# Patient Record
Sex: MALE | Race: ASIAN | NOT HISPANIC OR LATINO | URBAN - METROPOLITAN AREA
[De-identification: names, ages, dates, MRNs, and addresses within clinical notes are randomized per-mention and may not be internally consistent; named-entity substitution may affect disease eponyms.]

---

## 2021-01-19 ENCOUNTER — INPATIENT (INPATIENT)
Facility: HOSPITAL | Age: 56
LOS: 11 days | Discharge: HOME | End: 2021-01-31
Attending: INTERNAL MEDICINE | Admitting: INTERNAL MEDICINE
Payer: COMMERCIAL

## 2021-01-19 VITALS
DIASTOLIC BLOOD PRESSURE: 70 MMHG | SYSTOLIC BLOOD PRESSURE: 107 MMHG | OXYGEN SATURATION: 97 % | RESPIRATION RATE: 17 BRPM | TEMPERATURE: 98 F | HEART RATE: 93 BPM

## 2021-01-19 DIAGNOSIS — R62.7 ADULT FAILURE TO THRIVE: ICD-10-CM

## 2021-01-19 LAB
ALBUMIN SERPL ELPH-MCNC: 3 G/DL — LOW (ref 3.3–5)
ALP SERPL-CCNC: 361 U/L — HIGH (ref 40–120)
ALT FLD-CCNC: 39 U/L — SIGNIFICANT CHANGE UP (ref 4–41)
ANION GAP SERPL CALC-SCNC: 13 MMOL/L — SIGNIFICANT CHANGE UP (ref 7–14)
APTT BLD: 31.8 SEC — SIGNIFICANT CHANGE UP (ref 27–36.3)
AST SERPL-CCNC: 110 U/L — HIGH (ref 4–40)
BASOPHILS # BLD AUTO: 0 K/UL — SIGNIFICANT CHANGE UP (ref 0–0.2)
BASOPHILS NFR BLD AUTO: 0 % — SIGNIFICANT CHANGE UP (ref 0–2)
BILIRUB SERPL-MCNC: 0.4 MG/DL — SIGNIFICANT CHANGE UP (ref 0.2–1.2)
BUN SERPL-MCNC: 21 MG/DL — SIGNIFICANT CHANGE UP (ref 7–23)
CALCIUM SERPL-MCNC: 8.7 MG/DL — SIGNIFICANT CHANGE UP (ref 8.4–10.5)
CHLORIDE SERPL-SCNC: 101 MMOL/L — SIGNIFICANT CHANGE UP (ref 98–107)
CO2 SERPL-SCNC: 21 MMOL/L — LOW (ref 22–31)
CREAT SERPL-MCNC: 0.81 MG/DL — SIGNIFICANT CHANGE UP (ref 0.5–1.3)
EOSINOPHIL # BLD AUTO: 0 K/UL — SIGNIFICANT CHANGE UP (ref 0–0.5)
EOSINOPHIL NFR BLD AUTO: 0 % — SIGNIFICANT CHANGE UP (ref 0–6)
GLUCOSE SERPL-MCNC: 125 MG/DL — HIGH (ref 70–99)
HCT VFR BLD CALC: 34.4 % — LOW (ref 39–50)
HGB BLD-MCNC: 10.2 G/DL — LOW (ref 13–17)
IANC: 9.5 K/UL — HIGH (ref 1.5–8.5)
INR BLD: 1.29 RATIO — HIGH (ref 0.88–1.16)
LYMPHOCYTES # BLD AUTO: 1.03 K/UL — SIGNIFICANT CHANGE UP (ref 1–3.3)
LYMPHOCYTES # BLD AUTO: 8.8 % — LOW (ref 13–44)
MAGNESIUM SERPL-MCNC: 2.7 MG/DL — HIGH (ref 1.6–2.6)
MCHC RBC-ENTMCNC: 19.2 PG — LOW (ref 27–34)
MCHC RBC-ENTMCNC: 29.7 GM/DL — LOW (ref 32–36)
MCV RBC AUTO: 64.8 FL — LOW (ref 80–100)
MONOCYTES # BLD AUTO: 0.71 K/UL — SIGNIFICANT CHANGE UP (ref 0–0.9)
MONOCYTES NFR BLD AUTO: 6.1 % — SIGNIFICANT CHANGE UP (ref 2–14)
NEUTROPHILS # BLD AUTO: 9.77 K/UL — HIGH (ref 1.8–7.4)
NEUTROPHILS NFR BLD AUTO: 78.1 % — HIGH (ref 43–77)
PHOSPHATE SERPL-MCNC: 3.6 MG/DL — SIGNIFICANT CHANGE UP (ref 2.5–4.5)
PLATELET # BLD AUTO: 331 K/UL — SIGNIFICANT CHANGE UP (ref 150–400)
POTASSIUM SERPL-MCNC: 5 MMOL/L — SIGNIFICANT CHANGE UP (ref 3.5–5.3)
POTASSIUM SERPL-SCNC: 5 MMOL/L — SIGNIFICANT CHANGE UP (ref 3.5–5.3)
PROT SERPL-MCNC: 7.6 G/DL — SIGNIFICANT CHANGE UP (ref 6–8.3)
PROTHROM AB SERPL-ACNC: 14.5 SEC — HIGH (ref 10.6–13.6)
RBC # BLD: 5.31 M/UL — SIGNIFICANT CHANGE UP (ref 4.2–5.8)
RBC # FLD: 18.1 % — HIGH (ref 10.3–14.5)
SARS-COV-2 RNA SPEC QL NAA+PROBE: SIGNIFICANT CHANGE UP
SODIUM SERPL-SCNC: 135 MMOL/L — SIGNIFICANT CHANGE UP (ref 135–145)
WBC # BLD: 11.71 K/UL — HIGH (ref 3.8–10.5)
WBC # FLD AUTO: 11.71 K/UL — HIGH (ref 3.8–10.5)

## 2021-01-19 PROCEDURE — 99285 EMERGENCY DEPT VISIT HI MDM: CPT

## 2021-01-19 RX ORDER — SODIUM CHLORIDE 9 MG/ML
1000 INJECTION INTRAMUSCULAR; INTRAVENOUS; SUBCUTANEOUS ONCE
Refills: 0 | Status: COMPLETED | OUTPATIENT
Start: 2021-01-19 | End: 2021-01-19

## 2021-01-19 RX ADMIN — SODIUM CHLORIDE 1000 MILLILITER(S): 9 INJECTION INTRAMUSCULAR; INTRAVENOUS; SUBCUTANEOUS at 18:20

## 2021-01-19 NOTE — ED PROVIDER NOTE - OBJECTIVE STATEMENT
56 yo m pmh colon CA mets to liver and lungs, pw syncope. pt was dx w/ ca one month prior at OSH after having abd pain and GIB. reports was following up w/ pcp today and had syncopal event in office and in bathroom. unclear if before/after using bathroom. reports having prodromal sx including lightheadedness. reports intermittent cp/sob over last month. none present today. reports 25 lb weight loss over last month. denies n/v, f/c.

## 2021-01-19 NOTE — ED PROVIDER NOTE - ATTENDING CONTRIBUTION TO CARE
Abhijeet BAZZI: I agree with the above provided history and exam and addend/modify it as follows.    55M w/ pmh colon CA (newly diagnosed as of 2 wks ago) - p/w generalized weakness x 2 wks, as well as pre-syncopal episode today - was walking to bathroom, felt very weak and dizzy, grabbed onto railing and slowly let himself down onto the ground, denies hitting head / injuring any part of his body, denies passing out at all. Denies any other recent complaints, no cough, fever, vomiting, abd pain, chest pain.     On exam patient appears pale, weak, moving all extremities spontaneously, no abd TTP, lungs CTAB.     Plan to check syncopal workup. Concern for FTT, pt may need to be admitted for further workup and management, but also unable to care for himself at home at this time    I Josue Jha MD performed a history and physical exam of the patient and discussed their management with the resident and /or advanced care provider. I reviewed the resident and /or ACP's note and agree with the documented findings and plan of care. My medical decision making and observations are found above.

## 2021-01-19 NOTE — ED PROVIDER NOTE - NS ED ROS FT
GENERAL: No fever or chills, //             EYES: no change in vision, //             HEENT: no trouble swallowing or speaking, //             CARDIAC: no chest pain, //              PULMONARY:  SOB, //             GI: no abdominal pain, no nausea or no vomiting, no diarrhea or constipation, //             : No changes in urination,  //            SKIN: no rashes,  //            NEURO: no headache,  //             MSK: No joint pain otherwise as HPI or negative. ~David Pike DO PGY3

## 2021-01-19 NOTE — ED ADULT TRIAGE NOTE - CHIEF COMPLAINT QUOTE
Pt. brought in by EMS from [facility] sent in for near syncopal episode at home and MD office. Pt. found to be hypotensive at MD office, given 500cc bolus. Pt. recently diagnosed with colon CA, has been experiencing FTT [id number]. Pt. states he lost 25 lbs in the past month. Noted IV in RAC obtained at [facility]D office. Pt. brought in by EMS from [facility] sent in for near syncopal episode at home and MD office. Pt. found to be hypotensive at MD office, given 500cc bolus. Pt. recently diagnosed with colon CA, has been experiencing FTT [id number]. Pt. states he lost 25 lbs in the past month. fingerstick of 130 done by EMS at [facility] office. Noted IV in RAC obtained at PMD office.

## 2021-01-19 NOTE — ED PROVIDER NOTE - CLINICAL SUMMARY MEDICAL DECISION MAKING FREE TEXT BOX
yousuf pgy3: 56 yo m w/ recent dx of metastatic colon ca pw new syncope. no prior hx. no recent cardiac w/u. no reported cad hx in family. possible volume down due to recent weight loss. will check labs, imaging, likey tba. pt w/ no tachycardia or dyspnea upon current eval however at risk of PE, will eval.

## 2021-01-19 NOTE — ED PROVIDER NOTE - PHYSICAL EXAMINATION
General: well appearing, interactive, well nourished, no apparent distress, ncat  HEENT: EOMI, PERRLA, normal mucosa, normal oropharynx, no lesions on the lips or on oral mucosa, normal external ear  Neck: supple, no lymphadenopathy, full range of motion, no nuchal rigidity  CV: RRR, normal S1 and S2 with no murmur, capillary refill less than two seconds, pp 2+   Resp: lungs CTA b/l, good aeration bilaterally, symmetric chest wall   Abd: non-distended, soft, non-tender  : no CVA tenderness  MSK: full range of motion, no cyanosis, no edema, no clubbing, no immobility  Neuro: CN II-XII grossly intact, muscle strength 5/5 in all extremities, silt in all extremities   Skin: no rashes, skin intact

## 2021-01-19 NOTE — ED ADULT NURSE REASSESSMENT NOTE - NS ED NURSE REASSESS COMMENT FT1
Received report from JAMIR Fragoso. Pt resting comfortably at this time, resp. even and unlabored. Pt states he has been feeling weak x2-4 weeks. Denies CP, SOB, HA, dizziness, and abd. pain. Awaiting CT scan results. VS as noted. NSR on the CM. NAD. Will continue to monitor.

## 2021-01-19 NOTE — ED ADULT NURSE NOTE - CHIEF COMPLAINT QUOTE
Pt. brought in by EMS from [facility] sent in for near syncopal episode at home and MD office. Pt. found to be hypotensive at MD office, given 500cc bolus. Pt. recently diagnosed with colon CA, has been experiencing FTT [id number]. Pt. states he lost 25 lbs in the past month. fingerstick of 130 done by EMS at [facility] office. Noted IV in RAC obtained at [facility]D office.

## 2021-01-19 NOTE — ED ADULT NURSE NOTE - OBJECTIVE STATEMENT
pt received spot 5. pt A+Ox3 recently diagnosed with colon ca with mets to liver and lungs. s/p syncopal episode today at pmd office. denies cp/sob. respirations even and unlabored. placed on cm. NSR noted. IVSL in place. labs drawn and sent. awaiting further orders. will monitor.

## 2021-01-20 DIAGNOSIS — C18.9 MALIGNANT NEOPLASM OF COLON, UNSPECIFIED: ICD-10-CM

## 2021-01-20 DIAGNOSIS — D72.829 ELEVATED WHITE BLOOD CELL COUNT, UNSPECIFIED: ICD-10-CM

## 2021-01-20 DIAGNOSIS — R55 SYNCOPE AND COLLAPSE: ICD-10-CM

## 2021-01-20 DIAGNOSIS — R94.5 ABNORMAL RESULTS OF LIVER FUNCTION STUDIES: ICD-10-CM

## 2021-01-20 DIAGNOSIS — R62.7 ADULT FAILURE TO THRIVE: ICD-10-CM

## 2021-01-20 DIAGNOSIS — Z29.9 ENCOUNTER FOR PROPHYLACTIC MEASURES, UNSPECIFIED: ICD-10-CM

## 2021-01-20 DIAGNOSIS — R50.9 FEVER, UNSPECIFIED: ICD-10-CM

## 2021-01-20 DIAGNOSIS — D64.9 ANEMIA, UNSPECIFIED: ICD-10-CM

## 2021-01-20 LAB
ALBUMIN SERPL ELPH-MCNC: 2.2 G/DL — LOW (ref 3.3–5)
ALP SERPL-CCNC: 306 U/L — HIGH (ref 40–120)
ALT FLD-CCNC: 34 U/L — SIGNIFICANT CHANGE UP (ref 4–41)
ANION GAP SERPL CALC-SCNC: 6 MMOL/L — LOW (ref 7–14)
APTT BLD: 32.2 SEC — SIGNIFICANT CHANGE UP (ref 27–36.3)
AST SERPL-CCNC: 148 U/L — HIGH (ref 4–40)
B PERT DNA SPEC QL NAA+PROBE: SIGNIFICANT CHANGE UP
BASOPHILS # BLD AUTO: 0.03 K/UL — SIGNIFICANT CHANGE UP (ref 0–0.2)
BASOPHILS NFR BLD AUTO: 0.3 % — SIGNIFICANT CHANGE UP (ref 0–2)
BILIRUB SERPL-MCNC: 0.5 MG/DL — SIGNIFICANT CHANGE UP (ref 0.2–1.2)
BUN SERPL-MCNC: 15 MG/DL — SIGNIFICANT CHANGE UP (ref 7–23)
C PNEUM DNA SPEC QL NAA+PROBE: SIGNIFICANT CHANGE UP
CALCIUM SERPL-MCNC: 8 MG/DL — LOW (ref 8.4–10.5)
CHLORIDE SERPL-SCNC: 106 MMOL/L — SIGNIFICANT CHANGE UP (ref 98–107)
CO2 SERPL-SCNC: 19 MMOL/L — LOW (ref 22–31)
CREAT SERPL-MCNC: 0.63 MG/DL — SIGNIFICANT CHANGE UP (ref 0.5–1.3)
CRP SERPL-MCNC: 105 MG/L — HIGH
EOSINOPHIL # BLD AUTO: 0.03 K/UL — SIGNIFICANT CHANGE UP (ref 0–0.5)
EOSINOPHIL NFR BLD AUTO: 0.3 % — SIGNIFICANT CHANGE UP (ref 0–6)
FERRITIN SERPL-MCNC: 314 NG/ML — SIGNIFICANT CHANGE UP (ref 30–400)
FLUAV H1 2009 PAND RNA SPEC QL NAA+PROBE: SIGNIFICANT CHANGE UP
FLUAV H1 RNA SPEC QL NAA+PROBE: SIGNIFICANT CHANGE UP
FLUAV H3 RNA SPEC QL NAA+PROBE: SIGNIFICANT CHANGE UP
FLUAV SUBTYP SPEC NAA+PROBE: SIGNIFICANT CHANGE UP
FLUBV RNA SPEC QL NAA+PROBE: SIGNIFICANT CHANGE UP
FOLATE SERPL-MCNC: 18.2 NG/ML — HIGH (ref 3.1–17.5)
GLUCOSE BLDC GLUCOMTR-MCNC: 77 MG/DL — SIGNIFICANT CHANGE UP (ref 70–99)
GLUCOSE BLDC GLUCOMTR-MCNC: 79 MG/DL — SIGNIFICANT CHANGE UP (ref 70–99)
GLUCOSE SERPL-MCNC: 73 MG/DL — SIGNIFICANT CHANGE UP (ref 70–99)
HADV DNA SPEC QL NAA+PROBE: SIGNIFICANT CHANGE UP
HAPTOGLOB SERPL-MCNC: 393 MG/DL — HIGH (ref 34–200)
HCOV PNL SPEC NAA+PROBE: SIGNIFICANT CHANGE UP
HCT VFR BLD CALC: 32.8 % — LOW (ref 39–50)
HCV AB S/CO SERPL IA: 0.13 S/CO — SIGNIFICANT CHANGE UP (ref 0–0.99)
HCV AB SERPL-IMP: SIGNIFICANT CHANGE UP
HGB BLD-MCNC: 9.9 G/DL — LOW (ref 13–17)
HMPV RNA SPEC QL NAA+PROBE: SIGNIFICANT CHANGE UP
HPIV1 RNA SPEC QL NAA+PROBE: SIGNIFICANT CHANGE UP
HPIV2 RNA SPEC QL NAA+PROBE: SIGNIFICANT CHANGE UP
HPIV3 RNA SPEC QL NAA+PROBE: SIGNIFICANT CHANGE UP
HPIV4 RNA SPEC QL NAA+PROBE: SIGNIFICANT CHANGE UP
IANC: 7.87 K/UL — SIGNIFICANT CHANGE UP (ref 1.5–8.5)
IMM GRANULOCYTES NFR BLD AUTO: 0.4 % — SIGNIFICANT CHANGE UP (ref 0–1.5)
INR BLD: 1.24 RATIO — HIGH (ref 0.88–1.16)
IRON SATN MFR SERPL: 34 UG/DL — LOW (ref 45–165)
IRON SATN MFR SERPL: 9 % — LOW (ref 14–50)
LDH SERPL L TO P-CCNC: 942 U/L — HIGH (ref 135–225)
LYMPHOCYTES # BLD AUTO: 1.95 K/UL — SIGNIFICANT CHANGE UP (ref 1–3.3)
LYMPHOCYTES # BLD AUTO: 18.7 % — SIGNIFICANT CHANGE UP (ref 13–44)
MAGNESIUM SERPL-MCNC: 2.5 MG/DL — SIGNIFICANT CHANGE UP (ref 1.6–2.6)
MCHC RBC-ENTMCNC: 19.3 PG — LOW (ref 27–34)
MCHC RBC-ENTMCNC: 30.2 GM/DL — LOW (ref 32–36)
MCV RBC AUTO: 64.1 FL — LOW (ref 80–100)
MONOCYTES # BLD AUTO: 0.53 K/UL — SIGNIFICANT CHANGE UP (ref 0–0.9)
MONOCYTES NFR BLD AUTO: 5.1 % — SIGNIFICANT CHANGE UP (ref 2–14)
NEUTROPHILS # BLD AUTO: 7.87 K/UL — HIGH (ref 1.8–7.4)
NEUTROPHILS NFR BLD AUTO: 75.2 % — SIGNIFICANT CHANGE UP (ref 43–77)
NRBC # BLD: 0 /100 WBCS — SIGNIFICANT CHANGE UP
NRBC # FLD: 0 K/UL — SIGNIFICANT CHANGE UP
PHOSPHATE SERPL-MCNC: 3.6 MG/DL — SIGNIFICANT CHANGE UP (ref 2.5–4.5)
PLATELET # BLD AUTO: 309 K/UL — SIGNIFICANT CHANGE UP (ref 150–400)
POTASSIUM SERPL-MCNC: SIGNIFICANT CHANGE UP MMOL/L (ref 3.5–5.3)
POTASSIUM SERPL-SCNC: SIGNIFICANT CHANGE UP MMOL/L (ref 3.5–5.3)
PROCALCITONIN SERPL-MCNC: 1.35 NG/ML — HIGH (ref 0.02–0.1)
PROT SERPL-MCNC: 6.8 G/DL — SIGNIFICANT CHANGE UP (ref 6–8.3)
PROTHROM AB SERPL-ACNC: 14.1 SEC — HIGH (ref 10.6–13.6)
PSA FREE FLD-MCNC: 0.29 NG/ML — SIGNIFICANT CHANGE UP
PSA FREE FLD-MCNC: 21 % — SIGNIFICANT CHANGE UP
PSA SERPL-MCNC: 1.34 NG/ML — SIGNIFICANT CHANGE UP (ref 0–4)
RAPID RVP RESULT: SIGNIFICANT CHANGE UP
RAPID RVP RESULT: SIGNIFICANT CHANGE UP
RBC # BLD: 5.12 M/UL — SIGNIFICANT CHANGE UP (ref 4.2–5.8)
RBC # BLD: 5.12 M/UL — SIGNIFICANT CHANGE UP (ref 4.2–5.8)
RBC # FLD: 18.5 % — HIGH (ref 10.3–14.5)
RETICS #: 34 K/UL — SIGNIFICANT CHANGE UP (ref 25–125)
RETICS/RBC NFR: 0.7 % — SIGNIFICANT CHANGE UP (ref 0.5–2.5)
RSV RNA SPEC QL NAA+PROBE: SIGNIFICANT CHANGE UP
RV+EV RNA SPEC QL NAA+PROBE: SIGNIFICANT CHANGE UP
SARS-COV-2 RNA SPEC QL NAA+PROBE: SIGNIFICANT CHANGE UP
SODIUM SERPL-SCNC: 131 MMOL/L — LOW (ref 135–145)
TIBC SERPL-MCNC: 358 UG/DL — SIGNIFICANT CHANGE UP (ref 220–430)
TROPONIN T, HIGH SENSITIVITY RESULT: 7 NG/L — SIGNIFICANT CHANGE UP
UIBC SERPL-MCNC: 324 UG/DL — SIGNIFICANT CHANGE UP (ref 110–370)
VIT B12 SERPL-MCNC: 1895 PG/ML — HIGH (ref 200–900)
WBC # BLD: 10.45 K/UL — SIGNIFICANT CHANGE UP (ref 3.8–10.5)
WBC # FLD AUTO: 10.45 K/UL — SIGNIFICANT CHANGE UP (ref 3.8–10.5)

## 2021-01-20 PROCEDURE — 99222 1ST HOSP IP/OBS MODERATE 55: CPT | Mod: GC

## 2021-01-20 PROCEDURE — 99223 1ST HOSP IP/OBS HIGH 75: CPT

## 2021-01-20 PROCEDURE — 99446 NTRPROF PH1/NTRNET/EHR 5-10: CPT | Mod: GC

## 2021-01-20 PROCEDURE — 12345: CPT | Mod: NC

## 2021-01-20 RX ORDER — SODIUM CHLORIDE 9 MG/ML
1000 INJECTION INTRAMUSCULAR; INTRAVENOUS; SUBCUTANEOUS
Refills: 0 | Status: DISCONTINUED | OUTPATIENT
Start: 2021-01-20 | End: 2021-01-31

## 2021-01-20 RX ORDER — ENOXAPARIN SODIUM 100 MG/ML
40 INJECTION SUBCUTANEOUS DAILY
Refills: 0 | Status: DISCONTINUED | OUTPATIENT
Start: 2021-01-20 | End: 2021-01-24

## 2021-01-20 RX ORDER — PIPERACILLIN AND TAZOBACTAM 4; .5 G/20ML; G/20ML
3.38 INJECTION, POWDER, LYOPHILIZED, FOR SOLUTION INTRAVENOUS EVERY 8 HOURS
Refills: 0 | Status: DISCONTINUED | OUTPATIENT
Start: 2021-01-20 | End: 2021-01-26

## 2021-01-20 RX ORDER — ASCORBIC ACID 60 MG
500 TABLET,CHEWABLE ORAL DAILY
Refills: 0 | Status: DISCONTINUED | OUTPATIENT
Start: 2021-01-20 | End: 2021-01-31

## 2021-01-20 RX ORDER — AZITHROMYCIN 500 MG/1
500 TABLET, FILM COATED ORAL ONCE
Refills: 0 | Status: COMPLETED | OUTPATIENT
Start: 2021-01-20 | End: 2021-01-20

## 2021-01-20 RX ORDER — PIPERACILLIN AND TAZOBACTAM 4; .5 G/20ML; G/20ML
3.38 INJECTION, POWDER, LYOPHILIZED, FOR SOLUTION INTRAVENOUS ONCE
Refills: 0 | Status: COMPLETED | OUTPATIENT
Start: 2021-01-20 | End: 2021-01-20

## 2021-01-20 RX ORDER — AZITHROMYCIN 500 MG/1
TABLET, FILM COATED ORAL
Refills: 0 | Status: DISCONTINUED | OUTPATIENT
Start: 2021-01-20 | End: 2021-01-24

## 2021-01-20 RX ORDER — AZITHROMYCIN 500 MG/1
500 TABLET, FILM COATED ORAL EVERY 24 HOURS
Refills: 0 | Status: DISCONTINUED | OUTPATIENT
Start: 2021-01-21 | End: 2021-01-24

## 2021-01-20 RX ORDER — ACETAMINOPHEN 500 MG
650 TABLET ORAL ONCE
Refills: 0 | Status: COMPLETED | OUTPATIENT
Start: 2021-01-20 | End: 2021-01-20

## 2021-01-20 RX ORDER — FERROUS SULFATE 325(65) MG
325 TABLET ORAL DAILY
Refills: 0 | Status: DISCONTINUED | OUTPATIENT
Start: 2021-01-20 | End: 2021-01-31

## 2021-01-20 RX ADMIN — PIPERACILLIN AND TAZOBACTAM 200 GRAM(S): 4; .5 INJECTION, POWDER, LYOPHILIZED, FOR SOLUTION INTRAVENOUS at 08:51

## 2021-01-20 RX ADMIN — PIPERACILLIN AND TAZOBACTAM 25 GRAM(S): 4; .5 INJECTION, POWDER, LYOPHILIZED, FOR SOLUTION INTRAVENOUS at 22:31

## 2021-01-20 RX ADMIN — SODIUM CHLORIDE 125 MILLILITER(S): 9 INJECTION INTRAMUSCULAR; INTRAVENOUS; SUBCUTANEOUS at 04:09

## 2021-01-20 RX ADMIN — Medication 500 MILLIGRAM(S): at 13:12

## 2021-01-20 RX ADMIN — Medication 650 MILLIGRAM(S): at 22:53

## 2021-01-20 RX ADMIN — PIPERACILLIN AND TAZOBACTAM 25 GRAM(S): 4; .5 INJECTION, POWDER, LYOPHILIZED, FOR SOLUTION INTRAVENOUS at 13:12

## 2021-01-20 RX ADMIN — ENOXAPARIN SODIUM 40 MILLIGRAM(S): 100 INJECTION SUBCUTANEOUS at 13:12

## 2021-01-20 RX ADMIN — Medication 325 MILLIGRAM(S): at 13:12

## 2021-01-20 RX ADMIN — AZITHROMYCIN 500 MILLIGRAM(S): 500 TABLET, FILM COATED ORAL at 09:52

## 2021-01-20 NOTE — ED ADULT NURSE REASSESSMENT NOTE - NS ED NURSE REASSESS COMMENT FT1
Report given to JAMIR Gonzáles in [facility]. Pt resting comfortably, resp. even and unlabored. Denies CP, SOB, HA, and dizziness. VS as noted. NSR on the CM. NAD. Transported to [facility].

## 2021-01-20 NOTE — CHART NOTE - NSCHARTNOTEFT_GEN_A_CORE
friday    Vascular & Interventional Radiology Brief Consult Note    Evaluate for Procedure: Liver Biopsy    HPI: 55y Male with PMH of vitiligo, with recent CT A/P showing mass in distal transverse colon with hepatic and right lower lobe metastases now presenting to the hospital for pre-syncopal episode likely 2/2 orthostatic hypotension. Patient reports near-syncopal episode when walking to the bathroom to urinate at night.     Allergies:   Medications (Abx/Cardiac/Anticoagulation/Blood Products)    azithromycin  IVPB: 500 milliGRAM(s) IV Intermittent (01-20 @ 09:52)  enoxaparin Injectable: 40 milliGRAM(s) SubCutaneous (01-20 @ 13:12)  piperacillin/tazobactam IVPB.: 200 mL/Hr IV Intermittent (01-20 @ 08:51)  piperacillin/tazobactam IVPB..: 25 mL/Hr IV Intermittent (01-20 @ 13:12)    Data:  167.6  51.7  T(C): 37.1  HR: 80  BP: 96/66  RR: 18  SpO2: 100%    -WBC 10.45 / HgB 9.9 / Hct 32.8 / Plt 309  -Na 131 / Cl 106 / BUN 15 / Glucose 73  -K TNP / CO2 19 / Cr 0.63  -ALT 34 / Alk Phos 306 / T.Bili 0.5  -INR 1.24 / PTT 32.2    Imaging: CT chest 1/19/2021: Hepatic hypodensities in keeping with known metastatic disease.    Assessment/Plan:   -55y Male with mass in distal transverse colon with hepatic and right lower lobe metastases     Plan:   CT biopsy liver masses on Friday.    -Please place order for IR Procedure, approving attending Dr. Parisi  -NPO past midnight Friday  -hold therapeutic and prophylactic anticoagulants (LVWH prophylaxis must be held 12 h prior and can be resumed 24 hours after procedure)  -maintain active type and screen x 2 Vascular & Interventional Radiology Brief Consult Note    Evaluate for Procedure: Liver Biopsy    HPI: 55y Male with PMH of vitiligo, with recent CT A/P showing mass in distal transverse colon with hepatic and right lower lobe metastases now presenting to the hospital for pre-syncopal episode likely 2/2 orthostatic hypotension. Patient reports near-syncopal episode when walking to the bathroom to urinate at night.     Allergies:   Medications (Abx/Cardiac/Anticoagulation/Blood Products)    azithromycin  IVPB: 500 milliGRAM(s) IV Intermittent (01-20 @ 09:52)  enoxaparin Injectable: 40 milliGRAM(s) SubCutaneous (01-20 @ 13:12)  piperacillin/tazobactam IVPB.: 200 mL/Hr IV Intermittent (01-20 @ 08:51)  piperacillin/tazobactam IVPB..: 25 mL/Hr IV Intermittent (01-20 @ 13:12)    Data:  167.6  51.7  T(C): 37.1  HR: 80  BP: 96/66  RR: 18  SpO2: 100%    -WBC 10.45 / HgB 9.9 / Hct 32.8 / Plt 309  -Na 131 / Cl 106 / BUN 15 / Glucose 73  -K TNP / CO2 19 / Cr 0.63  -ALT 34 / Alk Phos 306 / T.Bili 0.5  -INR 1.24 / PTT 32.2    Imaging: CT chest 1/19/2021: Hepatic hypodensities in keeping with known metastatic disease.    Assessment/Plan:   -55y Male with mass in distal transverse colon with hepatic and right lower lobe metastases     Plan:   Patient is currently on COVID precautions.    Will defer biopsy for now.  If patient is off of COVID precautions, please reconsult as needed or page [phone].

## 2021-01-20 NOTE — CONSULT NOTE ADULT - ASSESSMENT
56 yo man with history of vitiligo and recent finding of a mass in the distal transverse colon with hepatic and RLL metastatic disease (on CTAP on 1/5/21) presents with an episode of near syncope early Tuesday morning. Patient currently with no obstructive symptoms.     - No indication for acute surgical intervention   - GI consult for possible colonoscopy, tissue bx   - medical oncology consult   - Obtain tumor markers , CEA   - Discussed with Dr. Fernie Rao PGY2  B [facility]   [id number]  56 yo man with history of vitiligo and recent finding of a mass in the distal transverse colon with hepatic and RLL metastatic disease (on CTAP on 1/5/21) presents with an episode of near syncope early Tuesday morning. Patient currently with no obstructive symptoms.     - No indication for acute surgical intervention   - GI consult for possible colonoscopy, tissue bx   - medical oncology consult   - Obtain tumor markers , CEA  - Obtain OSH CT A/P for further review of imaging    - Discussed with Dr. Fernie Rao PGY2  B Team [facility]   [id number]

## 2021-01-20 NOTE — H&P ADULT - PROBLEM SELECTOR PLAN 2
Likely 2/2 metastatic disease from colon cancer. Pt with 2 days of fevers at home. On admission, pt initially afebrile but later had fever of 101.2F. Did not meet SIRS criteria. CTA chest with no PE but with peripheral based groundglass opacities in the right lung.   - COVID-19 PCR negative. Will check RVP and repeat COVID-19 swab. If negative, then will start empiric antibiotics with Zosyn and Azithromycin to cover for PNA and abdominal source given colonic mass and liver mets.  - F/u blood cxs  - F/u UA and urine cx, though pt denies any urinary symptoms  - Check procalcitonin, other COVID-19 inflammatory markers Pt with 2 days of fevers at home. On admission, pt initially afebrile but later had fever of 101.2F. Did not meet SIRS criteria, with leukocytosis of 11.71. CTA chest with no PE but with peripheral based groundglass opacities in the right lung.   - COVID-19 PCR negative. Will check RVP and repeat COVID-19 swab. If negative, then will start empiric antibiotics with Zosyn and Azithromycin to cover for PNA and abdominal source given colonic mass and liver mets.  - F/u blood cxs  - F/u UA and urine cx, though pt denies any urinary symptoms  - Check procalcitonin, other COVID-19 inflammatory markers

## 2021-01-20 NOTE — H&P ADULT - HISTORY OF PRESENT ILLNESS
54 yo man with recent finding of a mass in the distal transverse colon with hepatic and RLL metastatic disease (on CTAP on 1/5/21) presents with an episode of near syncope early Tuesday morning. Pt went to [facility] ED on 1/4/21 due to weeks of lower abdominal pain and was found to have a stool guaiac that was positive in the setting of anemia with Hgb 8.8. A CTAP that was performed during the ED visit showed an approximately 5.5 x 3.6 cm mass in the distal transverse colon, an enlarged prostate with distended bladder and mid prostate enhancement, and diffuse enhancing lesions throughout the liver and cluster of nodules at RLL consistent with metastatic disease. Pt was scheduled to have follow up   54 yo man with recent finding of a mass in the distal transverse colon with hepatic and RLL metastatic disease (on CTAP on 1/5/21) presents with an episode of near syncope early Tuesday morning. Pt went to [facility] ED on 1/4/21 due to weeks of lower abdominal pain and was found to have a stool guaiac that was positive in the setting of anemia with Hgb 8.8. A CTAP that was performed during the ED visit showed an approximately 5.5 x 3.6 cm mass in the distal transverse colon, an enlarged prostate with distended bladder and mid prostate enhancement, and diffuse enhancing lesions throughout the liver and cluster of nodules at RLL consistent with metastatic disease. Pt was scheduled to have follow up in early February and hasn't seen anyone yet for the findings. Pt went to see his PCP on Tuesday, however, after he almost passed out when he went to the bathroom to urinate in the middle of the night. Pt was feeling lightheaded/faint at the time and was about to fall to the ground when he caught himself by grabbing on to the door. Pt denies any LOC, head trauma, or fall. Pt again felt lightheaded/faint when he was at his PCP's office later in the day. No associated chest pain, palpitations, headaches, vision changes, numbness, tingling, diaphoresis,  56 yo man with history of vitiligo and recent finding of a mass in the distal transverse colon with hepatic and RLL metastatic disease (on CTAP on 1/5/21) presents with an episode of near syncope early Tuesday morning. Pt went to [facility] ED on 1/4/21 due to weeks of lower abdominal pain and was found to have a stool guaiac that was positive in the setting of anemia with Hgb 8.8. A CTAP that was performed during the ED visit showed an approximately 5.5 x 3.6 cm mass in the distal transverse colon, an enlarged prostate with distended bladder and mid prostate enhancement, and diffuse enhancing lesions throughout the liver and cluster of nodules at RLL consistent with metastatic disease. Pt was scheduled to have follow up in early February and hasn't seen anyone yet for the findings. Pt went to see his PCP on Tuesday, however, after he almost passed out when he went to the bathroom to urinate in the middle of the night. Pt was feeling lightheaded/faint at the time and was about to fall to the ground when he caught himself by grabbing on to the door. Pt denies any LOC, head trauma, or fall. Pt again felt lightheaded/faint when he was at his PCP's office later in the day. No associated chest pain, palpitations, headaches, vision changes, numbness, tingling, or diaphoresis. Pt notes that over the past 2-3 weeks, he has lost ~20-25 lbs as a result of a weak appetite and loss of taste. Pt also reports that for the last 1 week, he has been experiencing dyspnea with exertion, getting winded even when walking short distances, such as from his bed to the bathroom. Over the last 2 days, pt has been having intermittent fevers. Denies any cough, current abdominal pain, N/V, diarrhea, constipation, bloody stools, or urinary symptoms. Has had dark greenish stools since starting iron tablets a week ago.    In the ED,   T 98, HR 80-93, BP /54-70, RR 16-18, O2 sats % RA.

## 2021-01-20 NOTE — PROVIDER CONTACT NOTE (OTHER) - ACTION/TREATMENT ORDERED:
Provider notified and made aware. Provider recommends to reattempt PO intake and will order PO Tylenol.  Will continue to monitor.

## 2021-01-20 NOTE — H&P ADULT - NSHPPHYSICALEXAM_GEN_ALL_CORE
Vital Signs Last 24 Hrs  T(C): 36.7 (19 Jan 2021 20:42), Max: 36.7 (19 Jan 2021 15:36)  T(F): 98 (19 Jan 2021 20:42), Max: 98 (19 Jan 2021 15:36)  HR: 80 (20 Jan 2021 00:54) (80 - 93)  BP: 99/69 (20 Jan 2021 00:54) (91/54 - 107/70)  BP(mean): --  RR: 16 (20 Jan 2021 00:54) (16 - 18)  SpO2: 100% (20 Jan 2021 00:54) (97% - 100%)    PHYSICAL EXAM:  General: Awake and alert.  No acute distress.  Head: Normocephalic, atraumatic.    Eyes: PERRL.  EOMI.  No scleral icterus.  No conjunctival pallor.  Mouth: Moist MM.  No oropharyngeal exudates.    Neck: Supple.  Full range of motion.  No JVD.  No LAD.  No thyromegaly.  Trachea midline.    Heart: RRR.  Normal S1 and S2.  No murmurs, rubs, or gallops.  No LE edema b/l.   Lungs: Nonlabored breathing.  Good inspiratory effort.  CTAB.  No wheezes, crackles, or rhonchi.    Abdomen: BS+, soft, NT/ND.  No hepatomegaly.   Skin: Warm and dry.  No rashes.  Extremities: No cyanosis.  2+ peripheral pulses b/l.  Musculoskeletal: No joint deformities.  No spinal or paraspinal tenderness.  Neuro: A&Ox3.  CN II-XII intact.  5/5 motor strength in UE and LE b/l.  Tactile sensation intact in UE and LE b/l.  Cerebellar function intact as assessed by finger-to-nose test. Vital Signs Last 24 Hrs  T(C): 36.7 (19 Jan 2021 20:42), Max: 36.7 (19 Jan 2021 15:36)  T(F): 98 (19 Jan 2021 20:42), Max: 98 (19 Jan 2021 15:36)  HR: 80 (20 Jan 2021 00:54) (80 - 93)  BP: 99/69 (20 Jan 2021 00:54) (91/54 - 107/70)  BP(mean): --  RR: 16 (20 Jan 2021 00:54) (16 - 18)  SpO2: 100% (20 Jan 2021 00:54) (97% - 100%)    PHYSICAL EXAM:  General: Frail appearing.  Awake and alert.  No acute distress.  Head: Normocephalic, atraumatic.    Eyes: PERRL.  EOMI.  No scleral icterus.  No conjunctival pallor.  Mouth: Dry MM.  No oropharyngeal exudates.    Neck: Supple.  Full range of motion.  No JVD.  No LAD.  No thyromegaly.  Trachea midline.    Heart: RRR.  Normal S1 and S2.  No murmurs, rubs, or gallops.  No LE edema b/l.   Lungs: Nonlabored breathing.  Good inspiratory effort.  CTAB.  No wheezes, crackles, or rhonchi.    Abdomen: BS+, soft, nontender with light or deep palpation with no rebound or guarding, nondistended.  No hepatomegaly.   Skin: Warm and dry.  No rashes.  Extremities: No cyanosis.  2+ peripheral pulses b/l.  Musculoskeletal: No joint deformities.  No spinal or paraspinal tenderness.  Neuro: A&Ox3.  CN II-XII intact.  5/5 motor strength in UE and LE b/l.  Tactile sensation intact in UE and LE b/l.  No focal deficits.

## 2021-01-20 NOTE — CONSULT NOTE ADULT - SUBJECTIVE AND OBJECTIVE BOX
Patient is a 55y old  Male who presents with a chief complaint of Near syncope (20 Jan 2021 12:46)      HPI:    56 yo man with history of vitiligo and recent finding of a mass in the distal transverse colon with hepatic and RLL metastatic disease (on CTAP on 1/5/21) presents with an episode of near syncope early Tuesday morning. Pt went to [facility] ED on 1/4/21 due to weeks of lower abdominal pain and was found to have a stool guaiac that was positive in the setting of anemia with Hgb 8.8. A CTAP that was performed during the ED visit showed an approximately 5.5 x 3.6 cm mass in the distal transverse colon, an enlarged prostate with distended bladder and mid prostate enhancement, and diffuse enhancing lesions throughout the liver and cluster of nodules at RLL consistent with metastatic disease.     Pt was scheduled to have follow up in early February and hasn't seen anyone yet for the findings. Pt went to see his PCP on Tuesday, however, after he almost passed out when he went to the bathroom to urinate in the middle of the night. Pt was feeling lightheaded/faint at the time and was about to fall to the ground when he caught himself by grabbing on to the door. Pt denies any LOC, head trauma, or fall. Pt again felt lightheaded/faint when he was at his PCP's office later in the day. No associated chest pain, palpitations, headaches, vision changes, numbness, tingling, or diaphoresis. Pt notes that over the past 2-3 weeks, he has lost ~20-25 lbs as a result of a weak appetite and loss of taste. Pt also reports that for the last 1 week, he has been experiencing dyspnea with exertion, getting winded even when walking short distances, such as from his bed to the bathroom. Over the last 2 days, pt has been having intermittent fevers. Denies any cough, current abdominal pain, N/V, diarrhea, constipation, bloody stools, or urinary symptoms. Has had dark greenish stools since starting iron tablets a week ago.    In the ED,   T 98, HR 80-93, BP /54-70, RR 16-18, O2 sats % RA.  (20 Jan 2021 01:18)      REVIEW OF SYSTEMS:    CONSTITUTIONAL: No fever, weight loss, or fatigue  EYES: No eye pain, visual disturbances, or discharge  ENMT:  No sore throat  NECK: No pain or stiffness  RESPIRATORY: No cough, wheezing, chills or hemoptysis; No shortness of breath  CARDIOVASCULAR: No chest pain, palpitations, dizziness, or leg swelling  GASTROINTESTINAL: No abdominal or epigastric pain. No nausea, vomiting, or hematemesis; No diarrhea or constipation. No melena or hematochezia.  GENITOURINARY: No dysuria, frequency, hematuria, or incontinence  NEUROLOGICAL: No headaches, memory loss, loss of strength, numbness, or tremors  SKIN: No itching, burning, rashes, or lesions   LYMPH NODES: No enlarged glands  MUSCULOSKELETAL: No joint pain or swelling; No muscle, back, or extremity pain      PAST MEDICAL & SURGICAL HISTORY:  Vitiligo    Colon cancer    No significant past surgical history        Allergies    No Known Allergies    Intolerances        FAMILY HISTORY:  Family history of type 2 diabetes mellitus    Family history of hypertension    Family history of colon cancer        SOCIAL HISTORY:        MEDICATIONS  (STANDING):  ascorbic acid 500 milliGRAM(s) Oral daily  azithromycin  IVPB      enoxaparin Injectable 40 milliGRAM(s) SubCutaneous daily  ferrous    sulfate 325 milliGRAM(s) Oral daily  piperacillin/tazobactam IVPB.. 3.375 Gram(s) IV Intermittent every 8 hours  sodium chloride 0.9%. 1000 milliLiter(s) (125 mL/Hr) IV Continuous <Continuous>    MEDICATIONS  (PRN):      Vital Signs Last 24 Hrs  T(C): 37.1 (20 Jan 2021 12:50), Max: 38.4 (20 Jan 2021 01:57)  T(F): 98.8 (20 Jan 2021 12:50), Max: 101.2 (20 Jan 2021 01:57)  HR: 80 (20 Jan 2021 12:50) (73 - 93)  BP: 96/66 (20 Jan 2021 12:50) (91/54 - 107/70)  BP(mean): --  RR: 18 (20 Jan 2021 12:50) (16 - 18)  SpO2: 100% (20 Jan 2021 12:50) (96% - 100%)    PHYSICAL EXAM:    GENERAL: NAD, well-groomed  HEAD:  Atraumatic, Normocephalic  EYES: EOMI, PERRLA, conjunctiva and sclera clear  ENMT: No tonsillar erythema, exudates, or enlargement; Moist mucous membranes  NECK: Supple, No JVD  CHEST/LUNG: Clear to percussion bilaterally; No rales, rhonchi, wheezing, or rubs  HEART: Regular rate and rhythm; No murmurs, rubs, or gallops  ABDOMEN: Soft, Nontender, Nondistended; Bowel sounds present  EXTREMITIES:  2+ Peripheral Pulses, No clubbing, cyanosis, or edema  LYMPH: No lymphadenopathy noted  SKIN: No rashes or lesions    LABS:  CBC Full  -  ( 20 Jan 2021 06:26 )  WBC Count : 10.45 K/uL  RBC Count : 5.12 M/uL  Hemoglobin : 9.9 g/dL  Hematocrit : 32.8 %  Platelet Count - Automated : 309 K/uL  Mean Cell Volume : 64.1 fL  Mean Cell Hemoglobin : 19.3 pg  Mean Cell Hemoglobin Concentration : 30.2 gm/dL  Auto Neutrophil # : 7.87 K/uL  Auto Lymphocyte # : 1.95 K/uL  Auto Monocyte # : 0.53 K/uL  Auto Eosinophil # : 0.03 K/uL  Auto Basophil # : 0.03 K/uL  Auto Neutrophil % : 75.2 %  Auto Lymphocyte % : 18.7 %  Auto Monocyte % : 5.1 %  Auto Eosinophil % : 0.3 %  Auto Basophil % : 0.3 %      01-20    131<L>  |  106  |  15  ----------------------------<  73  TNP   |  19<L>  |  0.63    Ca    8.0<L>      20 Jan 2021 06:26  Phos  3.6     01-20  Mg     2.5     01-20    TPro  6.8  /  Alb  2.2<L>  /  TBili  0.5  /  DBili  x   /  AST  148<H>  /  ALT  34  /  AlkPhos  306<H>  01-20      LIVER FUNCTIONS - ( 20 Jan 2021 06:26 )  Alb: 2.2 g/dL / Pro: 6.8 g/dL / ALK PHOS: 306 U/L / ALT: 34 U/L / AST: 148 U/L / GGT: x                               MICROBIOLOGY:      COVID-19 PCR . (01.20.21 @ 03:56)   COVID-19 PCR: NotDetec: You can help in the fight against COVID-19. [facility] may contact   you to see if you are interested in voluntarily participating in one of   our clinical trials.   Testing is performed using polymerase chain reaction (PCR) or   transcription mediated amplification (TMA). This COVID-19 (SARS-CoV-2)   nucleic acid amplification test was validated by [facility] and is   in use under the FDA Emergency Use Authorization (EUA) for clinical labs   CLIA-certified to perform high complexity testing. Test results should be   correlated with clinical presentation, patient history, and epidemiology.     Respiratory Viral Panel with COVID-19 by ROSANNE (01.20.21 @ 03:56)   Rapid RVP Result: NotDete   SARS-CoV-2: NotDetec: This Respiratory Panel uses polymerase chain reaction (PCR) to detect for   adenovirus; coronavirus (HKU1, NL63, 229E, OC43); human metapneumovirus   (hMPV); human enterovirus/rhinovirus (Entero/RV); influenza A; influenza   A/H1; influenza A/H3; influenza A/H1-2009; influenza B; parainfluenza   viruses 1, 2, 3, 4; respiratory syncytial virus; Mycoplasma pneumoniae;   Chlamydophila pneumoniae; and SARS-CoV-2.   Adenovirus (RapRVP): NotDetec   Influenza A (RapRVP): NotDetec   Influenza AH1 2009 (RapRVP): NotDetec   Influenza AH1 (RapRVP): NotDetec   Influenza AH3 (RapRVP): NotDetec   Influenza B (RapRVP): NotDetec   Parainfluenza 1 (RapRVP): NotDetec   Parainfluenza 2 (RapRVP): NotDetec   Parainfluenza 3 (RapRVP): NotDetec   Parainfluenza 4 (RapRVP): NotDetec   Resp Syncytial Virus (RapRVP): NotDetec   Chlamydia pneumoniae (RapRVP): NotDetec   Mycoplasma pneumoniae (RapRVP): NotDetec   Entero/Rhinovirus (RapRVP): NotDetec   hMPV (RapRVP): NotDetec   Coronavirus (229E,HKU1,NL63,OC43): NotDetec       RADIOLOGY:      < from: CT Angio Chest w/ IV Cont (01.19.21 @ 20:28) >    EXAM:  CT ANGIO CHEST (W)AW IC        PROCEDURE DATE:  Jan 19 2021         INTERPRETATION:  CLINICAL INFORMATION: 55-year-old male with known metastatic colonic cancer to the liver and lungs presenting with syncopal episode. Clinical concern for pulmonary embolism.    COMPARISON: None.    PROCEDURE:  CT Angiography of the Chest.  90 ml of Omnipaque 350 was injected intravenously. 10 ml were discarded.  Sagittal and coronal reformats were performed as well as 3D (MIP) reconstructions.    FINDINGS:    LUNGS AND AIRWAYS: Patent central airways.  Diffuse right peripherally-based groundglass opacities and focus of groundglass opacity in the left lower lobes. Right dependent basilar atelectasis.  PLEURA: No pleural effusion. No pneumothorax.  MEDIASTINUM AND ALMAZ: No lymphadenopathy.  VESSELS: No main, right or left, lobar, segmental or subsegmental pulmonary embolism. The main pulmonary artery is normal in caliber.  HEART: Heart size is normal. No pericardial effusion.  CHEST WALL AND LOWER NECK: Within normal limits.  VISUALIZED UPPER ABDOMEN: Hepatic hypodensities in keeping with known metastatic disease.  BONES: Within normal limits.    IMPRESSION:  No evidence of pulmonary embolism.    Peripheral based groundglass opacities in the right lung suggestive of atypical/ COVID-19 viral pneumonia.    < end of copied text >             Patient is a 55y old  Male who presents with a chief complaint of Near syncope (20 Jan 2021 12:46)      HPI:    56 yo man with history of vitiligo and recent finding of a mass in the distal transverse colon with hepatic and RLL metastatic disease (on CTAP on 1/5/21) presents with an episode of near syncope early Tuesday morning. Pt went to [facility] ED on 1/4/21 due to weeks of lower abdominal pain and was found to have a stool guaiac that was positive in the setting of anemia with Hgb 8.8. A CTAP that was performed during the ED visit showed an approximately 5.5 x 3.6 cm mass in the distal transverse colon, an enlarged prostate with distended bladder and mid prostate enhancement, and diffuse enhancing lesions throughout the liver and cluster of nodules at RLL consistent with metastatic disease.     Pt was scheduled to have follow up in early February and hasn't seen anyone yet for the findings. Pt went to see his PCP on Tuesday, however, after he almost passed out when he went to the bathroom to urinate in the middle of the night. Pt was feeling lightheaded/faint at the time and was about to fall to the ground when he caught himself by grabbing on to the door. Pt denies any LOC, head trauma, or fall. Pt again felt lightheaded/faint when he was at his PCP's office later in the day. No associated chest pain, palpitations, headaches, vision changes, numbness, tingling, or diaphoresis. Pt notes that over the past 2-3 weeks, he has lost ~20-25 lbs as a result of a weak appetite and loss of taste. Pt also reports that for the last 1 week, he has been experiencing dyspnea with exertion, getting winded even when walking short distances, such as from his bed to the bathroom. Over the last 2 days, pt has been having intermittent fevers. Denies any cough, current abdominal pain, N/V, diarrhea, constipation, bloody stools, or urinary symptoms. Has had dark greenish stools since starting iron tablets a week ago.    In the ED,   T 98, HR 80-93, BP /54-70, RR 16-18, O2 sats % RA.  (20 Jan 2021 01:18)      REVIEW OF SYSTEMS:    CONSTITUTIONAL: No fever, weight loss, or fatigue  EYES: No eye pain, visual disturbances, or discharge  ENMT:  No sore throat  NECK: No pain or stiffness  RESPIRATORY: No cough, wheezing, chills or hemoptysis; No shortness of breath  CARDIOVASCULAR: No chest pain, palpitations, dizziness, or leg swelling  GASTROINTESTINAL: No abdominal or epigastric pain. No nausea, vomiting, or hematemesis; No diarrhea or constipation. No melena or hematochezia.  GENITOURINARY: No dysuria, frequency, hematuria, or incontinence  NEUROLOGICAL: No headaches, memory loss, loss of strength, numbness, or tremors  SKIN: No itching, burning, rashes, or lesions   LYMPH NODES: No enlarged glands  MUSCULOSKELETAL: No joint pain or swelling; No muscle, back, or extremity pain      PAST MEDICAL & SURGICAL HISTORY:  Vitiligo    Colon cancer    No significant past surgical history        Allergies    No Known Allergies    Intolerances        FAMILY HISTORY:  Family history of type 2 diabetes mellitus    Family history of hypertension    Family history of colon cancer        SOCIAL HISTORY:        MEDICATIONS  (STANDING):  ascorbic acid 500 milliGRAM(s) Oral daily  azithromycin  IVPB      enoxaparin Injectable 40 milliGRAM(s) SubCutaneous daily  ferrous    sulfate 325 milliGRAM(s) Oral daily  piperacillin/tazobactam IVPB.. 3.375 Gram(s) IV Intermittent every 8 hours  sodium chloride 0.9%. 1000 milliLiter(s) (125 mL/Hr) IV Continuous <Continuous>    MEDICATIONS  (PRN):      Vital Signs Last 24 Hrs  T(C): 37.1 (20 Jan 2021 12:50), Max: 38.4 (20 Jan 2021 01:57)  T(F): 98.8 (20 Jan 2021 12:50), Max: 101.2 (20 Jan 2021 01:57)  HR: 80 (20 Jan 2021 12:50) (73 - 93)  BP: 96/66 (20 Jan 2021 12:50) (91/54 - 107/70)  BP(mean): --  RR: 18 (20 Jan 2021 12:50) (16 - 18)  SpO2: 100% (20 Jan 2021 12:50) (96% - 100%)    PHYSICAL EXAM:    GENERAL: NAD, well-groomed  HEAD:  Atraumatic, Normocephalic  EYES: EOMI, PERRLA, conjunctiva and sclera clear  ENMT: No tonsillar erythema, exudates, or enlargement; Moist mucous membranes  NECK: Supple, No JVD  CHEST/LUNG: Clear to percussion bilaterally; No rales, rhonchi, wheezing, or rubs  HEART: Regular rate and rhythm; No murmurs, rubs, or gallops  ABDOMEN: Soft, Nontender, Nondistended; Bowel sounds present  EXTREMITIES:  2+ Peripheral Pulses, No clubbing, cyanosis, or edema  LYMPH: No lymphadenopathy noted  SKIN: No rashes or lesions, + vitiligo    LABS:  CBC Full  -  ( 20 Jan 2021 06:26 )  WBC Count : 10.45 K/uL  RBC Count : 5.12 M/uL  Hemoglobin : 9.9 g/dL  Hematocrit : 32.8 %  Platelet Count - Automated : 309 K/uL  Mean Cell Volume : 64.1 fL  Mean Cell Hemoglobin : 19.3 pg  Mean Cell Hemoglobin Concentration : 30.2 gm/dL  Auto Neutrophil # : 7.87 K/uL  Auto Lymphocyte # : 1.95 K/uL  Auto Monocyte # : 0.53 K/uL  Auto Eosinophil # : 0.03 K/uL  Auto Basophil # : 0.03 K/uL  Auto Neutrophil % : 75.2 %  Auto Lymphocyte % : 18.7 %  Auto Monocyte % : 5.1 %  Auto Eosinophil % : 0.3 %  Auto Basophil % : 0.3 %      01-20    131<L>  |  106  |  15  ----------------------------<  73  TNP   |  19<L>  |  0.63    Ca    8.0<L>      20 Jan 2021 06:26  Phos  3.6     01-20  Mg     2.5     01-20    TPro  6.8  /  Alb  2.2<L>  /  TBili  0.5  /  DBili  x   /  AST  148<H>  /  ALT  34  /  AlkPhos  306<H>  01-20      LIVER FUNCTIONS - ( 20 Jan 2021 06:26 )  Alb: 2.2 g/dL / Pro: 6.8 g/dL / ALK PHOS: 306 U/L / ALT: 34 U/L / AST: 148 U/L / GGT: x                               MICROBIOLOGY:      COVID-19 PCR . (01.20.21 @ 03:56)   COVID-19 PCR: NotDetec: You can help in the fight against COVID-19. [facility] may contact   you to see if you are interested in voluntarily participating in one of   our clinical trials.   Testing is performed using polymerase chain reaction (PCR) or   transcription mediated amplification (TMA). This COVID-19 (SARS-CoV-2)   nucleic acid amplification test was validated by [facility] and is   in use under the FDA Emergency Use Authorization (EUA) for clinical labs   CLIA-certified to perform high complexity testing. Test results should be   correlated with clinical presentation, patient history, and epidemiology.     Respiratory Viral Panel with COVID-19 by ROSANNE (01.20.21 @ 03:56)   Rapid RVP Result: NotDete   SARS-CoV-2: NotDetec: This Respiratory Panel uses polymerase chain reaction (PCR) to detect for   adenovirus; coronavirus (HKU1, NL63, 229E, OC43); human metapneumovirus   (hMPV); human enterovirus/rhinovirus (Entero/RV); influenza A; influenza   A/H1; influenza A/H3; influenza A/H1-2009; influenza B; parainfluenza   viruses 1, 2, 3, 4; respiratory syncytial virus; Mycoplasma pneumoniae;   Chlamydophila pneumoniae; and SARS-CoV-2.   Adenovirus (RapRVP): NotDetec   Influenza A (RapRVP): NotDetec   Influenza AH1 2009 (RapRVP): NotDetec   Influenza AH1 (RapRVP): NotDetec   Influenza AH3 (RapRVP): NotDetec   Influenza B (RapRVP): NotDetec   Parainfluenza 1 (RapRVP): NotDetec   Parainfluenza 2 (RapRVP): NotDetec   Parainfluenza 3 (RapRVP): NotDetec   Parainfluenza 4 (RapRVP): NotDetec   Resp Syncytial Virus (RapRVP): NotDetec   Chlamydia pneumoniae (RapRVP): NotDetec   Mycoplasma pneumoniae (RapRVP): NotDetec   Entero/Rhinovirus (RapRVP): NotDetec   hMPV (RapRVP): NotDetec   Coronavirus (229E,HKU1,NL63,OC43): NotDetec       COVID-19 PCR . (01.19.21 @ 18:17)   COVID-19 PCR: NotDetec: You can help in the fight against COVID-19. [facility] may [facility]   you to see if you are interested in voluntarily participating in one of   our clinical trials.   Testing is performed using polymerase chain reaction (PCR) or   transcription mediated amplification (TMA). This COVID-19 (SARS-CoV-2)   nucleic acid amplification test was validated by [facility] and is   in use under the FDA Emergency Use Authorization (EUA) for clinical labs   CLIA-certified to perform high complexity testing. Test results should be   correlated with clinical presentation, patient history, and epidemiology.   RADIOLOGY:      < from: CT Angio Chest w/ IV Cont (01.19.21 @ 20:28) >    EXAM:  CT ANGIO CHEST (W)AW IC        PROCEDURE DATE:  Jan 19 2021         INTERPRETATION:  CLINICAL INFORMATION: 55-year-old male with known metastatic colonic cancer to the liver and lungs presenting with syncopal episode. Clinical concern for pulmonary embolism.    COMPARISON: None.    PROCEDURE:  CT Angiography of the Chest.  90 ml of Omnipaque 350 was injected intravenously. 10 ml were discarded.  Sagittal and coronal reformats were performed as well as 3D (MIP) reconstructions.    FINDINGS:    LUNGS AND AIRWAYS: Patent central airways.  Diffuse right peripherally-based groundglass opacities and focus of groundglass opacity in the left lower lobes. Right dependent basilar atelectasis.  PLEURA: No pleural effusion. No pneumothorax.  MEDIASTINUM AND ALMAZ: No lymphadenopathy.  VESSELS: No main, right or left, lobar, segmental or subsegmental pulmonary embolism. The main pulmonary artery is normal in caliber.  HEART: Heart size is normal. No pericardial effusion.  CHEST WALL AND LOWER NECK: Within normal limits.  VISUALIZED UPPER ABDOMEN: Hepatic hypodensities in keeping with known metastatic disease.  BONES: Within normal limits.    IMPRESSION:  No evidence of pulmonary embolism.    Peripheral based groundglass opacities in the right lung suggestive of atypical/ COVID-19 viral pneumonia.    < end of copied text >

## 2021-01-20 NOTE — H&P ADULT - PROBLEM SELECTOR PLAN 4
Pt with recent CTAP showing an ~5.5 x 3.6 cm mass in the distal transverse colon, an enlarged prostate with distended bladder and mid prostate enhancement, and diffuse enhancing lesions throughout the liver and cluster of nodules at RLL consistent with metastatic disease. Suspect colon cancer Pt with recent CTAP showing an ~5.5 x 3.6 cm mass in the distal transverse colon, an enlarged prostate with distended bladder and mid prostate enhancement, and diffuse enhancing lesions throughout the liver and cluster of nodules at RLL consistent with metastatic disease. Suspect colon cancer, but also with concern for prostate cancer.  - GI consult emailed for possible colonoscopy to obtain tissue biopsy of colonic mass  - Check total and free PSA and UA. Possible Urology consult pending PSA results.  - Possible Oncology consult pending tissue biopsy results, as primary currently unknown  - Check tumor markers: CEA, CA 19-9, and AFP

## 2021-01-20 NOTE — PROGRESS NOTE ADULT - PROBLEM SELECTOR PLAN 4
Pt with recent CTAP showing an ~5.5 x 3.6 cm mass in the distal transverse colon, an enlarged prostate with distended bladder and mid prostate enhancement, and diffuse enhancing lesions throughout the liver and cluster of nodules at RLL consistent with metastatic disease. Suspect colon cancer, but also with concern for prostate cancer.  - FU GI consult- emailed for possible colonoscopy to obtain tissue biopsy of colonic mass  - FU  total and free PSA and UA. Possible Urology consult pending PSA results.  - Possible Oncology consult pending tissue biopsy results, as primary currently unknown  -FU  tumor markers: CEA, CA 19-9, and AFP Pt with recent CTAP showing an ~5.5 x 3.6 cm mass in the distal transverse colon, an enlarged prostate with distended bladder and mid prostate enhancement, and diffuse enhancing lesions throughout the liver and cluster of nodules at RLL consistent with metastatic disease. Suspect colon cancer, but also with concern for prostate cancer.  - FU GI consult- emailed for possible colonoscopy to obtain tissue biopsy of colonic mass  - FU  total and free PSA and UA. Possible Urology consult pending PSA results.  - Possible Oncology consult pending tissue biopsy results, as primary currently unknown  -FU  tumor markers: CEA, CA 19-9, and AFP  - Gi/Surgery on board- Advise IR guided  BX

## 2021-01-20 NOTE — CONSULT NOTE ADULT - SUBJECTIVE AND OBJECTIVE BOX
Chief Complaint:  Patient is a 55y old  Male who presents with a chief complaint of Near syncope (20 Jan 2021 10:52)      HPI:  Patient is a 55yoM with PMH of vitiligo, with recent CT A/P showing mass in distal transverse colon with hepatic and right lower lobe metastases (1/25/21) now presenting to the hospital for pre-syncopal episode likely 2/2 orthostatic hypotension. Patient reports near-syncopal episode when walking to the bathroom to urinate at night. He denies LOC, head trauma or prior episodes of syncope. Patient reports history of dark red blood in stools for several weeks which resolved 2 weeks ago after receiving medication in [facility] (?[facility]). He has not had follow-up since the prior admission. He also reports mild stabbing upper abdominal 3/10 pain that typically occurs at night and lasts for several minutes before resolving spontaneously. He notes 20 pound weight loss over the last year, decreased appetite, early satiety,  thin caliber stool, urinary frequency, nocturia and dribbling. He also notes dyspnea on exertion after walking 30-40 feet. He denies nausea, vomiting, headache, chest pain, LE edema or pallor. He reports he has never had a colonoscopy in the past, denies sick contacts or recent travel.      Allergies:  No Known Allergies      Home Medications:    Hospital Medications:  ascorbic acid 500 milliGRAM(s) Oral daily  azithromycin  IVPB      enoxaparin Injectable 40 milliGRAM(s) SubCutaneous daily  ferrous    sulfate 325 milliGRAM(s) Oral daily  piperacillin/tazobactam IVPB.. 3.375 Gram(s) IV Intermittent every 8 hours  sodium chloride 0.9%. 1000 milliLiter(s) IV Continuous <Continuous>      PMHX/PSHX:  Vitiligo    Colon cancer    No significant past surgical history        Family history:  Family history of type 2 diabetes mellitus    Family history of hypertension    Family history of colon cancer        Social History:     ROS:     General:  Positive for wt loss, fevers, chills,  fatigue, No night sweats  Eyes:  Good vision, no reported pain  ENT:  No sore throat, pain, runny nose, dysphagia  CV:  No pain, palpitations, hypo/hypertension  Resp:  Positive for dyspnea on exertion. No cough, tachypnea, wheezing  GI:  See HPI  :  Positive for nocturia and dribbling, No pain, bleeding, incontinence  Muscle:  No pain, weakness  Neuro:  No weakness, tingling, memory problems  Psych:  No insomnia, mood problems, depression  Endocrine:  No polyuria, polydipsia, cold/heat intolerance  Heme:  No petechiae, ecchymosis, easy bruisability  Skin:  No rash, edema      PHYSICAL EXAM:     GENERAL:  Appears cachectic, no distress  HEENT:  NC/AT,  conjunctivae clear and pink,  no JVD  CHEST:  Full & symmetric excursion, no increased effort, breath sounds clear. + Mild crackles in lung bases  HEART:  Regular rhythm, S1, S2, no murmur/rub/S3/S4, no abdominal bruit  ABDOMEN:  Soft, non-distended, +LUQ and epigastric tenderness to palpation, normoactive bowel sounds,  no masses  EXTREMITIES:  no cyanosis, clubbing, or edema  SKIN:  Scattered hypopigmented patches of skin,  No rash/erythema/ecchymoses/petechiae/wounds/abscess/warm/dry  NEURO:  Alert and oriented x3    Vital Signs:  Vital Signs Last 24 Hrs  T(C): 36.9 (20 Jan 2021 08:52), Max: 38.4 (20 Jan 2021 01:57)  T(F): 98.4 (20 Jan 2021 08:52), Max: 101.2 (20 Jan 2021 01:57)  HR: 73 (20 Jan 2021 08:52) (73 - 93)  BP: 105/71 (20 Jan 2021 08:52) (91/54 - 107/70)  BP(mean): --  RR: 18 (20 Jan 2021 08:52) (16 - 18)  SpO2: 100% (20 Jan 2021 08:52) (96% - 100%)  Daily Height in cm: 167.64 (20 Jan 2021 08:52)    Daily     LABS:                        9.9    10.45 )-----------( 309      ( 20 Jan 2021 06:26 )             32.8     01-20    131<L>  |  106  |  15  ----------------------------<  73  TNP   |  19<L>  |  0.63    Ca    8.0<L>      20 Jan 2021 06:26  Phos  3.6     01-20  Mg     2.5     01-20    TPro  6.8  /  Alb  2.2<L>  /  TBili  0.5  /  DBili  x   /  AST  148<H>  /  ALT  34  /  AlkPhos  306<H>  01-20    LIVER FUNCTIONS - ( 20 Jan 2021 06:26 )  Alb: 2.2 g/dL / Pro: 6.8 g/dL / ALK PHOS: 306 U/L / ALT: 34 U/L / AST: 148 U/L / GGT: x           PT/INR - ( 20 Jan 2021 07:53 )   PT: 14.1 sec;   INR: 1.24 ratio         PTT - ( 20 Jan 2021 07:53 )  PTT:32.2 sec        Imaging:            Branden Hirsch  PGY-1  [id number]     Chief Complaint:  Patient is a 55y old  Male who presents with a chief complaint of Near syncope (20 Jan 2021 10:52)      HPI:  Patient is a 55yoM with PMH of vitiligo, with recent CT A/P showing mass in distal transverse colon with hepatic and right lower lobe metastases (1/25/21) now presenting to the hospital for pre-syncopal episode likely 2/2 orthostatic hypotension. Patient reports near-syncopal episode when walking to the bathroom to urinate at night. He denies LOC, head trauma or prior episodes of syncope. Patient reports history of dark red blood in stools for several weeks which resolved 2 weeks ago after receiving medication in [facility] (?[facility]). He has not had follow-up since the prior admission. He reports mild stabbing upper abdominal 3/10 pain that typically occurs at night and lasts for several minutes before resolving spontaneously. He notes 20 pound weight loss over the last year, decreased appetite, early satiety,  thin caliber stool, urinary frequency, nocturia and dribbling and intermittent fevers over the last few days. He notes dyspnea on exertion after walking 30-40 feet. He denies nausea, vomiting, headache, chest pain, LE edema or pallor. He reports he has never had a colonoscopy in the past, denies sick contacts or recent travel.      Allergies:  No Known Allergies      Home Medications:    Hospital Medications:  ascorbic acid 500 milliGRAM(s) Oral daily  azithromycin  IVPB      enoxaparin Injectable 40 milliGRAM(s) SubCutaneous daily  ferrous    sulfate 325 milliGRAM(s) Oral daily  piperacillin/tazobactam IVPB.. 3.375 Gram(s) IV Intermittent every 8 hours  sodium chloride 0.9%. 1000 milliLiter(s) IV Continuous <Continuous>      PMHX/PSHX:  Vitiligo    Colon cancer    No significant past surgical history        Family history:  Family history of type 2 diabetes mellitus    Family history of hypertension    Family history of colon cancer        Social History:     ROS:     General:  Positive for wt loss, fevers, chills,  fatigue, No night sweats  Eyes:  Good vision, no reported pain  ENT:  No sore throat, pain, runny nose, dysphagia  CV:  No pain, palpitations, hypo/hypertension  Resp:  Positive for dyspnea on exertion. No cough, tachypnea, wheezing  GI:  See HPI  :  Positive for nocturia and dribbling, No pain, bleeding, incontinence  Muscle:  No pain, weakness  Neuro:  No weakness, tingling, memory problems  Psych:  No insomnia, mood problems, depression  Endocrine:  No polyuria, polydipsia, cold/heat intolerance  Heme:  No petechiae, ecchymosis, easy bruisability  Skin:  No rash, edema      PHYSICAL EXAM:     GENERAL:  Appears cachectic, no distress  HEENT:  NC/AT,  conjunctivae clear and pink,  no JVD  CHEST:  Full & symmetric excursion, no increased effort, breath sounds clear. + Mild crackles in lung bases  HEART:  Regular rhythm, S1, S2, no murmur/rub/S3/S4, no abdominal bruit  ABDOMEN:  Soft, non-distended, +LUQ and epigastric tenderness to palpation, normoactive bowel sounds,  no masses  EXTREMITIES:  no cyanosis, clubbing, or edema  SKIN:  Scattered hypopigmented patches of skin,  No rash/erythema/ecchymoses/petechiae/wounds/abscess/warm/dry  NEURO:  Alert and oriented x3    Vital Signs:  Vital Signs Last 24 Hrs  T(C): 36.9 (20 Jan 2021 08:52), Max: 38.4 (20 Jan 2021 01:57)  T(F): 98.4 (20 Jan 2021 08:52), Max: 101.2 (20 Jan 2021 01:57)  HR: 73 (20 Jan 2021 08:52) (73 - 93)  BP: 105/71 (20 Jan 2021 08:52) (91/54 - 107/70)  BP(mean): --  RR: 18 (20 Jan 2021 08:52) (16 - 18)  SpO2: 100% (20 Jan 2021 08:52) (96% - 100%)  Daily Height in cm: 167.64 (20 Jan 2021 08:52)    Daily     LABS:                        9.9    10.45 )-----------( 309      ( 20 Jan 2021 06:26 )             32.8     01-20    131<L>  |  106  |  15  ----------------------------<  73  TNP   |  19<L>  |  0.63    Ca    8.0<L>      20 Jan 2021 06:26  Phos  3.6     01-20  Mg     2.5     01-20    TPro  6.8  /  Alb  2.2<L>  /  TBili  0.5  /  DBili  x   /  AST  148<H>  /  ALT  34  /  AlkPhos  306<H>  01-20    LIVER FUNCTIONS - ( 20 Jan 2021 06:26 )  Alb: 2.2 g/dL / Pro: 6.8 g/dL / ALK PHOS: 306 U/L / ALT: 34 U/L / AST: 148 U/L / GGT: x           PT/INR - ( 20 Jan 2021 07:53 )   PT: 14.1 sec;   INR: 1.24 ratio         PTT - ( 20 Jan 2021 07:53 )  PTT:32.2 sec        Imaging:            Branden Hirsch  PGY-1  [id number]

## 2021-01-20 NOTE — CHART NOTE - NSCHARTNOTEFT_GEN_A_CORE
Spoke with GI recommended IR for liver met Bx, clear diet for now individual instruction/verbal instruction/written material

## 2021-01-20 NOTE — H&P ADULT - PROBLEM SELECTOR PLAN 5
Hgb 10.2 on admission vs. 8.8 on 1/4/21. No S/S of active bleed at this time. Pt with positive stool guaiac on 1/4/21. Likely iron-deficiency anemia, possibly also 2/2 AOCD and from folate/vit B12 deficiency given recent poor PO intake.   - Check FOBT  - Check iron studies, folate, vit B12, retic count. Lower suspicion for hemolysis at this time, but will check haptoglobin and LDH.  - Monitor H/H and transfuse to keep Hgb > 7 Hgb 10.2 on admission vs. 8.8 on 1/4/21. No S/S of active bleed at this time. Pt with positive stool guaiac on 1/4/21. Likely iron-deficiency anemia, possibly also 2/2 AOCD and from folate/vit B12 deficiency given recent poor PO intake.   - Check FOBT  - Check iron studies, folate, vit B12, retic count. Lower suspicion for hemolysis at this time, but will check haptoglobin and LDH.  - Monitor H/H and transfuse to keep Hgb > 7  - C/w ferrous sulfate with vit C supplementation

## 2021-01-20 NOTE — PROGRESS NOTE ADULT - SUBJECTIVE AND OBJECTIVE BOX
PROGRESS NOTE:     Patient is a 55y old  Male who presents with a chief complaint of Near syncope (20 Jan 2021 10:06)      SUBJECTIVE / OVERNIGHT EVENTS:    ADDITIONAL REVIEW OF SYSTEMS:    MEDICATIONS  (STANDING):  ascorbic acid 500 milliGRAM(s) Oral daily  azithromycin  IVPB      enoxaparin Injectable 40 milliGRAM(s) SubCutaneous daily  ferrous    sulfate 325 milliGRAM(s) Oral daily  piperacillin/tazobactam IVPB.. 3.375 Gram(s) IV Intermittent every 8 hours  sodium chloride 0.9%. 1000 milliLiter(s) (125 mL/Hr) IV Continuous <Continuous>    MEDICATIONS  (PRN):      CAPILLARY BLOOD GLUCOSE      POCT Blood Glucose.: 77 mg/dL (20 Jan 2021 06:12)  POCT Blood Glucose.: 79 mg/dL (20 Jan 2021 03:15)  POCT Blood Glucose.: 196 mg/dL (19 Jan 2021 15:47)    I&O's Summary      PHYSICAL EXAM:  Vital Signs Last 24 Hrs  T(C): 36.9 (20 Jan 2021 08:52), Max: 38.4 (20 Jan 2021 01:57)  T(F): 98.4 (20 Jan 2021 08:52), Max: 101.2 (20 Jan 2021 01:57)  HR: 73 (20 Jan 2021 08:52) (73 - 93)  BP: 105/71 (20 Jan 2021 08:52) (91/54 - 107/70)  BP(mean): --  RR: 18 (20 Jan 2021 08:52) (16 - 18)  SpO2: 100% (20 Jan 2021 08:52) (96% - 100%)    CONSTITUTIONAL: NAD, well-developed  RESPIRATORY: Normal respiratory effort; lungs are clear to auscultation bilaterally  CARDIOVASCULAR: Regular rate and rhythm, normal S1 and S2, no murmur/rub/gallop; No lower extremity edema; Peripheral pulses are 2+ bilaterally  ABDOMEN: Nontender to palpation, normoactive bowel sounds, no rebound/guarding; No hepatosplenomegaly  MUSCLOSKELETAL: no clubbing or cyanosis of digits; no joint swelling or tenderness to palpation  PSYCH: A+O to person, place, and time; affect appropriate  NEURO:  SKIN:    LABS:                        9.9    10.45 )-----------( 309      ( 20 Jan 2021 06:26 )             32.8     01-20    131<L>  |  106  |  15  ----------------------------<  73  TNP   |  19<L>  |  0.63    Ca    8.0<L>      20 Jan 2021 06:26  Phos  3.6     01-20  Mg     2.5     01-20    TPro  6.8  /  Alb  2.2<L>  /  TBili  0.5  /  DBili  x   /  AST  148<H>  /  ALT  34  /  AlkPhos  306<H>  01-20    PT/INR - ( 20 Jan 2021 07:53 )   PT: 14.1 sec;   INR: 1.24 ratio         PTT - ( 20 Jan 2021 07:53 )  PTT:32.2 sec            RADIOLOGY & ADDITIONAL TESTS:  Results Reviewed:   Imaging Personally Reviewed:  Electrocardiogram Personally Reviewed:    COORDINATION OF CARE:  Care Discussed with Consultants/Other Providers [Y/N]:  Prior or Outpatient Records Reviewed [Y/N]:   Dwain Becerra  Hospitalist  Pager- [phone]    PROGRESS NOTE:     Patient is a 55y old  Male who presents with a chief complaint of Near syncope (20 Jan 2021 10:06)      SUBJECTIVE / OVERNIGHT EVENTS: Pt seen and examined by me  C/o decreased  PO appetite, anxious about his condition   No fever or chills   no cough     ADDITIONAL REVIEW OF SYSTEMS:    MEDICATIONS  (STANDING):  ascorbic acid 500 milliGRAM(s) Oral daily  azithromycin  IVPB      enoxaparin Injectable 40 milliGRAM(s) SubCutaneous daily  ferrous    sulfate 325 milliGRAM(s) Oral daily  piperacillin/tazobactam IVPB.. 3.375 Gram(s) IV Intermittent every 8 hours  sodium chloride 0.9%. 1000 milliLiter(s) (125 mL/Hr) IV Continuous <Continuous>    MEDICATIONS  (PRN):      CAPILLARY BLOOD GLUCOSE      POCT Blood Glucose.: 77 mg/dL (20 Jan 2021 06:12)  POCT Blood Glucose.: 79 mg/dL (20 Jan 2021 03:15)  POCT Blood Glucose.: 196 mg/dL (19 Jan 2021 15:47)    I&O's Summary      PHYSICAL EXAM:  Vital Signs Last 24 Hrs  T(C): 36.9 (20 Jan 2021 08:52), Max: 38.4 (20 Jan 2021 01:57)  T(F): 98.4 (20 Jan 2021 08:52), Max: 101.2 (20 Jan 2021 01:57)  HR: 73 (20 Jan 2021 08:52) (73 - 93)  BP: 105/71 (20 Jan 2021 08:52) (91/54 - 107/70)  BP(mean): --  RR: 18 (20 Jan 2021 08:52) (16 - 18)  SpO2: 100% (20 Jan 2021 08:52) (96% - 100%)    CONSTITUTIONAL: NAD, well-developed  RESPIRATORY: Normal respiratory effort; lungs are clear to auscultation bilaterally  CARDIOVASCULAR: Regular rate and rhythm, normal S1 and S2, no murmur/rub/gallop; No lower extremity edema; Peripheral pulses are 2+ bilaterally  ABDOMEN: Nontender to palpation, normoactive bowel sounds, no rebound/guarding; No hepatosplenomegaly  MUSCLOSKELETAL: no clubbing or cyanosis of digits; no joint swelling or tenderness to palpation  PSYCH: A+O to person, place, and time; affect appropriate  NEURO: NAD  SKIN: patches of vitilgo    LABS:                        9.9    10.45 )-----------( 309      ( 20 Jan 2021 06:26 )             32.8     01-20    131<L>  |  106  |  15  ----------------------------<  73  TNP   |  19<L>  |  0.63    Ca    8.0<L>      20 Jan 2021 06:26  Phos  3.6     01-20  Mg     2.5     01-20    TPro  6.8  /  Alb  2.2<L>  /  TBili  0.5  /  DBili  x   /  AST  148<H>  /  ALT  34  /  AlkPhos  306<H>  01-20    PT/INR - ( 20 Jan 2021 07:53 )   PT: 14.1 sec;   INR: 1.24 ratio         PTT - ( 20 Jan 2021 07:53 )  PTT:32.2 sec            RADIOLOGY & ADDITIONAL TESTS:  Results Reviewed:   Imaging Personally Reviewed:  Electrocardiogram Personally Reviewed:    COORDINATION OF CARE:  Care Discussed with Consultants/Other Providers [Y/N]: GI   Prior or Outpatient Records Reviewed [Y/N]:Surgery

## 2021-01-20 NOTE — H&P ADULT - PROBLEM SELECTOR PLAN 3
Likely 2/2 metastatic disease from colon cancer. Pt with significant weight loss and poor appetite over past 2-3 weeks.  - Nutrition consult  - PT consult  - Fall risk protocol

## 2021-01-20 NOTE — PROGRESS NOTE ADULT - PROBLEM SELECTOR PLAN 6
T bili 0.4, alk phos 361, , and ALT 39. Likely from liver mets. No abdominal pain or tenderness.  - Monitor LFTs for now  - Will get repeat imaging of abdomen if pt with concerning uptrending LFTs

## 2021-01-20 NOTE — CONSULT NOTE ADULT - ASSESSMENT
Patient is a 55yoM with PMH of vitiligo, with recent CT A/P showing mass in distal transverse colon with hepatic and right lower lobe lesions consistent with metastases (1/25/21) now presenting to the hospital for pre-syncopal episode likely 2/2 orthostatic hypotension with a suspected COVID-19 infection.  #Colon mass                    Patient found to have 5.5x3.6cm mass in distal transverse colon with diffuse enhancing hepatic lesions and nodules in right lung base, consistent with metastatic colon cancer. Patient also with history concerning for malignancy including 20+ lbs weight loss in the last year, bloody stools, iron deficiency anemia, thin caliber stools as well as family history of cancer. Given likely metastases to the liver and lung, a biopsy of the hepatic lesions, if feasible, would confirm the diagnosis of metastatic colon cancer. Given active  COVID precautions, patient is not a candidate for colonoscopy at this time Patient reports he wants to pursue any appropriate management if malignancy is confirmed.  #Fever  Patient with fever 2/2 COVID vs. bacterial PNA vs. tumor fever. Despite negative COVID PCR on this admission, patient also has elevated inflammatory markers and peripheral ground glass opacities on CTA concerning for COVID infection, and is currently on COVID precautions    Recommendations:  - IR consult to evaluate for possibility of liver lesion biopsy to confirm malignancy  - ID consult regarding management for fever of unclear etiology   - Clear liquid diet  - Oncology consult, pending biopsy results Patient is a 55yoM with PMH of vitiligo, with recent CT A/P showing mass in distal transverse colon with hepatic and right lower lobe lesions consistent with metastases (1/25/21) now presenting to the hospital for pre-syncopal episode likely 2/2 orthostatic hypotension with a suspected COVID-19 infection.  #Colon mass                    Patient found to have 5.5x3.6cm mass in distal transverse colon with diffuse enhancing hepatic lesions and nodules in right lung base, consistent with metastatic colon cancer. Patient also with history concerning for malignancy including 20+ lbs weight loss in the last year, bloody stools, iron deficiency anemia, thin caliber stools as well as family history of cancer. Patient's elevated Alk phos and AST likely 2/2 metastatic disease to the liver. Given likely metastases to the liver and lung, a biopsy of the hepatic lesions, if feasible, would confirm the diagnosis of metastatic colon cancer. Given active COVID precautions, patient is not a candidate for colonoscopy at this time Patient reports he wants to pursue any appropriate management if malignancy is confirmed.  #Fever  Patient with fever 2/2 COVID vs. atypical bacterial PNA vs. tumor fever. Despite negative COVID PCR on this admission, patient also has elevated inflammatory markers and peripheral ground glass opacities on CTA concerning for COVID infection, and is currently on COVID precautions.    Recommendations:  - IR consult to evaluate for possibility of liver lesion biopsy to confirm malignancy  - ID consult regarding management for fever of unclear etiology   - Clear liquid diet  - Oncology consult, pending biopsy results Patient is a 55yoM with PMH of vitiligo, with recent CT A/P showing mass in distal transverse colon with hepatic and right lower lobe lesions consistent with metastases (1/25/21) now presenting to the hospital for pre-syncopal episode likely 2/2 orthostatic hypotension with a suspected COVID-19 infection.  #Colon mass                    Patient found to have 5.5x3.6cm mass in distal transverse colon with diffuse enhancing hepatic lesions and nodules in right lung base, consistent with metastatic colon cancer. Patient also with history concerning for malignancy including 20+ lbs weight loss in the last year, bloody stools, iron deficiency anemia, thin caliber stools as well as family history of cancer. Patient's elevated Alk phos and AST likely 2/2 metastatic disease to the liver. Given likely metastases to the liver and lung, a biopsy of the hepatic lesions, if feasible, would confirm the diagnosis of metastatic colon cancer. Given active COVID precautions, patient is not a candidate for colonoscopy at this time Patient reports he wants to pursue any appropriate management if malignancy is confirmed.  #Fever  Patient with fever 2/2 COVID vs. atypical bacterial PNA vs. tumor fever. Despite negative COVID PCR on this admission, patient also has elevated inflammatory markers and peripheral ground glass opacities on CTA concerning for COVID infection, and is currently on COVID precautions.    Recommendations:  - IR consult to evaluate for possibility of liver lesion biopsy to confirm malignancy  - ID consult regarding management for fever of unclear etiology   - Clear liquid diet  - Tumor markers, including CEA  - Oncology consult, pending biopsy results

## 2021-01-20 NOTE — H&P ADULT - PROBLEM SELECTOR PLAN 7
- DVT ppx: Lovenox SQ if H/H in AM relative stable.  - Diet: Regular for now - DVT ppx: Lovenox SQ if H/H in AM relatively stable.  - Diet: Regular for now

## 2021-01-20 NOTE — PROGRESS NOTE ADULT - PROBLEM SELECTOR PLAN 2
Pt with 2 days of fevers at home. On admission, pt initially afebrile but later had fever of 101.2F. Did not meet SIRS criteria, with leukocytosis of 11.71.   CTA -no PE, peripheral based groundglass opacities in R  lung.   - COVID-19 PCR x2 negative.  RVP - negative  -Procalcitonin-1.35  -On  empiric antibiotics with Zosyn and Azithromycin to cover for PNA and abdominal source given colonic mass and liver mets.  - F/u blood cxs  - F/u UA and urine cx, though pt denies any urinary symptoms

## 2021-01-20 NOTE — H&P ADULT - PROBLEM SELECTOR PLAN 6
T bili 0.4, alk phos 361, , and ALT 39. Likely from liver mets. No abdominal pain or tenderness.  - Monitor LFTs T bili 0.4, alk phos 361, , and ALT 39. Likely from liver mets. No abdominal pain or tenderness.  - Monitor LFTs for now  - Will get repeat imaging of abdomen if pt with concerning uptrending LFTs

## 2021-01-20 NOTE — H&P ADULT - NSHPREVIEWOFSYSTEMS_GEN_ALL_CORE
Constitutional: No generalized weakness, fevers, chills, or weight loss  Eyes: No visual changes, double vision, or eye pain  Ears, Nose, Mouth, Throat: No runny nose, sinus pain, ear pain, tinnitus, sore throat, dysphagia, or odynophagia  Cardiovascular: No chest pain, palpitations, or LE edema  Respiratory: No cough, wheezing, hemoptysis, or shortness of breath  Gastrointestinal: No abdominal pain, dysphagia, anorexia, nausea/vomiting, diarrhea/constipation, hematemesis, melena, or BRBPR  Genitourinary: No dysuria, frequency, urgency, or hematuria  Musculoskeletal: No joint pain, joint swelling, or decreased ROM  Skin: No pruritus, rashes, lesions, or wounds  Neurologic:  No seizures, headache, paresthesias, numbness, or limb weakness  Psychiatric: No depression, anxiety, difficulty concentrating, anhedonia, or lack of energy  Endocrine: No heat/cold intolerance, mood swings, sweats, polydipsia, or polyuria  Hematologic/lymphatic: No purpura, petechia, or prolonged or excessive bleeding after dental extraction / injury  Allergic/Immunologic: No anaphylaxis or allergic response to materials, foods, animals    Positives and pertinent negatives noted and all other systems negative. Constitutional: +Generalized weakness. +Fevers. +Weight lossNo generalized weakness, fevers, chills, or weight loss  Eyes: No visual changes, double vision, or eye pain  Ears, Nose, Mouth, Throat: No runny nose, sinus pain, ear pain, tinnitus, sore throat, dysphagia, or odynophagia  Cardiovascular: No chest pain, palpitations, or LE edema  Respiratory: No cough, wheezing, hemoptysis, or shortness of breath  Gastrointestinal: No abdominal pain, dysphagia, anorexia, nausea/vomiting, diarrhea/constipation, hematemesis, melena, or BRBPR  Genitourinary: No dysuria, frequency, urgency, or hematuria  Musculoskeletal: No joint pain, joint swelling, or decreased ROM  Skin: No pruritus, rashes, lesions, or wounds  Neurologic:  No seizures, headache, paresthesias, numbness, or limb weakness  Psychiatric: No depression, anxiety, difficulty concentrating, anhedonia, or lack of energy  Endocrine: No heat/cold intolerance, mood swings, sweats, polydipsia, or polyuria  Hematologic/lymphatic: No purpura, petechia, or prolonged or excessive bleeding after dental extraction / injury  Allergic/Immunologic: No anaphylaxis or allergic response to materials, foods, animals    Positives and pertinent negatives noted and all other systems negative. Constitutional: +Generalized weakness. +Fevers. +Weight loss.  Eyes: No visual changes, double vision, or eye pain  Ears, Nose, Mouth, Throat: No runny nose, sinus pain, ear pain, tinnitus, sore throat, dysphagia, or odynophagia  Cardiovascular: No chest pain, palpitations, or LE edema  Respiratory: +Dyspnea with exertion. No cough, wheezing, or hemoptysis.  Gastrointestinal: +Resolved lower abdominal pain. +Dark greenish stools since starting iron tablets. No nausea/vomiting, diarrhea/constipation, hematemesis, or BRBPR.  Genitourinary: No dysuria, frequency, urgency, or hematuria  Musculoskeletal: No joint pain, joint swelling, or decreased ROM  Skin: +Vitiligo. No pruritus or rashes.  Neurologic: +Syncope. No seizures, headache, paresthesias, numbness, or limb weakness.  Psychiatric: No depression, anxiety, or agitation  Endocrine: No heat/cold intolerance, mood swings, sweats, polydipsia, or polyuria  Hematologic/lymphatic: No purpura, petechia, or prolonged or excessive bleeding after dental extraction / injury  Allergic/Immunologic: No anaphylaxis or allergic response to materials, foods, animals    Positives and pertinent negatives noted and all other systems negative.

## 2021-01-20 NOTE — CONSULT NOTE ADULT - SUBJECTIVE AND OBJECTIVE BOX
HPI:  54 yo man with history of vitiligo and recent finding of a mass in the distal transverse colon with hepatic and RLL metastatic disease (on CTAP on 1/5/21) presents with an episode of near syncope early Tuesday morning. Pt went to see his PCP on Tuesday, however, after he almost passed out when he went to the bathroom to urinate in the middle of the night. He denies any other symptoms. Surgery consulted  for known colon mass.     Pt went to [facility] ED on 1/4/21 due to weeks of lower abdominal pain and was found to have a stool guaiac that was positive in the setting of anemia with Hgb 8.8. A CTAP that was performed during the ED visit showed an approximately 5.5 x 3.6 cm mass in the distal transverse colon, an enlarged prostate with distended bladder and mid prostate enhancement, and diffuse enhancing lesions throughout the liver and cluster of nodules at RLL consistent with metastatic disease. Pt was scheduled to have follow up in early February. Additionally pt notes poor appetite and ~20-25 lbs weight loss over the last 2-3 weeks. Pt reports normal BM and passing flatus. Denies n/v/f/c.     Of note pt with recent VELEZ, intermittent fevers and recent loss of taste. Denies any cough, current abdominal pain, N/V, diarrhea, constipation, bloody stools, or urinary symptoms. CTA chest with b/l ground glass opacities c/w COVID 19, however COVID PCR negative x2.     In the ED,   T 98, HR 80-93, BP /54-70, RR 16-18, O2 sats % RA.  (20 Jan 2021 01:18)      PAST MEDICAL & SURGICAL HISTORY:  Vitiligo    Colon cancer    No significant past surgical history        ROS: Negative except for HPI    MEDICATIONS:  enoxaparin Injectable 40 milliGRAM(s) SubCutaneous daily          ascorbic acid 500 milliGRAM(s) Oral daily  azithromycin  IVPB      ferrous    sulfate 325 milliGRAM(s) Oral daily  piperacillin/tazobactam IVPB.. 3.375 Gram(s) IV Intermittent every 8 hours  sodium chloride 0.9%. 1000 milliLiter(s) IV Continuous <Continuous>      Allergies    No Known Allergies    Intolerances        SOCIAL HISTORY: Denies tobacco, social ETOH, denies illicit drug use    FAMILY HISTORY:  Family history of type 2 diabetes mellitus    Family history of hypertension    Family history of colon cancer        Vital Signs Last 24 Hrs  T(C): 36.9 (20 Jan 2021 08:52), Max: 38.4 (20 Jan 2021 01:57)  T(F): 98.4 (20 Jan 2021 08:52), Max: 101.2 (20 Jan 2021 01:57)  HR: 73 (20 Jan 2021 08:52) (73 - 93)  BP: 105/71 (20 Jan 2021 08:52) (91/54 - 107/70)  BP(mean): --  RR: 18 (20 Jan 2021 08:52) (16 - 18)  SpO2: 100% (20 Jan 2021 08:52) (96% - 100%)    PHYSICAL EXAM:    Constitutional: Cachectic, NAD   HEENT: PERRLA, EOMI  Respiratory: CTAB  Cardiovascular: RRR  Gastrointestinal: soft, NT/ND. No rebound or guarding.   Extremities: No peripheral edema  Vascular: 2+ peripheral pulses  Neurological: A/O x 3, no focal deficits      LABS:                        9.9    10.45 )-----------( 309      ( 20 Jan 2021 06:26 )             32.8     01-20    131<L>  |  106  |  15  ----------------------------<  73  TNP   |  19<L>  |  0.63    Ca    8.0<L>      20 Jan 2021 06:26  Phos  3.6     01-20  Mg     2.5     01-20    TPro  6.8  /  Alb  2.2<L>  /  TBili  0.5  /  DBili  x   /  AST  148<H>  /  ALT  34  /  AlkPhos  306<H>  01-20    PT/INR - ( 20 Jan 2021 07:53 )   PT: 14.1 sec;   INR: 1.24 ratio         PTT - ( 20 Jan 2021 07:53 )  PTT:32.2 sec        RADIOLOGY & ADDITIONAL STUDIES:    ASSESSMENT/PLAN:  Patient is a 55y old  Male who presents with a chief complaint of Near syncope (20 Jan 2021 01:18)      -  -  -  -

## 2021-01-20 NOTE — CHART NOTE - NSCHARTNOTEFT_GEN_A_CORE
d/w attending- given mass found on CT from 1/5/21-Pt with recent CTAP showing an ~5.5 x 3.6 cm mass in the distal transverse colon, an enlarged prostate with distended bladder and mid prostate enhancement, and diffuse enhancing lesions throughout the liver and cluster of nodules at RLL consistent with metastatic disease. Suspect colon cancer.    called colorectal surgery consult, will follow up.

## 2021-01-20 NOTE — H&P ADULT - NSHPSOCIALHISTORY_GEN_ALL_CORE
Tobacco: denies  EtOH: denies  Illicit drugs: denies  Lives with Tobacco: denies any history of use  EtOH: social drinker  Illicit drugs: denies any history of use  . Lives with wife.

## 2021-01-20 NOTE — PROGRESS NOTE ADULT - PROBLEM SELECTOR PLAN 1
Suspect 2/2 orthostatic hypotension, but can also be reflex-mediated/vasovagal or cardiac (structural heart disease, arrhythmias) or in setting of anemia.  - Orthostatics checked s/p 1L NS bolus in ED and positive.   Pt with increase in HR of >30 bpm when going from sitting to standing.   - C/w IVF with NS at 125 cc/hr x8 hrs overnight  - CTA chest negative for PE  - Hs-trop x1 -7  - EKG without any acute ischemic changes. Low suspicion for myocardial ischemia/ACS as etiology at this time.  - FU  TTE to evaluate for any structural heart disease  - Monitor on tele for any concerning arrhythmias  - Anemia workup as below  - PT consult  - Fall risk protocol

## 2021-01-20 NOTE — H&P ADULT - ASSESSMENT
54 yo man with history of vitiligo and recent finding of a mass in the distal transverse colon with hepatic and RLL metastatic disease (on CTAP on 1/5/21) presents with an episode of near syncope early Tuesday morning, suspect 2/2 orthostatic hypotension, and fevers of unclear etiology.

## 2021-01-20 NOTE — CONSULT NOTE ADULT - ASSESSMENT
54 yo man with history of vitiligo and recent finding of a mass in the distal transverse colon with hepatic and RLL metastatic disease (on CTAP on 1/5/21) presents with an episode of near syncope early Tuesday morning. Pt went to [facility] ED on 1/4/21 due to weeks of lower abdominal pain and was found to have a stool guaiac that was positive in the setting of anemia with Hgb 8.8. A CTAP that was performed during the ED visit showed an approximately 5.5 x 3.6 cm mass in the distal transverse colon, an enlarged prostate with distended bladder and mid prostate enhancement, and diffuse enhancing lesions throughout the liver and cluster of nodules at RLL consistent with metastatic disease.     Pt was scheduled to have follow up in early February and hasn't seen anyone yet for the findings. Pt went to see his PCP on Tuesday, however, after he almost passed out when he went to the bathroom to urinate in the middle of the night. Pt was feeling lightheaded/faint at the time and was about to fall to the ground when he caught himself by grabbing on to the door. Pt denies any LOC, head trauma, or fall. Pt again felt lightheaded/faint when he was at his PCP's office later in the day. No associated chest pain, palpitations, headaches, vision changes, numbness, tingling, or diaphoresis. Pt notes that over the past 2-3 weeks, he has lost ~20-25 lbs as a result of a weak appetite and loss of taste. Pt also reports that for the last 1 week, he has been experiencing dyspnea with exertion, getting winded even when walking short distances, such as from his bed to the bathroom. Over the last 2 days, pt has been having intermittent fevers. Denies any cough, current abdominal pain, N/V, diarrhea, constipation, bloody stools, or urinary symptoms. Has had dark greenish stools since starting iron tablets a week ago.    In the ED,   T 98, HR 80-93, BP /54-70, RR 16-18, O2 sats % RA.  (20 Jan 2021 01:18)      Fever:    - Tmax 101.2 (1/20), hemodynamically stable.  Satting 100% on RA.  Rule out acute bacterial infection.  Check bcx x 2 and UA/Ucx.   Denies urinary symptoms.    - CTA chest no PE, (+) peripheral based GGOs in rt lung, possible metastatic disease.  Covid pcr (-) x2.  Pt currently w/o sob or hypoxemia, without symptoms of acute covid illness.       - Will check covid serologies to see if pt had prior exposure.      - Fever may also be caused by underlying colonic malignancy and metastatic disease.      Colonic mass with liver metastasis:    - Surgical evaluation noted - no surgery planned at this time.  GI called by primary team for colonoscopy and biopsy.    - Possible IR biopsy of liver mets.    - Cont zosyn/azithro for now for intra-abdominal coverage and Legionella.  If Leg Ag (-), d/c azithro.    Leandra Matson

## 2021-01-20 NOTE — H&P ADULT - NSHPLABSRESULTS_GEN_ALL_CORE
EKG personally reviewed.    Imaging personally reviewed.  CTA chest with IV contrast:  INTERPRETATION:  CLINICAL INFORMATION: 55-year-old male with known metastatic colonic cancer to the liver and lungs presenting with syncopal episode. Clinical concern for pulmonary embolism.    COMPARISON: None.    PROCEDURE:  CT Angiography of the Chest.  90 ml of Omnipaque 350 was injected intravenously. 10 ml were discarded.  Sagittal and coronal reformats were performed as well as 3D (MIP) reconstructions.    FINDINGS:    LUNGS AND AIRWAYS: Patent central airways.  Diffuse right peripherally-based groundglass opacities and focus of groundglass opacity in the left lower lobes. Right dependent basilar atelectasis.  PLEURA: No pleural effusion. No pneumothorax.  MEDIASTINUM AND ALMAZ: No lymphadenopathy.  VESSELS: No main, right or left, lobar, segmental or subsegmental pulmonary embolism. The main pulmonary artery is normal in caliber.  HEART: Heart size is normal. No pericardial effusion.  CHEST WALL AND LOWER NECK: Within normal limits.  VISUALIZED UPPER ABDOMEN: Hepatic hypodensities in keeping with known metastatic disease.  BONES: Within normal limits.    IMPRESSION:  No evidence of pulmonary embolism.  Peripheral based groundglass opacities in the right lung suggestive of atypical/ COVID-19 viral pneumonia. EKG personally reviewed.  NSR at 89 bpm. No acute ischemic changes. QTc 442 ms.    Imaging personally reviewed.  CTA chest with IV contrast:  INTERPRETATION:  CLINICAL INFORMATION: 55-year-old male with known metastatic colonic cancer to the liver and lungs presenting with syncopal episode. Clinical concern for pulmonary embolism.    COMPARISON: None.    PROCEDURE:  CT Angiography of the Chest.  90 ml of Omnipaque 350 was injected intravenously. 10 ml were discarded.  Sagittal and coronal reformats were performed as well as 3D (MIP) reconstructions.    FINDINGS:    LUNGS AND AIRWAYS: Patent central airways.  Diffuse right peripherally-based groundglass opacities and focus of groundglass opacity in the left lower lobes. Right dependent basilar atelectasis.  PLEURA: No pleural effusion. No pneumothorax.  MEDIASTINUM AND ALMAZ: No lymphadenopathy.  VESSELS: No main, right or left, lobar, segmental or subsegmental pulmonary embolism. The main pulmonary artery is normal in caliber.  HEART: Heart size is normal. No pericardial effusion.  CHEST WALL AND LOWER NECK: Within normal limits.  VISUALIZED UPPER ABDOMEN: Hepatic hypodensities in keeping with known metastatic disease.  BONES: Within normal limits.    IMPRESSION:  No evidence of pulmonary embolism.  Peripheral based groundglass opacities in the right lung suggestive of atypical/ COVID-19 viral pneumonia.

## 2021-01-20 NOTE — PATIENT PROFILE ADULT - COMPLETE THE FOLLOWING IF THE PATIENT REFUSES THE INFLUENZA VACCINE:
[Negative] : Genitourinary Risks/benefits discussed with patient/surrogate/Vaccine Information Sheet (VIS) provided-VIS date: 8/15/19

## 2021-01-20 NOTE — H&P ADULT - NSICDXFAMILYHX_GEN_ALL_CORE_FT
FAMILY HISTORY:  Family history of colon cancer  Family history of hypertension  Family history of type 2 diabetes mellitus

## 2021-01-21 LAB
AFP-TM SERPL-MCNC: 2.4 NG/ML — SIGNIFICANT CHANGE UP
ALBUMIN SERPL ELPH-MCNC: 2.7 G/DL — LOW (ref 3.3–5)
ALP SERPL-CCNC: 377 U/L — HIGH (ref 40–120)
ALT FLD-CCNC: 42 U/L — HIGH (ref 4–41)
ANION GAP SERPL CALC-SCNC: 12 MMOL/L — SIGNIFICANT CHANGE UP (ref 7–14)
AST SERPL-CCNC: 135 U/L — HIGH (ref 4–40)
BASOPHILS # BLD AUTO: 0.02 K/UL — SIGNIFICANT CHANGE UP (ref 0–0.2)
BASOPHILS NFR BLD AUTO: 0.2 % — SIGNIFICANT CHANGE UP (ref 0–2)
BILIRUB SERPL-MCNC: 0.5 MG/DL — SIGNIFICANT CHANGE UP (ref 0.2–1.2)
BUN SERPL-MCNC: 14 MG/DL — SIGNIFICANT CHANGE UP (ref 7–23)
CALCIUM SERPL-MCNC: 8.3 MG/DL — LOW (ref 8.4–10.5)
CANCER AG19-9 SERPL-ACNC: 2775 U/ML — HIGH
CEA SERPL-MCNC: 1000 NG/ML — HIGH (ref 1–3.8)
CHLORIDE SERPL-SCNC: 107 MMOL/L — SIGNIFICANT CHANGE UP (ref 98–107)
CO2 SERPL-SCNC: 21 MMOL/L — LOW (ref 22–31)
CREAT SERPL-MCNC: 0.72 MG/DL — SIGNIFICANT CHANGE UP (ref 0.5–1.3)
CULTURE RESULTS: NO GROWTH — SIGNIFICANT CHANGE UP
EOSINOPHIL # BLD AUTO: 0.04 K/UL — SIGNIFICANT CHANGE UP (ref 0–0.5)
EOSINOPHIL NFR BLD AUTO: 0.4 % — SIGNIFICANT CHANGE UP (ref 0–6)
GLUCOSE SERPL-MCNC: 74 MG/DL — SIGNIFICANT CHANGE UP (ref 70–99)
HCT VFR BLD CALC: 34.6 % — LOW (ref 39–50)
HGB BLD-MCNC: 10.1 G/DL — LOW (ref 13–17)
IANC: 6.45 K/UL — SIGNIFICANT CHANGE UP (ref 1.5–8.5)
IMM GRANULOCYTES NFR BLD AUTO: 0.3 % — SIGNIFICANT CHANGE UP (ref 0–1.5)
LYMPHOCYTES # BLD AUTO: 1.87 K/UL — SIGNIFICANT CHANGE UP (ref 1–3.3)
LYMPHOCYTES # BLD AUTO: 21 % — SIGNIFICANT CHANGE UP (ref 13–44)
MAGNESIUM SERPL-MCNC: 2.3 MG/DL — SIGNIFICANT CHANGE UP (ref 1.6–2.6)
MCHC RBC-ENTMCNC: 19.1 PG — LOW (ref 27–34)
MCHC RBC-ENTMCNC: 29.2 GM/DL — LOW (ref 32–36)
MCV RBC AUTO: 65.4 FL — LOW (ref 80–100)
MONOCYTES # BLD AUTO: 0.5 K/UL — SIGNIFICANT CHANGE UP (ref 0–0.9)
MONOCYTES NFR BLD AUTO: 5.6 % — SIGNIFICANT CHANGE UP (ref 2–14)
NEUTROPHILS # BLD AUTO: 6.45 K/UL — SIGNIFICANT CHANGE UP (ref 1.8–7.4)
NEUTROPHILS NFR BLD AUTO: 72.5 % — SIGNIFICANT CHANGE UP (ref 43–77)
NRBC # BLD: 0 /100 WBCS — SIGNIFICANT CHANGE UP
NRBC # FLD: 0 K/UL — SIGNIFICANT CHANGE UP
PHOSPHATE SERPL-MCNC: 3.2 MG/DL — SIGNIFICANT CHANGE UP (ref 2.5–4.5)
PLATELET # BLD AUTO: 331 K/UL — SIGNIFICANT CHANGE UP (ref 150–400)
POTASSIUM SERPL-MCNC: 4.3 MMOL/L — SIGNIFICANT CHANGE UP (ref 3.5–5.3)
POTASSIUM SERPL-SCNC: 4.3 MMOL/L — SIGNIFICANT CHANGE UP (ref 3.5–5.3)
PROT SERPL-MCNC: 6.8 G/DL — SIGNIFICANT CHANGE UP (ref 6–8.3)
RBC # BLD: 5.29 M/UL — SIGNIFICANT CHANGE UP (ref 4.2–5.8)
RBC # FLD: 18.5 % — HIGH (ref 10.3–14.5)
SARS-COV-2 IGG SERPL QL IA: POSITIVE
SARS-COV-2 IGM SERPL IA-ACNC: 8.11 INDEX — HIGH
SODIUM SERPL-SCNC: 140 MMOL/L — SIGNIFICANT CHANGE UP (ref 135–145)
SPECIMEN SOURCE: SIGNIFICANT CHANGE UP
WBC # BLD: 8.91 K/UL — SIGNIFICANT CHANGE UP (ref 3.8–10.5)
WBC # FLD AUTO: 8.91 K/UL — SIGNIFICANT CHANGE UP (ref 3.8–10.5)

## 2021-01-21 PROCEDURE — 99232 SBSQ HOSP IP/OBS MODERATE 35: CPT | Mod: GC

## 2021-01-21 PROCEDURE — 99233 SBSQ HOSP IP/OBS HIGH 50: CPT

## 2021-01-21 RX ORDER — ACETAMINOPHEN 500 MG
650 TABLET ORAL ONCE
Refills: 0 | Status: COMPLETED | OUTPATIENT
Start: 2021-01-21 | End: 2021-01-21

## 2021-01-21 RX ORDER — ACETAMINOPHEN 500 MG
650 TABLET ORAL EVERY 6 HOURS
Refills: 0 | Status: DISCONTINUED | OUTPATIENT
Start: 2021-01-21 | End: 2021-01-31

## 2021-01-21 RX ADMIN — PIPERACILLIN AND TAZOBACTAM 25 GRAM(S): 4; .5 INJECTION, POWDER, LYOPHILIZED, FOR SOLUTION INTRAVENOUS at 22:59

## 2021-01-21 RX ADMIN — AZITHROMYCIN 255 MILLIGRAM(S): 500 TABLET, FILM COATED ORAL at 09:05

## 2021-01-21 RX ADMIN — PIPERACILLIN AND TAZOBACTAM 25 GRAM(S): 4; .5 INJECTION, POWDER, LYOPHILIZED, FOR SOLUTION INTRAVENOUS at 13:05

## 2021-01-21 RX ADMIN — ENOXAPARIN SODIUM 40 MILLIGRAM(S): 100 INJECTION SUBCUTANEOUS at 13:05

## 2021-01-21 RX ADMIN — Medication 650 MILLIGRAM(S): at 23:18

## 2021-01-21 RX ADMIN — Medication 500 MILLIGRAM(S): at 13:05

## 2021-01-21 RX ADMIN — Medication 650 MILLIGRAM(S): at 17:32

## 2021-01-21 RX ADMIN — Medication 325 MILLIGRAM(S): at 13:05

## 2021-01-21 RX ADMIN — PIPERACILLIN AND TAZOBACTAM 25 GRAM(S): 4; .5 INJECTION, POWDER, LYOPHILIZED, FOR SOLUTION INTRAVENOUS at 05:22

## 2021-01-21 NOTE — PROGRESS NOTE ADULT - ASSESSMENT
56 yo man with history of vitiligo and recent finding of a mass in the distal transverse colon with hepatic and RLL metastatic disease (on CTAP on 1/5/21) presents with an episode of near syncope early Tuesday morning. Pt went to [facility] ED on 1/4/21 due to weeks of lower abdominal pain and was found to have a stool guaiac that was positive in the setting of anemia with Hgb 8.8. A CTAP that was performed during the ED visit showed an approximately 5.5 x 3.6 cm mass in the distal transverse colon, an enlarged prostate with distended bladder and mid prostate enhancement, and diffuse enhancing lesions throughout the liver and cluster of nodules at RLL consistent with metastatic disease.     Pt was scheduled to have follow up in early February and hasn't seen anyone yet for the findings. Pt went to see his PCP on Tuesday, however, after he almost passed out when he went to the bathroom to urinate in the middle of the night. Pt was feeling lightheaded/faint at the time and was about to fall to the ground when he caught himself by grabbing on to the door. Pt denies any LOC, head trauma, or fall. Pt again felt lightheaded/faint when he was at his PCP's office later in the day. No associated chest pain, palpitations, headaches, vision changes, numbness, tingling, or diaphoresis. Pt notes that over the past 2-3 weeks, he has lost ~20-25 lbs as a result of a weak appetite and loss of taste. Pt also reports that for the last 1 week, he has been experiencing dyspnea with exertion, getting winded even when walking short distances, such as from his bed to the bathroom. Over the last 2 days, pt has been having intermittent fevers. Denies any cough, current abdominal pain, N/V, diarrhea, constipation, bloody stools, or urinary symptoms. Has had dark greenish stools since starting iron tablets a week ago.    In the ED,   T 98, HR 80-93, BP /54-70, RR 16-18, O2 sats % RA.  (20 Jan 2021 01:18)      Fever:    - Tmax 101.2 (1/20), hemodynamically stable.  Satting 100% on RA.  Rule out acute bacterial infection.  Check bcx x 2  (ngtd) and UA/Ucx.   Denies urinary symptoms.    - CTA chest no PE, (+) peripheral based GGOs in rt lung, possible metastatic disease vs prior covid exposure.   Covid pcr (-) x2.  Pt currently w/o sob or hypoxemia, without symptoms of acute covid illness.       - Covid serologies are positive.  Covid pcr (-) x 2.   In the absence of acute illness, suspect this is prior disease.  OK from ID standpoint to d/c isolation precautions.     - Fever may also be caused by underlying colonic malignancy and metastatic disease.      Colonic mass with liver metastasis:    - Surgical evaluation noted - no surgery planned at this time.  GI called by primary team for colonoscopy and biopsy.    - Possible IR biopsy of liver mets - planned for next monday.    - Cont zosyn/azithro for now for intra-abdominal coverage and Legionella.  If Leg Ag (-), d/c azithro.    Leandra Matson

## 2021-01-21 NOTE — PROGRESS NOTE ADULT - PROBLEM SELECTOR PLAN 3
Suspect colon cancer ( no tissue diagnosis yet).  CTAP showing an ~5.5 x 3.6 cm mass in the distal transverse colon, enlarged prostate, diffuse enhancing lesions throughout the liver and cluster of nodules at RLL consistent with metastatic disease.   CEA -2882, PSA - WNL  -Oncology consult pending tissue biopsy results, as primary currently unknown  -FU  tumor markers: CA 19-9, and AFP  - GI/Surgery on board- Advise IR guided  BX  - 1/20 -Team dw IR- Defer biopsy as pt was on  isolation as pt  1/21-DW ID- Isolation dced as low suspicion for Active COVID, previous exposure +  1/21- Appreciate GI- Consult IR re: biopsy of liver lesions. However, if IR unable, GI can tentatively plan for colonoscopy for biopsy, for Monday   FU IR/GI Suspect colon cancer ( no tissue diagnosis yet).  CTAP showing an ~5.5 x 3.6 cm mass in the distal transverse colon, enlarged prostate, diffuse enhancing lesions throughout the liver and cluster of nodules at RLL consistent with metastatic disease.   CEA -2882, PSA - WNL  -Oncology consult pending tissue biopsy results, as primary currently unknown  -FU  tumor markers: CA 19-9, and AFP  - GI/Surgery on board- Advise IR guided  BX  - 1/20 -Team dw IR- Defer biopsy as pt was on  isolation as pt  1/21-DW ID- Isolation dced as low suspicion for Active COVID, previous exposure +  1/21- Appreciate GI- Consult IR re: biopsy of liver lesions. However, if IR unable, GI can tentatively plan for colonoscopy for biopsy, for Monday 1/21= Plan for IR procedure under Dr. Cristina on Monday Self

## 2021-01-21 NOTE — PROGRESS NOTE ADULT - SUBJECTIVE AND OBJECTIVE BOX
----------------------------------------------------------  Interventional Radiology Brief Consult Note  -----------------------------------------------------------    Reason for Referral:     HPI: 55y Male with PMH of vitiligo, with recent CT A/P showing mass in distal transverse colon with hepatic and right lower lobe metastases now presenting to the hospital for pre-syncopal episode likely 2/2 orthostatic hypotension. Patient reports near-syncopal episode when walking to the bathroom to urinate at night.     Vitals:  T(C): 37.5 (01-21-21 @ 13:00), Max: 37.9 (01-20-21 @ 22:15)  HR: 81 (01-21-21 @ 13:00) (70 - 82)  BP: 108/77 (01-21-21 @ 13:00) (95/69 - 108/77)  RR: 18 (01-21-21 @ 13:00) (17 - 18)  SpO2: 100% (01-21-21 @ 13:00) (99% - 100%)    Medications:  Home Medications:  Centrum oral tablet: 1 tab(s) orally once a day (21 Jan 2021 10:58)  ferrous sulfate 324 mg (65 mg elemental iron) oral tablet: 1 tab(s) orally once a day (21 Jan 2021 10:58)  Vitamin C 500 mg oral tablet: 1 tab(s) orally once a day (21 Jan 2021 10:58)    MEDICATIONS  (STANDING):  ascorbic acid 500 milliGRAM(s) Oral daily  azithromycin  IVPB      azithromycin  IVPB 500 milliGRAM(s) IV Intermittent every 24 hours  enoxaparin Injectable 40 milliGRAM(s) SubCutaneous daily  ferrous    sulfate 325 milliGRAM(s) Oral daily  piperacillin/tazobactam IVPB.. 3.375 Gram(s) IV Intermittent every 8 hours  sodium chloride 0.9%. 1000 milliLiter(s) (125 mL/Hr) IV Continuous <Continuous>      Labs:           10.1  8.91)-----(331     (01-21-21 @ 07:08)         34.6     140 | 107 | 14  --------------------< 74     (01-21-21 @ 07:08)  4.3 | 21 | 0.72       PT: 14.1<H> 01-20-21 @ 07:53  aPTT: 32.2 01-20-21 @ 07:53   INR: 1.24<H> 01-20-21 @ 07:53      Assessment/Plan:   -55y Male with mass in distal transverse colon with hepatic and right lower lobe metastases. patient no longer on covid precautions per ID, covid AB positive, lung CT findings felt likely to be chronic    Recommendations:  plan for  above procedure Monday with anesthesia under CT. can put order for IR procedure under Dr. Cristina  NPO AMN sun for sedation  please hold AM SQH/SQL mon AM  please recheck CBC BMP Coags in AM, maintain active T/S x 2

## 2021-01-21 NOTE — PROGRESS NOTE ADULT - PROBLEM SELECTOR PLAN 2
Pt with 2 days of fevers at home. On admission, pt initially afebrile but later had fever of 101.2F. Did not meet SIRS criteria, with leukocytosis of 11.71.   CTA -no PE, peripheral based groundglass opacities in R  lung.   - COVID-19 PCR x2 negative.  COVID ab- positive RVP - negative  -Procalcitonin-1.35  -On  empiric antibiotics with Zosyn and Azithromycin to cover for PNA and abdominal source given colonic mass and liver mets.  - ID on board- DC Azithro if U Legionella  negative  - F/u blood cxs  - F/u UA and urine cx, though pt denies any urinary symptoms

## 2021-01-21 NOTE — PROGRESS NOTE ADULT - ASSESSMENT
56 yo man with history of vitiligo and recent finding of a mass in the distal transverse colon with hepatic and RLL metastatic disease (on CTAP on 1/5/21) presents with an episode of near syncope early Tuesday morning, suspect 2/2 orthostatic hypotension, and fevers of unclear etiology.

## 2021-01-21 NOTE — DIETITIAN INITIAL EVALUATION ADULT. - PROBLEM SELECTOR PLAN 2
Pt with 2 days of fevers at home. On admission, pt initially afebrile but later had fever of 101.2F. Did not meet SIRS criteria, with leukocytosis of 11.71. CTA chest with no PE but with peripheral based groundglass opacities in the right lung.   - COVID-19 PCR negative. Will check RVP and repeat COVID-19 swab. If negative, then will start empiric antibiotics with Zosyn and Azithromycin to cover for PNA and abdominal source given colonic mass and liver mets.  - F/u blood cxs  - F/u UA and urine cx, though pt denies any urinary symptoms  - Check procalcitonin, other COVID-19 inflammatory markers

## 2021-01-21 NOTE — PROGRESS NOTE ADULT - PROBLEM SELECTOR PLAN 4
Likely 2/2 metastatic disease from colon cancer. Pt with significant weight loss and poor appetite over past 2-3 weeks.  - Nutrition consult  - PT consult- home   - Fall risk protocol

## 2021-01-21 NOTE — DIETITIAN INITIAL EVALUATION ADULT. - PROBLEM SELECTOR PLAN 5
Hgb 10.2 on admission vs. 8.8 on 1/4/21. No S/S of active bleed at this time. Pt with positive stool guaiac on 1/4/21. Likely iron-deficiency anemia, possibly also 2/2 AOCD and from folate/vit B12 deficiency given recent poor PO intake.   - Check FOBT  - Check iron studies, folate, vit B12, retic count. Lower suspicion for hemolysis at this time, but will check haptoglobin and LDH.  - Monitor H/H and transfuse to keep Hgb > 7  - C/w ferrous sulfate with vit C supplementation

## 2021-01-21 NOTE — PROGRESS NOTE ADULT - PROBLEM SELECTOR PLAN 6
T bili 0.4, alk phos 361, , and ALT 39. Likely from liver mets. No abdominal pain or tenderness.  - Monitor LFTs for now  - Will get repeat imaging of abdomen if pt with concerning uptrending LFTs Likely from liver mets. No abdominal pain or tenderness.  - Monitor LFTs for now  - Will get repeat imaging of abdomen if pt with concerning uptrending LFTs

## 2021-01-21 NOTE — DIETITIAN INITIAL EVALUATION ADULT. - PROBLEM SELECTOR PLAN 1
Unclear etiology. Suspect 2/2 orthostatic hypotension, but can also be reflex-mediated/vasovagal or cardiac (structural heart disease, arrhythmias) or in setting of anemia.  - Orthostatics checked s/p 1L NS bolus in ED and positive. Pt with increase in HR of >30 bpm when going from sitting to standing.   - C/w IVF with NS at 125 cc/hr x8 hrs overnight  - CTA chest negative for PE  - Hs-trop drawn in ED but result unable to be obtained due to lab issues. Will check hs-trop in AM. EKG without any acute ischemic changes. Low suspicion for myocardial ischemia/ACS as etiology at this time.  - Check TTE to evaluate for any structural heart disease  - Monitor on tele for any concerning arrhythmias  - Anemia workup as below  - PT consult  - Fall risk protocol

## 2021-01-21 NOTE — DIETITIAN INITIAL EVALUATION ADULT. - PROBLEM SELECTOR PLAN 4
Pt with recent CTAP showing an ~5.5 x 3.6 cm mass in the distal transverse colon, an enlarged prostate with distended bladder and mid prostate enhancement, and diffuse enhancing lesions throughout the liver and cluster of nodules at RLL consistent with metastatic disease. Suspect colon cancer, but also with concern for prostate cancer.  - GI consult emailed for possible colonoscopy to obtain tissue biopsy of colonic mass  - Check total and free PSA and UA. Possible Urology consult pending PSA results.  - Possible Oncology consult pending tissue biopsy results, as primary currently unknown  - Check tumor markers: CEA, CA 19-9, and AFP

## 2021-01-21 NOTE — DIETITIAN INITIAL EVALUATION ADULT. - PERTINENT MEDS FT
MEDICATIONS  (STANDING):  ascorbic acid 500 milliGRAM(s) Oral daily  azithromycin  IVPB      azithromycin  IVPB 500 milliGRAM(s) IV Intermittent every 24 hours  enoxaparin Injectable 40 milliGRAM(s) SubCutaneous daily  ferrous    sulfate 325 milliGRAM(s) Oral daily  piperacillin/tazobactam IVPB.. 3.375 Gram(s) IV Intermittent every 8 hours  sodium chloride 0.9%. 1000 milliLiter(s) (125 mL/Hr) IV Continuous <Continuous>

## 2021-01-21 NOTE — PROGRESS NOTE ADULT - SUBJECTIVE AND OBJECTIVE BOX
PROGRESS NOTE:   Written by Branden Hirsch. [facility] page number: [id number]. [place] page number [phone]    Patient is a 55y old  Male who presents with a chief complaint of Near syncope (20 Jan 2021 14:11)      SUBJECTIVE / OVERNIGHT EVENTS: Overnight, no acute events. Patient febrile to 100.3F overnight, resolved with tylenol. Patient reports he feels better this morning and feels more energetic. His last BM was 2 days ago. Patient denies chest pain, shortness of breath, abdominal pain, constipation, diarrhea, leg swelling or pain      ADDITIONAL REVIEW OF SYSTEMS:    MEDICATIONS  (STANDING):  ascorbic acid 500 milliGRAM(s) Oral daily  azithromycin  IVPB      azithromycin  IVPB 500 milliGRAM(s) IV Intermittent every 24 hours  enoxaparin Injectable 40 milliGRAM(s) SubCutaneous daily  ferrous    sulfate 325 milliGRAM(s) Oral daily  piperacillin/tazobactam IVPB.. 3.375 Gram(s) IV Intermittent every 8 hours  sodium chloride 0.9%. 1000 milliLiter(s) (125 mL/Hr) IV Continuous <Continuous>    MEDICATIONS  (PRN):      CAPILLARY BLOOD GLUCOSE        I&O's Summary      PHYSICAL EXAM:  Vital Signs Last 24 Hrs  T(C): 37.1 (21 Jan 2021 09:00), Max: 37.9 (20 Jan 2021 22:15)  T(F): 98.8 (21 Jan 2021 09:00), Max: 100.3 (20 Jan 2021 22:15)  HR: 82 (21 Jan 2021 09:00) (70 - 82)  BP: 95/69 (21 Jan 2021 09:00) (95/69 - 105/60)  BP(mean): --  RR: 18 (21 Jan 2021 09:00) (17 - 18)  SpO2: 100% (21 Jan 2021 09:00) (99% - 100%)    GENERAL:  Appears cachectic, no distress  HEENT:  NC/AT,  conjunctivae clear and pink,  no JVD  CHEST:  Full & symmetric excursion, no increased effort, breath sounds clear. + Mild crackles in lung bases  HEART:  Regular rhythm, S1, S2, no murmur/rub/S3/S4, no abdominal bruit  ABDOMEN:  Soft, non-distended, +  mild LUQ and epigastric tenderness to deep palpation, normoactive bowel sounds  EXTREMITIES:  no cyanosis, clubbing, or edema  SKIN:  Scattered hypopigmented patches of skin,  No rash/erythema/ecchymoses/petechiae/wounds/abscess/warm/dry  NEURO:  Alert and oriented x3    LABS:                        10.1   8.91  )-----------( 331      ( 21 Jan 2021 07:08 )             34.6     01-21    140  |  107  |  14  ----------------------------<  74  4.3   |  21<L>  |  0.72    Ca    8.3<L>      21 Jan 2021 07:08  Phos  3.2     01-21  Mg     2.3     01-21    TPro  6.8  /  Alb  2.7<L>  /  TBili  0.5  /  DBili  x   /  AST  135<H>  /  ALT  42<H>  /  AlkPhos  377<H>  01-21    PT/INR - ( 20 Jan 2021 07:53 )   PT: 14.1 sec;   INR: 1.24 ratio         PTT - ( 20 Jan 2021 07:53 )  PTT:32.2 sec            RADIOLOGY & ADDITIONAL TESTS:  Results Reviewed:   Imaging Personally Reviewed:  Electrocardiogram Personally Reviewed:    COORDINATION OF CARE:  Care Discussed with Consultants/Other Providers [Y/N]:  Prior or Outpatient Records Reviewed [Y/N]:

## 2021-01-21 NOTE — PROGRESS NOTE ADULT - SUBJECTIVE AND OBJECTIVE BOX
Dwain Becerra  Hospitalist  Pager- [phone]    PROGRESS NOTE:     Patient is a 55y old  Male who presents with a chief complaint of Near syncope (20 Jan 2021 10:06)      SUBJECTIVE / OVERNIGHT EVENTS: Pt seen and examined by me  C/o decreased  PO appetite, anxious about his condition   Temperature of 100.3 overnigt     ADDITIONAL REVIEW OF SYSTEMS:    MEDICATIONS  (STANDING):  ascorbic acid 500 milliGRAM(s) Oral daily  azithromycin  IVPB      azithromycin  IVPB 500 milliGRAM(s) IV Intermittent every 24 hours  enoxaparin Injectable 40 milliGRAM(s) SubCutaneous daily  ferrous    sulfate 325 milliGRAM(s) Oral daily  piperacillin/tazobactam IVPB.. 3.375 Gram(s) IV Intermittent every 8 hours  sodium chloride 0.9%. 1000 milliLiter(s) (125 mL/Hr) IV Continuous <Continuous>    MEDICATIONS  (PRN):        PHYSICAL EXAM:    Vital Signs Last 24 Hrs  T(C): 37.1 (21 Jan 2021 09:00), Max: 37.9 (20 Jan 2021 22:15)  T(F): 98.8 (21 Jan 2021 09:00), Max: 100.3 (20 Jan 2021 22:15)  HR: 82 (21 Jan 2021 09:00) (70 - 82)  BP: 95/69 (21 Jan 2021 09:00) (95/69 - 105/60)  BP(mean): --  RR: 18 (21 Jan 2021 09:00) (17 - 18)  SpO2: 100% (21 Jan 2021 09:00) (99% - 100%)    CONSTITUTIONAL: NAD, well-developed  RESPIRATORY: Normal respiratory effort; lungs are clear to auscultation bilaterally  CARDIOVASCULAR: Regular rate and rhythm, normal S1 and S2, no murmur/rub/gallop; No lower extremity edema; Peripheral pulses are 2+ bilaterally  ABDOMEN: Nontender to palpation, normoactive bowel sounds, no rebound/guarding; No hepatosplenomegaly  MUSCLOSKELETAL: no clubbing or cyanosis of digits; no joint swelling or tenderness to palpation  PSYCH: A+O to person, place, and time; affect appropriate  NEURO: NAD  SKIN: patches of vitilgo    LABS:                                            10.1   8.91  )-----------( 331      ( 21 Jan 2021 07:08 )             34.6       01-21    140  |  107  |  14  ----------------------------<  74  4.3   |  21<L>  |  0.72    Ca    8.3<L>      21 Jan 2021 07:08  Phos  3.2     01-21  Mg     2.3     01-21    TPro  6.8  /  Alb  2.7<L>  /  TBili  0.5  /  DBili  x   /  AST  135<H>  /  ALT  42<H>  /  AlkPhos  377<H>  01-21       PTT - ( 20 Jan 2021 07:53 )  PTT:32.2 sec            RADIOLOGY & ADDITIONAL TESTS:  Results Reviewed:   Imaging Personally Reviewed:  Electrocardiogram Personally Reviewed:    COORDINATION OF CARE:  Care Discussed with Consultants/Other Providers [Y/N]: GI   Prior or Outpatient Records Reviewed [Y/N]:Surgery    Dwain Becerra  Hospitalist  Pager- [phone]    PROGRESS NOTE:     Patient is a 55y old  Male who presents with a chief complaint of Near syncope (20 Jan 2021 10:06)      SUBJECTIVE / OVERNIGHT EVENTS: Pt seen and examined by me  No new events  Temperature of 100.3 overnigt   Last BM was 2 days back       ADDITIONAL REVIEW OF SYSTEMS:    MEDICATIONS  (STANDING):  ascorbic acid 500 milliGRAM(s) Oral daily  azithromycin  IVPB      azithromycin  IVPB 500 milliGRAM(s) IV Intermittent every 24 hours  enoxaparin Injectable 40 milliGRAM(s) SubCutaneous daily  ferrous    sulfate 325 milliGRAM(s) Oral daily  piperacillin/tazobactam IVPB.. 3.375 Gram(s) IV Intermittent every 8 hours  sodium chloride 0.9%. 1000 milliLiter(s) (125 mL/Hr) IV Continuous <Continuous>    MEDICATIONS  (PRN):        PHYSICAL EXAM:    Vital Signs Last 24 Hrs  T(C): 37.1 (21 Jan 2021 09:00), Max: 37.9 (20 Jan 2021 22:15)  T(F): 98.8 (21 Jan 2021 09:00), Max: 100.3 (20 Jan 2021 22:15)  HR: 82 (21 Jan 2021 09:00) (70 - 82)  BP: 95/69 (21 Jan 2021 09:00) (95/69 - 105/60)  BP(mean): --  RR: 18 (21 Jan 2021 09:00) (17 - 18)  SpO2: 100% (21 Jan 2021 09:00) (99% - 100%)    CONSTITUTIONAL: NAD, well-developed  RESPIRATORY: Normal respiratory effort; lungs are clear to auscultation bilaterally  CARDIOVASCULAR: Regular rate and rhythm, normal S1 and S2, no murmur/rub/gallop; No lower extremity edema; Peripheral pulses are 2+ bilaterally  ABDOMEN: Nontender to palpation, normoactive bowel sounds, no rebound/guarding; No hepatosplenomegaly  MUSCLOSKELETAL: no clubbing or cyanosis of digits; no joint swelling or tenderness to palpation  PSYCH: A+O to person, place, and time; affect appropriate  NEURO: NAD  SKIN: patches of vitilgo    LABS:                                            10.1   8.91  )-----------( 331      ( 21 Jan 2021 07:08 )             34.6       01-21    140  |  107  |  14  ----------------------------<  74  4.3   |  21<L>  |  0.72    Ca    8.3<L>      21 Jan 2021 07:08  Phos  3.2     01-21  Mg     2.3     01-21    TPro  6.8  /  Alb  2.7<L>  /  TBili  0.5  /  DBili  x   /  AST  135<H>  /  ALT  42<H>  /  AlkPhos  377<H>  01-21       PTT - ( 20 Jan 2021 07:53 )  PTT:32.2 sec            RADIOLOGY & ADDITIONAL TESTS:  Results Reviewed:   Imaging Personally Reviewed:  Electrocardiogram Personally Reviewed:    COORDINATION OF CARE:  Care Discussed with Consultants/Other Providers [Y/N]: GI   Prior or Outpatient Records Reviewed [Y/N]:Surgery

## 2021-01-21 NOTE — PROGRESS NOTE ADULT - PROBLEM SELECTOR PLAN 7
- DVT ppx: Lovenox SQ if H/H in AM relatively stable.  - Diet: Regular for now DVT ppx: Lovenox SQ   Dispo - Plan for IR procedure on monday

## 2021-01-21 NOTE — DIETITIAN INITIAL EVALUATION ADULT. - OTHER INFO
56 yo man with history of vitiligo and recent finding of a mass in the distal transverse colon with hepatic and RLL metastatic disease (on CTAP on 1/5/21) presents with an episode of near syncope early Tuesday morning, suspect 2/2 orthostatic hypotension, and fevers of unclear etiology.    Pt remains with <75% of PO intakes.  Denies chewing, swallowing difficulties or any nausea, vomiting, diarrhea, constipation. Pt reports lack of ability to taste. Encouraged increased PO intakes as able with small, frequent meal intakes. Pt endorses significant weight loss within the past 2-3 weeks. Suggested add Vital Cuisine Shake 2x daily. 56 yo man with history of vitiligo and recent finding of a mass in the distal transverse colon with hepatic and RLL metastatic disease (on CTAP on 1/5/21) presents with an episode of near syncope early Tuesday morning, suspect 2/2 orthostatic hypotension, and fevers of unclear etiology.    Pt remains with <75% of PO intakes.  Denies chewing, swallowing difficulties or any nausea, vomiting, diarrhea, constipation. Pt reports lack of ability to taste. Encouraged increased PO intakes as able with small, frequent meals. Pt endorses significant weight loss within the past 2-3 weeks. Suggested add Vital Cuisine Shake 2x daily.

## 2021-01-21 NOTE — PROGRESS NOTE ADULT - SUBJECTIVE AND OBJECTIVE BOX
Infectious Diseases progress note:    Subjective: Tmax 100.3, satting 100% on RA, no acute o/n events.  No cough/sob    ROS:  CONSTITUTIONAL:  No fever, chills, rigors  CARDIOVASCULAR:  No chest pain or palpitations  RESPIRATORY:   No SOB, cough, dyspnea on exertion.  No wheezing  GASTROINTESTINAL:  No abd pain, N/V, diarrhea/constipation  EXTREMITIES:  No swelling or joint pain  GENITOURINARY:  No burning on urination, increased frequency or urgency.  No flank pain  NEUROLOGIC:  No HA, visual disturbances  SKIN: No rashes    Allergies    No Known Allergies    Intolerances        ANTIBIOTICS/RELEVANT:  antimicrobials  azithromycin  IVPB      azithromycin  IVPB 500 milliGRAM(s) IV Intermittent every 24 hours  piperacillin/tazobactam IVPB.. 3.375 Gram(s) IV Intermittent every 8 hours    immunologic:    OTHER:  ascorbic acid 500 milliGRAM(s) Oral daily  enoxaparin Injectable 40 milliGRAM(s) SubCutaneous daily  ferrous    sulfate 325 milliGRAM(s) Oral daily  sodium chloride 0.9%. 1000 milliLiter(s) IV Continuous <Continuous>      Objective:  Vital Signs Last 24 Hrs  T(C): 37.5 (21 Jan 2021 13:00), Max: 37.9 (20 Jan 2021 22:15)  T(F): 99.5 (21 Jan 2021 13:00), Max: 100.3 (20 Jan 2021 22:15)  HR: 81 (21 Jan 2021 13:00) (70 - 82)  BP: 108/77 (21 Jan 2021 13:00) (95/69 - 108/77)  BP(mean): --  RR: 18 (21 Jan 2021 13:00) (17 - 18)  SpO2: 100% (21 Jan 2021 13:00) (99% - 100%)    PHYSICAL EXAM:  Constitutional:NAD  Eyes:AL, EOMI  Ear/Nose/Throat: no thrush, mucositis.  Moist mucous membranes	  Neck:no JVD, no lymphadenopathy, supple  Respiratory: CTA cheli  Cardiovascular: S1S2 RRR, no murmurs  Gastrointestinal:soft, nontender,  nondistended (+) BS  Extremities:no e/e/c  Skin:  no rashes, open wounds or ulcerations        LABS:                        10.1   8.91  )-----------( 331      ( 21 Jan 2021 07:08 )             34.6     01-21    140  |  107  |  14  ----------------------------<  74  4.3   |  21<L>  |  0.72    Ca    8.3<L>      21 Jan 2021 07:08  Phos  3.2     01-21  Mg     2.3     01-21    TPro  6.8  /  Alb  2.7<L>  /  TBili  0.5  /  DBili  x   /  AST  135<H>  /  ALT  42<H>  /  AlkPhos  377<H>  01-21    PT/INR - ( 20 Jan 2021 07:53 )   PT: 14.1 sec;   INR: 1.24 ratio         PTT - ( 20 Jan 2021 07:53 )  PTT:32.2 sec      Procalcitonin, Serum: 1.35 (01-20 @ 07:53)        COVID-19 Antibody - for prior infection screening (01.21.21 @ 06:37)   COVID-19 IgG Antibody Index: 8.11: Roche ECLIA Total AB (SVETLANA)   NOTE: This result index represents a total antibody measurement, which   includes IgG, IgA, and IgM   Measures Nucleocapsid   Negative <= 0.99 Index   Positive >= 1.00 Index Index   COVID-19 IgG Antibody Interpretation: Positive: This test has not been reviewed by the FDA by the standard review     Rapid RVP Result: [facility]  Rapid RVP Result: [facility]          MICROBIOLOGY:      Culture - Blood (01.20.21 @ 10:09)   Specimen Source: .Blood Blood-Peripheral   Culture Results:   No growth to date.         RADIOLOGY & ADDITIONAL STUDIES:    < from: CT Angio Chest w/ IV Cont (01.19.21 @ 20:28) >    INTERPRETATION:  CLINICAL INFORMATION: 55-year-old male with known metastatic colonic cancer to the liver and lungs presenting with syncopal episode. Clinical concern for pulmonary embolism.    COMPARISON: None.    PROCEDURE:  CT Angiography of the Chest.  90 ml of Omnipaque 350 was injected intravenously. 10 ml were discarded.  Sagittal and coronal reformats were performed as well as 3D (MIP) reconstructions.    FINDINGS:    LUNGS AND AIRWAYS: Patent central airways.  Diffuse right peripherally-based groundglass opacities and focus of groundglass opacity in the left lower lobes. Right dependent basilar atelectasis.  PLEURA: No pleural effusion. No pneumothorax.  MEDIASTINUM AND ALMAZ: No lymphadenopathy.  VESSELS: No main, right or left, lobar, segmental or subsegmental pulmonary embolism. The main pulmonary artery is normal in caliber.  HEART: Heart size is normal. No pericardial effusion.  CHEST WALL AND LOWER NECK: Within normal limits.  VISUALIZED UPPER ABDOMEN: Hepatic hypodensities in keeping with known metastatic disease.  BONES: Within normal limits.    IMPRESSION:  No evidence of pulmonary embolism.    Peripheral based groundglass opacities in the right lung suggestive of atypical/ COVID-19 viral pneumonia.    < end of copied text >

## 2021-01-21 NOTE — PROGRESS NOTE ADULT - PROBLEM SELECTOR PLAN 1
Suspect 2/2 orthostatic hypotension  Other possibility Is reflex-mediated/vasovagal  or in setting of anemia.  Orthostatics  positive with increase in HR of >30 bpm when going from sitting to standing.    - s/p IVF  - CTA chest negative for PE  - Hs-trop x1 -7  - EKG without any acute ischemic changes. Low suspicion for myocardial ischemia/ACS as etiology at this time.  - FU  TTE to evaluate for any structural heart disease  - FU repeat Orthostatics  - Monitor on tele for any concerning arrhythmias

## 2021-01-21 NOTE — PROGRESS NOTE ADULT - ASSESSMENT
Patient is a 55yoM with PMH of vitiligo, with recent CT A/P showing mass in distal transverse colon with hepatic and right lower lobe lesions consistent with metastases (1/25/21) now presenting to the hospital for pre-syncopal episode likely 2/2 orthostatic hypotension with recurring fevers of unclear etiology.  #Colon mass                    Patient found to have 5.5x3.6cm mass in distal transverse colon with diffuse enhancing hepatic lesions and nodules in right lung base, consistent with metastatic colon cancer. Patient also with history concerning for malignancy including 20+ lbs weight loss in the last year, bloody stools, iron deficiency anemia, thin caliber stools as well as family history of cancer. Patient's elevated Alk phos and AST likely 2/2 metastatic disease to the liver. CEA elevated to 2882. Given likely metastases to the liver and lung, a biopsy of the hepatic lesions, if feasible, would confirm the diagnosis of metastatic colon cancer. Given active COVID precautions, patient is not a candidate for colonoscopy at this time Patient reports he wants to pursue any appropriate management if malignancy is confirmed.  #Fever  Patient with recurring fevers over the last week, most recently overnight (to 100.3F) 2/2 bacterial infection (legionella vs. GI source) vs. tumor fever vs. COVID. Despite initial concerns for COVID infection based on ground glass opacities on CTA and elevated inflammatory markers, patient without hypoxia, with negative COVID PCR x3 and positive COVID serologies, now off of isolation precautions with lower suspicion for active COVID infection. Agree with empiric antibiotic coverage as per ID and infectious workup for fevers with blood cultures and UA/urine culture.     Recommendations:  - re-consult IR to re-evaluate for possibility of liver lesions biopsy to confirm malignancy now that patient is off isolation precautions  - Clear liquid diet  - follow up AFP, CA 19-9 (elevated CEA)  - Oncology consult, pending biopsy results   Patient is a 55yoM with PMH of vitiligo, with recent CT A/P showing mass in distal transverse colon with hepatic and right lower lobe lesions consistent with metastases (1/25/21) now presenting to the hospital for pre-syncopal episode likely 2/2 orthostatic hypotension with recurring fevers of unclear etiology.  #Colon mass                    Patient found to have 5.5x3.6cm mass in distal transverse colon with diffuse enhancing hepatic lesions and nodules in right lung base, consistent with metastatic colon cancer. Patient also with history concerning for malignancy including 20+ lbs weight loss in the last year, bloody stools, iron deficiency anemia, thin caliber stools as well as family history of cancer. Patient's elevated Alk phos and AST likely 2/2 metastatic disease to the liver. CEA elevated to 2882. Given likely metastases to the liver and lung, a biopsy of the hepatic lesions, if feasible, would confirm the diagnosis of metastatic colon cancer. Given active COVID precautions, patient is not a candidate for colonoscopy at this time Patient reports he wants to pursue any appropriate management if malignancy is confirmed.  #Fever  Patient with recurring fevers over the last week, most recently overnight (to 100.3F) 2/2 bacterial infection (legionella vs. GI source) vs. tumor fever vs. COVID. Despite initial concerns for COVID infection based on ground glass opacities on CTA and elevated inflammatory markers, patient without hypoxia, with negative COVID PCR x3 and positive COVID serologies, now off of isolation precautions with lower suspicion for active COVID infection. Agree with empiric antibiotic coverage as per ID and infectious workup for fevers with blood cultures and UA/urine culture.     Recommendations:  - re-consult IR to re-evaluate for possibility of liver lesions biopsy to confirm metastatic colon cancer now that patient is off isolation precautions  - Clear liquid diet  - follow up AFP, CA 19-9 (elevated CEA)  - Oncology consult, pending biopsy results

## 2021-01-22 LAB
ALBUMIN SERPL ELPH-MCNC: 2.5 G/DL — LOW (ref 3.3–5)
ALP SERPL-CCNC: 404 U/L — HIGH (ref 40–120)
ALT FLD-CCNC: 51 U/L — HIGH (ref 4–41)
ANION GAP SERPL CALC-SCNC: 14 MMOL/L — SIGNIFICANT CHANGE UP (ref 7–14)
APPEARANCE UR: CLEAR — SIGNIFICANT CHANGE UP
APTT BLD: 35.2 SEC — SIGNIFICANT CHANGE UP (ref 27–36.3)
AST SERPL-CCNC: 176 U/L — HIGH (ref 4–40)
BACTERIA # UR AUTO: NEGATIVE — SIGNIFICANT CHANGE UP
BASOPHILS # BLD AUTO: 0.01 K/UL — SIGNIFICANT CHANGE UP (ref 0–0.2)
BASOPHILS NFR BLD AUTO: 0.1 % — SIGNIFICANT CHANGE UP (ref 0–2)
BILIRUB SERPL-MCNC: 0.7 MG/DL — SIGNIFICANT CHANGE UP (ref 0.2–1.2)
BILIRUB UR-MCNC: NEGATIVE — SIGNIFICANT CHANGE UP
BLD GP AB SCN SERPL QL: NEGATIVE — SIGNIFICANT CHANGE UP
BUN SERPL-MCNC: 9 MG/DL — SIGNIFICANT CHANGE UP (ref 7–23)
CALCIUM SERPL-MCNC: 8.3 MG/DL — LOW (ref 8.4–10.5)
CHLORIDE SERPL-SCNC: 103 MMOL/L — SIGNIFICANT CHANGE UP (ref 98–107)
CO2 SERPL-SCNC: 20 MMOL/L — LOW (ref 22–31)
COLOR SPEC: YELLOW — SIGNIFICANT CHANGE UP
CREAT SERPL-MCNC: 0.68 MG/DL — SIGNIFICANT CHANGE UP (ref 0.5–1.3)
DIFF PNL FLD: NEGATIVE — SIGNIFICANT CHANGE UP
EOSINOPHIL # BLD AUTO: 0.04 K/UL — SIGNIFICANT CHANGE UP (ref 0–0.5)
EOSINOPHIL NFR BLD AUTO: 0.4 % — SIGNIFICANT CHANGE UP (ref 0–6)
EPI CELLS # UR: 1 /HPF — SIGNIFICANT CHANGE UP (ref 0–5)
GLUCOSE SERPL-MCNC: 96 MG/DL — SIGNIFICANT CHANGE UP (ref 70–99)
GLUCOSE UR QL: NEGATIVE — SIGNIFICANT CHANGE UP
HCT VFR BLD CALC: 29.7 % — LOW (ref 39–50)
HGB BLD-MCNC: 9.1 G/DL — LOW (ref 13–17)
IANC: 8.4 K/UL — SIGNIFICANT CHANGE UP (ref 1.5–8.5)
IMM GRANULOCYTES NFR BLD AUTO: 0.5 % — SIGNIFICANT CHANGE UP (ref 0–1.5)
INR BLD: 1.37 RATIO — HIGH (ref 0.88–1.16)
KETONES UR-MCNC: NEGATIVE — SIGNIFICANT CHANGE UP
LEGIONELLA AG UR QL: NEGATIVE — SIGNIFICANT CHANGE UP
LEUKOCYTE ESTERASE UR-ACNC: NEGATIVE — SIGNIFICANT CHANGE UP
LYMPHOCYTES # BLD AUTO: 1.45 K/UL — SIGNIFICANT CHANGE UP (ref 1–3.3)
LYMPHOCYTES # BLD AUTO: 13.5 % — SIGNIFICANT CHANGE UP (ref 13–44)
MCHC RBC-ENTMCNC: 19.3 PG — LOW (ref 27–34)
MCHC RBC-ENTMCNC: 30.6 GM/DL — LOW (ref 32–36)
MCV RBC AUTO: 63.1 FL — LOW (ref 80–100)
MONOCYTES # BLD AUTO: 0.79 K/UL — SIGNIFICANT CHANGE UP (ref 0–0.9)
MONOCYTES NFR BLD AUTO: 7.4 % — SIGNIFICANT CHANGE UP (ref 2–14)
NEUTROPHILS # BLD AUTO: 8.4 K/UL — HIGH (ref 1.8–7.4)
NEUTROPHILS NFR BLD AUTO: 78.1 % — HIGH (ref 43–77)
NITRITE UR-MCNC: NEGATIVE — SIGNIFICANT CHANGE UP
NRBC # BLD: 0 /100 WBCS — SIGNIFICANT CHANGE UP
NRBC # FLD: 0 K/UL — SIGNIFICANT CHANGE UP
PH UR: 6.5 — SIGNIFICANT CHANGE UP (ref 5–8)
PLATELET # BLD AUTO: 372 K/UL — SIGNIFICANT CHANGE UP (ref 150–400)
POTASSIUM SERPL-MCNC: 3.9 MMOL/L — SIGNIFICANT CHANGE UP (ref 3.5–5.3)
POTASSIUM SERPL-SCNC: 3.9 MMOL/L — SIGNIFICANT CHANGE UP (ref 3.5–5.3)
PROT SERPL-MCNC: 6.7 G/DL — SIGNIFICANT CHANGE UP (ref 6–8.3)
PROT UR-MCNC: ABNORMAL
PROTHROM AB SERPL-ACNC: 15.5 SEC — HIGH (ref 10.6–13.6)
RBC # BLD: 4.71 M/UL — SIGNIFICANT CHANGE UP (ref 4.2–5.8)
RBC # FLD: 17.6 % — HIGH (ref 10.3–14.5)
RBC CASTS # UR COMP ASSIST: 0 /HPF — SIGNIFICANT CHANGE UP (ref 0–4)
RH IG SCN BLD-IMP: POSITIVE — SIGNIFICANT CHANGE UP
SARS-COV-2 RNA SPEC QL NAA+PROBE: SIGNIFICANT CHANGE UP
SODIUM SERPL-SCNC: 137 MMOL/L — SIGNIFICANT CHANGE UP (ref 135–145)
SP GR SPEC: 1.02 — SIGNIFICANT CHANGE UP (ref 1.01–1.02)
UROBILINOGEN FLD QL: ABNORMAL
WBC # BLD: 10.74 K/UL — HIGH (ref 3.8–10.5)
WBC # FLD AUTO: 10.74 K/UL — HIGH (ref 3.8–10.5)
WBC UR QL: 1 /HPF — SIGNIFICANT CHANGE UP (ref 0–5)

## 2021-01-22 PROCEDURE — 99233 SBSQ HOSP IP/OBS HIGH 50: CPT

## 2021-01-22 PROCEDURE — 93306 TTE W/DOPPLER COMPLETE: CPT | Mod: 26

## 2021-01-22 RX ADMIN — Medication 500 MILLIGRAM(S): at 12:28

## 2021-01-22 RX ADMIN — Medication 325 MILLIGRAM(S): at 12:28

## 2021-01-22 RX ADMIN — PIPERACILLIN AND TAZOBACTAM 25 GRAM(S): 4; .5 INJECTION, POWDER, LYOPHILIZED, FOR SOLUTION INTRAVENOUS at 05:02

## 2021-01-22 RX ADMIN — ENOXAPARIN SODIUM 40 MILLIGRAM(S): 100 INJECTION SUBCUTANEOUS at 12:28

## 2021-01-22 RX ADMIN — PIPERACILLIN AND TAZOBACTAM 25 GRAM(S): 4; .5 INJECTION, POWDER, LYOPHILIZED, FOR SOLUTION INTRAVENOUS at 12:28

## 2021-01-22 RX ADMIN — AZITHROMYCIN 255 MILLIGRAM(S): 500 TABLET, FILM COATED ORAL at 06:54

## 2021-01-22 RX ADMIN — PIPERACILLIN AND TAZOBACTAM 25 GRAM(S): 4; .5 INJECTION, POWDER, LYOPHILIZED, FOR SOLUTION INTRAVENOUS at 22:18

## 2021-01-22 RX ADMIN — Medication 650 MILLIGRAM(S): at 05:01

## 2021-01-22 RX ADMIN — Medication 650 MILLIGRAM(S): at 22:17

## 2021-01-22 NOTE — PROGRESS NOTE ADULT - PROBLEM SELECTOR PLAN 3
Suspect colon cancer ( no tissue diagnosis yet).  CTAP showing an ~5.5 x 3.6 cm mass in the distal transverse colon, enlarged prostate, diffuse enhancing lesions throughout the liver and cluster of nodules at RLL consistent with metastatic disease.   CEA -2882, PSA - WNL  -Oncology consult pending tissue biopsy results, as primary currently unknown  -FU  tumor markers: CA 19-9, and AFP  - GI/Surgery on board- Advise IR guided  BX  - 1/20 -Team dw IR- Defer biopsy as pt was on  isolation as pt  1/21-DW ID- Isolation dced as low suspicion for Active COVID, previous exposure +  1/21- Appreciate GI- Consult IR re: biopsy of liver lesions. However, if IR unable, GI can tentatively plan for colonoscopy for biopsy, for Monday 1/21= Plan for IR procedure under Dr. Cristina on Monday

## 2021-01-22 NOTE — PROGRESS NOTE ADULT - PROBLEM SELECTOR PLAN 2
Pt with 2 days of fevers at home. On admission, pt initially afebrile but later had fever of 101.2F. Did not meet SIRS criteria, with leukocytosis of 11.71.   CTA -no PE, peripheral based groundglass opacities in R  lung.   - COVID-19 PCR x2 negative.  COVID ab- positive RVP - negative  -Procalcitonin-1.35  -On  empiric antibiotics with Zosyn and Azithromycin to cover for PNA and abdominal source given colonic mass and liver mets.  - ID on board  - F/u blood cxs  - F/u UA and urine cx, though pt denies any urinary symptoms

## 2021-01-22 NOTE — PROGRESS NOTE ADULT - SUBJECTIVE AND OBJECTIVE BOX
Patient is a 55y old  Male who presents with a chief complaint of Near syncope (2021 11:48)      SUBJECTIVE / OVERNIGHT EVENTS: No complaints today     MEDICATIONS  (STANDING):  ascorbic acid 500 milliGRAM(s) Oral daily  azithromycin  IVPB      azithromycin  IVPB 500 milliGRAM(s) IV Intermittent every 24 hours  enoxaparin Injectable 40 milliGRAM(s) SubCutaneous daily  ferrous    sulfate 325 milliGRAM(s) Oral daily  piperacillin/tazobactam IVPB.. 3.375 Gram(s) IV Intermittent every 8 hours  sodium chloride 0.9%. 1000 milliLiter(s) (125 mL/Hr) IV Continuous <Continuous>    MEDICATIONS  (PRN):  acetaminophen   Tablet .. 650 milliGRAM(s) Oral every 6 hours PRN Temp greater or equal to 38C (100.4F), Mild Pain (1 - 3), Moderate Pain (4 - 6)      Vital Signs Last 24 Hrs  T(C): 36.8 (2021 12:27), Max: 38.4 (2021 05:00)  T(F): 98.2 (2021 12:27), Max: 101.1 (2021 05:00)  HR: 84 (2021 12:27) (72 - 93)  BP: 112/75 (2021 12:27) (107/74 - 118/80)  BP(mean): --  RR: 16 (2021 12:27) (16 - 18)  SpO2: 100% (2021 12:27) (100% - 100%)  CAPILLARY BLOOD GLUCOSE        I&O's Summary      PHYSICAL EXAM:  GENERAL: NAD, well-developed  HEAD:  Atraumatic, Normocephalic  EYES: EOMI, PERRLA, conjunctiva and sclera clear  NECK: Supple, No JVD  CHEST/LUNG: Clear to auscultation bilaterally; No wheeze  HEART: Regular rate and rhythm; No murmurs, rubs, or gallops  ABDOMEN: Soft, Nontender, Nondistended; Bowel sounds present  EXTREMITIES:  2+ Peripheral Pulses, No clubbing, cyanosis, or edema  PSYCH: AAOx3  NEUROLOGY: non-focal  SKIN: No rashes or lesions    LABS:                        9.1    10.74 )-----------( 372      ( 2021 07:36 )             29.7         137  |  103  |  9   ----------------------------<  96  3.9   |  20<L>  |  0.68    Ca    8.3<L>      2021 07:36  Phos  3.2       Mg     2.3         TPro  6.7  /  Alb  2.5<L>  /  TBili  0.7  /  DBili  x   /  AST  176<H>  /  ALT  51<H>  /  AlkPhos  404<H>      PT/INR - ( 2021 07:36 )   PT: 15.5 sec;   INR: 1.37 ratio         PTT - ( 2021 07:36 )  PTT:35.2 sec      Urinalysis Basic - ( 2021 07:36 )    Color: Yellow / Appearance: Clear / S.021 / pH: x  Gluc: x / Ketone: Negative  / Bili: Negative / Urobili: 3 mg/dL   Blood: x / Protein: Trace / Nitrite: Negative   Leuk Esterase: Negative / RBC: 0 /HPF / WBC 1 /HPF   Sq Epi: x / Non Sq Epi: 1 /HPF / Bacteria: Negative        RADIOLOGY & ADDITIONAL TESTS:    Imaging Personally Reviewed:    Consultant(s) Notes Reviewed:      Care Discussed with Consultants/Other Providers:

## 2021-01-22 NOTE — CONSULT NOTE ADULT - ASSESSMENT
Assessment/Plan:   -55y Male with mass in distal transverse colon with hepatic and right lower lobe metastases. patient no longer on covid precautions per ID, covid AB positive, lung CT findings felt likely to be chronic    Recommendations:  plan for  above procedure Monday with anesthesia under CT. can put order for IR procedure under Dr. Jonnie gregg for sedation  please hold AM SQH/SQL mon AM  please recheck CBC BMP Coags in AM, maintain active T/S x 2

## 2021-01-22 NOTE — PROGRESS NOTE ADULT - PROBLEM SELECTOR PLAN 6
Likely from liver mets. No abdominal pain or tenderness.  - Monitor LFTs for now  - Will get repeat imaging of abdomen if pt with concerning up trending LFTs

## 2021-01-22 NOTE — CONSULT NOTE ADULT - SUBJECTIVE AND OBJECTIVE BOX
Patient is a 55y old  Male who presents with a chief complaint of Near syncope (2021 16:08)    HPI:  56 yo man with history of vitiligo and recent finding of a mass in the distal transverse colon with hepatic and RLL metastatic disease (on CTAP on 21) presents with an episode of near syncope early Tuesday morning. Pt went to [facility] ED on 21 due to weeks of lower abdominal pain and was found to have a stool guaiac that was positive in the setting of anemia with Hgb 8.8. A CTAP that was performed during the ED visit showed an approximately 5.5 x 3.6 cm mass in the distal transverse colon, an enlarged prostate with distended bladder and mid prostate enhancement, and diffuse enhancing lesions throughout the liver and cluster of nodules at RLL consistent with metastatic disease. Pt was scheduled to have follow up in early February and hasn't seen anyone yet for the findings. Pt went to see his PCP on Tuesday, however, after he almost passed out when he went to the bathroom to urinate in the middle of the night. Pt was feeling lightheaded/faint at the time and was about to fall to the ground when he caught himself by grabbing on to the door. Pt denies any LOC, head trauma, or fall. Pt again felt lightheaded/faint when he was at his PCP's office later in the day. No associated chest pain, palpitations, headaches, vision changes, numbness, tingling, or diaphoresis. Pt notes that over the past 2-3 weeks, he has lost ~20-25 lbs as a result of a weak appetite and loss of taste. Pt also reports that for the last 1 week, he has been experiencing dyspnea with exertion, getting winded even when walking short distances, such as from his bed to the bathroom. Over the last 2 days, pt has been having intermittent fevers. Denies any cough, current abdominal pain, N/V, diarrhea, constipation, bloody stools, or urinary symptoms. Has had dark greenish stools since starting iron tablets a week ago.    REVIEW OF SYSTEMS:  General:  No wt loss, fevers, chills, night sweats  Eyes:  Good vision, no reported pain  ENT:  No sore throat, pain, runny nose, dysphagia  CV:  No pain, palpitations, hypo/hypertension  Resp:  No dyspnea, cough, tachypnea, wheezing  GI:  No pain, nausea, vomiting, diarrhea, constipation  :  No pain, bleeding, incontinence, nocturia  Muscle:  No pain, weakness  Breast:  No pain, abscess, mass, discharge  Neuro:  No weakness, tingling, memory problems  Psych:  No fatigue, insomnia, mood problems, depression  Endocrine:  No polyuria, polydypsia, cold/heat intolerance  Heme:  No petechiae, ecchymosis, easy bruisability  Skin:  No rash, tattoos, scars, edema    PAST MEDICAL & SURGICAL HISTORY:  Vitiligo  Colon cancer    No significant past surgical history    Allergies  No Known Allergies    MEDICATIONS  (STANDING):  ascorbic acid 500 milliGRAM(s) Oral daily  azithromycin  IVPB      azithromycin  IVPB 500 milliGRAM(s) IV Intermittent every 24 hours  enoxaparin Injectable 40 milliGRAM(s) SubCutaneous daily  ferrous    sulfate 325 milliGRAM(s) Oral daily  piperacillin/tazobactam IVPB.. 3.375 Gram(s) IV Intermittent every 8 hours  sodium chloride 0.9%. 1000 milliLiter(s) (125 mL/Hr) IV Continuous <Continuous>    MEDICATIONS  (PRN):  acetaminophen   Tablet .. 650 milliGRAM(s) Oral every 6 hours PRN Temp greater or equal to 38C (100.4F), Mild Pain (1 - 3), Moderate Pain (4 - 6)    FAMILY HISTORY:  Family history of type 2 diabetes mellitus  Family history of hypertension  Family history of colon cancer    PHYSICAL EXAM:    Vital Signs Last 24 Hrs  T(C): 37.1 (2021 09:25), Max: 38.4 (2021 05:00)  T(F): 98.7 (2021 09:25), Max: 101.1 (2021 05:00)  HR: 85 (2021 05:00) (72 - 93)  BP: 111/78 (2021 05:00) (107/74 - 118/80)  BP(mean): --  RR: 17 (2021 05:00) (17 - 18)  SpO2: 100% (2021 05:00) (100% - 100%)      General:  Appears stated age, no distress  HEENT:  NC/AT, EOMI, conjunctivae clear  Chest: no increased effort, breath sounds clear  Abdomen:  Soft, non-tender, non-distended    LABS:                        9.1    10.74 )-----------( 372      ( 2021 07:36 )             29.7         137  |  103  |  9   ----------------------------<  96  3.9   |  20<L>  |  0.68    Ca    8.3<L>      2021 07:36  Phos  3.2       Mg     2.3         TPro  6.7  /  Alb  2.5<L>  /  TBili  0.7  /  DBili  x   /  AST  176<H>  /  ALT  51<H>  /  AlkPhos  404<H>      PT/INR - ( 2021 07:36 )   PT: 15.5 sec;   INR: 1.37 ratio       PTT - ( 2021 07:36 )  PTT:35.2 sec    Urinalysis Basic - ( 2021 07:36 )    Color: Yellow / Appearance: Clear / S.021 / pH: x  Gluc: x / Ketone: Negative  / Bili: Negative / Urobili: 3 mg/dL   Blood: x / Protein: Trace / Nitrite: Negative   Leuk Esterase: Negative / RBC: 0 /HPF / WBC 1 /HPF   Sq Epi: x / Non Sq Epi: 1 /HPF / Bacteria: Negative    RADIOLOGY & ADDITIONAL STUDIES:  < from: CT Angio Chest w/ IV Cont (21 @ 20:28) >  EXAM:  CT ANGIO CHEST (W)AW IC        PROCEDURE DATE:  2021         INTERPRETATION:  CLINICAL INFORMATION: 55-year-old male with known metastatic colonic cancer to the liver and lungs presenting with syncopal episode. Clinical concern for pulmonary embolism.    COMPARISON: None.    PROCEDURE:  CT Angiography of the Chest.  90 ml of Omnipaque 350 was injected intravenously. 10 ml were discarded.  Sagittal and coronal reformats were performed as well as 3D (MIP) reconstructions.    FINDINGS:    LUNGS AND AIRWAYS: Patent central airways.  Diffuse right peripherally-based groundglass opacities and focus of groundglass opacity in the left lower lobes. Right dependent basilar atelectasis.  PLEURA: No pleural effusion. No pneumothorax.  MEDIASTINUM AND ALMAZ: No lymphadenopathy.  VESSELS: No main, right or left, lobar, segmental or subsegmental pulmonary embolism. The main pulmonary artery is normal in caliber.  HEART: Heart size is normal. No pericardial effusion.  CHEST WALL AND LOWER NECK: Within normal limits.  VISUALIZED UPPER ABDOMEN: Hepatic hypodensities in keeping with known metastatic disease.  BONES: Within normal limits.    IMPRESSION:  No evidence of pulmonary embolism.    Peripheral based groundglass opacities in the right lung suggestive of atypical/ COVID-19 viral pneumonia.              DANI SANDOVAL MD; Resident Radiology  This document has been electronically signed.  FADI GRANT MD; Attending Radiologist  This document has been electronically signed. 1920  9:46PM    < end of copied text >

## 2021-01-23 LAB
ALBUMIN SERPL ELPH-MCNC: 2.6 G/DL — LOW (ref 3.3–5)
ALP SERPL-CCNC: 418 U/L — HIGH (ref 40–120)
ALT FLD-CCNC: 51 U/L — HIGH (ref 4–41)
ANION GAP SERPL CALC-SCNC: 16 MMOL/L — HIGH (ref 7–14)
AST SERPL-CCNC: 145 U/L — HIGH (ref 4–40)
BASOPHILS # BLD AUTO: 0.02 K/UL — SIGNIFICANT CHANGE UP (ref 0–0.2)
BASOPHILS NFR BLD AUTO: 0.2 % — SIGNIFICANT CHANGE UP (ref 0–2)
BILIRUB SERPL-MCNC: 0.8 MG/DL — SIGNIFICANT CHANGE UP (ref 0.2–1.2)
BLD GP AB SCN SERPL QL: NEGATIVE — SIGNIFICANT CHANGE UP
BUN SERPL-MCNC: 11 MG/DL — SIGNIFICANT CHANGE UP (ref 7–23)
CALCIUM SERPL-MCNC: 8.8 MG/DL — SIGNIFICANT CHANGE UP (ref 8.4–10.5)
CHLORIDE SERPL-SCNC: 101 MMOL/L — SIGNIFICANT CHANGE UP (ref 98–107)
CO2 SERPL-SCNC: 20 MMOL/L — LOW (ref 22–31)
CREAT SERPL-MCNC: 0.65 MG/DL — SIGNIFICANT CHANGE UP (ref 0.5–1.3)
EOSINOPHIL # BLD AUTO: 0.06 K/UL — SIGNIFICANT CHANGE UP (ref 0–0.5)
EOSINOPHIL NFR BLD AUTO: 0.5 % — SIGNIFICANT CHANGE UP (ref 0–6)
GLUCOSE SERPL-MCNC: 79 MG/DL — SIGNIFICANT CHANGE UP (ref 70–99)
HCT VFR BLD CALC: 34.2 % — LOW (ref 39–50)
HGB BLD-MCNC: 10.2 G/DL — LOW (ref 13–17)
IANC: 7.88 K/UL — SIGNIFICANT CHANGE UP (ref 1.5–8.5)
IMM GRANULOCYTES NFR BLD AUTO: 1 % — SIGNIFICANT CHANGE UP (ref 0–1.5)
LYMPHOCYTES # BLD AUTO: 2.4 K/UL — SIGNIFICANT CHANGE UP (ref 1–3.3)
LYMPHOCYTES # BLD AUTO: 20.8 % — SIGNIFICANT CHANGE UP (ref 13–44)
MAGNESIUM SERPL-MCNC: 2.4 MG/DL — SIGNIFICANT CHANGE UP (ref 1.6–2.6)
MCHC RBC-ENTMCNC: 19 PG — LOW (ref 27–34)
MCHC RBC-ENTMCNC: 29.8 GM/DL — LOW (ref 32–36)
MCV RBC AUTO: 63.6 FL — LOW (ref 80–100)
MONOCYTES # BLD AUTO: 1.05 K/UL — HIGH (ref 0–0.9)
MONOCYTES NFR BLD AUTO: 9.1 % — SIGNIFICANT CHANGE UP (ref 2–14)
NEUTROPHILS # BLD AUTO: 7.88 K/UL — HIGH (ref 1.8–7.4)
NEUTROPHILS NFR BLD AUTO: 68.4 % — SIGNIFICANT CHANGE UP (ref 43–77)
NRBC # BLD: 0 /100 WBCS — SIGNIFICANT CHANGE UP
NRBC # FLD: 0 K/UL — SIGNIFICANT CHANGE UP
PHOSPHATE SERPL-MCNC: 3.2 MG/DL — SIGNIFICANT CHANGE UP (ref 2.5–4.5)
PLATELET # BLD AUTO: 419 K/UL — HIGH (ref 150–400)
POTASSIUM SERPL-MCNC: 5 MMOL/L — SIGNIFICANT CHANGE UP (ref 3.5–5.3)
POTASSIUM SERPL-SCNC: 5 MMOL/L — SIGNIFICANT CHANGE UP (ref 3.5–5.3)
PROT SERPL-MCNC: 7.5 G/DL — SIGNIFICANT CHANGE UP (ref 6–8.3)
RBC # BLD: 5.38 M/UL — SIGNIFICANT CHANGE UP (ref 4.2–5.8)
RBC # FLD: 18.6 % — HIGH (ref 10.3–14.5)
RH IG SCN BLD-IMP: POSITIVE — SIGNIFICANT CHANGE UP
SODIUM SERPL-SCNC: 137 MMOL/L — SIGNIFICANT CHANGE UP (ref 135–145)
WBC # BLD: 11.52 K/UL — HIGH (ref 3.8–10.5)
WBC # FLD AUTO: 11.52 K/UL — HIGH (ref 3.8–10.5)

## 2021-01-23 PROCEDURE — 99233 SBSQ HOSP IP/OBS HIGH 50: CPT

## 2021-01-23 RX ADMIN — PIPERACILLIN AND TAZOBACTAM 25 GRAM(S): 4; .5 INJECTION, POWDER, LYOPHILIZED, FOR SOLUTION INTRAVENOUS at 06:41

## 2021-01-23 RX ADMIN — AZITHROMYCIN 255 MILLIGRAM(S): 500 TABLET, FILM COATED ORAL at 07:48

## 2021-01-23 RX ADMIN — ENOXAPARIN SODIUM 40 MILLIGRAM(S): 100 INJECTION SUBCUTANEOUS at 13:26

## 2021-01-23 RX ADMIN — PIPERACILLIN AND TAZOBACTAM 25 GRAM(S): 4; .5 INJECTION, POWDER, LYOPHILIZED, FOR SOLUTION INTRAVENOUS at 13:26

## 2021-01-23 RX ADMIN — Medication 500 MILLIGRAM(S): at 13:26

## 2021-01-23 RX ADMIN — PIPERACILLIN AND TAZOBACTAM 25 GRAM(S): 4; .5 INJECTION, POWDER, LYOPHILIZED, FOR SOLUTION INTRAVENOUS at 21:10

## 2021-01-23 RX ADMIN — Medication 325 MILLIGRAM(S): at 13:26

## 2021-01-23 NOTE — PROGRESS NOTE ADULT - SUBJECTIVE AND OBJECTIVE BOX
Patient is a 55y old  Male who presents with a chief complaint of Near syncope (2021 14:09)      SUBJECTIVE / OVERNIGHT EVENTS: Pt has no complaints today.     MEDICATIONS  (STANDING):  ascorbic acid 500 milliGRAM(s) Oral daily  azithromycin  IVPB      azithromycin  IVPB 500 milliGRAM(s) IV Intermittent every 24 hours  enoxaparin Injectable 40 milliGRAM(s) SubCutaneous daily  ferrous    sulfate 325 milliGRAM(s) Oral daily  piperacillin/tazobactam IVPB.. 3.375 Gram(s) IV Intermittent every 8 hours  sodium chloride 0.9%. 1000 milliLiter(s) (125 mL/Hr) IV Continuous <Continuous>    MEDICATIONS  (PRN):  acetaminophen   Tablet .. 650 milliGRAM(s) Oral every 6 hours PRN Temp greater or equal to 38C (100.4F), Mild Pain (1 - 3), Moderate Pain (4 - 6)      Vital Signs Last 24 Hrs  T(C): 37.2 (2021 09:47), Max: 38 (2021 22:15)  T(F): 98.9 (2021 09:47), Max: 100.4 (2021 22:15)  HR: 100 (2021 09:47) (84 - 101)  BP: 98/52 (2021 09:47) (98/52 - 113/70)  BP(mean): --  RR: 18 (2021 09:47) (16 - 18)  SpO2: 97% (2021 09:47) (97% - 100%)  CAPILLARY BLOOD GLUCOSE        I&O's Summary      PHYSICAL EXAM:  GENERAL: NAD, well-developed  HEAD:  Atraumatic, Normocephalic  EYES: EOMI, PERRLA, conjunctiva and sclera clear  NECK: Supple, No JVD  CHEST/LUNG: Clear to auscultation bilaterally; No wheeze  HEART: Regular rate and rhythm; No murmurs, rubs, or gallops  ABDOMEN: Soft, Nontender, Nondistended; Bowel sounds present  EXTREMITIES:  2+ Peripheral Pulses, No clubbing, cyanosis, or edema  PSYCH: AAOx3  NEUROLOGY: non-focal  SKIN: No rashes or lesions    LABS:                        10.2   11.52 )-----------( 419      ( 2021 08:12 )             34.2         137  |  101  |  11  ----------------------------<  79  5.0   |  20<L>  |  0.65    Ca    8.8      2021 08:11  Phos  3.2       Mg     2.4         TPro  7.5  /  Alb  2.6<L>  /  TBili  0.8  /  DBili  x   /  AST  145<H>  /  ALT  51<H>  /  AlkPhos  418<H>      PT/INR - ( 2021 07:36 )   PT: 15.5 sec;   INR: 1.37 ratio         PTT - ( 2021 07:36 )  PTT:35.2 sec      Urinalysis Basic - ( 2021 07:36 )    Color: Yellow / Appearance: Clear / S.021 / pH: x  Gluc: x / Ketone: Negative  / Bili: Negative / Urobili: 3 mg/dL   Blood: x / Protein: Trace / Nitrite: Negative   Leuk Esterase: Negative / RBC: 0 /HPF / WBC 1 /HPF   Sq Epi: x / Non Sq Epi: 1 /HPF / Bacteria: Negative        RADIOLOGY & ADDITIONAL TESTS:    Imaging Personally Reviewed:    Consultant(s) Notes Reviewed:      Care Discussed with Consultants/Other Providers: Patient is a 55y old  Male who presents with a chief complaint of Near syncope (2021 14:09)      SUBJECTIVE / OVERNIGHT EVENTS: Pt has no complaints today.     MEDICATIONS  (STANDING):  ascorbic acid 500 milliGRAM(s) Oral daily  azithromycin  IVPB      azithromycin  IVPB 500 milliGRAM(s) IV Intermittent every 24 hours  enoxaparin Injectable 40 milliGRAM(s) SubCutaneous daily  ferrous    sulfate 325 milliGRAM(s) Oral daily  piperacillin/tazobactam IVPB.. 3.375 Gram(s) IV Intermittent every 8 hours  sodium chloride 0.9%. 1000 milliLiter(s) (125 mL/Hr) IV Continuous <Continuous>    MEDICATIONS  (PRN):  acetaminophen   Tablet .. 650 milliGRAM(s) Oral every 6 hours PRN Temp greater or equal to 38C (100.4F), Mild Pain (1 - 3), Moderate Pain (4 - 6)      Vital Signs Last 24 Hrs  T(C): 37.2 (2021 09:47), Max: 38 (2021 22:15)  T(F): 98.9 (2021 09:47), Max: 100.4 (2021 22:15)  HR: 100 (2021 09:47) (84 - 101)  BP: 98/52 (2021 09:47) (98/52 - 113/70)  BP(mean): --  RR: 18 (2021 09:47) (16 - 18)  SpO2: 97% (2021 09:47) (97% - 100%)  CAPILLARY BLOOD GLUCOSE        I&O's Summary      PHYSICAL EXAM:  GENERAL: NAD, well-developed  HEAD:  Atraumatic, Normocephalic  EYES: EOMI, PERRLA, conjunctiva and sclera clear  NECK: Supple, No JVD  CHEST/LUNG: Clear to auscultation bilaterally; No wheeze  HEART: Regular rate and rhythm; No murmurs, rubs, or gallops  ABDOMEN: Soft, Nontender, Nondistended; Bowel sounds present  EXTREMITIES:  2+ Peripheral Pulses, No clubbing, cyanosis, or edema  PSYCH: AAOx3  NEUROLOGY: non-focal  SKIN: No rashes or lesions    LABS:                        10.2   11.52 )-----------( 419      ( 2021 08:12 )             34.2         137  |  101  |  11  ----------------------------<  79  5.0   |  20<L>  |  0.65    Ca    8.8      2021 08:11  Phos  3.2       Mg     2.4         TPro  7.5  /  Alb  2.6<L>  /  TBili  0.8  /  DBili  x   /  AST  145<H>  /  ALT  51<H>  /  AlkPhos  418<H>      PT/INR - ( 2021 07:36 )   PT: 15.5 sec;   INR: 1.37 ratio         PTT - ( 2021 07:36 )  PTT:35.2 sec      Urinalysis Basic - ( 2021 07:36 )    Color: Yellow / Appearance: Clear / S.021 / pH: x  Gluc: x / Ketone: Negative  / Bili: Negative / Urobili: 3 mg/dL   Blood: x / Protein: Trace / Nitrite: Negative   Leuk Esterase: Negative / RBC: 0 /HPF / WBC 1 /HPF   Sq Epi: x / Non Sq Epi: 1 /HPF / Bacteria: Negative    < from: Transthoracic Echocardiogram (21 @ 19:04) >  CONCLUSIONS:  1. Normal mitral valve. Minimal mitral regurgitation.  2. Normal left ventricular internal dimensions and wall  thicknesses.  3. Endocardium not well visualized; grossly normal left  ventricular systolic function.  4. Right ventricular enlargement with decreased right  ventricular systolic function.  5. Inadequate tricuspid regurgitation Doppler envelope  precludes estimation of RVSP.  -------------------------------------------    < end of copied text >      RADIOLOGY & ADDITIONAL TESTS:    Imaging Personally Reviewed:    Consultant(s) Notes Reviewed:      Care Discussed with Consultants/Other Providers:

## 2021-01-23 NOTE — PROGRESS NOTE ADULT - PROBLEM SELECTOR PLAN 2
Pt with 2 days of fevers at home. On admission, pt initially afebrile but later had fever of 101.2F. Did not meet SIRS criteria, with leukocytosis of 11.71.   CTA -no PE, peripheral based groundglass opacities in R  lung.   - COVID-19 PCR x2 negative.  COVID ab- positive RVP - negative  -Procalcitonin-1.35  -On  empiric antibiotics with Zosyn and Azithromycin to cover for PNA and abdominal source given colonic mass and liver mets.  - ID on board  - F/u blood cxs  - F/u UA and urine cx, though pt denies any urinary symptoms Pt with 2 days of fevers at home. On admission, pt initially afebrile but later had fever of 101.2F. Did not meet SIRS criteria, with leukocytosis of 11.71.   CTA -no PE, peripheral based groundglass opacities in R  lung.   - COVID-19 PCR x2 negative.  COVID ab- positive RVP - negative  -Procalcitonin-1.35  -On  empiric antibiotics with Zosyn and Azithromycin to cover for PNA and abdominal source given colonic mass and liver mets.  - ID on board  - F/u blood cxs, negative so far  - F/u UA and urine cx, though pt denies any urinary symptoms

## 2021-01-24 LAB
ALBUMIN SERPL ELPH-MCNC: 2.5 G/DL — LOW (ref 3.3–5)
ALBUMIN SERPL ELPH-MCNC: 2.6 G/DL — LOW (ref 3.3–5)
ALP SERPL-CCNC: 429 U/L — HIGH (ref 40–120)
ALP SERPL-CCNC: 435 U/L — HIGH (ref 40–120)
ALT FLD-CCNC: 50 U/L — HIGH (ref 4–41)
ALT FLD-CCNC: 50 U/L — HIGH (ref 4–41)
ANION GAP SERPL CALC-SCNC: 10 MMOL/L — SIGNIFICANT CHANGE UP (ref 7–14)
APTT BLD: 34.6 SEC — SIGNIFICANT CHANGE UP (ref 27–36.3)
AST SERPL-CCNC: 128 U/L — HIGH (ref 4–40)
AST SERPL-CCNC: 130 U/L — HIGH (ref 4–40)
BASOPHILS # BLD AUTO: 0.01 K/UL — SIGNIFICANT CHANGE UP (ref 0–0.2)
BASOPHILS NFR BLD AUTO: 0.1 % — SIGNIFICANT CHANGE UP (ref 0–2)
BILIRUB DIRECT SERPL-MCNC: 0.4 MG/DL — HIGH (ref 0–0.2)
BILIRUB INDIRECT FLD-MCNC: 0.4 MG/DL — SIGNIFICANT CHANGE UP (ref 0–1)
BILIRUB SERPL-MCNC: 0.8 MG/DL — SIGNIFICANT CHANGE UP (ref 0.2–1.2)
BILIRUB SERPL-MCNC: 0.8 MG/DL — SIGNIFICANT CHANGE UP (ref 0.2–1.2)
BUN SERPL-MCNC: 13 MG/DL — SIGNIFICANT CHANGE UP (ref 7–23)
CALCIUM SERPL-MCNC: 8.4 MG/DL — SIGNIFICANT CHANGE UP (ref 8.4–10.5)
CHLORIDE SERPL-SCNC: 101 MMOL/L — SIGNIFICANT CHANGE UP (ref 98–107)
CO2 SERPL-SCNC: 23 MMOL/L — SIGNIFICANT CHANGE UP (ref 22–31)
CREAT SERPL-MCNC: 0.68 MG/DL — SIGNIFICANT CHANGE UP (ref 0.5–1.3)
EOSINOPHIL # BLD AUTO: 0.03 K/UL — SIGNIFICANT CHANGE UP (ref 0–0.5)
EOSINOPHIL NFR BLD AUTO: 0.3 % — SIGNIFICANT CHANGE UP (ref 0–6)
GLUCOSE SERPL-MCNC: 104 MG/DL — HIGH (ref 70–99)
HCT VFR BLD CALC: 26.9 % — LOW (ref 39–50)
HGB BLD-MCNC: 8.3 G/DL — LOW (ref 13–17)
IANC: 8.29 K/UL — SIGNIFICANT CHANGE UP (ref 1.5–8.5)
IMM GRANULOCYTES NFR BLD AUTO: 0.7 % — SIGNIFICANT CHANGE UP (ref 0–1.5)
INR BLD: 1.32 RATIO — HIGH (ref 0.88–1.16)
LYMPHOCYTES # BLD AUTO: 17.7 % — SIGNIFICANT CHANGE UP (ref 13–44)
LYMPHOCYTES # BLD AUTO: 2.07 K/UL — SIGNIFICANT CHANGE UP (ref 1–3.3)
MAGNESIUM SERPL-MCNC: 2.1 MG/DL — SIGNIFICANT CHANGE UP (ref 1.6–2.6)
MCHC RBC-ENTMCNC: 19.1 PG — LOW (ref 27–34)
MCHC RBC-ENTMCNC: 30.9 GM/DL — LOW (ref 32–36)
MCV RBC AUTO: 62 FL — LOW (ref 80–100)
MONOCYTES # BLD AUTO: 1.22 K/UL — HIGH (ref 0–0.9)
MONOCYTES NFR BLD AUTO: 10.4 % — SIGNIFICANT CHANGE UP (ref 2–14)
NEUTROPHILS # BLD AUTO: 8.29 K/UL — HIGH (ref 1.8–7.4)
NEUTROPHILS NFR BLD AUTO: 70.8 % — SIGNIFICANT CHANGE UP (ref 43–77)
NRBC # BLD: 0 /100 WBCS — SIGNIFICANT CHANGE UP
NRBC # FLD: 0 K/UL — SIGNIFICANT CHANGE UP
PHOSPHATE SERPL-MCNC: 2.3 MG/DL — LOW (ref 2.5–4.5)
PLATELET # BLD AUTO: 440 K/UL — HIGH (ref 150–400)
POTASSIUM SERPL-MCNC: 4.1 MMOL/L — SIGNIFICANT CHANGE UP (ref 3.5–5.3)
POTASSIUM SERPL-SCNC: 4.1 MMOL/L — SIGNIFICANT CHANGE UP (ref 3.5–5.3)
PROT SERPL-MCNC: 6.7 G/DL — SIGNIFICANT CHANGE UP (ref 6–8.3)
PROT SERPL-MCNC: 6.7 G/DL — SIGNIFICANT CHANGE UP (ref 6–8.3)
PROTHROM AB SERPL-ACNC: 14.9 SEC — HIGH (ref 10.6–13.6)
RBC # BLD: 4.34 M/UL — SIGNIFICANT CHANGE UP (ref 4.2–5.8)
RBC # FLD: 17.7 % — HIGH (ref 10.3–14.5)
SODIUM SERPL-SCNC: 134 MMOL/L — LOW (ref 135–145)
WBC # BLD: 11.7 K/UL — HIGH (ref 3.8–10.5)
WBC # FLD AUTO: 11.7 K/UL — HIGH (ref 3.8–10.5)

## 2021-01-24 PROCEDURE — 99233 SBSQ HOSP IP/OBS HIGH 50: CPT

## 2021-01-24 RX ORDER — SODIUM,POTASSIUM PHOSPHATES 278-250MG
1 POWDER IN PACKET (EA) ORAL ONCE
Refills: 0 | Status: COMPLETED | OUTPATIENT
Start: 2021-01-24 | End: 2021-01-24

## 2021-01-24 RX ADMIN — Medication 500 MILLIGRAM(S): at 14:04

## 2021-01-24 RX ADMIN — PIPERACILLIN AND TAZOBACTAM 25 GRAM(S): 4; .5 INJECTION, POWDER, LYOPHILIZED, FOR SOLUTION INTRAVENOUS at 06:22

## 2021-01-24 RX ADMIN — Medication 325 MILLIGRAM(S): at 14:04

## 2021-01-24 RX ADMIN — PIPERACILLIN AND TAZOBACTAM 25 GRAM(S): 4; .5 INJECTION, POWDER, LYOPHILIZED, FOR SOLUTION INTRAVENOUS at 14:03

## 2021-01-24 RX ADMIN — Medication 650 MILLIGRAM(S): at 18:13

## 2021-01-24 RX ADMIN — AZITHROMYCIN 255 MILLIGRAM(S): 500 TABLET, FILM COATED ORAL at 05:02

## 2021-01-24 RX ADMIN — PIPERACILLIN AND TAZOBACTAM 25 GRAM(S): 4; .5 INJECTION, POWDER, LYOPHILIZED, FOR SOLUTION INTRAVENOUS at 21:06

## 2021-01-24 RX ADMIN — ENOXAPARIN SODIUM 40 MILLIGRAM(S): 100 INJECTION SUBCUTANEOUS at 14:04

## 2021-01-24 RX ADMIN — Medication 1 PACKET(S): at 09:39

## 2021-01-24 NOTE — PROGRESS NOTE ADULT - SUBJECTIVE AND OBJECTIVE BOX
Patient is a 55y old  Male who presents with a chief complaint of Near syncope (23 Jan 2021 11:48)      SUBJECTIVE / OVERNIGHT EVENTS: No complaints today.     MEDICATIONS  (STANDING):  ascorbic acid 500 milliGRAM(s) Oral daily  ferrous    sulfate 325 milliGRAM(s) Oral daily  piperacillin/tazobactam IVPB.. 3.375 Gram(s) IV Intermittent every 8 hours  sodium chloride 0.9%. 1000 milliLiter(s) (125 mL/Hr) IV Continuous <Continuous>    MEDICATIONS  (PRN):  acetaminophen   Tablet .. 650 milliGRAM(s) Oral every 6 hours PRN Temp greater or equal to 38C (100.4F), Mild Pain (1 - 3), Moderate Pain (4 - 6)      Vital Signs Last 24 Hrs  T(C): 37.8 (24 Jan 2021 13:45), Max: 37.8 (23 Jan 2021 17:00)  T(F): 100 (24 Jan 2021 13:45), Max: 100 (23 Jan 2021 17:00)  HR: 94 (24 Jan 2021 13:45) (86 - 97)  BP: 109/68 (24 Jan 2021 13:45) (95/62 - 112/69)  BP(mean): --  RR: 18 (24 Jan 2021 13:45) (15 - 18)  SpO2: 97% (24 Jan 2021 13:45) (95% - 99%)  CAPILLARY BLOOD GLUCOSE        I&O's Summary      PHYSICAL EXAM:  GENERAL: NAD, well-developed  HEAD:  Atraumatic, Normocephalic  EYES: EOMI, PERRLA, conjunctiva and sclera clear  NECK: Supple, No JVD  CHEST/LUNG: Clear to auscultation bilaterally; No wheeze  HEART: Regular rate and rhythm; No murmurs, rubs, or gallops  ABDOMEN: Soft, Nontender, Nondistended; Bowel sounds present  EXTREMITIES:  2+ Peripheral Pulses, No clubbing, cyanosis, or edema  PSYCH: AAOx3  NEUROLOGY: non-focal    LABS:                        8.3    11.70 )-----------( 440      ( 24 Jan 2021 07:49 )             26.9     01-24    134<L>  |  101  |  13  ----------------------------<  104<H>  4.1   |  23  |  0.68    Ca    8.4      24 Jan 2021 07:49  Phos  2.3     01-24  Mg     2.1     01-24    TPro  6.7  /  Alb  2.5<L>  /  TBili  0.8  /  DBili  0.4<H>  /  AST  130<H>  /  ALT  50<H>  /  AlkPhos  435<H>  01-24    PT/INR - ( 24 Jan 2021 07:49 )   PT: 14.9 sec;   INR: 1.32 ratio         PTT - ( 24 Jan 2021 07:49 )  PTT:34.6 sec          RADIOLOGY & ADDITIONAL TESTS:    Imaging Personally Reviewed:    Consultant(s) Notes Reviewed:      Care Discussed with Consultants/Other Providers:

## 2021-01-24 NOTE — PROGRESS NOTE ADULT - PROBLEM SELECTOR PLAN 2
Pt with 2 days of fevers at home. On admission, pt initially afebrile but later had fever of 101.2F. Did not meet SIRS criteria, with leukocytosis of 11.71.   CTA -no PE, peripheral based groundglass opacities in R  lung.   - COVID-19 PCR x2 negative.  COVID ab- positive RVP - negative  -Procalcitonin-1.35  -On  empiric antibiotics with Zosyn and Azithromycin to cover for PNA and abdominal source given colonic mass and liver mets.  - ID on board  - F/u blood cxs, negative so far  - F/u UA and urine cx, though pt denies any urinary symptoms

## 2021-01-25 ENCOUNTER — RESULT REVIEW (OUTPATIENT)
Age: 56
End: 2021-01-25

## 2021-01-25 LAB
ALBUMIN SERPL ELPH-MCNC: 2.3 G/DL — LOW (ref 3.3–5)
ALP SERPL-CCNC: 423 U/L — HIGH (ref 40–120)
ALT FLD-CCNC: 50 U/L — HIGH (ref 4–41)
ANION GAP SERPL CALC-SCNC: 13 MMOL/L — SIGNIFICANT CHANGE UP (ref 7–14)
ANISOCYTOSIS BLD QL: SIGNIFICANT CHANGE UP
APTT BLD: 35.4 SEC — SIGNIFICANT CHANGE UP (ref 27–36.3)
AST SERPL-CCNC: 111 U/L — HIGH (ref 4–40)
BASOPHILS # BLD AUTO: 0 K/UL — SIGNIFICANT CHANGE UP (ref 0–0.2)
BASOPHILS NFR BLD AUTO: 0 % — SIGNIFICANT CHANGE UP (ref 0–2)
BILIRUB SERPL-MCNC: 1.2 MG/DL — SIGNIFICANT CHANGE UP (ref 0.2–1.2)
BUN SERPL-MCNC: 11 MG/DL — SIGNIFICANT CHANGE UP (ref 7–23)
CALCIUM SERPL-MCNC: 8.1 MG/DL — LOW (ref 8.4–10.5)
CHLORIDE SERPL-SCNC: 101 MMOL/L — SIGNIFICANT CHANGE UP (ref 98–107)
CO2 SERPL-SCNC: 23 MMOL/L — SIGNIFICANT CHANGE UP (ref 22–31)
CREAT SERPL-MCNC: 0.7 MG/DL — SIGNIFICANT CHANGE UP (ref 0.5–1.3)
CULTURE RESULTS: SIGNIFICANT CHANGE UP
CULTURE RESULTS: SIGNIFICANT CHANGE UP
ELLIPTOCYTES BLD QL SMEAR: SLIGHT — SIGNIFICANT CHANGE UP
EOSINOPHIL # BLD AUTO: 0 K/UL — SIGNIFICANT CHANGE UP (ref 0–0.5)
EOSINOPHIL NFR BLD AUTO: 0 % — SIGNIFICANT CHANGE UP (ref 0–6)
GIANT PLATELETS BLD QL SMEAR: PRESENT — SIGNIFICANT CHANGE UP
GLUCOSE SERPL-MCNC: 107 MG/DL — HIGH (ref 70–99)
HCT VFR BLD CALC: 28.1 % — LOW (ref 39–50)
HGB BLD-MCNC: 8.4 G/DL — LOW (ref 13–17)
HYPOCHROMIA BLD QL: SLIGHT — SIGNIFICANT CHANGE UP
IANC: 8.61 K/UL — HIGH (ref 1.5–8.5)
INR BLD: 1.41 RATIO — HIGH (ref 0.88–1.16)
LYMPHOCYTES # BLD AUTO: 1.16 K/UL — SIGNIFICANT CHANGE UP (ref 1–3.3)
LYMPHOCYTES # BLD AUTO: 9.6 % — LOW (ref 13–44)
MAGNESIUM SERPL-MCNC: 2 MG/DL — SIGNIFICANT CHANGE UP (ref 1.6–2.6)
MCHC RBC-ENTMCNC: 19 PG — LOW (ref 27–34)
MCHC RBC-ENTMCNC: 29.9 GM/DL — LOW (ref 32–36)
MCV RBC AUTO: 63.6 FL — LOW (ref 80–100)
METAMYELOCYTES # FLD: 0.9 % — SIGNIFICANT CHANGE UP (ref 0–1)
MICROCYTES BLD QL: SIGNIFICANT CHANGE UP
MONOCYTES # BLD AUTO: 1.16 K/UL — HIGH (ref 0–0.9)
MONOCYTES NFR BLD AUTO: 9.6 % — SIGNIFICANT CHANGE UP (ref 2–14)
NEUTROPHILS # BLD AUTO: 9.47 K/UL — HIGH (ref 1.8–7.4)
NEUTROPHILS NFR BLD AUTO: 78.1 % — HIGH (ref 43–77)
OVALOCYTES BLD QL SMEAR: SLIGHT — SIGNIFICANT CHANGE UP
PHOSPHATE SERPL-MCNC: 2.9 MG/DL — SIGNIFICANT CHANGE UP (ref 2.5–4.5)
PLAT MORPH BLD: NORMAL — SIGNIFICANT CHANGE UP
PLATELET # BLD AUTO: 474 K/UL — HIGH (ref 150–400)
PLATELET COUNT - ESTIMATE: NORMAL — SIGNIFICANT CHANGE UP
POIKILOCYTOSIS BLD QL AUTO: SIGNIFICANT CHANGE UP
POLYCHROMASIA BLD QL SMEAR: SLIGHT — SIGNIFICANT CHANGE UP
POTASSIUM SERPL-MCNC: 4.1 MMOL/L — SIGNIFICANT CHANGE UP (ref 3.5–5.3)
POTASSIUM SERPL-SCNC: 4.1 MMOL/L — SIGNIFICANT CHANGE UP (ref 3.5–5.3)
PROT SERPL-MCNC: 6.9 G/DL — SIGNIFICANT CHANGE UP (ref 6–8.3)
PROTHROM AB SERPL-ACNC: 16 SEC — HIGH (ref 10.6–13.6)
RBC # BLD: 4.42 M/UL — SIGNIFICANT CHANGE UP (ref 4.2–5.8)
RBC # FLD: 17.2 % — HIGH (ref 10.3–14.5)
RBC BLD AUTO: ABNORMAL
SODIUM SERPL-SCNC: 137 MMOL/L — SIGNIFICANT CHANGE UP (ref 135–145)
SPECIMEN SOURCE: SIGNIFICANT CHANGE UP
SPECIMEN SOURCE: SIGNIFICANT CHANGE UP
TARGETS BLD QL SMEAR: SIGNIFICANT CHANGE UP
VARIANT LYMPHS # BLD: 1.8 % — SIGNIFICANT CHANGE UP (ref 0–6)
WBC # BLD: 12.13 K/UL — HIGH (ref 3.8–10.5)
WBC # FLD AUTO: 12.13 K/UL — HIGH (ref 3.8–10.5)

## 2021-01-25 PROCEDURE — 47000 NEEDLE BIOPSY OF LIVER PERQ: CPT

## 2021-01-25 PROCEDURE — 88173 CYTOPATH EVAL FNA REPORT: CPT | Mod: 26

## 2021-01-25 PROCEDURE — 88305 TISSUE EXAM BY PATHOLOGIST: CPT | Mod: 26

## 2021-01-25 PROCEDURE — 77012 CT SCAN FOR NEEDLE BIOPSY: CPT | Mod: 26

## 2021-01-25 PROCEDURE — 88307 TISSUE EXAM BY PATHOLOGIST: CPT | Mod: 26

## 2021-01-25 PROCEDURE — 88341 IMHCHEM/IMCYTCHM EA ADD ANTB: CPT | Mod: 26,59

## 2021-01-25 PROCEDURE — 88360 TUMOR IMMUNOHISTOCHEM/MANUAL: CPT | Mod: 26

## 2021-01-25 PROCEDURE — 99233 SBSQ HOSP IP/OBS HIGH 50: CPT

## 2021-01-25 PROCEDURE — 88342 IMHCHEM/IMCYTCHM 1ST ANTB: CPT | Mod: 26,59

## 2021-01-25 RX ORDER — ENOXAPARIN SODIUM 100 MG/ML
40 INJECTION SUBCUTANEOUS DAILY
Refills: 0 | Status: DISCONTINUED | OUTPATIENT
Start: 2021-01-25 | End: 2021-01-25

## 2021-01-25 RX ADMIN — PIPERACILLIN AND TAZOBACTAM 25 GRAM(S): 4; .5 INJECTION, POWDER, LYOPHILIZED, FOR SOLUTION INTRAVENOUS at 22:50

## 2021-01-25 RX ADMIN — Medication 650 MILLIGRAM(S): at 16:02

## 2021-01-25 RX ADMIN — Medication 500 MILLIGRAM(S): at 12:33

## 2021-01-25 RX ADMIN — PIPERACILLIN AND TAZOBACTAM 25 GRAM(S): 4; .5 INJECTION, POWDER, LYOPHILIZED, FOR SOLUTION INTRAVENOUS at 04:38

## 2021-01-25 RX ADMIN — Medication 325 MILLIGRAM(S): at 12:33

## 2021-01-25 RX ADMIN — PIPERACILLIN AND TAZOBACTAM 25 GRAM(S): 4; .5 INJECTION, POWDER, LYOPHILIZED, FOR SOLUTION INTRAVENOUS at 15:52

## 2021-01-25 NOTE — PROGRESS NOTE ADULT - SUBJECTIVE AND OBJECTIVE BOX
Vascular & Interventional Radiology Pre-Procedure Note    Procedure Name: Hepatic lesion biopsy    HPI: 55y Male with mass in distal transverse colon. Also hepatic and lung metastases.    Allergies:   Medications (Abx/Cardiac/Anticoagulation/Blood Products)    azithromycin  IVPB: 255 mL/Hr IV Intermittent (01-24 @ 05:02)  enoxaparin Injectable: 40 milliGRAM(s) SubCutaneous (01-24 @ 14:04)  piperacillin/tazobactam IVPB..: 25 mL/Hr IV Intermittent (01-25 @ 04:38)    Data:    T(C): 37.6  HR: 100  BP: 101/64  RR: 18  SpO2: 97%    Exam  Resting comfortably, normal respirations, normal mentation.    -WBC 12.13 / HgB 8.4 / Hct 28.1 / Plt 474  -Na 137 / Cl 101 / BUN 11 / Glucose 107  -K 4.1 / CO2 23 / Cr 0.70  -ALT 50 / Alk Phos 423 / T.Bili 1.2  -INR1.41    Imaging: CTA chest 1/19/21    Plan:   - 55y Male presents for hepatic lesion biopsy.  - Informed consent obtained.   - Lovenox ppx held 1/24/21.

## 2021-01-25 NOTE — PROGRESS NOTE ADULT - PROBLEM SELECTOR PLAN 1
Suspect 2/2 orthostatic hypotension  Other possibility Is reflex-mediated/vasovagal  or in setting of anemia.  Orthostatics  positive with increase in HR of >30 bpm when going from sitting to standing.    - s/p IVF  - CTA chest negative for PE  - Hs-trop x1 -7  - EKG without any acute ischemic changes. Low suspicion for myocardial ischemia/ACS as etiology at this time.  - TTE w/o overt valvular HD sonali EF  - FU repeat Orthostatics  - Monitor on tele for any concerning arrhythmias Pt with 2 days of fevers at home. On admission, pt initially afebrile but later had fever of 101.2F. Did not meet SIRS criteria, with leukocytosis of 11.71.   CTA -no PE, peripheral based ground glass opacities in R  lung.   - COVID-19 PCR x2 negative.  COVID ab- positive RVP - negative  -Procalcitonin-1.35  -On  empiric antibiotics with Zosyn and Azithromycin to cover for PNA and abdominal source given colonic mass and liver mets. Azithromycin dc'ed  - blood cxs, negative so far  - F/u UA and urine cx, NGTD  - if repeated fever check Bcx   - repeat procal in AM  - ID on board f/u recs

## 2021-01-25 NOTE — PROGRESS NOTE ADULT - SUBJECTIVE AND OBJECTIVE BOX
Vascular & Interventional Radiology Post-Procedure Note    Pre-Procedure Diagnosis: Colon ca with hepatic lesions  Post-Procedure Diagnosis: Same as pre.  Indications for Procedure: Tissue diagnosis    : Jonnie  Assistant(s): Jacob    Procedure Details/Findings: Hypoattenuating hepatic lesions again identified. 3 core needle biopsies obtained    Complications: None  Estimated Blood Loss: Minimal  Specimen: As above  Contrast: None  Sedation: Per anesthesia  Patient Condition/Disposition: Stable, IRS    Plan: F/u Pathology Vascular & Interventional Radiology Post-Procedure Note    Pre-Procedure Diagnosis: Colon ca with hepatic lesions  Post-Procedure Diagnosis: Same as pre.  Indications for Procedure: Tissue diagnosis    : Jonnie  Assistant(s): Jacob    Procedure Details/Findings: Hypoattenuating hepatic lesions again identified. 3 core needle biopsies obtained    Complications: None  Estimated Blood Loss: Minimal  Specimen: As above  Contrast: None  Sedation: Per anesthesia  Patient Condition/Disposition: Stable, IRS    Plan:   - F/u Pathology  - May resume Lovenox 24 hours post procedure

## 2021-01-25 NOTE — PROGRESS NOTE ADULT - PROBLEM SELECTOR PLAN 2
Pt with 2 days of fevers at home. On admission, pt initially afebrile but later had fever of 101.2F. Did not meet SIRS criteria, with leukocytosis of 11.71.   CTA -no PE, peripheral based ground glass opacities in R  lung.   - COVID-19 PCR x2 negative.  COVID ab- positive RVP - negative  -Procalcitonin-1.35  -On  empiric antibiotics with Zosyn and Azithromycin to cover for PNA and abdominal source given colonic mass and liver mets. Azithromycin dc'ed  - ID on board  - F/u blood cxs, negative so far  - F/u UA and urine cx, though pt denies any urinary symptoms Suspect colon cancer ( no tissue diagnosis yet).  CTAP showing an ~5.5 x 3.6 cm mass in the distal transverse colon, enlarged prostate, diffuse enhancing lesions throughout the liver and cluster of nodules at RLL consistent with metastatic disease.   CEA -2882, PSA - WNL CA 19-9,-2882 and AFP- 2775  -Oncology consult pending tissue biopsy results, as primary currently unknown  - GI/Surgery on board- Advise IR guided  BX  - 1/20 -Team dw IR- Defer biopsy as pt was on  isolation as pt  - 1/21-DW ID- Isolation dced as low suspicion for Active COVID, previous exposure +  - 1/21- Appreciate GI- Consult IR re: biopsy of liver lesions.   - 1/25 Plan for IR procedure under Dr. Cristina

## 2021-01-25 NOTE — PROGRESS NOTE ADULT - SUBJECTIVE AND OBJECTIVE BOX
Patient is a 55y old  Male who presents with a chief complaint of Near syncope (25 Jan 2021 09:57)      SUBJECTIVE / OVERNIGHT EVENTS: IR biopsy today. Tm 100.8  ADDITIONAL REVIEW OF SYSTEMS: denies CP/SOB     MEDICATIONS  (STANDING):  ascorbic acid 500 milliGRAM(s) Oral daily  ferrous    sulfate 325 milliGRAM(s) Oral daily  piperacillin/tazobactam IVPB.. 3.375 Gram(s) IV Intermittent every 8 hours  sodium chloride 0.9%. 1000 milliLiter(s) (125 mL/Hr) IV Continuous <Continuous>    MEDICATIONS  (PRN):  acetaminophen   Tablet .. 650 milliGRAM(s) Oral every 6 hours PRN Temp greater or equal to 38C (100.4F), Mild Pain (1 - 3), Moderate Pain (4 - 6)      CAPILLARY BLOOD GLUCOSE        I&O's Summary      PHYSICAL EXAM:  Vital Signs Last 24 Hrs  T(C): 37.6 (25 Jan 2021 04:37), Max: 38.2 (24 Jan 2021 18:10)  T(F): 99.7 (25 Jan 2021 04:37), Max: 100.8 (24 Jan 2021 18:10)  HR: 100 (25 Jan 2021 04:37) (94 - 110)  BP: 101/64 (25 Jan 2021 04:37) (101/64 - 109/68)  BP(mean): --  RR: 18 (25 Jan 2021 04:37) (18 - 18)  SpO2: 97% (25 Jan 2021 04:37) (97% - 99%)    GENERAL: NAD, well-developed  HEAD:  Atraumatic, Normocephalic  EYES: EOMI, PERRLA, conjunctiva and sclera clear  NECK: Supple, No JVD  CHEST/LUNG: Clear to auscultation bilaterally; No wheeze  HEART: Regular rate and rhythm; No murmurs, rubs, or gallops  ABDOMEN: Soft, Nontender, Nondistended; Bowel sounds present  EXTREMITIES:  2+ Peripheral Pulses, No clubbing, cyanosis, or edema  PSYCH: AAOx3  NEUROLOGY: non-focal    LABS:                        8.4    12.13 )-----------( 474      ( 25 Jan 2021 06:50 )             28.1     01-25    137  |  101  |  11  ----------------------------<  107<H>  4.1   |  23  |  0.70    Ca    8.1<L>      25 Jan 2021 06:50  Phos  2.9     01-25  Mg     2.0     01-25    TPro  6.9  /  Alb  2.3<L>  /  TBili  1.2  /  DBili  x   /  AST  111<H>  /  ALT  50<H>  /  AlkPhos  423<H>  01-25    PT/INR - ( 25 Jan 2021 06:50 )   PT: 16.0 sec;   INR: 1.41 ratio         PTT - ( 25 Jan 2021 06:50 )  PTT:35.4 sec            RADIOLOGY & ADDITIONAL TESTS:  Results Reviewed: < from: Transthoracic Echocardiogram (01.22.21 @ 19:04) >  Fractional short: 36 %  Ejection Fraction (Teicholtz): 66 %  ------------------------------------------------------------------------  OBSERVATIONS:  Mitral Valve: Normal mitral valve. Minimal mitral  regurgitation.  Aortic Root: Normal aortic root.  Aortic Valve: Normal trileaflet aortic valve.  Left Atrium: Normal left atrium.  Left Ventricle: Endocardium not well visualized; grossly  normal left ventricular systolic function. Normal left  ventricular internal dimensions and wall thicknesses.  Right Heart: Normal right atrium. Right ventricular  enlargement with decreased right ventricular systolic  function. Normal tricuspid valve. Minimal tricuspid  regurgitation. Normal pulmonic valve.  Mild pulmonic  regurgitation.  Pericardium/PleuraNormal pericardium with no pericardial  effusion.  Hemodynamic: Inadequate tricuspid regurgitation Doppler  envelope precludes estimation of RVSP.  ------------------------------------------------------------------------  CONCLUSIONS:  1. Normal mitral valve. Minimal mitral regurgitation.  2. Normal left ventricular internal dimensions and wall  thicknesses.  3. Endocardium not well visualized; grossly normal left  ventricular systolic function.  4. Right ventricular enlargement with decreased right  ventricular systolic function.  5. Inadequate tricuspid regurgitation Doppler envelope  precludes estimation of RVSP.    < end of copied text >    Imaging Personally Reviewed:  Electrocardiogram Personally Reviewed:    COORDINATION OF CARE:  Care Discussed with Consultants/Other Providers [Y/N]:  Prior or Outpatient Records Reviewed [Y/N]:

## 2021-01-25 NOTE — PROGRESS NOTE ADULT - PROBLEM SELECTOR PLAN 3
Suspect colon cancer ( no tissue diagnosis yet).  CTAP showing an ~5.5 x 3.6 cm mass in the distal transverse colon, enlarged prostate, diffuse enhancing lesions throughout the liver and cluster of nodules at RLL consistent with metastatic disease.   CEA -2882, PSA - WNL  -Oncology consult pending tissue biopsy results, as primary currently unknown  -FU  tumor markers: CA 19-9, and AFP  - GI/Surgery on board- Advise IR guided  BX  - 1/20 -Team dw IR- Defer biopsy as pt was on  isolation as pt  - 1/21-DW ID- Isolation dced as low suspicion for Active COVID, previous exposure +  - 1/21- Appreciate GI- Consult IR re: biopsy of liver lesions. However, if IR unable, GI can tentatively plan for colonoscopy for biopsy, for Monday   - 1/21= Plan for IR procedure under Dr. Cristina on Monday Suspect 2/2 orthostatic hypotension  Other possibility Is reflex-mediated/vasovagal  or in setting of anemia.  Orthostatics  positive with increase in HR of >30 bpm when going from sitting to standing.    - s/p IVF  - CTA chest negative for PE  - Hs-trop x1 -7  - EKG without any acute ischemic changes. Low suspicion for myocardial ischemia/ACS as etiology at this time.  - TTE w/o overt valvular HD sonali EF  - repeat Orthostatics- neg  - Monitor on tele for any concerning arrhythmias

## 2021-01-25 NOTE — PROGRESS NOTE ADULT - PROBLEM SELECTOR PLAN 4
- Likely 2/2 metastatic disease from colon cancer. Pt with significant weight loss and poor appetite over past 2-3 weeks.  - Nutrition consult  - PT consult- home   - Fall risk protocol

## 2021-01-25 NOTE — PROGRESS NOTE ADULT - PROBLEM SELECTOR PLAN 7
DVT ppx: Lovenox SQ   Dispo - Plan for IR procedure on monday DVT ppx: Lovenox SQ   Dispo - Biopsy results Onc c/s w/u fever

## 2021-01-26 LAB
ANION GAP SERPL CALC-SCNC: 14 MMOL/L — SIGNIFICANT CHANGE UP (ref 7–14)
BUN SERPL-MCNC: 13 MG/DL — SIGNIFICANT CHANGE UP (ref 7–23)
CALCIUM SERPL-MCNC: 7.8 MG/DL — LOW (ref 8.4–10.5)
CHLORIDE SERPL-SCNC: 100 MMOL/L — SIGNIFICANT CHANGE UP (ref 98–107)
CO2 SERPL-SCNC: 20 MMOL/L — LOW (ref 22–31)
CREAT SERPL-MCNC: 0.72 MG/DL — SIGNIFICANT CHANGE UP (ref 0.5–1.3)
GLUCOSE SERPL-MCNC: 96 MG/DL — SIGNIFICANT CHANGE UP (ref 70–99)
HCT VFR BLD CALC: 24.7 % — LOW (ref 39–50)
HGB BLD-MCNC: 7.5 G/DL — LOW (ref 13–17)
MCHC RBC-ENTMCNC: 19 PG — LOW (ref 27–34)
MCHC RBC-ENTMCNC: 30.4 GM/DL — LOW (ref 32–36)
MCV RBC AUTO: 62.5 FL — LOW (ref 80–100)
NON-GYNECOLOGICAL CYTOLOGY STUDY: SIGNIFICANT CHANGE UP
NRBC # BLD: 0 /100 WBCS — SIGNIFICANT CHANGE UP
NRBC # FLD: 0 K/UL — SIGNIFICANT CHANGE UP
PLATELET # BLD AUTO: 511 K/UL — HIGH (ref 150–400)
POTASSIUM SERPL-MCNC: 4 MMOL/L — SIGNIFICANT CHANGE UP (ref 3.5–5.3)
POTASSIUM SERPL-SCNC: 4 MMOL/L — SIGNIFICANT CHANGE UP (ref 3.5–5.3)
PROCALCITONIN SERPL-MCNC: 1.77 NG/ML — HIGH (ref 0.02–0.1)
RBC # BLD: 3.95 M/UL — LOW (ref 4.2–5.8)
RBC # FLD: 16.9 % — HIGH (ref 10.3–14.5)
SODIUM SERPL-SCNC: 134 MMOL/L — LOW (ref 135–145)
WBC # BLD: 13.18 K/UL — HIGH (ref 3.8–10.5)
WBC # FLD AUTO: 13.18 K/UL — HIGH (ref 3.8–10.5)

## 2021-01-26 PROCEDURE — 99233 SBSQ HOSP IP/OBS HIGH 50: CPT

## 2021-01-26 RX ORDER — ENOXAPARIN SODIUM 100 MG/ML
40 INJECTION SUBCUTANEOUS DAILY
Refills: 0 | Status: DISCONTINUED | OUTPATIENT
Start: 2021-01-26 | End: 2021-01-28

## 2021-01-26 RX ORDER — ACETAMINOPHEN 500 MG
1000 TABLET ORAL ONCE
Refills: 0 | Status: COMPLETED | OUTPATIENT
Start: 2021-01-26 | End: 2021-01-26

## 2021-01-26 RX ADMIN — ENOXAPARIN SODIUM 40 MILLIGRAM(S): 100 INJECTION SUBCUTANEOUS at 17:40

## 2021-01-26 RX ADMIN — Medication 650 MILLIGRAM(S): at 13:17

## 2021-01-26 RX ADMIN — Medication 325 MILLIGRAM(S): at 13:10

## 2021-01-26 RX ADMIN — Medication 500 MILLIGRAM(S): at 13:10

## 2021-01-26 RX ADMIN — Medication 650 MILLIGRAM(S): at 05:19

## 2021-01-26 RX ADMIN — Medication 400 MILLIGRAM(S): at 20:51

## 2021-01-26 NOTE — PROGRESS NOTE ADULT - SUBJECTIVE AND OBJECTIVE BOX
Infectious Diseases progress note:    Subjective: Pt with continued intermittent fever, has been on zosyn, refused today's doses.   Denies cough, sob, n/v, cp, abd pain, diarrhea, dysuria.  s/p liver biopsy on 1/25.      ROS:  CONSTITUTIONAL:  No fever, chills, rigors  CARDIOVASCULAR:  No chest pain or palpitations  RESPIRATORY:   No SOB, cough, dyspnea on exertion.  No wheezing  GASTROINTESTINAL:  No abd pain, N/V, diarrhea/constipation  EXTREMITIES:  No swelling or joint pain  GENITOURINARY:  No burning on urination, increased frequency or urgency.  No flank pain  NEUROLOGIC:  No HA, visual disturbances  SKIN: No rashes    Allergies    No Known Allergies    Intolerances        ANTIBIOTICS/RELEVANT:  antimicrobials    immunologic:    OTHER:  acetaminophen   Tablet .. 650 milliGRAM(s) Oral every 6 hours PRN  ascorbic acid 500 milliGRAM(s) Oral daily  enoxaparin Injectable 40 milliGRAM(s) SubCutaneous daily  ferrous    sulfate 325 milliGRAM(s) Oral daily  sodium chloride 0.9%. 1000 milliLiter(s) IV Continuous <Continuous>      Objective:  Vital Signs Last 24 Hrs  T(C): 39.2 (26 Jan 2021 13:15), Max: 39.2 (26 Jan 2021 13:15)  T(F): 102.6 (26 Jan 2021 13:15), Max: 102.6 (26 Jan 2021 13:15)  HR: 86 (26 Jan 2021 13:15) (85 - 97)  BP: 102/71 (26 Jan 2021 13:15) (100/66 - 105/70)  BP(mean): --  RR: 18 (26 Jan 2021 13:15) (17 - 18)  SpO2: 99% (26 Jan 2021 13:15) (96% - 100%)    PHYSICAL EXAM:  Constitutional:NAD  Eyes:AL, EOMI  Ear/Nose/Throat: no thrush, mucositis.  Moist mucous membranes	  Neck:no JVD, no lymphadenopathy, supple  Respiratory: CTA cheli  Cardiovascular: S1S2 RRR, no murmurs  Gastrointestinal:soft, nontender,  nondistended (+) BS  Extremities:no e/e/c  Skin:  no rashes, open wounds or ulcerations        LABS:                        7.5    13.18 )-----------( 511      ( 26 Jan 2021 07:21 )             24.7     01-26    134<L>  |  100  |  13  ----------------------------<  96  4.0   |  20<L>  |  0.72    Ca    7.8<L>      26 Jan 2021 07:21  Phos  2.9     01-25  Mg     2.0     01-25    TPro  6.9  /  Alb  2.3<L>  /  TBili  1.2  /  DBili  x   /  AST  111<H>  /  ALT  50<H>  /  AlkPhos  423<H>  01-25    PT/INR - ( 25 Jan 2021 06:50 )   PT: 16.0 sec;   INR: 1.41 ratio         PTT - ( 25 Jan 2021 06:50 )  PTT:35.4 sec      Procalcitonin, Serum: 1.77 (01-26 @ 07:21)  Procalcitonin, Serum: 1.35 (01-20 @ 07:53)            Rapid RVP Result: [facility]  Rapid RVP Result: [facility]          MICROBIOLOGY:    Culture - Blood (01.22.21 @ 10:25)   Specimen Source: .Blood Blood-Venous   Culture Results:   No growth to date.     Culture - Blood (01.22.21 @ 10:25)   Specimen Source: .Blood Blood-Peripheral   Culture Results:   No growth to date.       Culture - Urine (01.20.21 @ 22:50)   Specimen Source: .Urine Clean Catch (Midstream)   Culture Results:   No growth     Culture - Blood (01.20.21 @ 07:35)   Specimen Source: .Blood Blood-Peripheral   Culture Results:   No Growth Final     RADIOLOGY & ADDITIONAL STUDIES:    < from: CT Angio Chest w/ IV Cont (01.19.21 @ 20:28) >  EXAM:  CT ANGIO CHEST (W)AW IC        PROCEDURE DATE:  Jan 19 2021         INTERPRETATION:  CLINICAL INFORMATION: 55-year-old male with known metastatic colonic cancer to the liver and lungs presenting with syncopal episode. Clinical concern for pulmonary embolism.    COMPARISON: None.    PROCEDURE:  CT Angiography of the Chest.  90 ml of Omnipaque 350 was injected intravenously. 10 ml were discarded.  Sagittal and coronal reformats were performed as well as 3D (MIP) reconstructions.    FINDINGS:    LUNGS AND AIRWAYS: Patent central airways.  Diffuse right peripherally-based groundglass opacities and focus of groundglass opacity in the left lower lobes. Right dependent basilar atelectasis.  PLEURA: No pleural effusion. No pneumothorax.  MEDIASTINUM AND ALMAZ: No lymphadenopathy.  VESSELS: No main, right or left, lobar, segmental or subsegmental pulmonary embolism. The main pulmonary artery is normal in caliber.  HEART: Heart size is normal. No pericardial effusion.  CHEST WALL AND LOWER NECK: Within normal limits.  VISUALIZED UPPER ABDOMEN: Hepatic hypodensities in keeping with known metastatic disease.  BONES: Within normal limits.    IMPRESSION:  No evidence of pulmonary embolism.    Peripheral based groundglass opacities in the right lung suggestive of atypical/ COVID-19 viral pneumonia.    < end of copied text >

## 2021-01-26 NOTE — PROGRESS NOTE ADULT - ASSESSMENT
55M h/o vitiligo and recent finding of a mass in the distal transverse colon with hepatic and RLL metastatic disease (on CTAP on 1/5/21) p/w an episode of near syncope early Tuesday morning, suspect 2/2 orthostatic hypotension, and fevers of unclear etiology.

## 2021-01-26 NOTE — PROGRESS NOTE ADULT - PROBLEM SELECTOR PLAN 2
-Suspect colon cancer (no tissue diagnosis yet).  -CTAP showing an ~5.5 x 3.6 cm mass in the distal transverse colon, enlarged prostate, diffuse enhancing lesions throughout the liver and cluster of nodules at RLL consistent with metastatic disease.   CEA -2882, PSA - WNL CA 19-9,-2882 and AFP- 2775  -Oncology consult now that biopsy results are in   - GI/Surgery on board; f/u their recs  - 1/21: d/w ID re-isolation dc'ed as low suspicion for Active COVID, previous exposure +

## 2021-01-26 NOTE — PROGRESS NOTE ADULT - SUBJECTIVE AND OBJECTIVE BOX
[facility] Division of Hospital Medicine  Hospitalist: Soledad Moreno MD   Pager: [phone]    CHIEF COMPLAINT: Patient is a 55y old  Male who presents with a chief complaint of Near syncope (25 Jan 2021 12:13)    SUBJECTIVE / OVERNIGHT EVENTS: Patient seen and examined. No acute events overnight. Pain well controlled and patient without any complaints.    ADDITIONAL REVIEW OF SYSTEMS:    MEDICATIONS  (STANDING):  ascorbic acid 500 milliGRAM(s) Oral daily  enoxaparin Injectable 40 milliGRAM(s) SubCutaneous daily  ferrous    sulfate 325 milliGRAM(s) Oral daily  sodium chloride 0.9%. 1000 milliLiter(s) (125 mL/Hr) IV Continuous <Continuous>    MEDICATIONS  (PRN):  acetaminophen   Tablet .. 650 milliGRAM(s) Oral every 6 hours PRN Temp greater or equal to 38C (100.4F), Mild Pain (1 - 3), Moderate Pain (4 - 6)      PHYSICAL EXAM:  Vital Signs Last 24 Hrs  T(C): 39.2 (26 Jan 2021 13:15), Max: 39.2 (26 Jan 2021 13:15)  T(F): 102.6 (26 Jan 2021 13:15), Max: 102.6 (26 Jan 2021 13:15)  HR: 86 (26 Jan 2021 13:15) (85 - 98)  BP: 102/71 (26 Jan 2021 13:15) (100/66 - 106/70)  BP(mean): --  RR: 18 (26 Jan 2021 13:15) (17 - 18)  SpO2: 99% (26 Jan 2021 13:15) (96% - 100%)    GENERAL: NAD, well-developed  HEAD:  Atraumatic, Normocephalic  EYES: EOMI, PERRLA, conjunctiva and sclera clear  NECK: Supple, No JVD  CHEST/LUNG: Clear to auscultation bilaterally; No wheeze  HEART: Regular rate and rhythm; No murmurs, rubs, or gallops  ABDOMEN: Soft, Nontender, Nondistended; Bowel sounds present  EXTREMITIES:  2+ Peripheral Pulses, No clubbing, cyanosis, or edema  PSYCH: AAOx3  NEUROLOGY: non-focal    LABS:                        7.5    13.18 )-----------( 511      ( 26 Jan 2021 07:21 )             24.7     01-26    134<L>  |  100  |  13  ----------------------------<  96  4.0   |  20<L>  |  0.72    Ca    7.8<L>      26 Jan 2021 07:21  Phos  2.9     01-25  Mg     2.0     01-25    TPro  6.9  /  Alb  2.3<L>  /  TBili  1.2  /  DBili  x   /  AST  111<H>  /  ALT  50<H>  /  AlkPhos  423<H>  01-25  PT/INR - ( 25 Jan 2021 06:50 )   PT: 16.0 sec;   INR: 1.41 ratio    PTT - ( 25 Jan 2021 06:50 )  PTT:35.4 sec    RADIOLOGY & ADDITIONAL TESTS:  Results Reviewed:   Final Diagnosis: LIVER, CT GUIDED CORE BIOPSY   POSITIVE FOR MALIGNANT CELLS.   Metastatic carcinoma, consistent with colonic origin

## 2021-01-26 NOTE — PROGRESS NOTE ADULT - PROBLEM SELECTOR PLAN 3
Suspect 2/2 orthostatic hypotension  Other possibility is reflex-mediated/vasovagal  or in setting of anemia.  Orthostatics  positive with increase in HR of >30 bpm when going from sitting to standing.    - s/p IVF  - CTA chest negative for PE  - Hs-trop x1 -7  - EKG without any acute ischemic changes. Low suspicion for myocardial ischemia/ACS as etiology at this time.  - TTE w/o overt valvular HD sonali EF  - repeat Orthostatics- neg  - Monitor on tele for any concerning arrhythmias

## 2021-01-26 NOTE — PROGRESS NOTE ADULT - ASSESSMENT
54 yo man with history of vitiligo and recent finding of a mass in the distal transverse colon with hepatic and RLL metastatic disease (on CTAP on 1/5/21) presents with an episode of near syncope early Tuesday morning. Pt went to [facility] ED on 1/4/21 due to weeks of lower abdominal pain and was found to have a stool guaiac that was positive in the setting of anemia with Hgb 8.8. A CTAP that was performed during the ED visit showed an approximately 5.5 x 3.6 cm mass in the distal transverse colon, an enlarged prostate with distended bladder and mid prostate enhancement, and diffuse enhancing lesions throughout the liver and cluster of nodules at RLL consistent with metastatic disease.     Pt was scheduled to have follow up in early February and hasn't seen anyone yet for the findings. Pt went to see his PCP on Tuesday, however, after he almost passed out when he went to the bathroom to urinate in the middle of the night. Pt was feeling lightheaded/faint at the time and was about to fall to the ground when he caught himself by grabbing on to the door. Pt denies any LOC, head trauma, or fall. Pt again felt lightheaded/faint when he was at his PCP's office later in the day. No associated chest pain, palpitations, headaches, vision changes, numbness, tingling, or diaphoresis. Pt notes that over the past 2-3 weeks, he has lost ~20-25 lbs as a result of a weak appetite and loss of taste. Pt also reports that for the last 1 week, he has been experiencing dyspnea with exertion, getting winded even when walking short distances, such as from his bed to the bathroom. Over the last 2 days, pt has been having intermittent fevers. Denies any cough, current abdominal pain, N/V, diarrhea, constipation, bloody stools, or urinary symptoms. Has had dark greenish stools since starting iron tablets a week ago.    In the ED,   T 98, HR 80-93, BP /54-70, RR 16-18, O2 sats % RA.  (20 Jan 2021 01:18)      Fever:    - Tmax 101.2 (1/20), hemodynamically stable.  Satting 100% on RA.  Rule out acute bacterial infection.  Check bcx x 2  (ngtd) and UA/Ucx.   Denies urinary symptoms.    - CTA chest no PE, (+) peripheral based GGOs in rt lung, possible metastatic disease vs prior covid exposure.   Covid pcr (-) x2.  Pt currently w/o sob or hypoxemia, without symptoms of acute covid illness.       - Covid serologies are positive.  Covid pcr (-) x 2.   In the absence of acute illness, suspect this is prior disease.  OK from ID standpoint to d/c isolation precautions.     - Fever may also be caused by underlying colonic malignancy and metastatic disease.      Colonic mass with liver metastasis:    - Surgical evaluation noted - no surgery planned at this time.  GI called by primary team for colonoscopy and biopsy.    - IR biopsy of liver mets - cytopath shows metastatic carcinoma. `l      * Off zosyn since 1/25, pt refused last two doses.  Cultures negative.   Will monitor pt closely off abx.   He continues to have fever - possibly secondary to tumor?.  Recommend repeat bcx while off abx.   Noted elevation of WBC and PCT.  Cont to monitor.     Leandra Matson

## 2021-01-26 NOTE — PROVIDER CONTACT NOTE (OTHER) - ACTION/TREATMENT ORDERED:
IV Tylenol to be ordered, ice to be placed on patient. IV Tylenol to be ordered, ice to be placed on patient, cooling blanket ordered.

## 2021-01-26 NOTE — PROGRESS NOTE ADULT - PROBLEM SELECTOR PLAN 1
Pt with 2 days of fevers at home. On admission, pt initially afebrile but later had fever of 101.2F. Did not meet SIRS criteria, with leukocytosis of 11.71.  - CTA -no PE, peripheral based ground glass opacities in R  lung.   - COVID-19 PCR x2 negative.  COVID ab- positive RVP - negative  - Procalcitonin-1.35  - patient continues to spike with recent temp of 102.6 -- per ID, they want to watch him off antibiotics for now. If patient decompensates can re-cultures but in the meantime they want to monitor off   - BCx, UA, UCx NGTD  - if repeated fever check BCx   - repeat procal in AM  - ID on board f/u recs

## 2021-01-27 LAB
ANION GAP SERPL CALC-SCNC: 15 MMOL/L — HIGH (ref 7–14)
APPEARANCE UR: CLEAR — SIGNIFICANT CHANGE UP
BILIRUB UR-MCNC: NEGATIVE — SIGNIFICANT CHANGE UP
BUN SERPL-MCNC: 15 MG/DL — SIGNIFICANT CHANGE UP (ref 7–23)
CALCIUM SERPL-MCNC: 8.7 MG/DL — SIGNIFICANT CHANGE UP (ref 8.4–10.5)
CHLORIDE SERPL-SCNC: 100 MMOL/L — SIGNIFICANT CHANGE UP (ref 98–107)
CO2 SERPL-SCNC: 21 MMOL/L — LOW (ref 22–31)
COLOR SPEC: ABNORMAL
CREAT SERPL-MCNC: 0.65 MG/DL — SIGNIFICANT CHANGE UP (ref 0.5–1.3)
CULTURE RESULTS: SIGNIFICANT CHANGE UP
CULTURE RESULTS: SIGNIFICANT CHANGE UP
DIFF PNL FLD: NEGATIVE — SIGNIFICANT CHANGE UP
GLUCOSE SERPL-MCNC: 75 MG/DL — SIGNIFICANT CHANGE UP (ref 70–99)
GLUCOSE UR QL: NEGATIVE — SIGNIFICANT CHANGE UP
HCT VFR BLD CALC: 24.8 % — LOW (ref 39–50)
HGB BLD-MCNC: 7.5 G/DL — LOW (ref 13–17)
KETONES UR-MCNC: NEGATIVE — SIGNIFICANT CHANGE UP
LEUKOCYTE ESTERASE UR-ACNC: NEGATIVE — SIGNIFICANT CHANGE UP
MCHC RBC-ENTMCNC: 19 PG — LOW (ref 27–34)
MCHC RBC-ENTMCNC: 30.2 GM/DL — LOW (ref 32–36)
MCV RBC AUTO: 62.9 FL — LOW (ref 80–100)
NITRITE UR-MCNC: NEGATIVE — SIGNIFICANT CHANGE UP
NRBC # BLD: 0 /100 WBCS — SIGNIFICANT CHANGE UP
NRBC # FLD: 0.02 K/UL — HIGH
PH UR: 6 — SIGNIFICANT CHANGE UP (ref 5–8)
PLATELET # BLD AUTO: 559 K/UL — HIGH (ref 150–400)
POTASSIUM SERPL-MCNC: 4.3 MMOL/L — SIGNIFICANT CHANGE UP (ref 3.5–5.3)
POTASSIUM SERPL-SCNC: 4.3 MMOL/L — SIGNIFICANT CHANGE UP (ref 3.5–5.3)
PROT UR-MCNC: ABNORMAL
RBC # BLD: 3.94 M/UL — LOW (ref 4.2–5.8)
RBC # FLD: 17.1 % — HIGH (ref 10.3–14.5)
SODIUM SERPL-SCNC: 136 MMOL/L — SIGNIFICANT CHANGE UP (ref 135–145)
SP GR SPEC: 1.03 — HIGH (ref 1.01–1.02)
SPECIMEN SOURCE: SIGNIFICANT CHANGE UP
SPECIMEN SOURCE: SIGNIFICANT CHANGE UP
UROBILINOGEN FLD QL: ABNORMAL
WBC # BLD: 17.65 K/UL — HIGH (ref 3.8–10.5)
WBC # FLD AUTO: 17.65 K/UL — HIGH (ref 3.8–10.5)

## 2021-01-27 PROCEDURE — 99223 1ST HOSP IP/OBS HIGH 75: CPT | Mod: GC

## 2021-01-27 PROCEDURE — 99233 SBSQ HOSP IP/OBS HIGH 50: CPT

## 2021-01-27 RX ORDER — ACETAMINOPHEN 500 MG
1000 TABLET ORAL ONCE
Refills: 0 | Status: COMPLETED | OUTPATIENT
Start: 2021-01-27 | End: 2021-01-27

## 2021-01-27 RX ADMIN — Medication 650 MILLIGRAM(S): at 17:39

## 2021-01-27 RX ADMIN — ENOXAPARIN SODIUM 40 MILLIGRAM(S): 100 INJECTION SUBCUTANEOUS at 12:13

## 2021-01-27 RX ADMIN — Medication 500 MILLIGRAM(S): at 12:13

## 2021-01-27 RX ADMIN — Medication 325 MILLIGRAM(S): at 12:13

## 2021-01-27 RX ADMIN — Medication 1000 MILLIGRAM(S): at 06:32

## 2021-01-27 NOTE — PROGRESS NOTE ADULT - ASSESSMENT
54 yo man with history of vitiligo and recent finding of a mass in the distal transverse colon with hepatic and RLL metastatic disease (on CTAP on 1/5/21) presents with an episode of near syncope early Tuesday morning. Pt went to [facility] ED on 1/4/21 due to weeks of lower abdominal pain and was found to have a stool guaiac that was positive in the setting of anemia with Hgb 8.8. A CTAP that was performed during the ED visit showed an approximately 5.5 x 3.6 cm mass in the distal transverse colon, an enlarged prostate with distended bladder and mid prostate enhancement, and diffuse enhancing lesions throughout the liver and cluster of nodules at RLL consistent with metastatic disease.     Pt was scheduled to have follow up in early February and hasn't seen anyone yet for the findings. Pt went to see his PCP on Tuesday, however, after he almost passed out when he went to the bathroom to urinate in the middle of the night. Pt was feeling lightheaded/faint at the time and was about to fall to the ground when he caught himself by grabbing on to the door. Pt denies any LOC, head trauma, or fall. Pt again felt lightheaded/faint when he was at his PCP's office later in the day. No associated chest pain, palpitations, headaches, vision changes, numbness, tingling, or diaphoresis. Pt notes that over the past 2-3 weeks, he has lost ~20-25 lbs as a result of a weak appetite and loss of taste. Pt also reports that for the last 1 week, he has been experiencing dyspnea with exertion, getting winded even when walking short distances, such as from his bed to the bathroom. Over the last 2 days, pt has been having intermittent fevers. Denies any cough, current abdominal pain, N/V, diarrhea, constipation, bloody stools, or urinary symptoms. Has had dark greenish stools since starting iron tablets a week ago.    In the ED,   T 98, HR 80-93, BP /54-70, RR 16-18, O2 sats % RA.  (20 Jan 2021 01:18)      Fever:    - Tmax 101.2 (1/20), hemodynamically stable.  Satting 100% on RA.  Rule out acute bacterial infection.  Check bcx x 2  (ngtd) and UA/Ucx.   Denies urinary symptoms.    - CTA chest no PE, (+) peripheral based GGOs in rt lung, possible metastatic disease vs prior covid exposure.   Covid pcr (-) x2.  Pt currently w/o sob or hypoxemia, without symptoms of acute covid illness.       - Covid serologies are positive.  Covid pcr (-) x 2.   In the absence of acute illness, suspect this is prior disease.  OK from ID standpoint to d/c isolation precautions.     - Fever may also be caused by underlying colonic malignancy and metastatic disease.      Colonic mass with liver metastasis:    - Surgical evaluation noted - no surgery planned at this time.  GI called by primary team for colonoscopy and biopsy.    - IR biopsy of liver mets - cytopath shows metastatic carcinoma. `l      * Off zosyn since 1/25, pt refused last two doses.  Cultures negative.   Will monitor pt closely off abx.   He continues to have fever - possibly secondary to tumor?      Now with elevated WBC, fever, and tachycardia with elevated PCT.  Recommend repeat bcx, UA, ucx and CTA c/a/p to r/o superimposed bacterial infection.   Will reassess need for abx based on sepsis w/u, monitor pt's clinical status closely.     Leandra Matson

## 2021-01-27 NOTE — CONSULT NOTE ADULT - SUBJECTIVE AND OBJECTIVE BOX
HPI:  56 yo man with history of vitiligo and recent finding of a mass in the distal transverse colon with hepatic and RLL metastatic disease (on CTAP on 1/5/21) presents with an episode of near syncope early Tuesday morning. Pt went to [facility] ED on 1/4/21 due to weeks of lower abdominal pain and was found to have a stool guaiac that was positive in the setting of anemia with Hgb 8.8. A CTAP that was performed during the ED visit showed an approximately 5.5 x 3.6 cm mass in the distal transverse colon, an enlarged prostate with distended bladder and mid prostate enhancement, and diffuse enhancing lesions throughout the liver and cluster of nodules at RLL consistent with metastatic disease. Pt was scheduled to have follow up in early February and hasn't seen anyone yet for the findings. Pt went to see his PCP on Tuesday, however, after he almost passed out when he went to the bathroom to urinate in the middle of the night. Pt was feeling lightheaded/faint at the time and was about to fall to the ground when he caught himself by grabbing on to the door. Pt denies any LOC, head trauma, or fall. Pt again felt lightheaded/faint when he was at his PCP's office later in the day. No associated chest pain, palpitations, headaches, vision changes, numbness, tingling, or diaphoresis. Pt notes that over the past 2-3 weeks, he has lost ~20-25 lbs as a result of a weak appetite and loss of taste. Pt also reports that for the last 1 week, he has been experiencing dyspnea with exertion, getting winded even when walking short distances, such as from his bed to the bathroom. Over the last 2 days, pt has been having intermittent fevers. Denies any cough, current abdominal pain, N/V, diarrhea, constipation, bloody stools, or urinary symptoms. Has had dark greenish stools since starting iron tablets a week ago.    In the ED,   T 98, HR 80-93, BP /54-70, RR 16-18, O2 sats % RA.  (20 Jan 2021 01:18)    Hospitalization course:      PAST MEDICAL & SURGICAL HISTORY:  Vitiligo    Colon cancer    No significant past surgical history        Allergies    No Known Allergies    Intolerances        MEDICATIONS  (STANDING):  ascorbic acid 500 milliGRAM(s) Oral daily  enoxaparin Injectable 40 milliGRAM(s) SubCutaneous daily  ferrous    sulfate 325 milliGRAM(s) Oral daily  sodium chloride 0.9%. 1000 milliLiter(s) (125 mL/Hr) IV Continuous <Continuous>    MEDICATIONS  (PRN):  acetaminophen   Tablet .. 650 milliGRAM(s) Oral every 6 hours PRN Temp greater or equal to 38C (100.4F), Mild Pain (1 - 3), Moderate Pain (4 - 6)      FAMILY HISTORY:  Family history of type 2 diabetes mellitus    Family history of hypertension    Family history of colon cancer        SOCIAL HISTORY: No EtOH, no tobacco    REVIEW OF SYSTEMS:    CONSTITUTIONAL: No weakness, fevers or chills  EYES/ENT: No visual changes;  No vertigo or throat pain   NECK: No pain or stiffness  RESPIRATORY: No cough, wheezing, hemoptysis; No shortness of breath  CARDIOVASCULAR: No chest pain or palpitations  GASTROINTESTINAL: No abdominal or epigastric pain. No nausea, vomiting, or hematemesis; No diarrhea or constipation. No melena or hematochezia.  GENITOURINARY: No dysuria, frequency or hematuria  NEUROLOGICAL: No numbness or weakness  SKIN: No itching, burning, rashes, or lesions   All other review of systems is negative unless indicated above.        T(F): 98.5 (01-27-21 @ 10:15), Max: 102.8 (01-26-21 @ 20:32)  HR: 86 (01-27-21 @ 10:15)  BP: 103/61 (01-27-21 @ 10:15)  RR: 18 (01-27-21 @ 10:15)  SpO2: 100% (01-27-21 @ 10:15)  Wt(kg): --    GENERAL: NAD, well-developed  HEAD:  Atraumatic, Normocephalic  EYES: EOMI, PERRLA, conjunctiva and sclera clear  NECK: Supple, No JVD  CHEST/LUNG: Clear to auscultation bilaterally; No wheeze  HEART: Regular rate and rhythm; No murmurs, rubs, or gallops  ABDOMEN: Soft, Nontender, Nondistended; Bowel sounds present  EXTREMITIES:  2+ Peripheral Pulses, No clubbing, cyanosis, or edema  NEUROLOGY: non-focal  SKIN: No rashes or lesions                          7.5    17.65 )-----------( 559      ( 27 Jan 2021 07:38 )             24.8       01-27    136  |  100  |  15  ----------------------------<  75  4.3   |  21<L>  |  0.65    Ca    8.7      27 Jan 2021 07:38                .Blood Blood-Peripheral  01-22 @ 09:27   No Growth Final  --  --      .Urine Clean Catch (Midstream)  01-20 @ 22:50   No growth  --  --      .Blood Blood-Peripheral  01-20 @ 07:35   No Growth Final  --  --      .Blood Blood  01-20 @ 07:10   No Growth Final  --  --       HPI:  54 yo man with history of vitiligo and recent finding of a mass in the distal transverse colon with hepatic and RLL metastatic disease (on CTAP on 1/5/21) presents with an episode of near syncope early Tuesday morning. Pt went to [facility] ED on 1/4/21 due to weeks of lower abdominal pain and was found to have a stool guaiac that was positive in the setting of anemia with Hgb 8.8. A CTAP that was performed during the ED visit showed an approximately 5.5 x 3.6 cm mass in the distal transverse colon, an enlarged prostate with distended bladder and mid prostate enhancement, and diffuse enhancing lesions throughout the liver and cluster of nodules at RLL consistent with metastatic disease. Pt was scheduled to have follow up in early February and hasn't seen anyone yet for the findings. Pt went to see his PCP on Tuesday, however, after he almost passed out when he went to the bathroom to urinate in the middle of the night. Pt was feeling lightheaded/faint at the time and was about to fall to the ground when he caught himself by grabbing on to the door. Pt denies any LOC, head trauma, or fall. Pt again felt lightheaded/faint when he was at his PCP's office later in the day. No associated chest pain, palpitations, headaches, vision changes, numbness, tingling, or diaphoresis. Pt notes that over the past 2-3 weeks, he has lost ~20-25 lbs as a result of a weak appetite and loss of taste. Pt also reports that for the last 1 week, he has been experiencing dyspnea with exertion, getting winded even when walking short distances, such as from his bed to the bathroom. Over the last 2 days, pt has been having intermittent fevers. Denies any cough, current abdominal pain, N/V, diarrhea, constipation, bloody stools, or urinary symptoms. Has had dark greenish stools since starting iron tablets a week ago.    In the ED,   T 98, HR 80-93, BP /54-70, RR 16-18, O2 sats % RA.  (20 Jan 2021 01:18)    Hospitalization course:  - Patient with metastatic cancer previously imaged with CTAP at [facility] ED, admitted for near-syncopal episode and failure to thrive  - Core needle biopsies obtained from hypoattenuating hepatic lesions (1/25)  - Cytopathology shows metastatic carcinoma consistent with colonic origin (moderately differentiated with areas of necrosis)  - Oncology consulted for further management of metastatic colon cancer    PAST MEDICAL & SURGICAL HISTORY:  Vitiligo    Colon cancer    No significant past surgical history        Allergies    No Known Allergies    Intolerances        MEDICATIONS  (STANDING):  ascorbic acid 500 milliGRAM(s) Oral daily  enoxaparin Injectable 40 milliGRAM(s) SubCutaneous daily  ferrous    sulfate 325 milliGRAM(s) Oral daily  sodium chloride 0.9%. 1000 milliLiter(s) (125 mL/Hr) IV Continuous <Continuous>    MEDICATIONS  (PRN):  acetaminophen   Tablet .. 650 milliGRAM(s) Oral every 6 hours PRN Temp greater or equal to 38C (100.4F), Mild Pain (1 - 3), Moderate Pain (4 - 6)      FAMILY HISTORY:  Family history of type 2 diabetes mellitus    Family history of hypertension    Family history of colon cancer        SOCIAL HISTORY: No EtOH, no tobacco    REVIEW OF SYSTEMS:    CONSTITUTIONAL: +fevers, no weakness or fatigue  EYES/ENT: No visual changes;  No vertigo or throat pain   NECK: No pain or stiffness  RESPIRATORY: No cough, wheezing, hemoptysis; No shortness of breath  CARDIOVASCULAR: No chest pain or palpitations  GASTROINTESTINAL: No abdominal or epigastric pain. No nausea, vomiting, or hematemesis; No diarrhea or constipation.  GENITOURINARY: No dysuria, frequency or hematuria  NEUROLOGICAL: No numbness or weakness  SKIN: No itching, burning, rashes, or lesions   All other review of systems is negative unless indicated above.        T(F): 98.5 (01-27-21 @ 10:15), Max: 102.8 (01-26-21 @ 20:32)  HR: 86 (01-27-21 @ 10:15)  BP: 103/61 (01-27-21 @ 10:15)  RR: 18 (01-27-21 @ 10:15)  SpO2: 100% (01-27-21 @ 10:15)  Wt(kg): 51.7 kG    GENERAL: NAD, lying in bed comfortably  HEAD:  Atraumatic, Normocephalic  EYES: EOMI, PERRLA, conjunctiva and sclera clear  NECK: Supple, No JVD  CHEST/LUNG: Clear to auscultation bilaterally; No wheeze  HEART: Regular rate and rhythm; No murmurs, rubs, or gallops  ABDOMEN: Soft, Nontender, Nondistended; Bowel sounds present  EXTREMITIES:  2+ Peripheral Pulses, No clubbing, cyanosis, or edema  NEUROLOGY: CNs grossly intact  SKIN: No rashes or lesions                          7.5    17.65 )-----------( 559      ( 27 Jan 2021 07:38 )             24.8       01-27    136  |  100  |  15  ----------------------------<  75  4.3   |  21<L>  |  0.65    Ca    8.7      27 Jan 2021 07:38                .Blood Blood-Peripheral  01-22 @ 09:27   No Growth Final  --  --      .Urine Clean Catch (Midstream)  01-20 @ 22:50   No growth  --  --      .Blood Blood-Peripheral  01-20 @ 07:35   No Growth Final  --  --      .Blood Blood  01-20 @ 07:10   No Growth Final  --  --       HPI:  56 yo man with history of vitiligo and recent finding of a mass in the distal transverse colon with hepatic and RLL metastatic disease (on CTAP on 1/5/21) presents with an episode of near syncope early Tuesday morning. Pt went to [facility] ED on 1/4/21 due to weeks of lower abdominal pain and was found to have a stool guaiac that was positive in the setting of anemia with Hgb 8.8. A CTAP that was performed during the ED visit showed an approximately 5.5 x 3.6 cm mass in the distal transverse colon, an enlarged prostate with distended bladder and mid prostate enhancement, and diffuse enhancing lesions throughout the liver and cluster of nodules at RLL consistent with metastatic disease. Pt was scheduled to have follow up in early February and hasn't seen anyone yet for the findings. Pt went to see his PCP on Tuesday, however, after he almost passed out when he went to the bathroom to urinate in the middle of the night. Pt was feeling lightheaded/faint at the time and was about to fall to the ground when he caught himself by grabbing on to the door. Pt denies any LOC, head trauma, or fall. Pt again felt lightheaded/faint when he was at his PCP's office later in the day. No associated chest pain, palpitations, headaches, vision changes, numbness, tingling, or diaphoresis. Pt notes that over the past 2-3 weeks, he has lost ~20-25 lbs as a result of a weak appetite and loss of taste. Pt also reports that for the last 1 week, he has been experiencing dyspnea with exertion, getting winded even when walking short distances, such as from his bed to the bathroom. Over the last 2 days, pt has been having intermittent fevers. Denies any cough, current abdominal pain, N/V, diarrhea, constipation, bloody stools, or urinary symptoms. Has had dark greenish stools since starting iron tablets a week ago.    In the ED,   T 98, HR 80-93, BP /54-70, RR 16-18, O2 sats % RA.  (20 Jan 2021 01:18)    Hospitalization course:  - Patient with metastatic cancer previously imaged with CTAP at [facility] ED, admitted for near-syncopal episode  - Core needle biopsies obtained from hypoattenuating hepatic lesions (1/25)  - Cytopathology shows metastatic carcinoma consistent with colonic origin (moderately differentiated with areas of necrosis)  - Oncology consulted for further management of metastatic colon cancer    PAST MEDICAL & SURGICAL HISTORY:  Vitiligo    Colon cancer    No significant past surgical history        Allergies    No Known Allergies    Intolerances        MEDICATIONS  (STANDING):  ascorbic acid 500 milliGRAM(s) Oral daily  enoxaparin Injectable 40 milliGRAM(s) SubCutaneous daily  ferrous    sulfate 325 milliGRAM(s) Oral daily  sodium chloride 0.9%. 1000 milliLiter(s) (125 mL/Hr) IV Continuous <Continuous>    MEDICATIONS  (PRN):  acetaminophen   Tablet .. 650 milliGRAM(s) Oral every 6 hours PRN Temp greater or equal to 38C (100.4F), Mild Pain (1 - 3), Moderate Pain (4 - 6)      FAMILY HISTORY:  Family history of type 2 diabetes mellitus    Family history of hypertension    Family history of colon cancer        SOCIAL HISTORY: No EtOH, no tobacco    REVIEW OF SYSTEMS:    CONSTITUTIONAL: +fevers, no weakness or fatigue  EYES/ENT: No visual changes;  No vertigo or throat pain   NECK: No pain or stiffness  RESPIRATORY: No cough, wheezing, hemoptysis; No shortness of breath  CARDIOVASCULAR: No chest pain or palpitations  GASTROINTESTINAL: No abdominal or epigastric pain. No nausea, vomiting, or hematemesis; No diarrhea or constipation.  GENITOURINARY: No dysuria, frequency or hematuria  NEUROLOGICAL: No numbness or weakness  SKIN: No itching, burning, rashes, or lesions   All other review of systems is negative unless indicated above.        T(F): 98.5 (01-27-21 @ 10:15), Max: 102.8 (01-26-21 @ 20:32)  HR: 86 (01-27-21 @ 10:15)  BP: 103/61 (01-27-21 @ 10:15)  RR: 18 (01-27-21 @ 10:15)  SpO2: 100% (01-27-21 @ 10:15)  Wt(kg): 51.7 kG    GENERAL: NAD, lying in bed comfortably  HEAD:  Atraumatic, Normocephalic  EYES: EOMI, PERRLA, conjunctiva and sclera clear  NECK: Supple, No JVD  CHEST/LUNG: Clear to auscultation bilaterally; No wheeze  HEART: Regular rate and rhythm; No murmurs, rubs, or gallops  ABDOMEN: Soft, Nontender, Nondistended; Bowel sounds present  EXTREMITIES:  2+ Peripheral Pulses, No clubbing, cyanosis, or edema  NEUROLOGY: CNs grossly intact  SKIN: No rashes or lesions                          7.5    17.65 )-----------( 559      ( 27 Jan 2021 07:38 )             24.8       01-27    136  |  100  |  15  ----------------------------<  75  4.3   |  21<L>  |  0.65    Ca    8.7      27 Jan 2021 07:38                .Blood Blood-Peripheral  01-22 @ 09:27   No Growth Final  --  --      .Urine Clean Catch (Midstream)  01-20 @ 22:50   No growth  --  --      .Blood Blood-Peripheral  01-20 @ 07:35   No Growth Final  --  --      .Blood Blood  01-20 @ 07:10   No Growth Final  --  --

## 2021-01-27 NOTE — CONSULT NOTE ADULT - ASSESSMENT
*** Incomplete Consult Note - case has not been discussed with attending yet *** Patient is a 54 y/o M with history of vitiligo and recent findings of a mass in distal transverse colon with hepatic and RLL metastatic disease on CTAP (1/5/21) presenting for an episode of near-syncope. Core needle biopsies obtained on 1/25 from hepatic lesion with findings of metastatic carcinoma consistent with colonic origin. CEA, , and AFP elevated. Oncology consulted for further management of metastatic colon cancer.     #Metastatic Colon Cancer  - CTAP on 1/5 showing mass in distal transverse colon with enhancing lesions throughout liver and cluster of nodules in RLL  - Biopsy of hepatic lesions show metastatic carcinoma w/ colonic origin  - appreciate ID recs for fever; cont to monitor and work-up  - pt has had a Hgb drop of 2.7 since 1/23, would cont to monitor and assess for occult bleed   - please repeat CT abdomen/pelvis as given time since prior imaging  - plan for outpatient treatment of metastatic colon cancer at [facility] Patient is a 56 y/o M with history of vitiligo and recent findings of a mass in distal transverse colon with hepatic and RLL metastatic disease on CTAP (1/5/21) presenting for an episode of near-syncope. Core needle biopsies obtained on 1/25 from hepatic lesion show findings of metastatic carcinoma consistent with colonic origin. CEA, , and AFP elevated. Oncology consulted for further management of metastatic colon cancer.     #Metastatic Colon Cancer  - Biopsy of hepatic lesions show metastatic carcinoma w/ colonic origin  - appreciate ID recs for fever; cont to monitor and work-up  - pt has had a Hgb drop of 2.7 since 1/23, would cont to monitor and assess for occult bleed   - please repeat CT abdomen/pelvis as his last abd/pelvis imaging study was performed on 1/5 (for further characterization of disease progression)  - plan for outpatient treatment of metastatic colon cancer at [facility] Patient is a 54 y/o M with history of vitiligo and recent findings of a mass in distal transverse colon with hepatic and RLL metastatic disease on CTAP (1/5/21) presenting for an episode of near-syncope. Core needle biopsies obtained on 1/25 from hepatic lesion show findings of metastatic carcinoma consistent with colonic origin. CEA, , and AFP elevated. Oncology consulted for further management of metastatic colon cancer.     #Metastatic Colon Cancer  - Biopsy of hepatic lesions show metastatic carcinoma w/ colonic origin  - appreciate ID recs for fever; cont to monitor and work-up  - pt has had a Hgb drop of 2.7 since 1/23, would cont to monitor and assess for occult bleed/GIB (imaging as per below as well)  - please repeat CT abdomen/pelvis with contrast as his last abd/pelvis imaging study was performed on 1/5 (for further characterization of disease progression)  - plan for outpatient treatment of metastatic colon cancer at [facility] after discharge  - all above discussed with patient at bedside, he expressed understanding

## 2021-01-27 NOTE — CONSULT NOTE ADULT - ATTENDING COMMENTS
Above noted. Events of the last week reviewed.  No surgical intervention required at this time. Will follow.
GI consulted for CT with evidence of colon mass with likely metastasis to liver and lung (CTAP 1/5/2021 at OSH).   Patient denies symptoms of obstruction or reports of active GIB. He has had fevers and GGO on imaging concerning for COVID infection despite negative COVID testing; as a result, patient is currently on isolation precautions.   Ultimately patient will need tissue for diagnosis and oncology consultations.   As patient unable to undergo colonoscopy in endoscopy suite while on COVID precautions, reasonable to consult IR for biopsy of liver lesions as alternative for diagnosis of suspected metastatic colon cancer.
The patient was seen and examined today with the resident Dr. Gandara, and I agree with the History, Physical Exam and Plan in the record. Labs and imaging reviewed by me.

## 2021-01-27 NOTE — PROGRESS NOTE ADULT - SUBJECTIVE AND OBJECTIVE BOX
Patient is a 55y old  Male who presents with a chief complaint of Near syncope (27 Jan 2021 11:42)      SUBJECTIVE / OVERNIGHT EVENTS: Still feeling "not well" due to fevers and sweats through the night.    MEDICATIONS  (STANDING):  ascorbic acid 500 milliGRAM(s) Oral daily  enoxaparin Injectable 40 milliGRAM(s) SubCutaneous daily  ferrous    sulfate 325 milliGRAM(s) Oral daily  sodium chloride 0.9%. 1000 milliLiter(s) (125 mL/Hr) IV Continuous <Continuous>    MEDICATIONS  (PRN):  acetaminophen   Tablet .. 650 milliGRAM(s) Oral every 6 hours PRN Temp greater or equal to 38C (100.4F), Mild Pain (1 - 3), Moderate Pain (4 - 6)      CAPILLARY BLOOD GLUCOSE        I&O's Summary      PHYSICAL EXAM:  Vital Signs Last 24 Hrs  T(C): 36.9 (27 Jan 2021 10:15), Max: 39.3 (26 Jan 2021 20:32)  T(F): 98.5 (27 Jan 2021 10:15), Max: 102.8 (26 Jan 2021 20:32)  HR: 86 (27 Jan 2021 10:15) (86 - 109)  BP: 103/61 (27 Jan 2021 10:15) (96/62 - 109/73)  BP(mean): --  RR: 18 (27 Jan 2021 10:15) (18 - 18)  SpO2: 100% (27 Jan 2021 10:15) (99% - 100%)  CONSTITUTIONAL: distress due to fever and sweating  EYES: PERRLA; conjunctiva and sclera clear  ENMT: Moist oral mucosa, no pharyngeal injection or exudates; normal dentition  NECK: Supple, no palpable masses; no thyromegaly  RESPIRATORY: Normal respiratory effort; lungs are clear to auscultation bilaterally  CARDIOVASCULAR: Regular rate and rhythm, normal S1 and S2, no murmur/rub/gallop; No lower extremity edema; Peripheral pulses are 2+ bilaterally  ABDOMEN: Nontender to palpation, normoactive bowel sounds, no rebound/guarding; No hepatosplenomegaly      LABS:                        7.5    17.65 )-----------( 559      ( 27 Jan 2021 07:38 )             24.8     01-27    136  |  100  |  15  ----------------------------<  75  4.3   |  21<L>  |  0.65    Ca    8.7      27 Jan 2021 07:38                  RADIOLOGY & ADDITIONAL TESTS:  Results Reviewed:   Imaging Personally Reviewed:  Electrocardiogram Personally Reviewed:    COORDINATION OF CARE:  Care Discussed with Consultants/Other Providers [Y/N]:  Prior or Outpatient Records Reviewed [Y/N]:

## 2021-01-27 NOTE — PROGRESS NOTE ADULT - PROBLEM SELECTOR PLAN 1
Pt with 2 days of fevers at home. On admission, pt initially afebrile but later had fever of 101.2F. Did not meet SIRS criteria, with leukocytosis of 11.71.  - CTA -no PE, peripheral based ground glass opacities in R  lung.   - COVID-19 PCR x2 negative.  COVID ab- positive RVP - negative  - Procalcitonin-1.35  - patient continues to spike. Discussed with ID - will obtain CT chest/abd/pelvis  - BCx, UA, UCx NGTD  - if repeated fever check BCx

## 2021-01-27 NOTE — PROGRESS NOTE ADULT - SUBJECTIVE AND OBJECTIVE BOX
Infectious Diseases progress note:    Subjective: Pt with fever, Tmax 102.4, tachycardia.  WBC elevated.  Repeat bcx, ucx sent.  CTA c/a/p ordered to evaluate for infectious focus.     ROS:  CONSTITUTIONAL:  No fever, chills, rigors  CARDIOVASCULAR:  No chest pain or palpitations  RESPIRATORY:   No SOB, cough, dyspnea on exertion.  No wheezing  GASTROINTESTINAL:  No abd pain, N/V, diarrhea/constipation  EXTREMITIES:  No swelling or joint pain  GENITOURINARY:  No burning on urination, increased frequency or urgency.  No flank pain  NEUROLOGIC:  No HA, visual disturbances  SKIN: No rashes    Allergies    No Known Allergies    Intolerances        ANTIBIOTICS/RELEVANT:  antimicrobials    immunologic:    OTHER:  acetaminophen   Tablet .. 650 milliGRAM(s) Oral every 6 hours PRN  ascorbic acid 500 milliGRAM(s) Oral daily  enoxaparin Injectable 40 milliGRAM(s) SubCutaneous daily  ferrous    sulfate 325 milliGRAM(s) Oral daily  sodium chloride 0.9%. 1000 milliLiter(s) IV Continuous <Continuous>      Objective:  Vital Signs Last 24 Hrs  T(C): 36.7 (2021 18:47), Max: 39.3 (2021 20:32)  T(F): 98.1 (2021 18:47), Max: 102.8 (2021 20:32)  HR: 110 (2021 17:38) (86 - 110)  BP: 99/61 (2021 17:38) (96/62 - 105/68)  BP(mean): --  RR: 18 (2021 17:38) (18 - 18)  SpO2: 100% (2021 17:38) (100% - 100%)    PHYSICAL EXAM:  Constitutional:NAD  Eyes:AL, EOMI  Ear/Nose/Throat: no thrush, mucositis.  Moist mucous membranes	  Neck:no JVD, no lymphadenopathy, supple  Respiratory: CTA cheli  Cardiovascular: S1S2 RRR, no murmurs  Gastrointestinal:soft, nontender,  nondistended (+) BS  Extremities:no e/e/c  Skin:  no rashes, open wounds or ulcerations        LABS:                        7.5    17.65 )-----------( 559      ( 2021 07:38 )             24.8         136  |  100  |  15  ----------------------------<  75  4.3   |  21<L>  |  0.65    Ca    8.7      2021 07:38        Urinalysis Basic - ( 2021 12:42 )    Color: Radha / Appearance: Clear / S.034 / pH: x  Gluc: x / Ketone: Negative  / Bili: Negative / Urobili: 12 mg/dL   Blood: x / Protein: 30 mg/dL / Nitrite: Negative   Leuk Esterase: Negative / RBC: 3 /HPF / WBC 3 /HPF   Sq Epi: x / Non Sq Epi: 1 /HPF / Bacteria: Few        Procalcitonin, Serum: 1.77 ( @ 07:21)  Procalcitonin, Serum: 1.35 ( @ 07:53)            Rapid RVP Result: NotDetec  Rapid RVP Result: NotDetec          MICROBIOLOGY:          RADIOLOGY & ADDITIONAL STUDIES:

## 2021-01-28 LAB
ALBUMIN SERPL ELPH-MCNC: 2.4 G/DL — LOW (ref 3.3–5)
ALP SERPL-CCNC: 427 U/L — HIGH (ref 40–120)
ALT FLD-CCNC: 42 U/L — HIGH (ref 4–41)
ANION GAP SERPL CALC-SCNC: 16 MMOL/L — HIGH (ref 7–14)
AST SERPL-CCNC: 102 U/L — HIGH (ref 4–40)
BASOPHILS # BLD AUTO: 0.02 K/UL — SIGNIFICANT CHANGE UP (ref 0–0.2)
BASOPHILS NFR BLD AUTO: 0.1 % — SIGNIFICANT CHANGE UP (ref 0–2)
BILIRUB SERPL-MCNC: 1 MG/DL — SIGNIFICANT CHANGE UP (ref 0.2–1.2)
BLD GP AB SCN SERPL QL: NEGATIVE — SIGNIFICANT CHANGE UP
BUN SERPL-MCNC: 16 MG/DL — SIGNIFICANT CHANGE UP (ref 7–23)
CALCIUM SERPL-MCNC: 9 MG/DL — SIGNIFICANT CHANGE UP (ref 8.4–10.5)
CHLORIDE SERPL-SCNC: 98 MMOL/L — SIGNIFICANT CHANGE UP (ref 98–107)
CO2 SERPL-SCNC: 22 MMOL/L — SIGNIFICANT CHANGE UP (ref 22–31)
CREAT SERPL-MCNC: 0.6 MG/DL — SIGNIFICANT CHANGE UP (ref 0.5–1.3)
CULTURE RESULTS: NO GROWTH — SIGNIFICANT CHANGE UP
EOSINOPHIL # BLD AUTO: 0.03 K/UL — SIGNIFICANT CHANGE UP (ref 0–0.5)
EOSINOPHIL NFR BLD AUTO: 0.2 % — SIGNIFICANT CHANGE UP (ref 0–6)
GLUCOSE SERPL-MCNC: 75 MG/DL — SIGNIFICANT CHANGE UP (ref 70–99)
HCT VFR BLD CALC: 22.3 % — LOW (ref 39–50)
HGB BLD-MCNC: 7 G/DL — CRITICAL LOW (ref 13–17)
IANC: 14.31 K/UL — HIGH (ref 1.5–8.5)
IMM GRANULOCYTES NFR BLD AUTO: 1 % — SIGNIFICANT CHANGE UP (ref 0–1.5)
LYMPHOCYTES # BLD AUTO: 1.94 K/UL — SIGNIFICANT CHANGE UP (ref 1–3.3)
LYMPHOCYTES # BLD AUTO: 10.8 % — LOW (ref 13–44)
MCHC RBC-ENTMCNC: 19.6 PG — LOW (ref 27–34)
MCHC RBC-ENTMCNC: 31.4 GM/DL — LOW (ref 32–36)
MCV RBC AUTO: 62.3 FL — LOW (ref 80–100)
MONOCYTES # BLD AUTO: 1.54 K/UL — HIGH (ref 0–0.9)
MONOCYTES NFR BLD AUTO: 8.5 % — SIGNIFICANT CHANGE UP (ref 2–14)
NEUTROPHILS # BLD AUTO: 14.31 K/UL — HIGH (ref 1.8–7.4)
NEUTROPHILS NFR BLD AUTO: 79.4 % — HIGH (ref 43–77)
NRBC # BLD: 0 /100 WBCS — SIGNIFICANT CHANGE UP
NRBC # FLD: 0.02 K/UL — HIGH
PLATELET # BLD AUTO: 620 K/UL — HIGH (ref 150–400)
POTASSIUM SERPL-MCNC: 4.3 MMOL/L — SIGNIFICANT CHANGE UP (ref 3.5–5.3)
POTASSIUM SERPL-SCNC: 4.3 MMOL/L — SIGNIFICANT CHANGE UP (ref 3.5–5.3)
PROT SERPL-MCNC: 7.8 G/DL — SIGNIFICANT CHANGE UP (ref 6–8.3)
RBC # BLD: 3.58 M/UL — LOW (ref 4.2–5.8)
RBC # FLD: 17.5 % — HIGH (ref 10.3–14.5)
RH IG SCN BLD-IMP: POSITIVE — SIGNIFICANT CHANGE UP
SODIUM SERPL-SCNC: 136 MMOL/L — SIGNIFICANT CHANGE UP (ref 135–145)
SPECIMEN SOURCE: SIGNIFICANT CHANGE UP
WBC # BLD: 18.02 K/UL — HIGH (ref 3.8–10.5)
WBC # FLD AUTO: 18.02 K/UL — HIGH (ref 3.8–10.5)

## 2021-01-28 PROCEDURE — 71260 CT THORAX DX C+: CPT | Mod: 26

## 2021-01-28 PROCEDURE — 74177 CT ABD & PELVIS W/CONTRAST: CPT | Mod: 26

## 2021-01-28 PROCEDURE — 99233 SBSQ HOSP IP/OBS HIGH 50: CPT

## 2021-01-28 PROCEDURE — 99232 SBSQ HOSP IP/OBS MODERATE 35: CPT | Mod: GC

## 2021-01-28 RX ADMIN — Medication 650 MILLIGRAM(S): at 21:11

## 2021-01-28 RX ADMIN — Medication 325 MILLIGRAM(S): at 17:50

## 2021-01-28 RX ADMIN — Medication 500 MILLIGRAM(S): at 17:50

## 2021-01-28 RX ADMIN — Medication 650 MILLIGRAM(S): at 00:57

## 2021-01-28 NOTE — PROVIDER CONTACT NOTE (OTHER) - BACKGROUND
Admitted with a fever and near syncope event, hx of colon cancer, vitiligo
54 yo M admitted for FTT in adult PMH of vitiligo and colon CA/
Patient admitted for near syncope and failure to thrive. H/o anemia and vitiligo
Patient admitted for near syncope and failure to thrive. H/o anemia and vitiligo
Patient with diagnosis of failure to thrive in adult  and PMHx of colon cancer, vitiligo
Admitted with a fever, hx of colon cancer
Patient admitted for Failure to thrive. Patient has colon cancer, and s/p liver biopsy.
Patient with diagnosis of failure to thrive in adult and PMHx of colon cancer
Admitted with a fever and near syncope event, hx of colon cancer, vitiligo
Patient admitted for Failure to thrive. Patient has colon cancer, and s/p liver biopsy.
Patient with diagnosis of failure to thrive in adult and PMHx of colon cancer.
Admitted with a fever and near syncope event, hx of colon cancer, vitiligo
Admitted with a fever and near syncope event, hx of colon cancer, vitiligo

## 2021-01-28 NOTE — PROGRESS NOTE ADULT - ASSESSMENT
55M h/o vitiligo and recent finding of a mass in the distal transverse colon with hepatic and RLL metastatic disease (on CTAP on 1/5/21) p/w an episode of near syncope early Tuesday morning, suspect 2/2 orthostatic hypotension, and fevers. s/p liver biopsy conssitent with metastatic colonic carcinoma.

## 2021-01-28 NOTE — PROGRESS NOTE ADULT - ASSESSMENT
54 yo man with history of vitiligo and recent finding of a mass in the distal transverse colon with hepatic and RLL metastatic disease (on CTAP on 1/5/21) presents with an episode of near syncope early Tuesday morning. Pt went to [facility] ED on 1/4/21 due to weeks of lower abdominal pain and was found to have a stool guaiac that was positive in the setting of anemia with Hgb 8.8. A CTAP that was performed during the ED visit showed an approximately 5.5 x 3.6 cm mass in the distal transverse colon, an enlarged prostate with distended bladder and mid prostate enhancement, and diffuse enhancing lesions throughout the liver and cluster of nodules at RLL consistent with metastatic disease.     Pt was scheduled to have follow up in early February and hasn't seen anyone yet for the findings. Pt went to see his PCP on Tuesday, however, after he almost passed out when he went to the bathroom to urinate in the middle of the night. Pt was feeling lightheaded/faint at the time and was about to fall to the ground when he caught himself by grabbing on to the door. Pt denies any LOC, head trauma, or fall. Pt again felt lightheaded/faint when he was at his PCP's office later in the day. No associated chest pain, palpitations, headaches, vision changes, numbness, tingling, or diaphoresis. Pt notes that over the past 2-3 weeks, he has lost ~20-25 lbs as a result of a weak appetite and loss of taste. Pt also reports that for the last 1 week, he has been experiencing dyspnea with exertion, getting winded even when walking short distances, such as from his bed to the bathroom. Over the last 2 days, pt has been having intermittent fevers. Denies any cough, current abdominal pain, N/V, diarrhea, constipation, bloody stools, or urinary symptoms. Has had dark greenish stools since starting iron tablets a week ago.    In the ED,   T 98, HR 80-93, BP /54-70, RR 16-18, O2 sats % RA.  (20 Jan 2021 01:18)      Fever:    - Tmax 101.2 (1/20), hemodynamically stable.  Satting 100% on RA.  Rule out acute bacterial infection.  Check bcx x 2  (ngtd) and UA/Ucx.   Denies urinary symptoms.    - CTA chest no PE, (+) peripheral based GGOs in rt lung, possible metastatic disease vs prior covid exposure.   Covid pcr (-) x2.  Pt currently w/o sob or hypoxemia, without symptoms of acute covid illness.       - Covid serologies are positive.  Covid pcr (-) x 2.   In the absence of acute illness, suspect this is prior disease.  OK from ID standpoint to d/c isolation precautions.     - Fever may also be caused by underlying colonic malignancy and metastatic disease.      Colonic mass with liver metastasis:    - Surgical evaluation noted - no surgery planned at this time.  GI called by primary team for colonoscopy and biopsy.    - IR biopsy of liver mets - cytopath shows metastatic carcinoma.       * Off zosyn since 1/25, pt refused last two doses.  Cultures negative.   Will monitor pt closely off abx.   He continues to have fever - possibly secondary to tumor?      Now with elevated WBC, fever, and tachycardia with elevated PCT.  Recommend repeat bcx, UA, ucx and CTA c/a/p to r/o superimposed bacterial infection.   Will reassess need for abx based on sepsis w/u, monitor pt's clinical status closely.     GI reconsulted due to black stools, low h/h.  For blood transfusion, monitor H/H.    Leandra Matson  [phone]

## 2021-01-28 NOTE — PROGRESS NOTE ADULT - PROBLEM SELECTOR PLAN 2
- suspect secondary to malignancy vs new underlying bacterial infection   Pt with 2 days of fevers at home. On admission, pt initially afebrile but later had fever of 101.2F. Did not meet SIRS criteria, with leukocytosis of 11.71.  - CTA -no PE, peripheral based ground glass opacities in R  lung.   - COVID-19 PCR x2 negative.  COVID ab- positive RVP - negative; COVID neg 1/21  - Procalcitonin-1.35  - patient continues to spike. Concern for superimposed bacterial infection  Discussed with ID - CT chest/abd/pelvis-P  - 1/27 BCx, UA-neg, 1/27 UCx -in labs

## 2021-01-28 NOTE — CHART NOTE - NSCHARTNOTEFT_GEN_A_CORE
PT noted with  significant anemia, He also reports black stool last night and this morning. Case discussed with attending, recommend GI and surgery follow up  will Transfuse 1 unit of PRBC and continue to monitor .

## 2021-01-28 NOTE — PROGRESS NOTE ADULT - PROBLEM SELECTOR PLAN 1
- Likely iron-deficiency anemia, from occult GIB  other possibility is AOCD and from folate/vit B12 deficiency given recent poor PO intake.   Pt with positive stool guaiac on 1/4/21.  Iron studies cw iron def Anemia plus AOCD   - Monitor H/H and transfuse to keep Hgb > 7  - C/w ferrous sulfate with vit C supplementation  - transfuse 1 u prbc given symptomatic anemia  - given dropping H/H and black stool with reconsult GI

## 2021-01-28 NOTE — PROGRESS NOTE ADULT - SUBJECTIVE AND OBJECTIVE BOX
Patient is a 55y old  Male who presents with a chief complaint of Near syncope (2021 19:38)      SUBJECTIVE / OVERNIGHT EVENTS: Notes epigastric tenderness all night and dark stools this AM feels weak. Tm102.4  ADDITIONAL REVIEW OF SYSTEMS: denies N/V/BRBRPR    MEDICATIONS  (STANDING):  ascorbic acid 500 milliGRAM(s) Oral daily  enoxaparin Injectable 40 milliGRAM(s) SubCutaneous daily  ferrous    sulfate 325 milliGRAM(s) Oral daily  sodium chloride 0.9%. 1000 milliLiter(s) (125 mL/Hr) IV Continuous <Continuous>    MEDICATIONS  (PRN):  acetaminophen   Tablet .. 650 milliGRAM(s) Oral every 6 hours PRN Temp greater or equal to 38C (100.4F), Mild Pain (1 - 3), Moderate Pain (4 - 6)      CAPILLARY BLOOD GLUCOSE        I&O's Summary      PHYSICAL EXAM:  Vital Signs Last 24 Hrs  T(C): 36.9 (2021 05:32), Max: 39.1 (2021 17:38)  T(F): 98.5 (2021 05:32), Max: 102.4 (2021 17:38)  HR: 97 (2021 05:32) (97 - 110)  BP: 109/73 (2021 05:32) (99/61 - 112/67)  BP(mean): --  RR: 18 (2021 05:32) (18 - 18)  SpO2: 100% (2021 05:32) (99% - 100%)  CONSTITUTIONAL: middle aged man   EYES: EOMI  NECK: Supple, no palpable masses]  RESPIRATORY: Normal respiratory effort; lungs are clear to auscultation bilaterally  CARDIOVASCULAR: Regular rate and rhythm, normal S1 and S2,  ABDOMEN: +bS; mild epigastric tenderness no guarding  MUSCULOSKELETAL:  Normal gait; no clubbing or cyanosis of digits; no joint swelling or tenderness to palpation  PSYCH: A+O to person, place, and time; affect appropriate  NEUROLOGY:  no gross  deficits   SKIN: vitiligo    LABS:                        7.0    18.02 )-----------( 620      ( 2021 08:38 )             22.3         136  |  98  |  16  ----------------------------<  75  4.3   |  22  |  0.60    Ca    9.0      2021 08:38    TPro  7.8  /  Alb  2.4<L>  /  TBili  1.0  /  DBili  x   /  AST  102<H>  /  ALT  42<H>  /  AlkPhos  427<H>            Urinalysis Basic - ( 2021 12:42 )    Color: Radha / Appearance: Clear / S.034 / pH: x  Gluc: x / Ketone: Negative  / Bili: Negative / Urobili: 12 mg/dL   Blood: x / Protein: 30 mg/dL / Nitrite: Negative   Leuk Esterase: Negative / RBC: 3 /HPF / WBC 3 /HPF   Sq Epi: x / Non Sq Epi: 1 /HPF / Bacteria: Few          RADIOLOGY & ADDITIONAL TESTS:  Results Reviewed:   Imaging Personally Reviewed:  Electrocardiogram Personally Reviewed:    COORDINATION OF CARE:  Care Discussed with Consultants/Other Providers [Y/N]:  Prior or Outpatient Records Reviewed [Y/N]:

## 2021-01-28 NOTE — PROVIDER CONTACT NOTE (OTHER) - ASSESSMENT
No acute signs of distress noted patient denies pain/discomfort.
No acute signs of distress noted patient denies pain/discomfort.
Patient asymptomatic, not in any distress
Patient has a fever. and complaints of some chills. Patient is AOx4, no distress noted, no shortness of breathe or other complaints.
Patient is A&Ox4. Denies chest pain/pain/SOB/dizziness.
Patient is A&Ox4. Denies chest pain/pain/SOB/dizziness.
Patient not in any distress. no complaints voiced.
Patients SBP<100 Temp>100F, vitals otherwise stable. patient noted with decreased PO intake Airborne/Contact Precautions
Temp noted 100.6, .  Patient not in any distress. No complaints voiced.
Patient has a fever. denies subjective symptoms
Patient has a fever. and complaints of some chills. Patient is AOx4, no distress noted, no shortness of breathe or other complaints.
Patient has a fever. asymptomatic. Patient is AOx4, no distress noted, no shortness of breath or other complaints.
Patient has a fever. and complaints of some chills. Patient is AOx4, no distress noted, no shortness of breathe or other complaints.

## 2021-01-28 NOTE — CHART NOTE - NSCHARTNOTEFT_GEN_A_CORE
[facility] MEDICINE NIGHT COVERAGE    Notified by RN - patient w/ temp of 100.6F and  s/p transfusion. Upon chart review, patient w/ 99.6F oral temp prior to transfusion; current temp likely unrelated to transfusion. Patient seen and examined at bedside. Denies chest pain, palpitations, shortness of breath, abdominal pain, n/v/d or any other complaints. On exam: NAD; Heart RRR, S1, S2; Lungs CTAB; Abd soft, mild diffuse TTP in all quadrants, (+) BS, Skin vitligo. Sepsis w/u BCx x2 and UA/UCx sent 1/27 and testing. Will f/u results. Will administer Tylenol for fever and recheck vitals in 1hr. Post transfusion CBC at midnight. RN made aware of plan. Will continue to monitor.    Vital Signs Last 24 Hrs  T(C): 38.1 (28 Jan 2021 20:52), Max: 38.1 (28 Jan 2021 20:52)  T(F): 100.6 (28 Jan 2021 20:52), Max: 100.6 (28 Jan 2021 20:52)  HR: 104 (28 Jan 2021 20:52) (95 - 110)  BP: 111/72 (28 Jan 2021 20:52) (109/72 - 112/67)  RR: 18 (28 Jan 2021 20:52) (18 - 18)  SpO2: 100% (28 Jan 2021 20:52) (100% - 100%)    Maxine Bradford PA-C  Medicine [id number] [facility] MEDICINE NIGHT COVERAGE    Notified by RN - patient w/ temp of 100.6F and  s/p transfusion. Upon chart review, patient w/ 99.6F oral temp prior to transfusion; current temp likely unrelated to transfusion. Patient seen and examined at bedside. Denies chest pain, palpitations, shortness of breath, abdominal pain, n/v/d or any other complaints. On exam: NAD; Heart RRR, S1, S2; Lungs CTAB; Abd soft, mild diffuse TTP in all quadrants, (+) BS, Skin vitiligo. Sepsis w/u BCx x2 and UA/UCx sent 1/27 -- UA neg, UCx NGTD; BCx preliminary results NGTD; will f/u final results. Will administer Tylenol for fever and recheck vitals in 1hr. Post transfusion CBC at midnight. RN made aware of plan. Will continue to monitor.    Vital Signs Last 24 Hrs  T(C): 38.1 (28 Jan 2021 20:52), Max: 38.1 (28 Jan 2021 20:52)  T(F): 100.6 (28 Jan 2021 20:52), Max: 100.6 (28 Jan 2021 20:52)  HR: 104 (28 Jan 2021 20:52) (95 - 110)  BP: 111/72 (28 Jan 2021 20:52) (109/72 - 112/67)  RR: 18 (28 Jan 2021 20:52) (18 - 18)  SpO2: 100% (28 Jan 2021 20:52) (100% - 100%)    Maxine Bradford PA-C  Medicine [id number]

## 2021-01-28 NOTE — PROVIDER CONTACT NOTE (OTHER) - SITUATION
patient has a temp of 100.5
Patient has a fever of 100.8 orally
Patient was using restroom HR went to 146 No acute signs of distress noted. Vitals were taken Oral temp 102.7 BP 96/66 Spo2 100%.
Post Blood Transfusion. Temp noted 100.6, .  Patient not in any distress.
Patient has a fever of 101.1 orally this morning.
Patient has been having febrile events during the days, and now has temp of 100.4 oral.
Patient has a fever of 100.4 orally
Patient noted with small black/ tarry formed stool.
patient HR went upto 130's while ambulating to the bathroom. At rest HR 90's.
Oral temp 102.3  BP 96/62 Patient is asymptomatic no acute signs of distress noted.
Patient had oral temp of 101.8 F. Refusing zosyn, stating he has been on antibiotic for too long and is still getting fevers. Requesting change in antibiotic
Patient had oral temp of 102.6 F. Refusing zosyn, stating he has been on antibiotic for too long and is still getting fevers. Requesting change in antibiotic
Patient noted with oral temp of 100.3

## 2021-01-28 NOTE — PROGRESS NOTE ADULT - SUBJECTIVE AND OBJECTIVE BOX
PROGRESS NOTE:   Written by Branden Hirsch. [facility] page number: [id number]. [facility] page number [phone]    Patient is a 55y old  Male who presents with a chief complaint of Near syncope (2021 14:21)      SUBJECTIVE / OVERNIGHT EVENTS: Overnight, patient febrile to 102.4F, and reports weakness, upper abdominal pain "8/10" severity and 2 episodes of black stools. Patient denies chest pain, shortness of breath, constipation, diarrhea, leg swelling or pain.    MEDICATIONS  (STANDING):  ascorbic acid 500 milliGRAM(s) Oral daily  ferrous    sulfate 325 milliGRAM(s) Oral daily  sodium chloride 0.9%. 1000 milliLiter(s) (125 mL/Hr) IV Continuous <Continuous>    MEDICATIONS  (PRN):  acetaminophen   Tablet .. 650 milliGRAM(s) Oral every 6 hours PRN Temp greater or equal to 38C (100.4F), Mild Pain (1 - 3), Moderate Pain (4 - 6)      CAPILLARY BLOOD GLUCOSE        I&O's Summary      PHYSICAL EXAM:  Vital Signs Last 24 Hrs  T(C): 36.9 (2021 05:32), Max: 39.1 (2021 17:38)  T(F): 98.5 (2021 05:32), Max: 102.4 (2021 17:38)  HR: 97 (2021 05:32) (97 - 110)  BP: 109/73 (2021 05:32) (99/61 - 112/67)  BP(mean): --  RR: 18 (2021 05:32) (18 - 18)  SpO2: 100% (2021 05:32) (99% - 100%)    GENERAL:  cachectic, lying in bed in no acute distress  HEENT:  NC/AT,  conjunctivae clear and pink,  no JVD  CHEST:  Full & symmetric excursion, no increased effort, breath sounds clear to auscultation bilaterally.  HEART:  Regular rhythm, S1, S2, no murmur/rub/S3/S4, no abdominal bruit  ABDOMEN:  Soft, non-distended, +  severe epigastric tenderness and mild LUQ tenderness, normoactive bowel sounds  RECTAL: scant brown stool, no evidence of blood/melena.  EXTREMITIES:  no cyanosis, clubbing, or edema  SKIN:  Scattered hypopigmented patches of skin,  No rash/erythema/ecchymoses/petechiae/wounds/abscess/warm/dry  NEURO:  Alert and oriented x3    LABS:                        7.0    18.02 )-----------( 620      ( 2021 08:38 )             22.3         136  |  98  |  16  ----------------------------<  75  4.3   |  22  |  0.60    Ca    9.0      2021 08:38    TPro  7.8  /  Alb  2.4<L>  /  TBili  1.0  /  DBili  x   /  AST  102<H>  /  ALT  42<H>  /  AlkPhos  427<H>            Urinalysis Basic - ( 2021 12:42 )    Color: Radha / Appearance: Clear / S.034 / pH: x  Gluc: x / Ketone: Negative  / Bili: Negative / Urobili: 12 mg/dL   Blood: x / Protein: 30 mg/dL / Nitrite: Negative   Leuk Esterase: Negative / RBC: 3 /HPF / WBC 3 /HPF   Sq Epi: x / Non Sq Epi: 1 /HPF / Bacteria: Few        Culture - Blood (collected 2021 14:19)  Source: .Blood Blood-Peripheral  Preliminary Report (2021 15:01):    No growth to date.    Culture - Blood (collected 2021 14:19)  Source: .Blood Blood-Peripheral  Preliminary Report (2021 15:01):    No growth to date.    Culture - Urine (collected 2021 10:35)  Source: .Urine Clean Catch (Midstream)  Final Report (2021 12:41):    No growth        RADIOLOGY & ADDITIONAL TESTS:  Results Reviewed:   Imaging Personally Reviewed:  Electrocardiogram Personally Reviewed:    COORDINATION OF CARE:  Care Discussed with Consultants/Other Providers [Y/N]:  Prior or Outpatient Records Reviewed [Y/N]:

## 2021-01-28 NOTE — PROGRESS NOTE ADULT - ASSESSMENT
Patient is a 55yoM with PMH of vitiligo, with metastatic colon cancer to the liver (biopsy confirmed) and lung, with downtrending hemoglobin in the setting of reported black stools, and intermittent fevers.    #Metastatic Colon cancer  Liver biopsy with cytopathology confirmed metastatic colon carcinoma. Elevated CEA, CA 19-9 and AFP. Patient with localized pain in epigastric/LUQ consistent with colon cancer in distal transverse colon. Colon cancer may be the source of downtrending hgb from 10s to 7s over the past week, although given reported dark stools an upper GI bleed cannot be ruled out. If significant/overt bleeding is evident, will consider Hemospray. Pending CT C/A/P to be reviewed for comparison to prior CT (1/5) to assess for disease progression.    #Black Stools  Patient reports 2 episodes of black stools, although not seen by staff. Rectal exam today with scant brown stool, no evidence of blood/melena. Discussed plan with patient to keep next BM for visualization. If melena is noted with next BM, then will consider EGD to assess for potential upper GI bleed.    #Fever  Recurring fevers with associated leukocytosis - tumor fever vs. infectious in etiology. Monitoring off antibiotics and sepsis workup as per ID. Pending CT C/A/P to assess for potential infectious source.    Recommendations:   -Consider EGD if melena is confirmed with next BM  -If significant/active bleeding is evident, will consider Hemospray  -Colorectal Surgery input

## 2021-01-28 NOTE — PROVIDER CONTACT NOTE (OTHER) - ACTION/TREATMENT ORDERED:
Provider made aware and patient was seen at bedside. monitoring continues. Provider made aware and patient was seen at bedside. Instructed to give Tylenol and recheck temp in 1 hr. monitoring continues.

## 2021-01-28 NOTE — PROGRESS NOTE ADULT - SUBJECTIVE AND OBJECTIVE BOX
Infectious Diseases progress note:    Subjective:  Awake, alert.  Feels weak, also c/o pain in lower abd and black stools today.   H/H decr.  WBC 18    ROS:  CONSTITUTIONAL:  No fever, chills, rigors  CARDIOVASCULAR:  No chest pain or palpitations  RESPIRATORY:   No SOB, cough, dyspnea on exertion.  No wheezing  GASTROINTESTINAL:  No abd pain, N/V, diarrhea/constipation  EXTREMITIES:  No swelling or joint pain  GENITOURINARY:  No burning on urination, increased frequency or urgency.  No flank pain  NEUROLOGIC:  No HA, visual disturbances  SKIN: No rashes    Allergies    No Known Allergies    Intolerances        ANTIBIOTICS/RELEVANT:  antimicrobials    immunologic:    OTHER:  acetaminophen   Tablet .. 650 milliGRAM(s) Oral every 6 hours PRN  ascorbic acid 500 milliGRAM(s) Oral daily  ferrous    sulfate 325 milliGRAM(s) Oral daily  sodium chloride 0.9%. 1000 milliLiter(s) IV Continuous <Continuous>      Objective:  Vital Signs Last 24 Hrs  T(C): 36.9 (2021 05:32), Max: 39.1 (2021 17:38)  T(F): 98.5 (2021 05:32), Max: 102.4 (2021 17:38)  HR: 97 (2021 05:32) (97 - 110)  BP: 109/73 (2021 05:32) (99/61 - 112/67)  BP(mean): --  RR: 18 (2021 05:32) (18 - 18)  SpO2: 100% (2021 05:32) (99% - 100%)    PHYSICAL EXAM:  Constitutional:NAD  Eyes:AL, EOMI  Ear/Nose/Throat: no thrush, mucositis.  Moist mucous membranes	  Neck:no JVD, no lymphadenopathy, supple  Respiratory: CTA cheli  Cardiovascular: S1S2 RRR, no murmurs  Gastrointestinal:soft, nontender,  nondistended (+) BS  Extremities:no e/e/c  Skin:  no rashes, open wounds or ulcerations        LABS:                        7.0    18.02 )-----------( 620      ( 2021 08:38 )             22.3         136  |  98  |  16  ----------------------------<  75  4.3   |  22  |  0.60    Ca    9.0      2021 08:38    TPro  7.8  /  Alb  2.4<L>  /  TBili  1.0  /  DBili  x   /  AST  102<H>  /  ALT  42<H>  /  AlkPhos  427<H>        Urinalysis Basic - ( 2021 12:42 )    Color: Radha / Appearance: Clear / S.034 / pH: x  Gluc: x / Ketone: Negative  / Bili: Negative / Urobili: 12 mg/dL   Blood: x / Protein: 30 mg/dL / Nitrite: Negative   Leuk Esterase: Negative / RBC: 3 /HPF / WBC 3 /HPF   Sq Epi: x / Non Sq Epi: 1 /HPF / Bacteria: Few        Procalcitonin, Serum: 1.77 ( @ 07:21)  Procalcitonin, Serum: 1.35 ( @ 07:53)            Rapid RVP Result: [facility]  Rapid RVP Result: [facility]          MICROBIOLOGY:    Culture - Urine (21 @ 10:35)   Specimen Source: .Urine Clean Catch (Midstream)   Culture Results:   No growth       Culture - Blood (21 @ 09:27)   Specimen Source: .Blood Blood-Venous   Culture Results:   No Growth Final       Culture - Urine (21 @ 22:50)   Specimen Source: .Urine Clean Catch (Midstream)   Culture Results:   No growth   RADIOLOGY & ADDITIONAL STUDIES:

## 2021-01-29 LAB
HCT VFR BLD CALC: 25.4 % — LOW (ref 39–50)
HCT VFR BLD CALC: 26.9 % — LOW (ref 39–50)
HGB BLD-MCNC: 7.9 G/DL — LOW (ref 13–17)
HGB BLD-MCNC: 8.4 G/DL — LOW (ref 13–17)
MCHC RBC-ENTMCNC: 20.2 PG — LOW (ref 27–34)
MCHC RBC-ENTMCNC: 20.2 PG — LOW (ref 27–34)
MCHC RBC-ENTMCNC: 31.1 GM/DL — LOW (ref 32–36)
MCHC RBC-ENTMCNC: 31.2 GM/DL — LOW (ref 32–36)
MCV RBC AUTO: 64.7 FL — LOW (ref 80–100)
MCV RBC AUTO: 65 FL — LOW (ref 80–100)
NRBC # BLD: 0 /100 WBCS — SIGNIFICANT CHANGE UP
NRBC # BLD: 0 /100 WBCS — SIGNIFICANT CHANGE UP
NRBC # FLD: 0 K/UL — SIGNIFICANT CHANGE UP
NRBC # FLD: 0.02 K/UL — HIGH
OB PNL STL: NEGATIVE — SIGNIFICANT CHANGE UP
PLATELET # BLD AUTO: 585 K/UL — HIGH (ref 150–400)
PLATELET # BLD AUTO: 624 K/UL — HIGH (ref 150–400)
RBC # BLD: 3.91 M/UL — LOW (ref 4.2–5.8)
RBC # BLD: 4.16 M/UL — LOW (ref 4.2–5.8)
RBC # FLD: 19.7 % — HIGH (ref 10.3–14.5)
RBC # FLD: 19.8 % — HIGH (ref 10.3–14.5)
WBC # BLD: 14.5 K/UL — HIGH (ref 3.8–10.5)
WBC # BLD: 15.39 K/UL — HIGH (ref 3.8–10.5)
WBC # FLD AUTO: 14.5 K/UL — HIGH (ref 3.8–10.5)
WBC # FLD AUTO: 15.39 K/UL — HIGH (ref 3.8–10.5)

## 2021-01-29 PROCEDURE — 99233 SBSQ HOSP IP/OBS HIGH 50: CPT

## 2021-01-29 PROCEDURE — 99232 SBSQ HOSP IP/OBS MODERATE 35: CPT | Mod: GC

## 2021-01-29 RX ORDER — PANTOPRAZOLE SODIUM 20 MG/1
40 TABLET, DELAYED RELEASE ORAL DAILY
Refills: 0 | Status: DISCONTINUED | OUTPATIENT
Start: 2021-01-29 | End: 2021-01-31

## 2021-01-29 RX ADMIN — Medication 500 MILLIGRAM(S): at 11:36

## 2021-01-29 RX ADMIN — Medication 325 MILLIGRAM(S): at 11:36

## 2021-01-29 RX ADMIN — Medication 650 MILLIGRAM(S): at 22:17

## 2021-01-29 RX ADMIN — PANTOPRAZOLE SODIUM 40 MILLIGRAM(S): 20 TABLET, DELAYED RELEASE ORAL at 11:36

## 2021-01-29 NOTE — PROGRESS NOTE ADULT - PROBLEM SELECTOR PLAN 2
- suspect secondary to malignancy   Pt with 2 days of fevers at home. On admission, pt initially afebrile but later had fever of 101.2F. Did not meet SIRS criteria, with leukocytosis of 11.71.  - CTA -no PE, peripheral based ground glass opacities in R  lung.   - COVID-19 PCR x2 negative.  COVID ab- positive RVP - negative; COVID neg 1/21  - Procalcitonin-1.35  - CT chest/abd/pelvis- without overt infectious source   - 1/27 BCx-NGTD  UA-neg, 1/27 UCx -NGTD  - WBC now trending down off antibiotics suspect likely rise due to recent liver biopsy and fever likely due to malignancy  - f/u ID recs

## 2021-01-29 NOTE — CHART NOTE - NSCHARTNOTEFT_GEN_A_CORE
General Surgery Note    Called by GI in regards to patient's tarry stools and any plans for operative intervention  CT abd/pelvis reviewed:    CT Abdomen and Pelvis w/ IV Cont (01.28.21 @ 16:29)  IMPRESSION:  *  Findings consistent with metastatic colon cancer evidenced by annular colonic mass and numerous hepatic metastases.  *  Groundglass pulmonary opacity consistent with infectious etiology, including Covid 19, improved compared to prior exam.    Prior to considering operative intervention, will require a EGD and/or Colonoscopy to determine source of bleeding and to evaluate entire lumen of colon (to r/o any synchronous lesions that may not be seen on CT, as any surgery at this point would be palliative)

## 2021-01-29 NOTE — PROGRESS NOTE ADULT - ASSESSMENT
56 yo man with history of vitiligo and recent finding of a mass in the distal transverse colon with hepatic and RLL metastatic disease (on CTAP on 1/5/21) presents with an episode of near syncope early Tuesday morning. Pt went to [facility] ED on 1/4/21 due to weeks of lower abdominal pain and was found to have a stool guaiac that was positive in the setting of anemia with Hgb 8.8. A CTAP that was performed during the ED visit showed an approximately 5.5 x 3.6 cm mass in the distal transverse colon, an enlarged prostate with distended bladder and mid prostate enhancement, and diffuse enhancing lesions throughout the liver and cluster of nodules at RLL consistent with metastatic disease.     Pt was scheduled to have follow up in early February and hasn't seen anyone yet for the findings. Pt went to see his PCP on Tuesday, however, after he almost passed out when he went to the bathroom to urinate in the middle of the night. Pt was feeling lightheaded/faint at the time and was about to fall to the ground when he caught himself by grabbing on to the door. Pt denies any LOC, head trauma, or fall. Pt again felt lightheaded/faint when he was at his PCP's office later in the day. No associated chest pain, palpitations, headaches, vision changes, numbness, tingling, or diaphoresis. Pt notes that over the past 2-3 weeks, he has lost ~20-25 lbs as a result of a weak appetite and loss of taste. Pt also reports that for the last 1 week, he has been experiencing dyspnea with exertion, getting winded even when walking short distances, such as from his bed to the bathroom. Over the last 2 days, pt has been having intermittent fevers. Denies any cough, current abdominal pain, N/V, diarrhea, constipation, bloody stools, or urinary symptoms. Has had dark greenish stools since starting iron tablets a week ago.    In the ED,   T 98, HR 80-93, BP /54-70, RR 16-18, O2 sats % RA.  (20 Jan 2021 01:18)      Fever:    - Tmax 101.2 (1/20), hemodynamically stable.  Satting 100% on RA.  Rule out acute bacterial infection.  Check bcx x 2  (ngtd) and UA/Ucx.   Denies urinary symptoms.    - CTA chest no PE, (+) peripheral based GGOs in rt lung, possible metastatic disease vs prior covid exposure.   Covid pcr (-) x2.  Pt currently w/o sob or hypoxemia, without symptoms of acute covid illness.       - Covid serologies are positive.  Covid pcr (-) x 2.   In the absence of acute illness, suspect this is prior disease.  OK from ID standpoint to d/c isolation precautions.     - Fever may also be caused by underlying colonic malignancy and metastatic disease.      Colonic mass with liver metastasis:    - Surgical evaluation noted - no surgery planned at this time.  GI called by primary team for colonoscopy and biopsy.    - IR biopsy of liver mets - cytopath shows metastatic carcinoma.       * Off zosyn since 1/25, pt refused last two doses.  Cultures negative.   Will monitor pt closely off abx.   He continues to have fever - possibly secondary to tumor?      Now with elevated WBC, fever, and tachycardia with elevated PCT.  Repeat bcx, UA, ucx and CTA c/a/p to r/o superimposed bacterial infection - studies negative.   Will reassess need for abx on a continuous basis.       GI reconsulted due to black stools, low h/h.  For blood transfusion, monitor H/H.    Leandra Matson  [phone]

## 2021-01-29 NOTE — PROGRESS NOTE ADULT - SUBJECTIVE AND OBJECTIVE BOX
Events of the last week noted. GI's note reviewed. Unless the rate of bleeding is such that transfusions are required very frequently, surgical intervention for this advanced/metastatic tumor isn't indicated. Will follow.

## 2021-01-29 NOTE — PROGRESS NOTE ADULT - PROBLEM SELECTOR PLAN 1
- Likely iron-deficiency anemia, from occult GIB  - Pt with positive stool guaiac on 1/4/21.  - Iron studies cw iron def Anemia plus AOCD   - Monitor H/H and transfuse to keep Hgb > 7  - C/w ferrous sulfate with vit C supplementation  - s/'p 1 uprbc 1/28 7/22--7.9/25.4  - repeat FOBT-neg   - repeated black stool unclear if from fe supplements   - repeat CBC/FOBT  - GI recs reviewed unlikely to achieve sustainable hemostasis with endoscopy currently NPO   - f/u surgery

## 2021-01-29 NOTE — PROGRESS NOTE ADULT - SUBJECTIVE AND OBJECTIVE BOX
Infectious Diseases progress note:    Subjective: NAD, afebrile.  Feels weak in general.  No new somatic complaints.  s/p cta c/a/p, no infectious focus identified.  WBC decreased today.     ROS:  CONSTITUTIONAL:  No fever, chills, rigors  CARDIOVASCULAR:  No chest pain or palpitations  RESPIRATORY:   No SOB, cough, dyspnea on exertion.  No wheezing  GASTROINTESTINAL:  No abd pain, N/V, diarrhea/constipation  EXTREMITIES:  No swelling or joint pain  GENITOURINARY:  No burning on urination, increased frequency or urgency.  No flank pain  NEUROLOGIC:  No HA, visual disturbances  SKIN: No rashes    Allergies    No Known Allergies    Intolerances        ANTIBIOTICS/RELEVANT:  antimicrobials    immunologic:    OTHER:  acetaminophen   Tablet .. 650 milliGRAM(s) Oral every 6 hours PRN  ascorbic acid 500 milliGRAM(s) Oral daily  ferrous    sulfate 325 milliGRAM(s) Oral daily  pantoprazole  Injectable 40 milliGRAM(s) IV Push daily  sodium chloride 0.9%. 1000 milliLiter(s) IV Continuous <Continuous>      Objective:  Vital Signs Last 24 Hrs  T(C): 36.9 (29 Jan 2021 18:04), Max: 38.1 (28 Jan 2021 20:52)  T(F): 98.4 (29 Jan 2021 18:04), Max: 100.6 (28 Jan 2021 20:52)  HR: 82 (29 Jan 2021 18:04) (82 - 104)  BP: 107/74 (29 Jan 2021 18:04) (107/74 - 116/86)  BP(mean): --  RR: 18 (29 Jan 2021 18:04) (16 - 18)  SpO2: 100% (29 Jan 2021 18:04) (98% - 100%)    PHYSICAL EXAM:  Constitutional:NAD  Eyes:AL, EOMI  Ear/Nose/Throat: no thrush, mucositis.  Moist mucous membranes	  Neck:no JVD, no lymphadenopathy, supple  Respiratory: CTA cheli  Cardiovascular: S1S2 RRR, no murmurs  Gastrointestinal:soft, nontender,  nondistended (+) BS  Extremities:no e/e/c  Skin:  no rashes, open wounds or ulcerations        LABS:                        8.4    14.50 )-----------( 624      ( 29 Jan 2021 11:27 )             26.9     01-28    136  |  98  |  16  ----------------------------<  75  4.3   |  22  |  0.60    Ca    9.0      28 Jan 2021 08:38    TPro  7.8  /  Alb  2.4<L>  /  TBili  1.0  /  DBili  x   /  AST  102<H>  /  ALT  42<H>  /  AlkPhos  427<H>  01-28          Procalcitonin, Serum: 1.77 (01-26 @ 07:21)  Procalcitonin, Serum: 1.35 (01-20 @ 07:53)            Rapid RVP Result: Kleer  Rapid RVP Result: Kleer          MICROBIOLOGY:      Culture - Blood (01.27.21 @ 14:19)   Specimen Source: .Blood Blood-Peripheral   Culture Results:   No growth to date.       Culture - Blood (01.27.21 @ 14:19)   Specimen Source: .Blood Blood-Peripheral   Culture Results:   No growth to date.   RADIOLOGY & ADDITIONAL STUDIES:    < from: CT Abdomen and Pelvis w/ IV Cont (01.28.21 @ 16:29) >  IMPRESSION:  *  Findings consistent with metastatic colon cancer evidenced by annular colonic mass and numerous hepatic metastases.  *  Groundglass pulmonary opacity consistent with infectious etiology, including Covid 19, improved compared to prior exam.    < end of copied text >

## 2021-01-29 NOTE — PROGRESS NOTE ADULT - PROBLEM SELECTOR PLAN 3
-Suspect colon cancer  -CTAP showing an ~5.5 x 3.6 cm mass in the distal transverse colon, enlarged prostate, diffuse enhancing lesions throughout the liver and cluster of nodules at RLL consistent with metastatic disease.   CEA -2882, PSA - WNL CA 19-9,-2882 and AFP- 2775  - 1/21: d/w ID re-isolation dc'ed as low suspicion for Active COVID, previous exposure + not using supplemental oxygen   - s/p liver biopsy metstatic adenoicarcinoma  - repeat CT done Heme/Onc to be reviewed with HemeOnc. plan for outpt chemo as per prior rec 1/27  - f/u Heme onc recs

## 2021-01-29 NOTE — PROGRESS NOTE ADULT - SUBJECTIVE AND OBJECTIVE BOX
PROGRESS NOTE:     Patient is a 55y old  Male who presents with a chief complaint of Near syncope (2021 14:21)      SUBJECTIVE / OVERNIGHT EVENTS:   - Pt febrile to 100.6 on 21   - Endorses 1 further black BM last night  - Denies abd pain, n/v/d/f/c     MEDICATIONS  (STANDING):  ascorbic acid 500 milliGRAM(s) Oral daily  ferrous    sulfate 325 milliGRAM(s) Oral daily  sodium chloride 0.9%. 1000 milliLiter(s) (125 mL/Hr) IV Continuous <Continuous>    MEDICATIONS  (PRN):  acetaminophen   Tablet .. 650 milliGRAM(s) Oral every 6 hours PRN Temp greater or equal to 38C (100.4F), Mild Pain (1 - 3), Moderate Pain (4 - 6)      CAPILLARY BLOOD GLUCOSE        I&O's Summary      PHYSICAL EXAM:  Vital Signs Last 24 Hrs  T(C): 36.8 (2021 05:34), Max: 38.1 (2021 20:52)  T(F): 98.3 (2021 05:34), Max: 100.6 (2021 20:52)  HR: 92 (2021 05:34) (92 - 104)  BP: 109/75 (2021 05:34) (109/72 - 116/86)  BP(mean): --  RR: 16 (2021 05:34) (16 - 18)  SpO2: 98% (2021 05:34) (98% - 100%)    GENERAL:  cachectic, lying in bed in no acute distress  HEENT:  NC/AT,  conjunctivae clear and pink,  no JVD  CHEST:  Full & symmetric excursion, no increased effort, breath sounds clear to auscultation bilaterally.  HEART:  Regular rhythm, S1, S2, no murmur/rub/S3/S4, no abdominal bruit  ABDOMEN:  Soft, non-distended, +  severe epigastric tenderness and mild LUQ tenderness, normoactive bowel sounds  RECTAL: scant brown stool, no evidence of blood/melena.  EXTREMITIES:  no cyanosis, clubbing, or edema  SKIN:  Scattered hypopigmented patches of skin,  No rash/erythema/ecchymoses/petechiae/wounds/abscess/warm/dry  NEURO:  Alert and oriented x3    LABS:                        7.9    15.39 )-----------( 585      ( 2021 00:17 )             25.4         136  |  98  |  16  ----------------------------<  75  4.3   |  22  |  0.60    Ca    9.0      2021 08:38    TPro  7.8  /  Alb  2.4<L>  /  TBili  1.0  /  DBili  x   /  AST  102<H>  /  ALT  42<H>  /  AlkPhos  427<H>      LIVER FUNCTIONS - ( 2021 08:38 )  Alb: 2.4 g/dL / Pro: 7.8 g/dL / ALK PHOS: 427 U/L / ALT: 42 U/L / AST: 102 U/L / GGT: x             Urinalysis Basic - ( 2021 12:42 )    Color: Radha / Appearance: Clear / S.034 / pH: x  Gluc: x / Ketone: Negative  / Bili: Negative / Urobili: 12 mg/dL   Blood: x / Protein: 30 mg/dL / Nitrite: Negative   Leuk Esterase: Negative / RBC: 3 /HPF / WBC 3 /HPF   Sq Epi: x / Non Sq Epi: 1 /HPF / Bacteria: Few                    Culture - Blood (collected 2021 14:19)  Source: .Blood Blood-Peripheral  Preliminary Report (2021 15:01):    No growth to date.    Culture - Blood (collected 2021 14:19)  Source: .Blood Blood-Peripheral  Preliminary Report (2021 15:01):    No growth to date.    Culture - Urine (collected 2021 10:35)  Source: .Urine Clean Catch (Midstream)  Final Report (2021 12:41):    No growth        RADIOLOGY & ADDITIONAL TESTS:  Results Reviewed:   Imaging Personally Reviewed:  Electrocardiogram Personally Reviewed:    COORDINATION OF CARE:  Care Discussed with Consultants/Other Providers [Y/N]:  Prior or Outpatient Records Reviewed [Y/N]:

## 2021-01-29 NOTE — PROGRESS NOTE ADULT - SUBJECTIVE AND OBJECTIVE BOX
Patient is a 55y old  Male who presents with a chief complaint of Near syncope (2021 10:03)      SUBJECTIVE / OVERNIGHT EVENTS: repeated episode of black stool overnight in setting of Iron supplement. TM-100.6. currrently NPO   ADDITIONAL REVIEW OF SYSTEMS: denies CP/SOB /N/V     MEDICATIONS  (STANDING):  ascorbic acid 500 milliGRAM(s) Oral daily  ferrous    sulfate 325 milliGRAM(s) Oral daily  pantoprazole  Injectable 40 milliGRAM(s) IV Push daily  sodium chloride 0.9%. 1000 milliLiter(s) (125 mL/Hr) IV Continuous <Continuous>    MEDICATIONS  (PRN):  acetaminophen   Tablet .. 650 milliGRAM(s) Oral every 6 hours PRN Temp greater or equal to 38C (100.4F), Mild Pain (1 - 3), Moderate Pain (4 - 6)      CAPILLARY BLOOD GLUCOSE        I&O's Summary    2021 07:01  -  2021 10:25  --------------------------------------------------------  IN: 0 mL / OUT: 150 mL / NET: -150 mL        PHYSICAL EXAM:  Vital Signs Last 24 Hrs  T(C): 36.8 (2021 05:34), Max: 38.1 (2021 20:52)  T(F): 98.3 (2021 05:34), Max: 100.6 (2021 20:52)  HR: 92 (2021 05:34) (92 - 104)  BP: 109/75 (2021 05:34) (109/72 - 116/86)  BP(mean): --  RR: 16 (2021 05:34) (16 - 18)  SpO2: 98% (2021 05:34) (98% - 100%)    CONSTITUTIONAL: middle aged man   EYES: EOMI  NECK: Supple, no palpable masses]  RESPIRATORY: Normal respiratory effort; lungs are clear to auscultation bilaterally  CARDIOVASCULAR: Regular rate and rhythm, normal S1 and S2,  ABDOMEN: +bS; mild epigastric tenderness no guarding  MUSCULOSKELETAL:  Normal gait; no clubbing or cyanosis of digits; no joint swelling or tenderness to palpation  PSYCH: A+O to person, place, and time; affect appropriate  NEUROLOGY:  no gross  deficits   SKIN: vitiligo    LABS:                        7.9    15.39 )-----------( 585      ( 2021 00:17 )             25.4         136  |  98  |  16  ----------------------------<  75  4.3   |  22  |  0.60    Ca    9.0      2021 08:38    TPro  7.8  /  Alb  2.4<L>  /  TBili  1.0  /  DBili  x   /  AST  102<H>  /  ALT  42<H>  /  AlkPhos  427<H>            Urinalysis Basic - ( 2021 12:42 )    Color: Radha / Appearance: Clear / S.034 / pH: x  Gluc: x / Ketone: Negative  / Bili: Negative / Urobili: 12 mg/dL   Blood: x / Protein: 30 mg/dL / Nitrite: Negative   Leuk Esterase: Negative / RBC: 3 /HPF / WBC 3 /HPF   Sq Epi: x / Non Sq Epi: 1 /HPF / Bacteria: Few        Culture - Blood (collected 2021 14:19)  Source: .Blood Blood-Peripheral  Preliminary Report (2021 15:01):    No growth to date.    Culture - Blood (collected 2021 14:19)  Source: .Blood Blood-Peripheral  Preliminary Report (2021 15:01):    No growth to date.    Culture - Urine (collected 2021 10:35)  Source: .Urine Clean Catch (Midstream)  Final Report (2021 12:41):    No growth        RADIOLOGY & ADDITIONAL TESTS:  Results Reviewed: c< from: CT Abdomen and Pelvis w/ IV Cont (21 @ 16:29) >    IMPRESSION:  *  Findings consistent with metastatic colon cancer evidenced by annular colonic mass and numerous hepatic metastases.  *  Groundglass pulmonary opacity consistent with infectious etiology, including Covid 19, improved compared to prior exam.            LYN FAJARDO MD; Resident Radiology  This document has been electronically signed.  RAFIQ MOULTON MD; Attending Radiologist  This document has been electronically signed. 2021  5:12PM    < end of copied text >    Imaging Personally Reviewed:  Electrocardiogram Personally Reviewed:    COORDINATION OF CARE:  Care Discussed with Consultants/Other Providers [Y/N]:  Prior or Outpatient Records Reviewed [Y/N]:

## 2021-01-29 NOTE — PROGRESS NOTE ADULT - ASSESSMENT
Patient is a 55yoM with PMH of vitiligo, with metastatic colon cancer to the liver (biopsy confirmed) and lung, with downtrending hemoglobin in the setting of reported black stools, and intermittent fevers.    IMPRESSION:   #Metastatic Colon cancer: liver biopsy with cytopathology confirmed metastatic colon carcinoma. Elevated CEA, CA 19-9 and AFP. Patient with localized pain in epigastric/LUQ consistent with colon cancer in distal transverse colon. Colon cancer may be the source of downtrending hgb from 10s to 7s over the past week, although given reported dark stools an upper GI bleed cannot be ruled out. If significant/overt bleeding is evident, will consider Hemospray. Pending CT C/A/P to be reviewed for comparison to prior CT (1/5) to assess for disease progression.    #Black Stools: Patient reports 2 episodes of black stools, although not seen by staff. Rectal exam today with scant brown stool, no evidence of blood/melena. Discussed plan with patient to keep next BM for visualization. If melena seen, hb dropping then will consider EGD to assess for potential upper GI bleed.    #Fever: Recurring fevers with associated leukocytosis - tumor fever vs. infectious in etiology. Monitoring off antibiotics and sepsis workup as per ID. Pending CT C/A/P to assess for potential infectious source.    RECOMMENDATIONS  - Trend CBC, transfuse Hb < 7   - Active T&S   - Monitor BMs (please evaluate next BM for melena) -- may be confounded by pt taking iron tabs   - Depending on surgery recommendations/planning, could consider EGD to r/o UGIB   - If pt w/ lower GI bleeding 2/2 CRC, potentially could use hemospray for temporizing measures (would ultimately need more definitive control if lower GI bleeding with surgical team)       Thank you for involving us in the care of this patient, please reach out if any further questions.     Kenny Sweeney MD  Gastroenterology Fellow, PGY4    Available on [employer] Teams  [phone] ([facility])  [id number] ([facility])  Please contact on call fellow weekdays after 5pm-7am and weekends: [phone]

## 2021-01-30 LAB
ALBUMIN SERPL ELPH-MCNC: 1.9 G/DL — LOW (ref 3.3–5)
ALP SERPL-CCNC: 487 U/L — HIGH (ref 40–120)
ALT FLD-CCNC: 44 U/L — HIGH (ref 4–41)
ANION GAP SERPL CALC-SCNC: 13 MMOL/L — SIGNIFICANT CHANGE UP (ref 7–14)
AST SERPL-CCNC: 112 U/L — HIGH (ref 4–40)
BASOPHILS # BLD AUTO: 0.03 K/UL — SIGNIFICANT CHANGE UP (ref 0–0.2)
BASOPHILS NFR BLD AUTO: 0.3 % — SIGNIFICANT CHANGE UP (ref 0–2)
BILIRUB SERPL-MCNC: 1 MG/DL — SIGNIFICANT CHANGE UP (ref 0.2–1.2)
BUN SERPL-MCNC: 18 MG/DL — SIGNIFICANT CHANGE UP (ref 7–23)
CALCIUM SERPL-MCNC: 8.7 MG/DL — SIGNIFICANT CHANGE UP (ref 8.4–10.5)
CHLORIDE SERPL-SCNC: 103 MMOL/L — SIGNIFICANT CHANGE UP (ref 98–107)
CO2 SERPL-SCNC: 21 MMOL/L — LOW (ref 22–31)
CREAT SERPL-MCNC: 0.63 MG/DL — SIGNIFICANT CHANGE UP (ref 0.5–1.3)
EOSINOPHIL # BLD AUTO: 0.1 K/UL — SIGNIFICANT CHANGE UP (ref 0–0.5)
EOSINOPHIL NFR BLD AUTO: 0.9 % — SIGNIFICANT CHANGE UP (ref 0–6)
GLUCOSE SERPL-MCNC: 82 MG/DL — SIGNIFICANT CHANGE UP (ref 70–99)
HCT VFR BLD CALC: 27 % — LOW (ref 39–50)
HGB BLD-MCNC: 8.4 G/DL — LOW (ref 13–17)
IANC: 8.54 K/UL — HIGH (ref 1.5–8.5)
IMM GRANULOCYTES NFR BLD AUTO: 1 % — SIGNIFICANT CHANGE UP (ref 0–1.5)
LYMPHOCYTES # BLD AUTO: 1.89 K/UL — SIGNIFICANT CHANGE UP (ref 1–3.3)
LYMPHOCYTES # BLD AUTO: 16.5 % — SIGNIFICANT CHANGE UP (ref 13–44)
MCHC RBC-ENTMCNC: 20.1 PG — LOW (ref 27–34)
MCHC RBC-ENTMCNC: 31.1 GM/DL — LOW (ref 32–36)
MCV RBC AUTO: 64.7 FL — LOW (ref 80–100)
MONOCYTES # BLD AUTO: 0.81 K/UL — SIGNIFICANT CHANGE UP (ref 0–0.9)
MONOCYTES NFR BLD AUTO: 7.1 % — SIGNIFICANT CHANGE UP (ref 2–14)
NEUTROPHILS # BLD AUTO: 8.54 K/UL — HIGH (ref 1.8–7.4)
NEUTROPHILS NFR BLD AUTO: 74.2 % — SIGNIFICANT CHANGE UP (ref 43–77)
NRBC # BLD: 0 /100 WBCS — SIGNIFICANT CHANGE UP
NRBC # FLD: 0.03 K/UL — HIGH
OB PNL STL: NEGATIVE — SIGNIFICANT CHANGE UP
PLATELET # BLD AUTO: 664 K/UL — HIGH (ref 150–400)
POTASSIUM SERPL-MCNC: 4.4 MMOL/L — SIGNIFICANT CHANGE UP (ref 3.5–5.3)
POTASSIUM SERPL-SCNC: 4.4 MMOL/L — SIGNIFICANT CHANGE UP (ref 3.5–5.3)
PROT SERPL-MCNC: 7.3 G/DL — SIGNIFICANT CHANGE UP (ref 6–8.3)
RBC # BLD: 4.17 M/UL — LOW (ref 4.2–5.8)
RBC # FLD: 20.8 % — HIGH (ref 10.3–14.5)
SODIUM SERPL-SCNC: 137 MMOL/L — SIGNIFICANT CHANGE UP (ref 135–145)
WBC # BLD: 11.48 K/UL — HIGH (ref 3.8–10.5)
WBC # FLD AUTO: 11.48 K/UL — HIGH (ref 3.8–10.5)

## 2021-01-30 PROCEDURE — 99233 SBSQ HOSP IP/OBS HIGH 50: CPT

## 2021-01-30 RX ADMIN — PANTOPRAZOLE SODIUM 40 MILLIGRAM(S): 20 TABLET, DELAYED RELEASE ORAL at 11:17

## 2021-01-30 RX ADMIN — Medication 325 MILLIGRAM(S): at 11:17

## 2021-01-30 RX ADMIN — Medication 500 MILLIGRAM(S): at 11:17

## 2021-01-30 NOTE — PROGRESS NOTE ADULT - PROBLEM SELECTOR PLAN 1
- Likely iron-deficiency anemia, from occult GIB  - Pt with positive stool guaiac on 1/4/21 repeat neg   - Iron studies cw iron def Anemia plus AOCD   - Monitor H/H and transfuse to keep Hgb > 7  - C/w ferrous sulfate with vit C supplementation  - s/p 1 uprbc 1/28 7/22--7.9/25.4  - repeat FOBT-neg   - repeated black stool unclear if from fe supplements   - GI recs reviewed unlikely to achieve sustainable hemostasis with endoscopy   - h/h stable   - surgery recs reviewed Unless the rate of bleeding is such that transfusions are required very frequently, surgical intervention for this advanced/metastatic tumor isn't indicated.   - monitor H/H for now

## 2021-01-30 NOTE — PROGRESS NOTE ADULT - PROBLEM SELECTOR PLAN 3
-Suspect colon cancer  -CTAP showing an ~5.5 x 3.6 cm mass in the distal transverse colon, enlarged prostate, diffuse enhancing lesions throughout the liver and cluster of nodules at RLL consistent with metastatic disease.   CEA -2882, PSA - WNL CA 19-9,-2882 and AFP- 2775  - s/p liver biopsy metstatic adenoicarcinoma  - plan for outpt chemo as per prior rec 1/27

## 2021-01-30 NOTE — PROGRESS NOTE ADULT - PROBLEM SELECTOR PLAN 7
DVT ppx: hold lovenox in setting of worsening anemia. SCD   Dispo - Home in AM if afebrile and H/H stable with outpt f/u Onc

## 2021-01-30 NOTE — PROGRESS NOTE ADULT - SUBJECTIVE AND OBJECTIVE BOX
Patient is a 55y old  Male who presents with a chief complaint of Near syncope (29 Jan 2021 21:35)      SUBJECTIVE / OVERNIGHT EVENTS: Pt in NAD no acute events ON   ADDITIONAL REVIEW OF SYSTEMS: denies N/V/D     MEDICATIONS  (STANDING):  ascorbic acid 500 milliGRAM(s) Oral daily  ferrous    sulfate 325 milliGRAM(s) Oral daily  pantoprazole  Injectable 40 milliGRAM(s) IV Push daily  sodium chloride 0.9%. 1000 milliLiter(s) (125 mL/Hr) IV Continuous <Continuous>    MEDICATIONS  (PRN):  acetaminophen   Tablet .. 650 milliGRAM(s) Oral every 6 hours PRN Temp greater or equal to 38C (100.4F), Mild Pain (1 - 3), Moderate Pain (4 - 6)      CAPILLARY BLOOD GLUCOSE        I&O's Summary    29 Jan 2021 07:01  -  30 Jan 2021 07:00  --------------------------------------------------------  IN: 0 mL / OUT: 150 mL / NET: -150 mL        PHYSICAL EXAM:  Vital Signs Last 24 Hrs  T(C): 36.9 (30 Jan 2021 11:20), Max: 37.5 (29 Jan 2021 16:26)  T(F): 98.4 (30 Jan 2021 11:20), Max: 99.5 (29 Jan 2021 16:26)  HR: 79 (30 Jan 2021 11:20) (73 - 89)  BP: 100/65 (30 Jan 2021 11:20) (100/65 - 112/62)  BP(mean): --  RR: 17 (30 Jan 2021 11:20) (17 - 18)  SpO2: 98% (30 Jan 2021 11:20) (97% - 100%)  CONSTITUTIONAL: NAD, well-developed, well-groomed  EYES: PERRLA; conjunctiva and sclera clear  ENMT: Moist oral mucosa, no pharyngeal injection or exudates; normal dentition  NECK: Supple, no palpable masses; no thyromegaly  RESPIRATORY: Normal respiratory effort; lungs are clear to auscultation bilaterally  CARDIOVASCULAR: Regular rate and rhythm, normal S1 and S2, no murmur/rub/gallop; No lower extremity edema; Peripheral pulses are 2+ bilaterally  ABDOMEN: Nontender to palpation, normoactive bowel sounds, no rebound/guarding; No hepatosplenomegaly  MUSCULOSKELETAL:  Normal gait; no clubbing or cyanosis of digits; no joint swelling or tenderness to palpation  PSYCH: A+O to person, place, and time; affect appropriate  NEUROLOGY: CN 2-12 are intact and symmetric; no gross sensory deficits   SKIN: No rashes; no palpable lesions    LABS:                        8.4    11.48 )-----------( 664      ( 30 Jan 2021 06:16 )             27.0     01-30    137  |  103  |  18  ----------------------------<  82  4.4   |  21<L>  |  0.63    Ca    8.7      30 Jan 2021 06:16    TPro  7.3  /  Alb  1.9<L>  /  TBili  1.0  /  DBili  x   /  AST  112<H>  /  ALT  44<H>  /  AlkPhos  487<H>  01-30                RADIOLOGY & ADDITIONAL TESTS:  Results Reviewed:   Imaging Personally Reviewed:  Electrocardiogram Personally Reviewed:    COORDINATION OF CARE:  Care Discussed with Consultants/Other Providers [Y/N]:  Prior or Outpatient Records Reviewed [Y/N]:

## 2021-01-31 ENCOUNTER — TRANSCRIPTION ENCOUNTER (OUTPATIENT)
Age: 56
End: 2021-01-31

## 2021-01-31 VITALS
DIASTOLIC BLOOD PRESSURE: 73 MMHG | OXYGEN SATURATION: 98 % | SYSTOLIC BLOOD PRESSURE: 108 MMHG | HEART RATE: 93 BPM | TEMPERATURE: 98 F | RESPIRATION RATE: 17 BRPM

## 2021-01-31 LAB
ALBUMIN SERPL ELPH-MCNC: 2.2 G/DL — LOW (ref 3.3–5)
ALP SERPL-CCNC: 691 U/L — HIGH (ref 40–120)
ALT FLD-CCNC: 62 U/L — HIGH (ref 4–41)
ANION GAP SERPL CALC-SCNC: 13 MMOL/L — SIGNIFICANT CHANGE UP (ref 7–14)
AST SERPL-CCNC: 152 U/L — HIGH (ref 4–40)
BASOPHILS # BLD AUTO: 0.03 K/UL — SIGNIFICANT CHANGE UP (ref 0–0.2)
BASOPHILS NFR BLD AUTO: 0.3 % — SIGNIFICANT CHANGE UP (ref 0–2)
BILIRUB SERPL-MCNC: 0.7 MG/DL — SIGNIFICANT CHANGE UP (ref 0.2–1.2)
BUN SERPL-MCNC: 13 MG/DL — SIGNIFICANT CHANGE UP (ref 7–23)
CALCIUM SERPL-MCNC: 8.5 MG/DL — SIGNIFICANT CHANGE UP (ref 8.4–10.5)
CHLORIDE SERPL-SCNC: 102 MMOL/L — SIGNIFICANT CHANGE UP (ref 98–107)
CO2 SERPL-SCNC: 22 MMOL/L — SIGNIFICANT CHANGE UP (ref 22–31)
CREAT SERPL-MCNC: 0.67 MG/DL — SIGNIFICANT CHANGE UP (ref 0.5–1.3)
EOSINOPHIL # BLD AUTO: 0.1 K/UL — SIGNIFICANT CHANGE UP (ref 0–0.5)
EOSINOPHIL NFR BLD AUTO: 1.1 % — SIGNIFICANT CHANGE UP (ref 0–6)
GLUCOSE SERPL-MCNC: 72 MG/DL — SIGNIFICANT CHANGE UP (ref 70–99)
HCT VFR BLD CALC: 25.5 % — LOW (ref 39–50)
HGB BLD-MCNC: 7.8 G/DL — LOW (ref 13–17)
IANC: 6.41 K/UL — SIGNIFICANT CHANGE UP (ref 1.5–8.5)
IMM GRANULOCYTES NFR BLD AUTO: 1.1 % — SIGNIFICANT CHANGE UP (ref 0–1.5)
LYMPHOCYTES # BLD AUTO: 1.48 K/UL — SIGNIFICANT CHANGE UP (ref 1–3.3)
LYMPHOCYTES # BLD AUTO: 17 % — SIGNIFICANT CHANGE UP (ref 13–44)
MCHC RBC-ENTMCNC: 20.2 PG — LOW (ref 27–34)
MCHC RBC-ENTMCNC: 30.6 GM/DL — LOW (ref 32–36)
MCV RBC AUTO: 65.9 FL — LOW (ref 80–100)
MONOCYTES # BLD AUTO: 0.58 K/UL — SIGNIFICANT CHANGE UP (ref 0–0.9)
MONOCYTES NFR BLD AUTO: 6.7 % — SIGNIFICANT CHANGE UP (ref 2–14)
NEUTROPHILS # BLD AUTO: 6.41 K/UL — SIGNIFICANT CHANGE UP (ref 1.8–7.4)
NEUTROPHILS NFR BLD AUTO: 73.8 % — SIGNIFICANT CHANGE UP (ref 43–77)
NRBC # BLD: 0 /100 WBCS — SIGNIFICANT CHANGE UP
NRBC # FLD: 0.03 K/UL — HIGH
PLATELET # BLD AUTO: 632 K/UL — HIGH (ref 150–400)
POTASSIUM SERPL-MCNC: 4.2 MMOL/L — SIGNIFICANT CHANGE UP (ref 3.5–5.3)
POTASSIUM SERPL-SCNC: 4.2 MMOL/L — SIGNIFICANT CHANGE UP (ref 3.5–5.3)
PROT SERPL-MCNC: 7.1 G/DL — SIGNIFICANT CHANGE UP (ref 6–8.3)
RBC # BLD: 3.87 M/UL — LOW (ref 4.2–5.8)
RBC # FLD: 21.8 % — HIGH (ref 10.3–14.5)
SODIUM SERPL-SCNC: 137 MMOL/L — SIGNIFICANT CHANGE UP (ref 135–145)
WBC # BLD: 8.7 K/UL — SIGNIFICANT CHANGE UP (ref 3.8–10.5)
WBC # FLD AUTO: 8.7 K/UL — SIGNIFICANT CHANGE UP (ref 3.8–10.5)

## 2021-01-31 PROCEDURE — 99239 HOSP IP/OBS DSCHRG MGMT >30: CPT

## 2021-01-31 RX ORDER — PANTOPRAZOLE SODIUM 20 MG/1
1 TABLET, DELAYED RELEASE ORAL
Qty: 30 | Refills: 0
Start: 2021-01-31 | End: 2021-03-01

## 2021-01-31 RX ADMIN — Medication 325 MILLIGRAM(S): at 10:19

## 2021-01-31 RX ADMIN — PANTOPRAZOLE SODIUM 40 MILLIGRAM(S): 20 TABLET, DELAYED RELEASE ORAL at 10:19

## 2021-01-31 RX ADMIN — Medication 30 MILLILITER(S): at 16:49

## 2021-01-31 RX ADMIN — Medication 500 MILLIGRAM(S): at 10:19

## 2021-01-31 RX ADMIN — Medication 650 MILLIGRAM(S): at 15:48

## 2021-01-31 NOTE — DISCHARGE NOTE NURSING/CASE MANAGEMENT/SOCIAL WORK - PATIENT PORTAL LINK FT
You can access the FollowMyHealth Patient Portal offered by [facility] by registering at the following website: [email or web address]. By joining [employer]’s FollowMyHealth portal, you will also be able to view your health information using other applications (apps) compatible with our system.

## 2021-01-31 NOTE — PROGRESS NOTE ADULT - PROBLEM SELECTOR PLAN 7
DVT ppx: hold lovenox in setting of worsening anemia. SCD   Dispo - Home with outpt f/u with PCP John Camargoiha [phone] DVT ppx:  SCD   Dispo - Home with outpt f/u with PCP Kaia Camargo [phone] office to call pt and needs outpt f/u with Onc     discharge 40 min

## 2021-01-31 NOTE — PROGRESS NOTE ADULT - PROBLEM SELECTOR PLAN 3
-CTAP showing an ~5.5 x 3.6 cm mass in the distal transverse colon, enlarged prostate, diffuse enhancing lesions throughout the liver and cluster of nodules at RLL consistent with metastatic disease.   CEA -2882, PSA - WNL CA 19-9,-2882 and AFP- 2775  - s/p liver biopsy metstatic adenoicarcinoma  - plan for outpt chemo as per prior rec 1/27

## 2021-01-31 NOTE — PROGRESS NOTE ADULT - ATTENDING COMMENTS
Followup requested regarding down trending hemoglobin/hematocrit. Known advanced unresected colon cancer with hepatic metastases. Stools dark but the patient is on iron. Of note, BEE today noted for brown stool. Dropping hemoglobin likely ongoing bleeding from colon cancer. Durable hemostasis unlikely to be achieved with endoscopic hemostatic therapy. Suggest surgery followup. Continue to monitor serial hemoglobin/hematocrit. Advise prophylactic antisecretory therapy with pantoprazole 40 mg q.d.
Experiencing epigastric and left upper quadrant abdominal pain attributed to known colon cancer in transverse colon. Denies nausea or vomiting. Having dark stools but is on iron. BEE yesterday noted for dark brown stool and not melena. PE/labs as noted-frail and chronically ill-appearing but otherwise comfortable/NAD. Abdomen-soft, nontender, nondistended without mass or hepatosplenomegaly. Patient with advanced colon cancer with hepatic metastasis. Dropping hemoglobin likely ongoing oozing from known colon cancer. Dark stools possibly attributed to iron supplement. Continue to monitor serial hemoglobin/hematocrit. Prophylactic antisecretory therapy with pantoprazole 40 mg q.d. advised. For now, defer to medical oncology and surgery regarding further intervention. Suspect limited role for colonoscopy regarding achieving durable endoscopic hemostasis.
IR deferred biopsy of liver lesions in setting of isolation precautions. Though patient remains with intermittent fevers, he is no longer on COVID isolation precautions (presumably given multiple negative COVID tests and now with positive Ab suggesting prior exposure). Would discuss again with IR re: biopsy of liver lesions to confirm metastatic colon cancer; however, if they are unable, GI can tentatively plan for colonoscopy for biopsy, can plan for Monday (as already on regular diet today) if remains off isolation and medically optimized.
Refer to the note above.
discharge 40 min

## 2021-01-31 NOTE — PROGRESS NOTE ADULT - PROBLEM SELECTOR PROBLEM 4
Near syncope
FTT (failure to thrive) in adult
Near syncope
FTT (failure to thrive) in adult
Colon cancer
Near syncope
Near syncope

## 2021-01-31 NOTE — DISCHARGE NOTE NURSING/CASE MANAGEMENT/SOCIAL WORK - NSDCFUADDAPPT_GEN_ALL_CORE_FT
Your PCP Kaia Camargo office to call you on Monday to set up appointment with you, if you do not hear from them please reach to set up an appointment [phone]

## 2021-01-31 NOTE — PROGRESS NOTE ADULT - PROBLEM SELECTOR PROBLEM 6
Abnormal LFTs

## 2021-01-31 NOTE — DISCHARGE NOTE PROVIDER - PROVIDER TOKENS
PROVIDER:[TOKEN:[[id number]],FOLLOWUP:[1 week]],PROVIDER:[TOKEN:[[id number]],FOLLOWUP:[1 week]]

## 2021-01-31 NOTE — DISCHARGE NOTE PROVIDER - NSFOLLOWUPCLINICS_GEN_ALL_ED_FT
[facility]  Hematology/Oncology  [address] [id number]  Phone: [phone]  Fax:   Follow Up Time:

## 2021-01-31 NOTE — PROGRESS NOTE ADULT - PROBLEM SELECTOR PLAN 1
- Likely iron-deficiency anemia, from occult GIB  - Pt with positive stool guaiac on 1/4/21 repeat neg x 2   - Iron studies cw iron def Anemia plus AOCD   - Monitor H/H and transfuse to keep Hgb > 7  - C/w ferrous sulfate with vit C supplementation  - s/p 1 uprbc 1/28 7/22--7.9/25.4  - repeated black stool likley from Fe supplement   - GI recs reviewed unlikely to achieve sustainable hemostasis with endoscopy   - h/h stable   - surgery recs reviewed Unless the rate of bleeding is such that transfusions are required very frequently, surgical intervention for this advanced/metastatic tumor isn't indicated.

## 2021-01-31 NOTE — DISCHARGE NOTE PROVIDER - HOSPITAL COURSE
55M h/o vitiligo and recent finding of a mass in the distal transverse colon with hepatic and RLL metastatic disease (on CTAP on 1/5/21) p/w an episode of near syncope suspect 2/2 orthostatic hypotension, and fevers. s/p liver biopsy consistent with metastatic colonic carcinoma.      Anemia.  Plan: - Likely iron-deficiency anemia, from occult GIB  - Pt with positive stool guaiac on 1/4/21 repeat neg x 2   - Iron studies cw iron def Anemia plus AOCD   - Monitor H/H and transfuse to keep Hgb > 7  - C/w ferrous sulfate with vit C supplementation  - s/p 1 uprbc 1/28 7/22--7.9/25.4  - repeated black stool likley from Fe supplement   - GI recs reviewed unlikely to achieve sustainable hemostasis with endoscopy   - h/h stable   - surgery recs reviewed Unless the rate of bleeding is such that transfusions are required very frequently, surgical intervention for this advanced/metastatic tumor isn't indicated.      Fever.  Plan: - suspect secondary to malignancy   Pt with 2 days of fevers at home. On admission, pt initially afebrile but later had fever of 101.2F. Did not meet SIRS criteria, with leukocytosis of 11.71.  - CTA -no PE, peripheral based ground glass opacities in R  lung.   - COVID-19 PCR x2 negative.  COVID ab- positive RVP - negative; COVID neg 1/21  - Procalcitonin-1.35  - CT chest/abd/pelvis- without overt infectious source   - 1/27 BCx-NGTD  UA-neg, 1/27 UCx -NGTD  - WBC now trending down off antibiotics suspect likely rise due to recent liver biopsy and fever likely due to malignancy  - CX NGTD.     Colon cancer.  Plan: -CTAP showing an ~5.5 x 3.6 cm mass in the distal transverse colon, enlarged prostate, diffuse enhancing lesions throughout the liver and cluster of nodules at RLL consistent with metastatic disease.   CEA -2882, PSA - WNL CA 19-9,-2882 and AFP- 2775  - s/p liver biopsy metstatic adenoicarcinoma  - plan for outpt chemo as per prior rec 1/27.     Near syncope.  Plan: Suspect 2/2 orthostatic hypotension  Other possibility is reflex-mediated/vasovagal  or in setting of anemia.  Orthostatics  positive with increase in HR of >30 bpm when going from sitting to standing.    - s/p IVF  - CTA chest negative for PE  - Hs-trop x1 -7  - EKG without any acute ischemic changes. Low suspicion for myocardial ischemia/ACS as etiology at this time.  - TTE w/o overt valvular HD sonali EF  - repeat Orthostatics- neg  - Monitor on tele for any concerning arrhythmias.     FTT (failure to thrive) in adult.  Plan: - Likely 2/2 metastatic disease from colon cancer. Pt with significant weight loss and poor appetite over past 2-3 weeks.  - Nutrition consult  - PT consult- home   - Fall risk protocol.      Abnormal LFTs. Plan: Likely from liver mets. No abdominal pain or tenderness.  - Monitor LFTs for now.    Need for prophylactic measure.  Plan: DVT ppx:  SCD   Dispo - Home with outpt f/u with PCP Kaia Camargo [phone] office to call pt and needs outpt f/u with Onc    55M h/o vitiligo and recent finding of a mass in the distal transverse colon with hepatic and RLL metastatic disease (on CTAP on 1/5/21) p/w an episode of near syncope suspect 2/2 orthostatic hypotension, and fevers. s/p liver biopsy consistent with metastatic colonic carcinoma.      Anemia.  Plan: - Likely iron-deficiency anemia, from occult GIB  - Pt with positive stool guaiac on 1/4/21 repeat neg x 2   - Iron studies cw iron def Anemia plus AOCD   - Monitor H/H and transfuse to keep Hgb > 7  - C/w ferrous sulfate with vit C supplementation  - s/p 1 uprbc 1/28 7/22--7.9/25.4  - repeated black stool likley from Fe supplement   - GI recs reviewed unlikely to achieve sustainable hemostasis with endoscopy   - h/h stable   - As per surgery Unless the rate of bleeding is such that transfusions are required very frequently, surgical intervention for this advanced/metastatic tumor isn't indicated.      Fever.  Plan: - suspect secondary to malignancy   Pt with 2 days of fevers at home. On admission, pt initially afebrile but later had fever of 101.2F. Did not meet SIRS criteria, with leukocytosis of 11.71.  - CTA -no PE, peripheral based ground glass opacities in R  lung.   - COVID-19 PCR x2 negative.  COVID ab- positive   RVP - negative; COVID neg 1/21  - Procalcitonin-1.35  - CT chest/abd/pelvis- without overt infectious source   - 1/27 BCx-NGTD  UA-neg, 1/27 UCx -NGTD  - WBC now trending down off antibiotics suspect likely rise due to recent liver biopsy and fever likely due to malignancy    Colon cancer.  Plan: -CTAP showing an ~5.5 x 3.6 cm mass in the distal transverse colon, enlarged prostate, diffuse enhancing lesions throughout the liver and cluster of nodules at RLL consistent with metastatic disease.   CEA -2882, PSA - WNL CA 19-9,-2882 and AFP- 2775  - s/p liver biopsy metstatic adenoicarcinoma  - plan for outpt chemo     Near syncope.  Plan: Suspect 2/2 orthostatic hypotension  Other possibility is reflex-mediated/vasovagal  or in setting of anemia.  Orthostatics  positive with increase in HR of >30 bpm when going from sitting to standing.    - s/p IVF  - CTA chest negative for PE  - Hs-trop x1 -7  - EKG without any acute ischemic changes. Low suspicion for myocardial ischemia/ACS as etiology at this time.  - TTE w/o overt valvular HD sonali EF  - repeat Orthostatics- neg  - Monitored on tele     FTT (failure to thrive) in adult.  Plan: - Likely 2/2 metastatic disease from colon cancer. Pt with significant weight loss and poor appetite over past 2-3 weeks.  - Nutrition consult rec regular diet   - PT consult- home   - Fall risk protocol.      Abnormal LFTs. Plan: Likely from liver mets. No abdominal pain or tenderness.  - Monitor LFTs for now.    Dispo - Home with outpt f/u with PCP Kaia Camargo [phone] office to call pt and needs outpt f/u with Onc   Patient stable and cleared for discharge discussed with Dr. Corley 1/31/21.

## 2021-01-31 NOTE — PROGRESS NOTE ADULT - PROVIDER SPECIALTY LIST ADULT
Surgery
Infectious Disease
Intervent Radiology
Gastroenterology
Gastroenterology
Infectious Disease
Gastroenterology
Infectious Disease
Hospitalist

## 2021-01-31 NOTE — PROGRESS NOTE ADULT - PROBLEM SELECTOR PLAN 2
- suspect secondary to malignancy   Pt with 2 days of fevers at home. On admission, pt initially afebrile but later had fever of 101.2F. Did not meet SIRS criteria, with leukocytosis of 11.71.  - CTA -no PE, peripheral based ground glass opacities in R  lung.   - COVID-19 PCR x2 negative.  COVID ab- positive RVP - negative; COVID neg 1/21  - Procalcitonin-1.35  - CT chest/abd/pelvis- without overt infectious source   - 1/27 BCx-NGTD  UA-neg, 1/27 UCx -NGTD  - WBC now trending down off antibiotics suspect likely rise due to recent liver biopsy and fever likely due to malignancy  - CX NGTD

## 2021-01-31 NOTE — DISCHARGE NOTE PROVIDER - DETAILS OF MALNUTRITION DIAGNOSIS/DIAGNOSES
This patient has been assessed with a concern for Malnutrition and was treated during this hospitalization for the following Nutrition diagnosis/diagnoses:     -  01/21/2021: Severe protein-calorie malnutrition   -  01/21/2021: Underweight (BMI < 19)   This patient has been assessed with a concern for Malnutrition and was treated during this hospitalization for the following Nutrition diagnosis/diagnoses:     -  01/21/2021: Severe protein-calorie malnutrition   -  01/21/2021: Underweight (BMI < 19)    This patient has been assessed with a concern for Malnutrition and was treated during this hospitalization for the following Nutrition diagnosis/diagnoses:     -  01/21/2021: Severe protein-calorie malnutrition   -  01/21/2021: Underweight (BMI < 19)   This patient has been assessed with a concern for Malnutrition and was treated during this hospitalization for the following Nutrition diagnosis/diagnoses:     -  01/21/2021: Severe protein-calorie malnutrition   -  01/21/2021: Underweight (BMI < 19)    This patient has been assessed with a concern for Malnutrition and was treated during this hospitalization for the following Nutrition diagnosis/diagnoses:     -  01/21/2021: Severe protein-calorie malnutrition   -  01/21/2021: Underweight (BMI < 19)    This patient has been assessed with a concern for Malnutrition and was treated during this hospitalization for the following Nutrition diagnosis/diagnoses:     -  01/21/2021: Severe protein-calorie malnutrition   -  01/21/2021: Underweight (BMI < 19)

## 2021-01-31 NOTE — PROGRESS NOTE ADULT - PROBLEM SELECTOR PROBLEM 5
FTT (failure to thrive) in adult
Anemia
FTT (failure to thrive) in adult
FTT (failure to thrive) in adult
Anemia
FTT (failure to thrive) in adult

## 2021-01-31 NOTE — PROGRESS NOTE ADULT - NUTRITIONAL ASSESSMENT
This patient has been assessed with a concern for Malnutrition and has been determined to have a diagnosis/diagnoses of Severe protein-calorie malnutrition and Underweight (BMI < 19).    This patient is being managed with:   Diet Regular-  Entered: Jan 29 2021  8:34AM    
This patient has been assessed with a concern for Malnutrition and has been determined to have a diagnosis/diagnoses of Severe protein-calorie malnutrition and Underweight (BMI < 19).    This patient is being managed with:   Diet Regular-  Entered: Jan 20 2021  4:24PM    
This patient has been assessed with a concern for Malnutrition and has been determined to have a diagnosis/diagnoses of Severe protein-calorie malnutrition and Underweight (BMI < 19).    This patient is being managed with:   Diet Regular-  Entered: Jan 29 2021  8:34AM    
This patient has been assessed with a concern for Malnutrition and has been determined to have a diagnosis/diagnoses of Severe protein-calorie malnutrition and Underweight (BMI < 19).    This patient is being managed with:   Diet Regular-  Entered: Jan 29 2021  8:34AM    
This patient has been assessed with a concern for Malnutrition and has been determined to have a diagnosis/diagnoses of Severe protein-calorie malnutrition and Underweight (BMI < 19).    This patient is being managed with:   Diet NPO after Midnight-     NPO Start Date: 24-Jan-2021   NPO Start Time: 23:59  Except Medications  Entered: Jan 24 2021  7:45AM    Diet Regular-  Entered: Jan 20 2021  4:24PM    
This patient has been assessed with a concern for Malnutrition and has been determined to have a diagnosis/diagnoses of Severe protein-calorie malnutrition and Underweight (BMI < 19).    This patient is being managed with:   Diet NPO-  Except Medications  Entered: Jan 29 2021  8:33AM    
This patient has been assessed with a concern for Malnutrition and has been determined to have a diagnosis/diagnoses of Severe protein-calorie malnutrition and Underweight (BMI < 19).    This patient is being managed with:   Diet Regular-  Entered: Jan 20 2021  4:24PM    
This patient has been assessed with a concern for Malnutrition and has been determined to have a diagnosis/diagnoses of Severe protein-calorie malnutrition and Underweight (BMI < 19).    This patient is being managed with:   Diet NPO after Midnight-     NPO Start Date: 24-Jan-2021   NPO Start Time: 23:59  Except Medications  Entered: Jan 24 2021  7:45AM    Diet Regular-  Entered: Jan 20 2021  4:24PM    
This patient has been assessed with a concern for Malnutrition and has been determined to have a diagnosis/diagnoses of Severe protein-calorie malnutrition and Underweight (BMI < 19).    This patient is being managed with:   Diet Regular-  Entered: Jan 20 2021  4:24PM

## 2021-01-31 NOTE — PROGRESS NOTE ADULT - PROBLEM SELECTOR PLAN 5
- Likely 2/2 metastatic disease from colon cancer. Pt with significant weight loss and poor appetite over past 2-3 weeks.  - Nutrition consult  - PT consult- home   - Fall risk protocol
- Likely iron-deficiency anemia, from occult GIB  other possibility is AOCD and from folate/vit B12 deficiency given recent poor PO intake.   Pt with positive stool guaiac on 1/4/21.  No S/S of active bleed at this time.   Iron studies cw iron def Anemia plus AOCD   - Monitor H/H and transfuse to keep Hgb > 7  - C/w ferrous sulfate with vit C supplementation
Likely iron-deficiency anemia, from occult GIB  other possibility is AOCD and from folate/vit B12 deficiency given recent poor PO intake.   Pt with positive stool guaiac on 1/4/21.  No S/S of active bleed at this time.   Iron studies cw iron def Anemia plus AOCD   - Monitor H/H and transfuse to keep Hgb > 7  - C/w ferrous sulfate with vit C supplementation
- Likely 2/2 metastatic disease from colon cancer. Pt with significant weight loss and poor appetite over past 2-3 weeks.  - Nutrition consult  - PT consult- home   - Fall risk protocol
Likely iron-deficiency anemia, from occult GIB  other possibility is AOCD and from folate/vit B12 deficiency given recent poor PO intake.   Pt with positive stool guaiac on 1/4/21.  No S/S of active bleed at this time.   Iron studies cw iron def Anemia plus AOCD   - Monitor H/H and transfuse to keep Hgb > 7  - C/w ferrous sulfate with vit C supplementation
- Likely 2/2 metastatic disease from colon cancer. Pt with significant weight loss and poor appetite over past 2-3 weeks.  - Nutrition consult  - PT consult- home   - Fall risk protocol
Likely iron-deficiency anemia, possibly also 2/2 AOCD and from folate/vit B12 deficiency given recent poor PO intake.   Hgb 10.2 on admission vs. 8.8 on 1/4/21. No S/S of active bleed at this time. Pt with positive stool guaiac on 1/4/21.  - Check FOBT  - Check iron studies, folate, vit B12, retic count. Lower suspicion for hemolysis at this time, but will check haptoglobin and LDH.  - Monitor H/H and transfuse to keep Hgb > 7  - C/w ferrous sulfate with vit C supplementation
- Likely 2/2 metastatic disease from colon cancer. Pt with significant weight loss and poor appetite over past 2-3 weeks.  - Nutrition consult  - PT consult- home   - Fall risk protocol
- Likely iron-deficiency anemia, from occult GIB  other possibility is AOCD and from folate/vit B12 deficiency given recent poor PO intake.   Pt with positive stool guaiac on 1/4/21.  No S/S of active bleed at this time.   Iron studies cw iron def Anemia plus AOCD   - Monitor H/H and transfuse to keep Hgb > 7  - C/w ferrous sulfate with vit C supplementation
- Likely iron-deficiency anemia, from occult GIB  other possibility is AOCD and from folate/vit B12 deficiency given recent poor PO intake.   Pt with positive stool guaiac on 1/4/21.  No S/S of active bleed at this time.   Iron studies cw iron def Anemia plus AOCD   - Monitor H/H and transfuse to keep Hgb > 7  - C/w ferrous sulfate with vit C supplementation
Likely iron-deficiency anemia, from occult GIB  other possibility is AOCD and from folate/vit B12 deficiency given recent poor PO intake.   Pt with positive stool guaiac on 1/4/21.  No S/S of active bleed at this time.   Iron studies cw iron def Anemia plus AOCD   - Monitor H/H and transfuse to keep Hgb > 7  - C/w ferrous sulfate with vit C supplementation
Likely iron-deficiency anemia, from occult GIB  other possibility is AOCD and from folate/vit B12 deficiency given recent poor PO intake.   Pt with positive stool guaiac on 1/4/21.  No S/S of active bleed at this time.   Iron studies cw iron def Anemia plus AOCD   - Monitor H/H and transfuse to keep Hgb > 7  - C/w ferrous sulfate with vit C supplementation

## 2021-01-31 NOTE — PROGRESS NOTE ADULT - SUBJECTIVE AND OBJECTIVE BOX
Patient is a 55y old  Male who presents with a chief complaint of Near syncope (30 Jan 2021 14:26)      SUBJECTIVE / OVERNIGHT EVENTS: repeat OB neg   ADDITIONAL REVIEW OF SYSTEMS:    MEDICATIONS  (STANDING):  ascorbic acid 500 milliGRAM(s) Oral daily  ferrous    sulfate 325 milliGRAM(s) Oral daily  pantoprazole  Injectable 40 milliGRAM(s) IV Push daily  sodium chloride 0.9%. 1000 milliLiter(s) (125 mL/Hr) IV Continuous <Continuous>    MEDICATIONS  (PRN):  acetaminophen   Tablet .. 650 milliGRAM(s) Oral every 6 hours PRN Temp greater or equal to 38C (100.4F), Mild Pain (1 - 3), Moderate Pain (4 - 6)      CAPILLARY BLOOD GLUCOSE        I&O's Summary      PHYSICAL EXAM:  Vital Signs Last 24 Hrs  T(C): 36.4 (31 Jan 2021 05:38), Max: 36.9 (30 Jan 2021 11:20)  T(F): 97.6 (31 Jan 2021 05:38), Max: 98.4 (30 Jan 2021 11:20)  HR: 69 (31 Jan 2021 05:38) (69 - 92)  BP: 106/69 (31 Jan 2021 05:38) (100/65 - 117/72)  BP(mean): --  RR: 17 (31 Jan 2021 05:38) (17 - 17)  SpO2: 99% (31 Jan 2021 05:38) (98% - 100%)    CONSTITUTIONAL: middle aged man   EYES: EOMI  NECK: Supple, no palpable masses]  RESPIRATORY: Normal respiratory effort; lungs are clear to auscultation bilaterally  CARDIOVASCULAR: Regular rate and rhythm, normal S1 and S2,  ABDOMEN: +bS; soft +NT  MUSCULOSKELETAL:  Normal gait; no clubbing or cyanosis of digits; no joint swelling or tenderness to palpation  PSYCH: A+O to person, place, and time; affect appropriate  NEUROLOGY:  no gross  deficits   SKIN: vitiligo  LABS:                        7.8    8.70  )-----------( 632      ( 31 Jan 2021 07:45 )             25.5     01-31    137  |  102  |  13  ----------------------------<  72  4.2   |  22  |  0.67    Ca    8.5      31 Jan 2021 07:45    TPro  7.1  /  Alb  2.2<L>  /  TBili  0.7  /  DBili  x   /  AST  152<H>  /  ALT  62<H>  /  AlkPhos  691<H>  01-31                RADIOLOGY & ADDITIONAL TESTS:  Results Reviewed:   Imaging Personally Reviewed:  Electrocardiogram Personally Reviewed:    COORDINATION OF CARE:  Care Discussed with Consultants/Other Providers [Y/N]:  Prior or Outpatient Records Reviewed [Y/N]:   Patient is a 55y old  Male who presents with a chief complaint of Near syncope (30 Jan 2021 14:26)      SUBJECTIVE / OVERNIGHT EVENTS: repeat OB neg H/H stable ON   ADDITIONAL REVIEW OF SYSTEMS: denies N/V BRBPR    MEDICATIONS  (STANDING):  ascorbic acid 500 milliGRAM(s) Oral daily  ferrous    sulfate 325 milliGRAM(s) Oral daily  pantoprazole  Injectable 40 milliGRAM(s) IV Push daily  sodium chloride 0.9%. 1000 milliLiter(s) (125 mL/Hr) IV Continuous <Continuous>    MEDICATIONS  (PRN):  acetaminophen   Tablet .. 650 milliGRAM(s) Oral every 6 hours PRN Temp greater or equal to 38C (100.4F), Mild Pain (1 - 3), Moderate Pain (4 - 6)      CAPILLARY BLOOD GLUCOSE        I&O's Summary      PHYSICAL EXAM:  Vital Signs Last 24 Hrs  T(C): 36.4 (31 Jan 2021 05:38), Max: 36.9 (30 Jan 2021 11:20)  T(F): 97.6 (31 Jan 2021 05:38), Max: 98.4 (30 Jan 2021 11:20)  HR: 69 (31 Jan 2021 05:38) (69 - 92)  BP: 106/69 (31 Jan 2021 05:38) (100/65 - 117/72)  BP(mean): --  RR: 17 (31 Jan 2021 05:38) (17 - 17)  SpO2: 99% (31 Jan 2021 05:38) (98% - 100%)    CONSTITUTIONAL: middle aged man   EYES: EOMI  NECK: Supple, no palpable masses]  RESPIRATORY: Normal respiratory effort; lungs are clear to auscultation bilaterally  CARDIOVASCULAR: Regular rate and rhythm, normal S1 and S2,  ABDOMEN: +bS; soft +NT  MUSCULOSKELETAL:  Normal gait; no clubbing or cyanosis of digits; no joint swelling or tenderness to palpation  PSYCH: A+O to person, place, and time; affect appropriate  NEUROLOGY:  no gross  deficits   SKIN: vitiligo  LABS:                        7.8    8.70  )-----------( 632      ( 31 Jan 2021 07:45 )             25.5     01-31    137  |  102  |  13  ----------------------------<  72  4.2   |  22  |  0.67    Ca    8.5      31 Jan 2021 07:45    TPro  7.1  /  Alb  2.2<L>  /  TBili  0.7  /  DBili  x   /  AST  152<H>  /  ALT  62<H>  /  AlkPhos  691<H>  01-31                RADIOLOGY & ADDITIONAL TESTS:  Results Reviewed:   Imaging Personally Reviewed:  Electrocardiogram Personally Reviewed:    COORDINATION OF CARE:  Care Discussed with Consultants/Other Providers [Y/N]:  Prior or Outpatient Records Reviewed [Y/N]:

## 2021-01-31 NOTE — DISCHARGE NOTE PROVIDER - NSDCMRMEDTOKEN_GEN_ALL_CORE_FT
Centrum oral tablet: 1 tab(s) orally once a day  ferrous sulfate 324 mg (65 mg elemental iron) oral tablet: 1 tab(s) orally once a day  Vitamin C 500 mg oral tablet: 1 tab(s) orally once a day   Centrum oral tablet: 1 tab(s) orally once a day  ferrous sulfate 324 mg (65 mg elemental iron) oral tablet: 1 tab(s) orally once a day  pantoprazole 40 mg oral delayed release tablet: 1 tab(s) orally once a day  Vitamin C 500 mg oral tablet: 1 tab(s) orally once a day

## 2021-01-31 NOTE — DISCHARGE NOTE PROVIDER - CARE PROVIDER_API CALL
Kaia Whitehead)  Internal Medicine  [address] [place] [address]  Phone: [phone]  Fax: [phone]  Follow Up Time: 1 week    Keira Tran)  Internal Medicine; Medical Oncology  [address] [place] [address]  Phone: [phone]  Fax: [phone]  Follow Up Time: 1 week

## 2021-01-31 NOTE — DISCHARGE NOTE PROVIDER - NSDCFUADDAPPT_GEN_ALL_CORE_FT
Your PCP Kaia Camargo office to call you on Monday to set up appointment with you, if you do not hear from them please reach to set up an appointment [phone]

## 2021-01-31 NOTE — DISCHARGE NOTE PROVIDER - NSDCCPCAREPLAN_GEN_ALL_CORE_FT
PRINCIPAL DISCHARGE DIAGNOSIS  Diagnosis: Near syncope  Assessment and Plan of Treatment: You were found to have orthostatic hypotension and you were treated with intravenous fluids. An echo was performed which showed normla heart function. You were also ruled out for a pulmonary embolus.  Continue to maintain yourself well hydrated.      SECONDARY DISCHARGE DIAGNOSES  Diagnosis: Metastatic colon cancer to liver  Assessment and Plan of Treatment: Please follow up with the [facility] for further evaluation and management    Diagnosis: GIB (gastrointestinal bleeding)  Assessment and Plan of Treatment: continue to take your protonix daily, if you have any further bleeding per rectum you will need to return to ER for fruthet management.    Diagnosis: Anemia  Assessment and Plan of Treatment: You were found to have anemia and recieved 2 units of blood, continue to take your iron tablets and have your blood work checked within 1 week with PCP to monitor levels.    Diagnosis: Fever  Assessment and Plan of Treatment: Your recurrent fevers were likley secondary to tumor, your cultures, RVP, UA, COVID PCR were negative, no need for antibiotics at this time.

## 2021-02-01 LAB
CULTURE RESULTS: SIGNIFICANT CHANGE UP
CULTURE RESULTS: SIGNIFICANT CHANGE UP
SPECIMEN SOURCE: SIGNIFICANT CHANGE UP
SPECIMEN SOURCE: SIGNIFICANT CHANGE UP

## 2021-02-02 PROBLEM — L80 VITILIGO: Chronic | Status: ACTIVE | Noted: 2021-01-20

## 2021-02-03 ENCOUNTER — NON-APPOINTMENT (OUTPATIENT)
Age: 56
End: 2021-02-03

## 2021-02-03 PROBLEM — Z00.00 ENCOUNTER FOR PREVENTIVE HEALTH EXAMINATION: Status: ACTIVE | Noted: 2021-02-03

## 2021-02-05 ENCOUNTER — OUTPATIENT (OUTPATIENT)
Dept: OUTPATIENT SERVICES | Facility: HOSPITAL | Age: 56
LOS: 1 days | Discharge: HOME | End: 2021-02-05

## 2021-02-05 ENCOUNTER — RESULT REVIEW (OUTPATIENT)
Age: 56
End: 2021-02-05

## 2021-02-05 ENCOUNTER — APPOINTMENT (OUTPATIENT)
Dept: HEMATOLOGY ONCOLOGY | Facility: CLINIC | Age: 56
End: 2021-02-05
Payer: COMMERCIAL

## 2021-02-05 ENCOUNTER — OUTPATIENT (OUTPATIENT)
Dept: OUTPATIENT SERVICES | Facility: HOSPITAL | Age: 56
LOS: 1 days | End: 2021-02-05
Payer: COMMERCIAL

## 2021-02-05 VITALS
OXYGEN SATURATION: 98 % | BODY MASS INDEX: 17.99 KG/M2 | WEIGHT: 114.64 LBS | SYSTOLIC BLOOD PRESSURE: 109 MMHG | DIASTOLIC BLOOD PRESSURE: 75 MMHG | TEMPERATURE: 98 F | RESPIRATION RATE: 14 BRPM | HEIGHT: 66.93 IN | HEART RATE: 120 BPM

## 2021-02-05 DIAGNOSIS — C18.9 MALIGNANT NEOPLASM OF COLON, UNSPECIFIED: ICD-10-CM

## 2021-02-05 LAB
BASOPHILS # BLD AUTO: 0.08 K/UL — SIGNIFICANT CHANGE UP (ref 0–0.2)
BASOPHILS NFR BLD AUTO: 0.6 % — SIGNIFICANT CHANGE UP (ref 0–2)
EOSINOPHIL # BLD AUTO: 0.09 K/UL — SIGNIFICANT CHANGE UP (ref 0–0.5)
EOSINOPHIL NFR BLD AUTO: 0.7 % — SIGNIFICANT CHANGE UP (ref 0–6)
HCT VFR BLD CALC: 29.4 % — LOW (ref 39–50)
HGB BLD-MCNC: 9 G/DL — LOW (ref 13–17)
IMM GRANULOCYTES NFR BLD AUTO: 1.1 % — SIGNIFICANT CHANGE UP (ref 0–1.5)
LYMPHOCYTES # BLD AUTO: 1.62 K/UL — SIGNIFICANT CHANGE UP (ref 1–3.3)
LYMPHOCYTES # BLD AUTO: 12.4 % — LOW (ref 13–44)
MCHC RBC-ENTMCNC: 21.4 PG — LOW (ref 27–34)
MCHC RBC-ENTMCNC: 30.6 G/DL — LOW (ref 32–36)
MCV RBC AUTO: 69.8 FL — LOW (ref 80–100)
MONOCYTES # BLD AUTO: 1.11 K/UL — HIGH (ref 0–0.9)
MONOCYTES NFR BLD AUTO: 8.5 % — SIGNIFICANT CHANGE UP (ref 2–14)
NEUTROPHILS # BLD AUTO: 10.04 K/UL — HIGH (ref 1.8–7.4)
NEUTROPHILS NFR BLD AUTO: 76.7 % — SIGNIFICANT CHANGE UP (ref 43–77)
NRBC # BLD: 0 /100 WBCS — SIGNIFICANT CHANGE UP (ref 0–0)
PLATELET # BLD AUTO: 730 K/UL — HIGH (ref 150–400)
RBC # BLD: 4.21 M/UL — SIGNIFICANT CHANGE UP (ref 4.2–5.8)
RBC # FLD: 24.6 % — HIGH (ref 10.3–14.5)
RETICS #: 67.8 K/UL — SIGNIFICANT CHANGE UP (ref 25–125)
RETICS/RBC NFR: 1.6 % — SIGNIFICANT CHANGE UP (ref 0.5–2.5)
WBC # BLD: 13.09 K/UL — HIGH (ref 3.8–10.5)
WBC # FLD AUTO: 13.09 K/UL — HIGH (ref 3.8–10.5)

## 2021-02-05 PROCEDURE — 99072 ADDL SUPL MATRL&STAF TM PHE: CPT

## 2021-02-05 PROCEDURE — 86850 RBC ANTIBODY SCREEN: CPT

## 2021-02-05 PROCEDURE — 99214 OFFICE O/P EST MOD 30 MIN: CPT | Mod: 25

## 2021-02-05 PROCEDURE — 86901 BLOOD TYPING SEROLOGIC RH(D): CPT

## 2021-02-05 PROCEDURE — 86900 BLOOD TYPING SEROLOGIC ABO: CPT

## 2021-02-06 NOTE — REVIEW OF SYSTEMS
[Chills] : chills [Night Sweats] : night sweats [Fatigue] : fatigue [Recent Change In Weight] : ~T recent weight change [SOB on Exertion] : shortness of breath during exertion [Constipation] : constipation [Negative] : Allergic/Immunologic [Fever] : no fever [FreeTextEntry4] : tinnitus x years  [FreeTextEntry7] : intermittent abdominal discomfort, mostly during night, L sided  [FreeTextEntry8] : urinary frequency

## 2021-02-06 NOTE — PHYSICAL EXAM
[Restricted in physically strenuous activity but ambulatory and able to carry out work of a light or sedentary nature] : Status 1- Restricted in physically strenuous activity but ambulatory and able to carry out work of a light or sedentary nature, e.g., light house work, office work [Normal] : affect appropriate [de-identified] : appears disheveled and older than stated age [de-identified] : vitiligo

## 2021-02-06 NOTE — RESULTS/DATA
[FreeTextEntry1] :  EXAM:  CT CHEST IC    EXAM:  CT ABDOMEN AND PELVIS IC     PROCEDURE DATE:  Jan 28 2021     INTERPRETATION:  CLINICAL INFORMATION: Fever and leukocytosis in a patient with metastatic colon cancer.  COMPARISON: CT chest 1/19/2021  PROCEDURE: CT of the Chest, Abdomen and Pelvis was performed with intravenous contrast. Intravenous contrast: 90 ml Omnipaque 350. 10 ml discarded. Oral contrast: None. Sagittal and coronal reformats were performed.  FINDINGS: CHEST: LUNGS AND LARGE AIRWAYS: Patent central airways. Diffuse right peripherally based groundglass opacities, improved from prior exam. Right lower lobe subsegmental atelectasis. Unchanged 5 mm nodule in the left upper lobe, likely representing an intrapulmonary lymph node. PLEURA: Trace right pleural effusion. VESSELS: Within normal limits. HEART: Heart size is normal. No pericardial effusion. MEDIASTINUM AND ALMAZ: No lymphadenopathy. CHEST WALL AND LOWER NECK: Within normal limits.  ABDOMEN AND PELVIS: LIVER: The liver is enlarged. Numerous low attenuating lesions with subtle rim enhancement diffusely throughout the liver, likely representing metastatic disease from known colon cancer. BILE DUCTS: Normal caliber. GALLBLADDER: Pericholecystic edema. SPLEEN: Within normal limits. PANCREAS: Within normal limits. ADRENALS: Within normal limits. KIDNEYS/URETERS: No renal stones or hydronephrosis. Bilateral renal cysts, largest right measuring up to 1.4 cm in the upper pole of the right kidney. Additional subcentimeter hypoattenuating lesions, too small to characterize.  BLADDER: Thickened bladder wall, likely chronic changes of bladder outlet obstruction. REPRODUCTIVE ORGANS: Prostate is enlarged and indents into the posterior wall of the bladder.  BOWEL: Distal transverse colonic mass in keeping with the clinical history of colon cancer. No bowel obstruction. Appendix is normal. PERITONEUM: Small amount of abdominal and pelvic ascites. VESSELS: Within normal limits. RETROPERITONEUM/LYMPH NODES: No lymphadenopathy. ABDOMINAL WALL: Within normal limits. BONES: Within normal limits.  IMPRESSION: *  Findings consistent with metastatic colon cancer evidenced by annular colonic mass and numerous hepatic metastases. *  Groundglass pulmonary opacity consistent with infectious etiology, including Covid 19, improved compared to prior exam.      LYN FAJARDO MD; Resident Radiology This document has been electronically signed. RAFIQ MOULTON MD; Attending Radiologist This document has been electronically signed. Jan 28 2021  5:12PM

## 2021-02-06 NOTE — HISTORY OF PRESENT ILLNESS
[AJCC Stage: ____] : AJCC Stage: [unfilled] [Disease: _____________________] : Disease: [unfilled] [de-identified] : 56 y/o M with PMH of vitiligo who developed abdominal discomfort which started 12/2020 and he was admitted on 1/4/[facility] @ [facility] where he was found to have mass in distal TC with hepatic and RLL metastatic disease seen in CT scan.  He was discharged home with outpatient follow up.  He had syncope at home on 1/18/2021 and then saw PMD on 1/19/2021 where he was sent to hospital and admitted to [facility] from 1/19/2021 to 1/19/2021.   He underwent liver biopsy on 1/25/2021 which confirmed metastatic colon cancer.  His CEA was elevated 2882 from 1/21/2021.  His CT c/a/p from 1/28/2021 showed findings consistent with metastatic colon cancer evidenced by annular colonic mass and numerous hepatic metastases, groundglass pulmonary opacity consistent with infectious etiology, including Covid 19, improved compared to prior exam.  \par \par FHx:  unsure \par Social:  no a/c, no smoking, no illicit drugs, lives with wife and 3 kids \par PMD:  Dr Whitehead \par \par He presents to us on 2/5/2021 and states he feels stronger since leaving hospital but has lost about 22 pounds from December to present  [FreeTextEntry1] : Newly diagnosed  [de-identified] : \par \par

## 2021-02-08 LAB
25(OH)D3 SERPL-MCNC: 60.1 NG/ML
ALBUMIN SERPL ELPH-MCNC: 3.1 G/DL
ALP BLD-CCNC: 730 U/L
ALT SERPL-CCNC: 59 U/L
ANION GAP SERPL CALC-SCNC: 17 MMOL/L
APTT BLD: 38.5 SEC
AST SERPL-CCNC: 108 U/L
BILIRUB SERPL-MCNC: 1 MG/DL
BUN SERPL-MCNC: 13 MG/DL
CALCIUM SERPL-MCNC: 8.9 MG/DL
CEA SERPL-MCNC: 2831 NG/ML
CHLORIDE SERPL-SCNC: 98 MMOL/L
CO2 SERPL-SCNC: 18 MMOL/L
CREAT SERPL-MCNC: 0.69 MG/DL
FERRITIN SERPL-MCNC: 458 NG/ML
FOLATE SERPL-MCNC: 10.4 NG/ML
GLUCOSE SERPL-MCNC: 145 MG/DL
HBV SURFACE AB SER QL: NONREACTIVE
HBV SURFACE AG SER QL: NONREACTIVE
HCV AB SER QL: NONREACTIVE
HCV S/CO RATIO: 0.19 S/CO
INR PPP: 1.42 RATIO
IRON SATN MFR SERPL: 7 %
IRON SERPL-MCNC: 15 UG/DL
POTASSIUM SERPL-SCNC: 4.4 MMOL/L
PROT SERPL-MCNC: 7.7 G/DL
PT BLD: 16.5 SEC
SODIUM SERPL-SCNC: 134 MMOL/L
TIBC SERPL-MCNC: 207 UG/DL
UIBC SERPL-MCNC: 192 UG/DL
VIT B12 SERPL-MCNC: 1451 PG/ML

## 2021-02-09 ENCOUNTER — APPOINTMENT (OUTPATIENT)
Dept: HEMATOLOGY ONCOLOGY | Facility: CLINIC | Age: 56
End: 2021-02-09
Payer: COMMERCIAL

## 2021-02-09 PROCEDURE — 99442: CPT

## 2021-02-10 LAB — SARS-COV-2 N GENE NPH QL NAA+PROBE: NOT DETECTED

## 2021-02-11 ENCOUNTER — OUTPATIENT (OUTPATIENT)
Dept: OUTPATIENT SERVICES | Facility: HOSPITAL | Age: 56
LOS: 1 days | End: 2021-02-11
Payer: COMMERCIAL

## 2021-02-11 ENCOUNTER — RESULT REVIEW (OUTPATIENT)
Age: 56
End: 2021-02-11

## 2021-02-11 DIAGNOSIS — C18.9 MALIGNANT NEOPLASM OF COLON, UNSPECIFIED: ICD-10-CM

## 2021-02-11 LAB
HGB A MFR BLD: 93.6 %
HGB A2 MFR BLD: 5.3 %
HGB F MFR BLD: 1.1 %
HGB FRACT BLD-IMP: NORMAL

## 2021-02-11 PROCEDURE — 77001 FLUOROGUIDE FOR VEIN DEVICE: CPT | Mod: 26,GC

## 2021-02-11 PROCEDURE — 36561 INSERT TUNNELED CV CATH: CPT

## 2021-02-11 PROCEDURE — 76937 US GUIDE VASCULAR ACCESS: CPT | Mod: 26

## 2021-02-16 ENCOUNTER — LABORATORY RESULT (OUTPATIENT)
Age: 56
End: 2021-02-16

## 2021-02-16 ENCOUNTER — RESULT REVIEW (OUTPATIENT)
Age: 56
End: 2021-02-16

## 2021-02-16 ENCOUNTER — APPOINTMENT (OUTPATIENT)
Dept: HEMATOLOGY ONCOLOGY | Facility: CLINIC | Age: 56
End: 2021-02-16
Payer: COMMERCIAL

## 2021-02-16 ENCOUNTER — APPOINTMENT (OUTPATIENT)
Dept: INFUSION THERAPY | Facility: HOSPITAL | Age: 56
End: 2021-02-16

## 2021-02-16 LAB
BASOPHILS # BLD AUTO: 0.11 K/UL — SIGNIFICANT CHANGE UP (ref 0–0.2)
BASOPHILS NFR BLD AUTO: 1 % — SIGNIFICANT CHANGE UP (ref 0–2)
EOSINOPHIL # BLD AUTO: 0.21 K/UL — SIGNIFICANT CHANGE UP (ref 0–0.5)
EOSINOPHIL NFR BLD AUTO: 1.9 % — SIGNIFICANT CHANGE UP (ref 0–6)
HCT VFR BLD CALC: 28.1 % — LOW (ref 39–50)
HGB BLD-MCNC: 8.9 G/DL — LOW (ref 13–17)
IMM GRANULOCYTES NFR BLD AUTO: 1 % — SIGNIFICANT CHANGE UP (ref 0–1.5)
LYMPHOCYTES # BLD AUTO: 1.33 K/UL — SIGNIFICANT CHANGE UP (ref 1–3.3)
LYMPHOCYTES # BLD AUTO: 12 % — LOW (ref 13–44)
MCHC RBC-ENTMCNC: 21.8 PG — LOW (ref 27–34)
MCHC RBC-ENTMCNC: 31.7 G/DL — LOW (ref 32–36)
MCV RBC AUTO: 68.7 FL — LOW (ref 80–100)
MONOCYTES # BLD AUTO: 0.72 K/UL — SIGNIFICANT CHANGE UP (ref 0–0.9)
MONOCYTES NFR BLD AUTO: 6.5 % — SIGNIFICANT CHANGE UP (ref 2–14)
NEUTROPHILS # BLD AUTO: 8.64 K/UL — HIGH (ref 1.8–7.4)
NEUTROPHILS NFR BLD AUTO: 77.6 % — HIGH (ref 43–77)
NRBC # BLD: 0 /100 WBCS — SIGNIFICANT CHANGE UP (ref 0–0)
PLATELET # BLD AUTO: 576 K/UL — HIGH (ref 150–400)
RBC # BLD: 4.09 M/UL — LOW (ref 4.2–5.8)
RBC # FLD: 23 % — HIGH (ref 10.3–14.5)
WBC # BLD: 11.12 K/UL — HIGH (ref 3.8–10.5)
WBC # FLD AUTO: 11.12 K/UL — HIGH (ref 3.8–10.5)

## 2021-02-16 PROCEDURE — 99214 OFFICE O/P EST MOD 30 MIN: CPT

## 2021-02-16 PROCEDURE — 99072 ADDL SUPL MATRL&STAF TM PHE: CPT

## 2021-02-16 RX ORDER — MULTIVIT-MIN/IRON/FOLIC ACID/K 18-600-40
500 CAPSULE ORAL
Refills: 0 | Status: DISCONTINUED | COMMUNITY
Start: 2021-02-05 | End: 2021-02-16

## 2021-02-16 NOTE — PHYSICAL EXAM
[Restricted in physically strenuous activity but ambulatory and able to carry out work of a light or sedentary nature] : Status 1- Restricted in physically strenuous activity but ambulatory and able to carry out work of a light or sedentary nature, e.g., light house work, office work [Normal] : affect appropriate [de-identified] : appears disheveled and older than stated age [de-identified] : vitiligo

## 2021-02-16 NOTE — REVIEW OF SYSTEMS
[Fatigue] : fatigue [Recent Change In Weight] : ~T recent weight change [SOB on Exertion] : shortness of breath during exertion [Negative] : Allergic/Immunologic [Fever] : no fever [FreeTextEntry4] : tinnitus x years  [FreeTextEntry7] : intermittent abdominal discomfort, mostly during night, L sided

## 2021-02-16 NOTE — HISTORY OF PRESENT ILLNESS
[Disease: _____________________] : Disease: [unfilled] [AJCC Stage: ____] : AJCC Stage: [unfilled] [de-identified] : 56 y/o M with PMH of vitiligo who developed abdominal discomfort which started 12/2020 and he was admitted on 1/4/2021 @ [facility] where he was found to have mass in distal TC with hepatic and RLL metastatic disease seen in CT scan.  He was discharged home with outpatient follow up.  He had syncope at home on 1/18/2021 and then saw PMD on 1/19/2021 where he was sent to hospital and admitted to [facility] from 1/19/2021 to 1/19/2021.   He underwent liver biopsy on 1/25/2021 which confirmed metastatic colon cancer.  His CEA was elevated 2882 from 1/21/2021.  His CT c/a/p from 1/28/2021 showed findings consistent with metastatic colon cancer evidenced by annular colonic mass and numerous hepatic metastases, groundglass pulmonary opacity consistent with infectious etiology, including Covid 19, improved compared to prior exam.  \par \par FHx:  unsure \par Social:  no a/c, no smoking, no illicit drugs, lives with wife and 3 kids \par PMD:  Dr Whitehead \par \par He presents to us on 2/5/2021 and states he feels stronger since leaving hospital but has lost about 22 pounds from December 2020 to Feb 2021\par Given Stage IV disease, offered FOLFOX and reviewed palliative intent of treatment \par    \par \par  [de-identified] : Mr Shannon comes in for follow up prior to starting his first cycle of palliative FOLFOX.  He states his nausea continues intermittently but his abdominal discomfort has improved. He has not needed to use Oxycodone for last 3 days.  He c/o bloating and had constipation x 2 days last week but currently, constipation has resolved.  He admits to eating less due to taste changes but tries to supplement his meals with Boost and protein shakes.  \par \par \par  [FreeTextEntry1] : Palliative FOLFOX starting 2/16/2021

## 2021-02-17 ENCOUNTER — NON-APPOINTMENT (OUTPATIENT)
Age: 56
End: 2021-02-17

## 2021-02-17 DIAGNOSIS — Z51.11 ENCOUNTER FOR ANTINEOPLASTIC CHEMOTHERAPY: ICD-10-CM

## 2021-02-17 DIAGNOSIS — C20 MALIGNANT NEOPLASM OF RECTUM: ICD-10-CM

## 2021-02-17 DIAGNOSIS — Z45.2 ENCOUNTER FOR ADJUSTMENT AND MANAGEMENT OF VASCULAR ACCESS DEVICE: ICD-10-CM

## 2021-02-17 DIAGNOSIS — R11.2 NAUSEA WITH VOMITING, UNSPECIFIED: ICD-10-CM

## 2021-02-18 ENCOUNTER — NON-APPOINTMENT (OUTPATIENT)
Age: 56
End: 2021-02-18

## 2021-02-18 ENCOUNTER — APPOINTMENT (OUTPATIENT)
Dept: INFUSION THERAPY | Facility: HOSPITAL | Age: 56
End: 2021-02-18

## 2021-02-18 LAB
FULL GENE SEQUENCE RESULT: NORMAL
INTERPRETATION PGDFRB: NORMAL
Lab: NEGATIVE
REF LAB TEST METHOD: NORMAL
REVIEWED BY: NORMAL
TA REPEAT RESULT: NORMAL
TEST PERFORMANCE INFO SPEC: NORMAL

## 2021-02-26 ENCOUNTER — APPOINTMENT (OUTPATIENT)
Dept: HEMATOLOGY ONCOLOGY | Facility: CLINIC | Age: 56
End: 2021-02-26
Payer: COMMERCIAL

## 2021-02-26 VITALS
BODY MASS INDEX: 17.47 KG/M2 | HEART RATE: 136 BPM | OXYGEN SATURATION: 99 % | DIASTOLIC BLOOD PRESSURE: 75 MMHG | WEIGHT: 108.69 LBS | HEIGHT: 66 IN | TEMPERATURE: 97.2 F | RESPIRATION RATE: 15 BRPM | SYSTOLIC BLOOD PRESSURE: 103 MMHG

## 2021-02-26 PROCEDURE — 99214 OFFICE O/P EST MOD 30 MIN: CPT

## 2021-02-26 PROCEDURE — 99072 ADDL SUPL MATRL&STAF TM PHE: CPT

## 2021-03-02 NOTE — PHYSICAL EXAM
[Restricted in physically strenuous activity but ambulatory and able to carry out work of a light or sedentary nature] : Status 1- Restricted in physically strenuous activity but ambulatory and able to carry out work of a light or sedentary nature, e.g., light house work, office work [Normal] : affect appropriate [de-identified] : appears  older than stated age, better color in his face  [de-identified] : normal respiratory effort, no audible wheeze [de-identified] : vitiligo

## 2021-03-02 NOTE — REVIEW OF SYSTEMS
[Fatigue] : fatigue [Negative] : Allergic/Immunologic [Fever] : no fever [FreeTextEntry4] : tinnitus x years

## 2021-03-02 NOTE — HISTORY OF PRESENT ILLNESS
[Disease: _____________________] : Disease: [unfilled] [AJCC Stage: ____] : AJCC Stage: [unfilled] [de-identified] : 56 y/o M with PMH of vitiligo who developed abdominal discomfort which started 12/2020 and he was admitted on 1/4/2021 @ [facility] where he was found to have mass in distal TC with hepatic and RLL metastatic disease seen in CT scan.  He was discharged home with outpatient follow up.  He had syncope at home on 1/18/2021 and then saw PMD on 1/19/2021 where he was sent to hospital and admitted to [facility] from 1/19/2021 to 1/19/2021.   He underwent liver biopsy on 1/25/2021 which confirmed metastatic colon cancer.  His CEA was elevated 2882 from 1/21/2021.  His CT c/a/p from 1/28/2021 showed findings consistent with metastatic colon cancer evidenced by annular colonic mass and numerous hepatic metastases, groundglass pulmonary opacity consistent with infectious etiology, including Covid 19, improved compared to prior exam.  \par \par FHx:  unsure \par Social:  no a/c, no smoking, no illicit drugs, lives with wife and 3 kids \par PMD:  Dr Whitehead \par \par He presents to us on 2/5/2021 and states he feels stronger since leaving hospital but has lost about 22 pounds from December 2020 to Feb 2021\par Given Stage IV disease, offered FOLFOX and reviewed palliative intent of treatment \par    \par Late Feb 2021 started FOLFOX \par  [FreeTextEntry1] : Palliative FOLFOX started 2/16/2021 s/p cycle 1 [de-identified] : Mr Ortega comes in for follow up after first cycle of FOLFOX and tolerated well\par no pain , seems to have resolved, \par less fatigue\par no fever or chills\par trying to be more active \par

## 2021-03-04 ENCOUNTER — RESULT REVIEW (OUTPATIENT)
Age: 56
End: 2021-03-04

## 2021-03-04 ENCOUNTER — LABORATORY RESULT (OUTPATIENT)
Age: 56
End: 2021-03-04

## 2021-03-04 ENCOUNTER — APPOINTMENT (OUTPATIENT)
Dept: HEMATOLOGY ONCOLOGY | Facility: CLINIC | Age: 56
End: 2021-03-04
Payer: COMMERCIAL

## 2021-03-04 ENCOUNTER — APPOINTMENT (OUTPATIENT)
Dept: INFUSION THERAPY | Facility: HOSPITAL | Age: 56
End: 2021-03-04

## 2021-03-04 VITALS
BODY MASS INDEX: 17.89 KG/M2 | RESPIRATION RATE: 17 BRPM | DIASTOLIC BLOOD PRESSURE: 85 MMHG | TEMPERATURE: 97 F | OXYGEN SATURATION: 99 % | HEIGHT: 65.98 IN | SYSTOLIC BLOOD PRESSURE: 121 MMHG | HEART RATE: 117 BPM | WEIGHT: 111.33 LBS

## 2021-03-04 LAB
BASOPHILS # BLD AUTO: 0.08 K/UL — SIGNIFICANT CHANGE UP (ref 0–0.2)
BASOPHILS NFR BLD AUTO: 1.3 % — SIGNIFICANT CHANGE UP (ref 0–2)
EOSINOPHIL # BLD AUTO: 0.1 K/UL — SIGNIFICANT CHANGE UP (ref 0–0.5)
EOSINOPHIL NFR BLD AUTO: 1.6 % — SIGNIFICANT CHANGE UP (ref 0–6)
HCT VFR BLD CALC: 33.4 % — LOW (ref 39–50)
HGB BLD-MCNC: 10.5 G/DL — LOW (ref 13–17)
IMM GRANULOCYTES NFR BLD AUTO: 4.2 % — HIGH (ref 0–1.5)
LYMPHOCYTES # BLD AUTO: 1.99 K/UL — SIGNIFICANT CHANGE UP (ref 1–3.3)
LYMPHOCYTES # BLD AUTO: 32.3 % — SIGNIFICANT CHANGE UP (ref 13–44)
MCHC RBC-ENTMCNC: 21.9 PG — LOW (ref 27–34)
MCHC RBC-ENTMCNC: 31.4 G/DL — LOW (ref 32–36)
MCV RBC AUTO: 69.6 FL — LOW (ref 80–100)
MONOCYTES # BLD AUTO: 0.64 K/UL — SIGNIFICANT CHANGE UP (ref 0–0.9)
MONOCYTES NFR BLD AUTO: 10.4 % — SIGNIFICANT CHANGE UP (ref 2–14)
NEUTROPHILS # BLD AUTO: 3.09 K/UL — SIGNIFICANT CHANGE UP (ref 1.8–7.4)
NEUTROPHILS NFR BLD AUTO: 50.2 % — SIGNIFICANT CHANGE UP (ref 43–77)
NRBC # BLD: 0 /100 WBCS — SIGNIFICANT CHANGE UP (ref 0–0)
PLATELET # BLD AUTO: 458 K/UL — HIGH (ref 150–400)
RBC # BLD: 4.8 M/UL — SIGNIFICANT CHANGE UP (ref 4.2–5.8)
RBC # FLD: 22.7 % — HIGH (ref 10.3–14.5)
WBC # BLD: 6.16 K/UL — SIGNIFICANT CHANGE UP (ref 3.8–10.5)
WBC # FLD AUTO: 6.16 K/UL — SIGNIFICANT CHANGE UP (ref 3.8–10.5)

## 2021-03-04 PROCEDURE — 99072 ADDL SUPL MATRL&STAF TM PHE: CPT

## 2021-03-04 PROCEDURE — 99205 OFFICE O/P NEW HI 60 MIN: CPT

## 2021-03-05 ENCOUNTER — NON-APPOINTMENT (OUTPATIENT)
Age: 56
End: 2021-03-05

## 2021-03-06 ENCOUNTER — APPOINTMENT (OUTPATIENT)
Dept: INFUSION THERAPY | Facility: HOSPITAL | Age: 56
End: 2021-03-06

## 2021-03-09 ENCOUNTER — OUTPATIENT (OUTPATIENT)
Dept: OUTPATIENT SERVICES | Facility: HOSPITAL | Age: 56
LOS: 1 days | Discharge: HOME | End: 2021-03-09

## 2021-03-09 ENCOUNTER — INPATIENT (INPATIENT)
Facility: HOSPITAL | Age: 56
LOS: 19 days | Discharge: HOME HEALTH | End: 2021-03-29
Attending: STUDENT IN AN ORGANIZED HEALTH CARE EDUCATION/TRAINING PROGRAM | Admitting: STUDENT IN AN ORGANIZED HEALTH CARE EDUCATION/TRAINING PROGRAM
Payer: COMMERCIAL

## 2021-03-09 ENCOUNTER — APPOINTMENT (OUTPATIENT)
Dept: HEMATOLOGY ONCOLOGY | Facility: CLINIC | Age: 56
End: 2021-03-09
Payer: COMMERCIAL

## 2021-03-09 ENCOUNTER — NON-APPOINTMENT (OUTPATIENT)
Age: 56
End: 2021-03-09

## 2021-03-09 VITALS
OXYGEN SATURATION: 100 % | HEART RATE: 133 BPM | TEMPERATURE: 98 F | SYSTOLIC BLOOD PRESSURE: 115 MMHG | DIASTOLIC BLOOD PRESSURE: 87 MMHG | RESPIRATION RATE: 16 BRPM | HEIGHT: 66 IN

## 2021-03-09 DIAGNOSIS — K56.609 UNSPECIFIED INTESTINAL OBSTRUCTION, UNSPECIFIED AS TO PARTIAL VERSUS COMPLETE OBSTRUCTION: ICD-10-CM

## 2021-03-09 DIAGNOSIS — E87.2 ACIDOSIS: ICD-10-CM

## 2021-03-09 DIAGNOSIS — C18.9 MALIGNANT NEOPLASM OF COLON, UNSPECIFIED: ICD-10-CM

## 2021-03-09 DIAGNOSIS — D72.819 DECREASED WHITE BLOOD CELL COUNT, UNSPECIFIED: ICD-10-CM

## 2021-03-09 DIAGNOSIS — D64.9 ANEMIA, UNSPECIFIED: ICD-10-CM

## 2021-03-09 DIAGNOSIS — Z29.9 ENCOUNTER FOR PROPHYLACTIC MEASURES, UNSPECIFIED: ICD-10-CM

## 2021-03-09 DIAGNOSIS — E87.1 HYPO-OSMOLALITY AND HYPONATREMIA: ICD-10-CM

## 2021-03-09 DIAGNOSIS — Z00.00 ENCOUNTER FOR GENERAL ADULT MEDICAL EXAMINATION WITHOUT ABNORMAL FINDINGS: ICD-10-CM

## 2021-03-09 LAB
ALBUMIN SERPL ELPH-MCNC: 3 G/DL — LOW (ref 3.3–5)
ALP SERPL-CCNC: 191 U/L — HIGH (ref 40–120)
ALT FLD-CCNC: 20 U/L — SIGNIFICANT CHANGE UP (ref 4–41)
ANION GAP SERPL CALC-SCNC: 12 MMOL/L — SIGNIFICANT CHANGE UP (ref 7–14)
ANISOCYTOSIS BLD QL: SIGNIFICANT CHANGE UP
APTT BLD: 28.7 SEC — SIGNIFICANT CHANGE UP (ref 27–36.3)
AST SERPL-CCNC: 37 U/L — SIGNIFICANT CHANGE UP (ref 4–40)
BASE EXCESS BLDV CALC-SCNC: 6.5 MMOL/L — HIGH (ref -3–2)
BASOPHILS # BLD AUTO: 0 K/UL — SIGNIFICANT CHANGE UP (ref 0–0.2)
BASOPHILS NFR BLD AUTO: 0 % — SIGNIFICANT CHANGE UP (ref 0–2)
BILIRUB SERPL-MCNC: 0.7 MG/DL — SIGNIFICANT CHANGE UP (ref 0.2–1.2)
BLD GP AB SCN SERPL QL: NEGATIVE — SIGNIFICANT CHANGE UP
BLOOD GAS VENOUS - CREATININE: 0.5 MG/DL — SIGNIFICANT CHANGE UP (ref 0.5–1.3)
BLOOD GAS VENOUS COMPREHENSIVE RESULT: SIGNIFICANT CHANGE UP
BUN SERPL-MCNC: 21 MG/DL — SIGNIFICANT CHANGE UP (ref 7–23)
CALCIUM SERPL-MCNC: 8.1 MG/DL — LOW (ref 8.4–10.5)
CHLORIDE BLDV-SCNC: 94 MMOL/L — LOW (ref 96–108)
CHLORIDE SERPL-SCNC: 89 MMOL/L — LOW (ref 98–107)
CO2 SERPL-SCNC: 28 MMOL/L — SIGNIFICANT CHANGE UP (ref 22–31)
CREAT SERPL-MCNC: 0.49 MG/DL — LOW (ref 0.5–1.3)
EOSINOPHIL # BLD AUTO: 0.03 K/UL — SIGNIFICANT CHANGE UP (ref 0–0.5)
EOSINOPHIL NFR BLD AUTO: 0.9 % — SIGNIFICANT CHANGE UP (ref 0–6)
GAS PNL BLDV: 129 MMOL/L — LOW (ref 136–146)
GIANT PLATELETS BLD QL SMEAR: PRESENT — SIGNIFICANT CHANGE UP
GLUCOSE BLDV-MCNC: 157 MG/DL — HIGH (ref 70–99)
GLUCOSE SERPL-MCNC: 156 MG/DL — HIGH (ref 70–99)
HCO3 BLDV-SCNC: 30 MMOL/L — HIGH (ref 20–27)
HCT VFR BLD CALC: 35.5 % — LOW (ref 39–50)
HCT VFR BLDA CALC: 34.4 % — LOW (ref 39–51)
HGB BLD CALC-MCNC: 11.2 G/DL — LOW (ref 13–17)
HGB BLD-MCNC: 10.7 G/DL — LOW (ref 13–17)
HYPOCHROMIA BLD QL: SIGNIFICANT CHANGE UP
IANC: 1.67 K/UL — SIGNIFICANT CHANGE UP (ref 1.5–8.5)
INR BLD: 1.28 RATIO — HIGH (ref 0.88–1.16)
LACTATE BLDV-MCNC: 2.7 MMOL/L — HIGH (ref 0.5–2)
LACTATE SERPL-SCNC: 3.5 MMOL/L — HIGH (ref 0.5–2)
LIDOCAIN IGE QN: 19 U/L — SIGNIFICANT CHANGE UP (ref 7–60)
LYMPHOCYTES # BLD AUTO: 1.03 K/UL — SIGNIFICANT CHANGE UP (ref 1–3.3)
LYMPHOCYTES # BLD AUTO: 32.7 % — SIGNIFICANT CHANGE UP (ref 13–44)
MACROCYTES BLD QL: SLIGHT — SIGNIFICANT CHANGE UP
MCHC RBC-ENTMCNC: 21.5 PG — LOW (ref 27–34)
MCHC RBC-ENTMCNC: 30.1 GM/DL — LOW (ref 32–36)
MCV RBC AUTO: 71.3 FL — LOW (ref 80–100)
MICROCYTES BLD QL: SIGNIFICANT CHANGE UP
MONOCYTES # BLD AUTO: 0.09 K/UL — SIGNIFICANT CHANGE UP (ref 0–0.9)
MONOCYTES NFR BLD AUTO: 2.8 % — SIGNIFICANT CHANGE UP (ref 2–14)
MYELOCYTES NFR BLD: 0.9 % — HIGH (ref 0–0)
NEUTROPHILS # BLD AUTO: 1.77 K/UL — LOW (ref 1.8–7.4)
NEUTROPHILS NFR BLD AUTO: 52.3 % — SIGNIFICANT CHANGE UP (ref 43–77)
NEUTS BAND # BLD: 3.8 % — SIGNIFICANT CHANGE UP (ref 0–6)
PCO2 BLDV: 43 MMHG — SIGNIFICANT CHANGE UP (ref 41–51)
PH BLDV: 7.46 — HIGH (ref 7.32–7.43)
PLAT MORPH BLD: NORMAL — SIGNIFICANT CHANGE UP
PLATELET # BLD AUTO: 493 K/UL — HIGH (ref 150–400)
PLATELET COUNT - ESTIMATE: ABNORMAL
PO2 BLDV: 37 MMHG — SIGNIFICANT CHANGE UP (ref 35–40)
POLYCHROMASIA BLD QL SMEAR: SLIGHT — SIGNIFICANT CHANGE UP
POTASSIUM BLDV-SCNC: 3.1 MMOL/L — LOW (ref 3.4–4.5)
POTASSIUM SERPL-MCNC: 3.3 MMOL/L — LOW (ref 3.5–5.3)
POTASSIUM SERPL-SCNC: 3.3 MMOL/L — LOW (ref 3.5–5.3)
PROT SERPL-MCNC: 6.5 G/DL — SIGNIFICANT CHANGE UP (ref 6–8.3)
PROTHROM AB SERPL-ACNC: 14.4 SEC — HIGH (ref 10.6–13.6)
RBC # BLD: 4.98 M/UL — SIGNIFICANT CHANGE UP (ref 4.2–5.8)
RBC # FLD: 21.6 % — HIGH (ref 10.3–14.5)
RBC BLD AUTO: ABNORMAL
RH IG SCN BLD-IMP: POSITIVE — SIGNIFICANT CHANGE UP
SAO2 % BLDV: 65.7 % — SIGNIFICANT CHANGE UP (ref 60–85)
SARS-COV-2 RNA SPEC QL NAA+PROBE: SIGNIFICANT CHANGE UP
SMUDGE CELLS # BLD: PRESENT — SIGNIFICANT CHANGE UP
SODIUM SERPL-SCNC: 129 MMOL/L — LOW (ref 135–145)
TARGETS BLD QL SMEAR: SIGNIFICANT CHANGE UP
VARIANT LYMPHS # BLD: 6.6 % — HIGH (ref 0–6)
WBC # BLD: 3.15 K/UL — LOW (ref 3.8–10.5)
WBC # FLD AUTO: 3.15 K/UL — LOW (ref 3.8–10.5)

## 2021-03-09 PROCEDURE — 74177 CT ABD & PELVIS W/CONTRAST: CPT | Mod: 26

## 2021-03-09 PROCEDURE — 99285 EMERGENCY DEPT VISIT HI MDM: CPT

## 2021-03-09 PROCEDURE — 99214 OFFICE O/P EST MOD 30 MIN: CPT | Mod: 95

## 2021-03-09 PROCEDURE — 99441: CPT

## 2021-03-09 RX ORDER — MORPHINE SULFATE 50 MG/1
4 CAPSULE, EXTENDED RELEASE ORAL ONCE
Refills: 0 | Status: DISCONTINUED | OUTPATIENT
Start: 2021-03-09 | End: 2021-03-09

## 2021-03-09 RX ORDER — PANTOPRAZOLE SODIUM 20 MG/1
40 TABLET, DELAYED RELEASE ORAL
Refills: 0 | Status: DISCONTINUED | OUTPATIENT
Start: 2021-03-09 | End: 2021-03-10

## 2021-03-09 RX ORDER — FERROUS SULFATE 325(65) MG
325 TABLET ORAL DAILY
Refills: 0 | Status: DISCONTINUED | OUTPATIENT
Start: 2021-03-09 | End: 2021-03-10

## 2021-03-09 RX ORDER — ASCORBIC ACID 60 MG
500 TABLET,CHEWABLE ORAL DAILY
Refills: 0 | Status: DISCONTINUED | OUTPATIENT
Start: 2021-03-09 | End: 2021-03-10

## 2021-03-09 RX ORDER — MORPHINE SULFATE 50 MG/1
2 CAPSULE, EXTENDED RELEASE ORAL EVERY 4 HOURS
Refills: 0 | Status: DISCONTINUED | OUTPATIENT
Start: 2021-03-09 | End: 2021-03-10

## 2021-03-09 RX ORDER — POTASSIUM CHLORIDE 20 MEQ
10 PACKET (EA) ORAL
Refills: 0 | Status: COMPLETED | OUTPATIENT
Start: 2021-03-09 | End: 2021-03-09

## 2021-03-09 RX ORDER — SODIUM CHLORIDE 9 MG/ML
1000 INJECTION, SOLUTION INTRAVENOUS ONCE
Refills: 0 | Status: COMPLETED | OUTPATIENT
Start: 2021-03-09 | End: 2021-03-09

## 2021-03-09 RX ORDER — ONDANSETRON 8 MG/1
4 TABLET, FILM COATED ORAL ONCE
Refills: 0 | Status: COMPLETED | OUTPATIENT
Start: 2021-03-09 | End: 2021-03-09

## 2021-03-09 RX ORDER — ENOXAPARIN SODIUM 100 MG/ML
40 INJECTION SUBCUTANEOUS DAILY
Refills: 0 | Status: DISCONTINUED | OUTPATIENT
Start: 2021-03-09 | End: 2021-03-25

## 2021-03-09 RX ORDER — MULTIVIT-MIN/FERROUS GLUCONATE 9 MG/15 ML
15 LIQUID (ML) ORAL DAILY
Refills: 0 | Status: DISCONTINUED | OUTPATIENT
Start: 2021-03-09 | End: 2021-03-09

## 2021-03-09 RX ORDER — SODIUM CHLORIDE 9 MG/ML
1000 INJECTION INTRAMUSCULAR; INTRAVENOUS; SUBCUTANEOUS ONCE
Refills: 0 | Status: COMPLETED | OUTPATIENT
Start: 2021-03-09 | End: 2021-03-09

## 2021-03-09 RX ORDER — SODIUM CHLORIDE 9 MG/ML
1000 INJECTION INTRAMUSCULAR; INTRAVENOUS; SUBCUTANEOUS
Refills: 0 | Status: DISCONTINUED | OUTPATIENT
Start: 2021-03-09 | End: 2021-03-10

## 2021-03-09 RX ORDER — ONDANSETRON 8 MG/1
4 TABLET, FILM COATED ORAL EVERY 6 HOURS
Refills: 0 | Status: DISCONTINUED | OUTPATIENT
Start: 2021-03-09 | End: 2021-03-26

## 2021-03-09 RX ADMIN — Medication 100 MILLIEQUIVALENT(S): at 18:21

## 2021-03-09 RX ADMIN — SODIUM CHLORIDE 100 MILLILITER(S): 9 INJECTION INTRAMUSCULAR; INTRAVENOUS; SUBCUTANEOUS at 23:47

## 2021-03-09 RX ADMIN — Medication 100 MILLIEQUIVALENT(S): at 19:00

## 2021-03-09 RX ADMIN — MORPHINE SULFATE 4 MILLIGRAM(S): 50 CAPSULE, EXTENDED RELEASE ORAL at 15:46

## 2021-03-09 RX ADMIN — MORPHINE SULFATE 4 MILLIGRAM(S): 50 CAPSULE, EXTENDED RELEASE ORAL at 20:34

## 2021-03-09 RX ADMIN — ONDANSETRON 4 MILLIGRAM(S): 8 TABLET, FILM COATED ORAL at 15:46

## 2021-03-09 RX ADMIN — SODIUM CHLORIDE 1000 MILLILITER(S): 9 INJECTION, SOLUTION INTRAVENOUS at 15:47

## 2021-03-09 RX ADMIN — Medication 100 MILLIEQUIVALENT(S): at 17:25

## 2021-03-09 RX ADMIN — SODIUM CHLORIDE 1000 MILLILITER(S): 9 INJECTION INTRAMUSCULAR; INTRAVENOUS; SUBCUTANEOUS at 17:25

## 2021-03-09 RX ADMIN — SODIUM CHLORIDE 1000 MILLILITER(S): 9 INJECTION, SOLUTION INTRAVENOUS at 17:25

## 2021-03-09 NOTE — ED PROVIDER NOTE - FAMILY HISTORY
Father  Still living? Unknown  Family history of colon cancer, Age at diagnosis: Age Unknown  Family history of type 2 diabetes mellitus, Age at diagnosis: Age Unknown     Mother  Still living? Unknown  Family history of hypertension, Age at diagnosis: Age Unknown

## 2021-03-09 NOTE — ED PROVIDER NOTE - OBJECTIVE STATEMENT
Cosat NORTON MD PGY3: 55 M hx stage 4 colon cancer follows with [facility] here for worsening nausea, vomiting (NBNB), abdominal distention and generalized abdominal pain x 1 week. Has been trying to tolerate solids but just ends up throwing them up. Has been able to void and pass gas, but no BM. 55 M hx stage 4 colon cancer follows with [facility] here for worsening nausea, vomiting (NBNB), abdominal distention and generalized abdominal pain x 1 week. Has been trying to tolerate solids but just ends up throwing them up. Has been able to void and pass gas, but no BM.

## 2021-03-09 NOTE — CONSULT NOTE ADULT - ASSESSMENT
56 y/o male with unresectable metastatic colonic adenocarcinoma, now with LBO due to obstruction secondary to transverse colon mass. Patient is with incompetent ileocecal valve. No signs of ischemia on CT. Aseptic appearing at this time.    -Given poor overall clinical prognosis and extent of metastatic disease, patient would like to defer operative intervention at this time. Patient is amenable to other alternative methods of treatment including NGT decompression and colonic stent placement.    -Will place NGT at bedside in ED for decompression    -GI consult placed for evaluation for stent placement    -Recommend continued IVF hydration    -Recommend palliative care consult to assist in goals of care discussion.     -Discussed case with Dr. Enciso

## 2021-03-09 NOTE — H&P ADULT - HISTORY OF PRESENT ILLNESS
This is a 55 M with hx stage 4 colon cancer follows with [facility]   here for worsening nausea, vomiting (NBNB), abdominal distention and   generalized abdominal pain x 1 week. Pt sent in from [facility]. Pt received his   first treatment of palliative folfox on 2/16/2021. Has been trying to tolerate solids but just ends up throwing them up. Has been able to void and pass gas, but no BM.    Pt denies any fever, chills, SOB, chest pain, dysuria, or diarrhea.     Of note, pt with recent hospitalization in 1/2021 where he was diagnosed with metastatic colon cancer to the liver and lungs. Pt treated for orthostatic hypotension and fevers likely in the setting of malignancy.     In the ED, pt's vitals were T 98 HR  /93 RR 16 % RA.   Treated with 2L bolus (LR 1L, NS 1L), Zofran 4 mg IV, Morphine 4 mg IV, and   KCl 10x3. Pt seen by Surgery and NG tube placed for decompression. This is a 55 M with hx unresectable stage 4 colon cancer (mets to liver and lungs, received palliative folfox 2/16/21) follows with [facility] here for worsening nausea, vomiting (NBNB), abdominal distention and   generalized abdominal pain x 1 week. Pt sent in from [facility]. Pt received his first treatment of palliative folfox on 2/16/2021. Has been trying to tolerate solids but just ends up throwing them up. Has been able to void and pass gas, but no BM.    Pt denies any fever, chills, SOB, chest pain, dysuria, or diarrhea.     Of note, pt with recent hospitalization in 1/2021 where he was diagnosed with metastatic colon cancer to the liver and lungs. Pt treated for orthostatic hypotension and fevers likely in the setting of malignancy.     In the ED, pt's vitals were T 98 HR  /93 RR 16 % RA.   Treated with 2L bolus (LR 1L, NS 1L), Zofran 4 mg IV, Morphine 4 mg IV, and   KCl 10x3. Pt seen by Surgery and NG tube placed for decompression. This is a 55 M with hx unresectable stage 4 colon cancer (mets to liver and lungs, received palliative folfox 2/16/21) follows with [facility] here for worsening nausea, vomiting (NBNB), abdominal distention and   generalized abdominal pain x 1 week. Pt sent in from [facility]. Pt received his first treatment of palliative folfox on 2/16/2021. Has been trying to tolerate solids but just ends up throwing them up. Has been able to void and pass gas, but no BM.    Pt denies any fever, chills, SOB, chest pain, dysuria, or diarrhea.     Of note, pt with recent hospitalization in 1/2021 where he was diagnosed with metastatic colon cancer to the liver and lungs. Pt treated for orthostatic hypotension and fevers likely in the setting of malignancy.     In the ED, pt's vitals were T 98 HR  /93 RR 16 % RA. Treated with 2L bolus (LR 1L, NS 1L), Zofran 4 mg IV, Morphine 4 mg IV, and KCl 10x3. Pt seen by Surgery and NG tube placed for decompression. This is a 55 M with hx unresectable stage 4 colon cancer (mets to liver and lungs, received palliative folfox 2/16/21) follows with [facility] here for worsening nausea, vomiting (NBNB), abdominal distention and   generalized abdominal pain x 1 week. Pt sent in from [facility]. Pt received his first treatment of palliative folfox on 2/16/2021. Has been trying to tolerate solids but just ends up throwing them up. Has been able to void and pass gas, but no BM in a few days.    Pt denies any fever, chills, SOB, chest pain, dysuria, or diarrhea.     Of note, pt with recent hospitalization in 1/2021 where he was diagnosed with metastatic colon cancer to the liver and lungs. Pt treated for orthostatic hypotension and fevers likely in the setting of malignancy.     In the ED, pt's vitals were T 98 HR  /93 RR 16 % RA. Treated with 2L bolus (LR 1L, NS 1L), Zofran 4 mg IV, Morphine 4 mg IV, and KCl 10x3. Pt seen by Surgery and NG tube placed for decompression. 55 M with hx unresectable stage 4 colon cancer (mets to liver and lungs, received palliative folfox 2/16/21) follows with [facility] here for worsening nausea, vomiting (NBNB), abdominal distention and   generalized abdominal pain x 1 week. Pt sent in from [facility]. Pt received his first treatment of palliative folfox on 2/16/2021. Has been trying to tolerate solids but just ends up throwing them up. Has been able to void and pass gas, but no BM in a few days.    Pt denies any fever, chills, SOB, chest pain, dysuria, or diarrhea.     Of note, pt with recent hospitalization in 1/2021 where he was diagnosed with metastatic colon cancer to the liver and lungs. Pt treated for orthostatic hypotension and fevers likely in the setting of malignancy.     In the ED, pt's vitals were T 98 HR  /93 RR 16 % RA. Treated with 2L bolus (LR 1L, NS 1L), Zofran 4 mg IV, Morphine 4 mg IV, and KCl 10x3. Pt seen by Surgery and NG tube placed for decompression.

## 2021-03-09 NOTE — ED ADULT NURSE REASSESSMENT NOTE - NS ED NURSE REASSESS COMMENT FT1
Received report from JAMIR Camejo. Pt resting comfortably, resp. even and unlabored. NGT continues to be in place. States "the tube has helped with my pain a lot". Denies CP, SOB, HA, and dizziness. Noted to be tachycardic- MAR called and made aware. MD Mack states no intervention at this time. VS as noted. Sinus tachycardia on the CM. NAD.

## 2021-03-09 NOTE — ED PROVIDER NOTE - CONSTITUTIONAL, MLM
Pale and thin appearing, awake, alert, oriented to person, place, time/situation and in no apparent distress. normal...

## 2021-03-09 NOTE — H&P ADULT - NSHPREVIEWOFSYSTEMS_GEN_ALL_CORE
REVIEW OF SYSTEMS:  CONSTITUTIONAL: No fever, chills  EYES: No eye pain, visual disturbances  ENT:  No difficulty hearing, tinnitus, vertigo  NECK: No pain   RESPIRATORY: No cough, wheezing, or hemoptysis; No shortness of breath  CARDIOVASCULAR: No chest pain, palpitations, dizziness, or leg swelling  GASTROINTESTINAL: +abdominal pain, nausea, vomiting, distension. no melena or hematochezia   GENITOURINARY: No dysuria, frequency, hematuria  NEUROLOGICAL: No headaches  SKIN: No rashes   ENDOCRINE: No heat or cold intolerance; No hair loss  MUSCULOSKELETAL: No joint pain or swelling  PSYCHIATRIC: No anxiety

## 2021-03-09 NOTE — HISTORY OF PRESENT ILLNESS
[FreeTextEntry1] : 55yoM with Stage IV colon adenocarcinoma presents for follow-up palliative care visit, referred by Oncology. PMH also significant for vitiligo. \par \par Patient presented to medical attention in 1/2021 with weight loss and abdominal pain.  He was found to have to have mass in distal TC with hepatic and RLL metastatic disease seen in CT scan. He was discharged home with outpatient follow up. He had syncope at home on 1/18/2021 and then saw PMD on 1/19/2021 where he was sent to hospital and admitted to [facility] from 1/19/2021 to 1/19/2021. He underwent liver biopsy on 1/25/2021 which confirmed metastatic colon cancer. His CEA was elevated 2882 from 1/21/2021. His CT c/a/p from 1/28/2021 showed findings consistent with metastatic colon cancer evidenced by annular colonic mass and numerous hepatic metastases, ground glass pulmonary opacity consistent with infectious etiology, including COVID 19, improved compared to prior exam. \par \par Patient initially referred for pain and medical cannabis certification. \par \par He suffers with mid abdominal pain that comes on intermittently. Sharp in quality, not related to BMs. Goes as high as 8/10 in intensity.  Currently rates it at 1/10. He has been using Oxycodone 5mg PRN, about 2-3 times daily. It has been helpful up until about 3 days ago. \par \par Interval History: Patient presents for urgent follow-up via Telemedicine.   His wife contacted the office to report uncontrolled pain, persistent nausea/vomiting, decreased PO intake. Reports nausea and vomiting x7 since dosi disconnect on 3/6/21.  PRN Compazine has not provided relief.  He has not had been able to tolerate anything PO including fluids.  Patient has been predominantly bedbound for the past few days.\par \par Endorses increased abdominal pain despite PRN oxycodone IR 10mg q6h which he reports is no longer providing any relief.   He believes he has been able to keep PRN oxycodone down despite nausea and vomiting. Currently rates pain 8-9/10. Last BM yesterday. \par \par ROS:\par + gas / belching\par +constipation - uses prune juice, PRN miralax \par +appetite loss - tries to eat a well rounded diet, gets early satiety\par +30lb weight loss in the last 3 months\par +nocturia, has appointment to see Urologist next week\par +occasionally feels pain at night that wakes him up\par +dysgeusia - everything tastes sweet to him\par +feels that he is coping well with his diagnosis\par All other ROS as outlined or noncontributory. \par \par Patient is , lives with his wife and their two children (ages 10 and 5).  He has a 30 year old daughter, Kacey, who lives in [place]. She is currently staying with him to help out for two months. He was working in building maintenance, is now applying for disability. \par \par I-Stop Ref#: [id number]

## 2021-03-09 NOTE — ED ADULT NURSE NOTE - OBJECTIVE STATEMENT
pt received to room #3 with c/o abd pain/distention and vomiting. pt cachetic in appearance. R CW port accessed, blood drawn and sent, pending attending eval, will cont to monitor.

## 2021-03-09 NOTE — ED CLERICAL - NS ED CLERK NOTE PRE-ARRIVAL INFORMATION; ADDITIONAL PRE-ARRIVAL INFORMATION
pt [facility] has stage 4 colon cancer received first treatment of folfox  last week pt has nausea vomiting unable to eat sent in to r/o obstruction

## 2021-03-09 NOTE — HISTORY OF PRESENT ILLNESS
[Home] : at home, [unfilled] , at the time of the visit. [Medical Office: ([facility])___] : at the medical office located in  [Verbal consent obtained from patient] : the patient, [unfilled] [FreeTextEntry1] : 55yoM with Stage IV colon adenocarcinoma presents for initial palliative care visit, referred by Oncology.\par PMH also significant for vitiligo. \par \par Patient presented to medical attention in 1/2021 with weight loss and abdominal pain.  He was found to have \par \par  recent finding of a mass in the distal transverse colon \par with hepatic and RLL metastatic disease (on CTAP on 1/5/21) p/w an episode of \par near syncope suspect 2/2 orthostatic hypotension, and fevers. s/p liver biopsy \par consistent with metastatic colonic carcinoma. \par \par Patient is referred today for medical cannabis consultation.\par \par He suffers with mid abdominal pain that comes on intermittently. Sharp in quality, not related to BMs. Goes as high as 8/10 in intensity.  Currently rates it at 1/10. He has been using Oxycodone 5mg PRN, about 2-3 times daily. It has been helpful up until about 3 days ago. \par \par ROS:\par +constipation - uses prune juice, PRN miralax \par +appetite loss - tries to eat a well rounded diet, gets early satiety\par +30lb weight loss in the last 3 months\par +nocturia, has appointment to see Urologist next week\par +occasionally feels pain at night that wakes him up\par +dysgeusia - everything tastes sweet to him\par +feels that he is coping well with his diagnosis\par Denies n/v, \par \par Patient is , lives with his wife and their two children (ages 10 and 5).  He has a 30 year old daughter, Kacey, who lives in [place]. She is currently staying with him to help out for two months. He was working in building maintenance, is now applying for disability. \par \par I-Stop Ref#: [id number]

## 2021-03-09 NOTE — H&P ADULT - PROBLEM SELECTOR PLAN 6
- Prior iron studies cw iron def Anemia plus AOCD   - Monitor H/H and transfuse to keep Hgb > 7  - C/w ferrous sulfate with vit C supplementation - Prior iron studies cw iron def Anemia plus AOCD   - Monitor H/H and transfuse to keep Hgb > 7  - Hold oral ferrous sulfate with vit C supplementation given NG tube to suction

## 2021-03-09 NOTE — H&P ADULT - PROBLEM SELECTOR PLAN 3
- Na 129 with low Cl 89  - Likely in the setting of poor PO intake, vomiting, dehydration  - S/p 2L bolus, c/w IVF   - Will send urine lytes   - Trend Na, do not overcorrect more than 6-8 mEq in 24h

## 2021-03-09 NOTE — ED PROVIDER NOTE - ATTENDING CONTRIBUTION TO CARE
Pt was seen and evaluated by me. Pt is a 54 y/o male with PMHx of Stage 4 colon cancer who presented to the ED for nausea, vomiting, and abd pain X 1 wk. Pt was sent in by the [facility] for nausea, vomiting, and abd pain X 1 wk. Pt was noted to have received his first treatment of folfox. Pt denies any fever, chills, SOB, chest pain, dysuria, or diarrhea. Pale and thin appearing. Lungs CTA b/l. RRR. Abd soft, distended, non-tender. No LE swelling of calf tenderness.   Concern for obstruction/worsening CA/UTI  Labs, UA, CT, IVF, Analgesia, Antiemetics

## 2021-03-09 NOTE — H&P ADULT - PROBLEM SELECTOR PLAN 7
- Diet: NPO  - DVT PPX: lovenox SQ  - Dispo: pending clinical improvement  - GOC: will address with pt, palliative consult for pain control and further assistance - Diet: NPO  - DVT PPX: lovenox SQ  - Dispo: pending clinical improvement  - GOC: pt will speak more with wife and children, palliative consult for pain control and further assistance

## 2021-03-09 NOTE — ED PROVIDER NOTE - PHYSICAL EXAMINATION
Costa NORTON MD PGY3:   PHYSICAL EXAM:    GENERAL: Uncomfortable, pale.   HEENT:  Atraumatic, Normocephalic  CHEST/LUNG: Chest rise equal bilaterally. CTAB.   HEART: Regular rate and rhythm. No murmurs or rubs.   ABDOMEN: Abdomen distended and firm, generalized TTP.   EXTREMITIES:  2+ Peripheral Pulses.  PSYCH: A&Ox3  SKIN: Pale, vitiligo.

## 2021-03-09 NOTE — H&P ADULT - PROBLEM SELECTOR PLAN 8
Transitions of Care Status:  1.  Name of PCP: Kaia Whitehead (PCP)  2.  PCP Contacted on Admission: [ ] Y    [x] N - night admission     3.  PCP contacted at Discharge: [ ] Y    [ ] N    [ ] N/A  4.  Post-Discharge Appointment Date and Location:  5.  Summary of Handoff given to PCP:

## 2021-03-09 NOTE — H&P ADULT - ASSESSMENT
This is a 55 M with hx unresectable stage 4 colon cancer (mets to liver and lungs, received palliative folfox 2/16/21) presenting with abdominal pain, N/V, and distension. Admitted for medical management of LBO due to obstruction 2/2 transverse colon mass.  This is a 55 M with hx unresectable stage 4 colon cancer (mets to liver and lungs, received palliative folfox 2/16/21) presenting with abdominal pain, N/V, and distension. Admitted for medical management of LBO due to obstruction 2/2 transverse colon mass.                55 M with hx unresectable stage 4 colon cancer (mets to liver and lungs, received palliative folfox 2/16/21) presenting with abdominal pain, N/V, and distension. Admitted for medical management of LBO due to obstruction 2/2 transverse colon mass.

## 2021-03-09 NOTE — H&P ADULT - PROBLEM SELECTOR PLAN 2
- Metastatic to liver and lungs, s/p first session of palliative folfox (2/16/21) at [facility]  - CEA - 2882, PSA - WNL, CA 19-9 -2882 and AFP - 2775  - CT abd/p showing hepatic mets with some that have increased in size since 1/2021, enlarged prostate, along with a 1.3 x 0.9 cm right lower lobe nodule concerning for metastatic disease  - Will email Oncology consult  - Pain control regimen   - Palliative consult for pain control and GOC given metastatic cancer - Metastatic to liver and lungs, s/p first session of palliative folfox (2/16/21) at [facility]  - CEA - 2882, PSA - WNL, CA 19-9 -2882 and AFP - 2775  - CT abd/p showing hepatic mets with some that have increased in size since 1/2021, enlarged prostate, along with a 1.3 x 0.9 cm right lower lobe nodule concerning for metastatic disease  - Will email Oncology consult  - Pain control regimen - Morphine 2 mg IV q4 PRN   - Palliative consult for pain control and GOC given metastatic cancer - Metastatic to liver and lungs, s/p first session of palliative folfox (2/16/21) at [facility]  - CEA - 2882, PSA - WNL, CA 19-9 -2882 and AFP - 2775  - CT abd/p showing hepatic mets with some that have increased in size since 1/2021, enlarged prostate, along with a 1.3 x 0.9 cm right lower lobe nodule concerning for metastatic disease  - Will email Oncology consult  - Pain control regimen - Morphine 4 mg IV q4 PRN   - Palliative consult for pain control and GOC given metastatic cancer

## 2021-03-09 NOTE — H&P ADULT - PROBLEM SELECTOR PLAN 1
- 1 week hx abdominal pain/distension, N/V in the setting of metastatic colon cancer   - CT abd/p LBO involving the ascending and transverse colon measuring up to 8 cm secondary to the colonic mass seen in the distal transverse colon. In addition, the jejunum and terminal ileum are also dilated  - S/p 2L bolus in the ED, c/w IVF hydration   - Seen by Surgery, pt would like to defer operative intervention at this time   - NG tube placed for decompression, f/u CXR to confirm placement  - GI consult placed for evaluation of stent placement - 1 week hx abdominal pain/distension, N/V in the setting of metastatic colon cancer   - CT abd/p LBO involving the ascending and transverse colon measuring up to 8 cm secondary to the colonic mass seen in the distal transverse colon. In addition, the jejunum and terminal ileum are also dilated  - S/p 2L bolus in the ED, c/w IVF hydration   - Seen by Surgery, pt would like to defer operative intervention at this time   - NG tube placed for decompression, f/u CXR to confirm placement  - GI consult placed for evaluation of stent placement - advanced GI to discuss options in AM

## 2021-03-09 NOTE — ED PROVIDER NOTE - CLINICAL SUMMARY MEDICAL DECISION MAKING FREE TEXT BOX
56 y/o male with PMHx of Stage 4 colon cancer who presented to the ED for nausea, vomiting, and abd pain X 1 wk.  Concern for obstruction/worsening CA/UTI  Labs, UA, CT, IVF, Analgesia, Antiemetics

## 2021-03-09 NOTE — H&P ADULT - NSICDXFAMILYHX_GEN_ALL_CORE_FT
FAMILY HISTORY:  Father  Still living? Unknown  Family history of colon cancer, Age at diagnosis: Age Unknown  Family history of type 2 diabetes mellitus, Age at diagnosis: Age Unknown    Mother  Still living? Unknown  Family history of hypertension, Age at diagnosis: Age Unknown

## 2021-03-09 NOTE — H&P ADULT - PROBLEM SELECTOR PLAN 4
WBC 3.15 with 6.6% reactive lymphocytes   - Likely in the setting of chemotherapy  - No fevers or signs of infection   - Continue to trend CBC w/ differential

## 2021-03-09 NOTE — CONSULT NOTE ADULT - SUBJECTIVE AND OBJECTIVE BOX
GENERAL SURGERY CONSULT NOTE  Attending: Dr. Enciso  Service: B Team Surgery  Contact: [phone]    HPI  Patient is a 55yoM with PMH of vitiligo, recently diagnosed with unresectable colonic adenocarcinoma with primary mass in distal transverse colon and associated hepatic and right lower lobe metastases (1/25/21), currently on chemotherapy (last infusion 3 days ago), now presenting with a 1 week history of abdominal pain and distension. The patient states that the pain is diffuse and has been associated with nausea and multiple episodes of emesis. The patient most recently vomited earlier today. He continues to pass bowel movements, last BM was yesterday. He denies fever or chills.    In ED patient was initially tachycardic to 130's, but received 2 liters of fluid and HR came down to 90s. Patient is otherwise hemodynamically stable. Patient is afebrile with WBC of 3.15. CT scan was obtained which demonstrates a large bowel obstruction with a transition point at the site of the previously noted transverse colon mass. Ileocecal valve in incompetent with dilation of the ileal and transverse colon as well.  Surgery consulted to evaluate.     PMH/PSH  Vitiligo    Colon cancer      No significant past surgical history        MEDICATIONS  potassium chloride  10 mEq/100 mL IVPB 10 milliEquivalent(s) IV Intermittent every 1 hour      Allergies    No Known Allergies    Intolerances        Social    Physical Exam  T(C): 36.5 (03-09-21 @ 14:08), Max: 36.5 (03-09-21 @ 14:08)  HR: 99 (03-09-21 @ 16:15) (99 - 133)  BP: 108/78 (03-09-21 @ 16:15) (108/78 - 115/87)  RR: 16 (03-09-21 @ 16:15) (16 - 16)  SpO2: 100% (03-09-21 @ 16:15) (100% - 100%)  Wt(kg): --  Tmax: T(C): , Max: 36.5 (03-09-21 @ 14:08)  Wt(kg): --      Gen: NAD  Neuro: AAOx3  HEENT: normocephalic, atraumatic, no scleral icterus  CV: S1, S2, RRR  Pulm: CTA B/L  Abd: Distended, no tender, no rebound or guarding.   Ext: warm, no edema    LABS                        10.7   3.15  )-----------( 493      ( 09 Mar 2021 15:11 )             35.5     03-09    129<L>  |  89<L>  |  21  ----------------------------<  156<H>  3.3<L>   |  28  |  0.49<L>    Ca    8.1<L>      09 Mar 2021 15:11    TPro  6.5  /  Alb  3.0<L>  /  TBili  0.7  /  DBili  x   /  AST  37  /  ALT  20  /  AlkPhos  191<H>  03-09    PT/INR - ( 09 Mar 2021 15:34 )   PT: 14.4 sec;   INR: 1.28 ratio         PTT - ( 09 Mar 2021 15:34 )  PTT:28.7 sec              IMAGING  < from: CT Abdomen and Pelvis w/ IV Cont (03.09.21 @ 17:10) >  COMPARISON: CT abdomen pelvis 1/28/2021    CONTRAST/COMPLICATIONS:  IV Contrast: Omnipaque 350 / 90 cc administered / 10 cc discarded.  Oral Contrast: NONE  Complications: None reported at time of study completion    PROCEDURE:  CT of the Abdomen and Pelvis was performed.  Sagittal and coronal reformats were performed.    FINDINGS:  LOWER CHEST: Unchanged 4 mm pulmonary nodule in the right middle lobe. A 1.3 x 0.9 cm right lower lobe nodule concerning for metastatic disease (series 2 image 13). Linear atelectasis in the right lower lobe.    LIVER: Redemonstration of diffuse hepatic metastases, some of which have increased in size since 1/28/2021.  BILE DUCTS: Normal caliber.  GALLBLADDER: Within normal limits.  SPLEEN: Within normal limits.  PANCREAS: Within normal limits.  ADRENALS: Within normal limits.  KIDNEYS/URETERS: A right upper pole renal cyst. Bilateral subcentimeter hypodense foci too small to characterize. No hydronephrosis.    BLADDER: Within normal limits.  REPRODUCTIVE ORGANS: The prostate is enlarged with extension into the bladder as on prior.    BOWEL: The ascending and transverse colon are diffusely dilated measuring up to 8.0 cm (series 601 image 43) secondary to the colonic mass seen in the distal transverse colon representing a large bowel obstruction. In addition, the jejunum and terminal ileum are also dilated. No signs of ischemia.  PERITONEUM: No ascites.  VESSELS: Within normal limits.  RETROPERITONEUM/LYMPH NODES: No lymphadenopathy.  ABDOMINAL WALL: Within normal limits.  BONES: Mild degenerative changes.    IMPRESSION:  Large bowel obstruction involving the ascending and transverse colon measuring up to 8 cm secondary to the colonic mass seen in the distal transverse colon. In addition, the jejunum and terminal ileum are also dilated. These findings were discussed with Dr. ASIM NORTON at 3/9/2021 5:26 PM by Dr. Will with read back confirmation.    Hepatic metastases, some of which have increased in size since 1/28/2021.  A 1.3 x 0.9 cm right lower lobe nodule concerning for metastatic disease.            AGA WILL MD; Resident Radiology  This document has been electronically signed.  ANDREZ LEHMAN MD; Attending Radiologist  This document has been electronically signed. Mar  9 2021  6:05PM    < end of copied text >

## 2021-03-09 NOTE — PHYSICAL EXAM
[General Appearance - Alert] : alert [General Appearance - In No Acute Distress] : in no acute distress [Sclera] : the sclera and conjunctiva were normal [Normal Oral Mucosa] : normal oral mucosa [Neck Appearance] : the appearance of the neck was normal [] : no respiratory distress [FreeTextEntry1] : RLL williams [Heart Rate And Rhythm] : heart rate was normal and rhythm regular [Heart Sounds] : normal S1 and S2 [Edema] : there was no peripheral edema [Bowel Sounds] : normal bowel sounds [Abdomen Soft] : soft [Abdomen Tenderness] : non-tender [Skin Color & Pigmentation] : normal skin color and pigmentation [No Focal Deficits] : no focal deficits [Oriented To Time, Place, And Person] : oriented to person, place, and time [Affect] : the affect was normal

## 2021-03-09 NOTE — ASSESSMENT
[FreeTextEntry1] : 55yoM with:\par \par 1. Stage IV Colon Adenocarcinoma - on 1st line FOLFOX. \par \par 2. Nausea / vomiting / dehydration- Patient is in need of urgent IVF, antiemetics.  Will need imaging to r/o obstruction.\par   \par 3. Neoplasm related pain - Recommend IV opioids at this time. \par \par 4. Encounter for Palliative Care\par - HCP on file\par - Emotional support provided\par \par Recommend ED evaluation for management of uncontrolled symptoms and further work up. Daughter provided phone number for [facility].  Informed [facility] triage RN.  Follow up after discharge, call sooner with questions or issues.\par \par  \par  \par  \par

## 2021-03-09 NOTE — ED PROVIDER NOTE - CPE EDP GASTRO NORM
normal... PROVIDER:[TOKEN:[[id number]],FOLLOWUP:[1-3 Days]] PROVIDER:[TOKEN:[[id number]],FOLLOWUP:[1-3 Days]],PROVIDER:[TOKEN:[[id number]],FOLLOWUP:[1-3 Days]]

## 2021-03-09 NOTE — ED ADULT NURSE REASSESSMENT NOTE - NS ED NURSE REASSESS COMMENT FT1
Break RN: pt resting comfortably in bed, denies complaints/nausea at this time. LR continues to infuse as per MD orders. remains NSR on monitor. in NAD at this time. will continue to monitor.

## 2021-03-09 NOTE — H&P ADULT - NSHPPHYSICALEXAM_GEN_ALL_CORE
Vital Signs Last 24 Hrs  T(C): 36.7 (09 Mar 2021 20:41), Max: 36.7 (09 Mar 2021 20:41)  T(F): 98 (09 Mar 2021 20:41), Max: 98 (09 Mar 2021 20:41)  HR: 125 (09 Mar 2021 20:41) (99 - 133)  BP: 117/93 (09 Mar 2021 20:41) (108/78 - 117/93)  BP(mean): --  RR: 16 (09 Mar 2021 20:41) (16 - 16)  SpO2: 100% (09 Mar 2021 20:41) (100% - 100%)    GENERAL: Uncomfortable, pale, NG tube in place   HEENT:  Atraumatic, Normocephalic  CHEST/LUNG: Chest rise equal bilaterally. CTAB.   HEART: tachycardic, no murmurs   ABDOMEN: Abdomen distended and firm, generalized TTP.   EXTREMITIES:  2+ Peripheral Pulses.  PSYCH: A&Ox3  SKIN: Pale, vitiligo Vital Signs Last 24 Hrs  T(C): 36.7 (09 Mar 2021 20:41), Max: 36.7 (09 Mar 2021 20:41)  T(F): 98 (09 Mar 2021 20:41), Max: 98 (09 Mar 2021 20:41)  HR: 125 (09 Mar 2021 20:41) (99 - 133)  BP: 117/93 (09 Mar 2021 20:41) (108/78 - 117/93)  BP(mean): --  RR: 16 (09 Mar 2021 20:41) (16 - 16)  SpO2: 100% (09 Mar 2021 20:41) (100% - 100%)    GENERAL: Uncomfortable, pale, NG tube in place, chronically ill appearing   HEENT:  Atraumatic, Normocephalic  CHEST/LUNG: Chest rise equal bilaterally. CTAB.   HEART: tachycardic, no murmurs   ABDOMEN: Abdomen distended and firm, generalized TTP.   EXTREMITIES:  2+ Peripheral Pulses.  PSYCH: A&Ox3  SKIN: Pale, vitiligo, port on R side of chest

## 2021-03-09 NOTE — ED ADULT TRIAGE NOTE - CHIEF COMPLAINT QUOTE
Pt complaining of vomiting since Saturday. Pt received 2nd dose of chemo on Saturday and since then is unable to tolerate PO. Pt has Colon CA with mets to liver. Pt arrives with R chest wall port and abdomen appears distended. Hx BPH, Colon CA

## 2021-03-09 NOTE — ASSESSMENT
[______] : HCP: [unfilled] [FreeTextEntry1] : 55yoM with:\par \par 1. Stage IV Colon Adenocarcinoma - on 1st line FOLFOX\par \par 2. Pain 2/2 Neoplasm - c/w PRN Oxycodone IR 10mg. \par \par Medical cannabis certification completed today. Provided cannabis education, overview of state program, and discussed adverse effects in detail. Counseled that vaporized cannabis is not the preferred route of administration due to the fact that both short-term and long-term risks associated with vaporizing oils are not yet fully understood. Recommend starting with 1:1 THC:CBD formulation at low dose of THC (~2mg/dose).\par \par 3. Constipation - c/w miralax, prune juice\par \par 4. Encounter for Palliative Care - Emotional support provided.\par Health Care Proxy (HCP) form completed in office today after explanation of the role of a HCP.\par \par Follow up in 1 month, call sooner with questions or issues.\par

## 2021-03-09 NOTE — ED ADULT NURSE NOTE - NSIMPLEMENTINTERV_GEN_ALL_ED
Implemented All Fall Risk Interventions:  [place] to call system. Call bell, personal items and telephone within reach. Instruct patient to call for assistance. Room bathroom lighting operational. Non-slip footwear when patient is off stretcher. Physically safe environment: no spills, clutter or unnecessary equipment. Stretcher in lowest position, wheels locked, appropriate side rails in place. Provide visual cue, wrist band, yellow gown, etc. Monitor gait and stability. Monitor for mental status changes and reorient to person, place, and time. Review medications for side effects contributing to fall risk. Reinforce activity limits and safety measures with patient and family.

## 2021-03-09 NOTE — H&P ADULT - NSHPLABSRESULTS_GEN_ALL_CORE
10.7   3.15  )-----------( 493      ( 09 Mar 2021 15:11 )             35.5       03-09    129<L>  |  89<L>  |  21  ----------------------------<  156<H>  3.3<L>   |  28  |  0.49<L>    Ca    8.1<L>      09 Mar 2021 15:11    TPro  6.5  /  Alb  3.0<L>  /  TBili  0.7  /  DBili  x   /  AST  37  /  ALT  20  /  AlkPhos  191<H>  03-09                  PT/INR - ( 09 Mar 2021 15:34 )   PT: 14.4 sec;   INR: 1.28 ratio         PTT - ( 09 Mar 2021 15:34 )  PTT:28.7 sec    Lactate Trend  03-09 @ 19:58 Lactate:3.5             CAPILLARY BLOOD GLUCOSE

## 2021-03-10 ENCOUNTER — APPOINTMENT (OUTPATIENT)
Dept: UROLOGY | Facility: CLINIC | Age: 56
End: 2021-03-10

## 2021-03-10 DIAGNOSIS — G89.3 NEOPLASM RELATED PAIN (ACUTE) (CHRONIC): ICD-10-CM

## 2021-03-10 DIAGNOSIS — Z51.5 ENCOUNTER FOR PALLIATIVE CARE: ICD-10-CM

## 2021-03-10 DIAGNOSIS — R53.81 OTHER MALAISE: ICD-10-CM

## 2021-03-10 LAB
ALBUMIN SERPL ELPH-MCNC: 2.5 G/DL — LOW (ref 3.3–5)
ALBUMIN SERPL ELPH-MCNC: 2.8 G/DL — LOW (ref 3.3–5)
ALP SERPL-CCNC: 147 U/L — HIGH (ref 40–120)
ALP SERPL-CCNC: 174 U/L — HIGH (ref 40–120)
ALT FLD-CCNC: 14 U/L — SIGNIFICANT CHANGE UP (ref 4–41)
ALT FLD-CCNC: 17 U/L — SIGNIFICANT CHANGE UP (ref 4–41)
ANION GAP SERPL CALC-SCNC: 11 MMOL/L — SIGNIFICANT CHANGE UP (ref 7–14)
ANION GAP SERPL CALC-SCNC: 12 MMOL/L — SIGNIFICANT CHANGE UP (ref 7–14)
APPEARANCE UR: CLEAR — SIGNIFICANT CHANGE UP
APTT BLD: 28.7 SEC — SIGNIFICANT CHANGE UP (ref 27–36.3)
AST SERPL-CCNC: 33 U/L — SIGNIFICANT CHANGE UP (ref 4–40)
AST SERPL-CCNC: 35 U/L — SIGNIFICANT CHANGE UP (ref 4–40)
BACTERIA # UR AUTO: NEGATIVE — SIGNIFICANT CHANGE UP
BASOPHILS # BLD AUTO: 0.01 K/UL — SIGNIFICANT CHANGE UP (ref 0–0.2)
BASOPHILS NFR BLD AUTO: 0.3 % — SIGNIFICANT CHANGE UP (ref 0–2)
BILIRUB SERPL-MCNC: 0.6 MG/DL — SIGNIFICANT CHANGE UP (ref 0.2–1.2)
BILIRUB SERPL-MCNC: 0.6 MG/DL — SIGNIFICANT CHANGE UP (ref 0.2–1.2)
BILIRUB UR-MCNC: ABNORMAL
BUN SERPL-MCNC: 16 MG/DL — SIGNIFICANT CHANGE UP (ref 7–23)
BUN SERPL-MCNC: 18 MG/DL — SIGNIFICANT CHANGE UP (ref 7–23)
CALCIUM SERPL-MCNC: 7.4 MG/DL — LOW (ref 8.4–10.5)
CALCIUM SERPL-MCNC: 7.7 MG/DL — LOW (ref 8.4–10.5)
CHLORIDE SERPL-SCNC: 93 MMOL/L — LOW (ref 98–107)
CHLORIDE SERPL-SCNC: 96 MMOL/L — LOW (ref 98–107)
CO2 SERPL-SCNC: 24 MMOL/L — SIGNIFICANT CHANGE UP (ref 22–31)
CO2 SERPL-SCNC: 26 MMOL/L — SIGNIFICANT CHANGE UP (ref 22–31)
COLOR SPEC: YELLOW — SIGNIFICANT CHANGE UP
CREAT SERPL-MCNC: 0.44 MG/DL — LOW (ref 0.5–1.3)
CREAT SERPL-MCNC: 0.44 MG/DL — LOW (ref 0.5–1.3)
DIFF PNL FLD: ABNORMAL
EOSINOPHIL # BLD AUTO: 0.04 K/UL — SIGNIFICANT CHANGE UP (ref 0–0.5)
EOSINOPHIL NFR BLD AUTO: 1.3 % — SIGNIFICANT CHANGE UP (ref 0–6)
EPI CELLS # UR: 0 /HPF — SIGNIFICANT CHANGE UP (ref 0–5)
GLUCOSE BLDC GLUCOMTR-MCNC: 105 MG/DL — HIGH (ref 70–99)
GLUCOSE BLDC GLUCOMTR-MCNC: 77 MG/DL — SIGNIFICANT CHANGE UP (ref 70–99)
GLUCOSE BLDC GLUCOMTR-MCNC: 86 MG/DL — SIGNIFICANT CHANGE UP (ref 70–99)
GLUCOSE BLDC GLUCOMTR-MCNC: 91 MG/DL — SIGNIFICANT CHANGE UP (ref 70–99)
GLUCOSE SERPL-MCNC: 112 MG/DL — HIGH (ref 70–99)
GLUCOSE SERPL-MCNC: 91 MG/DL — SIGNIFICANT CHANGE UP (ref 70–99)
GLUCOSE UR QL: NEGATIVE — SIGNIFICANT CHANGE UP
HCT VFR BLD CALC: 29.7 % — LOW (ref 39–50)
HGB BLD-MCNC: 9.2 G/DL — LOW (ref 13–17)
HYALINE CASTS # UR AUTO: 0 /LPF — SIGNIFICANT CHANGE UP (ref 0–7)
IANC: 1.64 K/UL — SIGNIFICANT CHANGE UP (ref 1.5–8.5)
IMM GRANULOCYTES NFR BLD AUTO: 0.6 % — SIGNIFICANT CHANGE UP (ref 0–1.5)
INR BLD: 1.28 RATIO — HIGH (ref 0.88–1.16)
KETONES UR-MCNC: ABNORMAL
LACTATE SERPL-SCNC: 1.6 MMOL/L — SIGNIFICANT CHANGE UP (ref 0.5–2)
LEUKOCYTE ESTERASE UR-ACNC: NEGATIVE — SIGNIFICANT CHANGE UP
LYMPHOCYTES # BLD AUTO: 1.11 K/UL — SIGNIFICANT CHANGE UP (ref 1–3.3)
LYMPHOCYTES # BLD AUTO: 35.9 % — SIGNIFICANT CHANGE UP (ref 13–44)
MAGNESIUM SERPL-MCNC: 2.1 MG/DL — SIGNIFICANT CHANGE UP (ref 1.6–2.6)
MAGNESIUM SERPL-MCNC: 2.3 MG/DL — SIGNIFICANT CHANGE UP (ref 1.6–2.6)
MCHC RBC-ENTMCNC: 21.6 PG — LOW (ref 27–34)
MCHC RBC-ENTMCNC: 31 GM/DL — LOW (ref 32–36)
MCV RBC AUTO: 69.9 FL — LOW (ref 80–100)
MONOCYTES # BLD AUTO: 0.27 K/UL — SIGNIFICANT CHANGE UP (ref 0–0.9)
MONOCYTES NFR BLD AUTO: 8.7 % — SIGNIFICANT CHANGE UP (ref 2–14)
NEUTROPHILS # BLD AUTO: 1.64 K/UL — LOW (ref 1.8–7.4)
NEUTROPHILS NFR BLD AUTO: 53.2 % — SIGNIFICANT CHANGE UP (ref 43–77)
NITRITE UR-MCNC: NEGATIVE — SIGNIFICANT CHANGE UP
NRBC # BLD: 0 /100 WBCS — SIGNIFICANT CHANGE UP
NRBC # FLD: 0 K/UL — SIGNIFICANT CHANGE UP
OSMOLALITY UR: 817 MOSM/KG — SIGNIFICANT CHANGE UP (ref 50–1200)
PH UR: 7 — SIGNIFICANT CHANGE UP (ref 5–8)
PHOSPHATE SERPL-MCNC: 2 MG/DL — LOW (ref 2.5–4.5)
PHOSPHATE SERPL-MCNC: 2 MG/DL — LOW (ref 2.5–4.5)
PLATELET # BLD AUTO: 362 K/UL — SIGNIFICANT CHANGE UP (ref 150–400)
POTASSIUM SERPL-MCNC: 3.4 MMOL/L — LOW (ref 3.5–5.3)
POTASSIUM SERPL-MCNC: 3.7 MMOL/L — SIGNIFICANT CHANGE UP (ref 3.5–5.3)
POTASSIUM SERPL-SCNC: 3.4 MMOL/L — LOW (ref 3.5–5.3)
POTASSIUM SERPL-SCNC: 3.7 MMOL/L — SIGNIFICANT CHANGE UP (ref 3.5–5.3)
PROT SERPL-MCNC: 5.1 G/DL — LOW (ref 6–8.3)
PROT SERPL-MCNC: 5.9 G/DL — LOW (ref 6–8.3)
PROT UR-MCNC: ABNORMAL
PROTHROM AB SERPL-ACNC: 14.4 SEC — HIGH (ref 10.6–13.6)
RBC # BLD: 4.25 M/UL — SIGNIFICANT CHANGE UP (ref 4.2–5.8)
RBC # FLD: 21.7 % — HIGH (ref 10.3–14.5)
RBC CASTS # UR COMP ASSIST: 3 /HPF — SIGNIFICANT CHANGE UP (ref 0–4)
SARS-COV-2 IGG SERPL QL IA: POSITIVE
SARS-COV-2 IGM SERPL IA-ACNC: 122 INDEX — HIGH
SODIUM SERPL-SCNC: 131 MMOL/L — LOW (ref 135–145)
SODIUM SERPL-SCNC: 131 MMOL/L — LOW (ref 135–145)
SODIUM UR-SCNC: 26 MMOL/L — SIGNIFICANT CHANGE UP
SP GR SPEC: >1.05 (ref 1.01–1.02)
UROBILINOGEN FLD QL: ABNORMAL
WBC # BLD: 3.09 K/UL — LOW (ref 3.8–10.5)
WBC # FLD AUTO: 3.09 K/UL — LOW (ref 3.8–10.5)
WBC UR QL: 1 /HPF — SIGNIFICANT CHANGE UP (ref 0–5)

## 2021-03-10 PROCEDURE — 99223 1ST HOSP IP/OBS HIGH 75: CPT

## 2021-03-10 PROCEDURE — 12345: CPT | Mod: NC,GC

## 2021-03-10 PROCEDURE — 99223 1ST HOSP IP/OBS HIGH 75: CPT | Mod: GC

## 2021-03-10 PROCEDURE — 71045 X-RAY EXAM CHEST 1 VIEW: CPT | Mod: 26

## 2021-03-10 PROCEDURE — 99254 IP/OBS CNSLTJ NEW/EST MOD 60: CPT | Mod: GC,25

## 2021-03-10 RX ORDER — INFLUENZA VIRUS VACCINE 15; 15; 15; 15 UG/.5ML; UG/.5ML; UG/.5ML; UG/.5ML
0.5 SUSPENSION INTRAMUSCULAR ONCE
Refills: 0 | Status: DISCONTINUED | OUTPATIENT
Start: 2021-03-10 | End: 2021-03-29

## 2021-03-10 RX ORDER — MORPHINE SULFATE 50 MG/1
4 CAPSULE, EXTENDED RELEASE ORAL EVERY 4 HOURS
Refills: 0 | Status: DISCONTINUED | OUTPATIENT
Start: 2021-03-10 | End: 2021-03-16

## 2021-03-10 RX ORDER — POTASSIUM PHOSPHATE, MONOBASIC POTASSIUM PHOSPHATE, DIBASIC 236; 224 MG/ML; MG/ML
15 INJECTION, SOLUTION INTRAVENOUS ONCE
Refills: 0 | Status: COMPLETED | OUTPATIENT
Start: 2021-03-10 | End: 2021-03-10

## 2021-03-10 RX ORDER — SODIUM CHLORIDE 9 MG/ML
1000 INJECTION INTRAMUSCULAR; INTRAVENOUS; SUBCUTANEOUS
Refills: 0 | Status: DISCONTINUED | OUTPATIENT
Start: 2021-03-10 | End: 2021-03-13

## 2021-03-10 RX ORDER — TETRACAINE/BENZOCAINE/BUTAMBEN 2%-14%-2%
1 OINTMENT (GRAM) TOPICAL
Refills: 0 | Status: DISCONTINUED | OUTPATIENT
Start: 2021-03-10 | End: 2021-03-15

## 2021-03-10 RX ORDER — CHLORHEXIDINE GLUCONATE 213 G/1000ML
1 SOLUTION TOPICAL DAILY
Refills: 0 | Status: DISCONTINUED | OUTPATIENT
Start: 2021-03-10 | End: 2021-03-26

## 2021-03-10 RX ORDER — POTASSIUM CHLORIDE 20 MEQ
10 PACKET (EA) ORAL
Refills: 0 | Status: COMPLETED | OUTPATIENT
Start: 2021-03-10 | End: 2021-03-10

## 2021-03-10 RX ORDER — POTASSIUM PHOSPHATE, MONOBASIC POTASSIUM PHOSPHATE, DIBASIC 236; 224 MG/ML; MG/ML
15 INJECTION, SOLUTION INTRAVENOUS ONCE
Refills: 0 | Status: DISCONTINUED | OUTPATIENT
Start: 2021-03-10 | End: 2021-03-15

## 2021-03-10 RX ORDER — PANTOPRAZOLE SODIUM 20 MG/1
40 TABLET, DELAYED RELEASE ORAL DAILY
Refills: 0 | Status: DISCONTINUED | OUTPATIENT
Start: 2021-03-10 | End: 2021-03-15

## 2021-03-10 RX ADMIN — Medication 100 MILLIEQUIVALENT(S): at 04:38

## 2021-03-10 RX ADMIN — MORPHINE SULFATE 4 MILLIGRAM(S): 50 CAPSULE, EXTENDED RELEASE ORAL at 21:11

## 2021-03-10 RX ADMIN — SODIUM CHLORIDE 100 MILLILITER(S): 9 INJECTION INTRAMUSCULAR; INTRAVENOUS; SUBCUTANEOUS at 12:05

## 2021-03-10 RX ADMIN — MORPHINE SULFATE 4 MILLIGRAM(S): 50 CAPSULE, EXTENDED RELEASE ORAL at 20:56

## 2021-03-10 RX ADMIN — Medication 100 MILLIEQUIVALENT(S): at 05:53

## 2021-03-10 RX ADMIN — MORPHINE SULFATE 4 MILLIGRAM(S): 50 CAPSULE, EXTENDED RELEASE ORAL at 05:21

## 2021-03-10 RX ADMIN — POTASSIUM PHOSPHATE, MONOBASIC POTASSIUM PHOSPHATE, DIBASIC 62.5 MILLIMOLE(S): 236; 224 INJECTION, SOLUTION INTRAVENOUS at 11:59

## 2021-03-10 RX ADMIN — PANTOPRAZOLE SODIUM 40 MILLIGRAM(S): 20 TABLET, DELAYED RELEASE ORAL at 11:59

## 2021-03-10 RX ADMIN — Medication 100 MILLIEQUIVALENT(S): at 02:57

## 2021-03-10 NOTE — PROGRESS NOTE ADULT - ASSESSMENT
55 M with hx unresectable stage 4 colon cancer (mets to liver and lungs, received palliative folfox 2/16/21) presenting with abdominal pain, N/V, and distension. Admitted for medical management of LBO due to obstruction 2/2 transverse colon mass.

## 2021-03-10 NOTE — CONSULT NOTE ADULT - ASSESSMENT
INCOMPLETE NOTE 55 M with hx unresectable stage 4 colon cancer (mets to liver and lungs, received palliative folfox 2/16/21) presenting with abdominal pain, N/V, and distension. Admitted for medical management of LBO due to obstruction 2/2 transverse colon mass.     Palliative care consulted for pain management and goals of care.

## 2021-03-10 NOTE — CONSULT NOTE ADULT - SUBJECTIVE AND OBJECTIVE BOX
YAKELIN GERARDO  MRN-[id number]      Reason for Consult: metastatic colon cancer    HPI:  55 M with hx unresectable stage 4 colon cancer (mets to liver and lungs, received palliative folfox 2/16/21) follows with [facility] here for worsening nausea, vomiting (NBNB), abdominal distention and   generalized abdominal pain x 1 week. Pt sent in from [facility]. Pt received his first treatment of palliative folfox on 2/16/2021. Has been trying to tolerate solids but just ends up throwing them up. Has been able to void and pass gas, but no BM in a few days.    Pt denies any fever, chills, SOB, chest pain, dysuria, or diarrhea.     Of note, pt with recent hospitalization in 1/2021 where he was diagnosed with metastatic colon cancer to the liver and lungs. Pt treated for orthostatic hypotension and fevers likely in the setting of malignancy.     In the ED, pt's vitals were T 98 HR  /93 RR 16 % RA. Treated with 2L bolus (LR 1L, NS 1L), Zofran 4 mg IV, Morphine 4 mg IV, and KCl 10x3. Pt seen by Surgery and NG tube placed for decompression. (09 Mar 2021 21:54)      Colon cancer hx.  12/2020: presented to [facility] for abdominal discomfort and there he was found to have mass in distal TC with hepatic and RLL metastatic disease seen in CT scan.   1/18/2021: He had syncope at home and then saw PMD next day. He was sent to to [facility] from 1/19/2021. He underwent liver biopsy on 1/25/2021 which confirmed metastatic colon cancer. His CEA was elevated 2882 from 1/21/2021. His CT c/a/p from 1/28/2021 showed findings consistent with metastatic colon cancer evidenced by annular colonic mass and numerous hepatic metastases, groundglass pulmonary opacity   2/16/2021: initiated FOLFOX C1.   C2 3/4/2021          PAST MEDICAL & SURGICAL HISTORY:  Vitiligo    Colon cancer  metastatic to liver, lungs    No significant past surgical history        FAMILY HISTORY:  Family history of type 2 diabetes mellitus (Father)    Family history of hypertension (Mother)    Family history of colon cancer (Father)      Social History:  no smoking, no illicit drugs, lives with wife and 3 kids (09 Mar 2021 21:54)      Home Medications:  Centrum oral tablet: 1 tab(s) orally once a day (09 Mar 2021 23:26)  ferrous sulfate 324 mg (65 mg elemental iron) oral tablet: 1 tab(s) orally once a day (09 Mar 2021 23:26)  oxycodone 5mg: every 6 hours, As Needed pain  (09 Mar 2021 23:26)  prochlorperazine 10 mg oral tablet: orally every 6 hours, As Needed (09 Mar 2021 23:26)  Vitamin C 500 mg oral tablet: 1 tab(s) orally once a day (09 Mar 2021 23:26)    Allergies    No Known Allergies    Intolerances        MEDICATIONS  (STANDING):  enoxaparin Injectable 40 milliGRAM(s) SubCutaneous daily  pantoprazole  Injectable 40 milliGRAM(s) IV Push daily  potassium phosphate IVPB 15 milliMole(s) IV Intermittent once  sodium chloride 0.9%. 1000 milliLiter(s) (100 mL/Hr) IV Continuous <Continuous>    MEDICATIONS  (PRN):  morphine  - Injectable 4 milliGRAM(s) IV Push every 4 hours PRN Severe Pain (7 - 10)  ondansetron Injectable 4 milliGRAM(s) IV Push every 6 hours PRN Nausea and/or Vomiting  tetracaine/benzocaine/butamben Spray 1 Spray(s) Topical four times a day PRN throat pain, discomfort          Objectives:  Vital Signs Last 24 Hrs  T(C): 36.9 (10 Mar 2021 00:52), Max: 36.9 (10 Mar 2021 00:52)  T(F): 98.4 (10 Mar 2021 00:52), Max: 98.4 (10 Mar 2021 00:52)  HR: 104 (10 Mar 2021 05:50) (99 - 133)  BP: 124/78 (10 Mar 2021 05:50) (108/78 - 124/86)  BP(mean): --  RR: 18 (10 Mar 2021 05:50) (16 - 20)  SpO2: 100% (10 Mar 2021 05:50) (100% - 100%)    Physical exam  General - NAD, alert and oriented  HEENT - PERRLA, EOM intact, sclera and conjunctiva clear, oropharynx, nares clear  Neck - Supple, no thyromegaly or thyroid nodules, no bruits  Cardiovascular - RRR no m/r/g, no JVD, no carotid bruits  Lungs - CTAB, no use of acessory muscles, no w/c/r  Abdomen - Normal bowel sounds, NT/ND  Genito Urinary –   Lymph Nodes - No LAD  Extremeties - No e/c/c  Skin - No rashes, skin warm and dry, no erythematous areas  Musculo Skeletal - 5/5 strength, normal range of motion, no swollen or erythematous joints.  Neurological – Alert and oriented x 3, CN 2-12 grossly intact.          Labs:    CBC Full  -  ( 09 Mar 2021 15:11 )  WBC Count : 3.15 K/uL  RBC Count : 4.98 M/uL  Hemoglobin : 10.7 g/dL  Hematocrit : 35.5 %  Platelet Count - Automated : 493 K/uL  Mean Cell Volume : 71.3 fL  Mean Cell Hemoglobin : 21.5 pg  Mean Cell Hemoglobin Concentration : 30.1 gm/dL  Auto Neutrophil # : 1.77 K/uL  Auto Lymphocyte # : 1.03 K/uL  Auto Monocyte # : 0.09 K/uL  Auto Eosinophil # : 0.03 K/uL  Auto Basophil # : 0.00 K/uL  Auto Neutrophil % : 52.3 %  Auto Lymphocyte % : 32.7 %  Auto Monocyte % : 2.8 %  Auto Eosinophil % : 0.9 %  Auto Basophil % : 0.0 %    PT/INR - ( 09 Mar 2021 15:34 )   PT: 14.4 sec;   INR: 1.28 ratio         PTT - ( 09 Mar 2021 15:34 )  PTT:28.7 sec    03-10    131<L>  |  93<L>  |  18  ----------------------------<  112<H>  3.4<L>   |  26  |  0.44<L>    Ca    7.7<L>      10 Mar 2021 00:55  Phos  2.0     03-10  Mg     2.3     03-10    TPro  5.9<L>  /  Alb  2.8<L>  /  TBili  0.6  /  DBili  x   /  AST  33  /  ALT  17  /  AlkPhos  174<H>  03-10    LIVER FUNCTIONS - ( 10 Mar 2021 00:55 )  Alb: 2.8 g/dL / Pro: 5.9 g/dL / ALK PHOS: 174 U/L / ALT: 17 U/L / AST: 33 U/L / GGT: x             Urinalysis Basic - ( 10 Mar 2021 00:55 )    Color: Yellow / Appearance: Clear / SG: >1.050 / pH: x  Gluc: x / Ketone: Moderate  / Bili: Small / Urobili: 6 mg/dL   Blood: x / Protein: 30 mg/dL / Nitrite: Negative   Leuk Esterase: Negative / RBC: 3 /HPF / WBC 1 /HPF   Sq Epi: x / Non Sq Epi: 0 /HPF / Bacteria: Negative            Imagings:    < from: CT Abdomen and Pelvis w/ IV Cont (03.09.21 @ 17:10) >  IMPRESSION:  Large bowel obstruction involving the ascending and transverse colon measuring up to 8 cm secondary to the colonic mass seen in the distal transverse colon. In addition, the jejunum and terminal ileum are also dilated. These findings were discussed with Dr. ASIM NORTON at 3/9/2021 5:26 PM by Dr. Will with read back confirmation.    Hepatic metastases, some of which have increased in size since 1/28/2021.  A 1.3 x 0.9 cm right lower lobe nodule concerning for metastatic disease.        < end of copied text >     YAKELIN GERARDO  MRN-[id number]      Reason for Consult: metastatic colon cancer    HPI:  55 M with hx unresectable stage 4 colon cancer (mets to liver and lungs, received palliative folfox 2/16/21) follows with [facility] here for worsening nausea, vomiting (NBNB), abdominal distention and   generalized abdominal pain x 1 week. Pt sent in from [facility]. Pt received his first treatment of palliative folfox on 2/16/2021. Has been trying to tolerate solids but just ends up throwing them up. Has been able to void and pass gas, but no BM in a few days.    Pt denies any fever, chills, SOB, chest pain, dysuria, or diarrhea.     Of note, pt with recent hospitalization in 1/2021 where he was diagnosed with metastatic colon cancer to the liver and lungs. Pt treated for orthostatic hypotension and fevers likely in the setting of malignancy.     In the ED, pt's vitals were T 98 HR  /93 RR 16 % RA. Treated with 2L bolus (LR 1L, NS 1L), Zofran 4 mg IV, Morphine 4 mg IV, and KCl 10x3. Pt seen by Surgery and NG tube placed for decompression. (09 Mar 2021 21:54)      Colon cancer hx.  12/2020: presented to [facility] for abdominal discomfort and there he was found to have mass in distal TC with hepatic and RLL metastatic disease seen in CT scan.   1/18/2021: He had syncope at home and then saw PMD next day. He was sent to to [facility] from 1/19/2021. He underwent liver biopsy on 1/25/2021 which confirmed metastatic colon cancer. His CEA was elevated 2882 from 1/21/2021. His CT c/a/p from 1/28/2021 showed findings consistent with metastatic colon cancer evidenced by annular colonic mass and numerous hepatic metastases, groundglass pulmonary opacity   2/16/2021: initiated FOLFOX C1.   C2 3/4/2021          PAST MEDICAL & SURGICAL HISTORY:  Vitiligo    Colon cancer  metastatic to liver, lungs    No significant past surgical history        FAMILY HISTORY:  Family history of type 2 diabetes mellitus (Father)    Family history of hypertension (Mother)    Family history of colon cancer (Father)      Social History:  no smoking, no illicit drugs, lives with wife and 3 kids (09 Mar 2021 21:54)      Home Medications:  Centrum oral tablet: 1 tab(s) orally once a day (09 Mar 2021 23:26)  ferrous sulfate 324 mg (65 mg elemental iron) oral tablet: 1 tab(s) orally once a day (09 Mar 2021 23:26)  oxycodone 5mg: every 6 hours, As Needed pain  (09 Mar 2021 23:26)  prochlorperazine 10 mg oral tablet: orally every 6 hours, As Needed (09 Mar 2021 23:26)  Vitamin C 500 mg oral tablet: 1 tab(s) orally once a day (09 Mar 2021 23:26)    Allergies    No Known Allergies    Intolerances        MEDICATIONS  (STANDING):  enoxaparin Injectable 40 milliGRAM(s) SubCutaneous daily  pantoprazole  Injectable 40 milliGRAM(s) IV Push daily  potassium phosphate IVPB 15 milliMole(s) IV Intermittent once  sodium chloride 0.9%. 1000 milliLiter(s) (100 mL/Hr) IV Continuous <Continuous>    MEDICATIONS  (PRN):  morphine  - Injectable 4 milliGRAM(s) IV Push every 4 hours PRN Severe Pain (7 - 10)  ondansetron Injectable 4 milliGRAM(s) IV Push every 6 hours PRN Nausea and/or Vomiting  tetracaine/benzocaine/butamben Spray 1 Spray(s) Topical four times a day PRN throat pain, discomfort          Objectives:  Vital Signs Last 24 Hrs  T(C): 36.9 (10 Mar 2021 00:52), Max: 36.9 (10 Mar 2021 00:52)  T(F): 98.4 (10 Mar 2021 00:52), Max: 98.4 (10 Mar 2021 00:52)  HR: 104 (10 Mar 2021 05:50) (99 - 133)  BP: 124/78 (10 Mar 2021 05:50) (108/78 - 124/86)  BP(mean): --  RR: 18 (10 Mar 2021 05:50) (16 - 20)  SpO2: 100% (10 Mar 2021 05:50) (100% - 100%)    Physical exam  General - NAD, alert and oriented  HEENT - PERRLA  Neck - Supple  Cardiovascular - RRR no m/r/g  Lungs - CTAB, no use of acessory muscles, no w/c/r  Abdomen - distended, non tender  Extremeties - No e/c/c  Skin - No rashes, skin warm and dry, no erythematous areas  Neurological – Alert and oriented x 3          Labs:    CBC Full  -  ( 09 Mar 2021 15:11 )  WBC Count : 3.15 K/uL  RBC Count : 4.98 M/uL  Hemoglobin : 10.7 g/dL  Hematocrit : 35.5 %  Platelet Count - Automated : 493 K/uL  Mean Cell Volume : 71.3 fL  Mean Cell Hemoglobin : 21.5 pg  Mean Cell Hemoglobin Concentration : 30.1 gm/dL  Auto Neutrophil # : 1.77 K/uL  Auto Lymphocyte # : 1.03 K/uL  Auto Monocyte # : 0.09 K/uL  Auto Eosinophil # : 0.03 K/uL  Auto Basophil # : 0.00 K/uL  Auto Neutrophil % : 52.3 %  Auto Lymphocyte % : 32.7 %  Auto Monocyte % : 2.8 %  Auto Eosinophil % : 0.9 %  Auto Basophil % : 0.0 %    PT/INR - ( 09 Mar 2021 15:34 )   PT: 14.4 sec;   INR: 1.28 ratio         PTT - ( 09 Mar 2021 15:34 )  PTT:28.7 sec    03-10    131<L>  |  93<L>  |  18  ----------------------------<  112<H>  3.4<L>   |  26  |  0.44<L>    Ca    7.7<L>      10 Mar 2021 00:55  Phos  2.0     03-10  Mg     2.3     03-10    TPro  5.9<L>  /  Alb  2.8<L>  /  TBili  0.6  /  DBili  x   /  AST  33  /  ALT  17  /  AlkPhos  174<H>  03-10    LIVER FUNCTIONS - ( 10 Mar 2021 00:55 )  Alb: 2.8 g/dL / Pro: 5.9 g/dL / ALK PHOS: 174 U/L / ALT: 17 U/L / AST: 33 U/L / GGT: x             Urinalysis Basic - ( 10 Mar 2021 00:55 )    Color: Yellow / Appearance: Clear / SG: >1.050 / pH: x  Gluc: x / Ketone: Moderate  / Bili: Small / Urobili: 6 mg/dL   Blood: x / Protein: 30 mg/dL / Nitrite: Negative   Leuk Esterase: Negative / RBC: 3 /HPF / WBC 1 /HPF   Sq Epi: x / Non Sq Epi: 0 /HPF / Bacteria: Negative            Imagings:    < from: CT Abdomen and Pelvis w/ IV Cont (03.09.21 @ 17:10) >  IMPRESSION:  Large bowel obstruction involving the ascending and transverse colon measuring up to 8 cm secondary to the colonic mass seen in the distal transverse colon. In addition, the jejunum and terminal ileum are also dilated. These findings were discussed with Dr. ASIM NORTON at 3/9/2021 5:26 PM by Dr. Will with read back confirmation.    Hepatic metastases, some of which have increased in size since 1/28/2021.  A 1.3 x 0.9 cm right lower lobe nodule concerning for metastatic disease.        < end of copied text >

## 2021-03-10 NOTE — PROGRESS NOTE ADULT - PROBLEM SELECTOR PLAN 6
- Prior iron studies cw iron def Anemia plus AOCD   - Monitor H/H and transfuse to keep Hgb > 7  - Hold oral ferrous sulfate with vit C supplementation given NG tube to suction

## 2021-03-10 NOTE — CONSULT NOTE ADULT - ASSESSMENT
55yoM with PMH of vitiligo, recently diagnosed with stage 4 colonic adenocarcinoma with primary mass in distal transverse colon and associated hepatic and right lung lower lobe metastases (1/25/21), currently on chemotherapy (last infusion 3 days ago), now presenting with a 1 week history of abdominal pain and distension. The patient states that the pain is diffuse and has been associated with nausea and multiple episodes of emesis. The patient most recently vomited earlier today.   He continues to pass flatus, last BM was 2 days ago ( Monday 03/08).   He denies fever or chills.  In ED patient was initially tachycardic to 130's, but received 2 liters of fluid and HR came down to 90s. Patient is otherwise hemodynamically stable. Patient is afebrile with WBC of 3.15.   CT scan was obtained which demonstrates a large bowel obstruction with a transition point at the site of the previously noted transverse colon mass.   Ileocecal valve in incompetent with dilation of the ileal and transverse colon as well.      Impression:  # LBO 2nd known colonic mass with incompetent ICV causing small bowel dilation   # Colonic CA with liver and lung mets on palliative chemotherapy     Recommendations:  - Keep NPO  - NG tube for decompression  - Will discuss with attending role of anitha Looney   Gastroenterology Fellow  Pager: [phone]  Please call answering service [phone] / on-call GI fellow after 5pm and before 8am, and on weekends.

## 2021-03-10 NOTE — CONSULT NOTE ADULT - PROBLEM SELECTOR RECOMMENDATION 3
Medical management with NGT decompression and pain management  - can consider Dexamethasone and Octreotide for bowel obstruction if not improving  - GI following  - Sx following- no surgical intervention

## 2021-03-10 NOTE — PROGRESS NOTE ADULT - PROBLEM SELECTOR PLAN 7
- Diet: NPO  - DVT PPX: lovenox SQ  - Dispo: pending clinical improvement  - GOC: pt will speak more with wife and children, palliative consult for pain control and further assistance

## 2021-03-10 NOTE — CONSULT NOTE ADULT - PROBLEM SELECTOR RECOMMENDATION 4
Will continue to follow for symptom management and goals of care  - Call made to patient's wife, Jolie. No answer, will follow up tomorrow.   - Currently building rapport    Sushma Freitas DO  Hospice and Palliative Care Fellow   [facility] Pager [phone]  [facility] Pager [phone]

## 2021-03-10 NOTE — CONSULT NOTE ADULT - PROBLEM SELECTOR RECOMMENDATION 9
Likely 2/2 abdominal distension in the setting of large bowel obstruction  - Continue Morphine 4 mg IV q4 PRN  - bowel regimen while on opiates to prevent OIC- dulcolax suppository qd PRN

## 2021-03-10 NOTE — PROGRESS NOTE ADULT - PROBLEM SELECTOR PLAN 1
- 1 week hx abdominal pain/distension, N/V in the setting of metastatic colon cancer   - CT abd/p LBO involving the ascending and transverse colon measuring up to 8 cm secondary to the colonic mass seen in the distal transverse colon. In addition, the jejunum and terminal ileum are also dilated  - S/p 2L bolus in the ED, c/w IVF hydration   - Seen by Surgery, pt would like to defer operative intervention at this time   - NG tube placed for decompression  - GI consult placed for evaluation of stent placement - advanced GI to discuss options   - Surgery deferring to GI

## 2021-03-10 NOTE — ED ADULT NURSE REASSESSMENT NOTE - NS ED NURSE REASSESS COMMENT FT1
3rd potassium chloride infusion given at 5:53am. Unable to sign off medication in the eMAR. Floor JAMIR Whiteside made aware.

## 2021-03-10 NOTE — CONSULT NOTE ADULT - SUBJECTIVE AND OBJECTIVE BOX
HPI:  55 M with hx unresectable stage 4 colon cancer (mets to liver and lungs, received palliative folfox 2/16/21) follows with [facility] here for worsening nausea, vomiting (NBNB), abdominal distention and   generalized abdominal pain x 1 week. Pt sent in from [facility]. Pt received his first treatment of palliative folfox on 2/16/2021. Has been trying to tolerate solids but just ends up throwing them up. Has been able to void and pass gas, but no BM in a few days.    Pt denies any fever, chills, SOB, chest pain, dysuria, or diarrhea.     Of note, pt with recent hospitalization in 1/2021 where he was diagnosed with metastatic colon cancer to the liver and lungs. Pt treated for orthostatic hypotension and fevers likely in the setting of malignancy.     In the ED, pt's vitals were T 98 HR  /93 RR 16 % RA. Treated with 2L bolus (LR 1L, NS 1L), Zofran 4 mg IV, Morphine 4 mg IV, and KCl 10x3. Pt seen by Surgery and NG tube placed for decompression. (09 Mar 2021 21:54)    PERTINENT PM/SXH:   Vitiligo    Colon cancer      No significant past surgical history      FAMILY HISTORY:  Family history of type 2 diabetes mellitus (Father)    Family history of hypertension (Mother)    Family history of colon cancer (Father)      ITEMS NOT CHECKED ARE NOT PRESENT    SOCIAL HISTORY:   Significant other/partner[ ]  Children[ ]  Denominational/Spirituality:  Substance hx:  [ ]   Tobacco hx:  [ ]   Alcohol hx: [ ]   Home Opioid hx:  [ ] I-Stop Reference No:  Living Situation: [ ]Home  [ ]Long term care  [ ]Rehab [ ]Other    ADVANCE DIRECTIVES:    DNR  MOLST  [ ]  Living Will  [ ]   DECISION MAKER(s):  [ ] Health Care Proxy(s)  [ ] Surrogate(s)  [ ] Guardian           Name(s): Phone Number(s):    BASELINE (I)ADL(s) (prior to admission):  [place]: [ ]Total  [ ] Moderate [ ]Dependent    Allergies    No Known Allergies    Intolerances    MEDICATIONS  (STANDING):  chlorhexidine 4% Liquid 1 Application(s) Topical daily  enoxaparin Injectable 40 milliGRAM(s) SubCutaneous daily  influenza   Vaccine 0.5 milliLiter(s) IntraMuscular once  pantoprazole  Injectable 40 milliGRAM(s) IV Push daily  potassium phosphate IVPB 15 milliMole(s) IV Intermittent once  sodium chloride 0.9%. 1000 milliLiter(s) (100 mL/Hr) IV Continuous <Continuous>    MEDICATIONS  (PRN):  morphine  - Injectable 4 milliGRAM(s) IV Push every 4 hours PRN Severe Pain (7 - 10)  ondansetron Injectable 4 milliGRAM(s) IV Push every 6 hours PRN Nausea and/or Vomiting  tetracaine/benzocaine/butamben Spray 1 Spray(s) Topical four times a day PRN throat pain, discomfort    PRESENT SYMPTOMS: [ ]Unable to obtain due to poor mentation   Source if other than patient:  [ ]Family   [ ]Team     Pain: [ ]yes [ ]no  QOL impact -   Location -                    Aggravating factors -  Quality -  Radiation -  Timing-  Severity (0-10 scale):  Minimal acceptable level (0-10 scale):     CPOT:    https://www.[facility].org/getattachment/[email or web address]/Critical-Care-Pain-Observation-Tool-(CPOT)      PAIN AD Score:     http://geriatrictoolkit.[email or web address]/cog/painad.pdf (press ctrl +  left click to view)    Dyspnea:                           [ ]Mild [ ]Moderate [ ]Severe  Anxiety:                             [ ]Mild [ ]Moderate [ ]Severe  Fatigue:                             [ ]Mild [ ]Moderate [ ]Severe  Nausea:                             [ ]Mild [ ]Moderate [ ]Severe  Loss of appetite:              [ ]Mild [ ]Moderate [ ]Severe  Constipation:                    [ ]Mild [ ]Moderate [ ]Severe    Other Symptoms:  [ ]All other review of systems negative     Palliative Performance Status Version 2:         %    http://npcrc.org/files/news/palliative_performance_scale_ppsv2.pdf  PHYSICAL EXAM:  Vital Signs Last 24 Hrs  T(C): 36.3 (10 Mar 2021 09:40), Max: 36.9 (10 Mar 2021 00:52)  T(F): 97.3 (10 Mar 2021 09:40), Max: 98.4 (10 Mar 2021 00:52)  HR: 100 (10 Mar 2021 09:40) (99 - 133)  BP: 113/77 (10 Mar 2021 09:40) (108/78 - 124/86)  BP(mean): --  RR: 18 (10 Mar 2021 09:40) (16 - 20)  SpO2: 100% (10 Mar 2021 09:40) (100% - 100%) I&O's Summary    GENERAL:  [ ]Alert  [ ]Oriented x   [ ]Lethargic  [ ]Cachexia  [ ]Unarousable  [ ]Verbal  [ ]Non-Verbal  Behavioral:   [ ] Anxiety  [ ] Delirium [ ] Agitation [ ] Other  HEENT:  [ ]Normal   [ ]Dry mouth   [ ]ET Tube/Trach  [ ]Oral lesions  PULMONARY:   [ ]Clear [ ]Tachypnea  [ ]Audible excessive secretions   [ ]Rhonchi        [ ]Right [ ]Left [ ]Bilateral  [ ]Crackles        [ ]Right [ ]Left [ ]Bilateral  [ ]Wheezing     [ ]Right [ ]Left [ ]Bilateral  [ ]Diminished breath sounds [ ]right [ ]left [ ]bilateral  CARDIOVASCULAR:    [ ]Regular [ ]Irregular [ ]Tachy  [ ]Mikey [ ]Murmur [ ]Other  GASTROINTESTINAL:  [ ]Soft  [ ]Distended   [ ]+BS  [ ]Non tender [ ]Tender  [ ]PEG [ ]OGT/ NGT  Last BM:   GENITOURINARY:  [ ]Normal [ ] Incontinent   [ ]Oliguria/Anuria   [ ]Judd  MUSCULOSKELETAL:   [ ]Normal   [ ]Weakness  [ ]Bed/Wheelchair bound [ ]Edema  NEUROLOGIC:   [ ]No focal deficits  [ ]Cognitive impairment  [ ]Dysphagia [ ]Dysarthria [ ]Paresis [ ]Other   SKIN:   [ ]Normal    [ ]Rash  [ ]Pressure ulcer(s)       Present on admission [ ]y [ ]n    CRITICAL CARE:  [ ] Shock Present  [ ]Septic [ ]Cardiogenic [ ]Neurologic [ ]Hypovolemic  [ ]  Vasopressors [ ]  Inotropes   [ ]Respiratory failure present [ ]Mechanical ventilation [ ]Non-invasive ventilatory support [ ]High flow  [ ]Acute  [ ]Chronic [ ]Hypoxic  [ ]Hypercarbic [ ]Other  [ ]Other organ failure     LABS:                        9.2    3.09  )-----------( 362      ( 10 Mar 2021 10:07 )             29.7   03-10    131<L>  |  96<L>  |  16  ----------------------------<  91  3.7   |  24  |  0.44<L>    Ca    7.4<L>      10 Mar 2021 10:07  Phos  2.0     03-10  Mg     2.1     03-10    TPro  5.1<L>  /  Alb  2.5<L>  /  TBili  0.6  /  DBili  x   /  AST  35  /  ALT  14  /  AlkPhos  147<H>  03-10  PT/INR - ( 10 Mar 2021 10:07 )   PT: 14.4 sec;   INR: 1.28 ratio         PTT - ( 10 Mar 2021 10:07 )  PTT:28.7 sec    Urinalysis Basic - ( 10 Mar 2021 00:55 )    Color: Yellow / Appearance: Clear / SG: >1.050 / pH: x  Gluc: x / Ketone: Moderate  / Bili: Small / Urobili: 6 mg/dL   Blood: x / Protein: 30 mg/dL / Nitrite: Negative   Leuk Esterase: Negative / RBC: 3 /HPF / WBC 1 /HPF   Sq Epi: x / Non Sq Epi: 0 /HPF / Bacteria: Negative      RADIOLOGY & ADDITIONAL STUDIES:    PROTEIN CALORIE MALNUTRITION PRESENT: [ ]mild [ ]moderate [ ]severe [ ]underweight [ ]morbid obesity  https://www.andeal.org/vault/2440/web/files/ONC/Table_Clinical%20Characteristics%20to%20Document%20Malnutrition-White%20JV%20et%20al%066072.pdf    Height (cm): 167.6 (03-10-21 @ 12:28), 168 (02-16-21 @ 10:09), 167.6 (01-20-21 @ 08:52)  Weight (kg): 47.6 (03-10-21 @ 12:28), 49 (03-04-21 @ 08:24), 51.7 (01-20-21 @ 08:12)  BMI (kg/m2): 16.9 (03-10-21 @ 12:28), 17.4 (03-04-21 @ 08:24), 18.3 (02-16-21 @ 10:09)    [ ]PPSV2 < or = to 30% [ ]significant weight loss  [ ]poor nutritional intake  [ ]anasarca     Albumin, Serum: 2.5 g/dL (03-10-21 @ 10:07)   [ ]Artificial Nutrition      REFERRALS:   [ ]Chaplaincy  [ ]Hospice  [ ]Child Life  [ ]Social Work  [ ]Case management [ ]Holistic Therapy     Goals of Care Document:      HPI: 55 M with hx unresectable stage 4 colon cancer (mets to liver and lungs, received palliative folfox 2/16/21) follows with [facility] here for worsening nausea, vomiting (NBNB), abdominal distention and   generalized abdominal pain x 1 week. Pt sent in from [facility]. Pt received his first treatment of palliative folfox on 2/16/2021. Has been trying to tolerate solids but just ends up throwing them up. Has been able to void and pass gas, but no BM in a few days.    Pt denies any fever, chills, SOB, chest pain, dysuria, or diarrhea.     Of note, pt with recent hospitalization in 1/2021 where he was diagnosed with metastatic colon cancer to the liver and lungs. Pt treated for orthostatic hypotension and fevers likely in the setting of malignancy.     In the ED, pt's vitals were T 98 HR  /93 RR 16 % RA. Treated with 2L bolus (LR 1L, NS 1L), Zofran 4 mg IV, Morphine 4 mg IV, and KCl 10x3. Pt seen by Surgery and NG tube placed for decompression. (09 Mar 2021 21:54)    Interval events: Patient seen and evaluated.     PERTINENT PM/SXH:   Vitiligo    Colon cancer      No significant past surgical history      FAMILY HISTORY:  Family history of type 2 diabetes mellitus (Father)    Family history of hypertension (Mother)    Family history of colon cancer (Father)      ITEMS NOT CHECKED ARE NOT PRESENT    SOCIAL HISTORY:   Significant other/partner[ ]  Children[ ]  Caodaism/Spirituality:  Substance hx:  [ ]   Tobacco hx:  [ ]   Alcohol hx: [ ]   Home Opioid hx:  [ ] I-Stop Reference No: [id number]  02/09/2021	02/09/2021	oxycodone hcl 5 mg tablet	28	7	  Living Situation: [ x]Home  [ ]Long term care  [ ]Rehab [ ]Other    ADVANCE DIRECTIVES:    DNR  MOLST  [ ]  Living Will  [ ]   DECISION MAKER(s):  [ ] Health Care Proxy(s)  [ ] Surrogate(s)  [ ] Guardian           Name(s): Phone Number(s):    BASELINE (I)ADL(s) (prior to admission):  [place]: [ ]Total  [ ] Moderate [ ]Dependent    Allergies    No Known Allergies    Intolerances    MEDICATIONS  (STANDING):  chlorhexidine 4% Liquid 1 Application(s) Topical daily  enoxaparin Injectable 40 milliGRAM(s) SubCutaneous daily  influenza   Vaccine 0.5 milliLiter(s) IntraMuscular once  pantoprazole  Injectable 40 milliGRAM(s) IV Push daily  potassium phosphate IVPB 15 milliMole(s) IV Intermittent once  sodium chloride 0.9%. 1000 milliLiter(s) (100 mL/Hr) IV Continuous <Continuous>    MEDICATIONS  (PRN):  morphine  - Injectable 4 milliGRAM(s) IV Push every 4 hours PRN Severe Pain (7 - 10)  ondansetron Injectable 4 milliGRAM(s) IV Push every 6 hours PRN Nausea and/or Vomiting  tetracaine/benzocaine/butamben Spray 1 Spray(s) Topical four times a day PRN throat pain, discomfort    PRESENT SYMPTOMS: [ ]Unable to obtain due to poor mentation   Source if other than patient:  [ ]Family   [ ]Team     Pain: [ ]yes [ ]no  QOL impact -   Location -                    Aggravating factors -  Quality -  Radiation -  Timing-  Severity (0-10 scale):  Minimal acceptable level (0-10 scale):     CPOT:    https://www.[facility].org/getattachment/[email or web address]/Critical-Care-Pain-Observation-Tool-(CPOT)      PAIN AD Score:     http://geriatrictoolkit.[place].[facility]/cog/painad.pdf (press ctrl +  left click to view)    Dyspnea:                           [ ]Mild [ ]Moderate [ ]Severe  Anxiety:                             [ ]Mild [ ]Moderate [ ]Severe  Fatigue:                             [ ]Mild [ ]Moderate [ ]Severe  Nausea:                             [ ]Mild [ ]Moderate [ ]Severe  Loss of appetite:              [ ]Mild [ ]Moderate [ ]Severe  Constipation:                    [ ]Mild [ ]Moderate [ ]Severe    Other Symptoms:  [ ]All other review of systems negative     Palliative Performance Status Version 2:   30-40   %    http://npcrc.org/files/news/palliative_performance_scale_ppsv2.pdf  PHYSICAL EXAM:  Vital Signs Last 24 Hrs  T(C): 36.3 (10 Mar 2021 09:40), Max: 36.9 (10 Mar 2021 00:52)  T(F): 97.3 (10 Mar 2021 09:40), Max: 98.4 (10 Mar 2021 00:52)  HR: 100 (10 Mar 2021 09:40) (99 - 133)  BP: 113/77 (10 Mar 2021 09:40) (108/78 - 124/86)  BP(mean): --  RR: 18 (10 Mar 2021 09:40) (16 - 20)  SpO2: 100% (10 Mar 2021 09:40) (100% - 100%) I&O's Summary    GENERAL:  [ ]Alert  [ ]Oriented x   [ ]Lethargic  [ ]Cachexia  [ ]Unarousable  [ ]Verbal  [ ]Non-Verbal  Behavioral:   [ ] Anxiety  [ ] Delirium [ ] Agitation [ ] Other  HEENT:  [ ]Normal   [ ]Dry mouth   [ ]ET Tube/Trach  [ ]Oral lesions  PULMONARY:   [ ]Clear [ ]Tachypnea  [ ]Audible excessive secretions   [ ]Rhonchi        [ ]Right [ ]Left [ ]Bilateral  [ ]Crackles        [ ]Right [ ]Left [ ]Bilateral  [ ]Wheezing     [ ]Right [ ]Left [ ]Bilateral  [ ]Diminished breath sounds [ ]right [ ]left [ ]bilateral  CARDIOVASCULAR:    [ ]Regular [ ]Irregular [ ]Tachy  [ ]Mikey [ ]Murmur [ ]Other  GASTROINTESTINAL:  [ ]Soft  [ ]Distended   [ ]+BS  [ ]Non tender [ ]Tender  [ ]PEG [ ]OGT/ NGT  Last BM:   GENITOURINARY:  [ ]Normal [ ] Incontinent   [ ]Oliguria/Anuria   [ ]Judd  MUSCULOSKELETAL:   [ ]Normal   [ ]Weakness  [ ]Bed/Wheelchair bound [ ]Edema  NEUROLOGIC:   [ ]No focal deficits  [ ]Cognitive impairment  [ ]Dysphagia [ ]Dysarthria [ ]Paresis [ ]Other   SKIN:   [ ]Normal    [ ]Rash  [ ]Pressure ulcer(s)       Present on admission [ ]y [ ]n    CRITICAL CARE:  [ ] Shock Present  [ ]Septic [ ]Cardiogenic [ ]Neurologic [ ]Hypovolemic  [ ]  Vasopressors [ ]  Inotropes   [ ]Respiratory failure present [ ]Mechanical ventilation [ ]Non-invasive ventilatory support [ ]High flow  [ ]Acute  [ ]Chronic [ ]Hypoxic  [ ]Hypercarbic [ ]Other  [ ]Other organ failure     LABS:                        9.2    3.09  )-----------( 362      ( 10 Mar 2021 10:07 )             29.7   03-10    131<L>  |  96<L>  |  16  ----------------------------<  91  3.7   |  24  |  0.44<L>    Ca    7.4<L>      10 Mar 2021 10:07  Phos  2.0     03-10  Mg     2.1     03-10    TPro  5.1<L>  /  Alb  2.5<L>  /  TBili  0.6  /  DBili  x   /  AST  35  /  ALT  14  /  AlkPhos  147<H>  03-10  PT/INR - ( 10 Mar 2021 10:07 )   PT: 14.4 sec;   INR: 1.28 ratio         PTT - ( 10 Mar 2021 10:07 )  PTT:28.7 sec    Urinalysis Basic - ( 10 Mar 2021 00:55 )    Color: Yellow / Appearance: Clear / SG: >1.050 / pH: x  Gluc: x / Ketone: Moderate  / Bili: Small / Urobili: 6 mg/dL   Blood: x / Protein: 30 mg/dL / Nitrite: Negative   Leuk Esterase: Negative / RBC: 3 /HPF / WBC 1 /HPF   Sq Epi: x / Non Sq Epi: 0 /HPF / Bacteria: Negative      RADIOLOGY & ADDITIONAL STUDIES:    PROTEIN CALORIE MALNUTRITION PRESENT: [ ]mild [ ]moderate [ ]severe [ ]underweight [ ]morbid obesity  https://www.andeal.org/vault/2440/web/files/ONC/Table_Clinical%20Characteristics%20to%20Document%20Malnutrition-White%20JV%20et%20al%202012.pdf    Height (cm): 167.6 (03-10-21 @ 12:28), 168 (02-16-21 @ 10:09), 167.6 (01-20-21 @ 08:52)  Weight (kg): 47.6 (03-10-21 @ 12:28), 49 (03-04-21 @ 08:24), 51.7 (01-20-21 @ 08:12)  BMI (kg/m2): 16.9 (03-10-21 @ 12:28), 17.4 (03-04-21 @ 08:24), 18.3 (02-16-21 @ 10:09)    [ ]PPSV2 < or = to 30% [ ]significant weight loss  [ ]poor nutritional intake  [ ]anasarca     Albumin, Serum: 2.5 g/dL (03-10-21 @ 10:07)   [ ]Artificial Nutrition      REFERRALS:   [ ]Chaplaincy  [ ]Hospice  [ ]Child Life  [ ]Social Work  [ ]Case management [ ]Holistic Therapy     Goals of Care Document:      HPI: 55 M with hx unresectable stage 4 colon cancer (mets to liver and lungs, received palliative folfox 2/16/21) follows with [facility] here for worsening nausea, vomiting (NBNB), abdominal distention and   generalized abdominal pain x 1 week. Pt sent in from [facility]. Pt received his first treatment of palliative folfox on 2/16/2021. Has been trying to tolerate solids but just ends up throwing them up. Has been able to void and pass gas, but no BM in a few days.    Pt denies any fever, chills, SOB, chest pain, dysuria, or diarrhea.     Of note, pt with recent hospitalization in 1/2021 where he was diagnosed with metastatic colon cancer to the liver and lungs. Pt treated for orthostatic hypotension and fevers likely in the setting of malignancy.     In the ED, pt's vitals were T 98 HR  /93 RR 16 % RA. Treated with 2L bolus (LR 1L, NS 1L), Zofran 4 mg IV, Morphine 4 mg IV, and KCl 10x3. Pt seen by Surgery and NG tube placed for decompression. (09 Mar 2021 21:54)    Interval events: Patient seen and evaluated. Introductions made to palliative care team. Patient states that he occasionally has abdominal pain which is relieved by Morphine. He received Morphine 4 mg IV PRN x1 in 24 hours. He is passing gas. No bowel movement.   - Patient states that he has 3 children- Oldest lives in [place] from a prior marriage and 2 children (6 y/o and 10 y/o) with Jolie.     PERTINENT PM/SXH:   Vitiligo    Colon cancer      No significant past surgical history      FAMILY HISTORY:  Family history of type 2 diabetes mellitus (Father)    Family history of hypertension (Mother)    Family history of colon cancer (Father)      ITEMS NOT CHECKED ARE NOT PRESENT    SOCIAL HISTORY:   Significant other/partner[ ]  Children[ ]  Judaism/Spirituality:  Substance hx:  [ ]   Tobacco hx:  [ ]   Alcohol hx: [ ]   Home Opioid hx:  [ ] I-Stop Reference No: [id number]  02/09/2021	02/09/2021	oxycodone hcl 5 mg tablet	28	7	  Living Situation: [ x]Home  [ ]Long term care  [ ]Rehab [ ]Other    ADVANCE DIRECTIVES:    DNR  MOLST  [ ]  Living Will  [ ]   DECISION MAKER(s):  [ ] Health Care Proxy(s)  [x ] Surrogate(s)  [ ] Guardian           Name(s): Phone Number(s): Jolie [phone]    BASELINE (I)ADL(s) (prior to admission):  [place]: [ ]Total  [x ] Moderate [ ]Dependent    Allergies    No Known Allergies    Intolerances    MEDICATIONS  (STANDING):  chlorhexidine 4% Liquid 1 Application(s) Topical daily  enoxaparin Injectable 40 milliGRAM(s) SubCutaneous daily  influenza   Vaccine 0.5 milliLiter(s) IntraMuscular once  pantoprazole  Injectable 40 milliGRAM(s) IV Push daily  potassium phosphate IVPB 15 milliMole(s) IV Intermittent once  sodium chloride 0.9%. 1000 milliLiter(s) (100 mL/Hr) IV Continuous <Continuous>    MEDICATIONS  (PRN):  morphine  - Injectable 4 milliGRAM(s) IV Push every 4 hours PRN Severe Pain (7 - 10)  ondansetron Injectable 4 milliGRAM(s) IV Push every 6 hours PRN Nausea and/or Vomiting  tetracaine/benzocaine/butamben Spray 1 Spray(s) Topical four times a day PRN throat pain, discomfort    PRESENT SYMPTOMS: [ ]Unable to obtain due to poor mentation   Source if other than patient:  [ ]Family   [ ]Team     Pain: [x ]yes [ ]no  QOL impact - moderate  Location -   abdominal pain                 Aggravating factors - distension  Quality - none  Radiation - none  Timing- episodic  Severity (0-10 scale): 5  Minimal acceptable level (0-10 scale):     CPOT:    https://www.[facility].org/getattachment/[email or web address]/Critical-Care-Pain-Observation-Tool-(CPOT)      PAIN AD Score: 1  http://geriatrictoolkit.[place].[facility]/cog/painad.pdf (press ctrl +  left click to view)    Dyspnea:                           [x ]Mild [ ]Moderate [ ]Severe  Anxiety:                             [ ]Mild [ ]Moderate [ ]Severe  Fatigue:                             [x ]Mild [ ]Moderate [ ]Severe  Nausea:                             [ ]Mild [ ]Moderate [ ]Severe  Loss of appetite:              [ ]Mild [ ]Moderate [ ]Severe  Constipation:                    [ ]Mild [ ]Moderate [ ]Severe    Other Symptoms:  [ ]All other review of systems negative     Palliative Performance Status Version 2:   30-40   %    http://[facility].org/files/news/palliative_performance_scale_ppsv2.pdf    PHYSICAL EXAM:  Vital Signs Last 24 Hrs  T(C): 36.3 (10 Mar 2021 09:40), Max: 36.9 (10 Mar 2021 00:52)  T(F): 97.3 (10 Mar 2021 09:40), Max: 98.4 (10 Mar 2021 00:52)  HR: 100 (10 Mar 2021 09:40) (99 - 133)  BP: 113/77 (10 Mar 2021 09:40) (108/78 - 124/86)  RR: 18 (10 Mar 2021 09:40) (16 - 20)  SpO2: 100% (10 Mar 2021 09:40) (100% - 100%)    I&O's Summary    GENERAL:  [x ]Alert  [x]Oriented x 3  [x]Lethargic  [ ]Cachexia  [ ]Unarousable  [x ]Verbal  [ ]Non-Verbal  Behavioral:   [ ] Anxiety  [ ] Delirium [ ] Agitation [ ] Other  HEENT:  [ ]Normal   [x ]Dry mouth   [ ]ET Tube/Trach  [ ]Oral lesions  PULMONARY:   [x ]Clear [ ]Tachypnea  [ ]Audible excessive secretions   [ ]Rhonchi        [ ]Right [ ]Left [ ]Bilateral  [ ]Crackles        [ ]Right [ ]Left [ ]Bilateral  [ ]Wheezing     [ ]Right [ ]Left [ ]Bilateral  [ ]Diminished breath sounds [ ]right [ ]left [ ]bilateral  CARDIOVASCULAR:    [ x]Regular [ ]Irregular [ ]Tachy  [ ]Mikey [ ]Murmur [ ]Other  GASTROINTESTINAL:  [ ]Soft  [x ]Distended   [ ]+BS  [ ]Non tender [x ]Tender  [x ]PEG [ ]OGT/ NGT  Last BM: n/a  GENITOURINARY:  [x ]Normal [ ] Incontinent   [ ]Oliguria/Anuria   [ ]Judd  MUSCULOSKELETAL:   [ ]Normal   [x ]Weakness  [ ]Bed/Wheelchair bound [ ]Edema  NEUROLOGIC:   [ x]No focal deficits  [ ]Cognitive impairment  [ ]Dysphagia [ ]Dysarthria [ ]Paresis [ ]Other   SKIN:   [x ]Normal    [ ]Rash  [ ]Pressure ulcer(s)       Present on admission [ ]y [ ]n    CRITICAL CARE:  [ ] Shock Present  [ ]Septic [ ]Cardiogenic [ ]Neurologic [ ]Hypovolemic  [ ]  Vasopressors [ ]  Inotropes   [ ]Respiratory failure present [ ]Mechanical ventilation [ ]Non-invasive ventilatory support [ ]High flow  [ ]Acute  [ ]Chronic [ ]Hypoxic  [ ]Hypercarbic [ ]Other  [ ]Other organ failure     LABS:                        9.2    3.09  )-----------( 362      ( 10 Mar 2021 10:07 )             29.7   03-10    131<L>  |  96<L>  |  16  ----------------------------<  91  3.7   |  24  |  0.44<L>    Ca    7.4<L>      10 Mar 2021 10:07  Phos  2.0     03-10  Mg     2.1     03-10    TPro  5.1<L>  /  Alb  2.5<L>  /  TBili  0.6  /  DBili  x   /  AST  35  /  ALT  14  /  AlkPhos  147<H>  03-10  PT/INR - ( 10 Mar 2021 10:07 )   PT: 14.4 sec;   INR: 1.28 ratio         PTT - ( 10 Mar 2021 10:07 )  PTT:28.7 sec    Urinalysis Basic - ( 10 Mar 2021 00:55 )    Color: Yellow / Appearance: Clear / SG: >1.050 / pH: x  Gluc: x / Ketone: Moderate  / Bili: Small / Urobili: 6 mg/dL   Blood: x / Protein: 30 mg/dL / Nitrite: Negative   Leuk Esterase: Negative / RBC: 3 /HPF / WBC 1 /HPF   Sq Epi: x / Non Sq Epi: 0 /HPF / Bacteria: Negative      RADIOLOGY & ADDITIONAL STUDIES:  < from: CT Abdomen and Pelvis w/ IV Cont (03.09.21 @ 17:10) >  Large bowel obstruction involving the ascending and transverse colon measuring up to 8 cm secondary to the colonic mass seen in the distal transverse colon. In addition, the jejunum and terminal ileum are also dilated. These findings were discussed with Dr. ASIM NORTON at 3/9/2021 5:26 PM by Dr. Will with read back confirmation.    Hepatic metastases, some of which have increased in size since 1/28/2021.  A 1.3 x 0.9 cm right lower lobe nodule concerning for metastatic disease.      PROTEIN CALORIE MALNUTRITION PRESENT: [ ]mild [ ]moderate [ ]severe [ ]underweight [ ]morbid obesity  https://www.andeal.org/vault/2440/web/files/ONC/Table_Clinical%20Characteristics%20to%20Document%20Malnutrition-White%20JV%20et%20al%202012.pdf    Height (cm): 167.6 (03-10-21 @ 12:28), 168 (02-16-21 @ 10:09), 167.6 (01-20-21 @ 08:52)  Weight (kg): 47.6 (03-10-21 @ 12:28), 49 (03-04-21 @ 08:24), 51.7 (01-20-21 @ 08:12)  BMI (kg/m2): 16.9 (03-10-21 @ 12:28), 17.4 (03-04-21 @ 08:24), 18.3 (02-16-21 @ 10:09)    [ ]PPSV2 < or = to 30% [ ]significant weight loss  [ ]poor nutritional intake  [ ]anasarca     Albumin, Serum: 2.5 g/dL (03-10-21 @ 10:07)   [ ]Artificial Nutrition      REFERRALS:   [ ]Chaplaincy  [ ]Hospice  [ ]Child Life  [x ]Social Work  [ ]Case management [ ]Holistic Therapy

## 2021-03-10 NOTE — CONSULT NOTE ADULT - SUBJECTIVE AND OBJECTIVE BOX
Chief Complaint:  nausea and vomiting     HPI:  55 M with hx stage 4 colon cancer (mets to liver and lungs, received palliative chemo here for worsening nausea, vomiting (NBNB), abdominal distention and generalized abdominal pain x 1 week.   Pt sent in from [facility]. Pt received his first treatment of palliative folfox on 2/16/2021. Has been trying to tolerate solids but just ends up throwing them up.  Pt denies any fever, chills, SOB, chest pain, dysuria, or diarrhea.   Of note, pt with recent hospitalization in 1/2021 where he was diagnosed with metastatic colon cancer to the liver and lungs. Pt treated for orthostatic hypotension and fevers likely in the setting of malignancy.   In the ED, pt's vitals were T 98 HR  /93 RR 16 % RA. Treated with 2L bolus (LR 1L, NS 1L), Zofran 4 mg IV, Morphine 4 mg IV, and KCl 10x3. Pt seen by Surgery and NG tube placed for decompression.       Allergies:  No Known Allergies    Home Medications:  Centrum oral tablet: 1 tab(s) orally once a day (09 Mar 2021 23:26)  ferrous sulfate 324 mg (65 mg elemental iron) oral tablet: 1 tab(s) orally once a day (09 Mar 2021 23:26)  oxycodone 5mg: every 6 hours, As Needed pain  (09 Mar 2021 23:26)  prochlorperazine 10 mg oral tablet: orally every 6 hours, As Needed (09 Mar 2021 23:26)  Vitamin C 500 mg oral tablet: 1 tab(s) orally once a day (09 Mar 2021 23:26)      Hospital Medications:  enoxaparin Injectable 40 milliGRAM(s) SubCutaneous daily  morphine  - Injectable 4 milliGRAM(s) IV Push every 4 hours PRN  ondansetron Injectable 4 milliGRAM(s) IV Push every 6 hours PRN  pantoprazole  Injectable 40 milliGRAM(s) IV Push daily  potassium phosphate IVPB 15 milliMole(s) IV Intermittent once  sodium chloride 0.9%. 1000 milliLiter(s) IV Continuous <Continuous>  tetracaine/benzocaine/butamben Spray 1 Spray(s) Topical four times a day PRN      PMHX/PSHX:    Vitiligo  Colon cancer  No significant past surgical history        Family history:    Family history of type 2 diabetes mellitus (Father)  Family history of hypertension (Mother)  Family history of colon cancer (Father)        Social History: no smoking    ROS:   General:  No fevers, chills or night sweats  ENT:  No sore throat or dysphagia  CV:  No pain or palpitations  Resp:  No dyspnea, cough or  wheezing  GI:  as above  Skin:  No rash or edema  Neuro: no weakness   Hematologic: no bleeding  Musculoskeletal: no muscle pain or join pain  Psych: no agitation     : no dysuria      PHYSICAL EXAM:   GENERAL:  NAD, Appears stated age  HEENT:  NC/AT,  conjunctivae clear and pink, sclera -anicteric  CHEST:  CTA B/L, Normal effort  HEART:  RRR S1/S2,  ABDOMEN:  Soft, non-tender, Tympanic on percussion-distended,  EXTREMITIES:  No cyanosis or Edema  SKIN:  Warm & Dry. No rash or erythema  NEURO:  Alert, oriented, no focal deficit    Vital Signs:  Vital Signs Last 24 Hrs  T(C): 36.9 (10 Mar 2021 00:52), Max: 36.9 (10 Mar 2021 00:52)  T(F): 98.4 (10 Mar 2021 00:52), Max: 98.4 (10 Mar 2021 00:52)  HR: 104 (10 Mar 2021 05:50) (99 - 133)  BP: 124/78 (10 Mar 2021 05:50) (108/78 - 124/86)  BP(mean): --  RR: 18 (10 Mar 2021 05:50) (16 - 20)  SpO2: 100% (10 Mar 2021 05:50) (100% - 100%)  Daily Height in cm: 167.64 (09 Mar 2021 14:08)    Daily     LABS:                        10.7   3.15  )-----------( 493      ( 09 Mar 2021 15:11 )             35.5     Mean Cell Volume: 71.3 fL (03-09-21 @ 15:11)    03-10    131<L>  |  93<L>  |  18  ----------------------------<  112<H>  3.4<L>   |  26  |  0.44<L>    Ca    7.7<L>      10 Mar 2021 00:55  Phos  2.0     03-10  Mg     2.3     03-10    TPro  5.9<L>  /  Alb  2.8<L>  /  TBili  0.6  /  DBili  x   /  AST  33  /  ALT  17  /  AlkPhos  174<H>  03-10    LIVER FUNCTIONS - ( 10 Mar 2021 00:55 )  Alb: 2.8 g/dL / Pro: 5.9 g/dL / ALK PHOS: 174 U/L / ALT: 17 U/L / AST: 33 U/L / GGT: x           PT/INR - ( 09 Mar 2021 15:34 )   PT: 14.4 sec;   INR: 1.28 ratio         PTT - ( 09 Mar 2021 15:34 )  PTT:28.7 sec  Urinalysis Basic - ( 10 Mar 2021 00:55 )    Color: Yellow / Appearance: Clear / SG: >1.050 / pH: x  Gluc: x / Ketone: Moderate  / Bili: Small / Urobili: 6 mg/dL   Blood: x / Protein: 30 mg/dL / Nitrite: Negative   Leuk Esterase: Negative / RBC: 3 /HPF / WBC 1 /HPF   Sq Epi: x / Non Sq Epi: 0 /HPF / Bacteria: Negative      Amylase Serum--      Lipase serum19       Ammonia--                          10.7   3.15  )-----------( 493      ( 09 Mar 2021 15:11 )             35.5     Imaging:        < from: CT Abdomen and Pelvis w/ IV Cont (03.09.21 @ 17:10) >  EXAM:  CT ABDOMEN AND PELVIS IC        PROCEDURE DATE:  Mar  9 2021         INTERPRETATION:  CLINICAL INFORMATION: Stage IV colon cancer. Nausea and vomiting. Evaluate for obstruction.    COMPARISON: CT abdomen pelvis 1/28/2021    CONTRAST/COMPLICATIONS:  IV Contrast: Omnipaque 350 / 90 cc administered / 10 cc discarded.  Oral Contrast: NONE  Complications: None reported at time of study completion    PROCEDURE:  CT of the Abdomen and Pelvis was performed.  Sagittal and coronal reformats were performed.    FINDINGS:  LOWER CHEST: Unchanged 4 mm pulmonary nodule in the right middle lobe. A 1.3 x 0.9 cm right lower lobe nodule concerning for metastatic disease (series 2 image 13). Linear atelectasis in the right lower lobe.    LIVER: Redemonstration of diffuse hepatic metastases, some of which have increased in size since 1/28/2021.  BILE DUCTS: Normal caliber.  GALLBLADDER: Within normal limits.  SPLEEN: Within normal limits.  PANCREAS: Within normal limits.  ADRENALS: Within normal limits.  KIDNEYS/URETERS: A right upper pole renal cyst. Bilateral subcentimeter hypodense foci too small to characterize. No hydronephrosis.    BLADDER: Within normal limits.  REPRODUCTIVE ORGANS: The prostate is enlarged with extension into the bladder as on prior.    BOWEL: The ascending and transverse colon are diffusely dilated measuring up to 8.0 cm (series 601 image 43) secondary to the colonic mass seen in the distal transverse colon representing a large bowel obstruction. In addition, the jejunum and terminal ileum are also dilated. No signs of ischemia.  PERITONEUM: No ascites.  VESSELS: Within normal limits.  RETROPERITONEUM/LYMPH NODES: No lymphadenopathy.  ABDOMINAL WALL: Within normal limits.  BONES: Mild degenerative changes.    IMPRESSION:  Large bowel obstruction involving the ascending and transverse colon measuring up to 8 cm secondary to the colonic mass seen in the distal transverse colon. In addition, the jejunum and terminal ileum are also dilated. These findings were discussed with Dr. ASIM NORTON at 3/9/2021 5:26 PM by Dr. Will with read back confirmation.    Hepatic metastases, some of which have increased in size since 1/28/2021.  A 1.3 x 0.9 cm right lower lobe nodule concerning for metastatic disease.        < end of copied text >

## 2021-03-10 NOTE — PROGRESS NOTE ADULT - ASSESSMENT
54 y/o male with unresectable metastatic colonic adenocarcinoma, now with LBO due to obstruction secondary to transverse colon mass. Patient is with incompetent ileocecal valve. No signs of ischemia on CT. NGT placed. GI following.    - Given poor overall clinical prognosis and extent of metastatic disease, patient would like to defer operative intervention at this time. Patient is amenable to other alternative methods of treatment including NGT decompression and colonic stent placement.  - NPO, mIVF, NGT in place  - VTE prophylaxis lovenox  - GI consult placed for evaluation for stent placement  - Recommend palliative care consult to assist in goals of care discussion.     56 y/o male with unresectable metastatic colonic adenocarcinoma, now with LBO due to obstruction secondary to transverse colon mass. Patient is with incompetent ileocecal valve. No signs of ischemia on CT. NGT placed. GI following.    - Given poor overall clinical prognosis and extent of metastatic disease, patient would like to defer operative intervention at this time. Patient is amenable to other alternative methods of treatment including NGT decompression and colonic stent placement.  - NPO, mIVF, NGT in place  - VTE prophylaxis lovenox  - GI consult placed for evaluation for stent placement, plan for stent today with Dr. Moffett  - Recommend palliative care consult to assist in goals of care discussion.

## 2021-03-10 NOTE — PROGRESS NOTE ADULT - ATTENDING COMMENTS
Large bowel obstruction, not a surgical candidate, NPO/IVF, c/w NGT, plan for colonic stent by GI  Metastatic colon CA, no plans for inpatient chemo, morphine IV prn for neoplasm related pain  Anemia of chronic disease, monitor hgb   Hypovolemic hyponatremia, c/w IVF ad monitor Na   Hypokalemia and hypophosphatemia, supplement K and phos prn

## 2021-03-10 NOTE — CONSULT NOTE ADULT - ASSESSMENT
This is a 56 y/o M, w/ PMH of recently diagnosed metastatic colon cancer (on FOLFOX), who p/w large bowel obstruction.  Evaluated by gen surgery and GI. Not a candidate for surgical intervention and the patient would prefer avoiding it as well. GI is on board and ongoing assessment for possible stenting. Currently on NGT decompression.    #Large bowel obstruction  -appreciate surgery and GI  -c/w NGT decompression  -possible stenting    #Metastatic colon cancer  -with hepatic and pulmonary mets  -currently on FOLFOX, s/p C2 on 3/4/2020  -NGS result pending  -pain mgmt per primary team; patient is on percocet at home. is being seen by palliative care for pain control  -medical oncology will follow    Theron-in MD Ruiz, PGY-4  Translational Medical Oncology Fellow  Pager: [phone] This is a 56 y/o M, w/ PMH of recently diagnosed metastatic colon cancer (on FOLFOX), who p/w large bowel obstruction.  Evaluated by gen surgery and GI. Not a candidate for surgical intervention and the patient would prefer avoiding it as well. GI is on board and ongoing assessment for possible stenting. Currently on NGT decompression.    #Large bowel obstruction  -appreciate surgery and GI  -c/w NGT decompression  -possible stenting today    #Metastatic colon cancer  -with hepatic and pulmonary mets  -currently on FOLFOX, s/p C2 on 3/4/2020  -NGS result pending  -pain mgmt per primary team; patient is on percocet at home. is being seen by palliative care for pain control  -medical oncology will follow    Theron-in MD Ruiz, PGY-4  Translational Medical Oncology Fellow  Pager: [phone]

## 2021-03-10 NOTE — PROGRESS NOTE ADULT - SUBJECTIVE AND OBJECTIVE BOX
GENERAL SURGERY PROGRESS NOTE  Attending: Dr. Enciso  Service: B Team Surgery  Contact: [phone]    SUBJECTIVE        Vitals  Vital Signs Last 24 Hrs  T(C): 36.9 (10 Mar 2021 00:52), Max: 36.9 (10 Mar 2021 00:52)  T(F): 98.4 (10 Mar 2021 00:52), Max: 98.4 (10 Mar 2021 00:52)  HR: 104 (10 Mar 2021 05:50) (99 - 133)  BP: 124/78 (10 Mar 2021 05:50) (108/78 - 124/86)  BP(mean): --  RR: 18 (10 Mar 2021 05:50) (16 - 20)  SpO2: 100% (10 Mar 2021 05:50) (100% - 100%)    Gen: NAD  Neuro: AAOx3  HEENT: normocephalic, atraumatic, no scleral icterus  CV: S1, S2, RRR  Pulm: CTA B/L  Abd: Distended, non tender, no rebound or guarding.   Ext: warm, no edema    LABS                        10.7   3.15  )-----------( 493      ( 09 Mar 2021 15:11 )             35.5     03-09    129<L>  |  89<L>  |  21  ----------------------------<  156<H>  3.3<L>   |  28  |  0.49<L>    Ca    8.1<L>      09 Mar 2021 15:11    TPro  6.5  /  Alb  3.0<L>  /  TBili  0.7  /  DBili  x   /  AST  37  /  ALT  20  /  AlkPhos  191<H>  03-09    PT/INR - ( 09 Mar 2021 15:34 )   PT: 14.4 sec;   INR: 1.28 ratio         PTT - ( 09 Mar 2021 15:34 )  PTT:28.7 sec

## 2021-03-10 NOTE — PROGRESS NOTE ADULT - PROBLEM SELECTOR PLAN 2
- Metastatic to liver and lungs, s/p first session of palliative folfox (2/16/21) at [facility]  - CEA - 2882, PSA - WNL, CA 19-9 -2882 and AFP - 2775  - CT abd/p showing hepatic mets with some that have increased in size since 1/2021, enlarged prostate, along with a 1.3 x 0.9 cm right lower lobe nodule concerning for metastatic disease  - Will email Oncology consult  - Pain control regimen - Morphine 4 mg IV q4 PRN   - Palliative consult for pain control and GOC given metastatic cancer

## 2021-03-10 NOTE — PROGRESS NOTE ADULT - SUBJECTIVE AND OBJECTIVE BOX
Dylon Godoy  PGY1/Medicine/[id number]/[phone]    YAKELIN GERARDO  55y  Male      Patient is a 55y old  Male who presents with a chief complaint of abdominal pain (10 Mar 2021 08:40)      INTERVAL HPI/OVERNIGHT EVENTS/ROS: No acute events overnight. Pt endorses improved belly pain and no further episodes of n/v. Denies f/c/cp/sob/dysuria. Still no BMs. Passing gas.     Vital Signs Last 24 Hrs  T(C): 36.9 (10 Mar 2021 00:52), Max: 36.9 (10 Mar 2021 00:52)  T(F): 98.4 (10 Mar 2021 00:52), Max: 98.4 (10 Mar 2021 00:52)  HR: 104 (10 Mar 2021 05:50) (99 - 133)  BP: 124/78 (10 Mar 2021 05:50) (108/78 - 124/86)  BP(mean): --  RR: 18 (10 Mar 2021 05:50) (16 - 20)  SpO2: 100% (10 Mar 2021 05:50) (100% - 100%)    PHYSICAL EXAM:  GENERAL: comfortable, pale, NG tube in place, chronically ill appearing. thin.   HEENT:  Atraumatic, Normocephalic  CHEST/LUNG: Chest rise equal bilaterally. CTAB.   HEART: tachycardic, no murmurs   ABDOMEN: Abdomen distended and soft, generalized mild TTP.   EXTREMITIES:  2+ Peripheral Pulses.  PSYCH: A&Ox3  SKIN: Pale, vitiligo, port on R side of chest    Consultant(s) Notes Reviewed:  [x ] YES  [ ] NO  Care Discussed with Consultants/Other Providers [ x] YES  [ ] NO    LABS:                        10.7   3.15  )-----------( 493      ( 09 Mar 2021 15:11 )             35.5     03-10    131<L>  |  93<L>  |  18  ----------------------------<  112<H>  3.4<L>   |  26  |  0.44<L>    Ca    7.7<L>      10 Mar 2021 00:55  Phos  2.0     03-10  Mg     2.3     03-10    TPro  5.9<L>  /  Alb  2.8<L>  /  TBili  0.6  /  DBili  x   /  AST  33  /  ALT  17  /  AlkPhos  174<H>  03-10    PT/INR - ( 09 Mar 2021 15:34 )   PT: 14.4 sec;   INR: 1.28 ratio         PTT - ( 09 Mar 2021 15:34 )  PTT:28.7 sec  Urinalysis Basic - ( 10 Mar 2021 00:55 )    Color: Yellow / Appearance: Clear / SG: >1.050 / pH: x  Gluc: x / Ketone: Moderate  / Bili: Small / Urobili: 6 mg/dL   Blood: x / Protein: 30 mg/dL / Nitrite: Negative   Leuk Esterase: Negative / RBC: 3 /HPF / WBC 1 /HPF   Sq Epi: x / Non Sq Epi: 0 /HPF / Bacteria: Negative        CAPILLARY BLOOD GLUCOSE      POCT Blood Glucose.: 105 mg/dL (10 Mar 2021 00:50)      RADIOLOGY & ADDITIONAL TESTS:    Imaging Personally Reviewed:  [ ] YES  [ ] NO

## 2021-03-10 NOTE — PROGRESS NOTE ADULT - PROBLEM SELECTOR PLAN 3
- Na 129 with low Cl 89  - Likely in the setting of poor PO intake, vomiting, dehydration  - S/p 2L bolus, c/w IVF   - Trend Na, do not overcorrect more than 6-8 mEq in 24h  - improving

## 2021-03-11 LAB
ALBUMIN SERPL ELPH-MCNC: 2.6 G/DL — LOW (ref 3.3–5)
ALP SERPL-CCNC: 147 U/L — HIGH (ref 40–120)
ALT FLD-CCNC: 15 U/L — SIGNIFICANT CHANGE UP (ref 4–41)
ANION GAP SERPL CALC-SCNC: 20 MMOL/L — HIGH (ref 7–14)
AST SERPL-CCNC: 39 U/L — SIGNIFICANT CHANGE UP (ref 4–40)
BILIRUB SERPL-MCNC: 0.6 MG/DL — SIGNIFICANT CHANGE UP (ref 0.2–1.2)
BUN SERPL-MCNC: 15 MG/DL — SIGNIFICANT CHANGE UP (ref 7–23)
CALCIUM SERPL-MCNC: 7.4 MG/DL — LOW (ref 8.4–10.5)
CHLORIDE SERPL-SCNC: 99 MMOL/L — SIGNIFICANT CHANGE UP (ref 98–107)
CO2 SERPL-SCNC: 16 MMOL/L — LOW (ref 22–31)
CREAT SERPL-MCNC: 0.45 MG/DL — LOW (ref 0.5–1.3)
GLUCOSE BLDC GLUCOMTR-MCNC: 112 MG/DL — HIGH (ref 70–99)
GLUCOSE BLDC GLUCOMTR-MCNC: 84 MG/DL — SIGNIFICANT CHANGE UP (ref 70–99)
GLUCOSE BLDC GLUCOMTR-MCNC: 91 MG/DL — SIGNIFICANT CHANGE UP (ref 70–99)
GLUCOSE SERPL-MCNC: 79 MG/DL — SIGNIFICANT CHANGE UP (ref 70–99)
HCT VFR BLD CALC: 33.3 % — LOW (ref 39–50)
HGB BLD-MCNC: 9.8 G/DL — LOW (ref 13–17)
LACTATE SERPL-SCNC: 1.4 MMOL/L — SIGNIFICANT CHANGE UP (ref 0.5–2)
MAGNESIUM SERPL-MCNC: 2.1 MG/DL — SIGNIFICANT CHANGE UP (ref 1.6–2.6)
MCHC RBC-ENTMCNC: 21.1 PG — LOW (ref 27–34)
MCHC RBC-ENTMCNC: 29.4 GM/DL — LOW (ref 32–36)
MCV RBC AUTO: 71.8 FL — LOW (ref 80–100)
NRBC # BLD: 0 /100 WBCS — SIGNIFICANT CHANGE UP
NRBC # FLD: 0.02 K/UL — HIGH
PHOSPHATE SERPL-MCNC: 2.2 MG/DL — LOW (ref 2.5–4.5)
PLATELET # BLD AUTO: 371 K/UL — SIGNIFICANT CHANGE UP (ref 150–400)
POTASSIUM SERPL-MCNC: 3.4 MMOL/L — LOW (ref 3.5–5.3)
POTASSIUM SERPL-SCNC: 3.4 MMOL/L — LOW (ref 3.5–5.3)
PROT SERPL-MCNC: 5.3 G/DL — LOW (ref 6–8.3)
RBC # BLD: 4.64 M/UL — SIGNIFICANT CHANGE UP (ref 4.2–5.8)
RBC # FLD: 21.8 % — HIGH (ref 10.3–14.5)
SODIUM SERPL-SCNC: 135 MMOL/L — SIGNIFICANT CHANGE UP (ref 135–145)
WBC # BLD: 3.2 K/UL — LOW (ref 3.8–10.5)
WBC # FLD AUTO: 3.2 K/UL — LOW (ref 3.8–10.5)

## 2021-03-11 PROCEDURE — 99233 SBSQ HOSP IP/OBS HIGH 50: CPT | Mod: GC

## 2021-03-11 PROCEDURE — 71045 X-RAY EXAM CHEST 1 VIEW: CPT | Mod: 26

## 2021-03-11 PROCEDURE — 99233 SBSQ HOSP IP/OBS HIGH 50: CPT

## 2021-03-11 PROCEDURE — 45389 COLONOSCOPY W/STENT PLCMT: CPT | Mod: GC

## 2021-03-11 PROCEDURE — 99254 IP/OBS CNSLTJ NEW/EST MOD 60: CPT

## 2021-03-11 PROCEDURE — 99232 SBSQ HOSP IP/OBS MODERATE 35: CPT | Mod: GC

## 2021-03-11 RX ORDER — POTASSIUM CHLORIDE 20 MEQ
20 PACKET (EA) ORAL ONCE
Refills: 0 | Status: COMPLETED | OUTPATIENT
Start: 2021-03-11 | End: 2021-03-11

## 2021-03-11 RX ADMIN — ENOXAPARIN SODIUM 40 MILLIGRAM(S): 100 INJECTION SUBCUTANEOUS at 05:23

## 2021-03-11 RX ADMIN — PANTOPRAZOLE SODIUM 40 MILLIGRAM(S): 20 TABLET, DELAYED RELEASE ORAL at 11:16

## 2021-03-11 RX ADMIN — Medication 1 SPRAY(S): at 13:19

## 2021-03-11 RX ADMIN — Medication 20 MILLIEQUIVALENT(S): at 15:58

## 2021-03-11 RX ADMIN — CHLORHEXIDINE GLUCONATE 1 APPLICATION(S): 213 SOLUTION TOPICAL at 11:17

## 2021-03-11 RX ADMIN — ONDANSETRON 4 MILLIGRAM(S): 8 TABLET, FILM COATED ORAL at 05:23

## 2021-03-11 NOTE — PROGRESS NOTE ADULT - ASSESSMENT
55 M with hx unresectable stage 4 colon cancer (mets to liver and lungs, received palliative folfox 2/16/21) presenting with abdominal pain, N/V, and distension. Admitted for medical management of LBO due to obstruction 2/2 transverse colon mass.     Palliative care consulted for pain management and goals of care.

## 2021-03-11 NOTE — PROGRESS NOTE ADULT - ATTENDING COMMENTS
Large bowel obstruction, not a surgical candidate, s/p colonic stent by GI, start clear liquids, remove NGT  Metastatic colon CA, no plans for inpatient chemo, morphine IV prn for neoplasm related pain  Anemia of chronic disease, monitor hgb   Hypovolemic hyponatremia, c/w IVF ad monitor Na   Hypokalemia and hypophosphatemia, supplement K and phos prn

## 2021-03-11 NOTE — PROGRESS NOTE ADULT - PROBLEM SELECTOR PLAN 1
- 1 week hx abdominal pain/distension, N/V in the setting of metastatic colon cancer   - CT abd/p LBO involving the ascending and transverse colon measuring up to 8 cm secondary to the colonic mass seen in the distal transverse colon. In addition, the jejunum and terminal ileum are also dilated  - S/p 2L bolus in the ED, c/w IVF hydration   - Seen by Surgery, pt would like to defer operative intervention at this time   - NG tube placed for decompression  - GI consult placed for evaluation of stent placement - plan for stent placement today - 1 week hx abdominal pain/distension, N/V in the setting of metastatic colon cancer   - CT abd/p LBO involving the ascending and transverse colon measuring up to 8 cm secondary to the colonic mass seen in the distal transverse colon. In addition, the jejunum and terminal ileum are also dilated  - S/p 2L bolus in the ED, c/w IVF hydration   - NG tube placed for decompression--> removed  - s/p colonic stent  - advanced diet to [facility]  - Dr. Alicia made aware for discussion of operative intervention given pt may be precluded from chemo due to stent and oncology strongly recommends chemo in the future

## 2021-03-11 NOTE — PROGRESS NOTE ADULT - SUBJECTIVE AND OBJECTIVE BOX
Dylon Godoy  PGY1/Medicine/[id number]/[phone]    YAKELIN GERARDO  55y  Male      Patient is a 55y old  Male who presents with a chief complaint of abdominal pain (10 Mar 2021 13:17)      INTERVAL HPI/OVERNIGHT EVENTS/ROS:     Vital Signs Last 24 Hrs  T(C): 36.7 (11 Mar 2021 07:45), Max: 37.6 (10 Mar 2021 12:28)  T(F): 98 (11 Mar 2021 07:45), Max: 99.7 (10 Mar 2021 12:28)  HR: 114 (11 Mar 2021 07:45) (98 - 114)  BP: 129/96 (11 Mar 2021 07:45) (113/77 - 129/96)  BP(mean): --  RR: 22 (11 Mar 2021 07:45) (17 - 22)  SpO2: 98% (11 Mar 2021 07:45) (97% - 100%)    PHYSICAL EXAM:  GENERAL: comfortable, pale, NG tube in place, chronically ill appearing. thin.   HEENT:  Atraumatic, Normocephalic  CHEST/LUNG: Chest rise equal bilaterally. CTAB.   HEART: tachycardic, no murmurs   ABDOMEN: Abdomen distended and soft, generalized mild TTP.   EXTREMITIES:  2+ Peripheral Pulses.  PSYCH: A&Ox3  SKIN: Pale, vitiligo, port on R side of chest    Consultant(s) Notes Reviewed:  [x ] YES  [ ] NO  Care Discussed with Consultants/Other Providers [ x] YES  [ ] NO    LABS:                        9.8    3.20  )-----------( 371      ( 11 Mar 2021 07:03 )             33.3     03-11    135  |  99  |  15  ----------------------------<  79  3.4<L>   |  16<L>  |  0.45<L>    Ca    7.4<L>      11 Mar 2021 07:03  Phos  2.2     03-11  Mg     2.1     03-11    TPro  5.3<L>  /  Alb  2.6<L>  /  TBili  0.6  /  DBili  x   /  AST  39  /  ALT  15  /  AlkPhos  147<H>  03-11    PT/INR - ( 10 Mar 2021 10:07 )   PT: 14.4 sec;   INR: 1.28 ratio         PTT - ( 10 Mar 2021 10:07 )  PTT:28.7 sec  Urinalysis Basic - ( 10 Mar 2021 00:55 )    Color: Yellow / Appearance: Clear / SG: >1.050 / pH: x  Gluc: x / Ketone: Moderate  / Bili: Small / Urobili: 6 mg/dL   Blood: x / Protein: 30 mg/dL / Nitrite: Negative   Leuk Esterase: Negative / RBC: 3 /HPF / WBC 1 /HPF   Sq Epi: x / Non Sq Epi: 0 /HPF / Bacteria: Negative        CAPILLARY BLOOD GLUCOSE      POCT Blood Glucose.: 91 mg/dL (11 Mar 2021 07:41)  POCT Blood Glucose.: 84 mg/dL (11 Mar 2021 05:44)  POCT Blood Glucose.: 77 mg/dL (10 Mar 2021 22:30)  POCT Blood Glucose.: 91 mg/dL (10 Mar 2021 15:50)  POCT Blood Glucose.: 86 mg/dL (10 Mar 2021 12:01)      RADIOLOGY & ADDITIONAL TESTS:    Imaging Personally Reviewed:  [ ] YES  [ ] NO   Dylon Godoy  PGY1/Medicine/[id number]/[phone]    YAKELIN GERARDO  55y  Male      Patient is a 55y old  Male who presents with a chief complaint of abdominal pain (10 Mar 2021 13:17)      INTERVAL HPI/OVERNIGHT EVENTS/ROS: Pt had episode of emesis overnight. NGT placement was confirmed with xray.     Vital Signs Last 24 Hrs  T(C): 36.7 (11 Mar 2021 07:45), Max: 37.6 (10 Mar 2021 12:28)  T(F): 98 (11 Mar 2021 07:45), Max: 99.7 (10 Mar 2021 12:28)  HR: 114 (11 Mar 2021 07:45) (98 - 114)  BP: 129/96 (11 Mar 2021 07:45) (113/77 - 129/96)  BP(mean): --  RR: 22 (11 Mar 2021 07:45) (17 - 22)  SpO2: 98% (11 Mar 2021 07:45) (97% - 100%)    PHYSICAL EXAM:  GENERAL: comfortable, pale, NG tube in place, chronically ill appearing. thin.   HEENT:  Atraumatic, Normocephalic  CHEST/LUNG: Chest rise equal bilaterally. CTAB.   HEART: tachycardic, no murmurs   ABDOMEN: Abdomen distended and soft, generalized mild TTP.   EXTREMITIES:  2+ Peripheral Pulses.  PSYCH: A&Ox3  SKIN: Pale, vitiligo, port on R side of chest    Consultant(s) Notes Reviewed:  [x ] YES  [ ] NO  Care Discussed with Consultants/Other Providers [ x] YES  [ ] NO    LABS:                        9.8    3.20  )-----------( 371      ( 11 Mar 2021 07:03 )             33.3     03-11    135  |  99  |  15  ----------------------------<  79  3.4<L>   |  16<L>  |  0.45<L>    Ca    7.4<L>      11 Mar 2021 07:03  Phos  2.2     03-11  Mg     2.1     03-11    TPro  5.3<L>  /  Alb  2.6<L>  /  TBili  0.6  /  DBili  x   /  AST  39  /  ALT  15  /  AlkPhos  147<H>  03-11    PT/INR - ( 10 Mar 2021 10:07 )   PT: 14.4 sec;   INR: 1.28 ratio         PTT - ( 10 Mar 2021 10:07 )  PTT:28.7 sec  Urinalysis Basic - ( 10 Mar 2021 00:55 )    Color: Yellow / Appearance: Clear / SG: >1.050 / pH: x  Gluc: x / Ketone: Moderate  / Bili: Small / Urobili: 6 mg/dL   Blood: x / Protein: 30 mg/dL / Nitrite: Negative   Leuk Esterase: Negative / RBC: 3 /HPF / WBC 1 /HPF   Sq Epi: x / Non Sq Epi: 0 /HPF / Bacteria: Negative        CAPILLARY BLOOD GLUCOSE      POCT Blood Glucose.: 91 mg/dL (11 Mar 2021 07:41)  POCT Blood Glucose.: 84 mg/dL (11 Mar 2021 05:44)  POCT Blood Glucose.: 77 mg/dL (10 Mar 2021 22:30)  POCT Blood Glucose.: 91 mg/dL (10 Mar 2021 15:50)  POCT Blood Glucose.: 86 mg/dL (10 Mar 2021 12:01)      RADIOLOGY & ADDITIONAL TESTS:    Imaging Personally Reviewed:  [ ] YES  [ ] NO   Dylon Godoy  PGY1/Medicine/[id number]/[phone]    YAKELIN GERARDO  55y  Male      Patient is a 55y old  Male who presents with a chief complaint of abdominal pain (10 Mar 2021 13:17)      INTERVAL HPI/OVERNIGHT EVENTS/ROS: Pt had episode of emesis overnight. NGT placement was confirmed with xray. Recovering after stent placement. Denies further n/v.     Vital Signs Last 24 Hrs  T(C): 36.7 (11 Mar 2021 07:45), Max: 37.6 (10 Mar 2021 12:28)  T(F): 98 (11 Mar 2021 07:45), Max: 99.7 (10 Mar 2021 12:28)  HR: 114 (11 Mar 2021 07:45) (98 - 114)  BP: 129/96 (11 Mar 2021 07:45) (113/77 - 129/96)  BP(mean): --  RR: 22 (11 Mar 2021 07:45) (17 - 22)  SpO2: 98% (11 Mar 2021 07:45) (97% - 100%)    PHYSICAL EXAM:  GENERAL: comfortable, pale, chronically ill appearing. thin.   HEENT:  Atraumatic, Normocephalic  CHEST/LUNG: Chest rise equal bilaterally. CTAB.   HEART: tachycardic, no murmurs   ABDOMEN: Abdomen distended and soft, generalized mild TTP.   EXTREMITIES:  2+ Peripheral Pulses.  PSYCH: A&Ox3  SKIN: Pale, vitiligo, port on R side of chest    Consultant(s) Notes Reviewed:  [x ] YES  [ ] NO  Care Discussed with Consultants/Other Providers [ x] YES  [ ] NO    LABS:                        9.8    3.20  )-----------( 371      ( 11 Mar 2021 07:03 )             33.3     03-11    135  |  99  |  15  ----------------------------<  79  3.4<L>   |  16<L>  |  0.45<L>    Ca    7.4<L>      11 Mar 2021 07:03  Phos  2.2     03-11  Mg     2.1     03-11    TPro  5.3<L>  /  Alb  2.6<L>  /  TBili  0.6  /  DBili  x   /  AST  39  /  ALT  15  /  AlkPhos  147<H>  03-11    PT/INR - ( 10 Mar 2021 10:07 )   PT: 14.4 sec;   INR: 1.28 ratio         PTT - ( 10 Mar 2021 10:07 )  PTT:28.7 sec  Urinalysis Basic - ( 10 Mar 2021 00:55 )    Color: Yellow / Appearance: Clear / SG: >1.050 / pH: x  Gluc: x / Ketone: Moderate  / Bili: Small / Urobili: 6 mg/dL   Blood: x / Protein: 30 mg/dL / Nitrite: Negative   Leuk Esterase: Negative / RBC: 3 /HPF / WBC 1 /HPF   Sq Epi: x / Non Sq Epi: 0 /HPF / Bacteria: Negative        CAPILLARY BLOOD GLUCOSE      POCT Blood Glucose.: 91 mg/dL (11 Mar 2021 07:41)  POCT Blood Glucose.: 84 mg/dL (11 Mar 2021 05:44)  POCT Blood Glucose.: 77 mg/dL (10 Mar 2021 22:30)  POCT Blood Glucose.: 91 mg/dL (10 Mar 2021 15:50)  POCT Blood Glucose.: 86 mg/dL (10 Mar 2021 12:01)      RADIOLOGY & ADDITIONAL TESTS:    Imaging Personally Reviewed:  [ ] YES  [ ] NO   Dylon Godoy  PGY1/Medicine/[id number]/[phone]    YAKLEIN GERARDO  55y  Male      Patient is a 55y old  Male who presents with a chief complaint of abdominal pain (10 Mar 2021 13:17)      INTERVAL HPI/OVERNIGHT EVENTS/ROS: Pt had episode of emesis overnight. NGT placement was confirmed with xray. Recovering after stent placement. Denies further n/v/f/c/cp/sob/abd pain.     Vital Signs Last 24 Hrs  T(C): 36.7 (11 Mar 2021 07:45), Max: 37.6 (10 Mar 2021 12:28)  T(F): 98 (11 Mar 2021 07:45), Max: 99.7 (10 Mar 2021 12:28)  HR: 114 (11 Mar 2021 07:45) (98 - 114)  BP: 129/96 (11 Mar 2021 07:45) (113/77 - 129/96)  BP(mean): --  RR: 22 (11 Mar 2021 07:45) (17 - 22)  SpO2: 98% (11 Mar 2021 07:45) (97% - 100%)    PHYSICAL EXAM:  GENERAL: comfortable, pale, chronically ill appearing. thin.   HEENT:  Atraumatic, Normocephalic  CHEST/LUNG: Chest rise equal bilaterally. CTAB.   HEART: tachycardic, no murmurs   ABDOMEN: Abdomen non-distended and soft, nontender.   EXTREMITIES:  2+ Peripheral Pulses.  PSYCH: A&Ox3  SKIN: Pale, vitiligo, port on R side of chest    Consultant(s) Notes Reviewed:  [x ] YES  [ ] NO  Care Discussed with Consultants/Other Providers [ x] YES  [ ] NO    LABS:                        9.8    3.20  )-----------( 371      ( 11 Mar 2021 07:03 )             33.3     03-11    135  |  99  |  15  ----------------------------<  79  3.4<L>   |  16<L>  |  0.45<L>    Ca    7.4<L>      11 Mar 2021 07:03  Phos  2.2     03-11  Mg     2.1     03-11    TPro  5.3<L>  /  Alb  2.6<L>  /  TBili  0.6  /  DBili  x   /  AST  39  /  ALT  15  /  AlkPhos  147<H>  03-11    PT/INR - ( 10 Mar 2021 10:07 )   PT: 14.4 sec;   INR: 1.28 ratio         PTT - ( 10 Mar 2021 10:07 )  PTT:28.7 sec  Urinalysis Basic - ( 10 Mar 2021 00:55 )    Color: Yellow / Appearance: Clear / SG: >1.050 / pH: x  Gluc: x / Ketone: Moderate  / Bili: Small / Urobili: 6 mg/dL   Blood: x / Protein: 30 mg/dL / Nitrite: Negative   Leuk Esterase: Negative / RBC: 3 /HPF / WBC 1 /HPF   Sq Epi: x / Non Sq Epi: 0 /HPF / Bacteria: Negative        CAPILLARY BLOOD GLUCOSE      POCT Blood Glucose.: 91 mg/dL (11 Mar 2021 07:41)  POCT Blood Glucose.: 84 mg/dL (11 Mar 2021 05:44)  POCT Blood Glucose.: 77 mg/dL (10 Mar 2021 22:30)  POCT Blood Glucose.: 91 mg/dL (10 Mar 2021 15:50)  POCT Blood Glucose.: 86 mg/dL (10 Mar 2021 12:01)      RADIOLOGY & ADDITIONAL TESTS:    Imaging Personally Reviewed:  [ ] YES  [ ] NO

## 2021-03-11 NOTE — CONSULT NOTE ADULT - ASSESSMENT
56 y/o male with unresectable metastatic colonic adenocarcinoma, now with LBO due to obstruction secondary to transverse colon mass, s/p decompression by GI with placement of stent. Patient hemodynamically stable in NAD +flatus. +BM. at present time.     - will plan for possible colorectal surgical intervention   - imaging to be further reviewed and operative plan to follow  - please obtain medical and cardiac optimization for possible laparoscopy, laparotomy and bowel resection    Plan discussed with attending colorectal surgeon PARTH Alicia MD.     Please contact Surgery A-Team (P: [phone]) with any questions.    PARTH Muller MD, PGY-III  Surgery, A-Team  Pager: [phone]  [facility]

## 2021-03-11 NOTE — CONSULT NOTE ADULT - SUBJECTIVE AND OBJECTIVE BOX
COLORECTAL SURGERY CONSULT NOTE  YAKELIN GERARDO  |  [id number]  |  03-11-21 @ 15:34    Patient was seen and examined at: 15:00    CC: Patient is a 55y old  Male who presents with a chief complaint of abdominal pain (11 Mar 2021 13:33)    HPI:  55yoM with PMH of vitiligo, recently diagnosed with unresectable colonic adenocarcinoma with primary mass in distal transverse colon and associated hepatic and right lower lobe metastases (1/25/21), currently on chemotherapy (last infusion 3 days ago), now presenting with a 1 week history of abdominal pain and distension. The patient states that the pain is diffuse and has been associated with nausea and multiple episodes of emesis. The patient most recently vomited earlier today. He continues to pass bowel movements, last BM was yesterday. He denies fever or chills.    In ED patient was initially tachycardic to 130's, but received 2 liters of fluid and HR came down to 90s. Patient is otherwise hemodynamically stable. Patient is afebrile with WBC of 3.15. CT scan was obtained which demonstrates a large bowel obstruction with a transition point at the site of the previously noted transverse colon mass. Ileocecal valve in incompetent with dilation of the ileal and transverse colon as well.  Surgery consulted to evaluate.     INTERVAL EVENTS:  Patient has subsequently undergone colonoscopy and stent placement by GI this AM. Successful placement of stent with decompression of alimentary canal. Colorectal surgery was consulted for diversion vs resection to allow future chemotherapeutic intervention.  Of note, last chemo was 2/16/2021.   Patient was seen and examined at bedside after decompression. No nausea / vomiting. +flatus. +BM. No pain.     PAST MEDICAL & SURGICAL HISTORY:  Vitiligo  Colon cancer metastatic to liver, lungs    No significant past surgical history    MEDICATIONS  (STANDING):  chlorhexidine 4% Liquid 1 Application(s) Topical daily  enoxaparin Injectable 40 milliGRAM(s) SubCutaneous daily  influenza   Vaccine 0.5 milliLiter(s) IntraMuscular once  pantoprazole  Injectable 40 milliGRAM(s) IV Push daily  potassium chloride   Solution 20 milliEquivalent(s) Oral once  potassium phosphate IVPB 15 milliMole(s) IV Intermittent once  sodium chloride 0.9%. 1000 milliLiter(s) (100 mL/Hr) IV Continuous <Continuous>    MEDICATIONS  (PRN):  morphine  - Injectable 4 milliGRAM(s) IV Push every 4 hours PRN Severe Pain (7 - 10)  ondansetron Injectable 4 milliGRAM(s) IV Push every 6 hours PRN Nausea and/or Vomiting  tetracaine/benzocaine/butamben Spray 1 Spray(s) Topical four times a day PRN throat pain, discomfort    ALLERGIES:  No Known Allergies or Intolerances    SOCIAL HISTORY:  no smoking, no illicit drugs, lives with wife and 3 kids (09 Mar 2021 21:54)    FAMILY HISTORY:  Family history of type 2 diabetes mellitus (Father)  Family history of hypertension (Mother)  Family history of colon cancer (Father)    Objective:   Vital Signs Last 24 Hrs  T(C): 36.7 (11 Mar 2021 11:14), Max: 36.7 (11 Mar 2021 07:45)  T(F): 98 (11 Mar 2021 11:14), Max: 98 (11 Mar 2021 07:45)  HR: 107 (11 Mar 2021 11:14) (93 - 114)  BP: 120/83 (11 Mar 2021 11:14) (119/84 - 129/96)  RR: 17 (11 Mar 2021 11:14) (17 - 22)  SpO2: 100% (11 Mar 2021 11:14) (98% - 100%)    CAPILLARY BLOOD GLUCOSE  POCT Blood Glucose.: 112 mg/dL (11 Mar 2021 12:06)    Physical Exam:  General: Well developed, well nourished, alert and cooperative, and appears to be in no acute distress.  HEENT: normocephalic, vision is grossly intact  Abdomen: soft, non-distended, non-tender, no guarding or rebound    LABS:                        9.8    3.20  )-----------( 371      ( 11 Mar 2021 07:03 )             33.3     03-11    135  |  99  |  15  ----------------------------<  79  3.4<L>   |  16<L>  |  0.45<L>    Ca    7.4<L>      11 Mar 2021 07:03  Phos  2.2     03-11  Mg     2.1     03-11    TPro  5.3<L>  /  Alb  2.6<L>  /  TBili  0.6  /  DBili  x   /  AST  39  /  ALT  15  /  AlkPhos  147<H>  03-11    PT/INR - ( 10 Mar 2021 10:07 )   PT: 14.4 sec;   INR: 1.28 ratio    PTT - ( 10 Mar 2021 10:07 )  PTT:28.7 sec    LIVER FUNCTIONS - ( 11 Mar 2021 07:03 )  Alb: 2.6 g/dL / Pro: 5.3 g/dL / ALK PHOS: 147 U/L / ALT: 15 U/L / AST: 39 U/L / GGT: x           Urinalysis Basic - ( 10 Mar 2021 00:55 )    Color: Yellow / Appearance: Clear / SG: >1.050 / pH: x  Gluc: x / Ketone: Moderate  / Bili: Small / Urobili: 6 mg/dL   Blood: x / Protein: 30 mg/dL / Nitrite: Negative   Leuk Esterase: Negative / RBC: 3 /HPF / WBC 1 /HPF   Sq Epi: x / Non Sq Epi: 0 /HPF / Bacteria: Negative    RADIOLOGY & ADDITIONAL STUDIES:    < from: CT Abdomen and Pelvis w/ IV Cont (03.09.21 @ 17:10) >  FINDINGS:  LOWER CHEST: Unchanged 4 mm pulmonary nodule in the right middle lobe. A 1.3 x 0.9 cm right lower lobe nodule concerning for metastatic disease (series 2 image 13). Linear atelectasis in the right lower lobe.    LIVER: Redemonstration of diffuse hepatic metastases, some of which have increased in size since 1/28/2021.  BILE DUCTS: Normal caliber.  GALLBLADDER: Within normal limits.  SPLEEN: Within normal limits.  PANCREAS: Within normal limits.  ADRENALS: Within normal limits.  KIDNEYS/URETERS: A right upper pole renal cyst. Bilateral subcentimeter hypodense foci too small to characterize. No hydronephrosis.    BLADDER: Within normal limits.  REPRODUCTIVE ORGANS: The prostate is enlarged with extension into the bladder as on prior.    BOWEL: The ascending and transverse colon are diffusely dilated measuring up to 8.0 cm (series 601 image 43) secondary to the colonic mass seen in the distal transverse colon representing a large bowel obstruction. In addition, the jejunum and terminal ileum are also dilated. No signs of ischemia.  PERITONEUM: No ascites.  VESSELS: Within normal limits.  RETROPERITONEUM/LYMPH NODES: No lymphadenopathy.  ABDOMINAL WALL: Within normal limits.  BONES: Mild degenerative changes.    IMPRESSION:  Large bowel obstruction involving the ascending and transverse colon measuring up to 8 cm secondary to the colonic mass seen in the distal transverse colon. In addition, the jejunum and terminal ileum are also dilated. These findings were discussed with Dr. ASIM NORTON at 3/9/2021 5:26 PM by Dr. Will with read back confirmation.    Hepatic metastases, some of which have increased in size since 1/28/2021.  A 1.3 x 0.9 cm right lower lobe nodule concerning for metastatic disease.    < end of copied text >

## 2021-03-11 NOTE — PROVIDER CONTACT NOTE (OTHER) - BACKGROUND
Patient admitted for bowel obstruction. NGT in place connected to low intermittent suction for decompression.

## 2021-03-11 NOTE — PROGRESS NOTE ADULT - PROBLEM SELECTOR PLAN 4
Will continue to follow for symptom management and goals of care  - Followed up with patient's wife, Jolie. Introduced to palliative care team.   - HCP in chart indicating Jolie as HCP.     Sushma Freitas DO  Hospice and Palliative Care Fellow   [facility] Pager [phone]  [facility] Pager [phone]

## 2021-03-11 NOTE — PROGRESS NOTE ADULT - ATTENDING COMMENTS
Dr. Alicia Colorectal surgery evaluation for operative management after stent placement, d/w Dr. Alicia  no plan for inpatient chemo  patient agreeable to surgical eval to allow for future chemo

## 2021-03-11 NOTE — PROGRESS NOTE ADULT - PROBLEM SELECTOR PLAN 3
Medical management with NGT decompression and pain management  - s/p stent today and now having bowel movements  - can consider Dexamethasone and Octreotide for bowel obstruction if not improving  - GI following  - Sx following- pt declining surgery

## 2021-03-11 NOTE — PROGRESS NOTE ADULT - PROBLEM SELECTOR PLAN 3
- Na 129 with low Cl 89  - Likely in the setting of poor PO intake, vomiting, dehydration  - S/p 2L bolus, c/w IVF   - Trend Na, do not overcorrect more than 6-8 mEq in 24h  - improving  - resolved

## 2021-03-11 NOTE — PROGRESS NOTE ADULT - ATTENDING COMMENTS
Complex symptom management in the setting of life-limiting illness.    Ongoing coordination from medical providers to optimize patient for palliative DMT.  Will adjust PRN symptom regimen pending clinical course.

## 2021-03-11 NOTE — PROGRESS NOTE ADULT - PROBLEM SELECTOR PLAN 2
- Metastatic to liver and lungs, s/p first session of palliative folfox (2/16/21) at [facility]  - CEA - 2882, PSA - WNL, CA 19-9 -2882 and AFP - 2775  - CT abd/p showing hepatic mets with some that have increased in size since 1/2021, enlarged prostate, along with a 1.3 x 0.9 cm right lower lobe nodule concerning for metastatic disease  - Will email Oncology consult  - Pain control regimen - Morphine 4 mg IV q4 PRN   - Palliative consult for pain control and GOC given metastatic cancer

## 2021-03-11 NOTE — PROGRESS NOTE ADULT - PROBLEM SELECTOR PLAN 1
Likely 2/2 abdominal distension in the setting of large bowel obstruction  - Continue Morphine 4 mg IV q4 PRN  - bowel regimen while on opiates to prevent OIC- dulcolax suppository qd PRN.

## 2021-03-11 NOTE — PROGRESS NOTE ADULT - PROBLEM SELECTOR PLAN 7
- Diet: NPO  - DVT PPX: lovenox SQ  - Dispo: pending clinical improvement and PT  - GOC: pt will speak more with wife and children, palliative consult for pain control and further assistance - Diet: CLD  - DVT PPX: lovenox SQ  - Dispo: pending clinical improvement and PT  - GOC: pt will speak more with wife and children, palliative consult for pain control and further assistance

## 2021-03-11 NOTE — PROGRESS NOTE ADULT - ASSESSMENT
This is a 56 y/o M, w/ PMH of recently diagnosed metastatic colon cancer (on FOLFOX), who p/w large bowel obstruction.  s/p endoscopy stenting    #Large bowel obstruction  -appreciate surgery and GI  -c/w NGT decompression  -s/p stent per GI  -to have patient to be able to receive bevacizumab, would prefer to remove the stent and manage surgically so that he can have optimal palliative systemic therapy  -pre-op preparation per primary team    #Metastatic colon cancer  -with hepatic and pulmonary mets  -currently on FOLFOX, s/p C2 on 3/4/2020  -NGS result pending  -pain mgmt per primary team; patient is on percocet at home. is being seen by palliative care for pain control  -medical oncology will follow    Theron-in MD Ruiz, PGY-4  Translational Medical Oncology Fellow  Pager: [phone]

## 2021-03-11 NOTE — PROGRESS NOTE ADULT - SUBJECTIVE AND OBJECTIVE BOX
SUBJECTIVE AND OBJECTIVE: Patient seen at bedside. Patient s/p stent this morning. He states that his pain is controlled with Morphine IV and he had 2 bowel movements this morning after enema.   - Called patient's wife, Jolie today. Introduced role of palliative care team.   INTERVAL HPI/OVERNIGHT EVENTS: Patient received Morphine 4 mg IV PRN x1.     DNR on chart:   Allergies    No Known Allergies    Intolerances    MEDICATIONS  (STANDING):  chlorhexidine 4% Liquid 1 Application(s) Topical daily  enoxaparin Injectable 40 milliGRAM(s) SubCutaneous daily  influenza   Vaccine 0.5 milliLiter(s) IntraMuscular once  pantoprazole  Injectable 40 milliGRAM(s) IV Push daily  potassium phosphate IVPB 15 milliMole(s) IV Intermittent once  sodium chloride 0.9%. 1000 milliLiter(s) (100 mL/Hr) IV Continuous <Continuous>    MEDICATIONS  (PRN):  morphine  - Injectable 4 milliGRAM(s) IV Push every 4 hours PRN Severe Pain (7 - 10)  ondansetron Injectable 4 milliGRAM(s) IV Push every 6 hours PRN Nausea and/or Vomiting  tetracaine/benzocaine/butamben Spray 1 Spray(s) Topical four times a day PRN throat pain, discomfort      ITEMS UNCHECKED ARE NOT PRESENT    PRESENT SYMPTOMS: [ ]Unable to obtain due to poor mentation   Source if other than patient:  [ ]Family   [ ]Team     Pain:  [ ]yes [ x]no  QOL impact -   Location -                    Aggravating factors -  Quality -  Radiation -  Timing-  Severity (0-10 scale):  Minimal acceptable level (0-10 scale):     Dyspnea:                           [ ]Mild [ ]Moderate [ ]Severe  Anxiety:                             [ ]Mild [ ]Moderate [ ]Severe  Fatigue:                             [ ]Mild [ ]Moderate [ ]Severe  Nausea:                             [ ]Mild [ ]Moderate [ ]Severe  Loss of appetite:              [ ]Mild [ ]Moderate [ ]Severe  Constipation:                    [ ]Mild [ ]Moderate [ ]Severe    CPOT:    https://www.[email or web address]/getattachment/[email or web address]/Critical-Care-Pain-Observation-Tool-(CPOT)    PAIN AD Score:	0  http://geriatrictoolkit.[place].[facility]/cog/painad.pdf (Ctrl + left click to view)    Other Symptoms:  [ ]All other review of systems negative     Palliative Performance Status Version 2:     40    %      http://[email or web address]/files/news/palliative_performance_scale_[email or web address].pdf  PHYSICAL EXAM:  Vital Signs Last 24 Hrs  T(C): 36.7 (11 Mar 2021 11:14), Max: 36.7 (10 Mar 2021 13:40)  T(F): 98 (11 Mar 2021 11:14), Max: 98 (10 Mar 2021 13:40)  HR: 107 (11 Mar 2021 11:14) (93 - 114)  BP: 120/83 (11 Mar 2021 11:14) (118/79 - 129/96)  BP(mean): --  RR: 17 (11 Mar 2021 11:14) (17 - 22)  SpO2: 100% (11 Mar 2021 11:14) (98% - 100%) I&O's Summary     GENERAL:  [ x]Alert  [x ]Oriented x3  [ ]Lethargic  [x ]Cachexia  [ ]Unarousable  [ x]Verbal  [ ]Non-Verbal  Behavioral:   [ ]Anxiety  [ ]Delirium [ ]Agitation [ ]Other  HEENT:  [x ]Normal   [ ]Dry mouth   [ ]ET Tube/Trach  [ ]Oral lesions  PULMONARY:   [x ]Clear [ ]Tachypnea  [ ]Audible excessive secretions   [ ]Rhonchi        [ ]Right [ ]Left [ ]Bilateral  [ ]Crackles        [ ]Right [ ]Left [ ]Bilateral  [ ]Wheezing     [ ]Right [ ]Left [ ]Bilateral  [ ]Diminished BS [ ] Right [ ]Left [ ]Bilateral  CARDIOVASCULAR:    [x ]Regular [ ]Irregular [ ]Tachy  [ ]Mikey [ ]Murmur [ ]Other  GASTROINTESTINAL:  [x ]Soft  [x ]Distended-improved   [ x]+BS  [x ]Non tender [ ]Tender  [ ]PEG [ ]OGT/ NGT   Last BM:  3/11  GENITOURINARY:  [x ]Normal [ ]Incontinent   [ ]Oliguria/Anuria   [ ]Judd  MUSCULOSKELETAL:   [ ]Normal   [x ]Weakness  [ ]Bed/Wheelchair bound [ ]Edema  NEUROLOGIC:   [ x]No focal deficits  [ ] Cognitive impairment  [ ] Dysphagia [ ]Dysarthria [ ] Paresis [ ]Other   SKIN: +vitiligo  [ ]Normal  [ ]Rash   [ ]Pressure ulcer(s) [ ]y [ ]n present on admission    CRITICAL CARE:  [ ]Shock Present  [ ]Septic [ ]Cardiogenic [ ]Neurologic [ ]Hypovolemic  [ ]Vasopressors [ ]Inotropes  [ ]Respiratory failure present [ ]Mechanical Ventilation [ ]Non-invasive ventilatory support [ ]High-Flow  [ ]Acute  [ ]Chronic [ ]Hypoxic  [ ]Hypercarbic [ ]Other  [ ]Other organ failure     LABS:                        9.8    3.20  )-----------( 371      ( 11 Mar 2021 07:03 )             33.3   03-11    135  |  99  |  15  ----------------------------<  79  3.4<L>   |  16<L>  |  0.45<L>    Ca    7.4<L>      11 Mar 2021 07:03  Phos  2.2     03-11  Mg     2.1     03-11    TPro  5.3<L>  /  Alb  2.6<L>  /  TBili  0.6  /  DBili  x   /  AST  39  /  ALT  15  /  AlkPhos  147<H>  03-11  PT/INR - ( 10 Mar 2021 10:07 )   PT: 14.4 sec;   INR: 1.28 ratio         PTT - ( 10 Mar 2021 10:07 )  PTT:28.7 sec    Urinalysis Basic - ( 10 Mar 2021 00:55 )    Color: Yellow / Appearance: Clear / SG: >1.050 / pH: x  Gluc: x / Ketone: Moderate  / Bili: Small / Urobili: 6 mg/dL   Blood: x / Protein: 30 mg/dL / Nitrite: Negative   Leuk Esterase: Negative / RBC: 3 /HPF / WBC 1 /HPF   Sq Epi: x / Non Sq Epi: 0 /HPF / Bacteria: Negative      RADIOLOGY & ADDITIONAL STUDIES:  < from: Xray Chest 1 View- PORTABLE-Urgent (Xray Chest 1 View- PORTABLE-Urgent .) (03.11.21 @ 03:16) >  Enteric tube tip is in the stomach and side port in the region of the distal esophagus/GE junction. Dr. Rubio informed me when I spoke with him at 1:43 PM on March 11, 2021 that the tube has been removed.    < end of copied text >      Protein Calorie Malnutrition Present: [ ]mild [ ]moderate [ ]severe [ ]underweight [ ]morbid obesity  https://www.andeal.org/vault/2440/web/files/ONC/Table_Clinical%20Characteristics%20to%20Document%20Malnutrition-White%20JV%20et%20al%202012.pdf    Height (cm): 167.6 (03-11-21 @ 06:47), 168 (02-16-21 @ 10:09), 167.6 (01-20-21 @ 08:52)  Weight (kg): 47.6 (03-11-21 @ 06:47), 49 (03-04-21 @ 08:24), 51.7 (01-20-21 @ 08:12)  BMI (kg/m2): 16.9 (03-11-21 @ 06:47), 17.4 (03-04-21 @ 08:24), 18.3 (02-16-21 @ 10:09)    [ ]PPSV2 < or = 30%  [ ]significant weight loss [ ]poor nutritional intake [ ]anasarca   Albumin, Serum: 2.6 g/dL (03-11-21 @ 07:03)   [ ]Artificial Nutrition    REFERRALS:   [ ]Chaplaincy  [ ]Hospice  [ ]Child Life  [ x]Social Work  [ ]Case management [ ]Holistic Therapy     Goals of Care Document:  HCP

## 2021-03-11 NOTE — PROGRESS NOTE ADULT - SUBJECTIVE AND OBJECTIVE BOX
YAKELIN GERARDO  [id number]    Interval hx:  Patient was seen and examined at the bedside    MEDICATIONS  (STANDING):  chlorhexidine 4% Liquid 1 Application(s) Topical daily  enoxaparin Injectable 40 milliGRAM(s) SubCutaneous daily  influenza   Vaccine 0.5 milliLiter(s) IntraMuscular once  pantoprazole  Injectable 40 milliGRAM(s) IV Push daily  potassium phosphate IVPB 15 milliMole(s) IV Intermittent once  sodium chloride 0.9%. 1000 milliLiter(s) (100 mL/Hr) IV Continuous <Continuous>    MEDICATIONS  (PRN):  morphine  - Injectable 4 milliGRAM(s) IV Push every 4 hours PRN Severe Pain (7 - 10)  ondansetron Injectable 4 milliGRAM(s) IV Push every 6 hours PRN Nausea and/or Vomiting  tetracaine/benzocaine/butamben Spray 1 Spray(s) Topical four times a day PRN throat pain, discomfort      Allergies    No Known Allergies    Intolerances        Objectives:  Vital Signs Last 24 Hrs  T(C): 36.7 (11 Mar 2021 11:14), Max: 36.7 (11 Mar 2021 07:45)  T(F): 98 (11 Mar 2021 11:14), Max: 98 (11 Mar 2021 07:45)  HR: 107 (11 Mar 2021 11:14) (93 - 114)  BP: 120/83 (11 Mar 2021 11:14) (119/84 - 129/96)  BP(mean): --  RR: 17 (11 Mar 2021 11:14) (17 - 22)  SpO2: 100% (11 Mar 2021 11:14) (98% - 100%)      Physical exam  General - NAD, alert and oriented  HEENT - PERRLA  Neck - Supple  Cardiovascular - RRR no m/r/g  Lungs - CTAB, no use of acessory muscles, no w/c/r  Abdomen - distended, non tender  Extremeties - No e/c/c  Skin - No rashes, skin warm and dry, no erythematous areas  Neurological – Alert and oriented x 3      Labs:    CBC Full  -  ( 11 Mar 2021 07:03 )  WBC Count : 3.20 K/uL  RBC Count : 4.64 M/uL  Hemoglobin : 9.8 g/dL  Hematocrit : 33.3 %  Platelet Count - Automated : 371 K/uL  Mean Cell Volume : 71.8 fL  Mean Cell Hemoglobin : 21.1 pg  Mean Cell Hemoglobin Concentration : 29.4 gm/dL  Auto Neutrophil # : x  Auto Lymphocyte # : x  Auto Monocyte # : x  Auto Eosinophil # : x  Auto Basophil # : x  Auto Neutrophil % : x  Auto Lymphocyte % : x  Auto Monocyte % : x  Auto Eosinophil % : x  Auto Basophil % : x    PT/INR - ( 10 Mar 2021 10:07 )   PT: 14.4 sec;   INR: 1.28 ratio         PTT - ( 10 Mar 2021 10:07 )  PTT:28.7 sec    03-11    135  |  99  |  15  ----------------------------<  79  3.4<L>   |  16<L>  |  0.45<L>    Ca    7.4<L>      11 Mar 2021 07:03  Phos  2.2     03-11  Mg     2.1     03-11    TPro  5.3<L>  /  Alb  2.6<L>  /  TBili  0.6  /  DBili  x   /  AST  39  /  ALT  15  /  AlkPhos  147<H>  03-11    LIVER FUNCTIONS - ( 11 Mar 2021 07:03 )  Alb: 2.6 g/dL / Pro: 5.3 g/dL / ALK PHOS: 147 U/L / ALT: 15 U/L / AST: 39 U/L / GGT: x             Urinalysis Basic - ( 10 Mar 2021 00:55 )    Color: Yellow / Appearance: Clear / SG: >1.050 / pH: x  Gluc: x / Ketone: Moderate  / Bili: Small / Urobili: 6 mg/dL   Blood: x / Protein: 30 mg/dL / Nitrite: Negative   Leuk Esterase: Negative / RBC: 3 /HPF / WBC 1 /HPF   Sq Epi: x / Non Sq Epi: 0 /HPF / Bacteria: Negative            Imagings:

## 2021-03-11 NOTE — CONSULT NOTE ADULT - ATTENDING COMMENTS
Above noted. Agree with the assessment and planned treatment.  Will get Palliative Care involved.
BETTIEO    Dr. Guevara will place a colonic stent.
Diffusely metastatic colon cancer w/ LBO now status post colonic stent  -Asked to evaluate pt by primary medical oncologist  -Pt likely w/ terminal metastatic colon cancer.  Colonic stent has currently palliated pts obstruction.  However, given risk of perforation, oncology is not enthusiastic about to proceeding w/ systemic therapy as some agents are contraindicated.   I had a long discussion with the pt about his surgical options.   He would likely undergo a laparoscopic partial colectomy w/ end colostomy to  remove tumor and stent.  This would allow him to proceed w/ frontline chemotherapy.  It would also decrease the chance of spontaneous perforation from prolonged stent placement.  We discussed risks and benefits at length.  Including bleeding, infection and prolonged hospital stay.   Pt is aware that this would be a palliative and not curative procedure.  He is also aware that the stoma would likely be permanent  -We also discussed the possibility of no intervention and continued palliative care.    -Pt initially expressed that he would be interested in proceeding, but I urged him to discuss further with his wife and with medical oncology  -I will discuss further with the patient on 3/15  -All questions answered  -diet as tolerated  -Medical optimization
will await stent placement but will preclude our use of bevacizumab in the future, will need to revisit role for diversion in the future   palliative chemotherapy once stable and discharged
Complex symptom management in the setting of life-limiting illness.    Patient reports adequate relief with current opiate PRN regimen. Pending potential endoscopic intervention. Denies any significant nausea/vomiting, if these symptoms develop consider initiating mBO protocol (Decadron, Octreotide, etc). Will address GOC pending clinical course. Discussed with outpatient Palliative Care providers.

## 2021-03-12 DIAGNOSIS — Z01.818 ENCOUNTER FOR OTHER PREPROCEDURAL EXAMINATION: ICD-10-CM

## 2021-03-12 LAB
ALBUMIN SERPL ELPH-MCNC: 2.4 G/DL — LOW (ref 3.3–5)
ALP SERPL-CCNC: 135 U/L — HIGH (ref 40–120)
ALT FLD-CCNC: 13 U/L — SIGNIFICANT CHANGE UP (ref 4–41)
ANION GAP SERPL CALC-SCNC: 12 MMOL/L — SIGNIFICANT CHANGE UP (ref 7–14)
ANION GAP SERPL CALC-SCNC: 16 MMOL/L — HIGH (ref 7–14)
AST SERPL-CCNC: 30 U/L — SIGNIFICANT CHANGE UP (ref 4–40)
BILIRUB SERPL-MCNC: 0.4 MG/DL — SIGNIFICANT CHANGE UP (ref 0.2–1.2)
BUN SERPL-MCNC: 10 MG/DL — SIGNIFICANT CHANGE UP (ref 7–23)
BUN SERPL-MCNC: 11 MG/DL — SIGNIFICANT CHANGE UP (ref 7–23)
CALCIUM SERPL-MCNC: 7.6 MG/DL — LOW (ref 8.4–10.5)
CALCIUM SERPL-MCNC: 7.8 MG/DL — LOW (ref 8.4–10.5)
CHLORIDE SERPL-SCNC: 101 MMOL/L — SIGNIFICANT CHANGE UP (ref 98–107)
CHLORIDE SERPL-SCNC: 103 MMOL/L — SIGNIFICANT CHANGE UP (ref 98–107)
CO2 SERPL-SCNC: 20 MMOL/L — LOW (ref 22–31)
CO2 SERPL-SCNC: 21 MMOL/L — LOW (ref 22–31)
CREAT SERPL-MCNC: 0.55 MG/DL — SIGNIFICANT CHANGE UP (ref 0.5–1.3)
CREAT SERPL-MCNC: 0.57 MG/DL — SIGNIFICANT CHANGE UP (ref 0.5–1.3)
GLUCOSE SERPL-MCNC: 100 MG/DL — HIGH (ref 70–99)
GLUCOSE SERPL-MCNC: 113 MG/DL — HIGH (ref 70–99)
HCT VFR BLD CALC: 30.6 % — LOW (ref 39–50)
HGB BLD-MCNC: 9.5 G/DL — LOW (ref 13–17)
MAGNESIUM SERPL-MCNC: 2.1 MG/DL — SIGNIFICANT CHANGE UP (ref 1.6–2.6)
MCHC RBC-ENTMCNC: 21.9 PG — LOW (ref 27–34)
MCHC RBC-ENTMCNC: 31 GM/DL — LOW (ref 32–36)
MCV RBC AUTO: 70.5 FL — LOW (ref 80–100)
NRBC # BLD: 0 /100 WBCS — SIGNIFICANT CHANGE UP
NRBC # FLD: 0.02 K/UL — HIGH
PHOSPHATE SERPL-MCNC: 1.7 MG/DL — LOW (ref 2.5–4.5)
PLATELET # BLD AUTO: 293 K/UL — SIGNIFICANT CHANGE UP (ref 150–400)
POTASSIUM SERPL-MCNC: 2.5 MMOL/L — CRITICAL LOW (ref 3.5–5.3)
POTASSIUM SERPL-MCNC: 3.3 MMOL/L — LOW (ref 3.5–5.3)
POTASSIUM SERPL-SCNC: 2.5 MMOL/L — CRITICAL LOW (ref 3.5–5.3)
POTASSIUM SERPL-SCNC: 3.3 MMOL/L — LOW (ref 3.5–5.3)
PROT SERPL-MCNC: 5.1 G/DL — LOW (ref 6–8.3)
RBC # BLD: 4.34 M/UL — SIGNIFICANT CHANGE UP (ref 4.2–5.8)
RBC # FLD: 21.8 % — HIGH (ref 10.3–14.5)
SODIUM SERPL-SCNC: 135 MMOL/L — SIGNIFICANT CHANGE UP (ref 135–145)
SODIUM SERPL-SCNC: 138 MMOL/L — SIGNIFICANT CHANGE UP (ref 135–145)
WBC # BLD: 6.8 K/UL — SIGNIFICANT CHANGE UP (ref 3.8–10.5)
WBC # FLD AUTO: 6.8 K/UL — SIGNIFICANT CHANGE UP (ref 3.8–10.5)

## 2021-03-12 PROCEDURE — 93010 ELECTROCARDIOGRAM REPORT: CPT

## 2021-03-12 PROCEDURE — 99231 SBSQ HOSP IP/OBS SF/LOW 25: CPT

## 2021-03-12 PROCEDURE — 99233 SBSQ HOSP IP/OBS HIGH 50: CPT

## 2021-03-12 PROCEDURE — 99232 SBSQ HOSP IP/OBS MODERATE 35: CPT | Mod: GC

## 2021-03-12 RX ORDER — POTASSIUM CHLORIDE 20 MEQ
10 PACKET (EA) ORAL
Refills: 0 | Status: COMPLETED | OUTPATIENT
Start: 2021-03-12 | End: 2021-03-12

## 2021-03-12 RX ORDER — POTASSIUM CHLORIDE 20 MEQ
40 PACKET (EA) ORAL
Refills: 0 | Status: COMPLETED | OUTPATIENT
Start: 2021-03-12 | End: 2021-03-12

## 2021-03-12 RX ORDER — SODIUM,POTASSIUM PHOSPHATES 278-250MG
2 POWDER IN PACKET (EA) ORAL EVERY 6 HOURS
Refills: 0 | Status: COMPLETED | OUTPATIENT
Start: 2021-03-12 | End: 2021-03-12

## 2021-03-12 RX ADMIN — ENOXAPARIN SODIUM 40 MILLIGRAM(S): 100 INJECTION SUBCUTANEOUS at 05:49

## 2021-03-12 RX ADMIN — Medication 40 MILLIEQUIVALENT(S): at 12:16

## 2021-03-12 RX ADMIN — PANTOPRAZOLE SODIUM 40 MILLIGRAM(S): 20 TABLET, DELAYED RELEASE ORAL at 12:16

## 2021-03-12 RX ADMIN — Medication 100 MILLIEQUIVALENT(S): at 13:00

## 2021-03-12 RX ADMIN — Medication 2 PACKET(S): at 14:07

## 2021-03-12 RX ADMIN — Medication 2 PACKET(S): at 18:06

## 2021-03-12 RX ADMIN — Medication 100 MILLIEQUIVALENT(S): at 12:16

## 2021-03-12 RX ADMIN — CHLORHEXIDINE GLUCONATE 1 APPLICATION(S): 213 SOLUTION TOPICAL at 12:16

## 2021-03-12 RX ADMIN — Medication 40 MILLIEQUIVALENT(S): at 12:17

## 2021-03-12 RX ADMIN — Medication 100 MILLIEQUIVALENT(S): at 14:08

## 2021-03-12 NOTE — PROGRESS NOTE ADULT - ASSESSMENT
54 y/o male with unresectable metastatic colonic adenocarcinoma, now with LBO due to obstruction secondary to transverse colon mass, s/p decompression by GI with placement of stent. Patient hemodynamically stable in NAD +flatus. +BM. at present time.     - will plan for possible palliative colorectal surgical intervention   - please obtain medical and cardiac optimization for possible laparoscopy, laparotomy and bowel resection    Plan discussed with attending colorectal surgeon PARTH Alicia MD.     Please contact Surgery A-Team (P: [phone]) with any questions.    PARTH Muller MD, PGY-III  Surgery, A-Team  Pager: [phone]  [facility]  54 y/o male with unresectable metastatic colonic adenocarcinoma, now with LBO due to obstruction secondary to transverse colon mass, s/p decompression by GI with placement of stent. Patient hemodynamically stable in NAD +flatus. +BM. at present time.     -possible palliative colorectal surgical intervention discussed with the patient.    -Recommended pt discuss further with family  -Consider palliative consult to ensure pt is aware of all options   -please obtain medical and cardiac optimization for possible laparoscopy, laparotomy and bowel resection    Plan discussed with attending colorectal surgeon PARTH Alicia MD.     Please contact Surgery A-Team (P: [phone]) with any questions.    PARTH Muller MD, PGY-III  Surgery, A-Team  Pager: [phone]  [facility]

## 2021-03-12 NOTE — PROGRESS NOTE ADULT - ATTENDING COMMENTS
Large bowel obstruction, s/p colonic stent by GI, advance diet as tolerated, surgery f/up regarding colon resection and ostomy   Metastatic colon CA, no plans for inpatient chemo, morphine IV prn for neoplasm related pain  Anemia of chronic disease, monitor hgb   Hypovolemic hyponatremia, improved w/ IVF, monitor Na   Hypokalemia, supplement K Large bowel obstruction, s/p colonic stent by GI, advance diet as tolerated, surgery f/up regarding colon resection and ostomy   Metastatic colon CA, no plans for inpatient chemo, morphine IV prn for neoplasm related pain  Anemia of chronic disease, monitor hgb   Hypovolemic hyponatremia, improved w/ IVF, monitor Na   Hypokalemia, supplement K  Pre-op, good METS up till January and no cardiac complaints, EKG w/ no ischemic changes, RCRI score of 1, patient is medically optimized and may proceed w/ OR as planned Large bowel obstruction, s/p colonic stent by GI, advance diet as tolerated, surgery f/up regarding colon resection and ostomy   Metastatic colon CA, no plans for inpatient chemo, morphine IV prn for neoplasm related pain  Anemia of chronic disease, monitor hgb   Hypovolemic hyponatremia, improved w/ IVF, monitor Na   Hypokalemia, supplement K  Pre-op, good METS (was working up till January) w/ no cardiac complaints, repeat EKG, RCRI score of 1, patient is medically optimized and may proceed w/ OR as planned

## 2021-03-12 NOTE — PROGRESS NOTE ADULT - ASSESSMENT
55yoM with PMH of vitiligo, recently diagnosed with stage 4 colonic adenocarcinoma with primary mass in distal transverse colon and associated hepatic and right lung lower lobe metastases (1/25/21), currently on chemotherapy admitted for abdominal pain and distension. Found to have large bowel obstruction with a transition point at the site of the previously noted transverse colon mass.       Impression:  # LBO 2nd known colonic mass s/p Colonoscopy with successful stent placement  # Colonic CA with liver and lung mets on palliative chemotherapy     Recommendations:  - Rest of care as per surgery and Oncology       Prashant Looney   Gastroenterology Fellow  Pager: [phone]  Please call answering service [phone] / on-call GI fellow after 5pm and before 8am, and on weekends. 55yoM with PMH of vitiligo, recently diagnosed with stage 4 colonic adenocarcinoma with primary mass in distal transverse colon and associated hepatic and right lung lower lobe metastases (1/25/21), currently on chemotherapy admitted for abdominal pain and distension. Found to have large bowel obstruction with a transition point at the site of the previously noted transverse colon mass.       Impression:  # LBO 2nd known colonic mass - s/p Colonoscopy with successful stent placement  # Colonic CA with liver and lung mets on palliative chemotherapy     Recommendations:  - Rest of care as per surgery and Oncology       Prashant Looney   Gastroenterology Fellow  Pager: [phone]  Please call answering service [phone] / on-call GI fellow after 5pm and before 8am, and on weekends.

## 2021-03-12 NOTE — PROGRESS NOTE ADULT - PROBLEM SELECTOR PLAN 8
Transitions of Care Status:  1.  Name of PCP: Kaia Whitehead (PCP)  2.  PCP Contacted on Admission: [ ] Y    [x] N - night admission     3.  PCP contacted at Discharge: [ ] Y    [ ] N    [ ] N/A  4.  Post-Discharge Appointment Date and Location:  5.  Summary of Handoff given to PCP: - Diet: CLD  - DVT PPX: lovenox SQ  - Dispo: pending clinical improvement and PT  - GOC: pt will speak more with wife and children, palliative consult for pain control and further assistance

## 2021-03-12 NOTE — PROGRESS NOTE ADULT - PROBLEM SELECTOR PLAN 5
- Lactate 2.7 --> 3.5 > 1.6  - Likely in the setting of LBO  - C/w IVF while npo WBC 3.15 with 6.6% reactive lymphocytes   - Likely in the setting of chemotherapy  - No fevers or signs of infection   - Continue to trend CBC w/ differential

## 2021-03-12 NOTE — PROGRESS NOTE ADULT - SUBJECTIVE AND OBJECTIVE BOX
SURGERY A TEAM PROGRESS NOTE  -----  YAKELIN GERARDO | [id number]  03-09-21 (3d)  -----  Subjective/24hour Events:  Patient seen and examined on rounds. No acute events overnight. Tolerating CLD, advanced to FLD today. No nausea / vomiting. +flatus. +BM. Patient would like to consider palliative resection at present time.   -----  Vital Signs:  T(C): 36.4 (03-12-21 @ 05:48), Max: 36.6 (03-11-21 @ 23:28)  HR: 95 (03-12-21 @ 05:48) (95 - 109)  BP: 107/78 (03-12-21 @ 05:48) (107/78 - 115/82)  ABP: --  ABP(mean): --  RR: 18 (03-12-21 @ 05:48) (18 - 18)  SpO2: 100% (03-12-21 @ 05:48) (100% - 100%)  CVP(mm Hg): --  POCT Blood Glucose.: 112 mg/dL (03-11-21 @ 12:06)    Height (cm): 167.6 (03-11-21 @ 06:47)  Weight (kg): 47.6 (03-11-21 @ 06:47)  BMI (kg/m2): 16.9 (03-11-21 @ 06:47)  BSA (m2): 1.52 (03-11-21 @ 06:47)    -----  Medications:  chlorhexidine 4% Liquid 1 Application(s) Topical daily  enoxaparin Injectable 40 milliGRAM(s) SubCutaneous daily  influenza   Vaccine 0.5 milliLiter(s) IntraMuscular once  morphine  - Injectable 4 milliGRAM(s) IV Push every 4 hours PRN  ondansetron Injectable 4 milliGRAM(s) IV Push every 6 hours PRN  pantoprazole  Injectable 40 milliGRAM(s) IV Push daily  potassium chloride   Powder 40 milliEquivalent(s) Oral every 2 hours  potassium chloride  10 mEq/100 mL IVPB 10 milliEquivalent(s) IV Intermittent every 1 hour  potassium phosphate IVPB 15 milliMole(s) IV Intermittent once  sodium chloride 0.9%. 1000 milliLiter(s) IV Continuous <Continuous>  tetracaine/benzocaine/butamben Spray 1 Spray(s) Topical four times a day PRN    -----  Physical Exam:  General: Well developed, well nourished, alert and cooperative, and appears to be in no acute distress.  HEENT: normocephalic, vision is grossly intact  Abdomen: soft, non-distended, non-tender, no guarding or rebound    -----  Labs:                        9.5    6.80  )-----------( 293      ( 12 Mar 2021 09:27 )             30.6     03-12    138  |  101  |  11  ----------------------------<  100<H>  2.5<LL>   |  21<L>  |  0.55    Ca    7.6<L>      12 Mar 2021 09:27  Phos  1.7     03-12  Mg     2.1     03-12    TPro  5.1<L>  /  Alb  2.4<L>  /  TBili  0.4  /  DBili  x   /  AST  30  /  ALT  13  /  AlkPhos  135<H>  03-12    LIVER FUNCTIONS - ( 12 Mar 2021 09:27 )  Alb: 2.4 g/dL / Pro: 5.1 g/dL / ALK PHOS: 135 U/L / ALT: 13 U/L / AST: 30 U/L / GGT: x           -----  Radiology / Procedures:    < from: Colonoscopy (03.11.21 @ 07:24) >  Impression:          - Preparation of the colon was fair.                       - Malignant completely obstructing tumor in the                        transverse colon. Prosthesis placed.                       - No specimens collected.    < end of copied text >

## 2021-03-12 NOTE — PROGRESS NOTE ADULT - SUBJECTIVE AND OBJECTIVE BOX
Dylon Godoy  PGY1/Medicine/[id number]/[phone]    YAKELIN GERARDO  55y  Male      Patient is a 55y old  Male who presents with a chief complaint of abdominal pain (12 Mar 2021 06:05)      INTERVAL HPI/OVERNIGHT EVENTS/ROS: Had ~7 episodes of watery, nonbloody diarrhea overnight. Denies n/v/f/c/dysuria/cp/sob.    Vital Signs Last 24 Hrs  T(C): 36.4 (12 Mar 2021 05:48), Max: 36.7 (11 Mar 2021 11:14)  T(F): 97.5 (12 Mar 2021 05:48), Max: 98 (11 Mar 2021 11:14)  HR: 95 (12 Mar 2021 05:48) (93 - 109)  BP: 107/78 (12 Mar 2021 05:48) (107/78 - 121/85)  BP(mean): --  RR: 18 (12 Mar 2021 05:48) (17 - 18)  SpO2: 100% (12 Mar 2021 05:48) (98% - 100%)    PHYSICAL EXAM:  GENERAL: comfortable, pale, chronically ill appearing. thin.   HEENT:  Atraumatic, Normocephalic  CHEST/LUNG: Chest rise equal bilaterally. CTAB.   HEART: tachycardic, no murmurs   ABDOMEN: Abdomen non-distended and soft, nontender.   EXTREMITIES:  2+ Peripheral Pulses.  PSYCH: A&Ox3  SKIN: Pale, vitiligo, port on R side of chest    Consultant(s) Notes Reviewed:  [x ] YES  [ ] NO  Care Discussed with Consultants/Other Providers [ x] YES  [ ] NO    LABS:                        9.8    3.20  )-----------( 371      ( 11 Mar 2021 07:03 )             33.3     03-11    135  |  99  |  15  ----------------------------<  79  3.4<L>   |  16<L>  |  0.45<L>    Ca    7.4<L>      11 Mar 2021 07:03  Phos  2.2     03-11  Mg     2.1     03-11    TPro  5.3<L>  /  Alb  2.6<L>  /  TBili  0.6  /  DBili  x   /  AST  39  /  ALT  15  /  AlkPhos  147<H>  03-11    PT/INR - ( 10 Mar 2021 10:07 )   PT: 14.4 sec;   INR: 1.28 ratio         PTT - ( 10 Mar 2021 10:07 )  PTT:28.7 sec      CAPILLARY BLOOD GLUCOSE      POCT Blood Glucose.: 112 mg/dL (11 Mar 2021 12:06)      RADIOLOGY & ADDITIONAL TESTS:    Imaging Personally Reviewed:  [ ] YES  [ ] NO

## 2021-03-12 NOTE — PROGRESS NOTE ADULT - PROBLEM SELECTOR PLAN 6
- Prior iron studies cw iron def Anemia plus AOCD   - Monitor H/H and transfuse to keep Hgb > 7  - Hold oral ferrous sulfate with vit C supplementation given NG tube to suction - Lactate 2.7 --> 3.5 > 1.6  - Likely in the setting of LBO  - C/w IVF while npo

## 2021-03-12 NOTE — PROGRESS NOTE ADULT - SUBJECTIVE AND OBJECTIVE BOX
Interval Events:   s/p Colonoscopy with successful stent placement    Hospital Medications:  chlorhexidine 4% Liquid 1 Application(s) Topical daily  enoxaparin Injectable 40 milliGRAM(s) SubCutaneous daily  influenza   Vaccine 0.5 milliLiter(s) IntraMuscular once  morphine  - Injectable 4 milliGRAM(s) IV Push every 4 hours PRN  ondansetron Injectable 4 milliGRAM(s) IV Push every 6 hours PRN  pantoprazole  Injectable 40 milliGRAM(s) IV Push daily  potassium phosphate IVPB 15 milliMole(s) IV Intermittent once  sodium chloride 0.9%. 1000 milliLiter(s) IV Continuous <Continuous>  tetracaine/benzocaine/butamben Spray 1 Spray(s) Topical four times a day PRN        ROS:   General:  No fevers, chills or night sweats  ENT:  No sore throat or dysphagia  CV:  No pain or palpitations  Resp:  No dyspnea, cough or  wheezing  GI:  as above  Skin:  No rash or edema  Neuro: no weakness   Hematologic: no bleeding  Musculoskeletal: no muscle pain or join pain  Psych: no agitation      PHYSICAL EXAM:   Vital Signs:  Vital Signs Last 24 Hrs  T(C): 36.4 (12 Mar 2021 05:48), Max: 36.7 (11 Mar 2021 07:45)  T(F): 97.5 (12 Mar 2021 05:48), Max: 98 (11 Mar 2021 07:45)  HR: 95 (12 Mar 2021 05:48) (93 - 114)  BP: 107/78 (12 Mar 2021 05:48) (107/78 - 129/96)  BP(mean): --  RR: 18 (12 Mar 2021 05:48) (17 - 22)  SpO2: 100% (12 Mar 2021 05:48) (98% - 100%)  Daily Height in cm: 167.64 (11 Mar 2021 06:47)    Daily     GENERAL:  NAD, Appears stated age  HEENT:  NC/AT,  conjunctivae clear and pink, sclera -anicteric  CHEST:  Normal Effort, Breath sounds clear  HEART:  RRR, S1 + S2, no murmurs  ABDOMEN:  Soft, non-tender, -distended,   EXTREMITIES:  no cyanosis or edema  SKIN:  Warm & Dry. No rash or erythema  NEURO:  Alert, oriented, no focal deficit    LABS:                        9.8    3.20  )-----------( 371      ( 11 Mar 2021 07:03 )             33.3     Mean Cell Volume: 71.8 fL (03-11-21 @ 07:03)    03-11    135  |  99  |  15  ----------------------------<  79  3.4<L>   |  16<L>  |  0.45<L>    Ca    7.4<L>      11 Mar 2021 07:03  Phos  2.2     03-11  Mg     2.1     03-11    TPro  5.3<L>  /  Alb  2.6<L>  /  TBili  0.6  /  DBili  x   /  AST  39  /  ALT  15  /  AlkPhos  147<H>  03-11    LIVER FUNCTIONS - ( 11 Mar 2021 07:03 )  Alb: 2.6 g/dL / Pro: 5.3 g/dL / ALK PHOS: 147 U/L / ALT: 15 U/L / AST: 39 U/L / GGT: x           PT/INR - ( 10 Mar 2021 10:07 )   PT: 14.4 sec;   INR: 1.28 ratio         PTT - ( 10 Mar 2021 10:07 )  PTT:28.7 sec                            9.8    3.20  )-----------( 371      ( 11 Mar 2021 07:03 )             33.3                         9.2    3.09  )-----------( 362      ( 10 Mar 2021 10:07 )             29.7                         10.7   3.15  )-----------( 493      ( 09 Mar 2021 15:11 )             35.5       Imaging:         Interval Events:   s/p Colonoscopy with successful stent placement  feels much better - no pain and no distension     Hospital Medications:  chlorhexidine 4% Liquid 1 Application(s) Topical daily  enoxaparin Injectable 40 milliGRAM(s) SubCutaneous daily  influenza   Vaccine 0.5 milliLiter(s) IntraMuscular once  morphine  - Injectable 4 milliGRAM(s) IV Push every 4 hours PRN  ondansetron Injectable 4 milliGRAM(s) IV Push every 6 hours PRN  pantoprazole  Injectable 40 milliGRAM(s) IV Push daily  potassium phosphate IVPB 15 milliMole(s) IV Intermittent once  sodium chloride 0.9%. 1000 milliLiter(s) IV Continuous <Continuous>  tetracaine/benzocaine/butamben Spray 1 Spray(s) Topical four times a day PRN        ROS:   General:  No fevers, chills or night sweats  ENT:  No sore throat or dysphagia  CV:  No pain or palpitations  Resp:  No dyspnea, cough or  wheezing  GI:  as above  Skin:  No rash or edema  Neuro: no weakness   Hematologic: no bleeding  Musculoskeletal: no muscle pain or join pain  Psych: no agitation      PHYSICAL EXAM:   Vital Signs:  Vital Signs Last 24 Hrs  T(C): 36.4 (12 Mar 2021 05:48), Max: 36.7 (11 Mar 2021 07:45)  T(F): 97.5 (12 Mar 2021 05:48), Max: 98 (11 Mar 2021 07:45)  HR: 95 (12 Mar 2021 05:48) (93 - 114)  BP: 107/78 (12 Mar 2021 05:48) (107/78 - 129/96)  BP(mean): --  RR: 18 (12 Mar 2021 05:48) (17 - 22)  SpO2: 100% (12 Mar 2021 05:48) (98% - 100%)  Daily Height in cm: 167.64 (11 Mar 2021 06:47)    Daily     GENERAL:  NAD, Appears stated age  HEENT:  NC/AT,  conjunctivae clear and pink, sclera -anicteric  CHEST:  Normal Effort, Breath sounds clear  HEART:  RRR, S1 + S2, no murmurs  ABDOMEN:  Soft, non-tender, non-distended,   EXTREMITIES:  no cyanosis or edema  SKIN:  Warm & Dry. No rash or erythema  NEURO:  Alert, oriented, no focal deficit    LABS:                        9.8    3.20  )-----------( 371      ( 11 Mar 2021 07:03 )             33.3     Mean Cell Volume: 71.8 fL (03-11-21 @ 07:03)    03-11    135  |  99  |  15  ----------------------------<  79  3.4<L>   |  16<L>  |  0.45<L>    Ca    7.4<L>      11 Mar 2021 07:03  Phos  2.2     03-11  Mg     2.1     03-11    TPro  5.3<L>  /  Alb  2.6<L>  /  TBili  0.6  /  DBili  x   /  AST  39  /  ALT  15  /  AlkPhos  147<H>  03-11    LIVER FUNCTIONS - ( 11 Mar 2021 07:03 )  Alb: 2.6 g/dL / Pro: 5.3 g/dL / ALK PHOS: 147 U/L / ALT: 15 U/L / AST: 39 U/L / GGT: x           PT/INR - ( 10 Mar 2021 10:07 )   PT: 14.4 sec;   INR: 1.28 ratio         PTT - ( 10 Mar 2021 10:07 )  PTT:28.7 sec                            9.8    3.20  )-----------( 371      ( 11 Mar 2021 07:03 )             33.3                         9.2    3.09  )-----------( 362      ( 10 Mar 2021 10:07 )             29.7                         10.7   3.15  )-----------( 493      ( 09 Mar 2021 15:11 )             35.5       Imaging:

## 2021-03-12 NOTE — PROGRESS NOTE ADULT - PROBLEM SELECTOR PLAN 2
- Metastatic to liver and lungs, s/p first session of palliative folfox (2/16/21) at [facility]  - CEA - 2882, PSA - WNL, CA 19-9 -2882 and AFP - 2775  - CT abd/p showing hepatic mets with some that have increased in size since 1/2021, enlarged prostate, along with a 1.3 x 0.9 cm right lower lobe nodule concerning for metastatic disease  - Will email Oncology consult  - Pain control regimen - Morphine 4 mg IV q4 PRN   - Palliative consult for pain control and GOC given metastatic cancer

## 2021-03-12 NOTE — PROGRESS NOTE ADULT - PROBLEM SELECTOR PLAN 4
Will continue to follow for symptom management and goals of care  - HCP in chart indicating Jolie as HCP.   - Child life referral made for patient's 2 young sons (5 and 10 y/o)  Sushma Freitas DO  Hospice and Palliative Care Fellow   [facility] Pager [phone]  [facility] Pager [phone]

## 2021-03-12 NOTE — PROGRESS NOTE ADULT - PROBLEM SELECTOR PLAN 3
- Na 129 with low Cl 89  - Likely in the setting of poor PO intake, vomiting, dehydration  - S/p 2L bolus, c/w IVF   - Trend Na, do not overcorrect more than 6-8 mEq in 24h  - improving  - resolved - Pt is currently medically optimized for colorectal surgery  - EKG in chart  - Revised Cardiac Risk Index: Based on RCRI, pt has 6% 30 day risk of death, cardiac arrest, MI due to high - Pt is currently medically optimized for colorectal surgery  - EKG in chart  - Revised Cardiac Risk Index: Based on RCRI, pt has 6% 30 day risk of death, cardiac arrest, MI due to intraperitoneal surgery

## 2021-03-12 NOTE — PROGRESS NOTE ADULT - SUBJECTIVE AND OBJECTIVE BOX
ANESTHESIA POSTOP CHECK    55y Male POSTOP DAY 1     No COMPLAINTS    NO APPARENT ANESTHESIA COMPLICATIONS

## 2021-03-12 NOTE — PROGRESS NOTE ADULT - PROBLEM SELECTOR PLAN 1
- 1 week hx abdominal pain/distension, N/V in the setting of metastatic colon cancer   - CT abd/p LBO involving the ascending and transverse colon measuring up to 8 cm secondary to the colonic mass seen in the distal transverse colon. In addition, the jejunum and terminal ileum are also dilated  - S/p 2L bolus in the ED, c/w IVF hydration   - NG tube placed for decompression--> removed  - s/p colonic stent  - advanced diet to CLD  - Dr. Alicia made aware for discussion of operative intervention given pt may be precluded from chemo due to stent and oncology strongly recommends chemo in the future

## 2021-03-12 NOTE — PROGRESS NOTE ADULT - ATTENDING COMMENTS
Complex symptom management in the setting of life-limiting illness.    Ongoing coordination from medical providers to optimize patient for palliative DMT.  Will adjust PRN symptom regimen pending clinical course. Advancing diet as tolerated but will hold off on transitioning to PO PRNs until oral route is confirmed to be reliable.

## 2021-03-12 NOTE — PROGRESS NOTE ADULT - SUBJECTIVE AND OBJECTIVE BOX
ANESTHESIA POSTOP CHECK    55y Male POSTOP DAY 1 s/p ERCP, Stent exchange under general anesthesia.    No COMPLAINTS    NO APPARENT ANESTHESIA COMPLICATIONS

## 2021-03-12 NOTE — PROGRESS NOTE ADULT - PROBLEM SELECTOR PLAN 4
WBC 3.15 with 6.6% reactive lymphocytes   - Likely in the setting of chemotherapy  - No fevers or signs of infection   - Continue to trend CBC w/ differential - Na 129 with low Cl 89  - Likely in the setting of poor PO intake, vomiting, dehydration  - S/p 2L bolus, c/w IVF   - Trend Na, do not overcorrect more than 6-8 mEq in 24h  - improving  - resolved

## 2021-03-12 NOTE — PROGRESS NOTE ADULT - PROBLEM SELECTOR PLAN 7
- Diet: CLD  - DVT PPX: lovenox SQ  - Dispo: pending clinical improvement and PT  - GOC: pt will speak more with wife and children, palliative consult for pain control and further assistance - Prior iron studies cw iron def Anemia plus AOCD   - Monitor H/H and transfuse to keep Hgb > 7  - Hold oral ferrous sulfate with vit C supplementation given NG tube to suction

## 2021-03-12 NOTE — PROGRESS NOTE ADULT - ATTENDING COMMENTS
Please see consult note for full recommendations    -possible palliative resection discussed with pts  -Please obtain palliative consult so that pt is aware of all options including hospice/palliative  -medical optimization  -advance diet as tolerated  -dvt ppx

## 2021-03-12 NOTE — PROGRESS NOTE ADULT - SUBJECTIVE AND OBJECTIVE BOX
SUBJECTIVE AND OBJECTIVE: Patient seen at bedside. NGT removed. States that he feels well today. He is passing gas and tolerating CLD. Denies pain.   - We discussed the possibility of him having surgery. He said that he wanted to avoid it but understands that if it is his only option then he will continue with surgery. 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 His goals are to receive treatment and be able to watch his sons go to school and grow up. Provider discussed child life services to help patient and his wife talk about cancer and advanced illness with young children. Patient has a 30 year old daughter from a prior relationship and 2 young sons (5 and 10 y/o) with his wife, Jolie.     INTERVAL HPI/OVERNIGHT EVENTS: Patient received no PRN Morphine in 24 hours.   DNR on chart:   Allergies    No Known Allergies    Intolerances    MEDICATIONS  (STANDING):  chlorhexidine 4% Liquid 1 Application(s) Topical daily  enoxaparin Injectable 40 milliGRAM(s) SubCutaneous daily  influenza   Vaccine 0.5 milliLiter(s) IntraMuscular once  pantoprazole  Injectable 40 milliGRAM(s) IV Push daily  potassium chloride   Powder 40 milliEquivalent(s) Oral every 2 hours  potassium chloride  10 mEq/100 mL IVPB 10 milliEquivalent(s) IV Intermittent every 1 hour  potassium phosphate IVPB 15 milliMole(s) IV Intermittent once  sodium chloride 0.9%. 1000 milliLiter(s) (100 mL/Hr) IV Continuous <Continuous>    MEDICATIONS  (PRN):  morphine  - Injectable 4 milliGRAM(s) IV Push every 4 hours PRN Severe Pain (7 - 10)  ondansetron Injectable 4 milliGRAM(s) IV Push every 6 hours PRN Nausea and/or Vomiting  tetracaine/benzocaine/butamben Spray 1 Spray(s) Topical four times a day PRN throat pain, discomfort      ITEMS UNCHECKED ARE NOT PRESENT    PRESENT SYMPTOMS: [ ]Unable to obtain due to poor mentation   Source if other than patient:  [ ]Family   [ ]Team     Pain:  [ ]yes [ x]no  QOL impact -   Location -                    Aggravating factors -  Quality -  Radiation -  Timing-  Severity (0-10 scale):  Minimal acceptable level (0-10 scale):     Dyspnea:                           [ ]Mild [ ]Moderate [ ]Severe  Anxiety:                             [ ]Mild [ ]Moderate [ ]Severe  Fatigue:                             [ ]Mild [ ]Moderate [ ]Severe  Nausea:                             [ ]Mild [ ]Moderate [ ]Severe  Loss of appetite:              [ ]Mild [ ]Moderate [ ]Severe  Constipation:                    [ ]Mild [ ]Moderate [ ]Severe    CPOT:    https://www.[facility].org/getattachment/[email or web address]-[email or web address]/Critical-Care-Pain-Observation-Tool-(CPOT)    PAIN AD Score:	0  http://geriatrictoolkit.[facility]/cog/painad.pdf (Ctrl + left click to view)    Other Symptoms:  [ ]All other review of systems negative     Palliative Performance Status Version 2:     40    %      http://[facility].org/files/news/palliative_performance_scale_ppsv2.pdf    Physical Exam:   Vital Signs Last 24 Hrs  T(C): 36.4 (12 Mar 2021 05:48), Max: 36.6 (11 Mar 2021 23:28)  T(F): 97.5 (12 Mar 2021 05:48), Max: 97.9 (11 Mar 2021 23:28)  HR: 95 (12 Mar 2021 05:48) (95 - 109)  BP: 107/78 (12 Mar 2021 05:48) (107/78 - 115/82)  BP(mean): --  RR: 18 (12 Mar 2021 05:48) (18 - 18)  SpO2: 100% (12 Mar 2021 05:48) (100% - 100%) I&O's Summary     GENERAL:  [ x]Alert  [x ]Oriented x3  [ ]Lethargic  [x ]Cachexia  [ ]Unarousable  [ x]Verbal  [ ]Non-Verbal  Behavioral:   [ ]Anxiety  [ ]Delirium [ ]Agitation [ ]Other  HEENT:  [x ]Normal   [ ]Dry mouth   [ ]ET Tube/Trach  [ ]Oral lesions  PULMONARY:   [x ]Clear [ ]Tachypnea  [ ]Audible excessive secretions   [ ]Rhonchi        [ ]Right [ ]Left [ ]Bilateral  [ ]Crackles        [ ]Right [ ]Left [ ]Bilateral  [ ]Wheezing     [ ]Right [ ]Left [ ]Bilateral  [ ]Diminished BS [ ] Right [ ]Left [ ]Bilateral  CARDIOVASCULAR:    [x ]Regular [ ]Irregular [ ]Tachy  [ ]Mikey [ ]Murmur [ ]Other  GASTROINTESTINAL:  [x ]Soft  [x ]Distended-improved   [ x]+BS  [x ]Non tender [ ]Tender  [ ]PEG [ ]OGT/ NGT   Last BM:  3/11  GENITOURINARY:  [x ]Normal [ ]Incontinent   [ ]Oliguria/Anuria   [ ]Judd  MUSCULOSKELETAL:   [ ]Normal   [x ]Weakness  [ ]Bed/Wheelchair bound [ ]Edema  NEUROLOGIC:   [ x]No focal deficits  [ ] Cognitive impairment  [ ] Dysphagia [ ]Dysarthria [ ] Paresis [ ]Other   SKIN: +vitiligo  [ ]Normal  [ ]Rash   [ ]Pressure ulcer(s) [ ]y [ ]n present on admission    CRITICAL CARE:  [ ]Shock Present  [ ]Septic [ ]Cardiogenic [ ]Neurologic [ ]Hypovolemic  [ ]Vasopressors [ ]Inotropes  [ ]Respiratory failure present [ ]Mechanical Ventilation [ ]Non-invasive ventilatory support [ ]High-Flow  [ ]Acute  [ ]Chronic [ ]Hypoxic  [ ]Hypercarbic [ ]Other  [ ]Other organ failure     LABS:                                    9.5    6.80  )-----------( 293      ( 12 Mar 2021 09:27 )             30.6     12 Mar 2021 09:27    138    |  101    |  11     ----------------------------<  100    2.5     |  21     |  0.55     Ca    7.6        12 Mar 2021 09:27  Phos  1.7       12 Mar 2021 09:27  Mg     2.1       12 Mar 2021 09:27    TPro  5.1    /  Alb  2.4    /  TBili  0.4    /  DBili  x      /  AST  30     /  ALT  13     /  AlkPhos  135    12 Mar 2021 09:27    LIVER FUNCTIONS - ( 12 Mar 2021 09:27 )  Alb: 2.4 g/dL / Pro: 5.1 g/dL / ALK PHOS: 135 U/L / ALT: 13 U/L / AST: 30 U/L / GGT: x             CAPILLARY BLOOD GLUCOSE    Urinalysis Basic - ( 10 Mar 2021 00:55 )  Color: Yellow / Appearance: Clear / SG: >1.050 / pH: x  Gluc: x / Ketone: Moderate  / Bili: Small / Urobili: 6 mg/dL   Blood: x / Protein: 30 mg/dL / Nitrite: Negative   Leuk Esterase: Negative / RBC: 3 /HPF / WBC 1 /HPF   Sq Epi: x / Non Sq Epi: 0 /HPF / Bacteria: Negative      RADIOLOGY & ADDITIONAL STUDIES:  < from: Xray Chest 1 View- PORTABLE-Urgent (Xray Chest 1 View- PORTABLE-Urgent .) (03.11.21 @ 03:16) >  Enteric tube tip is in the stomach and side port in the region of the distal esophagus/GE junction. Dr. Rubio informed me when I spoke with him at 1:43 PM on March 11, 2021 that the tube has been removed.    < end of copied text >      Protein Calorie Malnutrition Present: [ ]mild [ ]moderate [ ]severe [ ]underweight [ ]morbid obesity  https://www.andeal.org/vault/2440/web/files/ONC/Table_Clinical%20Characteristics%20to%20Document%20Malnutrition-White%20JV%20et%20al%945812.pdf    Height (cm): 167.6 (03-11-21 @ 06:47), 168 (02-16-21 @ 10:09), 167.6 (01-20-21 @ 08:52)  Weight (kg): 47.6 (03-11-21 @ 06:47), 49 (03-04-21 @ 08:24), 51.7 (01-20-21 @ 08:12)  BMI (kg/m2): 16.9 (03-11-21 @ 06:47), 17.4 (03-04-21 @ 08:24), 18.3 (02-16-21 @ 10:09)    [ ]PPSV2 < or = 30%  [ ]significant weight loss [ ]poor nutritional intake [ ]anasarca   Albumin, Serum: 2.6 g/dL (03-11-21 @ 07:03)   [ ]Artificial Nutrition    REFERRALS:   [ ]Chaplaincy  [ ]Hospice  [ ]Child Life  [ x]Social Work  [ ]Case management [ ]Holistic Therapy     Goals of Care Document:  HCP

## 2021-03-12 NOTE — PROGRESS NOTE ADULT - PROBLEM SELECTOR PLAN 3
- NGT discontinued  - s/p stent and now having bowel movements  - colorectal surgery following- plan for possible surgery  - GI following

## 2021-03-13 DIAGNOSIS — A41.9 SEPSIS, UNSPECIFIED ORGANISM: ICD-10-CM

## 2021-03-13 DIAGNOSIS — R19.7 DIARRHEA, UNSPECIFIED: ICD-10-CM

## 2021-03-13 LAB
ANION GAP SERPL CALC-SCNC: 11 MMOL/L — SIGNIFICANT CHANGE UP (ref 7–14)
ANION GAP SERPL CALC-SCNC: 11 MMOL/L — SIGNIFICANT CHANGE UP (ref 7–14)
BASOPHILS # BLD AUTO: 0.02 K/UL — SIGNIFICANT CHANGE UP (ref 0–0.2)
BASOPHILS NFR BLD AUTO: 0.2 % — SIGNIFICANT CHANGE UP (ref 0–2)
BUN SERPL-MCNC: 10 MG/DL — SIGNIFICANT CHANGE UP (ref 7–23)
BUN SERPL-MCNC: 8 MG/DL — SIGNIFICANT CHANGE UP (ref 7–23)
CALCIUM SERPL-MCNC: 7.3 MG/DL — LOW (ref 8.4–10.5)
CALCIUM SERPL-MCNC: 7.6 MG/DL — LOW (ref 8.4–10.5)
CHLORIDE SERPL-SCNC: 105 MMOL/L — SIGNIFICANT CHANGE UP (ref 98–107)
CHLORIDE SERPL-SCNC: 106 MMOL/L — SIGNIFICANT CHANGE UP (ref 98–107)
CO2 SERPL-SCNC: 20 MMOL/L — LOW (ref 22–31)
CO2 SERPL-SCNC: 20 MMOL/L — LOW (ref 22–31)
CREAT SERPL-MCNC: 0.52 MG/DL — SIGNIFICANT CHANGE UP (ref 0.5–1.3)
CREAT SERPL-MCNC: 0.54 MG/DL — SIGNIFICANT CHANGE UP (ref 0.5–1.3)
CULTURE RESULTS: SIGNIFICANT CHANGE UP
EOSINOPHIL # BLD AUTO: 0.02 K/UL — SIGNIFICANT CHANGE UP (ref 0–0.5)
EOSINOPHIL NFR BLD AUTO: 0.2 % — SIGNIFICANT CHANGE UP (ref 0–6)
GLUCOSE BLDC GLUCOMTR-MCNC: 92 MG/DL — SIGNIFICANT CHANGE UP (ref 70–99)
GLUCOSE SERPL-MCNC: 126 MG/DL — HIGH (ref 70–99)
GLUCOSE SERPL-MCNC: 87 MG/DL — SIGNIFICANT CHANGE UP (ref 70–99)
HCT VFR BLD CALC: 29.7 % — LOW (ref 39–50)
HGB BLD-MCNC: 9.2 G/DL — LOW (ref 13–17)
IANC: 6.91 K/UL — SIGNIFICANT CHANGE UP (ref 1.5–8.5)
IMM GRANULOCYTES NFR BLD AUTO: 1.1 % — SIGNIFICANT CHANGE UP (ref 0–1.5)
LYMPHOCYTES # BLD AUTO: 1.32 K/UL — SIGNIFICANT CHANGE UP (ref 1–3.3)
LYMPHOCYTES # BLD AUTO: 15 % — SIGNIFICANT CHANGE UP (ref 13–44)
MAGNESIUM SERPL-MCNC: 1.8 MG/DL — SIGNIFICANT CHANGE UP (ref 1.6–2.6)
MAGNESIUM SERPL-MCNC: 1.9 MG/DL — SIGNIFICANT CHANGE UP (ref 1.6–2.6)
MCHC RBC-ENTMCNC: 22.2 PG — LOW (ref 27–34)
MCHC RBC-ENTMCNC: 31 GM/DL — LOW (ref 32–36)
MCV RBC AUTO: 71.6 FL — LOW (ref 80–100)
MONOCYTES # BLD AUTO: 0.41 K/UL — SIGNIFICANT CHANGE UP (ref 0–0.9)
MONOCYTES NFR BLD AUTO: 4.7 % — SIGNIFICANT CHANGE UP (ref 2–14)
NEUTROPHILS # BLD AUTO: 6.91 K/UL — SIGNIFICANT CHANGE UP (ref 1.8–7.4)
NEUTROPHILS NFR BLD AUTO: 78.8 % — HIGH (ref 43–77)
NRBC # BLD: 0 /100 WBCS — SIGNIFICANT CHANGE UP
NRBC # FLD: 0 K/UL — SIGNIFICANT CHANGE UP
PHOSPHATE SERPL-MCNC: 1.9 MG/DL — LOW (ref 2.5–4.5)
PHOSPHATE SERPL-MCNC: 2 MG/DL — LOW (ref 2.5–4.5)
PLATELET # BLD AUTO: 266 K/UL — SIGNIFICANT CHANGE UP (ref 150–400)
POTASSIUM SERPL-MCNC: 2.7 MMOL/L — CRITICAL LOW (ref 3.5–5.3)
POTASSIUM SERPL-MCNC: 3.5 MMOL/L — SIGNIFICANT CHANGE UP (ref 3.5–5.3)
POTASSIUM SERPL-SCNC: 2.7 MMOL/L — CRITICAL LOW (ref 3.5–5.3)
POTASSIUM SERPL-SCNC: 3.5 MMOL/L — SIGNIFICANT CHANGE UP (ref 3.5–5.3)
RBC # BLD: 4.15 M/UL — LOW (ref 4.2–5.8)
RBC # FLD: 21.6 % — HIGH (ref 10.3–14.5)
SODIUM SERPL-SCNC: 136 MMOL/L — SIGNIFICANT CHANGE UP (ref 135–145)
SODIUM SERPL-SCNC: 137 MMOL/L — SIGNIFICANT CHANGE UP (ref 135–145)
SPECIMEN SOURCE: SIGNIFICANT CHANGE UP
WBC # BLD: 8.42 K/UL — SIGNIFICANT CHANGE UP (ref 3.8–10.5)
WBC # FLD AUTO: 8.42 K/UL — SIGNIFICANT CHANGE UP (ref 3.8–10.5)

## 2021-03-13 PROCEDURE — 99233 SBSQ HOSP IP/OBS HIGH 50: CPT | Mod: GC

## 2021-03-13 RX ORDER — POTASSIUM CHLORIDE 20 MEQ
40 PACKET (EA) ORAL EVERY 4 HOURS
Refills: 0 | Status: COMPLETED | OUTPATIENT
Start: 2021-03-13 | End: 2021-03-13

## 2021-03-13 RX ORDER — ACETAMINOPHEN 500 MG
750 TABLET ORAL ONCE
Refills: 0 | Status: COMPLETED | OUTPATIENT
Start: 2021-03-13 | End: 2021-03-13

## 2021-03-13 RX ORDER — PIPERACILLIN AND TAZOBACTAM 4; .5 G/20ML; G/20ML
3.38 INJECTION, POWDER, LYOPHILIZED, FOR SOLUTION INTRAVENOUS ONCE
Refills: 0 | Status: COMPLETED | OUTPATIENT
Start: 2021-03-13 | End: 2021-03-13

## 2021-03-13 RX ORDER — PIPERACILLIN AND TAZOBACTAM 4; .5 G/20ML; G/20ML
3.38 INJECTION, POWDER, LYOPHILIZED, FOR SOLUTION INTRAVENOUS EVERY 8 HOURS
Refills: 0 | Status: DISCONTINUED | OUTPATIENT
Start: 2021-03-13 | End: 2021-03-16

## 2021-03-13 RX ORDER — SODIUM CHLORIDE 9 MG/ML
1000 INJECTION INTRAMUSCULAR; INTRAVENOUS; SUBCUTANEOUS
Refills: 0 | Status: DISCONTINUED | OUTPATIENT
Start: 2021-03-13 | End: 2021-03-14

## 2021-03-13 RX ORDER — FERROUS SULFATE 325(65) MG
325 TABLET ORAL DAILY
Refills: 0 | Status: DISCONTINUED | OUTPATIENT
Start: 2021-03-13 | End: 2021-03-26

## 2021-03-13 RX ORDER — POTASSIUM CHLORIDE 20 MEQ
10 PACKET (EA) ORAL
Refills: 0 | Status: COMPLETED | OUTPATIENT
Start: 2021-03-13 | End: 2021-03-13

## 2021-03-13 RX ORDER — ASCORBIC ACID 60 MG
500 TABLET,CHEWABLE ORAL DAILY
Refills: 0 | Status: DISCONTINUED | OUTPATIENT
Start: 2021-03-13 | End: 2021-03-26

## 2021-03-13 RX ORDER — POTASSIUM PHOSPHATE, MONOBASIC POTASSIUM PHOSPHATE, DIBASIC 236; 224 MG/ML; MG/ML
30 INJECTION, SOLUTION INTRAVENOUS ONCE
Refills: 0 | Status: COMPLETED | OUTPATIENT
Start: 2021-03-13 | End: 2021-03-13

## 2021-03-13 RX ORDER — SODIUM,POTASSIUM PHOSPHATES 278-250MG
2 POWDER IN PACKET (EA) ORAL EVERY 4 HOURS
Refills: 0 | Status: COMPLETED | OUTPATIENT
Start: 2021-03-13 | End: 2021-03-13

## 2021-03-13 RX ADMIN — ENOXAPARIN SODIUM 40 MILLIGRAM(S): 100 INJECTION SUBCUTANEOUS at 06:19

## 2021-03-13 RX ADMIN — Medication 500 MILLIGRAM(S): at 13:03

## 2021-03-13 RX ADMIN — Medication 300 MILLIGRAM(S): at 02:31

## 2021-03-13 RX ADMIN — Medication 100 MILLIEQUIVALENT(S): at 09:36

## 2021-03-13 RX ADMIN — Medication 325 MILLIGRAM(S): at 13:03

## 2021-03-13 RX ADMIN — POTASSIUM PHOSPHATE, MONOBASIC POTASSIUM PHOSPHATE, DIBASIC 83.33 MILLIMOLE(S): 236; 224 INJECTION, SOLUTION INTRAVENOUS at 17:49

## 2021-03-13 RX ADMIN — Medication 40 MILLIEQUIVALENT(S): at 10:38

## 2021-03-13 RX ADMIN — PIPERACILLIN AND TAZOBACTAM 25 GRAM(S): 4; .5 INJECTION, POWDER, LYOPHILIZED, FOR SOLUTION INTRAVENOUS at 17:44

## 2021-03-13 RX ADMIN — CHLORHEXIDINE GLUCONATE 1 APPLICATION(S): 213 SOLUTION TOPICAL at 13:29

## 2021-03-13 RX ADMIN — PIPERACILLIN AND TAZOBACTAM 25 GRAM(S): 4; .5 INJECTION, POWDER, LYOPHILIZED, FOR SOLUTION INTRAVENOUS at 10:01

## 2021-03-13 RX ADMIN — Medication 40 MILLIEQUIVALENT(S): at 13:29

## 2021-03-13 RX ADMIN — Medication 100 MILLIEQUIVALENT(S): at 13:02

## 2021-03-13 RX ADMIN — PIPERACILLIN AND TAZOBACTAM 200 GRAM(S): 4; .5 INJECTION, POWDER, LYOPHILIZED, FOR SOLUTION INTRAVENOUS at 02:31

## 2021-03-13 RX ADMIN — PANTOPRAZOLE SODIUM 40 MILLIGRAM(S): 20 TABLET, DELAYED RELEASE ORAL at 13:03

## 2021-03-13 RX ADMIN — Medication 2 PACKET(S): at 13:02

## 2021-03-13 RX ADMIN — SODIUM CHLORIDE 100 MILLILITER(S): 9 INJECTION INTRAMUSCULAR; INTRAVENOUS; SUBCUTANEOUS at 09:36

## 2021-03-13 RX ADMIN — Medication 2 PACKET(S): at 10:02

## 2021-03-13 RX ADMIN — Medication 750 MILLIGRAM(S): at 02:30

## 2021-03-13 RX ADMIN — Medication 100 MILLIEQUIVALENT(S): at 10:47

## 2021-03-13 NOTE — PROGRESS NOTE ADULT - PROBLEM SELECTOR PLAN 2
- 1 week hx abdominal pain/distension, N/V in the setting of metastatic colon cancer   - CT abd/p LBO involving the ascending and transverse colon measuring up to 8 cm secondary to the colonic mass seen in the distal transverse colon. In addition, the jejunum and terminal ileum are also dilated  - S/p 2L bolus in the ED, c/w IVF hydration   - NG tube placed for decompression--> removed  - s/p colonic stent  - advanced diet to CLD  - Dr. lAicia made aware for discussion of operative intervention given pt may be precluded from chemo due to stent and oncology strongly recommends chemo in the future - pt having multiple watery diarrhea (~9 episodes per day) since stent placement  - possibly in setting of new stent vs infection as pt febrile to 101.3 on 3/13  - will f/u blood culture, stool culture, and GI PCR  - started on zosyn  - maintain IV hydration  - consider C. diff testing

## 2021-03-13 NOTE — PROGRESS NOTE ADULT - SUBJECTIVE AND OBJECTIVE BOX
Dylon Godoy  PGY1/Medicine/[id number]/[phone]    YAKELIN GERARDO  55y  Male      Patient is a 55y old  Male who presents with a chief complaint of abdominal pain (12 Mar 2021 12:08)      INTERVAL HPI/OVERNIGHT EVENTS/ROS: Had fever to 101.3 rectally. Having 9-10 watery bowel movements per day. Denies n/v/cp/sob/abd pain/dysuria.     Vital Signs Last 24 Hrs  T(C): 36.8 (13 Mar 2021 06:15), Max: 38.5 (13 Mar 2021 01:18)  T(F): 98.3 (13 Mar 2021 06:15), Max: 101.3 (13 Mar 2021 01:18)  HR: 82 (13 Mar 2021 06:15) (82 - 135)  BP: 100/70 (13 Mar 2021 06:15) (100/70 - 144/107)  BP(mean): --  RR: 18 (13 Mar 2021 06:15) (18 - 18)  SpO2: 100% (13 Mar 2021 06:15) (100% - 100%)    PHYSICAL EXAM:  GENERAL: comfortable, pale, chronically ill appearing. thin.   HEENT:  Atraumatic, Normocephalic  CHEST/LUNG: Chest rise equal bilaterally. CTAB.   HEART: tachycardic, no murmurs   ABDOMEN: Abdomen non-distended and soft, nontender.   EXTREMITIES:  2+ Peripheral Pulses.  PSYCH: A&Ox3  SKIN: Pale, vitiligo, port on R side of chest    Consultant(s) Notes Reviewed:  [x ] YES  [ ] NO  Care Discussed with Consultants/Other Providers [ x] YES  [ ] NO    LABS:                        9.2    8.42  )-----------( 266      ( 13 Mar 2021 07:31 )             29.7     03-12    135  |  103  |  10  ----------------------------<  113<H>  3.3<L>   |  20<L>  |  0.57    Ca    7.8<L>      12 Mar 2021 16:49  Phos  1.7     03-12  Mg     2.1     03-12    TPro  5.1<L>  /  Alb  2.4<L>  /  TBili  0.4  /  DBili  x   /  AST  30  /  ALT  13  /  AlkPhos  135<H>  03-12          CAPILLARY BLOOD GLUCOSE      POCT Blood Glucose.: 92 mg/dL (13 Mar 2021 00:42)      RADIOLOGY & ADDITIONAL TESTS:    Imaging Personally Reviewed:  [ ] YES  [ ] NO

## 2021-03-13 NOTE — PROGRESS NOTE ADULT - PROBLEM SELECTOR PLAN 4
- Pt is currently medically optimized for colorectal surgery  - EKG in chart  - Revised Cardiac Risk Index: Based on RCRI, pt has 6% 30 day risk of death, cardiac arrest, MI due to intraperitoneal surgery - Metastatic to liver and lungs, s/p first session of palliative folfox (2/16/21) at [facility]  - CEA - 2882, PSA - WNL, CA 19-9 -2882 and AFP - 2775  - CT abd/p showing hepatic mets with some that have increased in size since 1/2021, enlarged prostate, along with a 1.3 x 0.9 cm right lower lobe nodule concerning for metastatic disease  - Pain control regimen - Morphine 4 mg IV q4 PRN   - Palliative consult for pain control and GOC given metastatic cancer  - oncology following

## 2021-03-13 NOTE — PROGRESS NOTE ADULT - PROBLEM SELECTOR PLAN 8
- Prior iron studies cw iron def Anemia plus AOCD   - Monitor H/H and transfuse to keep Hgb > 7  - c/w PO iron and vit c - Lactate 2.7 --> 3.5 > 1.6  - Likely in the setting of LBO  - resolved

## 2021-03-13 NOTE — PROGRESS NOTE ADULT - ATTENDING COMMENTS
56 y/o M with hx unresectable stage 4 colon cancer (mets to liver and lungs, received palliative folfox 2/16/21) presenting with abdominal pain, N/V, and distension. Admitted for medical management of LBO due to obstruction 2/2 transverse colon mass.     #Sepsis, overnight septic. Febrile and tachycardic. Unclear source of infection. Fever w/u not complete. Pt having multiple watery stools. 10 episodes yesterday. This AM had 2. Possible GI source. Started on Zosyn overnight, will continue. Complete fever work up - UA and CXR. Pt with no COVID symptoms so no need for COVID swab. GI PCR. If he continues to have loose stools tomorrow, will plan for c.dif work up.     #Large bowel obstruction, s/p colonic stent by GI, advance diet as tolerated, surgery f/up regarding colon resection and ostomy     #Metastatic colon CA, no plans for inpatient chemo, morphine IV prn for neoplasm related pain    #Anemia of chronic disease, monitor hgb     #Hypovolemic hyponatremia, improved w/ IVF, monitor Na     #Hypokalemia, supplement K    #Pre-op, good METS (was working up till January) w/ no cardiac complaints, repeat EKG, RCRI score of 1, patient is medically optimized and may proceed w/ OR as planned

## 2021-03-13 NOTE — PROGRESS NOTE ADULT - PROBLEM SELECTOR PLAN 7
- Lactate 2.7 --> 3.5 > 1.6  - Likely in the setting of LBO  - resolved WBC 3.15 with 6.6% reactive lymphocytes   - Likely in the setting of chemotherapy  - improving

## 2021-03-13 NOTE — PROGRESS NOTE ADULT - PROBLEM SELECTOR PLAN 1
- pt having multiple watery diarrhea (~9 episodes per day) since stent placement  - possibly in setting of new stent vs infection as pt febrile to 101.3 on 3/13  - will f/u blood culture, stool culture, and GI PCR  - started on zosyn  - maintain IV hydration  - consider C. diff testing - pt septic overnight with tachycardia and fever to 101.3   - started on zosyn  - f/u bcx  - concern for infection related to copious diarrhea  - unlikely to be COVID due to 3/9 negative and lack of symptoms other than diarrhea  - CTM

## 2021-03-13 NOTE — PROGRESS NOTE ADULT - ASSESSMENT
56 y/o male with unresectable metastatic colonic adenocarcinoma, now with LBO due to obstruction secondary to transverse colon mass, s/p decompression by GI with placement of stent. Patient with recent fevers now with GNR in blood.    - Diarrhea likely post obstructive   - Abdomen benign, unlikely to be translocation from GI tract  - Complete fever workup including UA, CXR, Covid PCR  - Repeat blood cultures  - please obtain medical and cardiac optimization for possible laparoscopy, laparotomy and bowel resection    CMaloney x [phone]

## 2021-03-13 NOTE — PROGRESS NOTE ADULT - PROBLEM SELECTOR PLAN 6
WBC 3.15 with 6.6% reactive lymphocytes   - Likely in the setting of chemotherapy  - improving - Na 129 with low Cl 89  - Likely in the setting of poor PO intake, vomiting, dehydration  - S/p 2L bolus  - Trend Na, do not overcorrect more than 6-8 mEq in 24h  - improving  - resolved

## 2021-03-13 NOTE — PROGRESS NOTE ADULT - PROBLEM SELECTOR PLAN 3
- Metastatic to liver and lungs, s/p first session of palliative folfox (2/16/21) at [facility]  - CEA - 2882, PSA - WNL, CA 19-9 -2882 and AFP - 2775  - CT abd/p showing hepatic mets with some that have increased in size since 1/2021, enlarged prostate, along with a 1.3 x 0.9 cm right lower lobe nodule concerning for metastatic disease  - Pain control regimen - Morphine 4 mg IV q4 PRN   - Palliative consult for pain control and GOC given metastatic cancer  - oncology following - 1 week hx abdominal pain/distension, N/V in the setting of metastatic colon cancer   - CT abd/p LBO involving the ascending and transverse colon measuring up to 8 cm secondary to the colonic mass seen in the distal transverse colon. In addition, the jejunum and terminal ileum are also dilated  - S/p 2L bolus in the ED, c/w IVF hydration   - NG tube placed for decompression--> removed  - s/p colonic stent  - advanced diet to CLD  - Dr. Alicia made aware for discussion of operative intervention given pt may be precluded from chemo due to stent and oncology strongly recommends chemo in the future

## 2021-03-13 NOTE — PROGRESS NOTE ADULT - SUBJECTIVE AND OBJECTIVE BOX
Patient seen and examined on morning rounds. Having copious diarrhea since stent placed. Denies any abdominal pain. Passing flatus. Febrile overnight.     T(C): 36.7 (03-14-21 @ 05:26), Max: 36.8 (03-13-21 @ 23:16)  HR: 92 (03-14-21 @ 05:26) (87 - 96)  BP: 119/84 (03-14-21 @ 05:26) (106/74 - 119/84)  RR: 18 (03-14-21 @ 05:26) (18 - 18)  SpO2: 100% (03-14-21 @ 05:26) (100% - 100%)  CAPILLARY BLOOD GLUCOSE      Physical exam  Gen: A&O x 3, NAD  Abdomen: soft, NTND    CBC (03-14 @ 07:10)                              9.5<L>                         7.37    )----------------(  225        --    % Neutrophils, --    % Lymphocytes, ANC: --                                  30.1<L>              CBC (03-13 @ 07:31)                              9.2<L>                         8.42    )----------------(  266        78.8<H>% Neutrophils, 15.0  % Lymphocytes, ANC: 6.91                                29.7<L>                BMP (03-14 @ 07:10)             135     |  106     |  6<L>  		Ca++ --      Ca 7.6<L>             ---------------------------------( 105<H>		Mg 1.7                3.1<L>  |  18<L>   |  0.53  			Ph 2.4<L>  BMP (03-13 @ 15:58)             137     |  106     |  8     		Ca++ --      Ca 7.6<L>             ---------------------------------( 87    		Mg 1.8                3.5     |  20<L>   |  0.52  			Ph 1.9<L>    LFTs (03-14 @ 07:10)      TPro 5.1<L> / Alb 2.2<L> / TBili 0.3 / DBili -- / AST 36 / ALT 13 / AlkPhos 139<H>          -> .Stool Feces Culture (03-13 @ 15:56)     NG    NG    GI PCR Results: NOT detected  *******Please Note:*******  GI panel PCR evaluates for:  Campylobacter, Plesiomonas shigelloides, Salmonella,  Vibrio, Yersinia enterocolitica, Enteroaggregative  Escherichia coli (EAEC), Enteropathogenic E.coli (EPEC),  Enterotoxigenic E. coli (ETEC) lt/st, Shiga-like  toxin-producing E. coli (STEC) stx1/stx2,  Shigella/ Enteroinvasive E. coli (EIEC), Cryptosporidium,  Cyclospora cayetanensis, Entamoeba histolytica,  Giardia lamblia, Adenovirus F 40/41, Astrovirus,  Norovirus GI/GII, Rotavirus A, Sapovirus    -> .Blood Blood-Peripheral Culture (03-13 @ 10:08)       Growth in anaerobic bottle: Gram Negative Rods    Blood Culture PCR    Growth in anaerobic bottle: Gram Negative Rods  ***Blood Panel PCR results on this specimen are available  approximately 3 hours after the Gram stain result.***  Gram stain, PCR, and/or culture results may not always  correspond due to difference in methodologies.  ************************************************************  This PCR assay was performed by multiplex PCR. This  Assay tests for 66 bacterial and resistance gene targets.  Please refer to the [facility] test directory  at [email or web address] for details.

## 2021-03-13 NOTE — PROGRESS NOTE ADULT - PROBLEM SELECTOR PLAN 10
Transitions of Care Status:  1.  Name of PCP: Kaia Whitehead (PCP)  2.  PCP Contacted on Admission: [ ] Y    [x] N - night admission     3.  PCP contacted at Discharge: [ ] Y    [ ] N    [ ] N/A  4.  Post-Discharge Appointment Date and Location:  5.  Summary of Handoff given to PCP: - Diet: soft diet  - DVT PPX: lovenox SQ  - Dispo: likely home per PT  - GOC: pt will speak more with wife and children, palliative consult for pain control and further assistance

## 2021-03-13 NOTE — PROGRESS NOTE ADULT - PROBLEM SELECTOR PLAN 5
- Na 129 with low Cl 89  - Likely in the setting of poor PO intake, vomiting, dehydration  - S/p 2L bolus  - Trend Na, do not overcorrect more than 6-8 mEq in 24h  - improving  - resolved - Pt is currently medically optimized for colorectal surgery  - EKG in chart  - Revised Cardiac Risk Index: Based on RCRI, pt has 6% 30 day risk of death, cardiac arrest, MI due to intraperitoneal surgery

## 2021-03-13 NOTE — PROGRESS NOTE ADULT - PROBLEM SELECTOR PLAN 9
- Diet: soft diet  - DVT PPX: lovenox SQ  - Dispo: likely home per PT  - GOC: pt will speak more with wife and children, palliative consult for pain control and further assistance - Prior iron studies cw iron def Anemia plus AOCD   - Monitor H/H and transfuse to keep Hgb > 7  - c/w PO iron and vit c

## 2021-03-14 ENCOUNTER — TRANSCRIPTION ENCOUNTER (OUTPATIENT)
Age: 56
End: 2021-03-14

## 2021-03-14 LAB
-  K. PNEUMONIAE GROUP: SIGNIFICANT CHANGE UP
ALBUMIN SERPL ELPH-MCNC: 2.2 G/DL — LOW (ref 3.3–5)
ALP SERPL-CCNC: 139 U/L — HIGH (ref 40–120)
ALT FLD-CCNC: 13 U/L — SIGNIFICANT CHANGE UP (ref 4–41)
ANION GAP SERPL CALC-SCNC: 11 MMOL/L — SIGNIFICANT CHANGE UP (ref 7–14)
APPEARANCE UR: CLEAR — SIGNIFICANT CHANGE UP
AST SERPL-CCNC: 36 U/L — SIGNIFICANT CHANGE UP (ref 4–40)
BILIRUB SERPL-MCNC: 0.3 MG/DL — SIGNIFICANT CHANGE UP (ref 0.2–1.2)
BILIRUB UR-MCNC: NEGATIVE — SIGNIFICANT CHANGE UP
BUN SERPL-MCNC: 6 MG/DL — LOW (ref 7–23)
CALCIUM SERPL-MCNC: 7.6 MG/DL — LOW (ref 8.4–10.5)
CHLORIDE SERPL-SCNC: 106 MMOL/L — SIGNIFICANT CHANGE UP (ref 98–107)
CO2 SERPL-SCNC: 18 MMOL/L — LOW (ref 22–31)
COLOR SPEC: SIGNIFICANT CHANGE UP
CREAT SERPL-MCNC: 0.53 MG/DL — SIGNIFICANT CHANGE UP (ref 0.5–1.3)
DIFF PNL FLD: NEGATIVE — SIGNIFICANT CHANGE UP
GLUCOSE SERPL-MCNC: 105 MG/DL — HIGH (ref 70–99)
GLUCOSE UR QL: NEGATIVE — SIGNIFICANT CHANGE UP
GRAM STN FLD: SIGNIFICANT CHANGE UP
HCT VFR BLD CALC: 30.1 % — LOW (ref 39–50)
HGB BLD-MCNC: 9.5 G/DL — LOW (ref 13–17)
KETONES UR-MCNC: NEGATIVE — SIGNIFICANT CHANGE UP
LEUKOCYTE ESTERASE UR-ACNC: NEGATIVE — SIGNIFICANT CHANGE UP
MAGNESIUM SERPL-MCNC: 1.7 MG/DL — SIGNIFICANT CHANGE UP (ref 1.6–2.6)
MCHC RBC-ENTMCNC: 22.3 PG — LOW (ref 27–34)
MCHC RBC-ENTMCNC: 31.6 GM/DL — LOW (ref 32–36)
MCV RBC AUTO: 70.7 FL — LOW (ref 80–100)
METHOD TYPE: SIGNIFICANT CHANGE UP
NITRITE UR-MCNC: NEGATIVE — SIGNIFICANT CHANGE UP
NRBC # BLD: 0 /100 WBCS — SIGNIFICANT CHANGE UP
NRBC # FLD: 0 K/UL — SIGNIFICANT CHANGE UP
PH UR: 6 — SIGNIFICANT CHANGE UP (ref 5–8)
PHOSPHATE SERPL-MCNC: 2.4 MG/DL — LOW (ref 2.5–4.5)
PLATELET # BLD AUTO: 225 K/UL — SIGNIFICANT CHANGE UP (ref 150–400)
POTASSIUM SERPL-MCNC: 3.1 MMOL/L — LOW (ref 3.5–5.3)
POTASSIUM SERPL-SCNC: 3.1 MMOL/L — LOW (ref 3.5–5.3)
PROT SERPL-MCNC: 5.1 G/DL — LOW (ref 6–8.3)
PROT UR-MCNC: NEGATIVE — SIGNIFICANT CHANGE UP
RBC # BLD: 4.26 M/UL — SIGNIFICANT CHANGE UP (ref 4.2–5.8)
RBC # FLD: 22.1 % — HIGH (ref 10.3–14.5)
SODIUM SERPL-SCNC: 135 MMOL/L — SIGNIFICANT CHANGE UP (ref 135–145)
SP GR SPEC: 1.01 — LOW (ref 1.01–1.02)
SPECIMEN SOURCE: SIGNIFICANT CHANGE UP
UROBILINOGEN FLD QL: SIGNIFICANT CHANGE UP
WBC # BLD: 7.37 K/UL — SIGNIFICANT CHANGE UP (ref 3.8–10.5)
WBC # FLD AUTO: 7.37 K/UL — SIGNIFICANT CHANGE UP (ref 3.8–10.5)

## 2021-03-14 PROCEDURE — 99233 SBSQ HOSP IP/OBS HIGH 50: CPT

## 2021-03-14 RX ORDER — SODIUM,POTASSIUM PHOSPHATES 278-250MG
2 POWDER IN PACKET (EA) ORAL ONCE
Refills: 0 | Status: COMPLETED | OUTPATIENT
Start: 2021-03-14 | End: 2021-03-14

## 2021-03-14 RX ORDER — POTASSIUM CHLORIDE 20 MEQ
40 PACKET (EA) ORAL ONCE
Refills: 0 | Status: COMPLETED | OUTPATIENT
Start: 2021-03-14 | End: 2021-03-14

## 2021-03-14 RX ORDER — SODIUM CHLORIDE 9 MG/ML
1000 INJECTION INTRAMUSCULAR; INTRAVENOUS; SUBCUTANEOUS
Refills: 0 | Status: DISCONTINUED | OUTPATIENT
Start: 2021-03-14 | End: 2021-03-15

## 2021-03-14 RX ORDER — POTASSIUM CHLORIDE 20 MEQ
20 PACKET (EA) ORAL
Refills: 0 | Status: DISCONTINUED | OUTPATIENT
Start: 2021-03-14 | End: 2021-03-14

## 2021-03-14 RX ADMIN — Medication 2 PACKET(S): at 12:03

## 2021-03-14 RX ADMIN — PIPERACILLIN AND TAZOBACTAM 25 GRAM(S): 4; .5 INJECTION, POWDER, LYOPHILIZED, FOR SOLUTION INTRAVENOUS at 03:27

## 2021-03-14 RX ADMIN — Medication 325 MILLIGRAM(S): at 12:03

## 2021-03-14 RX ADMIN — PIPERACILLIN AND TAZOBACTAM 25 GRAM(S): 4; .5 INJECTION, POWDER, LYOPHILIZED, FOR SOLUTION INTRAVENOUS at 12:03

## 2021-03-14 RX ADMIN — SODIUM CHLORIDE 100 MILLILITER(S): 9 INJECTION INTRAMUSCULAR; INTRAVENOUS; SUBCUTANEOUS at 12:03

## 2021-03-14 RX ADMIN — ENOXAPARIN SODIUM 40 MILLIGRAM(S): 100 INJECTION SUBCUTANEOUS at 05:29

## 2021-03-14 RX ADMIN — Medication 40 MILLIEQUIVALENT(S): at 12:03

## 2021-03-14 RX ADMIN — PANTOPRAZOLE SODIUM 40 MILLIGRAM(S): 20 TABLET, DELAYED RELEASE ORAL at 12:03

## 2021-03-14 RX ADMIN — PIPERACILLIN AND TAZOBACTAM 25 GRAM(S): 4; .5 INJECTION, POWDER, LYOPHILIZED, FOR SOLUTION INTRAVENOUS at 18:04

## 2021-03-14 RX ADMIN — Medication 500 MILLIGRAM(S): at 12:03

## 2021-03-14 RX ADMIN — CHLORHEXIDINE GLUCONATE 1 APPLICATION(S): 213 SOLUTION TOPICAL at 12:03

## 2021-03-14 NOTE — DISCHARGE NOTE PROVIDER - NSDCCPCAREPLAN_GEN_ALL_CORE_FT
PRINCIPAL DISCHARGE DIAGNOSIS  Diagnosis: Large bowel obstruction  Assessment and Plan of Treatment: You came to the hospital and were found to have a large bowel obstruction due to your cancer mass in your colon. Originally you had a nasograstric tube placed which helped to reduce your symptoms while decompressing your abdomen. Then, per gastroenterology a colonic stent was placed to help bypass the obstruction. However, due to inability to continue chemotherapy with the presence of a colonic stent, the decision was made to perform a bowel resection.      SECONDARY DISCHARGE DIAGNOSES  Diagnosis: Bacteremia  Assessment and Plan of Treatment: You were found to have an episode of fever and blood cultures from that time grew klebsiella bacteria. You were treated with zosyn antibiotics.    Diagnosis: Severe diarrhea  Assessment and Plan of Treatment: Since receiving the colonic stent you had multiple days over approximately 10 episodes of diarrhea per day. Stool culture and testing for microbes were negative.     PRINCIPAL DISCHARGE DIAGNOSIS  Diagnosis: Large bowel obstruction  Assessment and Plan of Treatment: You came to the hospital and were found to have a large bowel obstruction due to your cancer mass in your colon. Originally you had a nasogastric tube placed which helped to reduce your symptoms while decompressing your abdomen. Then, per gastroenterology, a colonic stent was placed to help bypass the obstruction. However, due to the inability to continue chemotherapy with a colonic stent, the decision was made to perform a bowel resection.      SECONDARY DISCHARGE DIAGNOSES  Diagnosis: Bacteremia  Assessment and Plan of Treatment: Since receiving the colonic stent, you had multiple days over approximately 10 episodes of diarrhea per day. Stool culture and testing for microbes were negative. The diarrhea was likely secondary to the opening of the bowel obstruction. You were treated with IV fluids and electrolyte repletion, and diarrhea slowly resolved.    Diagnosis: Severe diarrhea  Assessment and Plan of Treatment:      PRINCIPAL DISCHARGE DIAGNOSIS  Diagnosis: Large bowel obstruction  Assessment and Plan of Treatment: You came to the hospital and were found to have a large bowel obstruction due to your cancer mass in your colon. Originally you had a nasogastric tube placed which helped to reduce your symptoms while decompressing your abdomen. Then, per gastroenterology, a colonic stent was placed to help bypass the obstruction. However, due to the inability to continue chemotherapy with a colonic stent, the decision was made to perform a bowel resection. You had a resection of part of your colon and creation of an ostomy for stool.      SECONDARY DISCHARGE DIAGNOSES  Diagnosis: Bacteremia  Assessment and Plan of Treatment: Since receiving the colonic stent, you had multiple days over approximately 10 episodes of diarrhea per day. Stool culture and testing for microbes were negative. The diarrhea was likely secondary to the opening of the bowel obstruction. You were treated with IV fluids and electrolyte repletion, and diarrhea slowly resolved. You will continue to take antibiotics through 4/2.    Diagnosis: Severe diarrhea  Assessment and Plan of Treatment:

## 2021-03-14 NOTE — PROGRESS NOTE ADULT - PROBLEM SELECTOR PLAN 10
- Diet: soft diet  - DVT PPX: lovenox SQ  - Dispo: likely home per PT  - GOC: pt will speak more with wife and children, palliative consult for pain control and further assistance

## 2021-03-14 NOTE — DISCHARGE NOTE PROVIDER - NSDCFUADDINST_GEN_ALL_CORE_FT
WOUND CARE: Please do not scrub over your incision. Otherwise you may let water run over it.   BATHING: Please do not submerge wound underwater. You may shower and/or sponge bathe.  ACTIVITY: No heavy lifting or straining. Otherwise, you may return to your usual level of physical activity. If you are taking narcotic pain medication (such as oxycodone), do NOT drive a car, operate machinery or make important decisions.  DIET: Return to your usual diet.  NOTIFY YOUR SURGEON IF: You have any bleeding that does not stop, any pus draining from your wound, any fever (over 100.4 F) or chills, persistent nausea/vomiting, persistent diarrhea, or if your pain is not controlled on your discharge pain medications.  FOLLOW-UP:  1. Please call to make a follow-up appointment within one week of discharge   2. Please follow up with your primary care physician and Hem/Onc team in one week regarding your hospitalization.

## 2021-03-14 NOTE — DISCHARGE NOTE PROVIDER - CARE PROVIDER_API CALL
Manfred Alicia)  ColonRectal Surgery; Surgery  [facility], [address] [place] [address]  Phone: [phone]  Fax: [phone]  Established Patient  Follow Up Time: 2 weeks

## 2021-03-14 NOTE — PROGRESS NOTE ADULT - PROBLEM SELECTOR PLAN 6
- Na 129 with low Cl 89  - Likely in the setting of poor PO intake, vomiting, dehydration  - S/p 2L bolus  - Trend Na, do not overcorrect more than 6-8 mEq in 24h  - improving  - resolved

## 2021-03-14 NOTE — PROGRESS NOTE ADULT - PROBLEM SELECTOR PLAN 9
- Prior iron studies cw iron def Anemia plus AOCD   - Monitor H/H and transfuse to keep Hgb > 7  - c/w PO iron and vit c

## 2021-03-14 NOTE — DISCHARGE NOTE PROVIDER - NSDCCPTREATMENT_GEN_ALL_CORE_FT
PRINCIPAL PROCEDURE  Procedure: Colonoscopy with insertion of colonic stent  Findings and Treatment: Due to the obstruction from your colonic mass you had symptoms of bowel obstruction. However, gastroenterology team placed a colonic stent to relieve your symptoms by bypassing the blockage. You tolerated the procedure well and had symptomatic relief.       PRINCIPAL PROCEDURE  Procedure: Laparoscopic partial colectomy  Findings and Treatment: With creation of end colostomy

## 2021-03-14 NOTE — DISCHARGE NOTE PROVIDER - HOSPITAL COURSE
HPI: 55 M with hx unresectable stage 4 colon cancer (mets to liver and lungs, received palliative folfox 2/16/21) follows with [facility] here for worsening nausea, vomiting (NBNB), abdominal distention and  generalized abdominal pain x 1 week. Pt sent in from [facility]. Pt received his first treatment of palliative folfox on 2/16/2021. Has been trying to tolerate solids but just ends up throwing them up. Has been able to void and pass gas, but no BM in a few days.    Pt denies any fever, chills, SOB, chest pain, dysuria, or diarrhea.     Of note, pt with recent hospitalization in 1/2021 where he was diagnosed with metastatic colon cancer to the liver and lungs. Pt treated for orthostatic hypotension and fevers likely in the setting of malignancy.     In the ED, pt's vitals were T 98 HR  /93 RR 16 % RA. Treated with 2L bolus (LR 1L, NS 1L), Zofran 4 mg IV, Morphine 4 mg IV, and KCl 10x3. Pt seen by Surgery and NG tube placed for decompression.    Hospital Course: Pt initially refused surgery and was treated by gastroenterology team by placement of a NGT briefly to decompress the GI system. It was followed up with colonic stent placement by gastroenterology. This resolved patient's n/v but was complicated by copious watery diarrhea (~10 eps per day). Stool cultures and GI PCR were negative. Pt spiked a fever and blood cultures grew klebsiella. UA was negative. Pt was treated with zosyn. Per onc, colonic stent would prevent patient from continuing his chemo regimen. Per their, rec surgery was consulted for evaluation for a bowel resection for the patient. HPI: 55 M with hx unresectable stage 4 colon cancer (mets to liver and lungs, received palliative folfox 2/16/21) follows with [facility] here for worsening nausea, vomiting (NBNB), abdominal distention and  generalized abdominal pain x 1 week. Pt sent in from [facility]. Pt received his first treatment of palliative folfox on 2/16/2021. Has been trying to tolerate solids but just ends up throwing them up. Has been able to void and pass gas, but no BM in a few days. Of note, pt with recent hospitalization in 1/2021 where he was diagnosed with metastatic colon cancer to the liver and lungs. Pt treated for orthostatic hypotension and fevers likely in the setting of malignancy. In the ED, pt's vitals were T 98 HR  /93 RR 16 % RA. Treated with 2L bolus (LR 1L, NS 1L), Zofran 4 mg IV, Morphine 4 mg IV, and KCl 10x3. Pt seen by Surgery and NG tube placed for decompression.    Hospital Course: Pt initially refused surgery and was treated by gastroenterology team by placement of a NGT briefly to decompress the GI system. It was followed up with colonic stent placement by gastroenterology. This resolved patient's n/v but was complicated by copious watery diarrhea (~10 eps per day). Stool cultures and GI PCR were negative. Pt spiked a fever and blood cultures grew klebsiella. UA was negative. Pt was treated with zosyn. Per onc, colonic stent would prevent patient from continuing his chemo regimen. Per their, rec surgery was consulted for evaluation for a bowel resection for the patient. HPI: 55 M with hx unresectable stage 4 colon cancer (Mets to liver and lungs, received palliative folfox 2/16/21) follows with [facility] here for worsening nausea, vomiting (NBNB), abdominal distention, and generalized abdominal pain x 1 week. Pt sent in from [facility]. Pt received his first treatment of palliative folfox on 2/16/2021. Has been trying to tolerate solids but ends up throwing them up. Has been able to void and pass gas, but no BM in a few days. Of note, pt, with recent hospitalization in 1/2021, was diagnosed with metastatic colon cancer to the liver and lungs. Pt treated for orthostatic hypotension and fevers likely in the setting of malignancy. In the ED, pt's vitals were T 98 HR  /93 RR 16 % RA. Treated with 2L bolus (LR 1L, NS 1L), Zofran 4 mg IV, Morphine 4 mg IV, and KCl 10x3. Pt was seen by surgery, and an NG tube was placed for decompression.    Hospital Course: The patient initially refused surgery and was treated by the gastroenterology team by briefly introducing an NGT to decompress the GI system. It was followed up with colonic stent placement by gastroenterology. This resolved the patient's n/v but was complicated by copious watery diarrhea (~10 eps per day). Stool cultures and GI PCR were negative. Pt spiked a fever, and blood cultures grew klebsiella. UA was negative. tHE Pt was treated with Zosyn. Per onc, the colonic stent would prevent the patient from continuing his chemo regimen. Per their, rec Surgery was consulted for evaluation for a bowel resection for the patient. His course was c/b B. cereus bacteremia, and he was started on Vanco. ID were following and switched to Cipro Flagyl. Bcx 3/19 were clear, and the pt underwent partial colectomy on 3/26 without complications. Post-op %%%%    On discharge, pt was HD stable; he was ermined and found fit to be discharged home with outpatient follow-up. HPI: 55 M with hx unresectable stage 4 colon cancer (Mets to liver and lungs, received palliative folfox 2/16/21) follows with [facility] here for worsening nausea, vomiting (NBNB), abdominal distention, and generalized abdominal pain x 1 week. Pt sent in from [facility]. Pt received his first treatment of palliative folfox on 2/16/2021. Has been trying to tolerate solids but ends up throwing them up. Has been able to void and pass gas, but no BM in a few days. Of note, pt, with recent hospitalization in 1/2021, was diagnosed with metastatic colon cancer to the liver and lungs. Pt treated for orthostatic hypotension and fevers likely in the setting of malignancy. In the ED, pt's vitals were T 98 HR  /93 RR 16 % RA. Treated with 2L bolus (LR 1L, NS 1L), Zofran 4 mg IV, Morphine 4 mg IV, and KCl 10x3. Pt was seen by surgery, and an NG tube was placed for decompression.    Hospital Course: The patient initially refused surgery and was treated by the gastroenterology team by briefly introducing an NGT to decompress the GI system. It was followed up with colonic stent placement by gastroenterology. This resolved the patient's n/v but was complicated by copious watery diarrhea (~10 eps per day). Stool cultures and GI PCR were negative. Pt spiked a fever, and blood cultures grew klebsiella. UA was negative. tHE Pt was treated with Zosyn. Per onc, the colonic stent would prevent the patient from continuing his chemo regimen. Per their, rec Surgery was consulted for evaluation for a bowel resection for the patient. His course was c/b B. cereus bacteremia, and he was started on Vanco. ID were following and switched to Cipro Flagyl to be completed on 4/2/21. Bcx 3/19 were clear, and the pt underwent partial colectomy on 3/26 without complications. Post-op patient was transferrred to [facility] colorectal surgery service, transferred to surgical floor. Pain medications were transitioned from IV to oral. Diet advanced to regular as tolerated. Patient is currently ambulating, voiding, ostomy functioning, tolerating regular diet and pain is well controlled.    On discharge, pt was HD stable; he was ermined and found fit to be discharged home with outpatient follow-up. HPI: 55 M with hx unresectable stage 4 colon cancer (Mets to liver and lungs, received palliative folfox 2/16/21) follows with [facility] here for worsening nausea, vomiting (NBNB), abdominal distention, and generalized abdominal pain x 1 week. Pt sent in from [facility]. Pt received his first treatment of palliative folfox on 2/16/2021. Has been trying to tolerate solids but ends up throwing them up. Has been able to void and pass gas, but no BM in a few days. Of note, pt, with recent hospitalization in 1/2021, was diagnosed with metastatic colon cancer to the liver and lungs. Pt treated for orthostatic hypotension and fevers likely in the setting of malignancy. In the ED, pt's vitals were T 98 HR  /93 RR 16 % RA. Treated with 2L bolus (LR 1L, NS 1L), Zofran 4 mg IV, Morphine 4 mg IV, and KCl 10x3. Pt was seen by surgery, and an NG tube was placed for decompression.    Hospital Course: The patient initially refused surgery and was treated by the gastroenterology team by briefly introducing an NGT to decompress the GI system. It was followed up with colonic stent placement by gastroenterology. This resolved the patient's n/v but was complicated by copious watery diarrhea (~10 eps per day). Stool cultures and GI PCR were negative. Pt spiked a fever, and blood cultures grew klebsiella. UA was negative. tHE Pt was treated with Zosyn. Per onc, the colonic stent would prevent the patient from continuing his chemo regimen. Per their, rec Surgery was consulted for evaluation for a bowel resection for the patient. His course was c/b B. cereus bacteremia, and he was started on Vanco. ID were following and switched to Cipro Flagyl to be completed on 4/2/21. Bcx 3/19 were clear, and the pt underwent partial colectomy on 3/26 without complications. Post-op patient was transferrred to [facility] colorectal surgery service, transferred to surgical floor. Pain medications were transitioned from IV to oral. Diet advanced to regular as tolerated with ostomy function.  Patient is currently ambulating, voiding, ostomy functioning, tolerating regular diet and pain is well controlled.    On discharge, pt was HD stable; he was ermined and found fit to be discharged home with outpatient follow-up.

## 2021-03-14 NOTE — PROGRESS NOTE ADULT - PROBLEM SELECTOR PLAN 2
- pt having multiple watery diarrhea (~9 episodes per day) since stent placement  - possibly in setting of new stent vs infection as pt febrile to 101.3 on 3/13  - will f/u blood culture, stool culture, and GI PCR  - started on zosyn  - maintain IV hydration  - consider C. diff testing - pt having multiple watery diarrhea (~9 episodes per day) since stent placement  - possibly in setting of new stent vs infection as pt febrile to 101.3 on 3/13  - GI PCR negative, stool cx pending, blood culture grew Kleb  - started on zosyn  - maintain IV hydration  - c. diff to be sent

## 2021-03-14 NOTE — DISCHARGE NOTE PROVIDER - NSDCMRMEDTOKEN_GEN_ALL_CORE_FT
Centrum oral tablet: 1 tab(s) orally once a day  ferrous sulfate 324 mg (65 mg elemental iron) oral tablet: 1 tab(s) orally once a day  oxycodone 5mg: every 6 hours, As Needed pain   pantoprazole 40 mg oral delayed release tablet: 1 tab(s) orally once a day  prochlorperazine 10 mg oral tablet: orally every 6 hours, As Needed  Vitamin C 500 mg oral tablet: 1 tab(s) orally once a day   Centrum oral tablet: 1 tab(s) orally once a day  Cipro 500 mg oral tablet: 1 tab(s) orally 2 times a day   ferrous sulfate 324 mg (65 mg elemental iron) oral tablet: 1 tab(s) orally once a day  Flagyl 500 mg oral tablet: 1 tab(s) orally 3 times a day  oxycodone 5mg: every 6 hours, As Needed pain   prochlorperazine 10 mg oral tablet: orally every 6 hours, As Needed  Vitamin C 500 mg oral tablet: 1 tab(s) orally once a day   Centrum oral tablet: 1 tab(s) orally once a day  Cipro 500 mg oral tablet: 1 tab(s) orally 2 times a day   ferrous sulfate 324 mg (65 mg elemental iron) oral tablet: 1 tab(s) orally once a day  Flagyl 500 mg oral tablet: 1 tab(s) orally 3 times a day  oxycodone 5mg: every 6 hours, As Needed pain   prochlorperazine 10 mg oral tablet: orally every 6 hours, As Needed  rolling walker: Rolling walker  Vitamin C 500 mg oral tablet: 1 tab(s) orally once a day   Centrum oral tablet: 1 tab(s) orally once a day  Cipro 500 mg oral tablet: 1 tab(s) orally 2 times a day   enoxaparin 40 mg/0.4 mL injectable solution: 40 milligram(s) subcutaneously once a day MDD:40mg daily, subcutaneous injection  ferrous sulfate 324 mg (65 mg elemental iron) oral tablet: 1 tab(s) orally once a day  Flagyl 500 mg oral tablet: 1 tab(s) orally 3 times a day  oxycodone 5mg: every 6 hours, As Needed pain   prochlorperazine 10 mg oral tablet: orally every 6 hours, As Needed  rolling walker: Rolling walker  Vitamin C 500 mg oral tablet: 1 tab(s) orally once a day   acetaminophen 325 mg oral tablet: 2 tab(s) orally every 6 hours, As needed, Mild Pain (1 - 3)  Centrum oral tablet: 1 tab(s) orally once a day  Cipro 500 mg oral tablet: 1 tab(s) orally 2 times a day   Cipro 500 mg oral tablet: 1 tab(s) orally 2 times a day MDD:2  enoxaparin 40 mg/0.4 mL injectable solution: 40 milligram(s) subcutaneously once a day MDD:40mg daily, subcutaneous injection  ferrous sulfate 324 mg (65 mg elemental iron) oral tablet: 1 tab(s) orally once a day  Flagyl 500 mg oral tablet: 1 tab(s) orally 2 times a day MDD:2   oxyCODONE 5 mg oral tablet: 1 tab(s) orally every 4 hours MDD:6  oxycodone 5mg: every 6 hours, As Needed pain   prochlorperazine 10 mg oral tablet: orally every 6 hours, As Needed  rolling walker: Rolling walker  Vitamin C 500 mg oral tablet: 1 tab(s) orally once a day   acetaminophen 325 mg oral tablet: 2 tab(s) orally every 6 hours, As needed, Mild Pain (1 - 3)  Centrum oral tablet: 1 tab(s) orally once a day  Cipro 500 mg oral tablet: 1 tab(s) orally 2 times a day MDD:2  enoxaparin 40 mg/0.4 mL injectable solution: 40 milligram(s) subcutaneously once a day MDD:40mg daily, subcutaneous injection  ferrous sulfate 324 mg (65 mg elemental iron) oral tablet: 1 tab(s) orally once a day  Flagyl 500 mg oral tablet: 1 tab(s) orally 2 times a day MDD:2   oxyCODONE 5 mg oral tablet: 1 tab(s) orally every 4 hours MDD:6  oxycodone 5mg: every 6 hours, As Needed pain   prochlorperazine 10 mg oral tablet: orally every 6 hours, As Needed  rolling walker: Rolling walker  Vitamin C 500 mg oral tablet: 1 tab(s) orally once a day

## 2021-03-14 NOTE — PROGRESS NOTE ADULT - PROBLEM SELECTOR PLAN 4
- Metastatic to liver and lungs, s/p first session of palliative folfox (2/16/21) at [facility]  - CEA - 2882, PSA - WNL, CA 19-9 -2882 and AFP - 2775  - CT abd/p showing hepatic mets with some that have increased in size since 1/2021, enlarged prostate, along with a 1.3 x 0.9 cm right lower lobe nodule concerning for metastatic disease  - Pain control regimen - Morphine 4 mg IV q4 PRN   - Palliative consult for pain control and GOC given metastatic cancer  - oncology following

## 2021-03-14 NOTE — PROGRESS NOTE ADULT - SUBJECTIVE AND OBJECTIVE BOX
Dylon Godoy  [id number]/[id number]/[phone]    YAKELIN GERARDO  55y  Male      Patient is a 55y old  Male who presents with a chief complaint of abdominal pain (13 Mar 2021 08:24)      INTERVAL HPI/OVERNIGHT EVENTS/ROS: Had 11 episodes of watery diarrhea. Denies n/v/f/c/cp/sob.     Vital Signs Last 24 Hrs  T(C): 36.7 (14 Mar 2021 05:26), Max: 36.8 (13 Mar 2021 23:16)  T(F): 98 (14 Mar 2021 05:26), Max: 98.3 (13 Mar 2021 23:16)  HR: 92 (14 Mar 2021 05:26) (87 - 96)  BP: 119/84 (14 Mar 2021 05:26) (106/74 - 119/84)  BP(mean): --  RR: 18 (14 Mar 2021 05:26) (18 - 18)  SpO2: 100% (14 Mar 2021 05:26) (100% - 100%)    PHYSICAL EXAM:  GENERAL: comfortable, pale, chronically ill appearing. thin.   HEENT:  Atraumatic, Normocephalic  CHEST/LUNG: Chest rise equal bilaterally. CTAB.   HEART: tachycardic, no murmurs   ABDOMEN: Abdomen non-distended and soft, nontender.   EXTREMITIES:  2+ Peripheral Pulses.  PSYCH: A&Ox3  SKIN: Pale, vitiligo, port on R side of chest    Consultant(s) Notes Reviewed:  [x ] YES  [ ] NO  Care Discussed with Consultants/Other Providers [ x] YES  [ ] NO    LABS:                        9.5    7.37  )-----------( 225      ( 14 Mar 2021 07:10 )             30.1     03-14    135  |  106  |  6<L>  ----------------------------<  105<H>  3.1<L>   |  18<L>  |  0.53    Ca    7.6<L>      14 Mar 2021 07:10  Phos  2.4     03-14  Mg     1.7     03-14    TPro  5.1<L>  /  Alb  2.2<L>  /  TBili  0.3  /  DBili  x   /  AST  36  /  ALT  13  /  AlkPhos  139<H>  03-14          CAPILLARY BLOOD GLUCOSE          RADIOLOGY & ADDITIONAL TESTS:    Imaging Personally Reviewed:  [ ] YES  [ ] NO

## 2021-03-14 NOTE — DISCHARGE NOTE PROVIDER - DETAILS OF MALNUTRITION DIAGNOSIS/DIAGNOSES
This patient has been assessed with a concern for Malnutrition and was treated during this hospitalization for the following Nutrition diagnosis/diagnoses:     -  03/16/2021: Severe protein-calorie malnutrition   -  03/16/2021: Underweight (BMI < 19)   This patient has been assessed with a concern for Malnutrition and was treated during this hospitalization for the following Nutrition diagnosis/diagnoses:     -  03/16/2021: Severe protein-calorie malnutrition   -  03/16/2021: Underweight (BMI < 19)    This patient has been assessed with a concern for Malnutrition and was treated during this hospitalization for the following Nutrition diagnosis/diagnoses:     -  03/16/2021: Severe protein-calorie malnutrition   -  03/16/2021: Underweight (BMI < 19)   This patient has been assessed with a concern for Malnutrition and was treated during this hospitalization for the following Nutrition diagnosis/diagnoses:     -  03/16/2021: Severe protein-calorie malnutrition   -  03/16/2021: Underweight (BMI < 19)    This patient has been assessed with a concern for Malnutrition and was treated during this hospitalization for the following Nutrition diagnosis/diagnoses:     -  03/16/2021: Severe protein-calorie malnutrition   -  03/16/2021: Underweight (BMI < 19)    This patient has been assessed with a concern for Malnutrition and was treated during this hospitalization for the following Nutrition diagnosis/diagnoses:     -  03/16/2021: Severe protein-calorie malnutrition   -  03/16/2021: Underweight (BMI < 19)   This patient has been assessed with a concern for Malnutrition and was treated during this hospitalization for the following Nutrition diagnosis/diagnoses:     -  03/16/2021: Severe protein-calorie malnutrition   -  03/16/2021: Underweight (BMI < 19)    This patient has been assessed with a concern for Malnutrition and was treated during this hospitalization for the following Nutrition diagnosis/diagnoses:     -  03/16/2021: Severe protein-calorie malnutrition   -  03/16/2021: Underweight (BMI < 19)    This patient has been assessed with a concern for Malnutrition and was treated during this hospitalization for the following Nutrition diagnosis/diagnoses:     -  03/16/2021: Severe protein-calorie malnutrition   -  03/16/2021: Underweight (BMI < 19)    This patient has been assessed with a concern for Malnutrition and was treated during this hospitalization for the following Nutrition diagnosis/diagnoses:     -  03/16/2021: Severe protein-calorie malnutrition   -  03/16/2021: Underweight (BMI < 19)   This patient has been assessed with a concern for Malnutrition and was treated during this hospitalization for the following Nutrition diagnosis/diagnoses:     -  03/16/2021: Severe protein-calorie malnutrition   -  03/16/2021: Underweight (BMI < 19)    This patient has been assessed with a concern for Malnutrition and was treated during this hospitalization for the following Nutrition diagnosis/diagnoses:     -  03/16/2021: Severe protein-calorie malnutrition   -  03/16/2021: Underweight (BMI < 19)    This patient has been assessed with a concern for Malnutrition and was treated during this hospitalization for the following Nutrition diagnosis/diagnoses:     -  03/16/2021: Severe protein-calorie malnutrition   -  03/16/2021: Underweight (BMI < 19)    This patient has been assessed with a concern for Malnutrition and was treated during this hospitalization for the following Nutrition diagnosis/diagnoses:     -  03/16/2021: Severe protein-calorie malnutrition   -  03/16/2021: Underweight (BMI < 19)    This patient has been assessed with a concern for Malnutrition and was treated during this hospitalization for the following Nutrition diagnosis/diagnoses:     -  03/16/2021: Severe protein-calorie malnutrition   -  03/16/2021: Underweight (BMI < 19)   This patient has been assessed with a concern for Malnutrition and was treated during this hospitalization for the following Nutrition diagnosis/diagnoses:     -  03/16/2021: Severe protein-calorie malnutrition   -  03/16/2021: Underweight (BMI < 19)    This patient has been assessed with a concern for Malnutrition and was treated during this hospitalization for the following Nutrition diagnosis/diagnoses:     -  03/16/2021: Severe protein-calorie malnutrition   -  03/16/2021: Underweight (BMI < 19)    This patient has been assessed with a concern for Malnutrition and was treated during this hospitalization for the following Nutrition diagnosis/diagnoses:     -  03/16/2021: Severe protein-calorie malnutrition   -  03/16/2021: Underweight (BMI < 19)    This patient has been assessed with a concern for Malnutrition and was treated during this hospitalization for the following Nutrition diagnosis/diagnoses:     -  03/16/2021: Severe protein-calorie malnutrition   -  03/16/2021: Underweight (BMI < 19)    This patient has been assessed with a concern for Malnutrition and was treated during this hospitalization for the following Nutrition diagnosis/diagnoses:     -  03/16/2021: Severe protein-calorie malnutrition   -  03/16/2021: Underweight (BMI < 19)    This patient has been assessed with a concern for Malnutrition and was treated during this hospitalization for the following Nutrition diagnosis/diagnoses:     -  03/16/2021: Severe protein-calorie malnutrition   -  03/16/2021: Underweight (BMI < 19)   This patient has been assessed with a concern for Malnutrition and was treated during this hospitalization for the following Nutrition diagnosis/diagnoses:     -  03/16/2021: Severe protein-calorie malnutrition   -  03/16/2021: Underweight (BMI < 19)    This patient has been assessed with a concern for Malnutrition and was treated during this hospitalization for the following Nutrition diagnosis/diagnoses:     -  03/16/2021: Severe protein-calorie malnutrition   -  03/16/2021: Underweight (BMI < 19)    This patient has been assessed with a concern for Malnutrition and was treated during this hospitalization for the following Nutrition diagnosis/diagnoses:     -  03/16/2021: Severe protein-calorie malnutrition   -  03/16/2021: Underweight (BMI < 19)    This patient has been assessed with a concern for Malnutrition and was treated during this hospitalization for the following Nutrition diagnosis/diagnoses:     -  03/16/2021: Severe protein-calorie malnutrition   -  03/16/2021: Underweight (BMI < 19)    This patient has been assessed with a concern for Malnutrition and was treated during this hospitalization for the following Nutrition diagnosis/diagnoses:     -  03/16/2021: Severe protein-calorie malnutrition   -  03/16/2021: Underweight (BMI < 19)    This patient has been assessed with a concern for Malnutrition and was treated during this hospitalization for the following Nutrition diagnosis/diagnoses:     -  03/16/2021: Severe protein-calorie malnutrition   -  03/16/2021: Underweight (BMI < 19)    This patient has been assessed with a concern for Malnutrition and was treated during this hospitalization for the following Nutrition diagnosis/diagnoses:     -  03/16/2021: Severe protein-calorie malnutrition   -  03/16/2021: Underweight (BMI < 19)   This patient has been assessed with a concern for Malnutrition and was treated during this hospitalization for the following Nutrition diagnosis/diagnoses:     -  03/16/2021: Severe protein-calorie malnutrition   -  03/16/2021: Underweight (BMI < 19)    This patient has been assessed with a concern for Malnutrition and was treated during this hospitalization for the following Nutrition diagnosis/diagnoses:     -  03/16/2021: Severe protein-calorie malnutrition   -  03/16/2021: Underweight (BMI < 19)    This patient has been assessed with a concern for Malnutrition and was treated during this hospitalization for the following Nutrition diagnosis/diagnoses:     -  03/16/2021: Severe protein-calorie malnutrition   -  03/16/2021: Underweight (BMI < 19)    This patient has been assessed with a concern for Malnutrition and was treated during this hospitalization for the following Nutrition diagnosis/diagnoses:     -  03/16/2021: Severe protein-calorie malnutrition   -  03/16/2021: Underweight (BMI < 19)    This patient has been assessed with a concern for Malnutrition and was treated during this hospitalization for the following Nutrition diagnosis/diagnoses:     -  03/16/2021: Severe protein-calorie malnutrition   -  03/16/2021: Underweight (BMI < 19)    This patient has been assessed with a concern for Malnutrition and was treated during this hospitalization for the following Nutrition diagnosis/diagnoses:     -  03/16/2021: Severe protein-calorie malnutrition   -  03/16/2021: Underweight (BMI < 19)    This patient has been assessed with a concern for Malnutrition and was treated during this hospitalization for the following Nutrition diagnosis/diagnoses:     -  03/16/2021: Severe protein-calorie malnutrition   -  03/16/2021: Underweight (BMI < 19)    This patient has been assessed with a concern for Malnutrition and was treated during this hospitalization for the following Nutrition diagnosis/diagnoses:     -  03/16/2021: Severe protein-calorie malnutrition   -  03/16/2021: Underweight (BMI < 19)

## 2021-03-14 NOTE — DISCHARGE NOTE PROVIDER - NSDCFUSCHEDAPPT_GEN_ALL_CORE_FT
HUMAIRA, RAMKIRILLND THAIS ; 03/31/2021 ; [facility]  HUMAIRA, FRANKYND THAIS ; 04/01/2021 ; [facility]  HUMAIRA, FRANKYND THAIS ; 04/03/2021 ; [facility]  BALLEANDRO, RAMCHAND THAIS ; 04/07/2021 ; [facility]  HUMAIRA, FRANKYND THAIS ; 04/15/2021 ; [facility]  HUMAIRA, FRANKYND THAIS ; 04/17/2021 ; [facility]  HUMAIRA, RAMKIRILLND THAIS ; 04/29/2021 ; [facility]  HUMAIRA, FRANKYND THAIS ; 05/01/2021 ; [facility]  HUMAIRA, RAMKIRILLND THAIS ; 05/13/2021 ; [facility]  HUMAIRA, FRANKYND THAIS ; 05/15/2021 ; [facility]  HUMAIRA, YAKELIN THAIS ; 05/27/2021 ; [facility]  HUMAIRA, RAMKIRILLND THAIS ; 05/29/2021 ; [facility] YAKELIN GERARDO THAIS ; 03/26/2021 ; [facility]  YAKELIN GERARDO THAIS ; 04/29/2021 ; [facility]  YAKELIN GERARDO THAIS ; 05/01/2021 ; [facility]  YAKELIN GERARDO THAIS ; 05/13/2021 ; [facility]  YAKELIN GERARDO THAIS ; 05/15/2021 ; [facility]  YAKELIN GERARDO THAIS ; 05/27/2021 ; [facility]  YAKELIN GERARDO THAIS ; 05/29/2021 ; [facility] YAKELIN GERARDO THAIS ; 04/29/2021 ; [facility]  YAKELIN GERARDO THAIS ; 05/01/2021 ; [facility]  YAKELIN GERARDO THAIS ; 05/13/2021 ; [facility]  YAKELIN GERARDO THAIS ; 05/15/2021 ; [facility]  YAKELIN GERARDO THAIS ; 05/27/2021 ; [facility]  YAKELIN GERARDO THAIS ; 05/29/2021 ; [facility] YAKELIN GERARDO THAIS ; 04/28/2021 ; [facility]  YAKELIN GERARDO THAIS ; 05/06/2021 ; [facility] [place] [facility]  YAKELIN GERARDO THAIS ; 05/07/2021 ; [facility]  YAKELIN GERARDO THAIS ; 05/10/2021 ; [facility]  YAKELIN GERARDO THAIS ; 05/12/2021 ; [facility]  YAKELIN GERARDO THAIS ; 05/24/2021 ; [facility]  YAKELIN GERARDO THAIS ; 05/26/2021 ; [facility] YAKELIN GERARDO THAIS ; 05/06/2021 ; [facility] [place] [facility]  YAKELIN GERARDO THAIS ; 05/07/2021 ; [facility]  YAKELIN GERARDO THAIS ; 05/10/2021 ; [facility]  YAKELIN GERARDO THAIS ; 05/12/2021 ; [facility]  YAKELIN GERARDO THAIS ; 05/24/2021 ; [facility]  YAKELIN GERARDO THAIS ; 05/26/2021 ; [facility]

## 2021-03-14 NOTE — PROGRESS NOTE ADULT - PROBLEM SELECTOR PLAN 5
- Pt is currently medically optimized for colorectal surgery  - EKG in chart  - Revised Cardiac Risk Index: Based on RCRI, pt has 6% 30 day risk of death, cardiac arrest, MI due to intraperitoneal surgery

## 2021-03-14 NOTE — PROGRESS NOTE ADULT - PROBLEM SELECTOR PLAN 1
- pt septic overnight with tachycardia and fever to 101.3   - started on zosyn  - f/u bcx: growing klebsiella   - concern for infection related to copious diarrhea  - unlikely to be COVID due to 3/9 negative and lack of symptoms other than diarrhea  - CTM - pt septic overnight with tachycardia and fever to 101.3   - started on zosyn  - f/u bcx: growing klebsiella possibly after GI procedure  - concern for infection related to copious diarrhea  - unlikely to be COVID due to 3/9 negative and lack of symptoms other than diarrhea  - CTM

## 2021-03-14 NOTE — DISCHARGE NOTE PROVIDER - CARE PROVIDERS DIRECT ADDRESSES
[email or web address]

## 2021-03-15 DIAGNOSIS — C18.9 MALIGNANT NEOPLASM OF COLON, UNSPECIFIED: ICD-10-CM

## 2021-03-15 LAB
ALBUMIN SERPL ELPH-MCNC: 2.3 G/DL — LOW (ref 3.3–5)
ALP SERPL-CCNC: 144 U/L — HIGH (ref 40–120)
ALT FLD-CCNC: 13 U/L — SIGNIFICANT CHANGE UP (ref 4–41)
ANION GAP SERPL CALC-SCNC: 13 MMOL/L — SIGNIFICANT CHANGE UP (ref 7–14)
AST SERPL-CCNC: 36 U/L — SIGNIFICANT CHANGE UP (ref 4–40)
BILIRUB SERPL-MCNC: 0.3 MG/DL — SIGNIFICANT CHANGE UP (ref 0.2–1.2)
BUN SERPL-MCNC: 5 MG/DL — LOW (ref 7–23)
CALCIUM SERPL-MCNC: 8.2 MG/DL — LOW (ref 8.4–10.5)
CHLORIDE SERPL-SCNC: 104 MMOL/L — SIGNIFICANT CHANGE UP (ref 98–107)
CO2 SERPL-SCNC: 21 MMOL/L — LOW (ref 22–31)
CREAT SERPL-MCNC: 0.46 MG/DL — LOW (ref 0.5–1.3)
CULTURE RESULTS: SIGNIFICANT CHANGE UP
GLUCOSE SERPL-MCNC: 105 MG/DL — HIGH (ref 70–99)
HCT VFR BLD CALC: 32.1 % — LOW (ref 39–50)
HGB BLD-MCNC: 9.8 G/DL — LOW (ref 13–17)
MAGNESIUM SERPL-MCNC: 1.7 MG/DL — SIGNIFICANT CHANGE UP (ref 1.6–2.6)
MCHC RBC-ENTMCNC: 21.7 PG — LOW (ref 27–34)
MCHC RBC-ENTMCNC: 30.5 GM/DL — LOW (ref 32–36)
MCV RBC AUTO: 71.2 FL — LOW (ref 80–100)
NRBC # BLD: 0 /100 WBCS — SIGNIFICANT CHANGE UP
NRBC # FLD: 0 K/UL — SIGNIFICANT CHANGE UP
PHOSPHATE SERPL-MCNC: 2.3 MG/DL — LOW (ref 2.5–4.5)
PLATELET # BLD AUTO: 246 K/UL — SIGNIFICANT CHANGE UP (ref 150–400)
POTASSIUM SERPL-MCNC: 2.9 MMOL/L — CRITICAL LOW (ref 3.5–5.3)
POTASSIUM SERPL-SCNC: 2.9 MMOL/L — CRITICAL LOW (ref 3.5–5.3)
PROT SERPL-MCNC: 5.3 G/DL — LOW (ref 6–8.3)
RBC # BLD: 4.51 M/UL — SIGNIFICANT CHANGE UP (ref 4.2–5.8)
RBC # FLD: 22.1 % — HIGH (ref 10.3–14.5)
SARS-COV-2 RNA SPEC QL NAA+PROBE: SIGNIFICANT CHANGE UP
SODIUM SERPL-SCNC: 138 MMOL/L — SIGNIFICANT CHANGE UP (ref 135–145)
SPECIMEN SOURCE: SIGNIFICANT CHANGE UP
WBC # BLD: 7.63 K/UL — SIGNIFICANT CHANGE UP (ref 3.8–10.5)
WBC # FLD AUTO: 7.63 K/UL — SIGNIFICANT CHANGE UP (ref 3.8–10.5)

## 2021-03-15 PROCEDURE — 99232 SBSQ HOSP IP/OBS MODERATE 35: CPT | Mod: GC

## 2021-03-15 PROCEDURE — 99233 SBSQ HOSP IP/OBS HIGH 50: CPT

## 2021-03-15 RX ORDER — POTASSIUM CHLORIDE 20 MEQ
40 PACKET (EA) ORAL ONCE
Refills: 0 | Status: COMPLETED | OUTPATIENT
Start: 2021-03-15 | End: 2021-03-15

## 2021-03-15 RX ORDER — SODIUM,POTASSIUM PHOSPHATES 278-250MG
1 POWDER IN PACKET (EA) ORAL EVERY 4 HOURS
Refills: 0 | Status: COMPLETED | OUTPATIENT
Start: 2021-03-15 | End: 2021-03-15

## 2021-03-15 RX ORDER — POTASSIUM CHLORIDE 20 MEQ
40 PACKET (EA) ORAL EVERY 4 HOURS
Refills: 0 | Status: DISCONTINUED | OUTPATIENT
Start: 2021-03-15 | End: 2021-03-15

## 2021-03-15 RX ORDER — SODIUM CHLORIDE 9 MG/ML
1000 INJECTION INTRAMUSCULAR; INTRAVENOUS; SUBCUTANEOUS
Refills: 0 | Status: DISCONTINUED | OUTPATIENT
Start: 2021-03-15 | End: 2021-03-16

## 2021-03-15 RX ORDER — PANTOPRAZOLE SODIUM 20 MG/1
40 TABLET, DELAYED RELEASE ORAL
Refills: 0 | Status: DISCONTINUED | OUTPATIENT
Start: 2021-03-15 | End: 2021-03-26

## 2021-03-15 RX ADMIN — Medication 1 TABLET(S): at 10:27

## 2021-03-15 RX ADMIN — Medication 40 MILLIEQUIVALENT(S): at 10:27

## 2021-03-15 RX ADMIN — Medication 500 MILLIGRAM(S): at 10:28

## 2021-03-15 RX ADMIN — SODIUM CHLORIDE 100 MILLILITER(S): 9 INJECTION INTRAMUSCULAR; INTRAVENOUS; SUBCUTANEOUS at 10:27

## 2021-03-15 RX ADMIN — Medication 1 TABLET(S): at 13:33

## 2021-03-15 RX ADMIN — SODIUM CHLORIDE 100 MILLILITER(S): 9 INJECTION INTRAMUSCULAR; INTRAVENOUS; SUBCUTANEOUS at 13:33

## 2021-03-15 RX ADMIN — PIPERACILLIN AND TAZOBACTAM 25 GRAM(S): 4; .5 INJECTION, POWDER, LYOPHILIZED, FOR SOLUTION INTRAVENOUS at 02:43

## 2021-03-15 RX ADMIN — PIPERACILLIN AND TAZOBACTAM 25 GRAM(S): 4; .5 INJECTION, POWDER, LYOPHILIZED, FOR SOLUTION INTRAVENOUS at 17:26

## 2021-03-15 RX ADMIN — PIPERACILLIN AND TAZOBACTAM 25 GRAM(S): 4; .5 INJECTION, POWDER, LYOPHILIZED, FOR SOLUTION INTRAVENOUS at 10:27

## 2021-03-15 RX ADMIN — CHLORHEXIDINE GLUCONATE 1 APPLICATION(S): 213 SOLUTION TOPICAL at 10:28

## 2021-03-15 RX ADMIN — ENOXAPARIN SODIUM 40 MILLIGRAM(S): 100 INJECTION SUBCUTANEOUS at 05:18

## 2021-03-15 RX ADMIN — SODIUM CHLORIDE 100 MILLILITER(S): 9 INJECTION INTRAMUSCULAR; INTRAVENOUS; SUBCUTANEOUS at 02:43

## 2021-03-15 RX ADMIN — Medication 40 MILLIEQUIVALENT(S): at 17:26

## 2021-03-15 RX ADMIN — Medication 325 MILLIGRAM(S): at 10:28

## 2021-03-15 RX ADMIN — PANTOPRAZOLE SODIUM 40 MILLIGRAM(S): 20 TABLET, DELAYED RELEASE ORAL at 10:28

## 2021-03-15 NOTE — PROGRESS NOTE ADULT - SUBJECTIVE AND OBJECTIVE BOX
Shamar Martin, PGY-1  Pager: [phone] / [phone]    CHIEF COMPLAINT: Patient is a 55y old  Male who presents with a chief complaint of abdominal pain (15 Mar 2021 07:04)      INTERVAL HPI/OVERNIGHT EVENTS:    MEDICATIONS (STANDING):  ascorbic acid 500 milliGRAM(s) Oral daily  chlorhexidine 4% Liquid 1 Application(s) Topical daily  enoxaparin Injectable 40 milliGRAM(s) SubCutaneous daily  ferrous    sulfate 325 milliGRAM(s) Oral daily  influenza   Vaccine 0.5 milliLiter(s) IntraMuscular once  pantoprazole  Injectable 40 milliGRAM(s) IV Push daily  piperacillin/tazobactam IVPB.. 3.375 Gram(s) IV Intermittent every 8 hours  potassium chloride   Powder 40 milliEquivalent(s) Oral every 4 hours  sodium chloride 0.9%. 1000 milliLiter(s) IV Continuous <Continuous>    MEDICATIONS  (PRN):  morphine  - Injectable 4 milliGRAM(s) IV Push every 4 hours PRN  ondansetron Injectable 4 milliGRAM(s) IV Push every 6 hours PRN  tetracaine/benzocaine/butamben Spray 1 Spray(s) Topical four times a day PRN      REVIEW OF SYSTEMS:  CONSTITUTIONAL: No fever, weight loss, or fatigue  EYES: No eye pain, visual disturbances, or discharge  ENMT:  No difficulty hearing, tinnitus, vertigo; No sinus or throat pain  NECK: No pain or stiffness  RESPIRATORY: No cough, wheezing, chills or hemoptysis; No shortness of breath  CARDIOVASCULAR: No chest pain, palpitations, dizziness, or leg swelling  GASTROINTESTINAL: No abdominal or epigastric pain. No nausea, vomiting, or hematemesis; No diarrhea or constipation. No melena or hematochezia.  GENITOURINARY: No dysuria, frequency, hematuria, or incontinence  NEUROLOGICAL: No headaches, memory loss, loss of strength, numbness, or tremors  SKIN: No itching, burning, rashes, or lesions   MUSCULOSKELETAL: No joint pain or swelling; No muscle, back, or extremity pain    VITAL SIGNS:  T(F): 97.4 (03-15-21 @ 05:12), Max: 98.2 (21 @ 21:13)  HR: 80 (03-15-21 @ 05:12) (80 - 88)  BP: 116/80 (03-15-21 @ 05:12) (108/80 - 116/80)  RR: 16 (03-15-21 @ 05:12) (16 - 16)  SpO2: 100% (03-15-21 @ 05:12) (98% - 100%)  CAPILLARY BLOOD GLUCOSE        I&O's Summary      PHYSICAL EXAM:  GENERAL: NAD, well-groomed, well-developed  HEAD:  Atraumatic, Normocephalic  EYES: EOMI, PERRLA, conjunctiva and sclera clear  ENMT: No tonsillar erythema, exudates, or enlargement; Moist mucous membranes  NECK: Supple, No JVD, Normal thyroid  NERVOUS SYSTEM:  Alert & Oriented X3, Good concentration; Motor Strength 5/5 B/L upper and lower extremities  CHEST/LUNG: Clear to auscultation bilaterally; No rales, rhonchi, wheezing, or rubs  HEART: Regular rate and rhythm; No murmurs, rubs, or gallops  ABDOMEN: Soft, Nontender, Nondistended; Bowel sounds present  EXTREMITIES:  2+ Peripheral Pulses, No clubbing, cyanosis, or edema  LYMPH: No lymphadenopathy noted  SKIN: No rashes or lesions    LABS:                        9.8    7.63  )-----------( 246      ( 15 Mar 2021 07:36 )             32.1     -15    138  |  104  |  5<L>  ----------------------------<  105<H>  2.9<LL>   |  21<L>  |  0.46<L>    Ca    8.2<L>      15 Mar 2021 07:36  Phos  2.3     -15  Mg     1.7     03-15    TPro  5.3<L>  /  Alb  2.3<L>  /  TBili  0.3  /  DBili  x   /  AST  36  /  ALT  13  /  AlkPhos  144<H>  -15    Urinalysis Basic - ( 14 Mar 2021 12:59 )  Color: Light Yellow / Appearance: Clear / S.009 / pH: x  Gluc: x / Ketone: Negative  / Bili: Negative / Urobili: <2 mg/dL   Blood: x / Protein: Negative / Nitrite: Negative   Leuk Esterase: Negative / RBC: x / WBC x   Sq Epi: x / Non Sq Epi: x / Bacteria: x        RADIOLOGY & ADDITIONAL TESTS:   Shamar Martin, PGY-1  Pager: [phone] / [phone]    CHIEF COMPLAINT: Patient is a 55y old  Male who presents with a chief complaint of abdominal pain (15 Mar 2021 07:04)    INTERVAL HPI/OVERNIGHT EVENTS: Pt cont to have multiple BM overnight (~6), stool is watery, no blood or mucous. Denies abdominal pain, N/V, able to eat. Denies CP, sob.     MEDICATIONS (STANDING):  ascorbic acid 500 milliGRAM(s) Oral daily  chlorhexidine 4% Liquid 1 Application(s) Topical daily  enoxaparin Injectable 40 milliGRAM(s) SubCutaneous daily  ferrous    sulfate 325 milliGRAM(s) Oral daily  influenza   Vaccine 0.5 milliLiter(s) IntraMuscular once  pantoprazole  Injectable 40 milliGRAM(s) IV Push daily  piperacillin/tazobactam IVPB.. 3.375 Gram(s) IV Intermittent every 8 hours  potassium chloride   Powder 40 milliEquivalent(s) Oral every 4 hours  sodium chloride 0.9%. 1000 milliLiter(s) IV Continuous <Continuous>    MEDICATIONS  (PRN):  morphine  - Injectable 4 milliGRAM(s) IV Push every 4 hours PRN  ondansetron Injectable 4 milliGRAM(s) IV Push every 6 hours PRN  tetracaine/benzocaine/butamben Spray 1 Spray(s) Topical four times a day PRN    REVIEW OF SYSTEMS:  CONSTITUTIONAL: No fever, weight loss, or fatigue  EYES: No eye pain, visual disturbances, or discharge  ENMT:  No difficulty hearing, tinnitus, vertigo; No sinus or throat pain  NECK: No pain or stiffness  RESPIRATORY: No cough, wheezing, chills or hemoptysis; No shortness of breath  CARDIOVASCULAR: No chest pain, palpitations, dizziness, or leg swelling  GASTROINTESTINAL: No abdominal or epigastric pain. No nausea, vomiting, or hematemesis; ++diarrhea or constipation.   GENITOURINARY: No dysuria, frequency, hematuria, or incontinence  NEUROLOGICAL: No headaches, memory loss, loss of strength, numbness, or tremors  SKIN: No itching, burning, rashes, or lesions   MUSCULOSKELETAL: No joint pain or swelling; No muscle, back, or extremity pain    VITAL SIGNS:  T(F): 97.4 (03-15-21 @ 05:12), Max: 98.2 (21 @ 21:13)  HR: 80 (03-15-21 @ 05:12) (80 - 88)  BP: 116/80 (03-15-21 @ 05:12) (108/80 - 116/80)  RR: 16 (03-15-21 @ 05:12) (16 - 16)  SpO2: 100% (03-15-21 @ 05:12) (98% - 100%)    PHYSICAL EXAM:  GENERAL: comfortable, pale, chronically ill appearing. thin.   HEENT:  Atraumatic, Normocephalic  CHEST/LUNG: Chest rise equal bilaterally. CTAB.   HEART: tachycardic, no murmurs   ABDOMEN: Abdomen non-distended and soft, nontender.   EXTREMITIES:  2+ Peripheral Pulses.  PSYCH: A&Ox3  SKIN: Pale, vitiligo, port on R side of chest      LABS:                        9.8    7.63  )-----------( 246      ( 15 Mar 2021 07:36 )             32.1     03-15    138  |  104  |  5<L>  ----------------------------<  105<H>  2.9<LL>   |  21<L>  |  0.46<L>    Ca    8.2<L>      15 Mar 2021 07:36  Phos  2.3     -15  Mg     1.7     03-15    TPro  5.3<L>  /  Alb  2.3<L>  /  TBili  0.3  /  DBili  x   /  AST  36  /  ALT  13  /  AlkPhos  144<H>  -15    Urinalysis Basic - ( 14 Mar 2021 12:59 )  Color: Light Yellow / Appearance: Clear / S.009 / pH: x  Gluc: x / Ketone: Negative  / Bili: Negative / Urobili: <2 mg/dL   Blood: x / Protein: Negative / Nitrite: Negative   Leuk Esterase: Negative / RBC: x / WBC x   Sq Epi: x / Non Sq Epi: x / Bacteria: x        RADIOLOGY & ADDITIONAL TESTS:

## 2021-03-15 NOTE — PROGRESS NOTE ADULT - PROBLEM SELECTOR PLAN 8
- Lactate 2.7 --> 3.5 > 1.6  - Likely in the setting of LBO  - resolved - Diet: soft diet  - DVT PPX: lovenox SQ  - Dispo: likely home per PT  - GOC: pt will speak more with wife and children, palliative consult for pain control and further assistance

## 2021-03-15 NOTE — PROGRESS NOTE ADULT - PROBLEM SELECTOR PLAN 6
- Na 129 with low Cl 89  - Likely in the setting of poor PO intake, vomiting, dehydration  - S/p 2L bolus  - Trend Na, do not overcorrect more than 6-8 mEq in 24h  - improving  - resolved WBC 3.15 with 6.6% reactive lymphocytes   - Likely in the setting of chemotherapy  - improving

## 2021-03-15 NOTE — PROGRESS NOTE ADULT - PROBLEM SELECTOR PLAN 1
.  Likely 2/2 abdominal distension in the setting of large bowel obstruction  -c/w Morphine 4mg IV q4 PRN for Severe Pain  -hold bowel regimen due to post-obstructive diarrhea

## 2021-03-15 NOTE — PROGRESS NOTE ADULT - PROBLEM SELECTOR PLAN 5
- Pt is currently medically optimized for colorectal surgery  - EKG in chart  - Revised Cardiac Risk Index: Based on RCRI, pt has 6% 30 day risk of death, cardiac arrest, MI due to intraperitoneal surgery - Pt is currently medically optimized for colorectal surgery  - EKG in chart  - Revised Cardiac Risk Index: Based on RCRI, pt has 6% 30 day risk of death, cardiac arrest, MI due to intraperitoneal surgery  -However, pt has poor nutritional status which likely have a affect on wound healing and overall prognosis.

## 2021-03-15 NOTE — PROGRESS NOTE ADULT - SUBJECTIVE AND OBJECTIVE BOX
Symptoms are well-controlled except for diarrhea. Consider Loperamide.  Patient is still contemplating surgery but he understands this is recommended to optimize his chemotherapy regimen.  He knows that surgery gives him the best chance of extending and that the stent is a temporary measure.  He is just apprehensive about the general idea of surgery. Emotional support provided. [facility]  Cesar Manning, Palliative Care Attending  Contact Info: Page [phone] (including Nights/Weekend), message on Microsoft Teams (Cesar Manning), or leave  at Palliative Office [phone] (Non-Urgent)    SUBJECTIVE AND OBJECTIVE:  INTERVAL HPI/OVERNIGHT EVENTS: Interval events noted. Symptoms are well-controlled except for diarrhea. Consider Loperamide. Patient is still contemplating surgery but he understands this is recommended to optimize his chemotherapy regimen. He knows that surgery gives him the best chance of extending and that the stent is a temporary measure. He is just apprehensive about the general idea of surgery. Emotional support provided. Patient required no additional PRNs in past 24hrs.    Allergies  No Known Allergies    MEDICATIONS  (STANDING):  ascorbic acid 500 milliGRAM(s) Oral daily  chlorhexidine 4% Liquid 1 Application(s) Topical daily  enoxaparin Injectable 40 milliGRAM(s) SubCutaneous daily  ferrous    sulfate 325 milliGRAM(s) Oral daily  influenza   Vaccine 0.5 milliLiter(s) IntraMuscular once  pantoprazole    Tablet 40 milliGRAM(s) Oral before breakfast  piperacillin/tazobactam IVPB.. 3.375 Gram(s) IV Intermittent every 8 hours  sodium chloride 0.9%. 1000 milliLiter(s) (100 mL/Hr) IV Continuous <Continuous>    MEDICATIONS  (PRN):  morphine  - Injectable 4 milliGRAM(s) IV Push every 4 hours PRN Severe Pain (7 - 10)  ondansetron Injectable 4 milliGRAM(s) IV Push every 6 hours PRN Nausea and/or Vomiting      ITEMS UNCHECKED ARE NOT PRESENT    PRESENT SYMPTOMS: []Unable to obtain due to poor mentation   Source if other than patient:  []Family   []Team     Pain:  [ ]yes [ x]no  QOL impact -   Location -                    Aggravating factors -  Quality -  Radiation -  Timing-  Severity (0-10 scale):  Minimal acceptable level (0-10 scale):     Dyspnea:                           [ ]Mild [ ]Moderate [ ]Severe  Anxiety:                             [ ]Mild [ ]Moderate [ ]Severe  Fatigue:                             [ ]Mild [ ]Moderate [ ]Severe  Nausea:                             [ ]Mild [ ]Moderate [ ]Severe  Loss of appetite:              [ ]Mild [ ]Moderate [ ]Severe  Constipation:                    [ ]Mild [ ]Moderate [ ]Severe    PAIN AD Score:	0  [email or web address] (Ctrl + left click to view)    Other Symptoms:  [ ]All other review of systems negative     Palliative Performance Status Version 2:     50%  [email or web address]_[email or web address]    PHYSICAL EXAM:  Vital Signs Last 24 Hrs  T(C): 36.5 (15 Mar 2021 15:10), Max: 36.8 (14 Mar 2021 21:13)  T(F): 97.7 (15 Mar 2021 15:10), Max: 98.2 (14 Mar 2021 21:13)  HR: 88 (15 Mar 2021 15:10) (80 - 88)  BP: 116/92 (15 Mar 2021 15:10) (115/81 - 116/92)  BP(mean): --  RR: 17 (15 Mar 2021 15:10) (16 - 17)  SpO2: 100% (15 Mar 2021 15:10) (99% - 100%) I&O's Summary    GENERAL:  [ x]Alert  [x ]Oriented x3  [ ]Lethargic  [x ]Cachexia  [ ]Unarousable  [ x]Verbal  [ ]Non-Verbal  Behavioral:   [ ]Anxiety  [ ]Delirium [ ]Agitation [ ]Other  HEENT:  [x ]Normal   [ ]Dry mouth   [ ]ET Tube/Trach  [ ]Oral lesions  PULMONARY:   [x ]Clear [ ]Tachypnea  [ ]Audible excessive secretions   [ ]Rhonchi        [ ]Right [ ]Left [ ]Bilateral  [ ]Crackles        [ ]Right [ ]Left [ ]Bilateral  [ ]Wheezing     [ ]Right [ ]Left [ ]Bilateral  [ ]Diminished BS [ ] Right [ ]Left [ ]Bilateral  CARDIOVASCULAR:    [x ]Regular [ ]Irregular [ ]Tachy  [ ]Imkey [ ]Murmur [ ]Other  GASTROINTESTINAL:  [x ]Soft  [x ]Distended-improved   [ x]+BS  [x ]Non tender [ ]Tender  [ ]PEG [ ]OGT/ NGT   Last BM:  3/15  GENITOURINARY:  [x ]Normal [ ]Incontinent   [ ]Oliguria/Anuria   [ ]Judd  MUSCULOSKELETAL:   [ ]Normal   [x ]Weakness  [ ]Bed/Wheelchair bound [ ]Edema  NEUROLOGIC:   [ x]No focal deficits  [ ] Cognitive impairment  [ ] Dysphagia [ ]Dysarthria [ ] Paresis [ ]Other   SKIN: +vitiligo  [ ]Normal  [ ]Rash   [ ]Pressure ulcer(s) [ ]y [ ]n present on admission    LABS:                         9.8    7.63  )-----------( 246      ( 15 Mar 2021 07:36 )             32.1   03-15    138  |  104  |  5<L>  ----------------------------<  105<H>  2.9<LL>   |  21<L>  |  0.46<L>    Ca    8.2<L>      15 Mar 2021 07:36  Phos  2.3     03-15  Mg     1.7     03-15    TPro  5.3<L>  /  Alb  2.3<L>  /  TBili  0.3  /  DBili  x   /  AST  36  /  ALT  13  /  AlkPhos  144<H>  03-15    Urinalysis Basic - ( 14 Mar 2021 12:59 )    Color: Light Yellow / Appearance: Clear / S.009 / pH: x  Gluc: x / Ketone: Negative  / Bili: Negative / Urobili: <2 mg/dL   Blood: x / Protein: Negative / Nitrite: Negative   Leuk Esterase: Negative / RBC: x / WBC x   Sq Epi: x / Non Sq Epi: x / Bacteria: x    RADIOLOGY & ADDITIONAL STUDIES: None new    PROTEIN CALORIE MALNUTRITION PRESENT: [ ]mild [ ]moderate [ ]severe [ ]underweight [ ]morbid obesity  [] PPSV2 < or = 30% [] significant weight loss [] poor nutritional intake [] anasarca [] catabolic state   Albumin, Serum: 2.3 g/dL (03-15-21 @ 07:36)   Artificial Nutrition []     REFERRALS:   []Chaplaincy  []Hospice  []Child Life  [x]Social Work  []Case management []Holistic Therapy []Physical Therapy []Dietary

## 2021-03-15 NOTE — PROGRESS NOTE ADULT - PROBLEM SELECTOR PLAN 3
- 1 week hx abdominal pain/distension, N/V in the setting of metastatic colon cancer   - CT abd/p LBO involving the ascending and transverse colon measuring up to 8 cm secondary to the colonic mass seen in the distal transverse colon. In addition, the jejunum and terminal ileum are also dilated  - S/p 2L bolus in the ED, c/w IVF hydration   - NG tube placed for decompression--> removed  - s/p colonic stent  - advanced diet to CLD  - Dr. Alicia made aware for discussion of operative intervention given pt may be precluded from chemo due to stent and oncology strongly recommends chemo in the future - 1 week hx abdominal pain/distension, N/V in the setting of metastatic colon cancer   - CT abd/p LBO involving the ascending and transverse colon measuring up to 8 cm secondary to the colonic mass seen in the distal transverse colon. In addition, the jejunum and terminal ileum are also dilated  - S/p 2L bolus in the ED, c/w IVF hydration   - NG tube placed for decompression--> removed  - s/p colonic stent  - on soft diet  - Dr. Alicia made aware for discussion of operative intervention given pt may be precluded from chemo due to stent and oncology strongly recommends chemo in the future

## 2021-03-15 NOTE — PROGRESS NOTE ADULT - PROBLEM SELECTOR PLAN 2
- pt having multiple watery diarrhea (~9 episodes per day) since stent placement  - possibly in setting of new stent vs infection as pt febrile to 101.3 on 3/13  - GI PCR negative, stool cx pending, blood culture grew Kleb  - started on zosyn  - maintain IV hydration  - c. diff to be sent - pt having multiple watery diarrhea (~9 episodes per day) since stent placement  - possibly in setting of new stent vs infection as pt febrile to 101.3 on 3/13  - GI PCR negative, stool cx pending, blood culture grew Kleb  - Pt on zosyn  - maintain IV hydration, replete electrolyte as needed.   -will cont to monitor

## 2021-03-15 NOTE — PROGRESS NOTE ADULT - PROBLEM SELECTOR PLAN 7
WBC 3.15 with 6.6% reactive lymphocytes   - Likely in the setting of chemotherapy  - improving - Prior iron studies cw iron def Anemia plus AOCD   - Monitor H/H and transfuse to keep Hgb > 7  - c/w PO iron and vit c

## 2021-03-15 NOTE — PROGRESS NOTE ADULT - ATTENDING COMMENTS
Patient seen and examined care d/w HS4.    54 yo M with stage 4 colon cancer (mets to liver and lungs, received palliative folfox 2/16/21) presenting with abdominal pain, N/V, and distension. Admitted for medical management of LBO due to obstruction 2/2 transverse colon mass.     #Kleb bacteremia: had sepsis (febrile and tachycardic). Unclear source of infection presumed GI source. UA negative. BCx growing klebsiella. GI PCR negative. C. dif pending.   #Large bowel obstruction, s/p colonic stent by GI, advance diet as tolerated, surgery f/up regarding colon resection and ostomy   #Metastatic colon CA no plans for inpatient chemo, morphine IV prn for neoplasm related pain  #Anemia of chronic disease, monitor hgb   #Hypokalemia, supplement K    #Pre-op, good METS (was working up till January) w/ no cardiac complaints, repeat EKG, RCRI score of 1, patient is medically optimized and may proceed w/ OR as planned

## 2021-03-15 NOTE — PROGRESS NOTE ADULT - PROBLEM SELECTOR PLAN 5
.  Complex medical decision making / symptom management in the setting of metastatic colon cancer.    Will continue to follow for ongoing GOC discussion / titration of palliative regimen.   Emotional support provided, questions answered.  Active Psychosocial Referrals: JAELYN FLOREZ    For new or uncontrolled symptoms, please page the Palliative Medicine team ([phone] #[phone]).   The service is available 24/7 (including nights & weekends) to provide symptom management recommendations over the phone as appropriate

## 2021-03-15 NOTE — PROGRESS NOTE ADULT - PROBLEM SELECTOR PLAN 1
- pt septic overnight with tachycardia and fever to 101.3   - started on zosyn  - f/u bcx: growing klebsiella possibly after GI procedure  - concern for infection related to copious diarrhea  - unlikely to be COVID due to 3/9 negative and lack of symptoms other than diarrhea  - CTM

## 2021-03-15 NOTE — PROGRESS NOTE ADULT - PROBLEM SELECTOR PLAN 4
- Metastatic to liver and lungs, s/p first session of palliative folfox (2/16/21) at [facility]  - CEA - 2882, PSA - WNL, CA 19-9 -[id number] and AFP - 2775  - CT abd/p showing hepatic mets with some that have increased in size since 1/2021, enlarged prostate, along with a 1.3 x 0.9 cm right lower lobe nodule concerning for metastatic disease  - Pain control regimen - Morphine 4 mg IV q4 PRN   - Palliative consult for pain control and GOC given metastatic cancer  - oncology following

## 2021-03-15 NOTE — PROGRESS NOTE ADULT - PROBLEM SELECTOR PLAN 4
.  Metastatic disease, follows at [facility]  -goals are to continue with DMT as long as it is being offered in order to extend the patient's life  -if no further DMT was offered/pursued, then patient would be eligible for home hospice services

## 2021-03-15 NOTE — PROGRESS NOTE ADULT - PROBLEM SELECTOR PLAN 3
.  Tolerating diet, s/p stent placement with GI  -discussed the current plan of care and how surgery is being recommended

## 2021-03-16 ENCOUNTER — APPOINTMENT (OUTPATIENT)
Dept: HEMATOLOGY ONCOLOGY | Facility: CLINIC | Age: 56
End: 2021-03-16

## 2021-03-16 DIAGNOSIS — A41.50 GRAM-NEGATIVE SEPSIS, UNSPECIFIED: ICD-10-CM

## 2021-03-16 DIAGNOSIS — E87.6 HYPOKALEMIA: ICD-10-CM

## 2021-03-16 LAB
-  AMIKACIN: SIGNIFICANT CHANGE UP
-  AMPICILLIN/SULBACTAM: SIGNIFICANT CHANGE UP
-  AMPICILLIN: SIGNIFICANT CHANGE UP
-  AZTREONAM: SIGNIFICANT CHANGE UP
-  CEFAZOLIN: SIGNIFICANT CHANGE UP
-  CEFEPIME: SIGNIFICANT CHANGE UP
-  CEFOXITIN: SIGNIFICANT CHANGE UP
-  CEFTRIAXONE: SIGNIFICANT CHANGE UP
-  CIPROFLOXACIN: SIGNIFICANT CHANGE UP
-  ERTAPENEM: SIGNIFICANT CHANGE UP
-  GENTAMICIN: SIGNIFICANT CHANGE UP
-  IMIPENEM: SIGNIFICANT CHANGE UP
-  LEVOFLOXACIN: SIGNIFICANT CHANGE UP
-  MEROPENEM: SIGNIFICANT CHANGE UP
-  PIPERACILLIN/TAZOBACTAM: SIGNIFICANT CHANGE UP
-  TOBRAMYCIN: SIGNIFICANT CHANGE UP
-  TRIMETHOPRIM/SULFAMETHOXAZOLE: SIGNIFICANT CHANGE UP
ALBUMIN SERPL ELPH-MCNC: 2.2 G/DL — LOW (ref 3.3–5)
ALP SERPL-CCNC: 127 U/L — HIGH (ref 40–120)
ALT FLD-CCNC: 12 U/L — SIGNIFICANT CHANGE UP (ref 4–41)
ANION GAP SERPL CALC-SCNC: 10 MMOL/L — SIGNIFICANT CHANGE UP (ref 7–14)
ANION GAP SERPL CALC-SCNC: 10 MMOL/L — SIGNIFICANT CHANGE UP (ref 7–14)
APTT BLD: 32.7 SEC — SIGNIFICANT CHANGE UP (ref 27–36.3)
AST SERPL-CCNC: 31 U/L — SIGNIFICANT CHANGE UP (ref 4–40)
BILIRUB SERPL-MCNC: 0.3 MG/DL — SIGNIFICANT CHANGE UP (ref 0.2–1.2)
BUN SERPL-MCNC: 7 MG/DL — SIGNIFICANT CHANGE UP (ref 7–23)
BUN SERPL-MCNC: 8 MG/DL — SIGNIFICANT CHANGE UP (ref 7–23)
CALCIUM SERPL-MCNC: 8 MG/DL — LOW (ref 8.4–10.5)
CALCIUM SERPL-MCNC: 8 MG/DL — LOW (ref 8.4–10.5)
CHLORIDE SERPL-SCNC: 105 MMOL/L — SIGNIFICANT CHANGE UP (ref 98–107)
CHLORIDE SERPL-SCNC: 105 MMOL/L — SIGNIFICANT CHANGE UP (ref 98–107)
CO2 SERPL-SCNC: 22 MMOL/L — SIGNIFICANT CHANGE UP (ref 22–31)
CO2 SERPL-SCNC: 23 MMOL/L — SIGNIFICANT CHANGE UP (ref 22–31)
CREAT SERPL-MCNC: 0.4 MG/DL — LOW (ref 0.5–1.3)
CREAT SERPL-MCNC: 0.42 MG/DL — LOW (ref 0.5–1.3)
CULTURE RESULTS: SIGNIFICANT CHANGE UP
GLUCOSE SERPL-MCNC: 109 MG/DL — HIGH (ref 70–99)
GLUCOSE SERPL-MCNC: 116 MG/DL — HIGH (ref 70–99)
HCT VFR BLD CALC: 26.7 % — LOW (ref 39–50)
HGB BLD-MCNC: 8.2 G/DL — LOW (ref 13–17)
INR BLD: 1.3 RATIO — HIGH (ref 0.88–1.16)
MAGNESIUM SERPL-MCNC: 1.6 MG/DL — SIGNIFICANT CHANGE UP (ref 1.6–2.6)
MCHC RBC-ENTMCNC: 22.2 PG — LOW (ref 27–34)
MCHC RBC-ENTMCNC: 30.7 GM/DL — LOW (ref 32–36)
MCV RBC AUTO: 72.2 FL — LOW (ref 80–100)
METHOD TYPE: SIGNIFICANT CHANGE UP
NRBC # BLD: 0 /100 WBCS — SIGNIFICANT CHANGE UP
NRBC # FLD: 0 K/UL — SIGNIFICANT CHANGE UP
ORGANISM # SPEC MICROSCOPIC CNT: SIGNIFICANT CHANGE UP
PHOSPHATE SERPL-MCNC: 2.5 MG/DL — SIGNIFICANT CHANGE UP (ref 2.5–4.5)
PLATELET # BLD AUTO: 230 K/UL — SIGNIFICANT CHANGE UP (ref 150–400)
POTASSIUM SERPL-MCNC: 2.6 MMOL/L — CRITICAL LOW (ref 3.5–5.3)
POTASSIUM SERPL-MCNC: 3.7 MMOL/L — SIGNIFICANT CHANGE UP (ref 3.5–5.3)
POTASSIUM SERPL-SCNC: 2.6 MMOL/L — CRITICAL LOW (ref 3.5–5.3)
POTASSIUM SERPL-SCNC: 3.7 MMOL/L — SIGNIFICANT CHANGE UP (ref 3.5–5.3)
PROT SERPL-MCNC: 5.1 G/DL — LOW (ref 6–8.3)
PROTHROM AB SERPL-ACNC: 14.6 SEC — HIGH (ref 10.6–13.6)
RBC # BLD: 3.7 M/UL — LOW (ref 4.2–5.8)
RBC # FLD: 21.6 % — HIGH (ref 10.3–14.5)
SODIUM SERPL-SCNC: 137 MMOL/L — SIGNIFICANT CHANGE UP (ref 135–145)
SODIUM SERPL-SCNC: 138 MMOL/L — SIGNIFICANT CHANGE UP (ref 135–145)
SPECIMEN SOURCE: SIGNIFICANT CHANGE UP
WBC # BLD: 7.71 K/UL — SIGNIFICANT CHANGE UP (ref 3.8–10.5)
WBC # FLD AUTO: 7.71 K/UL — SIGNIFICANT CHANGE UP (ref 3.8–10.5)

## 2021-03-16 PROCEDURE — 99231 SBSQ HOSP IP/OBS SF/LOW 25: CPT | Mod: GC

## 2021-03-16 PROCEDURE — 99232 SBSQ HOSP IP/OBS MODERATE 35: CPT | Mod: GC

## 2021-03-16 RX ORDER — OXYCODONE AND ACETAMINOPHEN 5; 325 MG/1; MG/1
1 TABLET ORAL EVERY 4 HOURS
Refills: 0 | Status: DISCONTINUED | OUTPATIENT
Start: 2021-03-16 | End: 2021-03-17

## 2021-03-16 RX ORDER — CEFTRIAXONE 500 MG/1
1000 INJECTION, POWDER, FOR SOLUTION INTRAMUSCULAR; INTRAVENOUS EVERY 24 HOURS
Refills: 0 | Status: DISCONTINUED | OUTPATIENT
Start: 2021-03-16 | End: 2021-03-20

## 2021-03-16 RX ORDER — POTASSIUM CHLORIDE 20 MEQ
40 PACKET (EA) ORAL EVERY 4 HOURS
Refills: 0 | Status: DISCONTINUED | OUTPATIENT
Start: 2021-03-16 | End: 2021-03-16

## 2021-03-16 RX ORDER — POTASSIUM CHLORIDE 20 MEQ
40 PACKET (EA) ORAL EVERY 4 HOURS
Refills: 0 | Status: COMPLETED | OUTPATIENT
Start: 2021-03-16 | End: 2021-03-16

## 2021-03-16 RX ADMIN — CEFTRIAXONE 100 MILLIGRAM(S): 500 INJECTION, POWDER, FOR SOLUTION INTRAMUSCULAR; INTRAVENOUS at 14:28

## 2021-03-16 RX ADMIN — PIPERACILLIN AND TAZOBACTAM 25 GRAM(S): 4; .5 INJECTION, POWDER, LYOPHILIZED, FOR SOLUTION INTRAVENOUS at 09:33

## 2021-03-16 RX ADMIN — CHLORHEXIDINE GLUCONATE 1 APPLICATION(S): 213 SOLUTION TOPICAL at 12:51

## 2021-03-16 RX ADMIN — PIPERACILLIN AND TAZOBACTAM 25 GRAM(S): 4; .5 INJECTION, POWDER, LYOPHILIZED, FOR SOLUTION INTRAVENOUS at 02:09

## 2021-03-16 RX ADMIN — Medication 40 MILLIEQUIVALENT(S): at 12:51

## 2021-03-16 RX ADMIN — ENOXAPARIN SODIUM 40 MILLIGRAM(S): 100 INJECTION SUBCUTANEOUS at 05:04

## 2021-03-16 RX ADMIN — PANTOPRAZOLE SODIUM 40 MILLIGRAM(S): 20 TABLET, DELAYED RELEASE ORAL at 05:04

## 2021-03-16 RX ADMIN — Medication 500 MILLIGRAM(S): at 12:51

## 2021-03-16 RX ADMIN — Medication 325 MILLIGRAM(S): at 12:51

## 2021-03-16 RX ADMIN — Medication 40 MILLIEQUIVALENT(S): at 17:55

## 2021-03-16 RX ADMIN — Medication 40 MILLIEQUIVALENT(S): at 10:17

## 2021-03-16 NOTE — PROGRESS NOTE ADULT - PROBLEM SELECTOR PLAN 9
- Diet: regualr + ensure per dietitian   - DVT PPX: Lovenox SQ  - Dispo: likely home per PT  - GOC: pt will speak more with wife and children, palliative consult for pain control and further assistance

## 2021-03-16 NOTE — DIETITIAN INITIAL EVALUATION ADULT. - PROBLEM SELECTOR PLAN 2
- Metastatic to liver and lungs, s/p first session of palliative folfox (2/16/21) at [facility]  - CEA - 2882, PSA - WNL, CA 19-9 -2882 and AFP - 2775  - CT abd/p showing hepatic mets with some that have increased in size since 1/2021, enlarged prostate, along with a 1.3 x 0.9 cm right lower lobe nodule concerning for metastatic disease  - Will email Oncology consult  - Pain control regimen - Morphine 4 mg IV q4 PRN   - Palliative consult for pain control and GOC given metastatic cancer

## 2021-03-16 NOTE — DIETITIAN INITIAL EVALUATION ADULT. - OTHER INFO
Pt. w PMH metastatic colon Ca. Presented with N/V and abdominal distention & admitted with LBO 2/2 to transverse colon mass.    Pt. reports persistent diarrhea.  Denies N/V or issues chewing/swallowing (thus would advance diet consistency to regular).    Attributes decrease appetite/PO intake PTA in part due to chemo and altered sense of taste, which has since resolved.     Pt. observed consuming breakfast with good PO intake (>75%).  States his appetite has improved since admission.  Is also consuming Ensure Enlive supplement 3x daily.    Pt. with appropriate nutrition related questions that RDN answered and discussed.  Advised on high calorie/nutrient dense food intake.  Offered double portions of meals, Pt. declined @ this time.     Pt. with significant weight decrease from usual body weight of 140lbs, which he states he last weight in Dec 2020.  This is consistent with 35lb decrease x 3 months.  Also with 9lb significant decrease x < 2 month   113.9lbs (51.7kg)--> Jan 20 2021  104.9lbs (47.6kg)--> Mar 10 2021

## 2021-03-16 NOTE — PROGRESS NOTE ADULT - PROBLEM SELECTOR PLAN 7
- Prior iron studies cw iron def Anemia plus AOCD   - Monitor H/H and transfuse to keep Hgb > 7  - c/w PO iron and vit c WBC 3.15 with 6.6% reactive lymphocytes   - Likely in the setting of chemotherapy  - improving

## 2021-03-16 NOTE — PROGRESS NOTE ADULT - SUBJECTIVE AND OBJECTIVE BOX
Shamar Martin, PGY-1  Pager: [phone] / [phone]    CHIEF COMPLAINT: Patient is a 55y old  Male who presents with a chief complaint of abdominal pain (16 Mar 2021 07:03)    INTERVAL HPI/OVERNIGHT EVENTS: CT. Pt was seen comfortable at bedside. He reports diarrhea has slowed down to ~6/day and more solid, denies blood/mucous. Denies abdominal pain, N/V, eating well and tolerating diet, no CP, SOB.   Pt notified that he is interested in colectomy     MEDICATIONS (STANDING):  ascorbic acid 500 milliGRAM(s) Oral daily  chlorhexidine 4% Liquid 1 Application(s) Topical daily  enoxaparin Injectable 40 milliGRAM(s) SubCutaneous daily  ferrous    sulfate 325 milliGRAM(s) Oral daily  influenza   Vaccine 0.5 milliLiter(s) IntraMuscular once  pantoprazole    Tablet 40 milliGRAM(s) Oral before breakfast  piperacillin/tazobactam IVPB.. 3.375 Gram(s) IV Intermittent every 8 hours  sodium chloride 0.9%. 1000 milliLiter(s) IV Continuous <Continuous>    MEDICATIONS  (PRN):  morphine  - Injectable 4 milliGRAM(s) IV Push every 4 hours PRN  ondansetron Injectable 4 milliGRAM(s) IV Push every 6 hours PRN    REVIEW OF SYSTEMS:  CONSTITUTIONAL: No fever, weight loss, or fatigue  EYES: No eye pain, visual disturbances, or discharge  ENMT:  No difficulty hearing, tinnitus, vertigo; No sinus or throat pain  NECK: No pain or stiffness  RESPIRATORY: No cough, wheezing, chills or hemoptysis; No shortness of breath  CARDIOVASCULAR: No chest pain, palpitations, dizziness, or leg swelling  GASTROINTESTINAL: No abdominal or epigastric pain. No nausea, vomiting, or hematemesis; No diarrhea or constipation. No melena or hematochezia.  GENITOURINARY: No dysuria, frequency, hematuria, or incontinence  NEUROLOGICAL: No headaches, memory loss, loss of strength, numbness, or tremors  SKIN: No itching, burning, rashes, or lesions   MUSCULOSKELETAL: No joint pain or swelling; No muscle, back, or extremity pain    VITAL SIGNS:  T(F): 98 (21 @ 05:03), Max: 98.1 (03-15-21 @ 21:17)  HR: 85 (21 @ 05:03) (81 - 88)  BP: 116/84 (21 @ 05:03) (115/83 - 116/92)  RR: 18 (21 @ 05:03) (17 - 18)  SpO2: 100% (21 @ 05:03) (100% - 100%)    PHYSICAL EXAM:  GENERAL: comfortable, pale, chronically ill appearing. thin.   HEENT:  Atraumatic, Normocephalic  CHEST/LUNG: Chest rise equal bilaterally. CTAB.   HEART: tachycardic, no murmurs   ABDOMEN: Abdomen non-distended and soft, nontender.   EXTREMITIES:  2+ Peripheral Pulses.  PSYCH: A&Ox3  SKIN: Pale, vitiligo, port on R side of chest      LABS:                        8.2    7.71  )-----------( 230      ( 16 Mar 2021 08:15 )             26.7     03-15    138  |  104  |  5<L>  ----------------------------<  105<H>  2.9<LL>   |  21<L>  |  0.46<L>    Ca    8.2<L>      15 Mar 2021 07:36  Phos  2.5     03-16  Mg     1.6     03-16    TPro  5.3<L>  /  Alb  2.3<L>  /  TBili  0.3  /  DBili  x   /  AST  36  /  ALT  13  /  AlkPhos  144<H>  03-15    PT/INR - ( 16 Mar 2021 08:15 )   PT: 14.6 sec;   INR: 1.30 ratio    PTT - ( 16 Mar 2021 08:15 )  PTT:32.7 sec    Urinalysis Basic - ( 14 Mar 2021 12:59 )    Color: Light Yellow / Appearance: Clear / S.009 / pH: x  Gluc: x / Ketone: Negative  / Bili: Negative / Urobili: <2 mg/dL   Blood: x / Protein: Negative / Nitrite: Negative   Leuk Esterase: Negative / RBC: x / WBC x   Sq Epi: x / Non Sq Epi: x / Bacteria: x      RADIOLOGY & ADDITIONAL TESTS:   Shamar Martin, PGY-1  Pager: [phone] / [phone]    CHIEF COMPLAINT: Patient is a 55y old  Male who presents with a chief complaint of abdominal pain (16 Mar 2021 07:03)    INTERVAL HPI/OVERNIGHT EVENTS: CT. Pt was seen comfortable at bedside. He reports diarrhea has slowed down to ~6/day and more solid, denies blood/mucous. Denies abdominal pain, N/V, eating well and tolerating diet, no CP, SOB.   Pt notified that he is interested in colectomy     MEDICATIONS (STANDING):  ascorbic acid 500 milliGRAM(s) Oral daily  chlorhexidine 4% Liquid 1 Application(s) Topical daily  enoxaparin Injectable 40 milliGRAM(s) SubCutaneous daily  ferrous    sulfate 325 milliGRAM(s) Oral daily  influenza   Vaccine 0.5 milliLiter(s) IntraMuscular once  pantoprazole    Tablet 40 milliGRAM(s) Oral before breakfast  piperacillin/tazobactam IVPB.. 3.375 Gram(s) IV Intermittent every 8 hours  sodium chloride 0.9%. 1000 milliLiter(s) IV Continuous <Continuous>    MEDICATIONS  (PRN):  morphine  - Injectable 4 milliGRAM(s) IV Push every 4 hours PRN  ondansetron Injectable 4 milliGRAM(s) IV Push every 6 hours PRN    REVIEW OF SYSTEMS:  CONSTITUTIONAL: No fever, weight loss, or fatigue  EYES: No eye pain, visual disturbances, or discharge  ENMT:  No difficulty hearing, tinnitus, vertigo; No sinus or throat pain  NECK: No pain or stiffness  RESPIRATORY: No cough, wheezing, chills or hemoptysis; No shortness of breath  CARDIOVASCULAR: No chest pain, palpitations, dizziness, or leg swelling  GASTROINTESTINAL: No abdominal or epigastric pain. No nausea, vomiting, or hematemesis; No diarrhea or constipation. No melena or hematochezia.  GENITOURINARY: No dysuria, frequency, hematuria, or incontinence  NEUROLOGICAL: No headaches, memory loss, loss of strength, numbness, or tremors  SKIN: No itching, burning, rashes, or lesions   MUSCULOSKELETAL: No joint pain or swelling; No muscle, back, or extremity pain    VITAL SIGNS:  T(F): 98 (21 @ 05:03), Max: 98.1 (03-15-21 @ 21:17)  HR: 85 (21 @ 05:03) (81 - 88)  BP: 116/84 (21 @ 05:03) (115/83 - 116/92)  RR: 18 (21 @ 05:03) (17 - 18)  SpO2: 100% (21 @ 05:03) (100% - 100%)    PHYSICAL EXAM:  GENERAL: comfortable, pale, chronically ill appearing. thin.   HEENT:  Atraumatic, Normocephalic  CHEST/LUNG: Chest rise equal bilaterally. CTAB.   HEART: tachycardic, no murmurs   ABDOMEN: Abdomen non-distended and soft, nontender.   EXTREMITIES:  2+ Peripheral Pulses.  PSYCH: A&Ox3  SKIN: Pale, vitiligo, port on R side of chest      LABS:                        8.2    7.71  )-----------( 230      ( 16 Mar 2021 08:15 )             26.7     03-15    138  |  104  |  5<L>  ----------------------------<  105<H>  2.9<LL>   |  21<L>  |  0.46<L>    Ca    8.2<L>      15 Mar 2021 07:36  Phos  2.5     03-16  Mg     1.6     -16    TPro  5.3<L>  /  Alb  2.3<L>  /  TBili  0.3  /  DBili  x   /  AST  36  /  ALT  13  /  AlkPhos  144<H>  03-15    PT/INR - ( 16 Mar 2021 08:15 )   PT: 14.6 sec;   INR: 1.30 ratio    PTT - ( 16 Mar 2021 08:15 )  PTT:32.7 sec    Urinalysis Basic - ( 14 Mar 2021 12:59 )    Color: Light Yellow / Appearance: Clear / S.009 / pH: x  Gluc: x / Ketone: Negative  / Bili: Negative / Urobili: <2 mg/dL   Blood: x / Protein: Negative / Nitrite: Negative   Leuk Esterase: Negative / RBC: x / WBC x   Sq Epi: x / Non Sq Epi: x / Bacteria: x    RADIOLOGY & ADDITIONAL TESTS:    Culture - Blood (21 @ 06:10)    Gram Stain:   Growth in anaerobic bottle: Gram Negative Rods    -  Amikacin: S <=16    -  Ampicillin: R 16 These ampicillin results predict results for amoxicillin    -  Ampicillin/Sulbactam: S <=4/2 Enterobacter, Citrobacter, and Serratia may develop resistance during prolonged therapy (3-4 days)    -  Aztreonam: S <=4    -  Cefazolin: S <=2 Enterobacter, Citrobacter, and Serratia may develop resistance during prolonged therapy (3-4 days)    -  Cefepime: S <=2    -  Cefoxitin: S <=8    -  Ceftriaxone: S <=1 Enterobacter, Citrobacter, and Serratia may develop resistance during prolonged therapy    -  Ciprofloxacin: S <=0.25    -  Ertapenem: S <=0.5    -  Gentamicin: S <=2    -  Imipenem: S <=1    -  Levofloxacin: S <=0.5    -  Meropenem: S <=1    -  Piperacillin/Tazobactam: S <=8    -  Tobramycin: S <=2    -  Trimethoprim/Sulfamethoxazole: S <=0.5/9.5    -  Klebsiella pneumoniae: Detec    Specimen Source: .Blood Blood-Peripheral    Organism: Blood Culture PCR    Organism: Klebsiella variicola    Culture Results:   Growth in anaerobic bottle: Klebsiella variicola  ***Blood Panel PCR results on this specimen are available  approximately 3 hours after the Gram stain result.***  Gram stain, PCR, and/or culture results may not always  correspond due to differencein methodologies.  ************************************************************  This PCR assay was performed by multiplex PCR. This  Assay tests for 66 bacterial and resistance gene targets.  Please refer to the [facility] [facility] test directory  at [email or web address] for details.    Organism Identification: Blood Culture PCR  Klebsiella variicola    Method Type: PCR    Method Type: KERI

## 2021-03-16 NOTE — PROGRESS NOTE ADULT - PROBLEM SELECTOR PLAN 8
- Diet: soft diet  - DVT PPX: lovenox SQ  - Dispo: likely home per PT  - GOC: pt will speak more with wife and children, palliative consult for pain control and further assistance - Diet: soft diet  - DVT PPX: Lovenox SQ  - Dispo: likely home per PT  - GOC: pt will speak more with wife and children, palliative consult for pain control and further assistance - Prior iron studies cw iron def Anemia plus AOCD   - Monitor H/H and transfuse to keep Hgb > 7  - c/w PO iron and vit c

## 2021-03-16 NOTE — DIETITIAN NUTRITION RISK NOTIFICATION - ADDITIONAL COMMENTS/DIETITIAN RECOMMENDATIONS
Please refer to Initial Dietitian Evaluation in documents section for nutritional recommendations.

## 2021-03-16 NOTE — PROGRESS NOTE ADULT - PROBLEM SELECTOR PLAN 4
- Metastatic to liver and lungs, s/p first session of palliative folfox (2/16/21) at [facility]  - CEA - 2882, PSA - WNL, CA 19-9 -2882 and AFP - 2775  - CT abd/p showing hepatic mets with some that have increased in size since 1/2021, enlarged prostate, along with a 1.3 x 0.9 cm right lower lobe nodule concerning for metastatic disease  - Pain control regimen - Morphine 4 mg IV q4 PRN   - Palliative consult for pain control and GOC given metastatic cancer  - oncology following - 1 week hx abdominal pain/distension, N/V in the setting of metastatic colon cancer   - CT abd/p LBO involving the ascending and transverse colon measuring up to 8 cm secondary to the colonic mass seen in the distal transverse colon. In addition, the jejunum and terminal ileum are also dilated  - S/p 2L bolus in the ED, c/w IVF hydration   - NG tube placed for decompression--> removed  - s/p colonic stent  - on soft diet  - Dr. Alicia made aware for discussion of operative intervention given pt may be precluded from chemo due to stent and oncology strongly recommends chemo in the future

## 2021-03-16 NOTE — PROGRESS NOTE ADULT - ATTENDING COMMENTS
Metastatic colon cancer causing LBO.  Pt is now s/p stent placement  -I had a long discussion about surgery with the pt.  He wishes to proceed so that he can attempt additional chemotherapy.  He is aware that he may require a permanent colostomy  -Pt will need negative blood cxs before surgery given recent positive culture  -Sx Tentatively scheduled for next week awaiting blood cx results  -will continue to follow

## 2021-03-16 NOTE — DIETITIAN INITIAL EVALUATION ADULT. - PERTINENT MEDS FT
MEDICATIONS  (STANDING):  ascorbic acid 500 milliGRAM(s) Oral daily  chlorhexidine 4% Liquid 1 Application(s) Topical daily  enoxaparin Injectable 40 milliGRAM(s) SubCutaneous daily  ferrous    sulfate 325 milliGRAM(s) Oral daily  influenza   Vaccine 0.5 milliLiter(s) IntraMuscular once  pantoprazole    Tablet 40 milliGRAM(s) Oral before breakfast  piperacillin/tazobactam IVPB.. 3.375 Gram(s) IV Intermittent every 8 hours  potassium chloride   Powder 40 milliEquivalent(s) Oral every 4 hours  sodium chloride 0.9%. 1000 milliLiter(s) (100 mL/Hr) IV Continuous <Continuous>

## 2021-03-16 NOTE — PROGRESS NOTE ADULT - PROBLEM SELECTOR PLAN 6
WBC 3.15 with 6.6% reactive lymphocytes   - Likely in the setting of chemotherapy  - improving - Pt is currently medically optimized for colorectal surgery  - EKG in chart  - Revised Cardiac Risk Index: Based on RCRI, pt has 6% 30-day risk of death, cardiac arrest, MI due to intraperitoneal surgery  -However, pt has poor nutritional status which likely have a affect on wound healing and overall prognosis.

## 2021-03-16 NOTE — PROGRESS NOTE ADULT - PROBLEM SELECTOR PLAN 1
- pt septic overnight with tachycardia and fever to 101.3   - started on zosyn  - bcx growing klebsiella possibly after GI procedure  - concern for infection related to copious diarrhea  - unlikely to be COVID due to 3/9 negative and lack of symptoms other than diarrhea  - CTM - 3/13 pt septic overnight with tachycardia and fever to 101.3   - started on zosyn (day 4)  - bcx growing klebsiella possibly after GI procedure  - concern for infection related to copious diarrhea  - unlikely to be COVID due to 3/9 negative and lack of symptoms other than diarrhea  - CTM - 3/13 pt septic overnight with tachycardia and fever to 101.3   - started on zosyn (day 4)  - bcx growing klebsiella possibly after GI procedure  - concern for infection related to copious diarrhea  - unlikely to be COVID due to 3/9 negative and lack of symptoms other than diarrhea  -repeat BCx in the AM  - CTM

## 2021-03-16 NOTE — PROGRESS NOTE ADULT - PROBLEM SELECTOR PLAN 3
- 1 week hx abdominal pain/distension, N/V in the setting of metastatic colon cancer   - CT abd/p LBO involving the ascending and transverse colon measuring up to 8 cm secondary to the colonic mass seen in the distal transverse colon. In addition, the jejunum and terminal ileum are also dilated  - S/p 2L bolus in the ED, c/w IVF hydration   - NG tube placed for decompression--> removed  - s/p colonic stent  - on soft diet  - Dr. Alicia made aware for discussion of operative intervention given pt may be precluded from chemo due to stent and oncology strongly recommends chemo in the future -likely in the setting of post obstructive diarrhea  -kidney function wnl  -will replete and recheck PM BMP

## 2021-03-16 NOTE — PROGRESS NOTE ADULT - ATTENDING COMMENTS
Patient seen and examined care d/w HS4.    56 yo M with stage 4 colon cancer (mets to liver and lungs, received palliative folfox 2/16/21) presenting with abdominal pain, N/V, and distension. Admitted for medical management of LBO due to obstruction 2/2 transverse colon mass.     #Kleb bacteremia: had sepsis (febrile and tachycardic) on 3/13 presumed GI source. UA negative. BCx growing klebsiella. GI PCR negative. Blood Cx on 3/15 NG.  zosyn --> ctx 1g q24h, D4/10  #Large bowel obstruction, s/p colonic stent by GI, tolerating diet, having stool output, diarrhea however improving, surgery rec surgery next week   #Metastatic colon CA: no plans for inpatient chemo, c/w PO pain control   #Anemia of chronic disease, monitor hgb   #Hypokalemia, supplement K    #Pre-op, good METS (was working up till January) w/ no cardiac complaints, repeat EKG, RCRI score of 1, patient is medically optimized and may proceed w/ OR as planned

## 2021-03-16 NOTE — PROGRESS NOTE ADULT - SUBJECTIVE AND OBJECTIVE BOX
no events.  pt stable, persistent diarrhea w/ hypokalemia    abd soft nontender        Objective:    MEDICATIONS  (STANDING):  ascorbic acid 500 milliGRAM(s) Oral daily  chlorhexidine 4% Liquid 1 Application(s) Topical daily  enoxaparin Injectable 40 milliGRAM(s) SubCutaneous daily  ferrous    sulfate 325 milliGRAM(s) Oral daily  influenza   Vaccine 0.5 milliLiter(s) IntraMuscular once  pantoprazole    Tablet 40 milliGRAM(s) Oral before breakfast  piperacillin/tazobactam IVPB.. 3.375 Gram(s) IV Intermittent every 8 hours  potassium chloride   Powder 40 milliEquivalent(s) Oral every 4 hours  sodium chloride 0.9%. 1000 milliLiter(s) (100 mL/Hr) IV Continuous <Continuous>    MEDICATIONS  (PRN):  morphine  - Injectable 4 milliGRAM(s) IV Push every 4 hours PRN Severe Pain (7 - 10)  ondansetron Injectable 4 milliGRAM(s) IV Push every 6 hours PRN Nausea and/or Vomiting      Vital Signs Last 24 Hrs  T(C): 37.2 (16 Mar 2021 09:29), Max: 37.2 (16 Mar 2021 09:29)  T(F): 98.9 (16 Mar 2021 09:29), Max: 98.9 (16 Mar 2021 09:29)  HR: 80 (16 Mar 2021 09:29) (80 - 88)  BP: 118/77 (16 Mar 2021 09:29) (115/83 - 118/77)  BP(mean): --  RR: 18 (16 Mar 2021 09:29) (17 - 18)  SpO2: 100% (16 Mar 2021 09:29) (100% - 100%)    I&O's Detail      Daily     Daily     LABS:                        8.2    7.71  )-----------( 230      ( 16 Mar 2021 08:15 )             26.7     03-16    137  |  105  |  8   ----------------------------<  109<H>  2.6<LL>   |  22  |  0.42<L>    Ca    8.0<L>      16 Mar 2021 08:15  Phos  2.5     03-16  Mg     1.6     03-16    TPro  5.1<L>  /  Alb  2.2<L>  /  TBili  0.3  /  DBili  x   /  AST  31  /  ALT  12  /  AlkPhos  127<H>  03-16    PT/INR - ( 16 Mar 2021 08:15 )   PT: 14.6 sec;   INR: 1.30 ratio         PTT - ( 16 Mar 2021 08:15 )  PTT:32.7 sec  Urinalysis Basic - ( 14 Mar 2021 12:59 )    Color: Light Yellow / Appearance: Clear / S.009 / pH: x  Gluc: x / Ketone: Negative  / Bili: Negative / Urobili: <2 mg/dL   Blood: x / Protein: Negative / Nitrite: Negative   Leuk Esterase: Negative / RBC: x / WBC x   Sq Epi: x / Non Sq Epi: x / Bacteria: x        RADIOLOGY & ADDITIONAL STUDIES:

## 2021-03-16 NOTE — DIETITIAN INITIAL EVALUATION ADULT. - PERTINENT LABORATORY DATA
03-16 Na137 mmol/L Glu 109 mg/dL<H> K+ 2.6 mmol/L<LL> Cr  0.42 mg/dL<L> BUN 8 mg/dL 03-16 Phos 2.5 mg/dL 03-16 Alb 2.2 g/dL<L>

## 2021-03-16 NOTE — PROGRESS NOTE ADULT - PROBLEM SELECTOR PLAN 5
- Pt is currently medically optimized for colorectal surgery  - EKG in chart  - Revised Cardiac Risk Index: Based on RCRI, pt has 6% 30-day risk of death, cardiac arrest, MI due to intraperitoneal surgery  -However, pt has poor nutritional status which likely have a affect on wound healing and overall prognosis. - Metastatic to liver and lungs, s/p first session of palliative folfox (2/16/21) at [facility]  - CEA - 2882, PSA - WNL, CA 19-9 -2882 and AFP - 2775  - CT abd/p showing hepatic mets with some that have increased in size since 1/2021, enlarged prostate, along with a 1.3 x 0.9 cm right lower lobe nodule concerning for metastatic disease  - Pain control regimen - Morphine 4 mg IV q4 PRN   - Palliative consulted for pain control and GOC given metastatic cancer, appreciate recs.  - oncology following

## 2021-03-16 NOTE — PROGRESS NOTE ADULT - PROBLEM SELECTOR PLAN 2
- pt having multiple watery diarrhea (~9 episodes per day) since stent placement  - possibly in setting of new stent vs infection as pt febrile to 101.3 on 3/13  - GI PCR negative, stool cx pending, blood culture grew Kleb  - Pt on zosyn  - maintain IV hydration, replete electrolyte as needed.   -will cont to monitor

## 2021-03-16 NOTE — DIETITIAN INITIAL EVALUATION ADULT. - ORAL INTAKE PTA/DIET HISTORY
Poor appetite/PO intake x 3 weeks PTA.    Usually cooks at home.  Eats a variety of foods including fruits, vegetables, salads, fish, beef, & chicken.

## 2021-03-16 NOTE — DIETITIAN INITIAL EVALUATION ADULT. - PROBLEM SELECTOR PLAN 1
- 1 week hx abdominal pain/distension, N/V in the setting of metastatic colon cancer   - CT abd/p LBO involving the ascending and transverse colon measuring up to 8 cm secondary to the colonic mass seen in the distal transverse colon. In addition, the jejunum and terminal ileum are also dilated  - S/p 2L bolus in the ED, c/w IVF hydration   - Seen by Surgery, pt would like to defer operative intervention at this time   - NG tube placed for decompression, f/u CXR to confirm placement  - GI consult placed for evaluation of stent placement - advanced GI to discuss options in AM

## 2021-03-17 LAB
ALBUMIN SERPL ELPH-MCNC: 2.3 G/DL — LOW (ref 3.3–5)
ALP SERPL-CCNC: 134 U/L — HIGH (ref 40–120)
ALT FLD-CCNC: 12 U/L — SIGNIFICANT CHANGE UP (ref 4–41)
ANION GAP SERPL CALC-SCNC: 11 MMOL/L — SIGNIFICANT CHANGE UP (ref 7–14)
AST SERPL-CCNC: 36 U/L — SIGNIFICANT CHANGE UP (ref 4–40)
BILIRUB SERPL-MCNC: 0.3 MG/DL — SIGNIFICANT CHANGE UP (ref 0.2–1.2)
BUN SERPL-MCNC: 10 MG/DL — SIGNIFICANT CHANGE UP (ref 7–23)
CALCIUM SERPL-MCNC: 8.6 MG/DL — SIGNIFICANT CHANGE UP (ref 8.4–10.5)
CHLORIDE SERPL-SCNC: 103 MMOL/L — SIGNIFICANT CHANGE UP (ref 98–107)
CO2 SERPL-SCNC: 22 MMOL/L — SIGNIFICANT CHANGE UP (ref 22–31)
CREAT SERPL-MCNC: 0.45 MG/DL — LOW (ref 0.5–1.3)
GLUCOSE SERPL-MCNC: 93 MG/DL — SIGNIFICANT CHANGE UP (ref 70–99)
HCT VFR BLD CALC: 30.3 % — LOW (ref 39–50)
HGB BLD-MCNC: 9.4 G/DL — LOW (ref 13–17)
MAGNESIUM SERPL-MCNC: 1.6 MG/DL — SIGNIFICANT CHANGE UP (ref 1.6–2.6)
MCHC RBC-ENTMCNC: 22.2 PG — LOW (ref 27–34)
MCHC RBC-ENTMCNC: 31 GM/DL — LOW (ref 32–36)
MCV RBC AUTO: 71.5 FL — LOW (ref 80–100)
NRBC # BLD: 0 /100 WBCS — SIGNIFICANT CHANGE UP
NRBC # FLD: 0 K/UL — SIGNIFICANT CHANGE UP
PHOSPHATE SERPL-MCNC: 2.8 MG/DL — SIGNIFICANT CHANGE UP (ref 2.5–4.5)
PLATELET # BLD AUTO: 291 K/UL — SIGNIFICANT CHANGE UP (ref 150–400)
POTASSIUM SERPL-MCNC: 4 MMOL/L — SIGNIFICANT CHANGE UP (ref 3.5–5.3)
POTASSIUM SERPL-SCNC: 4 MMOL/L — SIGNIFICANT CHANGE UP (ref 3.5–5.3)
PROT SERPL-MCNC: 6 G/DL — SIGNIFICANT CHANGE UP (ref 6–8.3)
RBC # BLD: 4.24 M/UL — SIGNIFICANT CHANGE UP (ref 4.2–5.8)
RBC # FLD: 22.5 % — HIGH (ref 10.3–14.5)
SODIUM SERPL-SCNC: 136 MMOL/L — SIGNIFICANT CHANGE UP (ref 135–145)
WBC # BLD: 9.46 K/UL — SIGNIFICANT CHANGE UP (ref 3.8–10.5)
WBC # FLD AUTO: 9.46 K/UL — SIGNIFICANT CHANGE UP (ref 3.8–10.5)

## 2021-03-17 PROCEDURE — 99232 SBSQ HOSP IP/OBS MODERATE 35: CPT | Mod: GC

## 2021-03-17 PROCEDURE — 99233 SBSQ HOSP IP/OBS HIGH 50: CPT

## 2021-03-17 RX ORDER — MORPHINE SULFATE 50 MG/1
15 CAPSULE, EXTENDED RELEASE ORAL EVERY 4 HOURS
Refills: 0 | Status: DISCONTINUED | OUTPATIENT
Start: 2021-03-17 | End: 2021-03-17

## 2021-03-17 RX ADMIN — Medication 500 MILLIGRAM(S): at 12:37

## 2021-03-17 RX ADMIN — PANTOPRAZOLE SODIUM 40 MILLIGRAM(S): 20 TABLET, DELAYED RELEASE ORAL at 05:23

## 2021-03-17 RX ADMIN — CEFTRIAXONE 100 MILLIGRAM(S): 500 INJECTION, POWDER, FOR SOLUTION INTRAMUSCULAR; INTRAVENOUS at 13:59

## 2021-03-17 RX ADMIN — Medication 325 MILLIGRAM(S): at 12:37

## 2021-03-17 RX ADMIN — ENOXAPARIN SODIUM 40 MILLIGRAM(S): 100 INJECTION SUBCUTANEOUS at 05:21

## 2021-03-17 RX ADMIN — CHLORHEXIDINE GLUCONATE 1 APPLICATION(S): 213 SOLUTION TOPICAL at 12:37

## 2021-03-17 NOTE — PROGRESS NOTE ADULT - PROBLEM SELECTOR PLAN 6
- Pt is currently medically optimized for colorectal surgery  - EKG in chart  - Revised Cardiac Risk Index: Based on RCRI, pt has 6% 30-day risk of death, cardiac arrest, MI due to intraperitoneal surgery  -However, pt has poor nutritional status which likely have a affect on wound healing and overall prognosis.

## 2021-03-17 NOTE — PROGRESS NOTE ADULT - PROBLEM SELECTOR PLAN 3
.  Tolerating diet, s/p stent placement with GI  -discussed the current plan of care and how surgery is being recommended, patient is in agreement  -surgery tentatively planned for next week

## 2021-03-17 NOTE — PROGRESS NOTE ADULT - PROBLEM SELECTOR PLAN 3
-likely in the setting of post obstructive diarrhea  -kidney function wnl  -will replete and recheck PM BMP Resolved  -likely in the setting of post obstructive diarrhea  -kidney function wnl  -will replete and recheck PM BMP

## 2021-03-17 NOTE — PROGRESS NOTE ADULT - PROBLEM SELECTOR PLAN 4
- 1 week hx abdominal pain/distension, N/V in the setting of metastatic colon cancer   - CT abd/p LBO involving the ascending and transverse colon measuring up to 8 cm secondary to the colonic mass seen in the distal transverse colon. In addition, the jejunum and terminal ileum are also dilated  - S/p 2L bolus in the ED, c/w IVF hydration   - NG tube placed for decompression--> removed  - s/p colonic stent  - on soft diet  - Dr. Alicia made aware for discussion of operative intervention given pt may be precluded from chemo due to stent and oncology strongly recommends chemo in the future - 1 week hx abdominal pain/distension, N/V in the setting of metastatic colon cancer   - CT abd/p LBO involving the ascending and transverse colon measuring up to 8 cm secondary to the colonic mass seen in the distal transverse colon. In addition, the jejunum and terminal ileum are also dilated  - S/p 2L bolus in the ED, c/w IVF hydration   - NG tube placed for decompression--> removed  - s/p colonic stent  - on soft diet  - Dr. Alicia made aware for discussion of operative intervention given pt may be precluded from chemo due to stent and oncology strongly recommends chemo in the future  -CR surgery are following, surgery tentatively scheduled for next week, pending negative cultures.

## 2021-03-17 NOTE — PROGRESS NOTE ADULT - SUBJECTIVE AND OBJECTIVE BOX
[facility]iatrics and Palliative Care  Cesar Manning, Palliative Care Attending  Contact Info: Page [phone] (including Nights/Weekend), message on Microsoft Teams (Ceasr Manning), or leave VM at Palliative Office [phone] (Non-Urgent)    SUBJECTIVE AND OBJECTIVE:  INTERVAL HPI/OVERNIGHT EVENTS: No acute events overnight. Patient without complaints today. Patient required no additional PRNs in past 24hrs.    Allergies  No Known Allergies    MEDICATIONS  (STANDING):  ascorbic acid 500 milliGRAM(s) Oral daily  cefTRIAXone   IVPB 1000 milliGRAM(s) IV Intermittent every 24 hours  chlorhexidine 4% Liquid 1 Application(s) Topical daily  enoxaparin Injectable 40 milliGRAM(s) SubCutaneous daily  ferrous    sulfate 325 milliGRAM(s) Oral daily  influenza   Vaccine 0.5 milliLiter(s) IntraMuscular once  pantoprazole    Tablet 40 milliGRAM(s) Oral before breakfast    MEDICATIONS  (PRN):  morphine  IR 15 milliGRAM(s) Oral every 4 hours PRN Moderate / Severe Pain (4 - 10)  ondansetron Injectable 4 milliGRAM(s) IV Push every 6 hours PRN Nausea and/or Vomiting      ITEMS UNCHECKED ARE NOT PRESENT    PRESENT SYMPTOMS: []Unable to obtain due to poor mentation   Source if other than patient:  []Family   []Team     Pain:  [ ]yes [ x]no  QOL impact -   Location -                    Aggravating factors -  Quality -  Radiation -  Timing-  Severity (0-10 scale):  Minimal acceptable level (0-10 scale):     Dyspnea:                           [ ]Mild [ ]Moderate [ ]Severe  Anxiety:                             [ ]Mild [ ]Moderate [ ]Severe  Fatigue:                             [ ]Mild [ ]Moderate [ ]Severe  Nausea:                             [ ]Mild [ ]Moderate [ ]Severe  Loss of appetite:              [ ]Mild [ ]Moderate [ ]Severe  Constipation:                    [ ]Mild [ ]Moderate [ ]Severe    PAIN AD Score:	0  [email or web address] (Ctrl + left click to view)    Other Symptoms:  [ ]All other review of systems negative     Palliative Performance Status Version 2:     50%  http://npcrc.org/files/news/[email or web address]    PHYSICAL EXAM:  Vital Signs Last 24 Hrs  T(C): 36.7 (17 Mar 2021 12:53), Max: 36.7 (17 Mar 2021 12:53)  T(F): 98 (17 Mar 2021 12:53), Max: 98 (17 Mar 2021 12:53)  HR: 86 (17 Mar 2021 12:53) (85 - 98)  BP: 118/86 (17 Mar 2021 12:53) (115/80 - 122/91)  BP(mean): --  RR: 17 (17 Mar 2021 12:53) (17 - 18)  SpO2: 100% (17 Mar 2021 12:53) (100% - 100%) I&O's Summary    GENERAL:  [ x]Alert  [x ]Oriented x3  [ ]Lethargic  [x ]Cachexia  [ ]Unarousable  [ x]Verbal  [ ]Non-Verbal  Behavioral:   [ ]Anxiety  [ ]Delirium [ ]Agitation [ ]Other  HEENT:  [x ]Normal   [ ]Dry mouth   [ ]ET Tube/Trach  [ ]Oral lesions  PULMONARY:   [x ]Clear [ ]Tachypnea  [ ]Audible excessive secretions   [ ]Rhonchi        [ ]Right [ ]Left [ ]Bilateral  [ ]Crackles        [ ]Right [ ]Left [ ]Bilateral  [ ]Wheezing     [ ]Right [ ]Left [ ]Bilateral  [ ]Diminished BS [ ] Right [ ]Left [ ]Bilateral  CARDIOVASCULAR:    [x ]Regular [ ]Irregular [ ]Tachy  [ ]Mikey [ ]Murmur [ ]Other  GASTROINTESTINAL:  [x ]Soft  [x ]Distended-improved   [ x]+BS  [x ]Non tender [ ]Tender  [ ]PEG [ ]OGT/ NGT   Last BM:  3/16  GENITOURINARY:  [x ]Normal [ ]Incontinent   [ ]Oliguria/Anuria   [ ]Judd  MUSCULOSKELETAL:   [ ]Normal   [x ]Weakness  [ ]Bed/Wheelchair bound [ ]Edema  NEUROLOGIC:   [ x]No focal deficits  [ ] Cognitive impairment  [ ] Dysphagia [ ]Dysarthria [ ] Paresis [ ]Other   SKIN: +vitiligo  [ ]Normal  [ ]Rash   [ ]Pressure ulcer(s) [ ]y [ ]n present on admission    LABS:                         9.4    9.46  )-----------( 291      ( 17 Mar 2021 07:38 )             30.3   03-17    136  |  103  |  10  ----------------------------<  93  4.0   |  22  |  0.45<L>    Ca    8.6      17 Mar 2021 07:38  Phos  2.8     03-17  Mg     1.6     03-17    TPro  6.0  /  Alb  2.3<L>  /  TBili  0.3  /  DBili  x   /  AST  36  /  ALT  12  /  AlkPhos  134<H>  03-17  PT/INR - ( 16 Mar 2021 08:15 )   PT: 14.6 sec;   INR: 1.30 ratio         PTT - ( 16 Mar 2021 08:15 )  PTT:32.7 sec        RADIOLOGY & ADDITIONAL STUDIES: None new    PROTEIN CALORIE MALNUTRITION PRESENT: [ ]mild [ ]moderate [ ]severe [ ]underweight [ ]morbid obesity  [] PPSV2 < or = 30% [] significant weight loss [] poor nutritional intake [] anasarca [] catabolic state   Albumin, Serum: 2.3 g/dL (03-17-21 @ 07:38)   Artificial Nutrition []     REFERRALS:   []Chaplaincy  []Hospice  []Child Life  [x]Social Work  []Case management []Holistic Therapy []Physical Therapy []Dietary

## 2021-03-17 NOTE — PROGRESS NOTE ADULT - ASSESSMENT
55M w/ metastatic colon cancer, LBO s/p stenting by GI now pending operative intervention.     - BCx from 3/15 preliminary negative - will f/u final status when available  - surgery tentatively scheduled for next week, pending clear Bcx.     Please contact Surgery A-Team (P: [phone]) with any questions.    PARTH Muller MD, PGY-III  Surgery, A-Team  Pager: [phone]  [facility]

## 2021-03-17 NOTE — PROGRESS NOTE ADULT - PROBLEM SELECTOR PLAN 4
.  Metastatic disease, follows at [facility]  -goals are to continue with DMT as long as it is being offered in order to extend the patient's life  -if no further DMT was offered/pursued, then patient would be eligible for home hospice services

## 2021-03-17 NOTE — PROGRESS NOTE ADULT - PROBLEM SELECTOR PLAN 1
.  Likely 2/2 abdominal distension in the setting of large bowel obstruction  -transition PRNs to Morphine IR 15mg PO q4h PRN for Moderate/Severe Pain  -hold bowel regimen due to post-obstructive diarrhea

## 2021-03-17 NOTE — PROGRESS NOTE ADULT - SUBJECTIVE AND OBJECTIVE BOX
SURGERY A TEAM PROGRESS NOTE  -----  YAKELIN GERARDO | [id number]  03-09-21 (8d)  -----  Subjective/24hour Events:  Patient seen and examined on rounds. No acute events overnight. Pain controlled. No nausea/vomiting. +flatus/+BM.     -----  Vital Signs:  T(C): 36.6 (03-17-21 @ 05:18), Max: 36.6 (03-16-21 @ 22:27)  HR: 98 (03-17-21 @ 05:18) (85 - 98)  BP: 122/88 (03-17-21 @ 05:18) (115/80 - 122/91)  RR: 17 (03-17-21 @ 05:18) (17 - 18)  SpO2: 100% (03-17-21 @ 05:18) (100% - 100%)    Height (cm): 167.6 (03-11-21 @ 06:47)  Weight (kg): 47.6 (03-11-21 @ 06:47)  BMI (kg/m2): 16.9 (03-11-21 @ 06:47)  BSA (m2): 1.52 (03-11-21 @ 06:47)    -----  Medications:  ascorbic acid 500 milliGRAM(s) Oral daily  cefTRIAXone   IVPB 1000 milliGRAM(s) IV Intermittent every 24 hours  chlorhexidine 4% Liquid 1 Application(s) Topical daily  enoxaparin Injectable 40 milliGRAM(s) SubCutaneous daily  ferrous    sulfate 325 milliGRAM(s) Oral daily  influenza   Vaccine 0.5 milliLiter(s) IntraMuscular once  ondansetron Injectable 4 milliGRAM(s) IV Push every 6 hours PRN  oxycodone    5 mG/acetaminophen 325 mG 1 Tablet(s) Oral every 4 hours PRN  pantoprazole    Tablet 40 milliGRAM(s) Oral before breakfast    -----  Physical Exam:  Gen: NAD  Neuro: AAOx4, HAHN's equally  Pulm:  Respirations grossly unlabored.   GI: S. NT. ND.     -----  Labs:                        9.4    9.46  )-----------( 291      ( 17 Mar 2021 07:38 )             30.3     03-17    136  |  103  |  10  ----------------------------<  93  4.0   |  22  |  0.45<L>    Ca    8.6      17 Mar 2021 07:38  Phos  2.8     03-17  Mg     1.6     03-17    TPro  6.0  /  Alb  2.3<L>  /  TBili  0.3  /  DBili  x   /  AST  36  /  ALT  12  /  AlkPhos  134<H>  03-17    LIVER FUNCTIONS - ( 17 Mar 2021 07:38 )  Alb: 2.3 g/dL / Pro: 6.0 g/dL / ALK PHOS: 134 U/L / ALT: 12 U/L / AST: 36 U/L / GGT: x           PT/INR - ( 16 Mar 2021 08:15 )   PT: 14.6 sec;   INR: 1.30 ratio    PTT - ( 16 Mar 2021 08:15 )  PTT:32.7 sec    -----  Radiology:    No new radiograph imaging for review.

## 2021-03-17 NOTE — PROGRESS NOTE ADULT - PROBLEM SELECTOR PLAN 2
- pt having multiple watery diarrhea (~9 episodes per day) since stent placement - improving  - possibly in setting of new stent vs infection as pt febrile to 101.3 on 3/13  - GI PCR negative, stool cx pending, blood culture grew Kleb  - Pt on zosyn  - maintain IV hydration, replete electrolyte as needed.   -will cont to monitor

## 2021-03-17 NOTE — PROGRESS NOTE ADULT - PROBLEM SELECTOR PLAN 1
- 3/13 pt septic overnight with tachycardia and fever to 101.3   - started on zosyn (day 5)  - bcx growing klebsiella possibly after GI procedure  - concern for infection related to copious diarrhea  - unlikely to be COVID due to 3/9 negative and lack of symptoms other than diarrhea  -repeat BCx today  - CTM - 3/13 pt septic overnight with tachycardia and fever to 101.3   - started on zosyn, swtched to CTX (day 5)  - bcx growing klebsiella possibly after GI procedure. 3/15 BCx NGTD  - unlikely to be COVID due to 3/9 negative and lack of symptoms other than diarrhea  - CTM

## 2021-03-17 NOTE — PROGRESS NOTE ADULT - ASSESSMENT
55 M with hx unresectable stage 4 colon cancer (mets to liver and lungs, received palliative folfox 2/16/21) presenting with abdominal pain, N/V, and distension. Admitted for medical management of LBO due to obstruction 2/2 transverse colon mass. Palliative care consulted for pain management and goals of care.     ·	transitioned PRN regimen to Morphine IR 15mg PO q4h PRN for Moderate/Severe Pain  -this dose is closer to equivalent IV dose that was providing adequate pain relief earlier  ·	patient is in agreement with pursuing surgery      Goals are established; Symptoms are managed. Palliative Care will SIGN OFF.  Please reconsult with any new issues or concerns (including nights/weekends): Page [phone]

## 2021-03-17 NOTE — PROGRESS NOTE ADULT - PROBLEM SELECTOR PLAN 5
.  Complex medical decision making / symptom management in the setting of metastatic colon cancer.    Emotional support provided, questions answered.  Active Psychosocial Referrals: JAELYN FLOREZ    For new or uncontrolled symptoms, please page the Palliative Medicine team ([facility] #[phone]).   The service is available 24/7 (including nights & weekends) to provide symptom management recommendations over the phone as appropriate

## 2021-03-17 NOTE — PROGRESS NOTE ADULT - SUBJECTIVE AND OBJECTIVE BOX
Shamar Martin, PGY-1  Pager: [phone] / [phone]    CHIEF COMPLAINT: Patient is a 55y old  Male who presents with a chief complaint of abdominal pain (17 Mar 2021 07:03)      INTERVAL HPI/OVERNIGHT EVENTS:    MEDICATIONS (STANDING):  ascorbic acid 500 milliGRAM(s) Oral daily  cefTRIAXone   IVPB 1000 milliGRAM(s) IV Intermittent every 24 hours  chlorhexidine 4% Liquid 1 Application(s) Topical daily  enoxaparin Injectable 40 milliGRAM(s) SubCutaneous daily  ferrous    sulfate 325 milliGRAM(s) Oral daily  influenza   Vaccine 0.5 milliLiter(s) IntraMuscular once  pantoprazole    Tablet 40 milliGRAM(s) Oral before breakfast    MEDICATIONS  (PRN):  ondansetron Injectable 4 milliGRAM(s) IV Push every 6 hours PRN  oxycodone    5 mG/acetaminophen 325 mG 1 Tablet(s) Oral every 4 hours PRN      REVIEW OF SYSTEMS:  CONSTITUTIONAL: No fever, weight loss, or fatigue  EYES: No eye pain, visual disturbances, or discharge  ENMT:  No difficulty hearing, tinnitus, vertigo; No sinus or throat pain  NECK: No pain or stiffness  RESPIRATORY: No cough, wheezing, chills or hemoptysis; No shortness of breath  CARDIOVASCULAR: No chest pain, palpitations, dizziness, or leg swelling  GASTROINTESTINAL: No abdominal or epigastric pain. No nausea, vomiting, or hematemesis; No diarrhea or constipation. No melena or hematochezia.  GENITOURINARY: No dysuria, frequency, hematuria, or incontinence  NEUROLOGICAL: No headaches, memory loss, loss of strength, numbness, or tremors  SKIN: No itching, burning, rashes, or lesions   MUSCULOSKELETAL: No joint pain or swelling; No muscle, back, or extremity pain    VITAL SIGNS:  T(F): 97.8 (03-17-21 @ 05:18), Max: 97.9 (03-16-21 @ 22:27)  HR: 98 (03-17-21 @ 05:18) (85 - 98)  BP: 122/88 (03-17-21 @ 05:18) (115/80 - 122/91)  RR: 17 (03-17-21 @ 05:18) (17 - 18)  SpO2: 100% (03-17-21 @ 05:18) (100% - 100%)    PHYSICAL EXAM:  GENERAL: comfortable, pale, chronically ill appearing. thin.   HEENT:  Atraumatic, Normocephalic  CHEST/LUNG: Chest rise equal bilaterally. CTAB.   HEART: tachycardic, no murmurs   ABDOMEN: Abdomen non-distended and soft, nontender.   EXTREMITIES:  2+ Peripheral Pulses.  PSYCH: A&Ox3  SKIN: Pale, vitiligo, port on R side of chest      LABS:                        9.4    9.46  )-----------( 291      ( 17 Mar 2021 07:38 )             30.3     03-17    136  |  103  |  10  ----------------------------<  93  4.0   |  22  |  0.45<L>    Ca    8.6      17 Mar 2021 07:38  Phos  2.8     03-17  Mg     1.6     03-17    TPro  6.0  /  Alb  2.3<L>  /  TBili  0.3  /  DBili  x   /  AST  36  /  ALT  12  /  AlkPhos  134<H>  03-17    PT/INR - ( 16 Mar 2021 08:15 )   PT: 14.6 sec;   INR: 1.30 ratio    PTT - ( 16 Mar 2021 08:15 )  PTT:32.7 sec    RADIOLOGY & ADDITIONAL TESTS:   Shamar Martin, PGY-1  Pager: [phone] / [phone]    CHIEF COMPLAINT: Patient is a 55y old  Male who presents with a chief complaint of abdominal pain (17 Mar 2021 07:03)    INTERVAL HPI/OVERNIGHT EVENTS: CT, pt was seen comfortable at bedside. He is tolerating diet well, no abd pain, N/V. Diarrhea slowed down to 5/24h, more solid. Denies CP, sob, cough.    MEDICATIONS (STANDING):  ascorbic acid 500 milliGRAM(s) Oral daily  cefTRIAXone   IVPB 1000 milliGRAM(s) IV Intermittent every 24 hours  chlorhexidine 4% Liquid 1 Application(s) Topical daily  enoxaparin Injectable 40 milliGRAM(s) SubCutaneous daily  ferrous    sulfate 325 milliGRAM(s) Oral daily  influenza   Vaccine 0.5 milliLiter(s) IntraMuscular once  pantoprazole    Tablet 40 milliGRAM(s) Oral before breakfast    MEDICATIONS  (PRN):  ondansetron Injectable 4 milliGRAM(s) IV Push every 6 hours PRN  oxycodone    5 mG/acetaminophen 325 mG 1 Tablet(s) Oral every 4 hours PRN    REVIEW OF SYSTEMS:  CONSTITUTIONAL: No fever, weight loss, or fatigue  EYES: No eye pain, visual disturbances, or discharge  ENMT:  No difficulty hearing, tinnitus, vertigo; No sinus or throat pain  NECK: No pain or stiffness  RESPIRATORY: No cough, wheezing, chills or hemoptysis; No shortness of breath  CARDIOVASCULAR: No chest pain, palpitations, dizziness, or leg swelling  GASTROINTESTINAL: No abdominal or epigastric pain. No nausea, vomiting, or hematemesis; +diarrhea   GENITOURINARY: No dysuria, frequency, hematuria, or incontinence  NEUROLOGICAL: No headaches, memory loss, loss of strength, numbness, or tremors  SKIN: No itching, burning, rashes, or lesions   MUSCULOSKELETAL: No joint pain or swelling; No muscle, back, or extremity pain    VITAL SIGNS:  T(F): 97.8 (03-17-21 @ 05:18), Max: 97.9 (03-16-21 @ 22:27)  HR: 98 (03-17-21 @ 05:18) (85 - 98)  BP: 122/88 (03-17-21 @ 05:18) (115/80 - 122/91)  RR: 17 (03-17-21 @ 05:18) (17 - 18)  SpO2: 100% (03-17-21 @ 05:18) (100% - 100%)    PHYSICAL EXAM:  GENERAL: comfortable, pale, chronically ill appearing. thin.   HEENT:  Atraumatic, Normocephalic  CHEST/LUNG: Chest rise equal bilaterally. CTAB.   HEART: tachycardic, no murmurs   ABDOMEN: Abdomen non-distended and soft, nontender.   EXTREMITIES:  2+ Peripheral Pulses.  PSYCH: A&Ox3  SKIN: Pale, vitiligo, port on R side of chest      LABS:                        9.4    9.46  )-----------( 291      ( 17 Mar 2021 07:38 )             30.3     03-17    136  |  103  |  10  ----------------------------<  93  4.0   |  22  |  0.45<L>    Ca    8.6      17 Mar 2021 07:38  Phos  2.8     03-17  Mg     1.6     03-17    TPro  6.0  /  Alb  2.3<L>  /  TBili  0.3  /  DBili  x   /  AST  36  /  ALT  12  /  AlkPhos  134<H>  03-17    PT/INR - ( 16 Mar 2021 08:15 )   PT: 14.6 sec;   INR: 1.30 ratio    PTT - ( 16 Mar 2021 08:15 )  PTT:32.7 sec    RADIOLOGY & ADDITIONAL TESTS:

## 2021-03-17 NOTE — PROGRESS NOTE ADULT - PROBLEM SELECTOR PLAN 5
- Metastatic to liver and lungs, s/p first session of palliative folfox (2/16/21) at [facility]  - CEA - 2882, PSA - WNL, CA 19-9 -2882 and AFP - 2775  - CT abd/p showing hepatic mets with some that have increased in size since 1/2021, enlarged prostate, along with a 1.3 x 0.9 cm right lower lobe nodule concerning for metastatic disease  - Pain control regimen - Morphine 4 mg IV q4 PRN   - Palliative consulted for pain control and GOC given metastatic cancer, appreciate recs.  - oncology following

## 2021-03-18 ENCOUNTER — APPOINTMENT (OUTPATIENT)
Dept: INFUSION THERAPY | Facility: HOSPITAL | Age: 56
End: 2021-03-18

## 2021-03-18 LAB
ALBUMIN SERPL ELPH-MCNC: 2.5 G/DL — LOW (ref 3.3–5)
ALP SERPL-CCNC: 141 U/L — HIGH (ref 40–120)
ALT FLD-CCNC: 14 U/L — SIGNIFICANT CHANGE UP (ref 4–41)
ANION GAP SERPL CALC-SCNC: 10 MMOL/L — SIGNIFICANT CHANGE UP (ref 7–14)
AST SERPL-CCNC: 26 U/L — SIGNIFICANT CHANGE UP (ref 4–40)
B CEREUS GROUP DNA BLD POS QL NAA+PROBE: SIGNIFICANT CHANGE UP
BILIRUB SERPL-MCNC: <0.2 MG/DL — SIGNIFICANT CHANGE UP (ref 0.2–1.2)
BUN SERPL-MCNC: 13 MG/DL — SIGNIFICANT CHANGE UP (ref 7–23)
CALCIUM SERPL-MCNC: 8.5 MG/DL — SIGNIFICANT CHANGE UP (ref 8.4–10.5)
CHLORIDE SERPL-SCNC: 102 MMOL/L — SIGNIFICANT CHANGE UP (ref 98–107)
CO2 SERPL-SCNC: 26 MMOL/L — SIGNIFICANT CHANGE UP (ref 22–31)
CREAT SERPL-MCNC: 0.42 MG/DL — LOW (ref 0.5–1.3)
CULTURE RESULTS: SIGNIFICANT CHANGE UP
CULTURE RESULTS: SIGNIFICANT CHANGE UP
GLUCOSE SERPL-MCNC: 136 MG/DL — HIGH (ref 70–99)
GRAM STN FLD: SIGNIFICANT CHANGE UP
HCT VFR BLD CALC: 29.5 % — LOW (ref 39–50)
HGB BLD-MCNC: 9 G/DL — LOW (ref 13–17)
MAGNESIUM SERPL-MCNC: 1.9 MG/DL — SIGNIFICANT CHANGE UP (ref 1.6–2.6)
MCHC RBC-ENTMCNC: 22.1 PG — LOW (ref 27–34)
MCHC RBC-ENTMCNC: 30.5 GM/DL — LOW (ref 32–36)
MCV RBC AUTO: 72.5 FL — LOW (ref 80–100)
METHOD TYPE: SIGNIFICANT CHANGE UP
NRBC # BLD: 0 /100 WBCS — SIGNIFICANT CHANGE UP
NRBC # FLD: 0 K/UL — SIGNIFICANT CHANGE UP
ORGANISM # SPEC MICROSCOPIC CNT: SIGNIFICANT CHANGE UP
ORGANISM # SPEC MICROSCOPIC CNT: SIGNIFICANT CHANGE UP
PHOSPHATE SERPL-MCNC: 3.4 MG/DL — SIGNIFICANT CHANGE UP (ref 2.5–4.5)
PLATELET # BLD AUTO: 323 K/UL — SIGNIFICANT CHANGE UP (ref 150–400)
POTASSIUM SERPL-MCNC: 3.9 MMOL/L — SIGNIFICANT CHANGE UP (ref 3.5–5.3)
POTASSIUM SERPL-SCNC: 3.9 MMOL/L — SIGNIFICANT CHANGE UP (ref 3.5–5.3)
PROT SERPL-MCNC: 5.8 G/DL — LOW (ref 6–8.3)
RBC # BLD: 4.07 M/UL — LOW (ref 4.2–5.8)
RBC # FLD: 22.2 % — HIGH (ref 10.3–14.5)
SODIUM SERPL-SCNC: 138 MMOL/L — SIGNIFICANT CHANGE UP (ref 135–145)
SPECIMEN SOURCE: SIGNIFICANT CHANGE UP
WBC # BLD: 7.25 K/UL — SIGNIFICANT CHANGE UP (ref 3.8–10.5)
WBC # FLD AUTO: 7.25 K/UL — SIGNIFICANT CHANGE UP (ref 3.8–10.5)

## 2021-03-18 PROCEDURE — 99255 IP/OBS CONSLTJ NEW/EST HI 80: CPT

## 2021-03-18 PROCEDURE — 93306 TTE W/DOPPLER COMPLETE: CPT | Mod: 26

## 2021-03-18 PROCEDURE — 99233 SBSQ HOSP IP/OBS HIGH 50: CPT | Mod: GC

## 2021-03-18 RX ORDER — VANCOMYCIN HCL 1 G
1000 VIAL (EA) INTRAVENOUS EVERY 12 HOURS
Refills: 0 | Status: DISCONTINUED | OUTPATIENT
Start: 2021-03-18 | End: 2021-03-19

## 2021-03-18 RX ADMIN — Medication 250 MILLIGRAM(S): at 11:47

## 2021-03-18 RX ADMIN — PANTOPRAZOLE SODIUM 40 MILLIGRAM(S): 20 TABLET, DELAYED RELEASE ORAL at 05:13

## 2021-03-18 RX ADMIN — Medication 250 MILLIGRAM(S): at 23:10

## 2021-03-18 RX ADMIN — ENOXAPARIN SODIUM 40 MILLIGRAM(S): 100 INJECTION SUBCUTANEOUS at 05:13

## 2021-03-18 RX ADMIN — CHLORHEXIDINE GLUCONATE 1 APPLICATION(S): 213 SOLUTION TOPICAL at 11:47

## 2021-03-18 RX ADMIN — CEFTRIAXONE 100 MILLIGRAM(S): 500 INJECTION, POWDER, FOR SOLUTION INTRAMUSCULAR; INTRAVENOUS at 14:40

## 2021-03-18 RX ADMIN — Medication 500 MILLIGRAM(S): at 11:47

## 2021-03-18 RX ADMIN — Medication 325 MILLIGRAM(S): at 11:47

## 2021-03-18 NOTE — CONSULT NOTE ADULT - ASSESSMENT
55 M with hx unresectable stage 4 colon cancer (mets to liver and lungs, received palliative folfox 2/16/21) follows with [facility] here for worsening nausea, vomiting (NBNB), abdominal distention  Prior fevers, no leukocytosis  CXR clear  Most recent CT A/P large bowel obstruction, liver mets  Prior episode of Klebsiella bacteremia, clear as of 3/15/21  BCX 3/17 BCX B cereus  Mediport in place  Patient generally well appearing, although chronically ill  Overall,  1) B cereus bacteremia  - Presumably GI source in setting of large bowel obstruction; generally well with no GI symptoms no systemic signs infection presently  - Vanco 1g q 12 (monitor levels)  - Repeat BCX for clearance, F/U pending BCX  2) Klebsiella bacteremia  - Continue ceftriaxone  3) Large Bowel Obstruction/Malignancy  - Further care per primary team    Simon Carney MD  Pager [phone]  After 5pm and on weekends call [phone]

## 2021-03-18 NOTE — PROGRESS NOTE ADULT - PROBLEM SELECTOR PLAN 1
- 3/13 pt septic overnight with tachycardia and fever to 101.3   - started on zosyn, switched to CTX (day 6)  - bcx growing klebsiella possibly after GI procedure. 3/15 BCx NGTD, however Bcx 3/17 growing gram variable rods - will switch back to Zosyn?  - symptomatically pt is doing well, diarrhea improving and no fever or other symptoms.   - CTM and repeat Bcx tomorrow. - 3/13 pt septic overnight with tachycardia and fever to 101.3   - started on zosyn, switched to CTX (day 6)  - bcx growing klebsiella possibly after GI procedure. 3/15 BCx NGTD, however Bcx 3/17 growing B. cereus - will start Vanco 1g q12h  - symptomatically pt is doing well, diarrhea improving and no fever or other symptoms.   - CTM and repeat Bcx tomorrow.  - ID consulted, appreciate recs.

## 2021-03-18 NOTE — PROVIDER CONTACT NOTE (CRITICAL VALUE NOTIFICATION) - ACTION/TREATMENT ORDERED:
md made aware. potassium to be ordered. will continue to monitor.
Md aware, K Phos, Potassium chloride oral solution and Potassium chloride IV run ordered
k runs ordered
No action at this time. Patient already on antibiotics. MD aware and notified.
give potassium
MD made aware. No further action at this time. Will continue to monitor patient.

## 2021-03-18 NOTE — CONSULT NOTE ADULT - CONSULT REASON
symptom management
LBO
palliative surgical intervention for metastatic colorectal cancer
LBO
Obstructing Colonic Mass
Bacteremia

## 2021-03-18 NOTE — PROGRESS NOTE ADULT - SUBJECTIVE AND OBJECTIVE BOX
Shamar Martin, PGY-1  Pager: [phone] / [phone]    CHIEF COMPLAINT: Patient is a 55y old  Male who presents with a chief complaint of abdominal pain (18 Mar 2021 07:04)    INTERVAL HPI/OVERNIGHT EVENTS: CT. Pt feels well in the AM, had only 1 BM solid. Denies abdominal pain, N/V, fever/chills, CP, SOB, dysuria. Eating well tolerating diet. No pain at port site.     MEDICATIONS (STANDING):  ascorbic acid 500 milliGRAM(s) Oral daily  cefTRIAXone   IVPB 1000 milliGRAM(s) IV Intermittent every 24 hours  chlorhexidine 4% Liquid 1 Application(s) Topical daily  enoxaparin Injectable 40 milliGRAM(s) SubCutaneous daily  ferrous    sulfate 325 milliGRAM(s) Oral daily  influenza   Vaccine 0.5 milliLiter(s) IntraMuscular once  pantoprazole    Tablet 40 milliGRAM(s) Oral before breakfast    MEDICATIONS  (PRN):  morphine  IR 15 milliGRAM(s) Oral every 4 hours PRN  ondansetron Injectable 4 milliGRAM(s) IV Push every 6 hours PRN    REVIEW OF SYSTEMS:  CONSTITUTIONAL: No fever, weight loss, or fatigue  EYES: No eye pain, visual disturbances, or discharge  ENMT:  No difficulty hearing, tinnitus, vertigo; No sinus or throat pain  NECK: No pain or stiffness  RESPIRATORY: No cough, wheezing, chills or hemoptysis; No shortness of breath  CARDIOVASCULAR: No chest pain, palpitations, dizziness, or leg swelling  GASTROINTESTINAL: No abdominal or epigastric pain. No nausea, vomiting, or hematemesis; No diarrhea or constipation. No melena or hematochezia.  GENITOURINARY: No dysuria, frequency, hematuria, or incontinence  NEUROLOGICAL: No headaches, memory loss, loss of strength, numbness, or tremors  SKIN: No itching, burning, rashes, or lesions   MUSCULOSKELETAL: No joint pain or swelling; No muscle, back, or extremity pain    VITAL SIGNS:  T(F): 97.9 (03-18-21 @ 05:57), Max: 98.9 (03-17-21 @ 21:21)  HR: 79 (03-18-21 @ 05:57) (79 - 86)  BP: 105/88 (03-18-21 @ 05:57) (102/87 - 118/86)  RR: 17 (03-18-21 @ 05:57) (17 - 18)  SpO2: 98% (03-18-21 @ 05:57) (98% - 100%)    PHYSICAL EXAM:  GENERAL: comfortable, pale, chronically ill appearing. thin.   HEENT:  Atraumatic, Normocephalic  CHEST/LUNG: Chest rise equal bilaterally. CTAB.   HEART: tachycardic, no murmurs   ABDOMEN: Abdomen non-distended and soft, nontender.   EXTREMITIES:  2+ Peripheral Pulses.  PSYCH: A&Ox3  SKIN: Pale, vitiligo, port on R side of chest, area non tender no erythma/swelling    LABS:                        9.4    9.46  )-----------( 291      ( 17 Mar 2021 07:38 )             30.3     03-17    136  |  103  |  10  ----------------------------<  93  4.0   |  22  |  0.45<L>    Ca    8.6      17 Mar 2021 07:38  Phos  2.8     03-17  Mg     1.6     03-17    TPro  6.0  /  Alb  2.3<L>  /  TBili  0.3  /  DBili  x   /  AST  36  /  ALT  12  /  AlkPhos  134<H>  03-17      RADIOLOGY & ADDITIONAL TESTS:    Culture - Blood in AM (03.17.21 @ 10:03)    -  Bacillus cereus group: Detec    Gram Stain:   Growth in anaerobic bottle: Gram Variable Rods    Specimen Source: .Blood Blood-Peripheral    Organism: Blood Culture PCR    Culture Results:   Growth in anaerobic bottle: Gram Variable Rods  ***Blood Panel PCR results on this specimen are available  approximately 3 hours after the Gram stain result.***  Gram stain, PCR, and/or culture results may not always  correspond due to difference in methodologies.  ************************************************************  This PCR assay was performed by multiplex PCR. This  Assay tests for 66 bacterial and resistance gene targets.  Please refer to the [email or web address] test directory  at [email or web address] for details.    Organism Identification: Blood Culture PCR    Method Type: PCR

## 2021-03-18 NOTE — CONSULT NOTE ADULT - SUBJECTIVE AND OBJECTIVE BOX
"HPI:  55 M with hx unresectable stage 4 colon cancer (mets to liver and lungs, received palliative folfox 2/16/21) follows with [facility] here for worsening nausea, vomiting (NBNB), abdominal distention and   generalized abdominal pain x 1 week. Pt sent in from [facility]. Pt received his first treatment of palliative folfox on 2/16/2021. Has been trying to tolerate solids but just ends up throwing them up. Has been able to void and pass gas, but no BM in a few days.    Pt denies any fever, chills, SOB, chest pain, dysuria, or diarrhea.     Of note, pt with recent hospitalization in 1/2021 where he was diagnosed with metastatic colon cancer to the liver and lungs. Pt treated for orthostatic hypotension and fevers likely in the setting of malignancy.     In the ED, pt's vitals were T 98 HR  /93 RR 16 % RA. Treated with 2L bolus (LR 1L, NS 1L), Zofran 4 mg IV, Morphine 4 mg IV, and KCl 10x3. Pt seen by Surgery and NG tube placed for decompression. (09 Mar 2021 21:54)"    Above reviewed. 56 yo M with stage 4 colon CA, s/p chemo, has mediport in place x ~1 month, presenting initially with abd pain. Found to have a Klebsiella bacteremia, which has since cleared, but then new B cereus bacteremia, although patient states no symptoms. No abd pain, no diarrhea. No dysuria, no pyuria. No cough/sob. No other focal complaints. ID called for further care and eval.    PAST MEDICAL & SURGICAL HISTORY:  Vitiligo    Colon cancer  metastatic to liver, lungs    No significant past surgical history    Allergies    No Known Allergies    Intolerances    ANTIMICROBIALS:  cefTRIAXone   IVPB 1000 every 24 hours  vancomycin  IVPB 1000 every 12 hours    OTHER MEDS:  ascorbic acid 500 milliGRAM(s) Oral daily  chlorhexidine 4% Liquid 1 Application(s) Topical daily  enoxaparin Injectable 40 milliGRAM(s) SubCutaneous daily  ferrous    sulfate 325 milliGRAM(s) Oral daily  influenza   Vaccine 0.5 milliLiter(s) IntraMuscular once  morphine  IR 15 milliGRAM(s) Oral every 4 hours PRN  ondansetron Injectable 4 milliGRAM(s) IV Push every 6 hours PRN  pantoprazole    Tablet 40 milliGRAM(s) Oral before breakfast    SOCIAL HISTORY: No tobacco, no alcohol, no illicit drugs    FAMILY HISTORY:  Family history of type 2 diabetes mellitus (Father)    Family history of hypertension (Mother)    Family history of colon cancer (Father)    Drug Dosing Weight  Height (cm): 167.6 (11 Mar 2021 06:47)  Weight (kg): 47.6 (11 Mar 2021 06:47)  BMI (kg/m2): 16.9 (11 Mar 2021 06:47)  BSA (m2): 1.52 (11 Mar 2021 06:47)    PE:    Vital Signs Last 24 Hrs  T(C): 36.6 (18 Mar 2021 05:57), Max: 37.2 (17 Mar 2021 21:21)  T(F): 97.9 (18 Mar 2021 05:57), Max: 98.9 (17 Mar 2021 21:21)  HR: 79 (18 Mar 2021 05:57) (79 - 86)  BP: 105/88 (18 Mar 2021 05:57) (102/87 - 118/86)  RR: 17 (18 Mar 2021 05:57) (17 - 18)  SpO2: 98% (18 Mar 2021 05:57) (98% - 100%)    Gen: AOx3, NAD, non-toxic, vitiligo  CV: S1+S2 normal, nontachycardic  Resp: Clear bilat, no resp distress, no crackles/wheezes  Abd: Slight distension but otherwise benign  Ext: No LE edema, no wounds  : No Judd, no suprapubic tenderness  IV/Skin: No thrombophlebitis; R sided mediport  Msk: No low back pain, no arthralgias, no joint swelling  Neuro: No sensory deficits, no motor deficits    LABS:                        9.0    7.25  )-----------( 323      ( 18 Mar 2021 10:00 )             29.5     03-18    138  |  102  |  13  ----------------------------<  136<H>  3.9   |  26  |  0.42<L>    Ca    8.5      18 Mar 2021 10:00  Phos  3.4     03-18  Mg     1.9     03-18    TPro  5.8<L>  /  Alb  2.5<L>  /  TBili  <0.2  /  DBili  x   /  AST  26  /  ALT  14  /  AlkPhos  141<H>  03-18    MICROBIOLOGY:    .Blood Blood-Peripheral  03-17-21   Growth in anaerobic bottle: Gram Variable Rods  ***Blood Panel PCR results on this specimen are available  approximately 3 hours after the Gram stain result.***  Gram stain, PCR, and/or culture results may not always  correspond due to difference in methodologies.  ************************************************************  This PCR assay was performed by multiplex PCR. This  Assay tests for 66 bacterial and resistance gene targets.  Please refer to the [facility] test directory  at [email or web address] for details.  --  Blood Culture PCR    .Blood Blood  03-15-21   No growth to date.    .Stool Feces  03-13-21   GI PCR Results: NOT detected    .Blood Blood-Venous  03-13-21   No growth to date.      .Blood Blood-Peripheral  03-13-21   Growth in anaerobic bottle: Klebsiella variicola    RADIOLOGY:    3/9 CT:    IMPRESSION:  Large bowel obstruction involving the ascending and transverse colon measuring up to 8 cm secondary to the colonic mass seen in the distal transverse colon. In addition, the jejunum and terminal ileum are also dilated. These findings were discussed with Dr. ASIM NORTON at 3/9/2021 5:26 PM by Dr. Will with read back confirmation.    Hepatic metastases, some of which have increased in size since 1/28/2021.  A 1.3 x 0.9 cm right lower lobe nodule concerning for metastatic disease.

## 2021-03-18 NOTE — PROGRESS NOTE ADULT - PROBLEM SELECTOR PLAN 5
- Metastatic to liver and lungs, s/p first session of palliative folfox (2/16/21) at [facility]  - CEA - 2882, PSA - WNL, CA 19-9 -2882 and AFP - 2775  - CT abd/p showing hepatic mets with some that have increased in size since 1/2021, enlarged prostate, along with a 1.3 x 0.9 cm right lower lobe nodule concerning for metastatic disease  - Pain control regimen - Morphine 4 mg IV q4 PRN   - Palliative consulted for pain control and GOC given metastatic cancer, appreciate recs.  - oncology following, appreciate recs and pain control.

## 2021-03-18 NOTE — PROVIDER CONTACT NOTE (CRITICAL VALUE NOTIFICATION) - ASSESSMENT
Gram variable rods in Anaerobic bottle. No acute distress noted.
Gram negative ysabel, growth in anaerobic
Patient appears stable at this time, no s/s of acute distress noted
No acute distress noted
potassium 2.9
pt asymptomatic with no signs of distress

## 2021-03-18 NOTE — PROGRESS NOTE ADULT - PROBLEM SELECTOR PLAN 3
Resolved  -likely in the setting of post obstructive diarrhea  -kidney function wnl  -will replete and BMP qd

## 2021-03-18 NOTE — PROGRESS NOTE ADULT - ATTENDING COMMENTS
Patient seen and examined care d/w HS4.    54 yo M with stage 4 colon cancer (mets to liver and lungs, received palliative folfox 2/16/21) presenting with abdominal pain, N/V, and distension. Admitted for medical management of LBO due to obstruction 2/2 transverse colon mass.     #Kleb bacteremia: had sepsis (febrile and tachycardic) on 3/13 presumed GI source. UA negative. BCx growing klebsiella. GI PCR negative. Blood Cx on 3/15 no growth to date.  zosyn --> ctx 1g q24h, D 6/10    # B Cereus: bx on 3/17, had diarrheal illness prior, likely translocation, vanco, appreciate ID    #Large bowel obstruction, s/p colonic stent by GI, tolerating diet, having stool output, diarrhearesolving, surgery rec surgery next week  pending clearance of blood cx    #Metastatic colon CA: no plans for inpatient chemo, c/w PO pain control     #Anemia of chronic disease, monitor hgb     Pre-op, good METS (was working up till January) w/ no cardiac complaints, repeat EKG, RCRI score of 1, patient is medically optimized and may proceed w/ OR as planned

## 2021-03-18 NOTE — PROVIDER CONTACT NOTE (CRITICAL VALUE NOTIFICATION) - RECOMMENDATIONS
Notify MD
monty choe, replenish
will continue to monitor. replenishment of potassium recommended.
MD notified.
Notify Md
supplement potassium

## 2021-03-18 NOTE — PROVIDER CONTACT NOTE (CRITICAL VALUE NOTIFICATION) - BACKGROUND
pt admitted for intestinal obstruction
Patient admitted for intestina obstruction
Patient in no acute distress admitted with intestinal obstruction  PMH of Ca, mets to liver lungs
LBO, metastatic colon cancer
patient admitted for intestinal obstruction
Patient in no acute distress admitted with intestinal obstruction  PMH of Ca, mets to liver lungs

## 2021-03-18 NOTE — CONSULT NOTE ADULT - CONSULT REQUESTED DATE/TIME
10-Mar-2021 09:09
09-Mar-2021 18:58
10-Mar-2021 13:17
11-Mar-2021 15:33
10-Mar-2021 07:23
18-Mar-2021 11:55

## 2021-03-18 NOTE — PROGRESS NOTE ADULT - PROBLEM SELECTOR PLAN 2
- pt having multiple watery diarrhea (~9 episodes per day) since stent placement - improving  - possibly in setting of new stent vs infection as pt febrile to 101.3 on 3/13  - GI PCR negative, stool cx pending, blood culture grew Kleb and gram variable rods.  - on Abx   - maintain IV hydration, replete electrolyte as needed.   - will cont to monitor - pt having multiple watery diarrhea (~9 episodes per day) since stent placement - RESOLVED.  - possibly in setting of new stent vs infection as pt febrile to 101.3 on 3/13  - GI PCR negative, stool cx pending, blood culture grew Kleb and gram variable rods.  - on Abx   - maintain IV hydration, replete electrolyte as needed.   - will cont to monitor - pt having multiple watery diarrhea (~9 episodes per day) since stent placement - RESOLVED.  - possibly in setting of new stent vs infection as pt febrile to 101.3 on 3/13  - GI PCR negative, stool cx pending, blood culture grew Kleb and gram variable rods.  - on Abx  - maintain hydration, replete electrolyte as needed.   - will cont to monitor

## 2021-03-18 NOTE — PROVIDER CONTACT NOTE (CRITICAL VALUE NOTIFICATION) - SITUATION
Gram negative ysabel, growth in anaerobic
K 2.5
Gram variable rods in Anaerobic bottle
Potassium 2.7
Potassium 2.6
potassium 2.9

## 2021-03-18 NOTE — PROGRESS NOTE ADULT - PROBLEM SELECTOR PLAN 4
- 1 week hx abdominal pain/distension, N/V in the setting of metastatic colon cancer   - CT abd/p LBO involving the ascending and transverse colon measuring up to 8 cm secondary to the colonic mass seen in the distal transverse colon. In addition, the jejunum and terminal ileum are also dilated  - S/p 2L bolus in the ED, c/w IVF hydration   - NG tube placed for decompression--> removed  - s/p colonic stent  - on soft diet --> regular, tolerating well.   - Dr. Alicia made aware for discussion of operative intervention given pt may be precluded from chemo due to stent and oncology strongly recommends chemo in the future  -CR surgery are following, surgery tentatively scheduled for next week, pending negative cultures.

## 2021-03-18 NOTE — PROVIDER CONTACT NOTE (CRITICAL VALUE NOTIFICATION) - TEST AND RESULT REPORTED:
Potassium 2.6
potassium 2.9
Gram negative ysabel, growth in anaerobic
K 2.5
Gram variable rods in Anaerobic bottle
Potassium 2.7

## 2021-03-19 LAB
ALBUMIN SERPL ELPH-MCNC: 2.8 G/DL — LOW (ref 3.3–5)
ALP SERPL-CCNC: 161 U/L — HIGH (ref 40–120)
ALT FLD-CCNC: 15 U/L — SIGNIFICANT CHANGE UP (ref 4–41)
ANION GAP SERPL CALC-SCNC: 11 MMOL/L — SIGNIFICANT CHANGE UP (ref 7–14)
AST SERPL-CCNC: 29 U/L — SIGNIFICANT CHANGE UP (ref 4–40)
BILIRUB SERPL-MCNC: 0.2 MG/DL — SIGNIFICANT CHANGE UP (ref 0.2–1.2)
BUN SERPL-MCNC: 13 MG/DL — SIGNIFICANT CHANGE UP (ref 7–23)
CALCIUM SERPL-MCNC: 8.7 MG/DL — SIGNIFICANT CHANGE UP (ref 8.4–10.5)
CHLORIDE SERPL-SCNC: 101 MMOL/L — SIGNIFICANT CHANGE UP (ref 98–107)
CO2 SERPL-SCNC: 26 MMOL/L — SIGNIFICANT CHANGE UP (ref 22–31)
CREAT SERPL-MCNC: 0.48 MG/DL — LOW (ref 0.5–1.3)
GLUCOSE SERPL-MCNC: 94 MG/DL — SIGNIFICANT CHANGE UP (ref 70–99)
HCT VFR BLD CALC: 33 % — LOW (ref 39–50)
HGB BLD-MCNC: 10 G/DL — LOW (ref 13–17)
MAGNESIUM SERPL-MCNC: 2.1 MG/DL — SIGNIFICANT CHANGE UP (ref 1.6–2.6)
MCHC RBC-ENTMCNC: 22.2 PG — LOW (ref 27–34)
MCHC RBC-ENTMCNC: 30.3 GM/DL — LOW (ref 32–36)
MCV RBC AUTO: 73.3 FL — LOW (ref 80–100)
NRBC # BLD: 0 /100 WBCS — SIGNIFICANT CHANGE UP
NRBC # FLD: 0 K/UL — SIGNIFICANT CHANGE UP
PHOSPHATE SERPL-MCNC: 3.7 MG/DL — SIGNIFICANT CHANGE UP (ref 2.5–4.5)
PLATELET # BLD AUTO: 454 K/UL — HIGH (ref 150–400)
POTASSIUM SERPL-MCNC: 4 MMOL/L — SIGNIFICANT CHANGE UP (ref 3.5–5.3)
POTASSIUM SERPL-SCNC: 4 MMOL/L — SIGNIFICANT CHANGE UP (ref 3.5–5.3)
PROT SERPL-MCNC: 6.7 G/DL — SIGNIFICANT CHANGE UP (ref 6–8.3)
RBC # BLD: 4.5 M/UL — SIGNIFICANT CHANGE UP (ref 4.2–5.8)
RBC # FLD: 22.5 % — HIGH (ref 10.3–14.5)
SODIUM SERPL-SCNC: 138 MMOL/L — SIGNIFICANT CHANGE UP (ref 135–145)
VANCOMYCIN TROUGH SERPL-MCNC: 10.1 UG/ML — SIGNIFICANT CHANGE UP (ref 10–20)
WBC # BLD: 8.45 K/UL — SIGNIFICANT CHANGE UP (ref 3.8–10.5)
WBC # FLD AUTO: 8.45 K/UL — SIGNIFICANT CHANGE UP (ref 3.8–10.5)

## 2021-03-19 PROCEDURE — 99232 SBSQ HOSP IP/OBS MODERATE 35: CPT | Mod: GC

## 2021-03-19 PROCEDURE — 99232 SBSQ HOSP IP/OBS MODERATE 35: CPT

## 2021-03-19 RX ORDER — VANCOMYCIN HCL 1 G
1250 VIAL (EA) INTRAVENOUS EVERY 12 HOURS
Refills: 0 | Status: DISCONTINUED | OUTPATIENT
Start: 2021-03-19 | End: 2021-03-20

## 2021-03-19 RX ADMIN — Medication 250 MILLIGRAM(S): at 11:50

## 2021-03-19 RX ADMIN — CHLORHEXIDINE GLUCONATE 1 APPLICATION(S): 213 SOLUTION TOPICAL at 11:50

## 2021-03-19 RX ADMIN — Medication 325 MILLIGRAM(S): at 11:50

## 2021-03-19 RX ADMIN — ENOXAPARIN SODIUM 40 MILLIGRAM(S): 100 INJECTION SUBCUTANEOUS at 05:35

## 2021-03-19 RX ADMIN — Medication 500 MILLIGRAM(S): at 11:50

## 2021-03-19 RX ADMIN — CEFTRIAXONE 100 MILLIGRAM(S): 500 INJECTION, POWDER, FOR SOLUTION INTRAMUSCULAR; INTRAVENOUS at 15:04

## 2021-03-19 RX ADMIN — PANTOPRAZOLE SODIUM 40 MILLIGRAM(S): 20 TABLET, DELAYED RELEASE ORAL at 05:35

## 2021-03-19 NOTE — PROGRESS NOTE ADULT - SUBJECTIVE AND OBJECTIVE BOX
Oncology Follow-up    INTERVAL HPI/OVERNIGHT EVENTS:  Patient S&E at bedside. No o/n events, patient resting comfortably.    VITAL SIGNS:  T(F): 97.8 (03-19-21 @ 06:10)  HR: 85 (03-19-21 @ 06:10)  BP: 124/89 (03-19-21 @ 06:10)  RR: 18 (03-19-21 @ 06:10)  SpO2: 98% (03-19-21 @ 06:10)  Wt(kg): --    PHYSICAL EXAM:    Constitutional: AAOx3, NAD  Eyes: EOMI, sclera non-icteric  Neck: supple, no masses, no JVD  Respiratory: CTA b/l, good air entry b/l, no wheezing, rhonchi, rales, with normal respiratory effort and no intercostal retractions  Cardiovascular: RRR, normal S1S2  Gastrointestinal: soft, NTND, no masses palpable, BS normal in all four quadrants, no HSM  Extremities: no c/c/e  Neurological: Grossly intact  Skin: Normal temperature    MEDICATIONS  (STANDING):  ascorbic acid 500 milliGRAM(s) Oral daily  cefTRIAXone   IVPB 1000 milliGRAM(s) IV Intermittent every 24 hours  chlorhexidine 4% Liquid 1 Application(s) Topical daily  enoxaparin Injectable 40 milliGRAM(s) SubCutaneous daily  ferrous    sulfate 325 milliGRAM(s) Oral daily  influenza   Vaccine 0.5 milliLiter(s) IntraMuscular once  pantoprazole    Tablet 40 milliGRAM(s) Oral before breakfast  vancomycin  IVPB 1000 milliGRAM(s) IV Intermittent every 12 hours    MEDICATIONS  (PRN):  morphine  IR 15 milliGRAM(s) Oral every 4 hours PRN Moderate / Severe Pain (4 - 10)  ondansetron Injectable 4 milliGRAM(s) IV Push every 6 hours PRN Nausea and/or Vomiting      No Known Allergies      LABS:                        10.0   8.45  )-----------( 454      ( 19 Mar 2021 08:04 )             33.0     03-19    138  |  101  |  13  ----------------------------<  94  4.0   |  26  |  0.48<L>    Ca    8.7      19 Mar 2021 08:12  Phos  3.7     03-19  Mg     2.1     03-19    TPro  6.7  /  Alb  2.8<L>  /  TBili  0.2  /  DBili  x   /  AST  29  /  ALT  15  /  AlkPhos  161<H>  03-19               RADIOLOGY & ADDITIONAL TESTS:  Studies reviewed.      < from: CT Abdomen and Pelvis w/ IV Cont (03.09.21 @ 17:10) >    IMPRESSION:  Large bowel obstruction involving the ascending and transverse colon measuring up to 8 cm secondary to the colonic mass seen in the distal transverse colon. In addition, the jejunum and terminal ileum are also dilated. These findings were discussed with Dr. ASIM NORTON at 3/9/2021 5:26 PM by Dr. Will with read back confirmation.    Hepatic metastases, some of which have increased in size since 1/28/2021.  A 1.3 x 0.9 cm right lower lobe nodule concerning for metastatic disease.    < end of copied text >

## 2021-03-19 NOTE — PROGRESS NOTE ADULT - ATTENDING COMMENTS
I have reviewed the case in detail with Dr. Echols, the oncology consult fellow and agree with the assessment and plan of care as outlined in the note.

## 2021-03-19 NOTE — PROGRESS NOTE ADULT - PROBLEM SELECTOR PLAN 1
- 3/13 pt septic overnight with tachycardia and fever to 101.3   - started on zosyn, switched to CTX (day 6)  - bcx growing klebsiella possibly after GI procedure. 3/15 BCx NGTD, however Bcx 3/17 growing B. cereus - will start Vanco 1g q12h  - symptomatically pt is doing well, diarrhea improving and no fever or other symptoms.   - CTM and repeat Bcx tomorrow.  - ID consulted, appreciate recs. - 3/13 pt septic overnight with tachycardia and fever to 101.3   - started on zosyn, switched to CTX (day 7)  - bcx growing klebsiella possibly after GI procedure. 3/15 BCx NGTD, however Bcx 3/17 growing B. cereus - will start Vanco 1g q12h (day 2)  - symptomatically pt is doing well, diarrhea improving and no fever or other symptoms.   - CTM and repeat Bcx drawn today.  - f/u vanco trough  - ID consulted, appreciate recs.

## 2021-03-19 NOTE — PROGRESS NOTE ADULT - ASSESSMENT
55 M with hx unresectable stage 4 colon cancer (mets to liver and lungs, received palliative folfox 2/16/21) follows with [facility] here for worsening nausea, vomiting (NBNB), abdominal distention  Prior fevers, no leukocytosis  CXR clear  Most recent CT A/P large bowel obstruction, liver mets  Prior episode of Klebsiella bacteremia, clear as of 3/15/21  BCX 3/17 BCX B cereus  Mediport in place  Patient generally well appearing, although chronically ill  Overall,  1) B cereus bacteremia  - Presumably GI source in setting of large bowel obstruction; generally well with no GI symptoms no systemic signs infection presently  - Vanco 1g q 12 (monitor levels)  - F/U pending BCXs  - Further workup pending clearance vs sustained bacteremia  2) Klebsiella bacteremia  - Likely transient from abd process  - Continue ceftriaxone  - Monitor BCXs  3) Large Bowel Obstruction/Malignancy  - Further care per primary team    Simon Carney MD  Pager [phone]  After 5pm and on weekends call [phone]

## 2021-03-19 NOTE — PROGRESS NOTE ADULT - PROBLEM SELECTOR PLAN 5
- Metastatic to liver and lungs, s/p first session of palliative folfox (2/16/21) at [facility]  - CEA - 2882, PSA - WNL, CA 19-9 -2882 and AFP - 2775  - CT abd/p showing hepatic mets with some that have increased in size since 1/2021, enlarged prostate, along with a 1.3 x 0.9 cm right lower lobe nodule concerning for metastatic disease  - Pain control regimen - Morphine 4 mg IV q4 PRN   - Palliative consulted for pain control and GOC given metastatic cancer, appreciate recs.  - oncology following, appreciate recs and pain control. - Prior iron studies cw iron def Anemia plus AOCD   - Monitor H/H and transfuse to keep Hgb > 7  - c/w PO iron and vit c

## 2021-03-19 NOTE — PROGRESS NOTE ADULT - PROBLEM SELECTOR PLAN 4
- 1 week hx abdominal pain/distension, N/V in the setting of metastatic colon cancer   - CT abd/p LBO involving the ascending and transverse colon measuring up to 8 cm secondary to the colonic mass seen in the distal transverse colon. In addition, the jejunum and terminal ileum are also dilated  - S/p 2L bolus in the ED, c/w IVF hydration   - NG tube placed for decompression--> removed  - s/p colonic stent  - on soft diet --> regular, tolerating well.   - Dr. Alicia made aware for discussion of operative intervention given pt may be precluded from chemo due to stent and oncology strongly recommends chemo in the future  -CR surgery are following, surgery tentatively scheduled for next week, pending negative cultures. - Pt is currently medically optimized for colorectal surgery  - EKG in chart  - Revised Cardiac Risk Index: Based on RCRI, pt has 6% 30-day risk of death, cardiac arrest, MI due to intraperitoneal surgery  -However, pt has poor nutritional status which likely have a affect on wound healing and overall prognosis.

## 2021-03-19 NOTE — PROGRESS NOTE ADULT - PROBLEM SELECTOR PLAN 2
- pt having multiple watery diarrhea (~9 episodes per day) since stent placement - RESOLVED.  - possibly in setting of new stent vs infection as pt febrile to 101.3 on 3/13  - GI PCR negative, stool cx pending, blood culture grew Kleb and gram variable rods.  - on Abx  - maintain hydration, replete electrolyte as needed.   - will cont to monitor Resolved, s/p stent.   - 1 week hx abdominal pain/distension, N/V in the setting of metastatic colon cancer   - CT abd/p LBO involving the ascending and transverse colon measuring up to 8 cm secondary to the colonic mass seen in the distal transverse colon. In addition, the jejunum and terminal ileum are also dilated  - S/p 2L bolus in the ED, c/w IVF hydration   - NG tube placed for decompression--> removed  - s/p colonic stent  - on soft diet --> regular, tolerating well.   - Dr. Alicia made aware for discussion of operative intervention given pt may be precluded from chemo due to stent and oncology strongly recommends chemo in the future  -CR surgery are following, surgery tentatively scheduled for next week, pending negative cultures. Resolved, s/p stent.   - 1 week hx abdominal pain/distension, N/V in the setting of metastatic colon cancer   - CT abd/p LBO involving the ascending and transverse colon measuring up to 8 cm secondary to the colonic mass seen in the distal transverse colon. In addition, the jejunum and terminal ileum are also dilated  - S/p 2L bolus in the ED, c/w IVF hydration   - NG tube placed for decompression--> removed  - s/p colonic stent  - on soft diet --> regular, tolerating well.   - Dr. Alicia made aware for discussion of operative intervention given pt may be precluded from chemo due to stent and oncology strongly recommends chemo in the future  -CR surgery are following, surgery tentatively scheduled for next week, pending negative cultures, will covid swab on Sunday.

## 2021-03-19 NOTE — PROGRESS NOTE ADULT - SUBJECTIVE AND OBJECTIVE BOX
Shamar Martin, PGY-1  Pager: [phone] / [phone]    CHIEF COMPLAINT: Patient is a 55y old  Male who presents with a chief complaint of abdominal pain (19 Mar 2021 07:11)    INTERVAL HPI/OVERNIGHT EVENTS: CT pt was seen comfortable in bed this AM. He denies any cp, sob, abd pain, N/V, chills/fever. Tolerating diet. 3 solid BM in 24h.     MEDICATIONS (STANDING):  ascorbic acid 500 milliGRAM(s) Oral daily  cefTRIAXone   IVPB 1000 milliGRAM(s) IV Intermittent every 24 hours  chlorhexidine 4% Liquid 1 Application(s) Topical daily  enoxaparin Injectable 40 milliGRAM(s) SubCutaneous daily  ferrous    sulfate 325 milliGRAM(s) Oral daily  influenza   Vaccine 0.5 milliLiter(s) IntraMuscular once  pantoprazole    Tablet 40 milliGRAM(s) Oral before breakfast  vancomycin  IVPB 1000 milliGRAM(s) IV Intermittent every 12 hours    MEDICATIONS  (PRN):  morphine  IR 15 milliGRAM(s) Oral every 4 hours PRN  ondansetron Injectable 4 milliGRAM(s) IV Push every 6 hours PRN    REVIEW OF SYSTEMS:  CONSTITUTIONAL: No fever, weight loss, or fatigue  EYES: No eye pain, visual disturbances, or discharge  ENMT:  No difficulty hearing, tinnitus, vertigo; No sinus or throat pain  NECK: No pain or stiffness  RESPIRATORY: No cough, wheezing, chills or hemoptysis; No shortness of breath  CARDIOVASCULAR: No chest pain, palpitations, dizziness, or leg swelling  GASTROINTESTINAL: No abdominal or epigastric pain. No nausea, vomiting, or hematemesis; No diarrhea or constipation. No melena or hematochezia.  GENITOURINARY: No dysuria, frequency, hematuria, or incontinence  NEUROLOGICAL: No headaches, memory loss, loss of strength, numbness, or tremors  SKIN: No itching, burning, rashes, or lesions   MUSCULOSKELETAL: No joint pain or swelling; No muscle, back, or extremity pain    VITAL SIGNS:  T(F): 97.8 (03-19-21 @ 06:10), Max: 98.1 (03-18-21 @ 21:22)  HR: 85 (03-19-21 @ 06:10) (85 - 96)  BP: 124/89 (03-19-21 @ 06:10) (108/76 - 124/89)  RR: 18 (03-19-21 @ 06:10) (18 - 18)  SpO2: 98% (03-19-21 @ 06:10) (98% - 100%)    PHYSICAL EXAM:  GENERAL: comfortable, pale, chronically ill appearing. thin.   HEENT:  Atraumatic, Normocephalic  CHEST/LUNG: Chest rise equal bilaterally. CTAB.   HEART: tachycardic, no murmurs   ABDOMEN: Abdomen non-distended and soft, nontender.   EXTREMITIES:  2+ Peripheral Pulses.  PSYCH: A&Ox3  SKIN: Pale, vitiligo, port on R side of chest, area non tender no erythma/swelling    LABS:                        10.0   8.45  )-----------( x        ( 19 Mar 2021 08:04 )             33.0     03-18    138  |  102  |  13  ----------------------------<  136<H>  3.9   |  26  |  0.42<L>    Ca    8.5      18 Mar 2021 10:00  Phos  3.4     03-18  Mg     1.9     03-18    TPro  5.8<L>  /  Alb  2.5<L>  /  TBili  <0.2  /  DBili  x   /  AST  26  /  ALT  14  /  AlkPhos  141<H>  03-18      RADIOLOGY & ADDITIONAL TESTS:    Culture - Blood in AM (03.17.21 @ 06:30)    Gram Stain:   Growth in anaerobic bottle: Gram Variable Rods    -  Bacillus cereus group: Detec    Specimen Source: .Blood Blood-Peripheral    Organism: Blood Culture PCR    Culture Results:   Growth in anaerobic bottle: Bacillus species not anthracis  "Susceptibilities not performed"  ***Blood Panel PCR results on this specimen are available  approximately 3 hours after the Gram stain result.***  Gram stain, PCR, and/or culture results may not always  correspond due to difference in methodologies.  ************************************************************  This PCR assay was performed by multiplex PCR. This  Assay tests for 66 bacterial and resistance gene targets.  Please refer to the [facility] test directory  at [email or web address] for details.    Organism Identification: Blood Culture PCR    Method Type: PCR

## 2021-03-19 NOTE — PROGRESS NOTE ADULT - ATTENDING COMMENTS
Patient seen and examined care d/w HS4.    54 yo M with stage 4 colon cancer (mets to liver and lungs, received palliative folfox 2/16/21) presenting with abdominal pain, N/V, and distension. Admitted for medical management of LBO due to obstruction 2/2 transverse colon mass.     # Kleb bacteremia: had sepsis (febrile and tachycardic) on 3/13 presumed GI source. UA negative. BCx growing klebsiella. GI PCR negative. Blood Cx on 3/15 no growth to date.  zosyn --> ctx 1g q24h, D 7/10    # B Cereus: bx on 3/17, had diarrheal illness prior, likely translocation, vanco, f/u level,  TTE wnl,  appreciate ID    #Large bowel obstruction, s/p colonic stent by GI, tolerating diet, having stool output, diarrhea resolved, surgery rec surgery next week  pending clearance of blood cx, sent 3/19    #Metastatic colon CA: no plans for inpatient chemo, c/w PO pain control     #Anemia of chronic disease, monitor hgb     Pre-op, good METS (was working up till January) w/ no cardiac complaints, repeat EKG, RCRI score of 1, patient is medically optimized and may proceed w/ OR as planned

## 2021-03-19 NOTE — PROGRESS NOTE ADULT - SUBJECTIVE AND OBJECTIVE BOX
CC: F/U Bacteremia    Saw/spoke to patient. No new complaints. Feels improved.    Allergies  No Known Allergies    ANTIMICROBIALS:  cefTRIAXone   IVPB 1000 every 24 hours  vancomycin  IVPB 1000 every 12 hours    PE:    Vital Signs Last 24 Hrs  T(C): 36.6 (19 Mar 2021 06:10), Max: 36.7 (18 Mar 2021 21:22)  T(F): 97.8 (19 Mar 2021 06:10), Max: 98.1 (18 Mar 2021 21:22)  HR: 85 (19 Mar 2021 06:10) (85 - 96)  BP: 124/89 (19 Mar 2021 06:10) (108/76 - 124/89)  RR: 18 (19 Mar 2021 06:10) (18 - 18)  SpO2: 98% (19 Mar 2021 06:10) (98% - 100%)    Gen: AOx3, NAD, non-toxic  CV: S1+S2 normal, nontachycardic  Resp: Clear bilat, no resp distress, no crackles/wheezes  Abd: Soft, nontender, +BS  Ext: No LE edema, no wounds    LABS:                        10.0   8.45  )-----------( 454      ( 19 Mar 2021 08:04 )             33.0     03-19    138  |  101  |  13  ----------------------------<  94  4.0   |  26  |  0.48<L>    Ca    8.7      19 Mar 2021 08:12  Phos  3.7     03-19  Mg     2.1     03-19    TPro  6.7  /  Alb  2.8<L>  /  TBili  0.2  /  DBili  x   /  AST  29  /  ALT  15  /  AlkPhos  161<H>  03-19    MICROBIOLOGY:    .Blood Blood-Peripheral  03-17-21   Growth in anaerobic bottle: Bacillus species not anthracis  "Susceptibilities not performed"  ***Blood Panel PCR results on this specimen are available  approximately 3 hours after the Gram stain result.***  Gram stain, PCR, and/or culture results may not always  correspond due to difference in methodologies.  ************************************************************  This PCR assay was performed by multiplex PCR. This  Assay tests for 66 bacterial and resistance gene targets.  Please refer to the [facility] test directory  at [email or web address] for details.  --  Blood Culture PCR    .Blood Blood  03-15-21   No growth to date.     .Stool Feces  03-13-21   GI PCR Results: NOT detected  *******Please Note:*******  GI panel PCR evaluates for:  Campylobacter, Plesiomonas shigelloides, Salmonella,  Vibrio, Yersinia enterocolitica, Enteroaggregative  Escherichia coli (EAEC), Enteropathogenic E.coli (EPEC),  Enterotoxigenic E. coli (ETEC) lt/st, Shiga-like  toxin-producing E. coli (STEC) stx1/stx2,  Shigella/ Enteroinvasive E. coli (EIEC), Cryptosporidium,  Cyclospora cayetanensis, Entamoeba histolytica,  Giardia lamblia, Adenovirus F 40/41, Astrovirus,  Norovirus GI/GII, Rotavirus A, Sapovirus  --  --      .Blood Blood-Peripheral  03-13-21   Growth in anaerobic bottle: Klebsiella variicola  ***Blood Panel PCR results on this specimen are available  approximately 3 hours after the Gram stain result.***  Gram stain, PCR, and/or culture results may not always  correspond due to differencein methodologies.  ************************************************************  This PCR assay was performed by multiplex PCR. This  Assay tests for 66 bacterial and resistance gene targets.  Please refer to the [facility] test directory  at [email or web address] for details.  --  Blood Culture PCR  Klebsiella variicola    (otherwise reviewed)    RADIOLOGY:    3/11 XR:    INTERPRETATION:    The heart is not enlarged. An accessed right IJ approach port is unchanged in position.  Enteric tube tip is in the stomach and side port in the region of the distal esophagus/GE junction, unchanged.  The lungs are clear.  No pleural effusion or pneumothorax seen.  No acute bony abnormality.      IMPRESSION:  Enteric tube tip is in the stomach and side port in the region of the distal esophagus/GE junction. Dr. Rubio informed me when I spoke with him at 1:43 PM on March 11, 2021 that the tube has been removed.

## 2021-03-19 NOTE — PROGRESS NOTE ADULT - PROBLEM SELECTOR PLAN 3
Resolved  -likely in the setting of post obstructive diarrhea  -kidney function wnl  -will replete and BMP qd - Metastatic to liver and lungs, s/p first session of palliative folfox (2/16/21) at [facility]  - CEA - 2882, PSA - WNL, CA 19-9 -2882 and AFP - 2775  - CT abd/p showing hepatic mets with some that have increased in size since 1/2021, enlarged prostate, along with a 1.3 x 0.9 cm right lower lobe nodule concerning for metastatic disease  - Pain control regimen - Morphine 4 mg IV q4 PRN   - Palliative consulted for pain control and GOC given metastatic cancer, appreciate recs.  - oncology following, appreciate recs and pain control.

## 2021-03-19 NOTE — PROGRESS NOTE ADULT - ASSESSMENT
This is a 56 y/o M, w/ PMH of recently diagnosed metastatic colon cancer (on FOLFOX), who p/w large bowel obstruction. s/p colonic stent insertion, now tolerating PO diet. Hospital course complicated by bacteremia from 2 different organism for which pt is treated with antibiotics.    #Bacteremia  - Initially found to have Klebsiella bacteremia possibly after GI procedure, follow up BCX on 3/15 negative. On Abx  - 3/17/21 BCx shows B. Cereus bacteremia: started on Vancomycin, ID on board.  - Plan for palliative colectomy once bacteremia is resolved.  - F/U blood culture results.  - Appreciate care from primary team and ID.    #Large bowel obstruction  - 3/9/21 CT abd/p LBO involving the ascending and transverse colon measuring up to 8 cm secondary to the colonic mass seen in the distal transverse colon. In addition, the jejunum and terminal ileum are also dilated.  -appreciate surgery and GI  -s/p NGT decompression: removed. s/p colonic stent 3/11/21. Tolerating diet now.  -Plan for palliative surgery and removal of the stent on this admission, surgery following.    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 s/p liver Bx 1/25/21 which established diagnosis.  -f/u CT abd/p showing hepatic mets with some that have increased in size since 1/2021, enlarged prostate, along with a 1.3 x 0.9 cm right lower lobe nodule concerning for metastatic disease.   -Currently on palliative FOLFOX, s/p C2 on 3/4/2020  -NGS result pending  -Pain mgmt per primary team; patient is on percocet at home. Has seen by palliative care for pain control  -Follows Dr. Tu Byers at [facility]. Plan to add bevacizumab after palliative colectomy.        Karissa Echols MD  PGY 5, Oncology/Hematology fellow  (P) [phone]  After 5pm, please contact on-call team.

## 2021-03-20 ENCOUNTER — APPOINTMENT (OUTPATIENT)
Dept: INFUSION THERAPY | Facility: HOSPITAL | Age: 56
End: 2021-03-20

## 2021-03-20 LAB
ALBUMIN SERPL ELPH-MCNC: 2.8 G/DL — LOW (ref 3.3–5)
ALP SERPL-CCNC: 149 U/L — HIGH (ref 40–120)
ALT FLD-CCNC: 14 U/L — SIGNIFICANT CHANGE UP (ref 4–41)
ANION GAP SERPL CALC-SCNC: 9 MMOL/L — SIGNIFICANT CHANGE UP (ref 7–14)
AST SERPL-CCNC: 25 U/L — SIGNIFICANT CHANGE UP (ref 4–40)
BILIRUB SERPL-MCNC: 0.2 MG/DL — SIGNIFICANT CHANGE UP (ref 0.2–1.2)
BUN SERPL-MCNC: 12 MG/DL — SIGNIFICANT CHANGE UP (ref 7–23)
CALCIUM SERPL-MCNC: 8.6 MG/DL — SIGNIFICANT CHANGE UP (ref 8.4–10.5)
CHLORIDE SERPL-SCNC: 102 MMOL/L — SIGNIFICANT CHANGE UP (ref 98–107)
CO2 SERPL-SCNC: 27 MMOL/L — SIGNIFICANT CHANGE UP (ref 22–31)
CREAT SERPL-MCNC: 0.44 MG/DL — LOW (ref 0.5–1.3)
CULTURE RESULTS: SIGNIFICANT CHANGE UP
GLUCOSE SERPL-MCNC: 101 MG/DL — HIGH (ref 70–99)
HCT VFR BLD CALC: 28.9 % — LOW (ref 39–50)
HGB BLD-MCNC: 8.8 G/DL — LOW (ref 13–17)
MAGNESIUM SERPL-MCNC: 2.1 MG/DL — SIGNIFICANT CHANGE UP (ref 1.6–2.6)
MCHC RBC-ENTMCNC: 22.5 PG — LOW (ref 27–34)
MCHC RBC-ENTMCNC: 30.4 GM/DL — LOW (ref 32–36)
MCV RBC AUTO: 73.9 FL — LOW (ref 80–100)
NRBC # BLD: 0 /100 WBCS — SIGNIFICANT CHANGE UP
NRBC # FLD: 0 K/UL — SIGNIFICANT CHANGE UP
PHOSPHATE SERPL-MCNC: 3.3 MG/DL — SIGNIFICANT CHANGE UP (ref 2.5–4.5)
PLATELET # BLD AUTO: 467 K/UL — HIGH (ref 150–400)
POTASSIUM SERPL-MCNC: 3.8 MMOL/L — SIGNIFICANT CHANGE UP (ref 3.5–5.3)
POTASSIUM SERPL-SCNC: 3.8 MMOL/L — SIGNIFICANT CHANGE UP (ref 3.5–5.3)
PROT SERPL-MCNC: 6.2 G/DL — SIGNIFICANT CHANGE UP (ref 6–8.3)
RBC # BLD: 3.91 M/UL — LOW (ref 4.2–5.8)
RBC # FLD: 22.5 % — HIGH (ref 10.3–14.5)
SODIUM SERPL-SCNC: 138 MMOL/L — SIGNIFICANT CHANGE UP (ref 135–145)
SPECIMEN SOURCE: SIGNIFICANT CHANGE UP
WBC # BLD: 8.14 K/UL — SIGNIFICANT CHANGE UP (ref 3.8–10.5)
WBC # FLD AUTO: 8.14 K/UL — SIGNIFICANT CHANGE UP (ref 3.8–10.5)

## 2021-03-20 PROCEDURE — 99233 SBSQ HOSP IP/OBS HIGH 50: CPT | Mod: GC

## 2021-03-20 RX ORDER — CEFTRIAXONE 500 MG/1
1000 INJECTION, POWDER, FOR SOLUTION INTRAMUSCULAR; INTRAVENOUS EVERY 24 HOURS
Refills: 0 | Status: DISCONTINUED | OUTPATIENT
Start: 2021-03-21 | End: 2021-03-22

## 2021-03-20 RX ORDER — VANCOMYCIN HCL 1 G
1000 VIAL (EA) INTRAVENOUS EVERY 12 HOURS
Refills: 0 | Status: DISCONTINUED | OUTPATIENT
Start: 2021-03-20 | End: 2021-03-22

## 2021-03-20 RX ORDER — METRONIDAZOLE 500 MG
500 TABLET ORAL EVERY 12 HOURS
Refills: 0 | Status: DISCONTINUED | OUTPATIENT
Start: 2021-03-20 | End: 2021-03-20

## 2021-03-20 RX ORDER — VANCOMYCIN HCL 1 G
1250 VIAL (EA) INTRAVENOUS EVERY 12 HOURS
Refills: 0 | Status: DISCONTINUED | OUTPATIENT
Start: 2021-03-20 | End: 2021-03-20

## 2021-03-20 RX ADMIN — Medication 166.67 MILLIGRAM(S): at 11:32

## 2021-03-20 RX ADMIN — CHLORHEXIDINE GLUCONATE 1 APPLICATION(S): 213 SOLUTION TOPICAL at 11:32

## 2021-03-20 RX ADMIN — CEFTRIAXONE 100 MILLIGRAM(S): 500 INJECTION, POWDER, FOR SOLUTION INTRAMUSCULAR; INTRAVENOUS at 14:32

## 2021-03-20 RX ADMIN — Medication 325 MILLIGRAM(S): at 11:32

## 2021-03-20 RX ADMIN — Medication 500 MILLIGRAM(S): at 11:32

## 2021-03-20 RX ADMIN — ENOXAPARIN SODIUM 40 MILLIGRAM(S): 100 INJECTION SUBCUTANEOUS at 06:08

## 2021-03-20 RX ADMIN — Medication 166.67 MILLIGRAM(S): at 00:45

## 2021-03-20 RX ADMIN — PANTOPRAZOLE SODIUM 40 MILLIGRAM(S): 20 TABLET, DELAYED RELEASE ORAL at 06:08

## 2021-03-20 RX ADMIN — Medication 250 MILLIGRAM(S): at 22:21

## 2021-03-20 NOTE — PROGRESS NOTE ADULT - ASSESSMENT
55 M with hx unresectable stage 4 colon cancer (mets to liver and lungs, received palliative folfox 2/16/21) follows with [facility] here for worsening nausea, vomiting (NBNB), abdominal distention  Prior fevers, no leukocytosis  CXR clear  Most recent CT A/P large bowel obstruction, liver mets  Prior episode of Klebsiella bacteremia, clear as of 3/15/21  BCX 3/17 BCX B cereus  Mediport in place  Patient generally well appearing, although chronically ill    Overall,  1) B cereus bacteremia  - Presumably GI source in setting of large bowel obstruction; generally well with no GI symptoms no systemic signs infection presently  - c/w Vanco 1g q 12, trough low, but pt weight on the low side and we are not treating MRSA infection, no need to push for very high trough and vanco tends to accumulate.   - F/U repeat BCXs, NTD       2) Klebsiella bacteremia  - Likely transient from abd process  - Continue ceftriaxone  - Monitor BCXs

## 2021-03-20 NOTE — PROGRESS NOTE ADULT - PROBLEM SELECTOR PLAN 1
- 3/13 pt septic overnight with tachycardia and fever to 101.3   - started on zosyn, switched to CTX (day 7)  - bcx growing klebsiella possibly after GI procedure. 3/15 BCx NGTD, however Bcx 3/17 growing B. cereus - will start Vanco 1g q12h (day 2)  - symptomatically pt is doing well, diarrhea improving and no fever or other symptoms.   - CTM and repeat Bcx drawn today.  - f/u vanco trough  - ID consulted, appreciate recs. - 3/13 pt septic overnight with tachycardia and fever to 101.3   - started on zosyn, switched to CTX (day 8)  - bcx growing klebsiella possibly after GI procedure. 3/15 BCx NGTD, however Bcx 3/17 growing B. cereus - will start Vanco 1250g q12h (day 3)  - symptomatically pt is doing well, diarrhea improving and no fever or other symptoms.   - repeat BCx 3/19 NGTD  - f/u vanco trough  - ID consulted, appreciate recs.

## 2021-03-20 NOTE — PROGRESS NOTE ADULT - SUBJECTIVE AND OBJECTIVE BOX
PROGRESS NOTE:     CONTACT INFO:  Hector Ortiz  PGY3  [phone]    Patient is a 55y old  Male who presents with a chief complaint of abdominal pain (19 Mar 2021 09:43)      SUBJECTIVE / OVERNIGHT EVENTS:    ADDITIONAL REVIEW OF SYSTEMS:    MEDICATIONS  (STANDING):  ascorbic acid 500 milliGRAM(s) Oral daily  cefTRIAXone   IVPB 1000 milliGRAM(s) IV Intermittent every 24 hours  chlorhexidine 4% Liquid 1 Application(s) Topical daily  enoxaparin Injectable 40 milliGRAM(s) SubCutaneous daily  ferrous    sulfate 325 milliGRAM(s) Oral daily  influenza   Vaccine 0.5 milliLiter(s) IntraMuscular once  pantoprazole    Tablet 40 milliGRAM(s) Oral before breakfast  vancomycin  IVPB 1250 milliGRAM(s) IV Intermittent every 12 hours    MEDICATIONS  (PRN):  morphine  IR 15 milliGRAM(s) Oral every 4 hours PRN Moderate / Severe Pain (4 - 10)  ondansetron Injectable 4 milliGRAM(s) IV Push every 6 hours PRN Nausea and/or Vomiting      CAPILLARY BLOOD GLUCOSE        I&O's Summary      PHYSICAL EXAM:  Vital Signs Last 24 Hrs  T(C): 36.6 (20 Mar 2021 06:11), Max: 36.9 (19 Mar 2021 20:15)  T(F): 97.9 (20 Mar 2021 06:11), Max: 98.4 (19 Mar 2021 20:15)  HR: 94 (20 Mar 2021 06:11) (94 - 100)  BP: 128/89 (20 Mar 2021 06:11) (100/68 - 128/89)  BP(mean): --  RR: 18 (20 Mar 2021 06:11) (17 - 18)  SpO2: 98% (20 Mar 2021 06:11) (98% - 100%)    CONSTITUTIONAL: No fever, weight loss, or fatigue  EYES: No eye pain, visual disturbances, or discharge  ENMT:  No difficulty hearing, tinnitus, vertigo; No sinus or throat pain  NECK: No pain or stiffness  RESPIRATORY: No cough, wheezing, chills or hemoptysis; No shortness of breath  CARDIOVASCULAR: No chest pain, palpitations, dizziness, or leg swelling  GASTROINTESTINAL: No abdominal or epigastric pain. No nausea, vomiting, or hematemesis; No diarrhea or constipation. No melena or hematochezia.  GENITOURINARY: No dysuria, frequency, hematuria, or incontinence  NEUROLOGICAL: No headaches, memory loss, loss of strength, numbness, or tremors  SKIN: No itching, burning, rashes, or lesions   MUSCULOSKELETAL: No joint pain or swelling; No muscle, back, or extremity pain    LABS:                        10.0   8.45  )-----------( 454      ( 19 Mar 2021 08:04 )             33.0     03-19    138  |  101  |  13  ----------------------------<  94  4.0   |  26  |  0.48<L>    Ca    8.7      19 Mar 2021 08:12  Phos  3.7     03-19  Mg     2.1     03-19    TPro  6.7  /  Alb  2.8<L>  /  TBili  0.2  /  DBili  x   /  AST  29  /  ALT  15  /  AlkPhos  161<H>  03-19                RADIOLOGY & ADDITIONAL TESTS:  Results Reviewed:   Imaging Personally Reviewed:  Electrocardiogram Personally Reviewed:    COORDINATION OF CARE:  Care Discussed with Consultants/Other Providers [Y/N]:  Prior or Outpatient Records Reviewed [Y/N]:   PROGRESS NOTE:     CONTACT INFO:  Hector Ortiz  PGY3  [phone]    Patient is a 55y old  Male who presents with a chief complaint of abdominal pain (19 Mar 2021 09:43)      SUBJECTIVE / OVERNIGHT EVENTS: Vanc trough 10.1, Vanc increased to 1250mb BID. Denies any CP, SOB, n/v/c/d nor abd pain this AM.    ADDITIONAL REVIEW OF SYSTEMS:    MEDICATIONS  (STANDING):  ascorbic acid 500 milliGRAM(s) Oral daily  cefTRIAXone   IVPB 1000 milliGRAM(s) IV Intermittent every 24 hours  chlorhexidine 4% Liquid 1 Application(s) Topical daily  enoxaparin Injectable 40 milliGRAM(s) SubCutaneous daily  ferrous    sulfate 325 milliGRAM(s) Oral daily  influenza   Vaccine 0.5 milliLiter(s) IntraMuscular once  pantoprazole    Tablet 40 milliGRAM(s) Oral before breakfast  vancomycin  IVPB 1250 milliGRAM(s) IV Intermittent every 12 hours    MEDICATIONS  (PRN):  morphine  IR 15 milliGRAM(s) Oral every 4 hours PRN Moderate / Severe Pain (4 - 10)  ondansetron Injectable 4 milliGRAM(s) IV Push every 6 hours PRN Nausea and/or Vomiting      CAPILLARY BLOOD GLUCOSE        I&O's Summary      PHYSICAL EXAM:  Vital Signs Last 24 Hrs  T(C): 36.6 (20 Mar 2021 06:11), Max: 36.9 (19 Mar 2021 20:15)  T(F): 97.9 (20 Mar 2021 06:11), Max: 98.4 (19 Mar 2021 20:15)  HR: 94 (20 Mar 2021 06:11) (94 - 100)  BP: 128/89 (20 Mar 2021 06:11) (100/68 - 128/89)  BP(mean): --  RR: 18 (20 Mar 2021 06:11) (17 - 18)  SpO2: 98% (20 Mar 2021 06:11) (98% - 100%)    GENERAL: comfortable, thin appearing  HEENT:  Atraumatic, Normocephalic  CHEST/LUNG: Chest rise equal bilaterally. CTAB.   HEART: tachycardic, no murmurs   ABDOMEN: Abdomen non-distended and soft, nontender.   EXTREMITIES:  2+ Peripheral Pulses.  PSYCH: A&Ox3  SKIN: Pale, vitiligo, port on R side of chest, area non tender no erythma/swelling    LABS:                        10.0   8.45  )-----------( 454      ( 19 Mar 2021 08:04 )             33.0     03-19    138  |  101  |  13  ----------------------------<  94  4.0   |  26  |  0.48<L>    Ca    8.7      19 Mar 2021 08:12  Phos  3.7     03-19  Mg     2.1     03-19    TPro  6.7  /  Alb  2.8<L>  /  TBili  0.2  /  DBili  x   /  AST  29  /  ALT  15  /  AlkPhos  161<H>  03-19        RADIOLOGY & ADDITIONAL TESTS:  Results Reviewed:   Imaging Personally Reviewed:  Electrocardiogram Personally Reviewed:    COORDINATION OF CARE:  Care Discussed with Consultants/Other Providers [Y/N]:  Prior or Outpatient Records Reviewed [Y/N]:

## 2021-03-20 NOTE — PROGRESS NOTE ADULT - ATTENDING COMMENTS
Patient seen and examined care d/w HS4.    54 yo M with stage 4 colon cancer (mets to liver and lungs, received palliative folfox 2/16/21) presenting with abdominal pain, N/V, and distension. Admitted for medical management of LBO due to obstruction 2/2 transverse colon mass.     # Sepsis secondary to Kleb bacteremia: Febrile and tachycardic on 3/13 presumed GI source.   UA negative. BCx growing klebsiella. GI PCR negative. Blood Cx on 3/15 no growth to date.  zosyn --> ctx 1g q24h, D 8/10    # B Cereus: bx on 3/17, had diarrheal illness prior, likely translocation, vanco, f/u level,  TTE wnl,  appreciate ID    #Large bowel obstruction, s/p colonic stent by GI, tolerating diet, having stool output, diarrhea resolved, surgery rec surgery next week  pending clearance of blood cx, sent 3/19    #Metastatic colon CA: no plans for inpatient chemo, c/w PO pain control     #Anemia of chronic disease, monitor hgb

## 2021-03-20 NOTE — PROGRESS NOTE ADULT - ASSESSMENT
55 M with hx unresectable stage 4 colon cancer (mets to liver and lungs, received palliative folfox 2/16/21) presenting with abdominal pain, N/V, and distension. Admitted for medical management of LBO due to obstruction 2/2 transverse colon mass.  55 M with hx unresectable stage 4 colon cancer (mets to liver and lungs, received palliative folfox 2/16/21) presenting with abdominal pain, N/V, and distension. Admitted for medical management of LBO due to obstruction 2/2 transverse colon mass. Course c/b klebsiella and B [place] bacteremia pending rectal stent removal next Monday.

## 2021-03-20 NOTE — PROGRESS NOTE ADULT - SUBJECTIVE AND OBJECTIVE BOX
55y old  Male who presents with a chief complaint of abdominal pain (20 Mar 2021 06:48)      Interval history:  Afebrile, no abdominal pain, eating, having BM, no diarrhea.       Allergies:   No Known Allergies      Antimicrobials:  cefTRIAXone   IVPB 1000 milliGRAM(s) IV Intermittent every 24 hours  vancomycin  IVPB 1250 milliGRAM(s) IV Intermittent every 12 hours      REVIEW OF SYSTEMS:  No chest pain  No SOB  No N/V  No dysuria  No rash.       Vital Signs Last 24 Hrs  T(C): 36.4 (03-20-21 @ 13:17), Max: 36.9 (03-19-21 @ 20:15)  T(F): 97.6 (03-20-21 @ 13:17), Max: 98.4 (03-19-21 @ 20:15)  HR: 91 (03-20-21 @ 13:17) (91 - 100)  BP: 106/70 (03-20-21 @ 13:17) (100/68 - 128/89)  BP(mean): --  RR: 17 (03-20-21 @ 13:17) (17 - 18)  SpO2: 100% (03-20-21 @ 13:17) (98% - 100%)      PHYSICAL EXAM:  Patient in no acute distress. Alert, awake.   Cardiovascular: S1S2 normal.  Lungs: + air entry B/L lung fields.  Gastrointestinal: soft, nontender, nondistended.  Extremities: no edema.  IV sites not inflamed.                           8.8    8.14  )-----------( 467      ( 20 Mar 2021 08:14 )             28.9   03-20    138  |  102  |  12  ----------------------------<  101<H>  3.8   |  27  |  0.44<L>    Ca    8.6      20 Mar 2021 08:28  Phos  3.3     03-20  Mg     2.1     03-20    TPro  6.2  /  Alb  2.8<L>  /  TBili  0.2  /  DBili  x   /  AST  25  /  ALT  14  /  AlkPhos  149<H>  03-20      LIVER FUNCTIONS - ( 20 Mar 2021 08:28 )  Alb: 2.8 g/dL / Pro: 6.2 g/dL / ALK PHOS: 149 U/L / ALT: 14 U/L / AST: 25 U/L / GGT: x             Culture - Blood (collected 19 Mar 2021 09:59)  Source: .Blood Blood-Venous  Preliminary Report (20 Mar 2021 10:01):    No growth to date.    Culture - Blood (collected 19 Mar 2021 09:59)  Source: .Blood Blood-Peripheral  Preliminary Report (20 Mar 2021 10:01):    No growth to date.

## 2021-03-20 NOTE — PROGRESS NOTE ADULT - PROBLEM SELECTOR PLAN 3
- Metastatic to liver and lungs, s/p first session of palliative folfox (2/16/21) at [facility]  - CEA - 2882, PSA - WNL, CA 19-9 -2882 and AFP - 2775  - CT abd/p showing hepatic mets with some that have increased in size since 1/2021, enlarged prostate, along with a 1.3 x 0.9 cm right lower lobe nodule concerning for metastatic disease  - Pain control regimen - Morphine 4 mg IV q4 PRN   - Palliative consulted for pain control and GOC given metastatic cancer, appreciate recs.  - oncology following, appreciate recs and pain control.

## 2021-03-20 NOTE — PROGRESS NOTE ADULT - PROBLEM SELECTOR PLAN 2
Resolved, s/p stent.   - 1 week hx abdominal pain/distension, N/V in the setting of metastatic colon cancer   - CT abd/p LBO involving the ascending and transverse colon measuring up to 8 cm secondary to the colonic mass seen in the distal transverse colon. In addition, the jejunum and terminal ileum are also dilated  - S/p 2L bolus in the ED, c/w IVF hydration   - NG tube placed for decompression--> removed  - s/p colonic stent  - on soft diet --> regular, tolerating well.   - Dr. Alicia made aware for discussion of operative intervention given pt may be precluded from chemo due to stent and oncology strongly recommends chemo in the future  -CR surgery are following, surgery tentatively scheduled for next week, pending negative cultures, will covid swab on Sunday. Resolved, s/p stent.   - 1 week hx abdominal pain/distension, N/V in the setting of metastatic colon cancer   - CT abd/p LBO involving the ascending and transverse colon measuring up to 8 cm secondary to the colonic mass seen in the distal transverse colon. In addition, the jejunum and terminal ileum are also dilated  - S/p 2L bolus in the ED, c/w IVF hydration   - NG tube placed for decompression--> removed  - s/p colonic stent  - on soft diet --> regular, tolerating well.   - Dr. Alicia made aware for discussion of operative intervention given pt may be precluded from chemo due to stent and oncology strongly recommends chemo in the future  -CR surgery are following, surgery tentatively scheduled for next week, pending negative cultures, will covid swab on [place] 3/21

## 2021-03-21 LAB
ALBUMIN SERPL ELPH-MCNC: 2.7 G/DL — LOW (ref 3.3–5)
ALP SERPL-CCNC: 159 U/L — HIGH (ref 40–120)
ALT FLD-CCNC: 13 U/L — SIGNIFICANT CHANGE UP (ref 4–41)
ANION GAP SERPL CALC-SCNC: 9 MMOL/L — SIGNIFICANT CHANGE UP (ref 7–14)
APTT BLD: 36.3 SEC — SIGNIFICANT CHANGE UP (ref 27–36.3)
AST SERPL-CCNC: 26 U/L — SIGNIFICANT CHANGE UP (ref 4–40)
BILIRUB SERPL-MCNC: 0.2 MG/DL — SIGNIFICANT CHANGE UP (ref 0.2–1.2)
BLD GP AB SCN SERPL QL: NEGATIVE — SIGNIFICANT CHANGE UP
BUN SERPL-MCNC: 13 MG/DL — SIGNIFICANT CHANGE UP (ref 7–23)
CALCIUM SERPL-MCNC: 8.4 MG/DL — SIGNIFICANT CHANGE UP (ref 8.4–10.5)
CHLORIDE SERPL-SCNC: 102 MMOL/L — SIGNIFICANT CHANGE UP (ref 98–107)
CO2 SERPL-SCNC: 27 MMOL/L — SIGNIFICANT CHANGE UP (ref 22–31)
CREAT SERPL-MCNC: 0.43 MG/DL — LOW (ref 0.5–1.3)
GLUCOSE SERPL-MCNC: 93 MG/DL — SIGNIFICANT CHANGE UP (ref 70–99)
HCT VFR BLD CALC: 28 % — LOW (ref 39–50)
HGB BLD-MCNC: 8.5 G/DL — LOW (ref 13–17)
INR BLD: 1.19 RATIO — HIGH (ref 0.88–1.16)
MAGNESIUM SERPL-MCNC: 2.3 MG/DL — SIGNIFICANT CHANGE UP (ref 1.6–2.6)
MCHC RBC-ENTMCNC: 22.5 PG — LOW (ref 27–34)
MCHC RBC-ENTMCNC: 30.4 GM/DL — LOW (ref 32–36)
MCV RBC AUTO: 74.1 FL — LOW (ref 80–100)
NRBC # BLD: 0 /100 WBCS — SIGNIFICANT CHANGE UP
NRBC # FLD: 0.02 K/UL — HIGH
PHOSPHATE SERPL-MCNC: 3.4 MG/DL — SIGNIFICANT CHANGE UP (ref 2.5–4.5)
PLATELET # BLD AUTO: 475 K/UL — HIGH (ref 150–400)
POTASSIUM SERPL-MCNC: 4 MMOL/L — SIGNIFICANT CHANGE UP (ref 3.5–5.3)
POTASSIUM SERPL-SCNC: 4 MMOL/L — SIGNIFICANT CHANGE UP (ref 3.5–5.3)
PROT SERPL-MCNC: 6.3 G/DL — SIGNIFICANT CHANGE UP (ref 6–8.3)
PROTHROM AB SERPL-ACNC: 13.6 SEC — SIGNIFICANT CHANGE UP (ref 10.6–13.6)
RBC # BLD: 3.78 M/UL — LOW (ref 4.2–5.8)
RBC # FLD: 22.3 % — HIGH (ref 10.3–14.5)
RH IG SCN BLD-IMP: POSITIVE — SIGNIFICANT CHANGE UP
SODIUM SERPL-SCNC: 138 MMOL/L — SIGNIFICANT CHANGE UP (ref 135–145)
VANCOMYCIN TROUGH SERPL-MCNC: 10.3 UG/ML — SIGNIFICANT CHANGE UP (ref 10–20)
WBC # BLD: 8.03 K/UL — SIGNIFICANT CHANGE UP (ref 3.8–10.5)
WBC # FLD AUTO: 8.03 K/UL — SIGNIFICANT CHANGE UP (ref 3.8–10.5)

## 2021-03-21 PROCEDURE — 99232 SBSQ HOSP IP/OBS MODERATE 35: CPT

## 2021-03-21 PROCEDURE — 99232 SBSQ HOSP IP/OBS MODERATE 35: CPT | Mod: GC

## 2021-03-21 RX ADMIN — Medication 250 MILLIGRAM(S): at 11:44

## 2021-03-21 RX ADMIN — ENOXAPARIN SODIUM 40 MILLIGRAM(S): 100 INJECTION SUBCUTANEOUS at 05:30

## 2021-03-21 RX ADMIN — CEFTRIAXONE 100 MILLIGRAM(S): 500 INJECTION, POWDER, FOR SOLUTION INTRAMUSCULAR; INTRAVENOUS at 01:31

## 2021-03-21 RX ADMIN — Medication 325 MILLIGRAM(S): at 11:45

## 2021-03-21 RX ADMIN — PANTOPRAZOLE SODIUM 40 MILLIGRAM(S): 20 TABLET, DELAYED RELEASE ORAL at 05:30

## 2021-03-21 RX ADMIN — CHLORHEXIDINE GLUCONATE 1 APPLICATION(S): 213 SOLUTION TOPICAL at 11:45

## 2021-03-21 RX ADMIN — Medication 500 MILLIGRAM(S): at 11:45

## 2021-03-21 NOTE — PROGRESS NOTE ADULT - ASSESSMENT
55 M with hx unresectable stage 4 colon cancer (mets to liver and lungs, received palliative folfox 2/16/21) presenting with abdominal pain, N/V, and distension. Admitted for medical management of LBO due to obstruction 2/2 transverse colon mass. Course c/b klebsiella and B [place] bacteremia pending rectal stent removal next Monday.

## 2021-03-21 NOTE — PROGRESS NOTE ADULT - ATTENDING COMMENTS
Patient seen and examined care d/w HS4.  54 yo M with stage 4 colon cancer (mets to liver and lungs, received palliative folfox 2/16/21) presenting with abdominal pain, N/V, and distension. Admitted for medical management of LBO due to obstruction 2/2 transverse colon mass.     # Sepsis secondary to Kleb bacteremia: Febrile and tachycardic on 3/13 presumed GI source.   UA negative. BCx growing klebsiella. GI PCR negative. Blood Cx on 3/15 no growth to date.  zosyn --> ctx 1g q24h, D 8/10    # B Cereus: bx on 3/17, had diarrheal illness prior, likely translocation, vanco level -10.3, TTE wnl,  appreciate ID    #Large bowel obstruction, s/p colonic stent by GI, tolerating diet, having stool output, diarrhea resolved, surgery rec surgery next week  pending clearance of blood cx, sent 3/19    #Metastatic colon CA: no plans for inpatient chemo, c/w PO pain control     #Anemia of chronic disease, monitor hgb . Patient seen and examined care d/w HS4.  56 yo M with stage 4 colon cancer (mets to liver and lungs, received palliative folfox 2/16/21) presenting with abdominal pain, N/V, and distension. Admitted for medical management of LBO due to obstruction 2/2 transverse colon mass.     # Sepsis secondary to Kleb bacteremia: Febrile and tachycardic on 3/13 presumed GI source.   UA negative. BCx growing klebsiella. GI PCR negative. Blood Cx on 3/15 and 3/19-  no growth to date.    zosyn --> ctx 1g q24h    # B Cereus: bx on 3/17, had diarrheal illness prior, likely translocation, vanco level -10.3, TTE wnl,  appreciate ID    #Large bowel obstruction, s/p colonic stent by GI, tolerating diet, having stool output, diarrhea resolved, surgery rec surgery next week  pending clearance of blood cx, sent 3/19 . Team dw GI - no plan for OR tomorrow pending final blood cultures     #Metastatic colon CA: no plans for inpatient chemo, c/w PO pain control     #Anemia of chronic disease- HH downtrended to 8.5 this AM. Had 2 BM day- no blood or melena reported.  monitor hgb   Transfuse if less than 7 or if pt hemodynamically stable

## 2021-03-21 NOTE — PROGRESS NOTE ADULT - PROBLEM SELECTOR PLAN 3
- Metastatic to liver and lungs, s/p first session of palliative folfox (2/16/21) at [facility]  - CEA - 2882, PSA - WNL, CA 19-9 -2882 and AFP - 2775  - CT abd/p showing hepatic mets with some that have increased in size since 1/2021, enlarged prostate, along with a 1.3 x 0.9 cm right lower lobe nodule concerning for metastatic disease  - Pain control regimen - Morphine 4 mg IV q4 PRN   - Palliative consulted for pain control and GOC given metastatic cancer, appreciate recs.  - oncology following, appreciate recs and pain control.

## 2021-03-21 NOTE — PROGRESS NOTE ADULT - SUBJECTIVE AND OBJECTIVE BOX
Shamra Martin, PGY-1  Pager: [phone] / [phone]    CHIEF COMPLAINT: Patient is a 55y old  Male who presents with a chief complaint of abdominal pain (21 Mar 2021 07:29)    INTERVAL HPI/OVERNIGHT EVENTS: CT, pt was seen comfortable at bedside. He denies any cp, sob abd pain, N/V. He is tolerating his diet well, had 2 solid BM yesterday. No chills/fever.    MEDICATIONS (STANDING):  ascorbic acid 500 milliGRAM(s) Oral daily  cefTRIAXone   IVPB 1000 milliGRAM(s) IV Intermittent every 24 hours  chlorhexidine 4% Liquid 1 Application(s) Topical daily  enoxaparin Injectable 40 milliGRAM(s) SubCutaneous daily  ferrous    sulfate 325 milliGRAM(s) Oral daily  influenza   Vaccine 0.5 milliLiter(s) IntraMuscular once  pantoprazole    Tablet 40 milliGRAM(s) Oral before breakfast  vancomycin  IVPB 1000 milliGRAM(s) IV Intermittent every 12 hours    MEDICATIONS  (PRN):  morphine  IR 15 milliGRAM(s) Oral every 4 hours PRN  ondansetron Injectable 4 milliGRAM(s) IV Push every 6 hours PRN    REVIEW OF SYSTEMS:  CONSTITUTIONAL: No fever, weight loss, or fatigue  EYES: No eye pain, visual disturbances, or discharge  ENMT:  No difficulty hearing, tinnitus, vertigo; No sinus or throat pain  NECK: No pain or stiffness  RESPIRATORY: No cough, wheezing, chills or hemoptysis; No shortness of breath  CARDIOVASCULAR: No chest pain, palpitations, dizziness, or leg swelling  GASTROINTESTINAL: No abdominal or epigastric pain. No nausea, vomiting, or hematemesis; No diarrhea or constipation. No melena or hematochezia.  GENITOURINARY: No dysuria, frequency, hematuria, or incontinence  NEUROLOGICAL: No headaches, memory loss, loss of strength, numbness, or tremors  SKIN: No itching, burning, rashes, or lesions   MUSCULOSKELETAL: No joint pain or swelling; No muscle, back, or extremity pain    VITAL SIGNS:  T(F): 97.6 (03-21-21 @ 05:28), Max: 97.6 (03-20-21 @ 13:17)  HR: 100 (03-21-21 @ 05:28) (91 - 100)  BP: 100/72 (03-21-21 @ 05:28) (100/72 - 107/73)  RR: 16 (03-21-21 @ 05:28) (16 - 17)  SpO2: 100% (03-21-21 @ 05:28) (100% - 100%)    I&O's Summary  20 Mar 2021 07:01  -  21 Mar 2021 07:00  --------------------------------------------------------  IN: 0 mL / OUT: 300 mL / NET: -300 mL    PHYSICAL EXAM:  GENERAL: comfortable, pale, chronically ill appearing. thin.   HEENT:  Atraumatic, Normocephalic  CHEST/LUNG: Chest rise equal bilaterally. CTAB.   HEART: tachycardic, no murmurs   ABDOMEN: Abdomen non-distended and soft, nontender.   EXTREMITIES:  2+ Peripheral Pulses.  PSYCH: A&Ox3  SKIN: Pale, vitiligo, port on R side of chest, area non tender no erythma/swelling    LABS:                        8.5    8.03  )-----------( 475      ( 21 Mar 2021 06:56 )             28.0     03-21    138  |  102  |  13  ----------------------------<  93  4.0   |  27  |  0.43<L>    Ca    8.4      21 Mar 2021 06:56  Phos  3.4     03-21  Mg     2.3     03-21    TPro  6.3  /  Alb  2.7<L>  /  TBili  0.2  /  DBili  x   /  AST  26  /  ALT  13  /  AlkPhos  159<H>  03-21    PT/INR - ( 21 Mar 2021 06:56 )   PT: 13.6 sec;   INR: 1.19 ratio    PTT - ( 21 Mar 2021 06:56 )  PTT:36.3 sec      RADIOLOGY & ADDITIONAL TESTS:

## 2021-03-21 NOTE — PROGRESS NOTE ADULT - PROBLEM SELECTOR PLAN 5
- Prior iron studies cw iron def Anemia plus AOCD   - Monitor H/H and transfuse to keep Hgb > 7  - c/w PO iron and vit c -Hgb slightly dropped overnight, no overt bleeding, will f/u cbc tomorrow.   - Prior iron studies cw iron def Anemia plus AOCD   - Monitor H/H and transfuse to keep Hgb > 7  - c/w PO iron and vit c

## 2021-03-21 NOTE — PROGRESS NOTE ADULT - PROBLEM SELECTOR PLAN 2
Resolved, s/p stent.   - 1 week hx abdominal pain/distension, N/V in the setting of metastatic colon cancer   - CT abd/p LBO involving the ascending and transverse colon measuring up to 8 cm secondary to the colonic mass seen in the distal transverse colon. In addition, the jejunum and terminal ileum are also dilated  - S/p 2L bolus in the ED, c/w IVF hydration   - NG tube placed for decompression--> removed  - s/p colonic stent  - on soft diet --> regular, tolerating well.   - Dr. Alicia made aware for discussion of operative intervention given pt may be precluded from chemo due to stent and oncology strongly recommends chemo in the future  -CR surgery are following, surgery tentatively scheduled for next week, pending negative cultures, will covid swab on Edgar 3/21

## 2021-03-21 NOTE — PROGRESS NOTE ADULT - PROBLEM SELECTOR PLAN 1
- 3/13 pt septic overnight with tachycardia and fever to 101.3   - started on zosyn, switched to CTX (day 8)  - bcx growing klebsiella possibly after GI procedure. 3/15 BCx NGTD, however Bcx 3/17 growing B. cereus - will start Vanco 1250g q12h (day 3)  - symptomatically pt is doing well, diarrhea improving and no fever or other symptoms.   - repeat BCx 3/19 NGTD  - f/u vanco trough  - ID consulted, appreciate recs. - 3/13 pt septic overnight with tachycardia and fever to 101.3   - started on zosyn, switched to CTX (day 9)  - bcx growing klebsiella possibly after GI procedure. 3/15 BCx NGTD, however Bcx 3/17 growing B. cereus - cw Vanco 1000g q12h (day 4)  - symptomatically pt is doing well, diarrhea improving and no fever, asymptomatic.  - repeat BCx 3/19 NGTD  - f/u vanco trough  - ID consulted, appreciate recs.

## 2021-03-22 DIAGNOSIS — A49.8 OTHER BACTERIAL INFECTIONS OF UNSPECIFIED SITE: ICD-10-CM

## 2021-03-22 DIAGNOSIS — A49.9 BACTERIAL INFECTION, UNSPECIFIED: ICD-10-CM

## 2021-03-22 LAB
HCT VFR BLD CALC: 28.8 % — LOW (ref 39–50)
HGB BLD-MCNC: 8.6 G/DL — LOW (ref 13–17)
MCHC RBC-ENTMCNC: 22.3 PG — LOW (ref 27–34)
MCHC RBC-ENTMCNC: 29.9 GM/DL — LOW (ref 32–36)
MCV RBC AUTO: 74.8 FL — LOW (ref 80–100)
NRBC # BLD: 0 /100 WBCS — SIGNIFICANT CHANGE UP
NRBC # FLD: 0 K/UL — SIGNIFICANT CHANGE UP
PLATELET # BLD AUTO: 517 K/UL — HIGH (ref 150–400)
RBC # BLD: 3.85 M/UL — LOW (ref 4.2–5.8)
RBC # FLD: 22.6 % — HIGH (ref 10.3–14.5)
WBC # BLD: 9.31 K/UL — SIGNIFICANT CHANGE UP (ref 3.8–10.5)
WBC # FLD AUTO: 9.31 K/UL — SIGNIFICANT CHANGE UP (ref 3.8–10.5)

## 2021-03-22 PROCEDURE — 99232 SBSQ HOSP IP/OBS MODERATE 35: CPT

## 2021-03-22 PROCEDURE — 99232 SBSQ HOSP IP/OBS MODERATE 35: CPT | Mod: GC

## 2021-03-22 RX ORDER — METRONIDAZOLE 500 MG
500 TABLET ORAL EVERY 12 HOURS
Refills: 0 | Status: DISCONTINUED | OUTPATIENT
Start: 2021-03-22 | End: 2021-03-25

## 2021-03-22 RX ORDER — CIPROFLOXACIN LACTATE 400MG/40ML
500 VIAL (ML) INTRAVENOUS EVERY 12 HOURS
Refills: 0 | Status: DISCONTINUED | OUTPATIENT
Start: 2021-03-22 | End: 2021-03-26

## 2021-03-22 RX ORDER — ACETAMINOPHEN 500 MG
650 TABLET ORAL EVERY 6 HOURS
Refills: 0 | Status: DISCONTINUED | OUTPATIENT
Start: 2021-03-22 | End: 2021-03-27

## 2021-03-22 RX ADMIN — Medication 250 MILLIGRAM(S): at 11:57

## 2021-03-22 RX ADMIN — ENOXAPARIN SODIUM 40 MILLIGRAM(S): 100 INJECTION SUBCUTANEOUS at 06:19

## 2021-03-22 RX ADMIN — PANTOPRAZOLE SODIUM 40 MILLIGRAM(S): 20 TABLET, DELAYED RELEASE ORAL at 06:19

## 2021-03-22 RX ADMIN — Medication 650 MILLIGRAM(S): at 12:01

## 2021-03-22 RX ADMIN — Medication 250 MILLIGRAM(S): at 01:41

## 2021-03-22 RX ADMIN — CEFTRIAXONE 100 MILLIGRAM(S): 500 INJECTION, POWDER, FOR SOLUTION INTRAMUSCULAR; INTRAVENOUS at 02:51

## 2021-03-22 RX ADMIN — CHLORHEXIDINE GLUCONATE 1 APPLICATION(S): 213 SOLUTION TOPICAL at 11:58

## 2021-03-22 RX ADMIN — Medication 325 MILLIGRAM(S): at 11:58

## 2021-03-22 RX ADMIN — Medication 500 MILLIGRAM(S): at 17:18

## 2021-03-22 RX ADMIN — Medication 500 MILLIGRAM(S): at 11:58

## 2021-03-22 RX ADMIN — Medication 650 MILLIGRAM(S): at 13:01

## 2021-03-22 NOTE — PROGRESS NOTE ADULT - PROBLEM SELECTOR PLAN 7
- Diet: regular + ensure per dietitian   - DVT PPX: Lovenox SQ  - Dispo: likely home per PT  - GOC: pt will speak more with wife and children, palliative consult for pain control and further assistance

## 2021-03-22 NOTE — PROGRESS NOTE ADULT - ASSESSMENT
55 M with hx unresectable stage 4 colon cancer (mets to liver and lungs, received palliative folfox 2/16/21) follows with [facility] here for worsening nausea, vomiting (NBNB), abdominal distention  Prior fevers, no leukocytosis  CXR clear  Most recent CT A/P large bowel obstruction, liver mets  Prior episode of Klebsiella bacteremia, clear as of 3/15/21  BCX 3/17 BCX B cereus  Mediport in place  Patient generally well appearing, although chronically ill  Overall,  1) B cereus bacteremia  - Presumably GI source in setting of large bowel obstruction, lower suspicion catheter; anticipate S to cipro  - Cipro 500mg q 12  - Flagyl 500mg q 12  - Repeat EKG to monitor QTC (cipro generally with minimal QTC effect)  2) Klebsiella bacteremia  - Likely transient from abd process  - Continue Cipro as above  - Monitor BCXs  3) Large Bowel Obstruction/Malignancy  - Further care per primary team    Simon Carney MD  Pager [phone]  After 5pm and on weekends call [phone] 55 M with hx unresectable stage 4 colon cancer (mets to liver and lungs, received palliative folfox 2/16/21) follows with [facility] here for worsening nausea, vomiting (NBNB), abdominal distention  Prior fevers, no leukocytosis  CXR clear  Most recent CT A/P large bowel obstruction, liver mets  Prior episode of Klebsiella bacteremia, clear as of 3/15/21  BCX 3/17 BCX B cereus  Mediport in place  Patient generally well appearing, although chronically ill  Overall,  1) B cereus bacteremia  - Presumably GI source in setting of large bowel obstruction, lower suspicion catheter; anticipate S to cipro  - Cipro 500mg q 12  - Flagyl 500mg q 12  - Repeat EKG to monitor QTC (cipro generally with minimal QTC effect)  2) Klebsiella bacteremia  - Likely transient from abd process  - Continue Cipro as above  - Monitor BCXs  3) Large Bowel Obstruction/Malignancy  - Further care per primary team    My colleagues will be covering this patient starting on 3/23/21, I will return 3/24/21.  Please call [phone] or on call fellow with any questions or change in status.     Simon Carney MD  Pager [phone]  After 5pm and on weekends call [phone]

## 2021-03-22 NOTE — PROGRESS NOTE ADULT - ATTENDING COMMENTS
Patient seen and examined care d/w HS4.    56 yo M with stage 4 colon cancer (mets to liver and lungs, received palliative folfox 2/16/21) presenting with abdominal pain, N/V, and distension. Admitted for medical management of LBO due to obstruction 2/2 transverse colon mass.     # Kleb bacteremia: had sepsis (febrile and tachycardic) on 3/13 presumed GI source. UA negative. BCx growing klebsiella. GI PCR negative. Blood Cx on 3/15 no growth to date.  zosyn --> ctx 1g q24h, D10/10    # B Cereus: bx on 3/17, had diarrheal illness prior, likely translocation, vanco, f/u level,  TTE wnl,  appreciate ID    #Large bowel obstruction, s/p colonic stent by GI, tolerating diet, having stool output, diarrhea resolved, surgery rec surgery next week  pending clearance of blood cx, sent 3/19 thus far NG likely will be final Wednesday. Pre-op, good METS (was working up till January) w/ no cardiac complaints, repeat EKG, RCRI score of 1, patient is medically optimized and may proceed w/ OR as planned    #Metastatic colon CA: no plans for inpatient chemo, c/w PO pain control     #Anemia of chronic disease, monitor hgb     Dispo: pending

## 2021-03-22 NOTE — PROGRESS NOTE ADULT - PROBLEM SELECTOR PLAN 6
- Diet: regualr + ensure per dietitian   - DVT PPX: Lovenox SQ  - Dispo: likely home per PT  - GOC: pt will speak more with wife and children, palliative consult for pain control and further assistance -Hgb slightly dropped overnight, no overt bleeding, will f/u cbc tomorrow.   - Prior iron studies cw iron def Anemia plus AOCD   - Monitor H/H and transfuse to keep Hgb > 7  - c/w PO iron and vit c

## 2021-03-22 NOTE — PROGRESS NOTE ADULT - SUBJECTIVE AND OBJECTIVE BOX
CC: F/U for Bacteremia    Saw/spoke to patient. No fevers, no chills. Patient doing well.    Allergies  No Known Allergies    ANTIMICROBIALS:  cefTRIAXone   IVPB 1000 every 24 hours  vancomycin  IVPB 1000 every 12 hours    PE:    Vital Signs Last 24 Hrs  T(C): 36.6 (22 Mar 2021 13:08), Max: 37 (21 Mar 2021 15:53)  T(F): 97.9 (22 Mar 2021 13:08), Max: 98.6 (21 Mar 2021 15:53)  HR: 90 (22 Mar 2021 13:08) (85 - 99)  BP: 116/87 (22 Mar 2021 13:08) (101/74 - 116/87)  RR: 17 (22 Mar 2021 13:08) (16 - 17)  SpO2: 100% (22 Mar 2021 13:08) (100% - 100%)    Gen: AOx3, NAD, non-toxic, pleasant  CV: S1+S2 normal, nontachycardic  Resp: Clear bilat, no resp distress, no crackles/wheezes  Abd: Soft, nontender, +BS  Ext: No LE edema, no wounds  Lines: R sided [facility]    LABS:                        8.6    9.31  )-----------( 517      ( 22 Mar 2021 08:32 )             28.8     03-21    138  |  102  |  13  ----------------------------<  93  4.0   |  27  |  0.43<L>    Ca    8.4      21 Mar 2021 06:56  Phos  3.4     03-21  Mg     2.3     03-21    TPro  6.3  /  Alb  2.7<L>  /  TBili  0.2  /  DBili  x   /  AST  26  /  ALT  13  /  AlkPhos  159<H>  03-21    MICROBIOLOGY:    .Blood Blood-Peripheral  03-19-21   No growth to date.    .Blood Blood-Peripheral  03-17-21   Growth in anaerobic bottle: Bacillus species not anthracis  "Susceptibilities not performed"  ***Blood Panel PCR results on this specimen are available  approximately 3 hours after the Gram stain result.***  Gram stain, PCR, and/or culture results may not always  correspond due to difference in methodologies.  ************************************************************  This PCR assay was performed by multiplex PCR. This  Assay tests for 66 bacterial and resistance gene targets.  Please refer to the [employer] test directory  at [email or web address] for details.  --  Blood Culture PCR    .Blood Blood  03-15-21   No Growth Final     .Stool Feces  03-13-21   GI PCR Results: NOT detected    .Blood Blood-Peripheral  03-13-21   Growth in anaerobic bottle: Klebsiella variicola  ***Blood Panel PCR results on this specimen are available  approximately 3 hours after the Gram stain result.***  Gram stain, PCR, and/or culture results may not always  correspond due to differencein methodologies.  ************************************************************  This PCR assay was performed by multiplex PCR. This  Assay tests for 66 bacterial and resistance gene targets.  Please refer to the [employer] test directory  at [email or web address] for details.  --  Blood Culture PCR  Klebsiella variicola    (otherwise reviewed)    RADIOLOGY:    3/11 XR:    INTERPRETATION:    The heart is not enlarged. An accessed right IJ approach port is unchanged in position.  Enteric tube tip is in the stomach and side port in the region of the distal esophagus/GE junction, unchanged.  The lungs are clear.  No pleural effusion or pneumothorax seen.  No acute bony abnormality.

## 2021-03-22 NOTE — PROGRESS NOTE ADULT - PROBLEM SELECTOR PLAN 5
-Hgb slightly dropped overnight, no overt bleeding, will f/u cbc tomorrow.   - Prior iron studies cw iron def Anemia plus AOCD   - Monitor H/H and transfuse to keep Hgb > 7  - c/w PO iron and vit c - Pt is currently medically optimized for colorectal surgery  - EKG in chart, TTE wnl  - Revised Cardiac Risk Index 0  - poor nutritional status which likely have an affect on wound healing and overall prognosis.

## 2021-03-22 NOTE — PROGRESS NOTE ADULT - ASSESSMENT
55 M with hx unresectable stage 4 colon cancer (mets to liver and lungs, received palliative folfox 2/16/21) presenting with abdominal pain, N/V, and distension. Admitted for medical management of LBO due to obstruction 2/2 transverse colon mass. Course c/b klebsiella and B Rankin bacteremia pending rectal stent removal next Monday.  55 M with hx unresectable stage 4 colon cancer (mets to liver and lungs, received palliative folfox 2/16/21) presenting with abdominal pain, N/V, and distension. Admitted for medical management of LBO due to obstruction 2/2 transverse colon mass. Course c/b klebsiella and B Eckerty bacteremia pending colectomy.

## 2021-03-22 NOTE — PROGRESS NOTE ADULT - SUBJECTIVE AND OBJECTIVE BOX
Shamar Martin, PGY-1  Pager: [phone] / [phone]    CHIEF COMPLAINT: Patient is a 55y old  Male who presents with a chief complaint of abdominal pain (22 Mar 2021 07:43)      INTERVAL HPI/OVERNIGHT EVENTS: CT, pt was seen comfortable at bedside. He denies any cp, sob abd pain, N/V. He is tolerating his diet well, had 2 solid BM yesterday. No chills/fever.    MEDICATIONS (STANDING):  ascorbic acid 500 milliGRAM(s) Oral daily  cefTRIAXone   IVPB 1000 milliGRAM(s) IV Intermittent every 24 hours  chlorhexidine 4% Liquid 1 Application(s) Topical daily  enoxaparin Injectable 40 milliGRAM(s) SubCutaneous daily  ferrous    sulfate 325 milliGRAM(s) Oral daily  influenza   Vaccine 0.5 milliLiter(s) IntraMuscular once  pantoprazole    Tablet 40 milliGRAM(s) Oral before breakfast  vancomycin  IVPB 1000 milliGRAM(s) IV Intermittent every 12 hours    MEDICATIONS  (PRN):  morphine  IR 15 milliGRAM(s) Oral every 4 hours PRN  ondansetron Injectable 4 milliGRAM(s) IV Push every 6 hours PRN    REVIEW OF SYSTEMS:  CONSTITUTIONAL: No fever, weight loss, or fatigue  EYES: No eye pain, visual disturbances, or discharge  ENMT:  No difficulty hearing, tinnitus, vertigo; No sinus or throat pain  NECK: No pain or stiffness  RESPIRATORY: No cough, wheezing, chills or hemoptysis; No shortness of breath  CARDIOVASCULAR: No chest pain, palpitations, dizziness, or leg swelling  GASTROINTESTINAL: No abdominal or epigastric pain. No nausea, vomiting, or hematemesis; No diarrhea or constipation. No melena or hematochezia.  GENITOURINARY: No dysuria, frequency, hematuria, or incontinence  NEUROLOGICAL: No headaches, memory loss, loss of strength, numbness, or tremors  SKIN: No itching, burning, rashes, or lesions   MUSCULOSKELETAL: No joint pain or swelling; No muscle, back, or extremity pain    VITAL SIGNS:  T(F): 98.1 (03-22-21 @ 06:18), Max: 98.6 (03-21-21 @ 15:53)  HR: 97 (03-22-21 @ 06:18) (85 - 99)  BP: 112/66 (03-22-21 @ 06:18) (101/74 - 112/66)  RR: 16 (03-22-21 @ 06:18) (16 - 17)  SpO2: 100% (03-22-21 @ 06:18) (100% - 100%)    PHYSICAL EXAM:  GENERAL: comfortable, pale, chronically ill appearing. thin.   HEENT:  Atraumatic, Normocephalic  CHEST/LUNG: Chest rise equal bilaterally. CTAB.   HEART: tachycardic, no murmurs   ABDOMEN: Abdomen non-distended and soft, nontender.   EXTREMITIES:  2+ Peripheral Pulses.  PSYCH: A&Ox3  SKIN: Pale, vitiligo, port on R side of chest, area non tender no erythma/swelling    LABS:                        8.6    9.31  )-----------( 517      ( 22 Mar 2021 08:32 )             28.8     03-21    138  |  102  |  13  ----------------------------<  93  4.0   |  27  |  0.43<L>    Ca    8.4      21 Mar 2021 06:56  Phos  3.4     03-21  Mg     2.3     03-21    TPro  6.3  /  Alb  2.7<L>  /  TBili  0.2  /  DBili  x   /  AST  26  /  ALT  13  /  AlkPhos  159<H>  03-21    PT/INR - ( 21 Mar 2021 06:56 )   PT: 13.6 sec;   INR: 1.19 ratio   PTT - ( 21 Mar 2021 06:56 )  PTT:36.3 sec    Culture - Blood in AM (03.19.21 @ 09:59)    Specimen Source: .Blood Blood-Venous    Culture Results:   No growth to date.    Culture - Blood in AM (03.19.21 @ 09:59)    Specimen Source: .Blood Blood-Peripheral    Culture Results:   No growth to date.    RADIOLOGY & ADDITIONAL TESTS:  Culture - Blood in AM (03.17.21 @ 06:30)    Gram Stain:   Growth in anaerobic bottle: Gram Variable Rods    -  Bacillus cereus group: Detec    Specimen Source: .Blood Blood-Peripheral    Organism: Blood Culture PCR    Culture Results:   Growth in anaerobic bottle: Bacillus species not anthracis  "Susceptibilities not performed"  ***Blood Panel PCR results on this specimen are available  approximately 3 hours after the Gram stain result.***  Gram stain, PCR, and/or culture results may not always  correspond due to difference in methodologies.  ************************************************************  This PCR assay was performed by multiplex PCR. This  Assay tests for 66 bacterial and resistance gene targets.  Please refer to the [facility] test directory  at [email or web address] for details.    Organism Identification: Blood Culture PCR    Method Type: PCR

## 2021-03-22 NOTE — PROGRESS NOTE ADULT - ASSESSMENT
55M w/ metastatic colon cancer, LBO s/p stenting by GI now pending operative intervention    - BCx from 3/19 preliminary negative - will f/u final results  - surgery tentatively scheduled for possibley Friday, pending 3/19 bcx final results  - Discussed w/ Dr. Ravin Davila PGY-3  Surgery, A-Team  Pager: [phone]

## 2021-03-22 NOTE — PROGRESS NOTE ADULT - PROBLEM SELECTOR PLAN 2
Resolved, s/p stent.   - 1 week hx abdominal pain/distension, N/V in the setting of metastatic colon cancer   - CT abd/p LBO involving the ascending and transverse colon measuring up to 8 cm secondary to the colonic mass seen in the distal transverse colon. In addition, the jejunum and terminal ileum are also dilated  - S/p 2L bolus in the ED, c/w IVF hydration   - NG tube placed for decompression--> removed  - s/p colonic stent  - on soft diet --> regular, tolerating well.   - Dr. Alicia made aware for discussion of operative intervention given pt may be precluded from chemo due to stent and oncology strongly recommends chemo in the future  -CR surgery are following, surgery tentatively scheduled for next week, pending negative cultures, will covid swab on Edgar 3/21 3/13 developed sepsis with tachycardia and fever to 101.3   - started on zosyn--> CTX -->cipro (day 10/14)  - bcx growing klebsiella possibly after GI procedure. 3/15 BCx NGTD  - repeat BCx 3/19 NGTD, surgery pending final.  - ID consulted, appreciate recs.

## 2021-03-22 NOTE — PROGRESS NOTE ADULT - SUBJECTIVE AND OBJECTIVE BOX
General Surgery Progress Note     S: Pt seen and examined at bedside during AM rounds  - Pt states that pain is minimal  - GI fxn ++  - denies f/c, n/v, CP, abd pain, SOB, lightheadedness.     O:    ***PHYSICAL EXAM***    Gen: NAD, laying in bed  HEENT: NC/AT  RESP: nonlabored breathing  Abdominal: Soft, minimally distended, non-tender, no rebound,      MEDICATIONS  (STANDING):  ascorbic acid 500 milliGRAM(s) Oral daily  cefTRIAXone   IVPB 1000 milliGRAM(s) IV Intermittent every 24 hours  chlorhexidine 4% Liquid 1 Application(s) Topical daily  enoxaparin Injectable 40 milliGRAM(s) SubCutaneous daily  ferrous    sulfate 325 milliGRAM(s) Oral daily  influenza   Vaccine 0.5 milliLiter(s) IntraMuscular once  pantoprazole    Tablet 40 milliGRAM(s) Oral before breakfast  vancomycin  IVPB 1000 milliGRAM(s) IV Intermittent every 12 hours    MEDICATIONS  (PRN):  morphine  IR 15 milliGRAM(s) Oral every 4 hours PRN Moderate / Severe Pain (4 - 10)  ondansetron Injectable 4 milliGRAM(s) IV Push every 6 hours PRN Nausea and/or Vomiting          Vital Signs Last 24 Hrs  T(C): 36.7 (22 Mar 2021 06:18), Max: 37 (21 Mar 2021 15:53)  T(F): 98.1 (22 Mar 2021 06:18), Max: 98.6 (21 Mar 2021 15:53)  HR: 97 (22 Mar 2021 06:18) (85 - 99)  BP: 112/66 (22 Mar 2021 06:18) (101/74 - 112/66)  BP(mean): --  RR: 16 (22 Mar 2021 06:18) (16 - 17)  SpO2: 100% (22 Mar 2021 06:18) (100% - 100%)          LABS:                        8.5    8.03  )-----------( 475      ( 21 Mar 2021 06:56 )             28.0     03-21    138  |  102  |  13  ----------------------------<  93  4.0   |  27  |  0.43<L>    Ca    8.4      21 Mar 2021 06:56  Phos  3.4     03-21  Mg     2.3     03-21    TPro  6.3  /  Alb  2.7<L>  /  TBili  0.2  /  DBili  x   /  AST  26  /  ALT  13  /  AlkPhos  159<H>  03-21

## 2021-03-22 NOTE — PROGRESS NOTE ADULT - PROBLEM SELECTOR PLAN 4
- Pt is currently medically optimized for colorectal surgery  - EKG in chart  - Revised Cardiac Risk Index: Based on RCRI, pt has 6% 30-day risk of death, cardiac arrest, MI due to intraperitoneal surgery  -However, pt has poor nutritional status which likely have a affect on wound healing and overall prognosis. Metastatic to liver and lungs, s/p first session of palliative folfox (2/16/21) at [facility]  - CEA - 2882, PSA - WNL, CA 19-9 -2882 and AFP - 2775  - CT abd/p showing hepatic mets with some that have increased in size since 1/2021, enlarged prostate, along with a 1.3 x 0.9 cm right lower lobe nodule concerning for metastatic disease  - Pain control regimen - Morphine 15 mg PO prn   - Palliative consulted for pain control and GOC given metastatic cancer, appreciate recs.  - oncology following, appreciate recs

## 2021-03-22 NOTE — PROGRESS NOTE ADULT - PROBLEM SELECTOR PLAN 1
- 3/13 pt septic overnight with tachycardia and fever to 101.3   - started on zosyn, switched to CTX (day 9)  - bcx growing klebsiella possibly after GI procedure. 3/15 BCx NGTD, however Bcx 3/17 growing B. cereus - cw Vanco 1000g q12h (day 4)  - symptomatically pt is doing well, diarrhea improving and no fever, asymptomatic.  - repeat BCx 3/19 NGTD  - f/u vanco trough  - ID consulted, appreciate recs. - 3/13 pt septic overnight with tachycardia and fever to 101.3   - started on zosyn, switched to CTX (day 10)  - bcx growing klebsiella possibly after GI procedure. 3/15 BCx NGTD, however Bcx 3/17 growing B. cereus - cw Vanco 1000g q12h (day 5)  - symptomatically pt is doing well, diarrhea improving and no fever, asymptomatic.  - repeat BCx 3/19 NGTD  - f/u vanco trough  - ID consulted, appreciate recs. - 3/13 pt septic overnight with tachycardia and fever to 101.3   - started on zosyn, switched to CTX (day 10/14)  - bcx growing klebsiella possibly after GI procedure. 3/15 BCx NGTD, however Bcx 3/17 growing B. cereus - cw Vanco 1000g q12h (day 5)  - symptomatically pt is doing well, diarrhea improving and no fever, asymptomatic.  - repeat BCx 3/19 NGTD, surgery pending final.  - f/u vanco trough  - ID consulted, appreciate recs. Noted after episodes of diarrhea, blood cultures for clearance of Kleb on 3/17 were + for B cereus in blood likely GI translocation in setting of colonic mass and stent  - s/ vanco x 5d, now ID rec cipro/flagyl   - culture 3/19 NG to date  - ID following, recs appreciated

## 2021-03-22 NOTE — PROGRESS NOTE ADULT - PROBLEM SELECTOR PLAN 3
- Metastatic to liver and lungs, s/p first session of palliative folfox (2/16/21) at [facility]  - CEA - 2882, PSA - WNL, CA 19-9 -2882 and AFP - 2775  - CT abd/p showing hepatic mets with some that have increased in size since 1/2021, enlarged prostate, along with a 1.3 x 0.9 cm right lower lobe nodule concerning for metastatic disease  - Pain control regimen - Morphine 4 mg IV q4 PRN   - Palliative consulted for pain control and GOC given metastatic cancer, appreciate recs.  - oncology following, appreciate recs and pain control. 1 week hx abdominal pain/distension, N/V in the setting of metastatic colon cancer   - CT abd/p LBO involving the ascending and transverse colon measuring up to 8 cm secondary to the colonic mass seen in the distal transverse colon. In addition, the jejunum and terminal ileum are also dilated  - NG tube placed for decompression--> removed  - s/p colonic stent, now passing BMs  - on soft diet --> regular, tolerating well.   - Dr. Alicia made aware for discussion of operative intervention given pt may be precluded from chemo due to stent and oncology strongly recommends chemo in the future  -CR surgery are following, surgery tentatively scheduled later this week

## 2021-03-22 NOTE — PROGRESS NOTE ADULT - PROBLEM SELECTOR PROBLEM 1
Gram negative septicemia Bacillus cereus, Bacillus mycoides, or Bacillus thuringiensis identified by diagnostic testing

## 2021-03-23 PROCEDURE — 99232 SBSQ HOSP IP/OBS MODERATE 35: CPT | Mod: GC

## 2021-03-23 RX ADMIN — Medication 500 MILLIGRAM(S): at 11:24

## 2021-03-23 RX ADMIN — Medication 500 MILLIGRAM(S): at 05:43

## 2021-03-23 RX ADMIN — Medication 500 MILLIGRAM(S): at 17:00

## 2021-03-23 RX ADMIN — Medication 325 MILLIGRAM(S): at 11:24

## 2021-03-23 RX ADMIN — CHLORHEXIDINE GLUCONATE 1 APPLICATION(S): 213 SOLUTION TOPICAL at 11:24

## 2021-03-23 RX ADMIN — PANTOPRAZOLE SODIUM 40 MILLIGRAM(S): 20 TABLET, DELAYED RELEASE ORAL at 05:43

## 2021-03-23 RX ADMIN — ENOXAPARIN SODIUM 40 MILLIGRAM(S): 100 INJECTION SUBCUTANEOUS at 05:43

## 2021-03-23 NOTE — PROGRESS NOTE ADULT - PROBLEM SELECTOR PLAN 1
Noted after episodes of diarrhea, blood cultures for clearance of Kleb on 3/17 were + for B cereus in blood likely GI translocation in setting of colonic mass and stent  - s/ vanco x 5d, now ID rec cipro/flagyl (until 3/29)  - culture 3/19 NGTD  - ID following, recs appreciated

## 2021-03-23 NOTE — PROGRESS NOTE ADULT - PROBLEM SELECTOR PLAN 5
- Pt is currently medically optimized for colorectal surgery  - EKG in chart, TTE wnl  - Revised Cardiac Risk Index 0  - poor nutritional status which likely have an affect on wound healing and overall prognosis.

## 2021-03-23 NOTE — PROGRESS NOTE ADULT - PROBLEM SELECTOR PLAN 4
Metastatic to liver and lungs, s/p first session of palliative folfox (2/16/21) at [facility]  - CEA - 2882, PSA - WNL, CA 19-9 -2882 and AFP - 2775  - CT abd/p showing hepatic mets with some that have increased in size since 1/2021, enlarged prostate, along with a 1.3 x 0.9 cm right lower lobe nodule concerning for metastatic disease  - Pain control regimen - Morphine 15 mg PO prn   - Palliative consulted for pain control and GOC given metastatic cancer, appreciate recs.  - oncology following, appreciate recs

## 2021-03-23 NOTE — PROGRESS NOTE ADULT - ATTENDING COMMENTS
Patient seen and examined care d/w HS4.    56 yo M with stage 4 colon cancer (mets to liver and lungs, received palliative folfox 2/16/21) presenting with abdominal pain, N/V, and distension. Admitted for medical management of LBO due to obstruction 2/2 transverse colon mass.     # B Cereus: bx on 3/17, had diarrheal illness prior, likely translocation, now cipro/flagyl per ID, 3/19 remains NG    # Kleb bacteremia: had sepsis (febrile and tachycardic) on 3/13 presumed GI source. UA negative. BCx growing klebsiella. GI PCR negative. Blood Cx on 3/15 no growth to date.  zosyn --> ctx 1g q24h, D10/10, now on cipro/flagyl through 3/29    #Large bowel obstruction, s/p colonic stent by GI, tolerating diet, having stool output, diarrhea resolved, surgery this Firday  pending clearance of blood cx, sent 3/19 thus far NG likely will be final Wednesday. Pre-op, good METS (was working up till January) w/ no cardiac complaints, repeat EKG, RCRI score of 1, patient is medically optimized and may proceed w/ OR as planned. TTE wnl.     #Metastatic colon CA: no plans for inpatient chemo, c/w PO pain control     #Anemia of chronic disease, monitor hgb     Dispo: pending OR friday, on abx for B cereus  Encourage PO intake

## 2021-03-23 NOTE — PROGRESS NOTE ADULT - ASSESSMENT
55 M with hx unresectable stage 4 colon cancer (mets to liver and lungs, received palliative folfox 2/16/21) presenting with abdominal pain, N/V, and distension. Admitted for medical management of LBO due to obstruction 2/2 transverse colon mass. Course c/b klebsiella and B [place] bacteremia pending colectomy.

## 2021-03-23 NOTE — PROGRESS NOTE ADULT - PROBLEM SELECTOR PLAN 6
-Hgb slightly dropped overnight, no overt bleeding, will f/u cbc tomorrow.   - Prior iron studies cw iron def Anemia plus AOCD   - Monitor H/H and transfuse to keep Hgb > 7  - c/w PO iron and vit c

## 2021-03-23 NOTE — PROGRESS NOTE ADULT - SUBJECTIVE AND OBJECTIVE BOX
Shamar Martin, PGY-1  Pager: [phone] / [phone]    CHIEF COMPLAINT: Patient is a 55y old  Male who presents with a chief complaint of abdominal pain (23 Mar 2021 07:14)    INTERVAL HPI/OVERNIGHT EVENTS: CT, pt was seen comfortable at bedside. He denies any cp, sob abd pain, N/V. He is tolerating his diet well, had 1 solid BM yesterday. No chills/fever.    MEDICATIONS (STANDING):  ascorbic acid 500 milliGRAM(s) Oral daily  chlorhexidine 4% Liquid 1 Application(s) Topical daily  ciprofloxacin     Tablet 500 milliGRAM(s) Oral every 12 hours  enoxaparin Injectable 40 milliGRAM(s) SubCutaneous daily  ferrous    sulfate 325 milliGRAM(s) Oral daily  influenza   Vaccine 0.5 milliLiter(s) IntraMuscular once  metroNIDAZOLE    Tablet 500 milliGRAM(s) Oral every 12 hours  pantoprazole    Tablet 40 milliGRAM(s) Oral before breakfast    MEDICATIONS  (PRN):  acetaminophen   Tablet .. 650 milliGRAM(s) Oral every 6 hours PRN  morphine  IR 15 milliGRAM(s) Oral every 4 hours PRN  ondansetron Injectable 4 milliGRAM(s) IV Push every 6 hours PRN    REVIEW OF SYSTEMS:  CONSTITUTIONAL: No fever, weight loss, or fatigue  RESPIRATORY: No cough, wheezing, chills or hemoptysis; No shortness of breath  CARDIOVASCULAR: No chest pain, palpitations, dizziness, or leg swelling  GASTROINTESTINAL: No abdominal or epigastric pain. No nausea, vomiting, or hematemesis; No diarrhea or constipation. No melena or hematochezia.  GENITOURINARY: No dysuria, frequency, hematuria, or incontinence  NEUROLOGICAL: No headaches, memory loss, loss of strength, numbness, or tremors    VITAL SIGNS:  T(F): 98.3 (03-23-21 @ 05:40), Max: 98.3 (03-23-21 @ 05:40)  HR: 100 (03-23-21 @ 05:40) (90 - 100)  BP: 106/74 (03-23-21 @ 05:40) (106/74 - 119/78)  RR: 17 (03-23-21 @ 05:40) (17 - 18)  SpO2: 100% (03-23-21 @ 05:40) (100% - 100%)    PHYSICAL EXAM:  GENERAL: comfortable, pale, chronically ill appearing. thin.   HEENT:  Atraumatic, Normocephalic  CHEST/LUNG: Chest rise equal bilaterally. CTAB.   HEART: tachycardic, no murmurs   ABDOMEN: Abdomen non-distended and soft, nontender.   EXTREMITIES:  2+ Peripheral Pulses.  PSYCH: A&Ox3  SKIN: Pale, vitiligo, port on R side of chest, area non tender no erythma/swelling      LABS:                        8.6    9.31  )-----------( 517      ( 22 Mar 2021 08:32 )             28.8         Culture - Blood in AM (03.19.21 @ 09:59)    Specimen Source: .Blood Blood-Venous    Culture Results:   No growth to date.      RADIOLOGY & ADDITIONAL TESTS:  Culture - Blood in AM (03.19.21 @ 09:59)    Specimen Source: .Blood Blood-Peripheral    Culture Results:   No growth to date.     Shamar Martin, PGY-1  Pager: [phone] / [phone]    CHIEF COMPLAINT: Patient is a 55y old  Male who presents with a chief complaint of abdominal pain (23 Mar 2021 07:14)    INTERVAL HPI/OVERNIGHT EVENTS: CT, pt was seen comfortable at bedside. He denies any cp, sob abd pain, N/V. He is tolerating his diet well, had 1 solid BM yesterday. No chills/fever.    MEDICATIONS (STANDING):  ascorbic acid 500 milliGRAM(s) Oral daily  chlorhexidine 4% Liquid 1 Application(s) Topical daily  ciprofloxacin     Tablet 500 milliGRAM(s) Oral every 12 hours  enoxaparin Injectable 40 milliGRAM(s) SubCutaneous daily  ferrous    sulfate 325 milliGRAM(s) Oral daily  influenza   Vaccine 0.5 milliLiter(s) IntraMuscular once  metroNIDAZOLE    Tablet 500 milliGRAM(s) Oral every 12 hours  pantoprazole    Tablet 40 milliGRAM(s) Oral before breakfast    MEDICATIONS  (PRN):  acetaminophen   Tablet .. 650 milliGRAM(s) Oral every 6 hours PRN  morphine  IR 15 milliGRAM(s) Oral every 4 hours PRN  ondansetron Injectable 4 milliGRAM(s) IV Push every 6 hours PRN    REVIEW OF SYSTEMS:  CONSTITUTIONAL: No fever, weight loss, or fatigue  RESPIRATORY: No cough, wheezing, chills or hemoptysis; No shortness of breath  CARDIOVASCULAR: No chest pain, palpitations, dizziness, or leg swelling  GASTROINTESTINAL: No abdominal or epigastric pain. No nausea, vomiting, or hematemesis; No diarrhea or constipation. No melena or hematochezia.  GENITOURINARY: No dysuria, frequency, hematuria, or incontinence  NEUROLOGICAL: No headaches, memory loss, loss of strength, numbness, or tremors    VITAL SIGNS:  T(F): 98.3 (03-23-21 @ 05:40), Max: 98.3 (03-23-21 @ 05:40)  HR: 100 (03-23-21 @ 05:40) (90 - 100)  BP: 106/74 (03-23-21 @ 05:40) (106/74 - 119/78)  RR: 17 (03-23-21 @ 05:40) (17 - 18)  SpO2: 100% (03-23-21 @ 05:40) (100% - 100%)    PHYSICAL EXAM:  GENERAL: comfortable, pale, chronically ill appearing. thin.   HEENT:  Atraumatic, Normocephalic  CHEST/LUNG: Chest rise equal bilaterally. CTAB.   HEART: tachycardic, no murmurs   ABDOMEN: Abdomen non-distended and soft, nontender.   EXTREMITIES:  2+ Peripheral Pulses.  PSYCH: A&Ox3  SKIN: Pale, vitiligo, port on R side of chest, area non tender no erythma/swelling      LABS:                        8.6    9.31  )-----------( 517      ( 22 Mar 2021 08:32 )             28.8       Culture - Blood in AM (03.19.21 @ 09:59)    Specimen Source: .Blood Blood-Venous    Culture Results:   No growth to date.    RADIOLOGY & ADDITIONAL TESTS:  Culture - Blood in AM (03.19.21 @ 09:59)    Specimen Source: .Blood Blood-Peripheral    Culture Results:   No growth to date.

## 2021-03-23 NOTE — PROGRESS NOTE ADULT - PROBLEM SELECTOR PLAN 3
1 week hx abdominal pain/distension, N/V in the setting of metastatic colon cancer   - CT abd/p LBO involving the ascending and transverse colon measuring up to 8 cm secondary to the colonic mass seen in the distal transverse colon. In addition, the jejunum and terminal ileum are also dilated  - NG tube placed for decompression--> removed  - s/p colonic stent, now passing BMs  - on soft diet --> regular, tolerating well.   - Dr. Alicia made aware for discussion of operative intervention given pt may be precluded from chemo due to stent and oncology strongly recommends chemo in the future  -CR surgery are following, surgery tentatively scheduled later this week

## 2021-03-23 NOTE — PROGRESS NOTE ADULT - PROBLEM SELECTOR PLAN 2
3/13 developed sepsis with tachycardia and fever to 101.3   - started on zosyn--> CTX -->cipro (until 3/29)  - bcx growing klebsiella possibly after GI procedure. 3/15 BCx NGTD  - repeat BCx 3/19 NGTD, surgery pending final.  - ID consulted, appreciate recs.

## 2021-03-24 LAB
CULTURE RESULTS: SIGNIFICANT CHANGE UP
CULTURE RESULTS: SIGNIFICANT CHANGE UP
SARS-COV-2 RNA SPEC QL NAA+PROBE: SIGNIFICANT CHANGE UP
SPECIMEN SOURCE: SIGNIFICANT CHANGE UP
SPECIMEN SOURCE: SIGNIFICANT CHANGE UP

## 2021-03-24 PROCEDURE — 99232 SBSQ HOSP IP/OBS MODERATE 35: CPT

## 2021-03-24 PROCEDURE — 99232 SBSQ HOSP IP/OBS MODERATE 35: CPT | Mod: GC

## 2021-03-24 RX ADMIN — PANTOPRAZOLE SODIUM 40 MILLIGRAM(S): 20 TABLET, DELAYED RELEASE ORAL at 05:15

## 2021-03-24 RX ADMIN — Medication 325 MILLIGRAM(S): at 12:23

## 2021-03-24 RX ADMIN — Medication 500 MILLIGRAM(S): at 05:15

## 2021-03-24 RX ADMIN — CHLORHEXIDINE GLUCONATE 1 APPLICATION(S): 213 SOLUTION TOPICAL at 12:23

## 2021-03-24 RX ADMIN — Medication 500 MILLIGRAM(S): at 17:53

## 2021-03-24 RX ADMIN — Medication 500 MILLIGRAM(S): at 12:23

## 2021-03-24 RX ADMIN — ENOXAPARIN SODIUM 40 MILLIGRAM(S): 100 INJECTION SUBCUTANEOUS at 05:15

## 2021-03-24 NOTE — PROGRESS NOTE ADULT - PROBLEM SELECTOR PLAN 3
1 week hx abdominal pain/distension, N/V in the setting of metastatic colon cancer   - CT abd/p LBO involving the ascending and transverse colon measuring up to 8 cm secondary to the colonic mass seen in the distal transverse colon. In addition, the jejunum and terminal ileum are also dilated  - NG tube placed for decompression--> removed  - s/p colonic stent, now passing BMs  - on soft diet --> regular, tolerating well.   - Dr. Alicia made aware for discussion of operative intervention given pt may be precluded from chemo due to stent and oncology strongly recommends chemo in the future  -CR surgery are following, surgery tentatively scheduled later this week 1 week hx abdominal pain/distension, N/V in the setting of metastatic colon cancer   - CT abd/p LBO involving the ascending and transverse colon measuring up to 8 cm secondary to the colonic mass seen in the distal transverse colon. In addition, the jejunum and terminal ileum are also dilated  - NG tube placed for decompression--> removed  - s/p colonic stent, now passing BMs  - on soft diet --> regular, tolerating well.   - Dr. Alicia made aware for discussion of operative intervention given pt may be precluded from chemo due to stent and oncology strongly recommends chemo in the future  -CR surgery are following, surgery tentatively scheduled for Friday 3/26 pending final cultures and ID clearance.

## 2021-03-24 NOTE — PROGRESS NOTE ADULT - SUBJECTIVE AND OBJECTIVE BOX
CC: F/U for Bacteremia    Saw/spoke to patient. No fevers, no chills. Doing well.    Allergies  No Known Allergies    ANTIMICROBIALS:  ciprofloxacin     Tablet 500 every 12 hours  metroNIDAZOLE    Tablet 500 every 12 hours    PE:    Vital Signs Last 24 Hrs  T(C): 37.2 (24 Mar 2021 12:20), Max: 37.2 (24 Mar 2021 12:20)  T(F): 98.9 (24 Mar 2021 12:20), Max: 98.9 (24 Mar 2021 12:20)  HR: 100 (24 Mar 2021 12:20) (96 - 100)  BP: 110/76 (24 Mar 2021 12:20) (97/70 - 110/76)  RR: 18 (24 Mar 2021 12:20) (17 - 18)  SpO2: 100% (24 Mar 2021 12:20) (100% - 100%)    Gen: AOx3, NAD, non-toxic, pleasant  CV: S1+S2 normal, tachycardic  Resp: Clear bilat, no resp distress, no crackles/wheezes  Abd: Soft, nontender, +BS  Ext: No LE edema, no wounds    LABS:    No new available    MICROBIOLOGY:    .Blood Blood-Peripheral  03-19-21   No Growth Final      .Blood Blood-Peripheral  03-17-21   Growth in anaerobic bottle: Bacillus species not anthracis  "Susceptibilities not performed"  ***Blood Panel PCR results on this specimen are available  approximately 3 hours after the Gram stain result.***  Gram stain, PCR, and/or culture results may not always  correspond due to difference in methodologies.  ************************************************************  This PCR assay was performed by multiplex PCR. This  Assay tests for 66 bacterial and resistance gene targets.  Please refer to the [facility] test directory  at [email or web address] for details.  --  Blood Culture PCR    .Stool Feces  03-13-21   GI PCR Results: NOT detected  *******Please Note:*******  GI panel PCR evaluates for:  Campylobacter, Plesiomonas shigelloides, Salmonella,  Vibrio, Yersinia enterocolitica, Enteroaggregative  Escherichia coli (EAEC), Enteropathogenic E.coli (EPEC),  Enterotoxigenic E. coli (ETEC) lt/st, Shiga-like  toxin-producing E. coli (STEC) stx1/stx2,  Shigella/ Enteroinvasive E. coli (EIEC), Cryptosporidium,  Cyclospora cayetanensis, Entamoeba histolytica,  Giardia lamblia, Adenovirus F 40/41, Astrovirus,  Norovirus GI/GII, Rotavirus A, Sapovirus  --  --    .Blood Blood-Peripheral  03-13-21   Growth in anaerobic bottle: Klebsiella variicola  ***Blood Panel PCR results on this specimen are available  approximately 3 hours after the Gram stain result.***  Gram stain, PCR, and/or culture results may not always  correspond due to differencein methodologies.  ************************************************************  This PCR assay was performed by multiplex PCR. This  Assay tests for 66 bacterial and resistance gene targets.  Please refer to the [facility] test directory  at [email or web address] for details.  --  Blood Culture PCR  Klebsiella variicola    (otherwise reviewed)    RADIOLOGY:    3/11 CXR:    INTERPRETATION:    The heart is not enlarged. An accessed right IJ approach port is unchanged in position.  Enteric tube tip is in the stomach and side port in the region of the distal esophagus/GE junction, unchanged.  The lungs are clear.  No pleural effusion or pneumothorax seen.  No acute bony abnormality.      IMPRESSION:  Enteric tube tip is in the stomach and side port in the region of the distal esophagus/GE junction. Dr. Rubio informed me when I spoke with him at 1:43 PM on March 11, 2021 that the tube has been removed.

## 2021-03-24 NOTE — PROGRESS NOTE ADULT - ASSESSMENT
55 M with hx unresectable stage 4 colon cancer (mets to liver and lungs, received palliative folfox 2/16/21) follows with [facility] here for worsening nausea, vomiting (NBNB), abdominal distention  Prior fevers, no leukocytosis  CXR clear  Most recent CT A/P large bowel obstruction, liver mets  Prior episode of Klebsiella bacteremia, clear as of 3/15/21  BCX 3/17 BCX B cereus, repeat BCXs NGTD  Mediport in place  Patient generally well appearing, although chronically ill  Overall,  1) B cereus bacteremia  - Presumably GI source in setting of large bowel obstruction, lower suspicion catheter; anticipate S to cipro  - Cipro 500mg q 12  - Flagyl 500mg q 12  - Repeat EKG to monitor QTC (cipro generally with minimal QTC effect)  2) Klebsiella bacteremia  - Likely transient from abd process  - Continue Cipro as above  - Monitor BCXs  3) Large Bowel Obstruction/Malignancy  - Further care per primary team  - Planned for OR with surgery team on 3/26. From ID perspective appears infection under control, reasonable to proceed with OR Procedure as long as BCX remain negative and no further signs sepsis/infection in the interim    Simon Carney MD  Pager [phone]  After 5pm and on weekends call [phone]

## 2021-03-24 NOTE — PROGRESS NOTE ADULT - SUBJECTIVE AND OBJECTIVE BOX
Shamar Martin, PGY-1  Pager: [phone] / [phone]    CHIEF COMPLAINT: Patient is a 55y old  Male who presents with a chief complaint of abdominal pain (24 Mar 2021 07:03)    INTERVAL HPI/ OVERNIGHT EVENTS: CT, pt was seen comfortable at bedside. He denies any cp, sob abd pain, N/V. He is tolerating his diet well, had 1 solid BM this AM. No chills/fever.  Offered again to update his family however pt said he is updating them.     MEDICATIONS (STANDING):  ascorbic acid 500 milliGRAM(s) Oral daily  chlorhexidine 4% Liquid 1 Application(s) Topical daily  ciprofloxacin     Tablet 500 milliGRAM(s) Oral every 12 hours  enoxaparin Injectable 40 milliGRAM(s) SubCutaneous daily  ferrous    sulfate 325 milliGRAM(s) Oral daily  influenza   Vaccine 0.5 milliLiter(s) IntraMuscular once  metroNIDAZOLE    Tablet 500 milliGRAM(s) Oral every 12 hours  pantoprazole    Tablet 40 milliGRAM(s) Oral before breakfast    MEDICATIONS  (PRN):  acetaminophen   Tablet .. 650 milliGRAM(s) Oral every 6 hours PRN  morphine  IR 15 milliGRAM(s) Oral every 4 hours PRN  ondansetron Injectable 4 milliGRAM(s) IV Push every 6 hours PRN    REVIEW OF SYSTEMS:  CONSTITUTIONAL: No fever, weight loss, or fatigue  EYES: No eye pain, visual disturbances, or discharge  ENMT:  No difficulty hearing, tinnitus, vertigo; No sinus or throat pain  NECK: No pain or stiffness  RESPIRATORY: No cough, wheezing, chills or hemoptysis; No shortness of breath  CARDIOVASCULAR: No chest pain, palpitations, dizziness, or leg swelling  GASTROINTESTINAL: No abdominal or epigastric pain. No nausea, vomiting, or hematemesis; No diarrhea or constipation. No melena or hematochezia.  GENITOURINARY: No dysuria, frequency, hematuria, or incontinence  NEUROLOGICAL: No headaches, memory loss, loss of strength, numbness, or tremors  SKIN: No itching, burning, rashes, or lesions   MUSCULOSKELETAL: No joint pain or swelling; No muscle, back, or extremity pain    VITAL SIGNS:  T(F): 98 (03-24-21 @ 05:09), Max: 98 (03-23-21 @ 12:31)  HR: 96 (03-24-21 @ 05:09) (83 - 97)  BP: 103/73 (03-24-21 @ 05:09) (97/70 - 108/74)  RR: 17 (03-24-21 @ 05:09) (17 - 18)  SpO2: 100% (03-24-21 @ 05:09) (100% - 100%)    PHYSICAL EXAM:  GENERAL: comfortable, pale, chronically ill appearing. thin.   HEENT:  Atraumatic, Normocephalic  CHEST/LUNG: Chest rise equal bilaterally. CTAB.   HEART: tachycardic, no murmurs   ABDOMEN: Abdomen non-distended and soft, nontender.   EXTREMITIES:  2+ Peripheral Pulses.  PSYCH: A&Ox3  SKIN: Pale, vitiligo, port on R side of chest, area non tender no erythma/swelling    LABS:    Culture - Blood in AM (03.19.21 @ 09:59)    Specimen Source: .Blood Blood-Venous    Culture Results:   No growth to date.    Culture - Blood in AM (03.19.21 @ 09:59)    Specimen Source: .Blood Blood-Peripheral    Culture Results:   No growth to date.

## 2021-03-24 NOTE — PROGRESS NOTE ADULT - PROBLEM SELECTOR PLAN 5
- Pt is currently medically optimized for colorectal surgery  - EKG in chart, TTE wnl  - Revised Cardiac Risk Index 0  - poor nutritional status which likely have an affect on wound healing and overall prognosis. - Pt is currently medically optimized for colorectal surgery  - EKG in chart, TTE wnl  - Revised Cardiac Risk Index 0  - poor nutritional status which likely have an affect on wound healing and overall prognosis.  -Will reach out to ID for clearance.

## 2021-03-24 NOTE — PROGRESS NOTE ADULT - PROBLEM SELECTOR PLAN 4
Metastatic to liver and lungs, s/p first session of palliative folfox (2/16/21) at [facility]  - CEA - 2882, PSA - WNL, CA 19-9 -2882 and AFP - 2775  - CT abd/p showing hepatic mets with some that have increased in size since 1/2021, enlarged prostate, along with a 1.3 x 0.9 cm right lower lobe nodule concerning for metastatic disease  - Pain control regimen - Morphine 15 mg PO prn   - Palliative consulted for pain control and GOC given metastatic cancer, appreciate recs.  - oncology following, appreciate recs

## 2021-03-24 NOTE — PROGRESS NOTE ADULT - ATTENDING COMMENTS
Patient seen and examined care d/w HS4.    54 yo M with stage 4 colon cancer (mets to liver and lungs, received palliative folfox 2/16/21) presenting with abdominal pain, N/V, and distension. Admitted for medical management of LBO due to obstruction 2/2 transverse colon mass.     # B Cereus: bx on 3/17, had diarrheal illness prior, likely translocation, now cipro/flagyl per ID, 3/19 NG final     # Kleb bacteremia: had sepsis (febrile and tachycardic) on 3/13 presumed GI source. UA negative. BCx growing klebsiella. GI PCR negative. Blood Cx on 3/15 no growth to date.  zosyn --> ctx 1g q24h, D10/10, now on cipro/flagyl through 3/29    #Large bowel obstruction, s/p colonic stent by GI, tolerating diet, having stool output, diarrhea resolved, surgery this Firday  pending clearance of blood cx, sent 3/19 thus far NG likely will be final Wednesday. Pre-op, good METS (was working up till January) w/ no cardiac complaints, repeat EKG, RCRI score of 1, patient is medically optimized and may proceed w/ OR as planned. TTE wnl.     #Metastatic colon CA: no plans for inpatient chemo, c/w PO pain control     #Anemia of chronic disease, monitor hgb     Dispo: pending OR friday, on abx for B cereus  Encourage PO intake Patient seen and examined care d/w HS4.    56 yo M with stage 4 colon cancer (mets to liver and lungs, received palliative folfox 2/16/21) presenting with abdominal pain, N/V, and distension. Admitted for medical management of LBO due to obstruction 2/2 transverse colon mass.     # B Cereus: bx on 3/17, had diarrheal illness prior, likely translocation, now cipro/flagyl per ID, 3/19 NG final     # Kleb bacteremia: had sepsis (febrile and tachycardic) on 3/13 presumed GI source. UA negative. BCx growing klebsiella. GI PCR negative. Blood Cx on 3/15 no growth to date.  zosyn --> ctx 1g q24h, D10/10, now on cipro/flagyl through 3/29    #Large bowel obstruction, s/p colonic stent by GI, tolerating diet, having stool output, diarrhea resolved, surgery this Firday  pending clearance of blood cx 3/19 x 2 NG final. Pre-op, good METS (was working up till January) w/ no cardiac complaints, repeat EKG, RCRI score of 1, patient is medically optimized and may proceed w/ OR as planned. TTE wnl.     #Metastatic colon CA: no plans for inpatient chemo, c/w PO pain control     #Anemia of chronic disease, monitor hgb     Dispo: pending OR friday, on abx for B cereus  Encourage PO intake

## 2021-03-24 NOTE — PROGRESS NOTE ADULT - ASSESSMENT
55 M with hx unresectable stage 4 colon cancer (mets to liver and lungs, received palliative folfox 2/16/21) presenting with abdominal pain, N/V, and distension. Admitted for medical management of LBO due to obstruction 2/2 transverse colon mass. Course c/b klebsiella and B [place] bacteremia pending colectomy.

## 2021-03-25 LAB
ANION GAP SERPL CALC-SCNC: 8 MMOL/L — SIGNIFICANT CHANGE UP (ref 7–14)
APTT BLD: 36.1 SEC — SIGNIFICANT CHANGE UP (ref 27–36.3)
BLD GP AB SCN SERPL QL: NEGATIVE — SIGNIFICANT CHANGE UP
BUN SERPL-MCNC: 15 MG/DL — SIGNIFICANT CHANGE UP (ref 7–23)
CALCIUM SERPL-MCNC: 9.1 MG/DL — SIGNIFICANT CHANGE UP (ref 8.4–10.5)
CHLORIDE SERPL-SCNC: 102 MMOL/L — SIGNIFICANT CHANGE UP (ref 98–107)
CO2 SERPL-SCNC: 26 MMOL/L — SIGNIFICANT CHANGE UP (ref 22–31)
CREAT SERPL-MCNC: 0.53 MG/DL — SIGNIFICANT CHANGE UP (ref 0.5–1.3)
GLUCOSE SERPL-MCNC: 115 MG/DL — HIGH (ref 70–99)
HCT VFR BLD CALC: 29.2 % — LOW (ref 39–50)
HGB BLD-MCNC: 8.8 G/DL — LOW (ref 13–17)
INR BLD: 1.18 RATIO — HIGH (ref 0.88–1.16)
MAGNESIUM SERPL-MCNC: 2.2 MG/DL — SIGNIFICANT CHANGE UP (ref 1.6–2.6)
MCHC RBC-ENTMCNC: 22.7 PG — LOW (ref 27–34)
MCHC RBC-ENTMCNC: 30.1 GM/DL — LOW (ref 32–36)
MCV RBC AUTO: 75.3 FL — LOW (ref 80–100)
NRBC # BLD: 0 /100 WBCS — SIGNIFICANT CHANGE UP
NRBC # FLD: 0 K/UL — SIGNIFICANT CHANGE UP
PHOSPHATE SERPL-MCNC: 4 MG/DL — SIGNIFICANT CHANGE UP (ref 2.5–4.5)
PLATELET # BLD AUTO: 550 K/UL — HIGH (ref 150–400)
POTASSIUM SERPL-MCNC: 4.1 MMOL/L — SIGNIFICANT CHANGE UP (ref 3.5–5.3)
POTASSIUM SERPL-SCNC: 4.1 MMOL/L — SIGNIFICANT CHANGE UP (ref 3.5–5.3)
PROTHROM AB SERPL-ACNC: 13.5 SEC — SIGNIFICANT CHANGE UP (ref 10.6–13.6)
RBC # BLD: 3.88 M/UL — LOW (ref 4.2–5.8)
RBC # FLD: 21.5 % — HIGH (ref 10.3–14.5)
RH IG SCN BLD-IMP: POSITIVE — SIGNIFICANT CHANGE UP
SARS-COV-2 RNA SPEC QL NAA+PROBE: SIGNIFICANT CHANGE UP
SODIUM SERPL-SCNC: 136 MMOL/L — SIGNIFICANT CHANGE UP (ref 135–145)
WBC # BLD: 8.41 K/UL — SIGNIFICANT CHANGE UP (ref 3.8–10.5)
WBC # FLD AUTO: 8.41 K/UL — SIGNIFICANT CHANGE UP (ref 3.8–10.5)

## 2021-03-25 PROCEDURE — 99231 SBSQ HOSP IP/OBS SF/LOW 25: CPT | Mod: 57

## 2021-03-25 PROCEDURE — 99232 SBSQ HOSP IP/OBS MODERATE 35: CPT | Mod: GC

## 2021-03-25 PROCEDURE — 99232 SBSQ HOSP IP/OBS MODERATE 35: CPT

## 2021-03-25 RX ORDER — POLYETHYLENE GLYCOL 3350 17 G/17G
17 POWDER, FOR SOLUTION ORAL
Refills: 0 | Status: DISCONTINUED | OUTPATIENT
Start: 2021-03-25 | End: 2021-03-25

## 2021-03-25 RX ORDER — NEOMYCIN SULFATE 500 MG/1
500 TABLET ORAL EVERY 8 HOURS
Refills: 0 | Status: COMPLETED | OUTPATIENT
Start: 2021-03-25 | End: 2021-03-26

## 2021-03-25 RX ORDER — SODIUM CHLORIDE 9 MG/ML
1000 INJECTION, SOLUTION INTRAVENOUS
Refills: 0 | Status: DISCONTINUED | OUTPATIENT
Start: 2021-03-25 | End: 2021-03-26

## 2021-03-25 RX ORDER — METRONIDAZOLE 500 MG
500 TABLET ORAL EVERY 8 HOURS
Refills: 0 | Status: COMPLETED | OUTPATIENT
Start: 2021-03-25 | End: 2021-03-26

## 2021-03-25 RX ORDER — ENOXAPARIN SODIUM 100 MG/ML
40 INJECTION SUBCUTANEOUS DAILY
Refills: 0 | Status: DISCONTINUED | OUTPATIENT
Start: 2021-03-25 | End: 2021-03-26

## 2021-03-25 RX ORDER — SOD SULF/SODIUM/NAHCO3/KCL/PEG
2000 SOLUTION, RECONSTITUTED, ORAL ORAL ONCE
Refills: 0 | Status: COMPLETED | OUTPATIENT
Start: 2021-03-25 | End: 2021-03-25

## 2021-03-25 RX ORDER — SODIUM CHLORIDE 9 MG/ML
1000 INJECTION INTRAMUSCULAR; INTRAVENOUS; SUBCUTANEOUS ONCE
Refills: 0 | Status: COMPLETED | OUTPATIENT
Start: 2021-03-25 | End: 2021-03-25

## 2021-03-25 RX ADMIN — Medication 2 ENEMA: at 10:56

## 2021-03-25 RX ADMIN — SODIUM CHLORIDE 75 MILLILITER(S): 9 INJECTION, SOLUTION INTRAVENOUS at 22:00

## 2021-03-25 RX ADMIN — NEOMYCIN SULFATE 500 MILLIGRAM(S): 500 TABLET ORAL at 13:49

## 2021-03-25 RX ADMIN — NEOMYCIN SULFATE 500 MILLIGRAM(S): 500 TABLET ORAL at 22:52

## 2021-03-25 RX ADMIN — SODIUM CHLORIDE 500 MILLILITER(S): 9 INJECTION INTRAMUSCULAR; INTRAVENOUS; SUBCUTANEOUS at 07:38

## 2021-03-25 RX ADMIN — Medication 500 MILLIGRAM(S): at 05:44

## 2021-03-25 RX ADMIN — Medication 325 MILLIGRAM(S): at 13:49

## 2021-03-25 RX ADMIN — Medication 500 MILLIGRAM(S): at 13:49

## 2021-03-25 RX ADMIN — Medication 500 MILLIGRAM(S): at 18:33

## 2021-03-25 RX ADMIN — CHLORHEXIDINE GLUCONATE 1 APPLICATION(S): 213 SOLUTION TOPICAL at 13:50

## 2021-03-25 RX ADMIN — Medication 500 MILLIGRAM(S): at 22:00

## 2021-03-25 RX ADMIN — PANTOPRAZOLE SODIUM 40 MILLIGRAM(S): 20 TABLET, DELAYED RELEASE ORAL at 05:44

## 2021-03-25 RX ADMIN — ENOXAPARIN SODIUM 40 MILLIGRAM(S): 100 INJECTION SUBCUTANEOUS at 05:44

## 2021-03-25 RX ADMIN — Medication 2000 MILLILITER(S): at 18:33

## 2021-03-25 RX ADMIN — Medication 500 MILLIGRAM(S): at 13:50

## 2021-03-25 NOTE — PROGRESS NOTE ADULT - SUBJECTIVE AND OBJECTIVE BOX
General Surgery Progress Note     S: Pt seen and examined at bedside during AM rounds  - Pt states that pain is minimal  - GI fxn ++  - denies f/c, n/v, CP, abd pain, SOB, lightheadedness.       OBJECTIVE:     ** PHYSICAL EXAM **    Gen: NAD, laying in bed  HEENT: NC/AT  RESP: nonlabored breathing  Abdominal: Soft, minimally distended, non-tender, no rebound,        ** VITAL SIGNS / I&O's **    Vital Signs Last 24 Hrs  T(C): 36.9 (25 Mar 2021 05:41), Max: 37.2 (24 Mar 2021 12:20)  T(F): 98.5 (25 Mar 2021 05:41), Max: 98.9 (24 Mar 2021 12:20)  HR: 98 (25 Mar 2021 05:41) (98 - 103)  BP: 97/63 (25 Mar 2021 05:41) (97/63 - 110/76)  BP(mean): --  RR: 17 (25 Mar 2021 05:41) (17 - 18)  SpO2: 100% (25 Mar 2021 05:41) (100% - 100%)          ** LABS **              CAPILLARY BLOOD GLUCOSE                    MEDICATIONS  (STANDING):  ascorbic acid 500 milliGRAM(s) Oral daily  chlorhexidine 4% Liquid 1 Application(s) Topical daily  ciprofloxacin     Tablet 500 milliGRAM(s) Oral every 12 hours  ferrous    sulfate 325 milliGRAM(s) Oral daily  influenza   Vaccine 0.5 milliLiter(s) IntraMuscular once  metroNIDAZOLE    Tablet 500 milliGRAM(s) Oral every 12 hours  pantoprazole    Tablet 40 milliGRAM(s) Oral before breakfast    MEDICATIONS  (PRN):  acetaminophen   Tablet .. 650 milliGRAM(s) Oral every 6 hours PRN Temp greater or equal to 38C (100.4F), Mild Pain (1 - 3)  ondansetron Injectable 4 milliGRAM(s) IV Push every 6 hours PRN Nausea and/or Vomiting

## 2021-03-25 NOTE — PROVIDER CONTACT NOTE (OTHER) - DATE AND TIME:
11-Mar-2021 03:00
11-Mar-2021 23:45
11-Mar-2021 11:08
13-Mar-2021 01:20
24-Mar-2021 21:50
25-Mar-2021 05:41
13-Mar-2021 00:30

## 2021-03-25 NOTE — PROVIDER CONTACT NOTE (OTHER) - NAME OF MD/NP/PA/DO NOTIFIED:
md yuan
Rebecca Valenzuela MD
Rebecca Alvarez
Rebecca Valenzuela MD
SARABJIT OTTO
SARABJIT OTTO
Rebecca Alvarez,

## 2021-03-25 NOTE — PROGRESS NOTE ADULT - ATTENDING COMMENTS
metastatic colon cancer w/ colonic stent in place  -OR tomorrow for laparoscopic transverse colon resection/removal of stent and colostomy creation  -risks and benefits were reviewed at length including bleeding, infection and likely need for permanent colostomy  -Bowel prep today to be written by surgical team  -manpreet ppx  -all questions answered

## 2021-03-25 NOTE — PROVIDER CONTACT NOTE (OTHER) - BACKGROUND
56 y/o male admitted for intestinal obstruction with hx of stage 4 colon CA (mets to liver and lungs), vitiligo

## 2021-03-25 NOTE — PROVIDER CONTACT NOTE (OTHER) - ASSESSMENT
Rectal temp 101.3. Patient states he feels dehydrated and has the shakes
BP 97/63 T 98.5 HR 98 RR 17 oxygen sat %; no verbalized complaint of pain/discomfort, not in distress, alert and responsive
, /107. Patient is shaking and said he feels dehydrated
hr 107
, patient assessed, no signs of acute distress noted.
 T 98.3 RR 17 /71 oxygen sat %; no verbalized complaint of pain/discomfort, not in distress, alert and responsive
Patient vomiting. No gastric content noted in suction canister

## 2021-03-25 NOTE — PROVIDER CONTACT NOTE (OTHER) - RECOMMENDATIONS
Notify provider.
Notify MD
continue to monitor
Notify MD
will continue to monitor.
Notify MD
continue to monitor

## 2021-03-25 NOTE — PROGRESS NOTE ADULT - SUBJECTIVE AND OBJECTIVE BOX
CC: F/U for Bacteremia    Saw/spoke to patient. No fevers, no chills. No new complaints. Doing well.    Allergies  No Known Allergies    ANTIMICROBIALS:  ciprofloxacin     Tablet 500 every 12 hours  metroNIDAZOLE    Tablet 500 every 8 hours  neomycin 500 every 8 hours    PE:    Vital Signs Last 24 Hrs  T(C): 36.9 (25 Mar 2021 05:41), Max: 37.2 (24 Mar 2021 12:20)  T(F): 98.5 (25 Mar 2021 05:41), Max: 98.9 (24 Mar 2021 12:20)  HR: 98 (25 Mar 2021 05:41) (98 - 103)  BP: 97/63 (25 Mar 2021 05:41) (97/63 - 110/76)  RR: 17 (25 Mar 2021 05:41) (17 - 18)  SpO2: 100% (25 Mar 2021 05:41) (100% - 100%)    Gen: AOx3, NAD, non-toxic  CV: S1+S2 normal, nontachycardic  Resp: Clear bilat, no resp distress, no crackles/wheezes  Abd: Soft, nontender, +BS  Ext: No LE edema, no wounds    LABS:                        8.8    8.41  )-----------( 550      ( 25 Mar 2021 08:01 )             29.2     03-25    136  |  102  |  15  ----------------------------<  115<H>  4.1   |  26  |  0.53    Ca    9.1      25 Mar 2021 08:01  Phos  4.0     03-25  Mg     2.2     03-25    MICROBIOLOGY:    .Blood Blood-Peripheral  03-19-21   No Growth Final     .Blood Blood-Peripheral  03-17-21   Growth in anaerobic bottle: Bacillus species not anthracis  "Susceptibilities not performed"  ***Blood Panel PCR results on this specimen are available  approximately 3 hours after the Gram stain result.***  Gram stain, PCR, and/or culture results may not always  correspond due to difference in methodologies.  ************************************************************  This PCR assay was performed by multiplex PCR. This  Assay tests for 66 bacterial and resistance gene targets.  Please refer to the [facility] test directory  at [email or web address] for details.  --  Blood Culture PCR    .Blood Blood  03-15-21   No Growth Final     .Stool Feces  03-13-21   GI PCR Results: NOT detected    .Blood Blood-Peripheral  03-13-21   Growth in anaerobic bottle: Klebsiella variicola  ***Blood Panel PCR results on this specimen are available  approximately 3 hours after the Gram stain result.***  Gram stain, PCR, and/or culture results may not always  correspond due to differencein methodologies.  ************************************************************  This PCR assay was performed by multiplex PCR. This  Assay tests for 66 bacterial and resistance gene targets.  Please refer to the [facility] test directory  at [email or web address] for details.  --  Blood Culture PCR  Klebsiella variicola    (otherwise reviewed)    RADIOLOGY:    3/11 XR:    INTERPRETATION:    The heart is not enlarged. An accessed right IJ approach port is unchanged in position.  Enteric tube tip is in the stomach and side port in the region of the distal esophagus/GE junction, unchanged.  The lungs are clear.  No pleural effusion or pneumothorax seen.  No acute bony abnormality.      IMPRESSION:  Enteric tube tip is in the stomach and side port in the region of the distal esophagus/GE junction. Dr. Rubio informed me when I spoke with him at 1:43 PM on March 11, 2021 that the tube has been removed.

## 2021-03-25 NOTE — PROGRESS NOTE ADULT - SUBJECTIVE AND OBJECTIVE BOX
Shamar Martin, PGY-1  Pager: [phone] / [phone]    CHIEF COMPLAINT: Patient is a 55y old  Male who presents with a chief complaint of abdominal pain (25 Mar 2021 08:58)    INTERVAL HPI/OVERNIGHT EVENTS: CT, pt was seen comfortable at bedside. He denies any cp, sob abd pain, N/V. He is tolerating his diet well, had 1 solid BM this AM. No chills/fever.    MEDICATIONS (STANDING):  ascorbic acid 500 milliGRAM(s) Oral daily  chlorhexidine 4% Liquid 1 Application(s) Topical daily  ciprofloxacin     Tablet 500 milliGRAM(s) Oral every 12 hours  ferrous    sulfate 325 milliGRAM(s) Oral daily  influenza   Vaccine 0.5 milliLiter(s) IntraMuscular once  metroNIDAZOLE    Tablet 500 milliGRAM(s) Oral every 8 hours  neomycin 500 milliGRAM(s) Oral every 8 hours  pantoprazole    Tablet 40 milliGRAM(s) Oral before breakfast  polyethylene glycol/electrolyte Solution 2000 milliLiter(s) Oral once  saline laxative (FLEET) Rectal Enema 2 Enema Rectal once    MEDICATIONS  (PRN):  acetaminophen   Tablet .. 650 milliGRAM(s) Oral every 6 hours PRN  ondansetron Injectable 4 milliGRAM(s) IV Push every 6 hours PRN      REVIEW OF SYSTEMS:  CONSTITUTIONAL: No fever, weight loss, or fatigue  RESPIRATORY: No cough, wheezing, chills or hemoptysis; No shortness of breath  CARDIOVASCULAR: No chest pain, palpitations, dizziness, or leg swelling  GASTROINTESTINAL: No abdominal or epigastric pain. No nausea, vomiting, or hematemesis; No diarrhea or constipation. No melena or hematochezia.  GENITOURINARY: No dysuria, frequency, hematuria, or incontinence    VITAL SIGNS:  T(F): 98.5 (03-25-21 @ 05:41), Max: 98.9 (03-24-21 @ 12:20)  HR: 98 (03-25-21 @ 05:41) (98 - 103)  BP: 97/63 (03-25-21 @ 05:41) (97/63 - 110/76)  RR: 17 (03-25-21 @ 05:41) (17 - 18)  SpO2: 100% (03-25-21 @ 05:41) (100% - 100%)    PHYSICAL EXAM:  GENERAL: comfortable, pale, chronically ill appearing. thin.   HEENT:  Atraumatic, Normocephalic  CHEST/LUNG: Chest rise equal bilaterally. CTAB.   HEART: tachycardic, no murmurs   ABDOMEN: Abdomen non-distended and soft, nontender.   EXTREMITIES:  2+ Peripheral Pulses.  PSYCH: A&Ox3  SKIN: Pale, vitiligo, port on R side of chest, area non tender no erythema/swelling    LABS:                        8.8    8.41  )-----------( 550      ( 25 Mar 2021 08:01 )             29.2     03-25    136  |  102  |  15  ----------------------------<  115<H>  4.1   |  26  |  0.53    Ca    9.1      25 Mar 2021 08:01  Phos  4.0     03-25  Mg     2.2     03-25      PT/INR - ( 25 Mar 2021 08:01 )   PT: 13.5 sec;   INR: 1.18 ratio    PTT - ( 25 Mar 2021 08:01 )  PTT:36.1 sec    RADIOLOGY & ADDITIONAL TESTS:

## 2021-03-25 NOTE — PROGRESS NOTE ADULT - ASSESSMENT
This is a 56 y/o M, w/ PMH of recently diagnosed metastatic colon cancer (on FOLFOX), who p/w large bowel obstruction. s/p colonic stent insertion, now tolerating PO diet. Hospital course complicated by bacteremia from 2 different organism for which pt is treated with antibiotics.    #Bacteremia  - Initially found to have Klebsiella bacteremia possibly after GI procedure, follow up BCX on 3/15 negative. On Abx  - 3/17/21 BCx shows B. Cereus bacteremia: started on Vancomycin, ID on board.  - F/U blood culture from 3/19 now negative, now ABx changed to PO.  - Appreciate care from primary team and ID.    #Large bowel obstruction  - 3/9/21 CT abd/p LBO involving the ascending and transverse colon measuring up to 8 cm secondary to the colonic mass seen in the distal transverse colon. In addition, the jejunum and terminal ileum are also dilated.  -appreciate surgery and GI  -s/p NGT decompression: removed. s/p colonic stent 3/11/21. Tolerating diet now.  -Plan for palliative surgery and removal of the stent on this admission, surgery following; OR scheduled tomorrow.    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 s/p liver Bx 1/25/21 which established diagnosis.  -f/u CT abd/p showing hepatic mets with some that have increased in size since 1/2021, enlarged prostate, along with a 1.3 x 0.9 cm right lower lobe nodule concerning for metastatic disease.   -Currently on palliative FOLFOX, s/p C2 on 3/4/2020  -NGS result pending  -Pain mgmt per primary team; patient is on percocet at home. Has seen by palliative care for pain control  -[place] [facility] [place] to add bevacizumab after palliative colectomy. No plan for inpatient chemotherapy.      Please let oncology know should you have any questions or concerns about the patient.      Karissa Echols MD  PGY 5, Oncology/Hematology fellow  (P) [phone]  After 5pm, please contact on-call team.     This is a 56 y/o M, w/ PMH of recently diagnosed metastatic colon cancer (on FOLFOX), who p/w large bowel obstruction. s/p colonic stent insertion, now tolerating PO diet. Hospital course complicated by bacteremia from 2 different organism for which pt is treated with antibiotics.    #Bacteremia  - Initially found to have Klebsiella bacteremia possibly after GI procedure, follow up BCX on 3/15 negative. On Abx  - 3/17/21 BCx shows B. Cereus bacteremia: started on Vancomycin, ID on board.  - F/U blood culture from 3/19 now negative, now ABx changed to PO.  - Appreciate care from primary team and ID.    #Large bowel obstruction  - 3/9/21 CT abd/p LBO involving the ascending and transverse colon measuring up to 8 cm secondary to the colonic mass seen in the distal transverse colon. In addition, the jejunum and terminal ileum are also dilated.  -appreciate surgery and GI  -s/p NGT decompression: removed. s/p colonic stent 3/11/21. Tolerating diet now.  -Plan for palliative surgery and removal of the stent on this admission, surgery following; OR scheduled tomorrow.    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 s/p liver Bx 1/25/21 which established diagnosis.  -f/u CT abd/p showing hepatic mets with some that have increased in size since 1/2021, enlarged prostate, along with a 1.3 x 0.9 cm right lower lobe nodule concerning for metastatic disease.   -Currently on palliative FOLFOX, s/p C2 on 3/4/2020  -NGS result pending  -Pain mgmt per primary team; patient is on percocet at home. Has seen by palliative care for pain control  -[place]. Tu Byers [place] [facility] [place] Plan to add bevacizumab after palliative colectomy. No plan for inpatient chemotherapy.    -Please consult social work/case management per patient's request, to help with arranging transport ([facility]).    Please let oncology know should you have any questions or concerns about the patient.      Karissa Echols MD  PGY 5, Oncology/Hematology fellow  (P) [phone]  After 5pm, please contact on-call team.

## 2021-03-25 NOTE — PROGRESS NOTE ADULT - ASSESSMENT
55 M with hx unresectable stage 4 colon cancer (mets to liver and lungs, received palliative folfox 2/16/21) follows with [facility] here for worsening nausea, vomiting (NBNB), abdominal distention  Prior fevers, no leukocytosis  CXR clear  Most recent CT A/P large bowel obstruction, liver mets  Prior episode of Klebsiella bacteremia, clear as of 3/15/21  BCX 3/17 BCX B cereus, repeat BCXs NGTD  Mediport in place  Patient generally well appearing, although chronically ill  Overall,  1) B cereus bacteremia  - Presumably GI source in setting of large bowel obstruction, lower suspicion catheter; anticipate S to cipro  - Cipro 500mg q 12  - Flagyl 500mg q 12  - Anticipate 2 week course abx from negative BCX  2) Klebsiella bacteremia  - Likely transient from abd process  - Continue Cipro as above  - Monitor BCXs  3) Large Bowel Obstruction/Malignancy  - Further care per primary team  - Planned for OR with surgery team on 3/26. From ID perspective appears infection under control, reasonable to proceed with OR Procedure as long as BCX remain negative and no further signs sepsis/infection in the interim    Simon Carney MD  Pager [phone]  After 5pm and on weekends call [phone]

## 2021-03-25 NOTE — PROGRESS NOTE ADULT - PROBLEM SELECTOR PLAN 7
- Diet: regular + ensure per dietitian   - DVT PPX: Lovenox SQ  - Dispo: likely home per PT  - GOC: palliative consult for pain control and further assistance - Diet: regular + ensure per dietitian, NPO at MN  - DVT PPX: Lovenox SQ, holding for surgery  - Dispo: likely home per PT  - GOC: palliative consult for pain control and further assistance - Diet: regular + ensure per dietitian, NPO at MN, will start IVF  - DVT PPX: Lovenox SQ, holding for surgery  - Dispo: likely home per PT  - GOC: palliative consult for pain control and further assistance

## 2021-03-25 NOTE — PROGRESS NOTE ADULT - PROBLEM SELECTOR PLAN 2
3/13 developed sepsis with tachycardia and fever to 101.3   - started on zosyn--> CTX -->cipro (until 3/29)  - bcx growing klebsiella possibly after GI procedure. 3/15 BCx NGTD  - repeat BCx 3/19 NG final.  - ID consulted, appreciate recs.

## 2021-03-25 NOTE — PROGRESS NOTE ADULT - PROBLEM SELECTOR PLAN 4
Metastatic to liver and lungs, s/p first session of palliative folfox (2/16/21) at [facility]  - CEA - 2882, PSA - WNL, CA 19-9 -2882 and AFP - 2775  - CT abd/p showing hepatic mets with some that have increased in size since 1/2021, enlarged prostate, along with a 1.3 x 0.9 cm right lower lobe nodule concerning for metastatic disease  - Pain control regimen - Morphine 15 mg PO prn   - Palliative consulted for pain control and GOC given metastatic cancer, appreciate recs.  - oncology following, appreciate recs

## 2021-03-25 NOTE — PROGRESS NOTE ADULT - SUBJECTIVE AND OBJECTIVE BOX
Oncology Follow-up    INTERVAL HPI/OVERNIGHT EVENTS:  Patient S&E at bedside. No o/n events, patient resting comfortably. No complaints at this time. Plan for OR tomorrow.    VITAL SIGNS:  T(F): 98.5 (03-25-21 @ 05:41)  HR: 98 (03-25-21 @ 05:41)  BP: 97/63 (03-25-21 @ 05:41)  RR: 17 (03-25-21 @ 05:41)  SpO2: 100% (03-25-21 @ 05:41)  Wt(kg): --    PHYSICAL EXAM:    Constitutional: AAOx3, NAD  Eyes: EOMI, sclera non-icteric  Neck: supple, no masses, no JVD  Respiratory: CTA b/l, good air entry b/l, no wheezing, rhonchi, rales, with normal respiratory effort and no intercostal retractions  Cardiovascular: RRR, normal S1S2, no M/R/G  Gastrointestinal: soft, NTND, no masses palpable, BS normal in all four quadrants, no HSM  Extremities: no c/c/e  Neurological: Grossly intact  Skin: Normal temperature    MEDICATIONS  (STANDING):  ascorbic acid 500 milliGRAM(s) Oral daily  chlorhexidine 4% Liquid 1 Application(s) Topical daily  ciprofloxacin     Tablet 500 milliGRAM(s) Oral every 12 hours  ferrous    sulfate 325 milliGRAM(s) Oral daily  influenza   Vaccine 0.5 milliLiter(s) IntraMuscular once  metroNIDAZOLE    Tablet 500 milliGRAM(s) Oral every 12 hours  pantoprazole    Tablet 40 milliGRAM(s) Oral before breakfast    MEDICATIONS  (PRN):  acetaminophen   Tablet .. 650 milliGRAM(s) Oral every 6 hours PRN Temp greater or equal to 38C (100.4F), Mild Pain (1 - 3)  ondansetron Injectable 4 milliGRAM(s) IV Push every 6 hours PRN Nausea and/or Vomiting      No Known Allergies      LABS:                        8.8    8.41  )-----------( 550      ( 25 Mar 2021 08:01 )             29.2     03-25    136  |  102  |  15  ----------------------------<  115<H>  4.1   |  26  |  0.53    Ca    9.1      25 Mar 2021 08:01  Phos  4.0     03-25  Mg     2.2     03-25      PT/INR - ( 25 Mar 2021 08:01 )   PT: 13.5 sec;   INR: 1.18 ratio         PTT - ( 25 Mar 2021 08:01 )  PTT:36.1 sec           RADIOLOGY & ADDITIONAL TESTS:  Studies reviewed.   Oncology Follow-up    INTERVAL HPI/OVERNIGHT EVENTS:  Patient S&E at bedside. No o/n events, patient resting comfortably. No complaints at this time. Plan for OR tomorrow.    VITAL SIGNS:  T(F): 98.5 (03-25-21 @ 05:41)  HR: 98 (03-25-21 @ 05:41)  BP: 97/63 (03-25-21 @ 05:41)  RR: 17 (03-25-21 @ 05:41)  SpO2: 100% (03-25-21 @ 05:41)  Wt(kg): --    PHYSICAL EXAM:    Constitutional: AAOx3, NAD, thin  Eyes: EOMI, sclera non-icteric  Neck: supple, no masses, no JVD  Respiratory: CTA b/l, good air entry b/l, no wheezing, rhonchi, rales, with normal respiratory effort and no intercostal retractions  Cardiovascular: RRR, normal S1S2, no M/R/G  Gastrointestinal: soft, NTND, no masses palpable, BS normal in all four quadrants, no HSM  Extremities: no c/c/e  Neurological: Grossly intact  Skin: Normal temperature    MEDICATIONS  (STANDING):  ascorbic acid 500 milliGRAM(s) Oral daily  chlorhexidine 4% Liquid 1 Application(s) Topical daily  ciprofloxacin     Tablet 500 milliGRAM(s) Oral every 12 hours  ferrous    sulfate 325 milliGRAM(s) Oral daily  influenza   Vaccine 0.5 milliLiter(s) IntraMuscular once  metroNIDAZOLE    Tablet 500 milliGRAM(s) Oral every 12 hours  pantoprazole    Tablet 40 milliGRAM(s) Oral before breakfast    MEDICATIONS  (PRN):  acetaminophen   Tablet .. 650 milliGRAM(s) Oral every 6 hours PRN Temp greater or equal to 38C (100.4F), Mild Pain (1 - 3)  ondansetron Injectable 4 milliGRAM(s) IV Push every 6 hours PRN Nausea and/or Vomiting      No Known Allergies      LABS:                        8.8    8.41  )-----------( 550      ( 25 Mar 2021 08:01 )             29.2     03-25    136  |  102  |  15  ----------------------------<  115<H>  4.1   |  26  |  0.53    Ca    9.1      25 Mar 2021 08:01  Phos  4.0     03-25  Mg     2.2     03-25      PT/INR - ( 25 Mar 2021 08:01 )   PT: 13.5 sec;   INR: 1.18 ratio         PTT - ( 25 Mar 2021 08:01 )  PTT:36.1 sec           RADIOLOGY & ADDITIONAL TESTS:  Studies reviewed.

## 2021-03-25 NOTE — PROGRESS NOTE ADULT - PROBLEM SELECTOR PLAN 3
1 week hx abdominal pain/distension, N/V in the setting of metastatic colon cancer   - CT abd/p LBO involving the ascending and transverse colon measuring up to 8 cm secondary to the colonic mass seen in the distal transverse colon. In addition, the jejunum and terminal ileum are also dilated  - NG tube placed for decompression--> removed  - s/p colonic stent, now passing BMs  - on soft diet --> regular, tolerating well.   - Dr. Alicia made aware for discussion of operative intervention given pt may be precluded from chemo due to stent and oncology strongly recommends chemo in the future  -CR surgery are following, surgery tentatively scheduled for Friday 3/26 pending ID clearance.

## 2021-03-25 NOTE — PROGRESS NOTE ADULT - PROBLEM SELECTOR PLAN 5
- Pt is currently medically optimized for colorectal surgery  - EKG in chart, TTE wnl  - Revised Cardiac Risk Index 0  - poor nutritional status which likely have an affect on wound healing and overall prognosis.  -Will reach out to ID for clearance.

## 2021-03-25 NOTE — PROGRESS NOTE ADULT - ASSESSMENT
55 M with hx unresectable stage 4 colon cancer (mets to liver and lungs, received palliative folfox 2/16/21) presenting with abdominal pain, N/V, and distension. Admitted for medical management of LBO due to obstruction 2/2 transverse colon mass. Course c/b klebsiella and B [place] bacteremia pending colectomy.

## 2021-03-25 NOTE — PROGRESS NOTE ADULT - ASSESSMENT
55M w/ metastatic colon cancer, LBO s/p stenting by GI now pending operative intervention    - BCx from 3/19 negative  - OR tomorrow for laparoscopic colectomy, will obtain consent today  - pending preop labs this AM  - COVID negative (3/24)  - Discussed w/ Dr. Ravin Davila PGY-3  Surgery, A-Team  Pager: [phone]   55M w/ metastatic colon cancer, LBO s/p stenting by GI now pending operative intervention    - BCx from 3/19 negative  - OR tomorrow for laparoscopic colectomy, consented  - NPO after midnight w/ IVF  - Mobi prep + Neomycin/flagyl PO prep for Enhanced Recovery Protocol  - pending preop labs this AM  - COVID negative (3/24)  - Discussed w/ Dr. Ravin Davila PGY-3  Surgery, A-Team  Pager: [phone]   55M w/ metastatic colon cancer, LBO s/p stenting by GI now pending operative intervention    - BCx from 3/19 negative  - OR tomorrow for laparoscopic colectomy, consented  - NPO after midnight w/ IVF  - Movi prep + Neomycin/flagyl PO prep q8h 3 doses starting 24hrs prior to OR time + Fleet enema 2dose 3hrs and 2.5hrs prior to OR time (1:15pm) for Enhanced Recovery Protocol  - pending preop labs this AM  - COVID negative (3/24)  - Discussed w/ Dr. Ravin Davila PGY-3  Surgery, A-Team  Pager: [phone]

## 2021-03-25 NOTE — PROVIDER CONTACT NOTE (OTHER) - ACTION/TREATMENT ORDERED:
MD made aware, water given PO for dehydration, hot packs given for shakes. Will continue to monitor patient.
MD Valenzuela made aware and no new orders received at this time, will continue to monitor.
md at bedside. will continue to monitor.
MD made aware, IV zosyn and IV tylenol started. Blood cultures ordered. Will continue to monitor patient.
MD aware. Portable x-ray ordered.
MD made aware, no new orders, continue to monitor
MD made aware, no new orders, continue to monitor

## 2021-03-25 NOTE — PROVIDER CONTACT NOTE (OTHER) - BACKGROUND
54 y/o male admitted for in 56 y/o male admitted for intestinal obstruction with hx of stage 4 colon CA (mets to liver and lungs), vitiligo

## 2021-03-25 NOTE — PROGRESS NOTE ADULT - ATTENDING COMMENTS
Patient seen and examined care d/w HS4.    54 yo M with stage 4 colon cancer (mets to liver and lungs, received palliative folfox 2/16/21) presenting with abdominal pain, N/V, and distension. Admitted for medical management of LBO due to obstruction 2/2 transverse colon mass.     #Large bowel obstruction, s/p colonic stent by GI, tolerating diet, having stool output, diarrhea resolved, surgery this Firday  pending clearance of blood cx 3/19 x 2 NG final. Pre-op, good METS (was working up till January) w/ no cardiac complaints, repeat EKG, RCRI score of 1, patient is medically optimized and may proceed w/ OR as planned. TTE wnl.     # B Cereus: bx on 3/17, had diarrheal illness prior, likely translocation, now cipro/flagyl per ID, 3/19 NG final     # Kleb bacteremia: had sepsis (febrile and tachycardic) on 3/13 presumed GI source. UA negative. BCx growing klebsiella. GI PCR negative. Blood Cx on 3/15 no growth to date.  zosyn --> ctx 1g q24h, D10/10, now on cipro/flagyl through 3/29    #Metastatic colon CA: no plans for inpatient chemo, c/w PO pain control     #Anemia of chronic disease, monitor hgb     Dispo: pending OR 3/26

## 2021-03-26 ENCOUNTER — RESULT REVIEW (OUTPATIENT)
Age: 56
End: 2021-03-26

## 2021-03-26 ENCOUNTER — APPOINTMENT (OUTPATIENT)
Dept: COLORECTAL SURGERY | Facility: HOSPITAL | Age: 56
End: 2021-03-26
Payer: COMMERCIAL

## 2021-03-26 LAB
ANION GAP SERPL CALC-SCNC: 11 MMOL/L — SIGNIFICANT CHANGE UP (ref 7–14)
ANION GAP SERPL CALC-SCNC: 13 MMOL/L — SIGNIFICANT CHANGE UP (ref 7–14)
APTT BLD: 32 SEC — SIGNIFICANT CHANGE UP (ref 27–36.3)
BUN SERPL-MCNC: 12 MG/DL — SIGNIFICANT CHANGE UP (ref 7–23)
BUN SERPL-MCNC: 8 MG/DL — SIGNIFICANT CHANGE UP (ref 7–23)
CALCIUM SERPL-MCNC: 8.4 MG/DL — SIGNIFICANT CHANGE UP (ref 8.4–10.5)
CALCIUM SERPL-MCNC: 8.5 MG/DL — SIGNIFICANT CHANGE UP (ref 8.4–10.5)
CHLORIDE SERPL-SCNC: 102 MMOL/L — SIGNIFICANT CHANGE UP (ref 98–107)
CHLORIDE SERPL-SCNC: 105 MMOL/L — SIGNIFICANT CHANGE UP (ref 98–107)
CO2 SERPL-SCNC: 21 MMOL/L — LOW (ref 22–31)
CO2 SERPL-SCNC: 23 MMOL/L — SIGNIFICANT CHANGE UP (ref 22–31)
CREAT SERPL-MCNC: 0.43 MG/DL — LOW (ref 0.5–1.3)
CREAT SERPL-MCNC: 0.51 MG/DL — SIGNIFICANT CHANGE UP (ref 0.5–1.3)
GLUCOSE BLDC GLUCOMTR-MCNC: 111 MG/DL — HIGH (ref 70–99)
GLUCOSE SERPL-MCNC: 145 MG/DL — HIGH (ref 70–99)
GLUCOSE SERPL-MCNC: 81 MG/DL — SIGNIFICANT CHANGE UP (ref 70–99)
HCT VFR BLD CALC: 26.5 % — LOW (ref 39–50)
HCT VFR BLD CALC: 35.4 % — LOW (ref 39–50)
HGB BLD-MCNC: 11 G/DL — LOW (ref 13–17)
HGB BLD-MCNC: 8.1 G/DL — LOW (ref 13–17)
INR BLD: 1.18 RATIO — HIGH (ref 0.88–1.16)
MAGNESIUM SERPL-MCNC: 1.7 MG/DL — SIGNIFICANT CHANGE UP (ref 1.6–2.6)
MAGNESIUM SERPL-MCNC: 2 MG/DL — SIGNIFICANT CHANGE UP (ref 1.6–2.6)
MCHC RBC-ENTMCNC: 22.5 PG — LOW (ref 27–34)
MCHC RBC-ENTMCNC: 23.4 PG — LOW (ref 27–34)
MCHC RBC-ENTMCNC: 30.6 GM/DL — LOW (ref 32–36)
MCHC RBC-ENTMCNC: 31.1 GM/DL — LOW (ref 32–36)
MCV RBC AUTO: 73.6 FL — LOW (ref 80–100)
MCV RBC AUTO: 75.2 FL — LOW (ref 80–100)
NRBC # BLD: 0 /100 WBCS — SIGNIFICANT CHANGE UP
NRBC # BLD: 0 /100 WBCS — SIGNIFICANT CHANGE UP
NRBC # FLD: 0 K/UL — SIGNIFICANT CHANGE UP
NRBC # FLD: 0 K/UL — SIGNIFICANT CHANGE UP
PHOSPHATE SERPL-MCNC: 3.9 MG/DL — SIGNIFICANT CHANGE UP (ref 2.5–4.5)
PHOSPHATE SERPL-MCNC: 4.1 MG/DL — SIGNIFICANT CHANGE UP (ref 2.5–4.5)
PLATELET # BLD AUTO: 483 K/UL — HIGH (ref 150–400)
PLATELET # BLD AUTO: 504 K/UL — HIGH (ref 150–400)
POTASSIUM SERPL-MCNC: 4.3 MMOL/L — SIGNIFICANT CHANGE UP (ref 3.5–5.3)
POTASSIUM SERPL-MCNC: 4.3 MMOL/L — SIGNIFICANT CHANGE UP (ref 3.5–5.3)
POTASSIUM SERPL-SCNC: 4.3 MMOL/L — SIGNIFICANT CHANGE UP (ref 3.5–5.3)
POTASSIUM SERPL-SCNC: 4.3 MMOL/L — SIGNIFICANT CHANGE UP (ref 3.5–5.3)
PROTHROM AB SERPL-ACNC: 13.5 SEC — SIGNIFICANT CHANGE UP (ref 10.6–13.6)
RBC # BLD: 3.6 M/UL — LOW (ref 4.2–5.8)
RBC # BLD: 4.71 M/UL — SIGNIFICANT CHANGE UP (ref 4.2–5.8)
RBC # FLD: 19.2 % — HIGH (ref 10.3–14.5)
RBC # FLD: 21.3 % — HIGH (ref 10.3–14.5)
SODIUM SERPL-SCNC: 136 MMOL/L — SIGNIFICANT CHANGE UP (ref 135–145)
SODIUM SERPL-SCNC: 139 MMOL/L — SIGNIFICANT CHANGE UP (ref 135–145)
WBC # BLD: 10.38 K/UL — SIGNIFICANT CHANGE UP (ref 3.8–10.5)
WBC # BLD: 10.68 K/UL — HIGH (ref 3.8–10.5)
WBC # FLD AUTO: 10.38 K/UL — SIGNIFICANT CHANGE UP (ref 3.8–10.5)
WBC # FLD AUTO: 10.68 K/UL — HIGH (ref 3.8–10.5)

## 2021-03-26 PROCEDURE — 99232 SBSQ HOSP IP/OBS MODERATE 35: CPT

## 2021-03-26 PROCEDURE — 44141 PARTIAL REMOVAL OF COLON: CPT

## 2021-03-26 PROCEDURE — 88341 IMHCHEM/IMCYTCHM EA ADD ANTB: CPT | Mod: 26

## 2021-03-26 PROCEDURE — 88309 TISSUE EXAM BY PATHOLOGIST: CPT | Mod: 26

## 2021-03-26 PROCEDURE — 88342 IMHCHEM/IMCYTCHM 1ST ANTB: CPT | Mod: 26

## 2021-03-26 PROCEDURE — 99232 SBSQ HOSP IP/OBS MODERATE 35: CPT | Mod: GC

## 2021-03-26 RX ORDER — CHLORHEXIDINE GLUCONATE 213 G/1000ML
1 SOLUTION TOPICAL DAILY
Refills: 0 | Status: DISCONTINUED | OUTPATIENT
Start: 2021-03-26 | End: 2021-03-29

## 2021-03-26 RX ORDER — HYDROMORPHONE HYDROCHLORIDE 2 MG/ML
1 INJECTION INTRAMUSCULAR; INTRAVENOUS; SUBCUTANEOUS EVERY 4 HOURS
Refills: 0 | Status: DISCONTINUED | OUTPATIENT
Start: 2021-03-26 | End: 2021-03-27

## 2021-03-26 RX ORDER — FERROUS SULFATE 325(65) MG
325 TABLET ORAL DAILY
Refills: 0 | Status: DISCONTINUED | OUTPATIENT
Start: 2021-03-26 | End: 2021-03-26

## 2021-03-26 RX ORDER — ACETAMINOPHEN 500 MG
750 TABLET ORAL EVERY 6 HOURS
Refills: 0 | Status: DISCONTINUED | OUTPATIENT
Start: 2021-03-26 | End: 2021-03-27

## 2021-03-26 RX ORDER — ASCORBIC ACID 60 MG
500 TABLET,CHEWABLE ORAL DAILY
Refills: 0 | Status: DISCONTINUED | OUTPATIENT
Start: 2021-03-26 | End: 2021-03-26

## 2021-03-26 RX ORDER — METRONIDAZOLE 500 MG
500 TABLET ORAL
Refills: 0 | Status: DISCONTINUED | OUTPATIENT
Start: 2021-03-26 | End: 2021-03-26

## 2021-03-26 RX ORDER — PANTOPRAZOLE SODIUM 20 MG/1
40 TABLET, DELAYED RELEASE ORAL DAILY
Refills: 0 | Status: DISCONTINUED | OUTPATIENT
Start: 2021-03-26 | End: 2021-03-27

## 2021-03-26 RX ORDER — CIPROFLOXACIN LACTATE 400MG/40ML
500 VIAL (ML) INTRAVENOUS EVERY 12 HOURS
Refills: 0 | Status: DISCONTINUED | OUTPATIENT
Start: 2021-03-27 | End: 2021-03-29

## 2021-03-26 RX ORDER — PANTOPRAZOLE SODIUM 20 MG/1
40 TABLET, DELAYED RELEASE ORAL
Refills: 0 | Status: DISCONTINUED | OUTPATIENT
Start: 2021-03-26 | End: 2021-03-26

## 2021-03-26 RX ORDER — MAGNESIUM SULFATE 500 MG/ML
2 VIAL (ML) INJECTION ONCE
Refills: 0 | Status: COMPLETED | OUTPATIENT
Start: 2021-03-26 | End: 2021-03-27

## 2021-03-26 RX ORDER — HYDROMORPHONE HYDROCHLORIDE 2 MG/ML
0.5 INJECTION INTRAMUSCULAR; INTRAVENOUS; SUBCUTANEOUS EVERY 4 HOURS
Refills: 0 | Status: DISCONTINUED | OUTPATIENT
Start: 2021-03-26 | End: 2021-03-27

## 2021-03-26 RX ORDER — SODIUM CHLORIDE 9 MG/ML
1000 INJECTION, SOLUTION INTRAVENOUS
Refills: 0 | Status: DISCONTINUED | OUTPATIENT
Start: 2021-03-26 | End: 2021-03-27

## 2021-03-26 RX ORDER — METRONIDAZOLE 500 MG
500 TABLET ORAL
Refills: 0 | Status: DISCONTINUED | OUTPATIENT
Start: 2021-03-26 | End: 2021-03-29

## 2021-03-26 RX ADMIN — Medication 100 MILLIGRAM(S): at 19:36

## 2021-03-26 RX ADMIN — SODIUM CHLORIDE 75 MILLILITER(S): 9 INJECTION, SOLUTION INTRAVENOUS at 18:57

## 2021-03-26 RX ADMIN — Medication 500 MILLIGRAM(S): at 05:30

## 2021-03-26 RX ADMIN — NEOMYCIN SULFATE 500 MILLIGRAM(S): 500 TABLET ORAL at 05:30

## 2021-03-26 RX ADMIN — PANTOPRAZOLE SODIUM 40 MILLIGRAM(S): 20 TABLET, DELAYED RELEASE ORAL at 05:31

## 2021-03-26 RX ADMIN — Medication 2 ENEMA: at 11:30

## 2021-03-26 RX ADMIN — CHLORHEXIDINE GLUCONATE 1 APPLICATION(S): 213 SOLUTION TOPICAL at 11:30

## 2021-03-26 NOTE — PROGRESS NOTE ADULT - PROBLEM SELECTOR PLAN 3
1 week hx abdominal pain/distension, N/V in the setting of metastatic colon cancer   - CT abd/p LBO involving the ascending and transverse colon measuring up to 8 cm secondary to the colonic mass seen in the distal transverse colon. In addition, the jejunum and terminal ileum are also dilated  - NG tube placed for decompression--> removed  - s/p colonic stent, now passing BMs  - on soft diet --> regular, tolerating well.   - Dr. Alicia made aware for discussion of operative intervention given pt may be precluded from chemo due to stent and oncology strongly recommends chemo in the future  -CR surgery are following, surgery tentatively scheduled for Friday 3/26 pending ID clearance. 1 week hx abdominal pain/distension, N/V in the setting of metastatic colon cancer   - CT abd/p LBO involving the ascending and transverse colon measuring up to 8 cm secondary to the colonic mass seen in the distal transverse colon. In addition, the jejunum and terminal ileum are also dilated  - NG tube placed for decompression--> removed  - s/p colonic stent, now passing BMs  - on soft diet --> regular, tolerating well.   - Dr. Alicia made aware for discussion of operative intervention given pt may be precluded from chemo due to stent and oncology strongly recommends chemo in the future  -CR surgery are following, surgery tentatively scheduled for TODAY Friday 3/26. GOOD LUCK.

## 2021-03-26 NOTE — PROGRESS NOTE ADULT - PROBLEM SELECTOR PLAN 2
3/13 developed sepsis with tachycardia and fever to 101.3   - started on zosyn--> CTX -->cipro (until 3/29)  - bcx growing klebsiella possibly after GI procedure. 3/15 BCx NGTD  - repeat BCx 3/19 NG final.  - ID consulted, appreciate recs. 3/13 developed sepsis with tachycardia and fever to 101.3   - started on zosyn--> CTX -->cipro (until 4/2)  - bcx growing klebsiella possibly after GI procedure. 3/15 BCx NGTD  - repeat BCx 3/19 NG final.  - ID consulted, appreciate recs.

## 2021-03-26 NOTE — PROGRESS NOTE ADULT - PROBLEM SELECTOR PLAN 4
Metastatic to liver and lungs, s/p first session of palliative folfox (2/16/21) at [facility]  - CEA - 2882, PSA - WNL, CA 19-9 -2882 and AFP - 2775  - CT abd/p showing hepatic mets with some that have increased in size since 1/2021, enlarged prostate, along with a 1.3 x 0.9 cm right lower lobe nodule concerning for metastatic disease  - Pain control regimen - Morphine 15 mg PO prn   - Palliative consulted for pain control and GOC given metastatic cancer, appreciate recs.  - oncology following, appreciate recs

## 2021-03-26 NOTE — PROGRESS NOTE ADULT - ASSESSMENT
55 M with hx unresectable stage 4 colon cancer (mets to liver and lungs, received palliative folfox 2/16/21) follows with [facility] here for worsening nausea, vomiting (NBNB), abdominal distention  Prior fevers, no leukocytosis  CXR clear  Most recent CT A/P large bowel obstruction, liver mets  Prior episode of Klebsiella bacteremia, clear as of 3/15/21  BCX 3/17 BCX B cereus, repeat BCXs NGTD  Mediport in place  Patient generally well appearing, although chronically ill  Overall,  1) B cereus bacteremia  - Presumably GI source in setting of large bowel obstruction, lower suspicion catheter; anticipate S to cipro  - Cipro 500mg q 12  - Flagyl 500mg q 12  - Anticipate 2 week course abx from negative BCX  2) Klebsiella bacteremia  - Likely transient from abd process  - Continue Cipro as above  - Monitor BCXs  3) Large Bowel Obstruction/Malignancy  - Further care per primary team  - Planned for OR with surgery team today. From ID perspective appears infection under control, reasonable to proceed with OR Procedure as long as BCX remain negative and no further signs sepsis/infection in the interim    Simon Carney MD  Pager [phone]  After 5pm and on weekends call [phone]

## 2021-03-26 NOTE — BRIEF OPERATIVE NOTE - NSICDXBRIEFPROCEDURE_GEN_ALL_CORE_FT
PROCEDURES:  Creation, end colostomy 26-Mar-2021 17:47:34  Keisha Kebede  Laparoscopic partial colectomy 26-Mar-2021 17:47:26  Keisha Kebede

## 2021-03-26 NOTE — PROGRESS NOTE ADULT - PROBLEM SELECTOR PLAN 7
- Diet: regular + ensure per dietitian, NPO at [facility], will start IVF  - DVT PPX: Lovenox SQ, holding for surgery  - Dispo: likely home per PT  - GOC: palliative consult for pain control and further assistance - Diet: regular + ensure per dietitian, NPO at MN, on IVF LR  - DVT PPX: Lovenox SQ, holding for surgery  - Dispo: likely home per PT  - GOC: palliative consult for pain control and further assistance

## 2021-03-26 NOTE — PROGRESS NOTE ADULT - ATTENDING COMMENTS
Patient seen and examined care d/w HS4.    56 yo M with stage 4 colon cancer (mets to liver and lungs, received palliative folfox 2/16/21) presenting with abdominal pain, N/V, and distension. Admitted for medical management of LBO due to obstruction 2/2 transverse colon mass.     # Large bowel obstruction, s/p colonic stent by GI, tolerating diet, having stool output, diarrhea resolved, surgery this Firday  pending clearance of blood cx 3/19 x 2 NG final. Pre-op, good METS (was working up till January) w/ no cardiac complaints, repeat EKG, RCRI score of 1, patient is medically optimized and may proceed w/ OR as planned. TTE wnl.     # B Cereus: bx on 3/17, had diarrheal illness prior, likely translocation, now cipro/flagyl per ID, 3/19 NG final , plan 14 days from 3/19 per ID    # Kleb bacteremia: had sepsis (febrile and tachycardic) on 3/13 presumed GI source. UA negative. BCx growing klebsiella. GI PCR negative. Blood Cx on 3/15 no growth to date.  zosyn --> ctx 1g q24h, D10/10, now on cipro/flagyl     #Metastatic colon CA: no plans for inpatient chemo, c/w PO pain control     #Anemia of chronic disease, monitor hgb     Dispo: pending OR 3/26

## 2021-03-26 NOTE — PROGRESS NOTE ADULT - SUBJECTIVE AND OBJECTIVE BOX
CC: F/U for bacteremia    Saw/spoke to patient. No fevers, no chills. No new complaints.    Allergies  No Known Allergies    ANTIMICROBIALS:  ciprofloxacin     Tablet 500 every 12 hours  metroNIDAZOLE  IVPB 500 <User Schedule>    PE:    Vital Signs Last 24 Hrs  T(C): 36.6 (26 Mar 2021 13:08), Max: 36.8 (26 Mar 2021 05:29)  T(F): 97.8 (26 Mar 2021 13:08), Max: 98.2 (26 Mar 2021 05:29)  HR: 100 (26 Mar 2021 13:08) (85 - 100)  BP: 118/89 (26 Mar 2021 13:08) (105/73 - 121/86)  RR: 16 (26 Mar 2021 13:08) (16 - 18)  SpO2: 100% (26 Mar 2021 13:08) (100% - 100%)    Gen: AOx3, NAD, non-toxic  CV: S1+S2 normal, nontachycardic  Resp: Clear bilat, no resp distress, no crackles/wheezes  Abd: Soft, nontender, +BS  Ext: No LE edema, no wounds    LABS:                        8.1    10.38 )-----------( 504      ( 26 Mar 2021 07:22 )             26.5     03-26    139  |  105  |  12  ----------------------------<  81  4.3   |  23  |  0.51    Ca    8.5      26 Mar 2021 07:22  Phos  3.9     03-26  Mg     2.0     03-26    MICROBIOLOGY:    .Blood Blood-Peripheral  03-19-21   No Growth Final     .Blood Blood-Peripheral  03-17-21   Growth in anaerobic bottle: Bacillus species not anthracis  "Susceptibilities not performed"  ***Blood Panel PCR results on this specimen are available  approximately 3 hours after the Gram stain result.***  Gram stain, PCR, and/or culture results may not always  correspond due to difference in methodologies.  ************************************************************  This PCR assay was performed by multiplex PCR. This  Assay tests for 66 bacterial and resistance gene targets.  Please refer to the [employer] test directory  at [email or web address] for details.  --  Blood Culture PCR    .Blood Blood  03-15-21   No Growth Final    .Stool Feces  03-13-21   GI PCR Results: NOT detected  *******Please Note:*******  GI panel PCR evaluates for:  Campylobacter, Plesiomonas shigelloides, Salmonella,  Vibrio, Yersinia enterocolitica, Enteroaggregative  Escherichia coli (EAEC), Enteropathogenic E.coli (EPEC),  Enterotoxigenic E. coli (ETEC) lt/st, Shiga-like  toxin-producing E. coli (STEC) stx1/stx2,  Shigella/ Enteroinvasive E. coli (EIEC), Cryptosporidium,  Cyclospora cayetanensis, Entamoeba histolytica,  Giardia lamblia, Adenovirus F 40/41, Astrovirus,  Norovirus GI/GII, Rotavirus A, Sapovirus    .Blood Blood-Peripheral  03-13-21   Growth in anaerobic bottle: Klebsiella variicola  ***Blood Panel PCR results on this specimen are available  approximately 3 hours after the Gram stain result.***  Gram stain, PCR, and/or culture results may not always  correspond due to differencein methodologies.  ************************************************************  This PCR assay was performed by multiplex PCR. This  Assay tests for 66 bacterial and resistance gene targets.  Please refer to the [employer] test directory  at [email or web address] for details.  --  Blood Culture PCR  Klebsiella variicola    RADIOLOGY:    3/11 XR:    INTERPRETATION:    The heart is not enlarged. An accessed right IJ approach port is unchanged in position.  Enteric tube tip is in the stomach and side port in the region of the distal esophagus/GE junction, unchanged.  The lungs are clear.  No pleural effusion or pneumothorax seen.  No acute bony abnormality.    IMPRESSION:  Enteric tube tip is in the stomach and side port in the region of the distal esophagus/GE junction. Dr. Rubio informed me when I spoke with him at 1:43 PM on March 11, 2021 that the tube has been removed.

## 2021-03-26 NOTE — PROGRESS NOTE ADULT - ASSESSMENT
55M w/ metastatic colon cancer, LBO s/p stenting by GI now pending operative intervention    - BCx from 3/19 negative  - OR today for laparoscopic colectomy, consented  - COVID negative (3/24)  - Discussed w/ Dr. Ravin Davila PGY-3  Surgery, A-Team  Pager: [phone]

## 2021-03-26 NOTE — BRIEF OPERATIVE NOTE - OPERATION/FINDINGS
Laparoscopic partial colectomy  Grossly metastatic disease noted in liver  Colonic mass and stent palpated in splenic flexure, which was mobilized  Patient with diffuse oozing, which was controlled with electrocautery and ligasure  After mobilization, distal transverse/ proximal descending colon extracorporealized and resected  End transverse colostomy created

## 2021-03-26 NOTE — PROGRESS NOTE ADULT - ASSESSMENT
55 M with hx unresectable stage 4 colon cancer (mets to liver and lungs, received palliative folfox 2/16/21) presenting with abdominal pain, N/V, and distension. Admitted for medical management of LBO due to obstruction 2/2 transverse colon mass. Course c/b klebsiella and B [place] bacteremia pending colectomy.

## 2021-03-26 NOTE — PROGRESS NOTE ADULT - SUBJECTIVE AND OBJECTIVE BOX
Shamar Martin, PGY-1  Pager: [phone] / [phone]    CHIEF COMPLAINT: Patient is a 55y old  Male who presents with a chief complaint of abdominal pain (26 Mar 2021 08:50)    INTERVAL HPI/OVERNIGHT EVENTS: CT, pt was seen comfortable at bedside. He is NPO from midnight on maintannce IVF. Denies any CP, sob, abd pain, N/V. Had BM with bowel prep.     MEDICATIONS (STANDING):  ascorbic acid 500 milliGRAM(s) Oral daily  chlorhexidine 4% Liquid 1 Application(s) Topical daily  ciprofloxacin     Tablet 500 milliGRAM(s) Oral every 12 hours  enoxaparin Injectable 40 milliGRAM(s) SubCutaneous daily  ferrous    sulfate 325 milliGRAM(s) Oral daily  influenza   Vaccine 0.5 milliLiter(s) IntraMuscular once  lactated ringers. 1000 milliLiter(s) IV Continuous <Continuous>  pantoprazole    Tablet 40 milliGRAM(s) Oral before breakfast  saline laxative (FLEET) Rectal Enema 2 Enema Rectal once    MEDICATIONS  (PRN):  acetaminophen   Tablet .. 650 milliGRAM(s) Oral every 6 hours PRN  ondansetron Injectable 4 milliGRAM(s) IV Push every 6 hours PRN    REVIEW OF SYSTEMS:  CONSTITUTIONAL: No fever, weight loss, or fatigue  RESPIRATORY: No cough, wheezing, chills or hemoptysis; No shortness of breath  CARDIOVASCULAR: No chest pain, palpitations, dizziness, or leg swelling  GASTROINTESTINAL: No abdominal or epigastric pain. No nausea, vomiting, or hematemesis; No diarrhea or constipation.   GENITOURINARY: No dysuria, frequency, hematuria, or incontinence    VITAL SIGNS:  T(F): 98.2 (03-26-21 @ 05:29), Max: 98.5 (03-25-21 @ 13:15)  HR: 85 (03-26-21 @ 05:29) (85 - 99)  BP: 110/77 (03-26-21 @ 05:29) (105/73 - 111/79)  RR: 18 (03-26-21 @ 05:29) (18 - 18)  SpO2: 100% (03-26-21 @ 05:29) (100% - 100%)    PHYSICAL EXAM:  GENERAL: comfortable, pale, chronically ill appearing. thin.   HEENT:  Atraumatic, Normocephalic  CHEST/LUNG: Chest rise equal bilaterally. CTAB.   HEART: tachycardic, no murmurs   ABDOMEN: Abdomen non-distended and soft, nontender.   EXTREMITIES:  2+ Peripheral Pulses.  PSYCH: A&Ox3  SKIN: Pale, vitiligo, port on R side of chest, area non tender no erythema/swelling    LABS:                        8.1    10.38 )-----------( 504      ( 26 Mar 2021 07:22 )             26.5     03-26    139  |  105  |  12  ----------------------------<  81  4.3   |  23  |  0.51    Ca    8.5      26 Mar 2021 07:22  Phos  3.9     03-26  Mg     2.0     03-26    PT/INR - ( 26 Mar 2021 07:22 )   PT: 13.5 sec;   INR: 1.18 ratio    PTT - ( 26 Mar 2021 07:22 )  PTT:32.0 sec    RADIOLOGY & ADDITIONAL TESTS:

## 2021-03-26 NOTE — PROGRESS NOTE ADULT - SUBJECTIVE AND OBJECTIVE BOX
General Surgery Progress Note     S: Pt seen and examined at bedside during AM rounds  - Pt states that pain is minimal  - GI fxn ++  - denies f/c, n/v, CP, abd pain, SOB, lightheadedness.         OBJECTIVE:     ** PHYSICAL EXAM **    Gen: NAD, laying in bed  HEENT: NC/AT  RESP: nonlabored breathing  Abdominal: Soft, minimally distended, non-tender, no rebound      ** VITAL SIGNS / I&O's **    Vital Signs Last 24 Hrs  T(C): 36.8 (26 Mar 2021 05:29), Max: 36.9 (25 Mar 2021 13:15)  T(F): 98.2 (26 Mar 2021 05:29), Max: 98.5 (25 Mar 2021 13:15)  HR: 85 (26 Mar 2021 05:29) (85 - 99)  BP: 110/77 (26 Mar 2021 05:29) (105/73 - 111/79)  BP(mean): --  RR: 18 (26 Mar 2021 05:29) (18 - 18)  SpO2: 100% (26 Mar 2021 05:29) (100% - 100%)          ** LABS **                          8.1    10.38 )-----------( 504      ( 26 Mar 2021 07:22 )             26.5     26 Mar 2021 07:22    139    |  105    |  12     ----------------------------<  81     4.3     |  23     |  0.51     Ca    8.5        26 Mar 2021 07:22  Phos  3.9       26 Mar 2021 07:22  Mg     2.0       26 Mar 2021 07:22      PT/INR - ( 26 Mar 2021 07:22 )   PT: 13.5 sec;   INR: 1.18 ratio         PTT - ( 26 Mar 2021 07:22 )  PTT:32.0 sec  CAPILLARY BLOOD GLUCOSE                    MEDICATIONS  (STANDING):  ascorbic acid 500 milliGRAM(s) Oral daily  chlorhexidine 4% Liquid 1 Application(s) Topical daily  ciprofloxacin     Tablet 500 milliGRAM(s) Oral every 12 hours  enoxaparin Injectable 40 milliGRAM(s) SubCutaneous daily  ferrous    sulfate 325 milliGRAM(s) Oral daily  influenza   Vaccine 0.5 milliLiter(s) IntraMuscular once  lactated ringers. 1000 milliLiter(s) (75 mL/Hr) IV Continuous <Continuous>  pantoprazole    Tablet 40 milliGRAM(s) Oral before breakfast  saline laxative (FLEET) Rectal Enema 2 Enema Rectal once    MEDICATIONS  (PRN):  acetaminophen   Tablet .. 650 milliGRAM(s) Oral every 6 hours PRN Temp greater or equal to 38C (100.4F), Mild Pain (1 - 3)  ondansetron Injectable 4 milliGRAM(s) IV Push every 6 hours PRN Nausea and/or Vomiting

## 2021-03-26 NOTE — PROGRESS NOTE ADULT - PROBLEM SELECTOR PLAN 1
Noted after episodes of diarrhea, blood cultures for clearance of Kleb on 3/17 were + for B cereus in blood likely GI translocation in setting of colonic mass and stent  - s/ vanco x 5d, now ID rec cipro/flagyl (until 3/29)  - culture 3/19 NG final   - ID following, recs appreciated Noted after episodes of diarrhea, blood cultures for clearance of Kleb on 3/17 were + for B cereus in blood likely GI translocation in setting of colonic mass and stent  - s/ vanco x 5d, now ID rec cipro/flagyl (until 4/2)  - culture 3/19 NG final   - ID following, recs appreciated Noted after episodes of diarrhea, blood cultures for clearance of Kleb on 3/17 were + for B cereus in blood likely GI translocation in setting of colonic mass and stent  - s/ vanco x 5d, now ID rec cipro/flagyl (until 4/2)  - culture 3/19 NG final   -monitor QTc weekly (QTc 453 on 3/22)   - ID following, recs appreciated

## 2021-03-27 LAB
ANION GAP SERPL CALC-SCNC: 10 MMOL/L — SIGNIFICANT CHANGE UP (ref 7–14)
BUN SERPL-MCNC: 8 MG/DL — SIGNIFICANT CHANGE UP (ref 7–23)
CALCIUM SERPL-MCNC: 8.6 MG/DL — SIGNIFICANT CHANGE UP (ref 8.4–10.5)
CHLORIDE SERPL-SCNC: 101 MMOL/L — SIGNIFICANT CHANGE UP (ref 98–107)
CO2 SERPL-SCNC: 25 MMOL/L — SIGNIFICANT CHANGE UP (ref 22–31)
COVID-19 SPIKE DOMAIN AB INTERP: POSITIVE
COVID-19 SPIKE DOMAIN ANTIBODY RESULT: >250 U/ML — HIGH
CREAT SERPL-MCNC: 0.52 MG/DL — SIGNIFICANT CHANGE UP (ref 0.5–1.3)
GLUCOSE SERPL-MCNC: 85 MG/DL — SIGNIFICANT CHANGE UP (ref 70–99)
HCT VFR BLD CALC: 34 % — LOW (ref 39–50)
HGB BLD-MCNC: 10.8 G/DL — LOW (ref 13–17)
MAGNESIUM SERPL-MCNC: 2.3 MG/DL — SIGNIFICANT CHANGE UP (ref 1.6–2.6)
MCHC RBC-ENTMCNC: 24 PG — LOW (ref 27–34)
MCHC RBC-ENTMCNC: 31.8 GM/DL — LOW (ref 32–36)
MCV RBC AUTO: 75.6 FL — LOW (ref 80–100)
NRBC # BLD: 0 /100 WBCS — SIGNIFICANT CHANGE UP
NRBC # FLD: 0 K/UL — SIGNIFICANT CHANGE UP
PHOSPHATE SERPL-MCNC: 4.9 MG/DL — HIGH (ref 2.5–4.5)
PLATELET # BLD AUTO: 488 K/UL — HIGH (ref 150–400)
POTASSIUM SERPL-MCNC: 4.1 MMOL/L — SIGNIFICANT CHANGE UP (ref 3.5–5.3)
POTASSIUM SERPL-SCNC: 4.1 MMOL/L — SIGNIFICANT CHANGE UP (ref 3.5–5.3)
RBC # BLD: 4.5 M/UL — SIGNIFICANT CHANGE UP (ref 4.2–5.8)
RBC # FLD: 19.4 % — HIGH (ref 10.3–14.5)
SARS-COV-2 IGG+IGM SERPL QL IA: >250 U/ML — HIGH
SARS-COV-2 IGG+IGM SERPL QL IA: POSITIVE
SODIUM SERPL-SCNC: 136 MMOL/L — SIGNIFICANT CHANGE UP (ref 135–145)
WBC # BLD: 8.89 K/UL — SIGNIFICANT CHANGE UP (ref 3.8–10.5)
WBC # FLD AUTO: 8.89 K/UL — SIGNIFICANT CHANGE UP (ref 3.8–10.5)

## 2021-03-27 RX ORDER — ACETAMINOPHEN 500 MG
750 TABLET ORAL EVERY 6 HOURS
Refills: 0 | Status: COMPLETED | OUTPATIENT
Start: 2021-03-27 | End: 2021-03-28

## 2021-03-27 RX ORDER — DEXTROSE MONOHYDRATE, SODIUM CHLORIDE, AND POTASSIUM CHLORIDE 50; .745; 4.5 G/1000ML; G/1000ML; G/1000ML
1000 INJECTION, SOLUTION INTRAVENOUS
Refills: 0 | Status: DISCONTINUED | OUTPATIENT
Start: 2021-03-27 | End: 2021-03-27

## 2021-03-27 RX ORDER — PANTOPRAZOLE SODIUM 20 MG/1
40 TABLET, DELAYED RELEASE ORAL
Refills: 0 | Status: DISCONTINUED | OUTPATIENT
Start: 2021-03-27 | End: 2021-03-29

## 2021-03-27 RX ORDER — OXYCODONE HYDROCHLORIDE 5 MG/1
2.5 TABLET ORAL EVERY 4 HOURS
Refills: 0 | Status: DISCONTINUED | OUTPATIENT
Start: 2021-03-27 | End: 2021-03-29

## 2021-03-27 RX ORDER — OXYCODONE HYDROCHLORIDE 5 MG/1
5 TABLET ORAL EVERY 4 HOURS
Refills: 0 | Status: DISCONTINUED | OUTPATIENT
Start: 2021-03-27 | End: 2021-03-29

## 2021-03-27 RX ADMIN — Medication 750 MILLIGRAM(S): at 00:39

## 2021-03-27 RX ADMIN — OXYCODONE HYDROCHLORIDE 5 MILLIGRAM(S): 5 TABLET ORAL at 21:49

## 2021-03-27 RX ADMIN — OXYCODONE HYDROCHLORIDE 5 MILLIGRAM(S): 5 TABLET ORAL at 22:19

## 2021-03-27 RX ADMIN — Medication 750 MILLIGRAM(S): at 13:05

## 2021-03-27 RX ADMIN — PANTOPRAZOLE SODIUM 40 MILLIGRAM(S): 20 TABLET, DELAYED RELEASE ORAL at 18:36

## 2021-03-27 RX ADMIN — Medication 300 MILLIGRAM(S): at 18:35

## 2021-03-27 RX ADMIN — Medication 300 MILLIGRAM(S): at 12:33

## 2021-03-27 RX ADMIN — Medication 50 GRAM(S): at 00:09

## 2021-03-27 RX ADMIN — Medication 500 MILLIGRAM(S): at 07:09

## 2021-03-27 RX ADMIN — OXYCODONE HYDROCHLORIDE 5 MILLIGRAM(S): 5 TABLET ORAL at 11:17

## 2021-03-27 RX ADMIN — OXYCODONE HYDROCHLORIDE 5 MILLIGRAM(S): 5 TABLET ORAL at 12:00

## 2021-03-27 RX ADMIN — HYDROMORPHONE HYDROCHLORIDE 0.5 MILLIGRAM(S): 2 INJECTION INTRAMUSCULAR; INTRAVENOUS; SUBCUTANEOUS at 03:50

## 2021-03-27 RX ADMIN — DEXTROSE MONOHYDRATE, SODIUM CHLORIDE, AND POTASSIUM CHLORIDE 50 MILLILITER(S): 50; .745; 4.5 INJECTION, SOLUTION INTRAVENOUS at 11:18

## 2021-03-27 RX ADMIN — Medication 750 MILLIGRAM(S): at 19:05

## 2021-03-27 RX ADMIN — Medication 100 MILLIGRAM(S): at 07:09

## 2021-03-27 RX ADMIN — Medication 500 MILLIGRAM(S): at 18:35

## 2021-03-27 RX ADMIN — Medication 100 MILLIGRAM(S): at 18:36

## 2021-03-27 RX ADMIN — HYDROMORPHONE HYDROCHLORIDE 0.5 MILLIGRAM(S): 2 INJECTION INTRAMUSCULAR; INTRAVENOUS; SUBCUTANEOUS at 04:05

## 2021-03-27 RX ADMIN — Medication 300 MILLIGRAM(S): at 00:09

## 2021-03-27 RX ADMIN — CHLORHEXIDINE GLUCONATE 1 APPLICATION(S): 213 SOLUTION TOPICAL at 11:18

## 2021-03-27 NOTE — PROGRESS NOTE ADULT - ASSESSMENT
55M w/ metastatic colon cancer, LBO s/p stenting by GI POD1 laparoscopic partial colectomy, end colostomy creation. Intraoperatively, pt received 3upRBCs and 1uFFP. Postop labs normal. Hemodynamically stable, recovering appropriately on floors.    Plan:  - Diet: NPO  - Pain control: IV tylenol standing, dilaudid prn. No toradol.   - No DVT ppx  - Maintain wasserman  - Monitor I/Os  - OOB and ambulating as tolerated  - F/u AM labs    Surgery Team A  [id number]. 55M w/ metastatic colon cancer, LBO s/p stenting by GI POD1 laparoscopic partial colectomy, end colostomy creation. Intraoperatively, pt received 3upRBCs and 1uFFP. Currently hemodynamically stable with ostomy function.    Plan:  - Diet: advanced to clears. Can further advance as tolerated.  - Pain control: IV tylenol standing, dilaudid prn. No toradol.   - No DVT ppx  - Maintain wasserman. Consider removing when patient ambulatory.  - Monitor I/Os  - OOB and ambulating as tolerated  - Ostomy RN consult  - F/u AM labs    Surgery Team A  [id number]. 1.61

## 2021-03-27 NOTE — PHYSICAL THERAPY INITIAL EVALUATION ADULT - GENERAL OBSERVATIONS, REHAB EVAL
Patient was received sitting at the edge of the bed in NAD.
Pt found with head of bed elevated; +IV; +colostomy; patient agreeable to PT.

## 2021-03-27 NOTE — PHYSICAL THERAPY INITIAL EVALUATION ADULT - ADDITIONAL COMMENTS
Patient reports he lives with his wife and children in a private house, 2 steps to enter and 1 flight within. Patient reports he was previously independent in all ADLs and did not use an assistive device for ambulation.     Patient was left sitting at the edge of the bed as found, all lines/tubes intact and call bell within reach, RN aware
Patient has 2 steps to enter and a flight of stairs inside the home to negotiate.

## 2021-03-27 NOTE — PHYSICAL THERAPY INITIAL EVALUATION ADULT - PATIENT PROFILE REVIEW, REHAB EVAL
PT orders received: increase as tolerated. Consult with JAMIR ALTAMIRANO, pt may participate in PT evaluation./yes
PT Re evaluation/yes

## 2021-03-27 NOTE — PHYSICAL THERAPY INITIAL EVALUATION ADULT - PREDICTED DURATION OF THERAPY (DAYS/WKS), PT EVAL
4 weeks
Patient demonstrating safe ambulation and performed safe stair negotiation-->will not be placed on PT program.

## 2021-03-27 NOTE — PHYSICAL THERAPY INITIAL EVALUATION ADULT - MANUAL MUSCLE TESTING RESULTS, REHAB EVAL
bilateral UE and LE 3+/5
bilateral UEs & LEs grossly 5/5 throughout/no strength deficits were identified

## 2021-03-27 NOTE — PHYSICAL THERAPY INITIAL EVALUATION ADULT - GAIT DISTANCE, PT EVAL
unable to ambulate further due to feeling tape stretching at colostomy bag and feeling uncomfortable/25 feet
200 feet

## 2021-03-27 NOTE — PHYSICAL THERAPY INITIAL EVALUATION ADULT - DISCHARGE DISPOSITION, PT EVAL
home/no skilled PT needs
Anticipated discharge to home with home PT services to improve functional mobility and strength, to optimize safety within the home environment, and to allow pt to return to prior level of function

## 2021-03-27 NOTE — PHYSICAL THERAPY INITIAL EVALUATION ADULT - PERTINENT HX OF CURRENT PROBLEM, REHAB EVAL
Patient is a 55 year old male admitted to [facility] on 3/9 with worsening nausea, vomiting (NBNB), abdominal distention and pain. PMH: unresectable stage 4 colon cancer (mets to liver and lungs, received palliative folfox 2/16/21).
55 year old male with hx unresectable stage 4 colon cancer (mets to liver and lungs, received palliative folfox 2/16/21) follows with [facility] [place] here for worsening nausea, vomiting (NBNB), abdominal distention. Pt was seen by PT on March 12. 2021 for evaluation and deemed independent with no PT needs. PT reevaluation requested due to patient now s/p above named procedure.

## 2021-03-27 NOTE — PHYSICAL THERAPY INITIAL EVALUATION ADULT - PLANNED THERAPY INTERVENTIONS, PT EVAL
Patient left with head of bed elevated , positioned for safety in bed in NAD, call bell in reach, all lines intact. +Bed alarm./balance training/bed mobility training/gait training/ROM/strengthening/transfer training

## 2021-03-27 NOTE — PROGRESS NOTE ADULT - SUBJECTIVE AND OBJECTIVE BOX
Surgery Progress Note    SUBJECTIVE: No acute events overnight. Over 24hrs, pt sp lap partial colectomy, end colostomy creation. Pt seen and examined at bedside. Patient comfortable. No nausea, vomiting, diarrhea. Pain is controlled. Tolerating diet.    Vital Signs Last 24 Hrs  T(C): 36.7 (26 Mar 2021 20:15), Max: 36.8 (26 Mar 2021 05:29)  T(F): 98 (26 Mar 2021 20:15), Max: 98.2 (26 Mar 2021 05:29)  HR: 70 (26 Mar 2021 20:15) (70 - 100)  BP: 124/88 (26 Mar 2021 20:15) (110/77 - 139/96)  BP(mean): 107 (26 Mar 2021 19:00) (105 - 109)  RR: 17 (26 Mar 2021 20:15) (11 - 18)  SpO2: 100% (26 Mar 2021 20:15) (95% - 100%)    Physical Exam:  General Appearance: In no acute distress, awake, alert, and oriented x3, cachectic  Respiratory: No labored breathing  CV: Pulse regularly present  Abdomen: Soft, appropriately tender, nondistended w/o rebound tenderness or guarding. Dressings c/d/i. Ostomy pink, viable w/ bowel sweat, solid stool.  Extremities: Warm and well perfused, moving spontaneously  : Judd in place w/ clear yellow output    LABS:                        11.0   10.68 )-----------( 483      ( 26 Mar 2021 18:18 )             35.4     03-26    136  |  102  |  8   ----------------------------<  145<H>  4.3   |  21<L>  |  0.43<L>    Ca    8.4      26 Mar 2021 18:18  Phos  4.1     03-26  Mg     1.7     03-26      PT/INR - ( 26 Mar 2021 07:22 )   PT: 13.5 sec;   INR: 1.18 ratio         PTT - ( 26 Mar 2021 07:22 )  PTT:32.0 sec      INs and OUTs:    03-26-21 @ 07:01  -  03-27-21 @ 00:21  --------------------------------------------------------  IN: 150 mL / OUT: 230 mL / NET: -80 mL        Medications:  MEDICATIONS  (STANDING):  acetaminophen  IVPB .. 750 milliGRAM(s) IV Intermittent every 6 hours  chlorhexidine 4% Liquid 1 Application(s) Topical daily  ciprofloxacin     Tablet 500 milliGRAM(s) Oral every 12 hours  influenza   Vaccine 0.5 milliLiter(s) IntraMuscular once  lactated ringers. 1000 milliLiter(s) (75 mL/Hr) IV Continuous <Continuous>  metroNIDAZOLE  IVPB 500 milliGRAM(s) IV Intermittent <User Schedule>  pantoprazole  Injectable 40 milliGRAM(s) IV Push daily    MEDICATIONS  (PRN):  acetaminophen   Tablet .. 650 milliGRAM(s) Oral every 6 hours PRN Temp greater or equal to 38C (100.4F), Mild Pain (1 - 3)  HYDROmorphone  Injectable 0.5 milliGRAM(s) IV Push every 4 hours PRN Moderate Pain (4 - 6)  HYDROmorphone  Injectable 1 milliGRAM(s) IV Push every 4 hours PRN Severe Pain (7 - 10)   Surgery Progress Note    SUBJECTIVE: No acute events overnight. Over 24hrs, pt sp lap partial colectomy, end colostomy creation. Pt seen and examined at bedside. Patient comfortable. No nausea, vomiting, diarrhea. Pain is controlled. Tolerating diet.     Vital Signs Last 24 Hrs  T(C): 36.7 (26 Mar 2021 20:15), Max: 36.8 (26 Mar 2021 05:29)  T(F): 98 (26 Mar 2021 20:15), Max: 98.2 (26 Mar 2021 05:29)  HR: 70 (26 Mar 2021 20:15) (70 - 100)  BP: 124/88 (26 Mar 2021 20:15) (110/77 - 139/96)  BP(mean): 107 (26 Mar 2021 19:00) (105 - 109)  RR: 17 (26 Mar 2021 20:15) (11 - 18)  SpO2: 100% (26 Mar 2021 20:15) (95% - 100%)    Physical Exam:  General Appearance: In no acute distress, awake, alert, and oriented x3, cachectic  Respiratory: No labored breathing  CV: Pulse regularly present  Abdomen: Soft, appropriately tender, nondistended w/o rebound tenderness or guarding. Dressings c/d/i. Ostomy pink, viable w/ bowel sweat, solid stool.  Extremities: Warm and well perfused, moving spontaneously  : Judd in place w/ clear yellow output    LABS:                        11.0   10.68 )-----------( 483      ( 26 Mar 2021 18:18 )             35.4     03-26    136  |  102  |  8   ----------------------------<  145<H>  4.3   |  21<L>  |  0.43<L>    Ca    8.4      26 Mar 2021 18:18  Phos  4.1     03-26  Mg     1.7     03-26      PT/INR - ( 26 Mar 2021 07:22 )   PT: 13.5 sec;   INR: 1.18 ratio         PTT - ( 26 Mar 2021 07:22 )  PTT:32.0 sec      INs and OUTs:    03-26-21 @ 07:01  -  03-27-21 @ 00:21  --------------------------------------------------------  IN: 150 mL / OUT: 230 mL / NET: -80 mL

## 2021-03-28 LAB
ANION GAP SERPL CALC-SCNC: 9 MMOL/L — SIGNIFICANT CHANGE UP (ref 7–14)
BUN SERPL-MCNC: 8 MG/DL — SIGNIFICANT CHANGE UP (ref 7–23)
CALCIUM SERPL-MCNC: 8.3 MG/DL — LOW (ref 8.4–10.5)
CHLORIDE SERPL-SCNC: 103 MMOL/L — SIGNIFICANT CHANGE UP (ref 98–107)
CO2 SERPL-SCNC: 26 MMOL/L — SIGNIFICANT CHANGE UP (ref 22–31)
CREAT SERPL-MCNC: 0.52 MG/DL — SIGNIFICANT CHANGE UP (ref 0.5–1.3)
GLUCOSE SERPL-MCNC: 72 MG/DL — SIGNIFICANT CHANGE UP (ref 70–99)
HCT VFR BLD CALC: 33.4 % — LOW (ref 39–50)
HGB BLD-MCNC: 10.6 G/DL — LOW (ref 13–17)
MAGNESIUM SERPL-MCNC: 2 MG/DL — SIGNIFICANT CHANGE UP (ref 1.6–2.6)
MCHC RBC-ENTMCNC: 24.1 PG — LOW (ref 27–34)
MCHC RBC-ENTMCNC: 31.7 GM/DL — LOW (ref 32–36)
MCV RBC AUTO: 75.9 FL — LOW (ref 80–100)
NRBC # BLD: 0 /100 WBCS — SIGNIFICANT CHANGE UP
NRBC # FLD: 0.03 K/UL — HIGH
PHOSPHATE SERPL-MCNC: 4.2 MG/DL — SIGNIFICANT CHANGE UP (ref 2.5–4.5)
PLATELET # BLD AUTO: 443 K/UL — HIGH (ref 150–400)
POTASSIUM SERPL-MCNC: 3.8 MMOL/L — SIGNIFICANT CHANGE UP (ref 3.5–5.3)
POTASSIUM SERPL-SCNC: 3.8 MMOL/L — SIGNIFICANT CHANGE UP (ref 3.5–5.3)
RBC # BLD: 4.4 M/UL — SIGNIFICANT CHANGE UP (ref 4.2–5.8)
RBC # FLD: 19.8 % — HIGH (ref 10.3–14.5)
SODIUM SERPL-SCNC: 138 MMOL/L — SIGNIFICANT CHANGE UP (ref 135–145)
WBC # BLD: 7.99 K/UL — SIGNIFICANT CHANGE UP (ref 3.8–10.5)
WBC # FLD AUTO: 7.99 K/UL — SIGNIFICANT CHANGE UP (ref 3.8–10.5)

## 2021-03-28 RX ORDER — ACETAMINOPHEN 500 MG
650 TABLET ORAL EVERY 6 HOURS
Refills: 0 | Status: DISCONTINUED | OUTPATIENT
Start: 2021-03-28 | End: 2021-03-29

## 2021-03-28 RX ORDER — ENOXAPARIN SODIUM 100 MG/ML
40 INJECTION SUBCUTANEOUS DAILY
Refills: 0 | Status: DISCONTINUED | OUTPATIENT
Start: 2021-03-28 | End: 2021-03-29

## 2021-03-28 RX ADMIN — Medication 650 MILLIGRAM(S): at 21:19

## 2021-03-28 RX ADMIN — Medication 750 MILLIGRAM(S): at 00:44

## 2021-03-28 RX ADMIN — PANTOPRAZOLE SODIUM 40 MILLIGRAM(S): 20 TABLET, DELAYED RELEASE ORAL at 07:07

## 2021-03-28 RX ADMIN — Medication 300 MILLIGRAM(S): at 00:14

## 2021-03-28 RX ADMIN — Medication 500 MILLIGRAM(S): at 18:01

## 2021-03-28 RX ADMIN — CHLORHEXIDINE GLUCONATE 1 APPLICATION(S): 213 SOLUTION TOPICAL at 12:25

## 2021-03-28 RX ADMIN — Medication 750 MILLIGRAM(S): at 07:33

## 2021-03-28 RX ADMIN — OXYCODONE HYDROCHLORIDE 5 MILLIGRAM(S): 5 TABLET ORAL at 12:25

## 2021-03-28 RX ADMIN — Medication 650 MILLIGRAM(S): at 15:45

## 2021-03-28 RX ADMIN — OXYCODONE HYDROCHLORIDE 5 MILLIGRAM(S): 5 TABLET ORAL at 12:55

## 2021-03-28 RX ADMIN — ENOXAPARIN SODIUM 40 MILLIGRAM(S): 100 INJECTION SUBCUTANEOUS at 18:01

## 2021-03-28 RX ADMIN — Medication 500 MILLIGRAM(S): at 07:03

## 2021-03-28 RX ADMIN — Medication 100 MILLIGRAM(S): at 18:02

## 2021-03-28 RX ADMIN — Medication 100 MILLIGRAM(S): at 07:03

## 2021-03-28 RX ADMIN — Medication 300 MILLIGRAM(S): at 07:03

## 2021-03-28 RX ADMIN — Medication 650 MILLIGRAM(S): at 22:05

## 2021-03-28 RX ADMIN — Medication 650 MILLIGRAM(S): at 15:15

## 2021-03-28 NOTE — PROGRESS NOTE ADULT - SUBJECTIVE AND OBJECTIVE BOX
SURGERY: A Team  Pager: [phone]    STATUS POST:  lap partial colectomy, end colostomy creation    POST OPERATIVE DAY #2      INTERVAL EVENTS/SUBJECTIVE: No acute events overnight. Over 24hrs, pt sp lap partial colectomy, end colostomy creation. Pt seen and examined at bedside. Patient comfortable. No nausea, vomiting, diarrhea. Pain is controlled. Tolerating diet.     ______________________________________________  OBJECTIVE:   T(C): 36.8 (03-28-21 @ 02:00), Max: 37 (03-27-21 @ 22:00)  HR: 60 (03-28-21 @ 02:00) (60 - 68)  BP: 102/67 (03-28-21 @ 02:00) (98/69 - 112/70)  RR: 18 (03-28-21 @ 02:00) (16 - 18)  SpO2: 100% (03-28-21 @ 02:00) (99% - 100%)  Wt(kg): --  CAPILLARY BLOOD GLUCOSE        I&O's Detail    26 Mar 2021 07:01  -  27 Mar 2021 07:00  --------------------------------------------------------  IN:    IV PiggyBack: 300 mL    Lactated Ringers: 750 mL  Total IN: 1050 mL    OUT:    Colostomy (mL): 85 mL    Indwelling Catheter - Urethral (mL): 2170 mL    Oral Fluid: 0 mL  Total OUT: 2255 mL    Total NET: -1205 mL      27 Mar 2021 07:01  -  28 Mar 2021 03:23  --------------------------------------------------------  IN:    dextrose 5% + sodium chloride 0.45% w/ Additives: 200 mL    IV PiggyBack: 75 mL    Oral Fluid: 740 mL  Total IN: 1015 mL    OUT:    Colostomy (mL): 475 mL    Indwelling Catheter - Urethral (mL): 100 mL    Voided (mL): 1650 mL  Total OUT: 2225 mL    Total NET: -1210 mL          Physical exam:  General Appearance: In no acute distress, awake, alert, and oriented x3, cachectic  Respiratory: No labored breathing  CV: Pulse regularly present  Abdomen: Soft, appropriately tender, nondistended w/o rebound tenderness or guarding. Dressings c/d/i. Ostomy pink, viable w/ bowel sweat, solid stool.  Extremities: Warm and well perfused, moving spontaneously  : Judd in place w/ clear yellow output  ______________________________________________  LABS:  CBC Full  -  ( 27 Mar 2021 08:17 )  WBC Count : 8.89 K/uL  RBC Count : 4.50 M/uL  Hemoglobin : 10.8 g/dL  Hematocrit : 34.0 %  Platelet Count - Automated : 488 K/uL  Mean Cell Volume : 75.6 fL  Mean Cell Hemoglobin : 24.0 pg  Mean Cell Hemoglobin Concentration : 31.8 gm/dL  Auto Neutrophil # : x  Auto Lymphocyte # : x  Auto Monocyte # : x  Auto Eosinophil # : x  Auto Basophil # : x  Auto Neutrophil % : x  Auto Lymphocyte % : x  Auto Monocyte % : x  Auto Eosinophil % : x  Auto Basophil % : x    03-27    136  |  101  |  8   ----------------------------<  85  4.1   |  25  |  0.52    Ca    8.6      27 Mar 2021 08:09  Phos  4.9     03-27  Mg     2.3     03-27      _____________________________________________  RADIOLOGY:     SURGERY: A Team  Pager: [phone]    STATUS POST:  lap partial colectomy, end colostomy creation    POST OPERATIVE DAY #2      INTERVAL EVENTS/SUBJECTIVE: No acute events overnight. Over 24hrs, pt sp lap partial colectomy, end colostomy creation. Pt seen and examined at bedside. Patient comfortable. No nausea, vomiting, diarrhea. Pain is controlled. Tolerating diet. Passed TOV    ______________________________________________  OBJECTIVE:   T(C): 36.8 (03-28-21 @ 02:00), Max: 37 (03-27-21 @ 22:00)  HR: 60 (03-28-21 @ 02:00) (60 - 68)  BP: 102/67 (03-28-21 @ 02:00) (98/69 - 112/70)  RR: 18 (03-28-21 @ 02:00) (16 - 18)  SpO2: 100% (03-28-21 @ 02:00) (99% - 100%)  Wt(kg): --  CAPILLARY BLOOD GLUCOSE        I&O's Detail    26 Mar 2021 07:01  -  27 Mar 2021 07:00  --------------------------------------------------------  IN:    IV PiggyBack: 300 mL    Lactated Ringers: 750 mL  Total IN: 1050 mL    OUT:    Colostomy (mL): 85 mL    Indwelling Catheter - Urethral (mL): 2170 mL    Oral Fluid: 0 mL  Total OUT: 2255 mL    Total NET: -1205 mL      27 Mar 2021 07:01  -  28 Mar 2021 03:23  --------------------------------------------------------  IN:    dextrose 5% + sodium chloride 0.45% w/ Additives: 200 mL    IV PiggyBack: 75 mL    Oral Fluid: 740 mL  Total IN: 1015 mL    OUT:    Colostomy (mL): 475 mL    Indwelling Catheter - Urethral (mL): 100 mL    Voided (mL): 1650 mL  Total OUT: 2225 mL    Total NET: -1210 mL          Physical exam:  General Appearance: In no acute distress, awake, alert, and oriented x3, cachectic  Respiratory: No labored breathing  CV: Pulse regularly present  Abdomen: Soft, appropriately tender, nondistended w/o rebound tenderness or guarding. Dressings c/d/i. Ostomy pink, viable w/ stool and gas within bag.   Extremities: Warm and well perfused, moving spontaneously    ______________________________________________  LABS:  CBC Full  -  ( 27 Mar 2021 08:17 )  WBC Count : 8.89 K/uL  RBC Count : 4.50 M/uL  Hemoglobin : 10.8 g/dL  Hematocrit : 34.0 %  Platelet Count - Automated : 488 K/uL  Mean Cell Volume : 75.6 fL  Mean Cell Hemoglobin : 24.0 pg  Mean Cell Hemoglobin Concentration : 31.8 gm/dL  Auto Neutrophil # : x  Auto Lymphocyte # : x  Auto Monocyte # : x  Auto Eosinophil # : x  Auto Basophil # : x  Auto Neutrophil % : x  Auto Lymphocyte % : x  Auto Monocyte % : x  Auto Eosinophil % : x  Auto Basophil % : x    03-27    136  |  101  |  8   ----------------------------<  85  4.1   |  25  |  0.52    Ca    8.6      27 Mar 2021 08:09  Phos  4.9     03-27  Mg     2.3     03-27      _____________________________________________  RADIOLOGY:     SURGERY: A Team  Pager: [phone]    STATUS POST:  lap partial colectomy, end colostomy creation    POST OPERATIVE DAY #3      INTERVAL EVENTS/SUBJECTIVE: No acute events overnight. Over 24hrs, pt sp lap partial colectomy, end colostomy creation. Pt seen and examined at bedside. Patient comfortable. No nausea, vomiting, diarrhea. Pain is controlled. Tolerating diet. Passed TOV    ______________________________________________  OBJECTIVE:   T(C): 36.8 (03-28-21 @ 02:00), Max: 37 (03-27-21 @ 22:00)  HR: 60 (03-28-21 @ 02:00) (60 - 68)  BP: 102/67 (03-28-21 @ 02:00) (98/69 - 112/70)  RR: 18 (03-28-21 @ 02:00) (16 - 18)  SpO2: 100% (03-28-21 @ 02:00) (99% - 100%)  Wt(kg): --  CAPILLARY BLOOD GLUCOSE        I&O's Detail    26 Mar 2021 07:01  -  27 Mar 2021 07:00  --------------------------------------------------------  IN:    IV PiggyBack: 300 mL    Lactated Ringers: 750 mL  Total IN: 1050 mL    OUT:    Colostomy (mL): 85 mL    Indwelling Catheter - Urethral (mL): 2170 mL    Oral Fluid: 0 mL  Total OUT: 2255 mL    Total NET: -1205 mL      27 Mar 2021 07:01  -  28 Mar 2021 03:23  --------------------------------------------------------  IN:    dextrose 5% + sodium chloride 0.45% w/ Additives: 200 mL    IV PiggyBack: 75 mL    Oral Fluid: 740 mL  Total IN: 1015 mL    OUT:    Colostomy (mL): 475 mL    Indwelling Catheter - Urethral (mL): 100 mL    Voided (mL): 1650 mL  Total OUT: 2225 mL    Total NET: -1210 mL          Physical exam:  General Appearance: In no acute distress, awake, alert, and oriented x3, cachectic  Respiratory: No labored breathing  CV: Pulse regularly present  Abdomen: Soft, appropriately tender, nondistended w/o rebound tenderness or guarding. Dressings c/d/i. Ostomy pink, viable w/ stool and gas within bag.   Extremities: Warm and well perfused, moving spontaneously    ______________________________________________  LABS:  CBC Full  -  ( 27 Mar 2021 08:17 )  WBC Count : 8.89 K/uL  RBC Count : 4.50 M/uL  Hemoglobin : 10.8 g/dL  Hematocrit : 34.0 %  Platelet Count - Automated : 488 K/uL  Mean Cell Volume : 75.6 fL  Mean Cell Hemoglobin : 24.0 pg  Mean Cell Hemoglobin Concentration : 31.8 gm/dL  Auto Neutrophil # : x  Auto Lymphocyte # : x  Auto Monocyte # : x  Auto Eosinophil # : x  Auto Basophil # : x  Auto Neutrophil % : x  Auto Lymphocyte % : x  Auto Monocyte % : x  Auto Eosinophil % : x  Auto Basophil % : x    03-27    136  |  101  |  8   ----------------------------<  85  4.1   |  25  |  0.52    Ca    8.6      27 Mar 2021 08:09  Phos  4.9     03-27  Mg     2.3     03-27      _____________________________________________  RADIOLOGY:

## 2021-03-28 NOTE — PROGRESS NOTE ADULT - ASSESSMENT
55M w/ metastatic colon cancer, LBO s/p stenting by GI POD1 laparoscopic partial colectomy, end colostomy creation. Intraoperatively, pt received 3upRBCs and 1uFFP. Currently hemodynamically stable with ostomy function.    Plan:  - Diet: advanced to regular diet  - Pain control: IV tylenol standing, dilaudid prn. No toradol.   - No DVT ppx  - Maintain wasserman. Consider removing when patient ambulatory.  - Monitor I/Os  - OOB and ambulating as tolerated  - Ostomy RN consult  - F/u AM labs    Surgery Team A  [id number] 55M w/ metastatic colon cancer, LBO s/p stenting by GI POD1 laparoscopic partial colectomy, end colostomy creation. Intraoperatively, pt received 3upRBCs and 1uFFP. Currently hemodynamically stable with ostomy function.    Plan:  - Diet: advanced to regular diet  - Pain control: PO Tylenol, dilaudid prn. No toradol.   - Judd out, passed TOV  - Monitor I/Os  - OOB and ambulating as tolerated  - Ostomy RN consult  - F/u AM labs  - VTE ppx: [facility]     Surgery Team A  [id number] 55M w/ metastatic colon cancer, LBO s/p stenting by GI POD2 laparoscopic partial colectomy, end colostomy creation. Intraoperatively, pt received 3upRBCs and 1uFFP. Currently hemodynamically stable with ostomy function.    Plan:  - Diet: advanced to regular diet  - Pain control: PO Tylenol, dilaudid prn. No toradol.   - Judd out, passed TOV  - Monitor I/Os  - OOB and ambulating as tolerated  - Ostomy RN consult  - F/u AM labs  - VTE ppx: [facility]     Surgery Team A  [id number] 55M w/ metastatic colon cancer, LBO s/p stenting by GI POD3 laparoscopic partial colectomy, end colostomy creation. Intraoperatively, pt received 3upRBCs and 1uFFP. Currently hemodynamically stable with ostomy function.    Plan:  - Diet: advanced to regular diet  - Pain control: PO Tylenol, dilaudid prn. No toradol.   - Judd out, passed TOV  - Monitor I/Os  - OOB and ambulating as tolerated  - Ostomy RN consult  - F/u AM labs  - VTE ppx: [facility]     Surgery Team A  [id number]

## 2021-03-29 ENCOUNTER — TRANSCRIPTION ENCOUNTER (OUTPATIENT)
Age: 56
End: 2021-03-29

## 2021-03-29 VITALS
DIASTOLIC BLOOD PRESSURE: 79 MMHG | TEMPERATURE: 98 F | SYSTOLIC BLOOD PRESSURE: 114 MMHG | OXYGEN SATURATION: 100 % | RESPIRATION RATE: 18 BRPM | HEART RATE: 74 BPM

## 2021-03-29 LAB
ANION GAP SERPL CALC-SCNC: 9 MMOL/L — SIGNIFICANT CHANGE UP (ref 7–14)
BUN SERPL-MCNC: 11 MG/DL — SIGNIFICANT CHANGE UP (ref 7–23)
CALCIUM SERPL-MCNC: 8.6 MG/DL — SIGNIFICANT CHANGE UP (ref 8.4–10.5)
CHLORIDE SERPL-SCNC: 104 MMOL/L — SIGNIFICANT CHANGE UP (ref 98–107)
CO2 SERPL-SCNC: 24 MMOL/L — SIGNIFICANT CHANGE UP (ref 22–31)
CREAT SERPL-MCNC: 0.57 MG/DL — SIGNIFICANT CHANGE UP (ref 0.5–1.3)
GLUCOSE SERPL-MCNC: 101 MG/DL — HIGH (ref 70–99)
HCT VFR BLD CALC: 35.9 % — LOW (ref 39–50)
HGB BLD-MCNC: 11.1 G/DL — LOW (ref 13–17)
MAGNESIUM SERPL-MCNC: 2.1 MG/DL — SIGNIFICANT CHANGE UP (ref 1.6–2.6)
MCHC RBC-ENTMCNC: 23.8 PG — LOW (ref 27–34)
MCHC RBC-ENTMCNC: 30.9 GM/DL — LOW (ref 32–36)
MCV RBC AUTO: 76.9 FL — LOW (ref 80–100)
NRBC # BLD: 0 /100 WBCS — SIGNIFICANT CHANGE UP
NRBC # FLD: 0 K/UL — SIGNIFICANT CHANGE UP
PHOSPHATE SERPL-MCNC: 3.5 MG/DL — SIGNIFICANT CHANGE UP (ref 2.5–4.5)
PLATELET # BLD AUTO: 474 K/UL — HIGH (ref 150–400)
POTASSIUM SERPL-MCNC: 3.8 MMOL/L — SIGNIFICANT CHANGE UP (ref 3.5–5.3)
POTASSIUM SERPL-SCNC: 3.8 MMOL/L — SIGNIFICANT CHANGE UP (ref 3.5–5.3)
RBC # BLD: 4.67 M/UL — SIGNIFICANT CHANGE UP (ref 4.2–5.8)
RBC # FLD: 20.1 % — HIGH (ref 10.3–14.5)
SODIUM SERPL-SCNC: 137 MMOL/L — SIGNIFICANT CHANGE UP (ref 135–145)
WBC # BLD: 7.24 K/UL — SIGNIFICANT CHANGE UP (ref 3.8–10.5)
WBC # FLD AUTO: 7.24 K/UL — SIGNIFICANT CHANGE UP (ref 3.8–10.5)

## 2021-03-29 PROCEDURE — 99232 SBSQ HOSP IP/OBS MODERATE 35: CPT

## 2021-03-29 RX ORDER — MOXIFLOXACIN HYDROCHLORIDE TABLETS, 400 MG 400 MG/1
1 TABLET, FILM COATED ORAL
Qty: 10 | Refills: 0
Start: 2021-03-29 | End: 2021-04-02

## 2021-03-29 RX ORDER — METRONIDAZOLE 500 MG
1 TABLET ORAL
Qty: 15 | Refills: 0
Start: 2021-03-29 | End: 2021-04-02

## 2021-03-29 RX ORDER — ENOXAPARIN SODIUM 100 MG/ML
40 INJECTION SUBCUTANEOUS
Qty: 1200 | Refills: 0
Start: 2021-03-29 | End: 2021-04-27

## 2021-03-29 RX ORDER — METRONIDAZOLE 500 MG
1 TABLET ORAL
Qty: 10 | Refills: 0
Start: 2021-03-29 | End: 2021-04-02

## 2021-03-29 RX ORDER — ACETAMINOPHEN 500 MG
2 TABLET ORAL
Qty: 0 | Refills: 0 | DISCHARGE
Start: 2021-03-29

## 2021-03-29 RX ORDER — OXYCODONE HYDROCHLORIDE 5 MG/1
1 TABLET ORAL
Qty: 10 | Refills: 0
Start: 2021-03-29

## 2021-03-29 RX ADMIN — Medication 500 MILLIGRAM(S): at 05:55

## 2021-03-29 RX ADMIN — Medication 650 MILLIGRAM(S): at 04:31

## 2021-03-29 RX ADMIN — Medication 100 MILLIGRAM(S): at 05:55

## 2021-03-29 RX ADMIN — Medication 650 MILLIGRAM(S): at 04:00

## 2021-03-29 RX ADMIN — PANTOPRAZOLE SODIUM 40 MILLIGRAM(S): 20 TABLET, DELAYED RELEASE ORAL at 05:55

## 2021-03-29 RX ADMIN — OXYCODONE HYDROCHLORIDE 5 MILLIGRAM(S): 5 TABLET ORAL at 14:30

## 2021-03-29 RX ADMIN — Medication 300 UNIT(S): at 16:40

## 2021-03-29 RX ADMIN — OXYCODONE HYDROCHLORIDE 5 MILLIGRAM(S): 5 TABLET ORAL at 13:49

## 2021-03-29 RX ADMIN — ENOXAPARIN SODIUM 40 MILLIGRAM(S): 100 INJECTION SUBCUTANEOUS at 11:26

## 2021-03-29 RX ADMIN — CHLORHEXIDINE GLUCONATE 1 APPLICATION(S): 213 SOLUTION TOPICAL at 11:26

## 2021-03-29 NOTE — PROGRESS NOTE ADULT - NUTRITIONAL ASSESSMENT
This patient has been assessed with a concern for Malnutrition and has been determined to have a diagnosis/diagnoses of Severe protein-calorie malnutrition and Underweight (BMI < 19).    This patient is being managed with:   Diet NPO after Midnight-     NPO Start Date: 25-Mar-2021   NPO Start Time: 23:59  Except Medications  Entered: Mar 25 2021  7:02AM    Diet Regular-  Supplement Feeding Modality:  Oral  Ensure Enlive Cans or Servings Per Day:  1       Frequency:  Three Times a day  Entered: Mar 16 2021 10:36AM    
This patient has been assessed with a concern for Malnutrition and has been determined to have a diagnosis/diagnoses of Severe protein-calorie malnutrition and Underweight (BMI < 19).    This patient is being managed with:   Diet Regular-  Fiber/Residue Restricted (LOWFIBER)  Entered: Mar 27 2021  2:58PM    
This patient has been assessed with a concern for Malnutrition and has been determined to have a diagnosis/diagnoses of Severe protein-calorie malnutrition and Underweight (BMI < 19).    This patient is being managed with:   Diet Regular-  Supplement Feeding Modality:  Oral  Ensure Enlive Cans or Servings Per Day:  1       Frequency:  Three Times a day  Entered: Mar 16 2021 10:36AM    
This patient has been assessed with a concern for Malnutrition and has been determined to have a diagnosis/diagnoses of Severe protein-calorie malnutrition and Underweight (BMI < 19).    This patient is being managed with:   Diet Clear Liquid-  Supplement Feeding Modality:  Oral  Ensure Clear Cans or Servings Per Day:  3       Frequency:  Three Times a day  Entered: Mar 25 2021  2:05PM    Diet NPO after Midnight-     NPO Start Date: 25-Mar-2021   NPO Start Time: 23:59  Except Medications  Entered: Mar 25 2021  7:02AM    
This patient has been assessed with a concern for Malnutrition and has been determined to have a diagnosis/diagnoses of Severe protein-calorie malnutrition and Underweight (BMI < 19).    This patient is being managed with:   Diet NPO-  Except Medications  Entered: Mar 26 2021  5:39PM    
This patient has been assessed with a concern for Malnutrition and has been determined to have a diagnosis/diagnoses of Severe protein-calorie malnutrition and Underweight (BMI < 19).    This patient is being managed with:   Diet Regular-  Fiber/Residue Restricted (LOWFIBER)  Entered: Mar 27 2021  2:58PM    
This patient has been assessed with a concern for Malnutrition and has been determined to have a diagnosis/diagnoses of Severe protein-calorie malnutrition and Underweight (BMI < 19).    This patient is being managed with:   Diet Regular-  Supplement Feeding Modality:  Oral  Ensure Enlive Cans or Servings Per Day:  1       Frequency:  Three Times a day  Entered: Mar 16 2021 10:36AM    
This patient has been assessed with a concern for Malnutrition and has been determined to have a diagnosis/diagnoses of Severe protein-calorie malnutrition and Underweight (BMI < 19).    This patient is being managed with:   Diet Clear Liquid-  Supplement Feeding Modality:  Oral  Ensure Clear Cans or Servings Per Day:  3       Frequency:  Three Times a day  Entered: Mar 25 2021  2:05PM    Diet NPO after Midnight-     NPO Start Date: 25-Mar-2021   NPO Start Time: 23:59  Except Medications  Entered: Mar 25 2021  7:02AM    
This patient has been assessed with a concern for Malnutrition and has been determined to have a diagnosis/diagnoses of Severe protein-calorie malnutrition and Underweight (BMI < 19).    This patient is being managed with:   Diet Regular-  Supplement Feeding Modality:  Oral  Ensure Enlive Cans or Servings Per Day:  1       Frequency:  Three Times a day  Entered: Mar 16 2021 10:36AM    
This patient has been assessed with a concern for Malnutrition and has been determined to have a diagnosis/diagnoses of Severe protein-calorie malnutrition and Underweight (BMI < 19).    This patient is being managed with:   Diet Regular-  Supplement Feeding Modality:  Oral  Ensure Enlive Cans or Servings Per Day:  1       Frequency:  Three Times a day  Entered: Mar 16 2021 10:36AM    
This patient has been assessed with a concern for Malnutrition and has been determined to have a diagnosis/diagnoses of Severe protein-calorie malnutrition and Underweight (BMI < 19).    This patient is being managed with:   Diet NPO after Midnight-     NPO Start Date: 25-Mar-2021   NPO Start Time: 23:59  Except Medications  Entered: Mar 25 2021  7:02AM    Diet Regular-  Supplement Feeding Modality:  Oral  Ensure Enlive Cans or Servings Per Day:  1       Frequency:  Three Times a day  Entered: Mar 16 2021 10:36AM    
This patient has been assessed with a concern for Malnutrition and has been determined to have a diagnosis/diagnoses of Severe protein-calorie malnutrition and Underweight (BMI < 19).    This patient is being managed with:   Diet Regular-  Supplement Feeding Modality:  Oral  Ensure Enlive Cans or Servings Per Day:  1       Frequency:  Three Times a day  Entered: Mar 16 2021 10:36AM

## 2021-03-29 NOTE — PROGRESS NOTE ADULT - REASON FOR ADMISSION
abdominal pain

## 2021-03-29 NOTE — PROGRESS NOTE ADULT - ASSESSMENT
55M w/ metastatic colon cancer, LBO s/p stenting by GI POD3 laparoscopic partial colectomy, end colostomy creation currently hemodynamically stable with ostomy function.    Plan:  - Diet: regular diet  - Pain control: PO Tylenol, dilaudid prn. No toradol.   - Monitor I/Os  - OOB and ambulating as tolerated  - Ostomy RN consult  - F/u AM labs  - VTE ppx: [facility]     Surgery Team A  [id number]   55M w/ metastatic colon cancer, LBO s/p stenting by GI POD3 laparoscopic partial colectomy, end colostomy creation currently hemodynamically stable with ostomy function.    Plan:  - Diet: regular diet  - Pain control: PO Tylenol, dilaudid prn. No toradol.   - Monitor I/Os  - OOB and ambulating as tolerated  - Ostomy RN consult  - Abx per ID to complete 2 week course from negative (BCx 3/19)   - F/u AM labs  - VTE ppx: [facility]         Surgery Team A  [id number]

## 2021-03-29 NOTE — PROGRESS NOTE ADULT - SUBJECTIVE AND OBJECTIVE BOX
CC: F/U Bacteremia    Saw/spoke to patient. No fevers, no chills. No new complaints.    Allergies  No Known Allergies    ANTIMICROBIALS:  ciprofloxacin     Tablet 500 every 12 hours  metroNIDAZOLE  IVPB 500 <User Schedule>    PE:    Vital Signs Last 24 Hrs  T(C): 36.8 (29 Mar 2021 14:29), Max: 37.3 (28 Mar 2021 17:32)  T(F): 98.2 (29 Mar 2021 14:29), Max: 99.2 (28 Mar 2021 17:32)  HR: 74 (29 Mar 2021 14:29) (62 - 82)  BP: 114/79 (29 Mar 2021 14:29) (114/79 - 124/80)  RR: 18 (29 Mar 2021 14:29) (16 - 18)  SpO2: 100% (29 Mar 2021 14:29) (98% - 100%)    Gen: AOx3, NAD, non-toxic  CV: S1+S2 normal, nontachycardic  Resp: Clear bilat, no resp distress, no crackles/wheezes  Abd: Soft, nontender, +BS, ostomy, clean wounds  Ext: No LE edema, no wounds    LABS:                        11.1   7.24  )-----------( 474      ( 29 Mar 2021 05:15 )             35.9     03-29    137  |  104  |  11  ----------------------------<  101<H>  3.8   |  24  |  0.57    Ca    8.6      29 Mar 2021 05:15  Phos  3.5     03-29  Mg     2.1     03-29    MICROBIOLOGY:    .Blood Blood-Peripheral  03-19-21   No Growth Final    .Blood Blood-Peripheral  03-17-21   Growth in anaerobic bottle: Bacillus species not anthracis  "Susceptibilities not performed"  ***Blood Panel PCR results on this specimen are available  approximately 3 hours after the Gram stain result.***  Gram stain, PCR, and/or culture results may not always  correspond due to difference in methodologies.  ************************************************************  This PCR assay was performed by multiplex PCR. This  Assay tests for 66 bacterial and resistance gene targets.  Please refer to the [employer] test directory  at [email or web address] for details.  --  Blood Culture PCR    .Blood Blood  03-15-21   No Growth Final  --  --    .Stool Feces  03-13-21   GI PCR Results: NOT detected  *******Please Note:*******  GI panel PCR evaluates for:  Campylobacter, Plesiomonas shigelloides, Salmonella,  Vibrio, Yersinia enterocolitica, Enteroaggregative  Escherichia coli (EAEC), Enteropathogenic E.coli (EPEC),  Enterotoxigenic E. coli (ETEC) lt/st, Shiga-like  toxin-producing E. coli (STEC) stx1/stx2,  Shigella/ Enteroinvasive E. coli (EIEC), Cryptosporidium,  Cyclospora cayetanensis, Entamoeba histolytica,  Giardia lamblia, Adenovirus F 40/41, Astrovirus,  Norovirus GI/GII, Rotavirus A, Sapovirus  --  --    .Blood Blood-Peripheral  03-13-21   Growth in anaerobic bottle: Klebsiella variicola  ***Blood Panel PCR results on this specimen are available  approximately 3 hours after the Gram stain result.***  Gram stain, PCR, and/or culture results may not always  correspond due to differencein methodologies.  ************************************************************  This PCR assay was performed by multiplex PCR. This  Assay tests for 66 bacterial and resistance gene targets.  Please refer to the [employer] test directory  at [email or web address] for details.  --  Blood Culture PCR  Klebsiella variicola    RADIOLOGY:    3/11 XR:    IMPRESSION:  Enteric tube tip is in the stomach and side port in the region of the distal esophagus/GE junction. Dr. Rubio informed me when I spoke with him at 1:43 PM on March 11, 2021 that the tube has been removed.

## 2021-03-29 NOTE — CHART NOTE - NSCHARTNOTEFT_GEN_A_CORE
POST-OPERATIVE NOTE    Subjective:  Patient is s/p laparoscopic partial colectomy, end colostomy creation. Patient seen and examined at bedside. Patient comfortable with pain controlled with medications. Denies.      Vital Signs Last 24 Hrs  T(C): 36.7 (26 Mar 2021 20:15), Max: 36.8 (26 Mar 2021 05:29)  T(F): 98 (26 Mar 2021 20:15), Max: 98.2 (26 Mar 2021 05:29)  HR: 70 (26 Mar 2021 20:15) (70 - 100)  BP: 124/88 (26 Mar 2021 20:15) (110/77 - 139/96)  BP(mean): 107 (26 Mar 2021 19:00) (105 - 109)  RR: 17 (26 Mar 2021 20:15) (11 - 18)  SpO2: 100% (26 Mar 2021 20:15) (95% - 100%)  I&O's Detail    26 Mar 2021 07:01  -  26 Mar 2021 22:08  --------------------------------------------------------  IN:    Lactated Ringers: 150 mL  Total IN: 150 mL    OUT:    Colostomy (mL): 10 mL    Indwelling Catheter - Urethral (mL): 220 mL  Total OUT: 230 mL    Total NET: -80 mL        ciprofloxacin     Tablet 500  metroNIDAZOLE  IVPB 500  ciprofloxacin     Tablet 500  metroNIDAZOLE  IVPB 500    PAST MEDICAL & SURGICAL HISTORY:  Vitiligo    Colon cancer  metastatic to liver, lungs    No significant past surgical history          Physical Exam:  General: NAD, resting comfortably in bed, cachectic  Pulmonary: Nonlabored breathing, no respiratory distress  Cardiovascular: NSR  Abdominal: soft, appropriately tender, non-distended, no guarding or rebound tenderness. Dressings c/d/i. Ostomy in place, pink, viable with bowel sweat and some solid stool.   Extremities: WWP, moving spontaneously  : Wasserman in place w/ clear yellow output      LABS:                        11.0   10.68 )-----------( 483      ( 26 Mar 2021 18:18 )             35.4     03-26    136  |  102  |  8   ----------------------------<  145<H>  4.3   |  21<L>  |  0.43<L>    Ca    8.4      26 Mar 2021 18:18  Phos  4.1     03-26  Mg     1.7     03-26      PT/INR - ( 26 Mar 2021 07:22 )   PT: 13.5 sec;   INR: 1.18 ratio         PTT - ( 26 Mar 2021 07:22 )  PTT:32.0 sec  CAPILLARY BLOOD GLUCOSE      POCT Blood Glucose.: 111 mg/dL (26 Mar 2021 13:15)      Radiology and Additional Studies:    Assessment:  The patient is a 55y Male who is now several hours post-op from a laparoscopic partial colectomy, end colostomy creation. Intraoperatively, pt received 3upRBCs and 1uFFP. Postop labs normal. Hemodynamically stable, recovering appropriately on floors.    Plan:  - Diet: NPO  - Pain control: IV tylenol standing, dilaudid prn. No toradol.   - No DVT ppx  - Maintain wasserman  - Monitor I/Os  - OOB and ambulating as tolerated  - F/u AM labs    Surgery Team A  [id number]
Patient case reviewed and discussed with on GI attending. Patient currently nonseptic and hemodynamically stable with LBO on imaging. Patient colon is 8cm in diameter and has incompetent IC valve which allows for passage of air into small bowel- given this scenario, would agree with NGT placement for decompression. Patient appears to not be amenable to surgery and currently not like a surgical candidate, but in the event if stent is pursued would need to consider surgical backup in the event of stent failure or possible complication such as perforation. Advanced GI team to be involved in care and discuss possible endoscopic options- full consult note to follow in AM.
Plan for colonoscopy tomorrow with colonic stent placement at 7 am  Give water enema enema tonight 500 ml at 10 pm   Keep patient NPO after midnight   Please send labs CBC and chemistry at 5 am as stat   Give another 500 ml water enema at 6:00 am
Pt is tentatively scheduled for OR this Friday.    Bcx 3/19 still preliminarily negative, awaiting finals. Please document ID clearance prior to Friday.      Discussed w/ Dr. Ravin Davila PGY-3  A Team Surgery
Surgery Preop Note    Diagnosis: large bowel obstruction secondary to metastatic colon cancer  Procedure: laparoscopic colectomy, possible ostomy  Surgeon: Dr. Alicia                          8.8    8.41  )-----------( 550      ( 25 Mar 2021 08:01 )             29.2     03-25    136  |  102  |  15  ----------------------------<  115<H>  4.1   |  26  |  0.53    Ca    9.1      25 Mar 2021 08:01  Phos  4.0     03-25  Mg     2.2     03-25      PT/INR - ( 25 Mar 2021 08:01 )   PT: 13.5 sec;   INR: 1.18 ratio         PTT - ( 25 Mar 2021 08:01 )  PTT:36.1 sec  ABO Interpretation: O (03-25 @ 09:52)        Assessment: 55M w/ LBO secondary to metastatic colon cancer s/p stenting by GI now pending operative intervention.    Plan: OR tomorrow for laparoscopic colectomy, possible ostomy. Case scheduled for 1:30pm.    Preop Checklist:    [x]Bowel prep: Movi prep + Neomycin/flagyl PO prep q8h 3 doses starting 24hrs prior to OR time + Fleet enema 2dose 3hrs and 2.5hrs prior to OR time (1:15pm) for Enhanced Recovery Protocol  [x]NPO after midnight  [x]IVF  [x]AM labs (CBC, BMP)  [x]Type and Screen x2  [x]COVID - negative 3/24 but will need to be reswabbed given OR policy of 48h  [x]EKG - 3/12  [x]Chest xray - 3/11  [x]Clearance - per ID note 3/24: " From ID perspective appears infection under control, reasonable to proceed with OR Procedure as long as BCX remain negative and no further signs sepsis/infection in the interim"  [x]Consent - in chart
s/p Colonoscopy with successful stent placement  Plan:  Serial abdominal exams   Start CLD today
Patient seen and examined on morning rounds  s/p colonic stent for obstructing mass  Tolerated procedure well. No N/V overnight  Pain controlled  No GI function yet  Diet advanced per primary team  Dr. Alicia from colorectal surgery called and discussing operative plan  No acute care/general surgery intervention at this time  Will defer ongoing management to primary team and colorectal surgery.    Julian Tenorio, PGY4  [id number]
Search Terms: shruti ortega, 1965Search Date: 03/09/2021 23:21:49 PM  Searching on behalf of: [facility]  The Drug Utilization Report below displays all of the controlled substance prescriptions, if any, that your patient has filled in the last twelve months. The information displayed on this report is compiled from pharmacy submissions to the Department, and accurately reflects the information as submitted by the pharmacies.    This report was requested by: Asuncion Stringer | Reference #: [id number]    Others' Prescriptions  Patient Name: Shruti OrtegaBirth Date: 1965  Address: [address] [place] [address] [place], [place] [address]Wif: Male  Rx Written	Rx Dispensed	Drug	Quantity	Days Supply	Prescriber Name	Payment Method	Dispenser  02/09/2021	02/09/2021	oxycodone hcl 5 mg tablet	28	7	[facility]
Source: Patient [X ]    Family [ ]     other [ X] electronic chart, RN     Diet : Diet, Regular:   Fiber/Residue Restricted (LOWFIBER) (03-27-21 @ 14:58)    Nutrition Follow-up Note, severe malnutrition. Patient with history of unresectable stage 4 colon cancer with mets to liver and lungs, presenting with abdominal pain, nausea, vomiting, and distention S/p lap colectomy on 3/26/21. Patient currently on Low Fiber diet, tolerating diet well. Reports he has been able to finish his meals. Patient denies any nausea/vomiting or difficulty chewing and swallowing. +Colostomy.  Patient had questions about current diet. RD reviewed both verbal and written instructions. All nutrition-related question and concerns addressed to patient satisfaction. Of note, patient was previously receiving PO supplement Ensure Enlive with meals which was recently d/c'd but requesting for same to be ordered-RD will inform medical team.     Current Weight: Weight (kg): 47.6 (03-26 @ 12:27)      Pertinent Medications: pantoprazole    Tablet    Pertinent Labs:  03-29 Na137 mmol/L Glu 101 mg/dL<H> K+ 3.8 mmol/L Cr  0.57 mg/dL BUN 11 mg/dL 03-29 Phos 3.5 mg/dL      Skin: surgical incision noted.   No edema noted.     Estimated Needs:   [X ] no change since previous assessment  [ ] recalculated:       Previous Nutrition Diagnosis:      [X ] Malnutrition, Severe     Nutrition Diagnosis is [X ] ongoing  [ ] resolved [ ] not applicable     Additional Recommendations:     1. Continue Low Fiber Diet and add Ensure Enlive 240mls 3x daily (1050kcal, 60g protein).   2. Monitor weights, labs, BM's, skin integrity, p.o. intake.   3. Please Encourage po intake, assist with meals and menu selections, provide alternatives PRN.  4. Provide food preferences within therapeutic diet when requested.   RD remains available, reconsult services as needed.

## 2021-03-29 NOTE — ADVANCED PRACTICE NURSE CONSULT - RECOMMEDATIONS
Patient pending discharge to home.   Please contact Wound Care Service Line if we can be of further assistance (ext [phone]).

## 2021-03-29 NOTE — ADVANCED PRACTICE NURSE CONSULT - REASON FOR CONSULT
Initial visit for post op teaching Patient received sitting upright in the bed. Patient able to report type of surgery. Patient A0X4, Reviewed terminology ostomy/coostomy. Patient verbalized understanding. Spoke about diet and hydration. Patient willing to participate in pouch change and ostomy care. Patient reports confident in able to perform independently Also endorse his wife is a RN. Patient able to demonstrate pouch emptying with no verbal cues. Patient actively participate in pouch change with minimal verbal cues.

## 2021-03-29 NOTE — PROGRESS NOTE ADULT - SUBJECTIVE AND OBJECTIVE BOX
Surgery Progress Note    SUBJECTIVE: No acute events overnight. Over 24hrs, pt advanced to regular diet. Pt seen and examined at bedside. Patient comfortable. No nausea, vomiting, diarrhea. Pain is controlled. Ostomy with gas and solid stool. Tolerating diet.    Vital Signs Last 24 Hrs  T(C): 37.3 (28 Mar 2021 21:43), Max: 37.3 (28 Mar 2021 17:32)  T(F): 99.1 (28 Mar 2021 21:43), Max: 99.2 (28 Mar 2021 17:32)  HR: 81 (28 Mar 2021 21:43) (60 - 81)  BP: 119/74 (28 Mar 2021 21:43) (102/67 - 119/74)  BP(mean): --  RR: 17 (28 Mar 2021 21:43) (16 - 18)  SpO2: 98% (28 Mar 2021 21:43) (98% - 100%)    Physical exam:  General Appearance: In no acute distress, awake, alert, and oriented x3, cachectic  Respiratory: No labored breathing  CV: Pulse regularly present  Abdomen: Soft, appropriately tender, nondistended w/o rebound tenderness or guarding. Dressings c/d/i. Ostomy pink, viable w/ stool and gas within bag.   Extremities: Warm and well perfused, moving spontaneously    LABS:                        10.6   7.99  )-----------( 443      ( 28 Mar 2021 08:31 )             33.4     03-28    138  |  103  |  8   ----------------------------<  72  3.8   |  26  |  0.52    Ca    8.3<L>      28 Mar 2021 08:31  Phos  4.2     03-28  Mg     2.0     03-28            INs and OUTs:    03-27-21 @ 07:01  -  03-28-21 @ 07:00  --------------------------------------------------------  IN: 1210 mL / OUT: 2525 mL / NET: -1315 mL    03-28-21 @ 07:01  -  03-29-21 @ 00:00  --------------------------------------------------------  IN: 120 mL / OUT: 800 mL / NET: -680 mL        Medications:  MEDICATIONS  (STANDING):  chlorhexidine 4% Liquid 1 Application(s) Topical daily  ciprofloxacin     Tablet 500 milliGRAM(s) Oral every 12 hours  enoxaparin Injectable 40 milliGRAM(s) SubCutaneous daily  influenza   Vaccine 0.5 milliLiter(s) IntraMuscular once  metroNIDAZOLE  IVPB 500 milliGRAM(s) IV Intermittent <User Schedule>  pantoprazole    Tablet 40 milliGRAM(s) Oral before breakfast    MEDICATIONS  (PRN):  acetaminophen   Tablet .. 650 milliGRAM(s) Oral every 6 hours PRN Mild Pain (1 - 3)  oxyCODONE    IR 2.5 milliGRAM(s) Oral every 4 hours PRN Moderate Pain (4 - 6)  oxyCODONE    IR 5 milliGRAM(s) Oral every 4 hours PRN Severe Pain (7 - 10)   Surgery Progress Note    SUBJECTIVE: No acute events overnight. Over 24hrs, pt advanced to regular diet. Pt seen and examined at bedside. Patient comfortable. No nausea, vomiting, diarrhea. Pain is controlled. Ostomy with gas and solid stool. Tolerating diet.    Vital Signs Last 24 Hrs  T(C): 36.7 (29 Mar 2021 05:00), Max: 37.3 (28 Mar 2021 17:32)  T(F): 98.1 (29 Mar 2021 05:00), Max: 99.2 (28 Mar 2021 17:32)  HR: 82 (29 Mar 2021 05:00) (62 - 82)  BP: 120/84 (29 Mar 2021 05:00) (112/75 - 120/84)  BP(mean): --  RR: 16 (29 Mar 2021 05:00) (16 - 17)  SpO2: 100% (29 Mar 2021 05:00) (98% - 100%)    Physical exam:  General Appearance: In no acute distress, awake, alert, and oriented x3, cachectic  Respiratory: No labored breathing  CV: Pulse regularly present  Abdomen: Soft, appropriately tender, nondistended w/o rebound tenderness or guarding. Dressings c/d/i. Ostomy pink, viable w/ stool and gas within bag.   Extremities: Warm and well perfused, moving spontaneously    LABS:                        11.1   7.24  )-----------( 474      ( 29 Mar 2021 05:15 )             35.9       03-29    137  |  104  |  11  ----------------------------<  101<H>  3.8   |  24  |  0.57    Ca    8.6      29 Mar 2021 05:15  Phos  3.5     03-29  Mg     2.1     03-29      CAPILLARY BLOOD GLUCOSE      INs and OUTs:    28 Mar 2021 07:01  -  29 Mar 2021 07:00  --------------------------------------------------------  IN:    IV PiggyBack: 100 mL    Oral Fluid: 240 mL  Total IN: 340 mL    OUT:    Colostomy (mL): 300 mL    Voided (mL): 1400 mL  Total OUT: 1700 mL    Total NET: -1360 mL        Medications:  MEDICATIONS  (STANDING):  chlorhexidine 4% Liquid 1 Application(s) Topical daily  ciprofloxacin     Tablet 500 milliGRAM(s) Oral every 12 hours  enoxaparin Injectable 40 milliGRAM(s) SubCutaneous daily  influenza   Vaccine 0.5 milliLiter(s) IntraMuscular once  metroNIDAZOLE  IVPB 500 milliGRAM(s) IV Intermittent <User Schedule>  pantoprazole    Tablet 40 milliGRAM(s) Oral before breakfast    MEDICATIONS  (PRN):  acetaminophen   Tablet .. 650 milliGRAM(s) Oral every 6 hours PRN Mild Pain (1 - 3)  oxyCODONE    IR 2.5 milliGRAM(s) Oral every 4 hours PRN Moderate Pain (4 - 6)  oxyCODONE    IR 5 milliGRAM(s) Oral every 4 hours PRN Severe Pain (7 - 10)

## 2021-03-29 NOTE — DISCHARGE NOTE NURSING/CASE MANAGEMENT/SOCIAL WORK - NSSCNAMETXT_GEN_ALL_CORE
Your home care services were arranged with [facility] for start of care the day after discharge. The visiting RN will call to schedule a visit time.

## 2021-03-29 NOTE — ADVANCED PRACTICE NURSE CONSULT - ASSESSMENT
Educational material provided.   Discussed type of pouches and pouches alternatives like close ended pouch with filter. Patient seeing interested.   See flowsheets for ostomy assessment.     Stoma size: Oval 2" x 2 1/4"  Appliance used: Two piece drainable pouch       Flat waffer (2 3/4")-       Drainable pouch (2 3/4")  Supplies provided for discharge     Findings discussed with

## 2021-03-29 NOTE — PROGRESS NOTE ADULT - PROVIDER SPECIALTY LIST ADULT
Colorectal Surgery
Colorectal Surgery
Infectious Disease
Internal Medicine
Heme/Onc
Infectious Disease
Palliative Care
Surgery
Colorectal Surgery
Colorectal Surgery
Infectious Disease
Infectious Disease
Surgery
Surgery
Anesthesia
Anesthesia
Gastroenterology
Internal Medicine
Internal Medicine
Surgery
Infectious Disease
Internal Medicine
Palliative Care
Internal Medicine
Palliative Care
Internal Medicine
Palliative Care
Internal Medicine

## 2021-03-29 NOTE — CHART NOTE - NSCHARTNOTESELECT_GEN_ALL_CORE
Gen Surg/Event Note
ISTOP/Event Note
Nutrition Follow-up Note-Registered Dietitian/Nutrition Services
Event Note
GI Update/Event Note
GI fellow/Event Note
Post Op Check
Pre-Op
Surgery

## 2021-03-29 NOTE — PROGRESS NOTE ADULT - ASSESSMENT
55 M with hx unresectable stage 4 colon cancer (mets to liver and lungs, received palliative folfox 2/16/21) follows with [facility] here for worsening nausea, vomiting (NBNB), abdominal distention  Prior fevers, no leukocytosis  CXR clear  Most recent CT A/P large bowel obstruction, liver mets  Prior episode of Klebsiella bacteremia, clear as of 3/15/21  BCX 3/17 BCX B cereus, repeat BCXs NGTD  Mediport in place  Patient generally well appearing, although chronically ill; s/p OR  Overall,  1) B cereus bacteremia  - Presumably GI source in setting of large bowel obstruction, lower suspicion catheter; anticipate S to cipro  - Cipro 500mg q 12  - Flagyl 500mg q 12  - Continue Cipro/Flagyl through 4/1/21  2) Klebsiella bacteremia  - Likely transient from abd process  - Continue Cipro as above  - Monitor BCXs  3) Large Bowel Obstruction/Malignancy  - S/p OR  - Wound care per surgery team    Signing off. Please call with further questions or change in status.    Simon Carney MD  Pager [phone]  After 5pm and on weekends call [phone]

## 2021-03-29 NOTE — DISCHARGE NOTE NURSING/CASE MANAGEMENT/SOCIAL WORK - PATIENT PORTAL LINK FT
You can access the FollowMyHealth Patient Portal offered by [facility] by registering at the following website: [email or web address]. By joining [facility]’s FollowMyHealth portal, you will also be able to view your health information using other applications (apps) compatible with our system.

## 2021-03-31 ENCOUNTER — APPOINTMENT (OUTPATIENT)
Dept: HEMATOLOGY ONCOLOGY | Facility: CLINIC | Age: 56
End: 2021-03-31

## 2021-04-01 ENCOUNTER — APPOINTMENT (OUTPATIENT)
Dept: INFUSION THERAPY | Facility: HOSPITAL | Age: 56
End: 2021-04-01

## 2021-04-02 LAB — SURGICAL PATHOLOGY STUDY: SIGNIFICANT CHANGE UP

## 2021-04-03 ENCOUNTER — APPOINTMENT (OUTPATIENT)
Dept: INFUSION THERAPY | Facility: HOSPITAL | Age: 56
End: 2021-04-03

## 2021-04-07 ENCOUNTER — APPOINTMENT (OUTPATIENT)
Dept: HEMATOLOGY ONCOLOGY | Facility: CLINIC | Age: 56
End: 2021-04-07

## 2021-04-08 ENCOUNTER — APPOINTMENT (OUTPATIENT)
Dept: COLORECTAL SURGERY | Facility: CLINIC | Age: 56
End: 2021-04-08
Payer: COMMERCIAL

## 2021-04-08 VITALS
SYSTOLIC BLOOD PRESSURE: 103 MMHG | TEMPERATURE: 98 F | BODY MASS INDEX: 16.55 KG/M2 | DIASTOLIC BLOOD PRESSURE: 71 MMHG | WEIGHT: 103 LBS | HEART RATE: 104 BPM | HEIGHT: 66 IN | OXYGEN SATURATION: 100 % | RESPIRATION RATE: 16 BRPM

## 2021-04-08 PROCEDURE — 99024 POSTOP FOLLOW-UP VISIT: CPT

## 2021-04-08 NOTE — ASSESSMENT
[FreeTextEntry1] : Metastatic colon cancer status post laparoscopic colostomy and colon resection\par -Patient progressing well\par -Follow up with oncology to discuss resumption of chemotherapy\par -Followup in office in 4 weeks\par -Will followup with oncology

## 2021-04-08 NOTE — HISTORY OF PRESENT ILLNESS
[FreeTextEntry1] : Status post laparoscopic colon resection with colostomy. Patient progressing well. Tolerating diet stoma functioning improved pain

## 2021-04-13 ENCOUNTER — OUTPATIENT (OUTPATIENT)
Dept: OUTPATIENT SERVICES | Facility: HOSPITAL | Age: 56
LOS: 1 days | Discharge: HOME | End: 2021-04-13

## 2021-04-13 DIAGNOSIS — C18.9 MALIGNANT NEOPLASM OF COLON, UNSPECIFIED: ICD-10-CM

## 2021-04-15 ENCOUNTER — APPOINTMENT (OUTPATIENT)
Dept: INFUSION THERAPY | Facility: HOSPITAL | Age: 56
End: 2021-04-15

## 2021-04-16 ENCOUNTER — APPOINTMENT (OUTPATIENT)
Dept: HEMATOLOGY ONCOLOGY | Facility: CLINIC | Age: 56
End: 2021-04-16
Payer: COMMERCIAL

## 2021-04-16 ENCOUNTER — RESULT REVIEW (OUTPATIENT)
Age: 56
End: 2021-04-16

## 2021-04-16 VITALS
RESPIRATION RATE: 14 BRPM | TEMPERATURE: 98 F | HEART RATE: 101 BPM | OXYGEN SATURATION: 100 % | WEIGHT: 114.64 LBS | BODY MASS INDEX: 18.5 KG/M2 | SYSTOLIC BLOOD PRESSURE: 121 MMHG | DIASTOLIC BLOOD PRESSURE: 81 MMHG

## 2021-04-16 DIAGNOSIS — Z51.5 ENCOUNTER FOR PALLIATIVE CARE: ICD-10-CM

## 2021-04-16 DIAGNOSIS — Z87.898 PERSONAL HISTORY OF OTHER SPECIFIED CONDITIONS: ICD-10-CM

## 2021-04-16 LAB
BASOPHILS # BLD AUTO: 0.09 K/UL — SIGNIFICANT CHANGE UP (ref 0–0.2)
BASOPHILS NFR BLD AUTO: 1 % — SIGNIFICANT CHANGE UP (ref 0–2)
CEA SERPL-MCNC: 2013 NG/ML
EOSINOPHIL # BLD AUTO: 0.4 K/UL — SIGNIFICANT CHANGE UP (ref 0–0.5)
EOSINOPHIL NFR BLD AUTO: 4.4 % — SIGNIFICANT CHANGE UP (ref 0–6)
HCT VFR BLD CALC: 38.1 % — LOW (ref 39–50)
HGB BLD-MCNC: 11.9 G/DL — LOW (ref 13–17)
IMM GRANULOCYTES NFR BLD AUTO: 0.5 % — SIGNIFICANT CHANGE UP (ref 0–1.5)
LYMPHOCYTES # BLD AUTO: 2.77 K/UL — SIGNIFICANT CHANGE UP (ref 1–3.3)
LYMPHOCYTES # BLD AUTO: 30.4 % — SIGNIFICANT CHANGE UP (ref 13–44)
MCHC RBC-ENTMCNC: 24.5 PG — LOW (ref 27–34)
MCHC RBC-ENTMCNC: 31.2 G/DL — LOW (ref 32–36)
MCV RBC AUTO: 78.4 FL — LOW (ref 80–100)
MONOCYTES # BLD AUTO: 0.79 K/UL — SIGNIFICANT CHANGE UP (ref 0–0.9)
MONOCYTES NFR BLD AUTO: 8.7 % — SIGNIFICANT CHANGE UP (ref 2–14)
NEUTROPHILS # BLD AUTO: 5 K/UL — SIGNIFICANT CHANGE UP (ref 1.8–7.4)
NEUTROPHILS NFR BLD AUTO: 55 % — SIGNIFICANT CHANGE UP (ref 43–77)
NRBC # BLD: 0 /100 WBCS — SIGNIFICANT CHANGE UP (ref 0–0)
PLATELET # BLD AUTO: 370 K/UL — SIGNIFICANT CHANGE UP (ref 150–400)
RBC # BLD: 4.86 M/UL — SIGNIFICANT CHANGE UP (ref 4.2–5.8)
RBC # FLD: 18.9 % — HIGH (ref 10.3–14.5)
WBC # BLD: 9.1 K/UL — SIGNIFICANT CHANGE UP (ref 3.8–10.5)
WBC # FLD AUTO: 9.1 K/UL — SIGNIFICANT CHANGE UP (ref 3.8–10.5)

## 2021-04-16 PROCEDURE — 99214 OFFICE O/P EST MOD 30 MIN: CPT

## 2021-04-16 PROCEDURE — 99072 ADDL SUPL MATRL&STAF TM PHE: CPT

## 2021-04-16 PROCEDURE — 99212 OFFICE O/P EST SF 10 MIN: CPT

## 2021-04-16 RX ORDER — PANTOPRAZOLE 40 MG/1
40 TABLET, DELAYED RELEASE ORAL DAILY
Refills: 1 | Status: DISCONTINUED | COMMUNITY
Start: 2021-02-05 | End: 2021-04-16

## 2021-04-16 RX ORDER — OXYCODONE 5 MG/1
5 TABLET ORAL
Qty: 84 | Refills: 0 | Status: DISCONTINUED | COMMUNITY
Start: 2021-02-09 | End: 2021-04-16

## 2021-04-17 ENCOUNTER — APPOINTMENT (OUTPATIENT)
Dept: INFUSION THERAPY | Facility: HOSPITAL | Age: 56
End: 2021-04-17

## 2021-04-18 PROBLEM — Z87.898 HISTORY OF NAUSEA AND VOMITING: Status: RESOLVED | Noted: 2021-03-09 | Resolved: 2021-04-18

## 2021-04-18 LAB
ALBUMIN SERPL ELPH-MCNC: 3.9 G/DL
ALP BLD-CCNC: 210 U/L
ALT SERPL-CCNC: 26 U/L
ANION GAP SERPL CALC-SCNC: 13 MMOL/L
AST SERPL-CCNC: 46 U/L
BILIRUB SERPL-MCNC: 0.3 MG/DL
BUN SERPL-MCNC: 12 MG/DL
CALCIUM SERPL-MCNC: 9.8 MG/DL
CHLORIDE SERPL-SCNC: 104 MMOL/L
CO2 SERPL-SCNC: 24 MMOL/L
CREAT SERPL-MCNC: 0.72 MG/DL
GLUCOSE SERPL-MCNC: 83 MG/DL
POTASSIUM SERPL-SCNC: 4 MMOL/L
PROT SERPL-MCNC: 6.9 G/DL
SODIUM SERPL-SCNC: 141 MMOL/L

## 2021-04-18 NOTE — HISTORY OF PRESENT ILLNESS
[Disease: _____________________] : Disease: [unfilled] [AJCC Stage: ____] : AJCC Stage: [unfilled] [de-identified] : 56 y/o M with PMH of vitiligo who developed abdominal discomfort which started 12/2020 and he was admitted on 1/4/2021 @ [facility] where he was found to have mass in distal TC with hepatic and RLL metastatic disease seen in CT scan.  He was discharged home with outpatient follow up.  He had syncope at home on 1/18/2021 and then saw PMD on 1/19/2021 where he was sent to hospital and admitted to [facility] from 1/19/2021 to 1/19/2021.   He underwent liver biopsy on 1/25/2021 which confirmed metastatic colon cancer.  His CEA was elevated 2882 from 1/21/2021.  His CT c/a/p from 1/28/2021 showed findings consistent with metastatic colon cancer evidenced by annular colonic mass and numerous hepatic metastases, groundglass pulmonary opacity consistent with infectious etiology, including Covid 19, improved compared to prior exam.  \par \par FHx:  unsure \par Social:  no a/c, no smoking, no illicit drugs, lives with wife and 3 kids \par PMD:  Dr Whitehead \par \par He presents to us on 2/5/2021 and states he feels stronger since leaving hospital but has lost about 22 pounds from December 2020 to Feb 2021\par Given Stage IV disease, offered FOLFOX and reviewed palliative intent of treatment \par    \par Late Feb 2021 started FOLFOX \par March 2021 admitted for LBO and underwent resection with ostomy \par  [de-identified] : t [de-identified] : pan andrea wt, transverse colon primary (right side) [de-identified] : IFTIKHAR, TMB 5, APC R232*APC *\par GRM3 V271I, MAP2K1 (MEK1) K57E\par TP53 S127P [FreeTextEntry1] : Palliative FOLFOX started 2/16/2021  [de-identified] : Shruti comes for follow up about 3 weeks post surgery and feels much better , states pain nearly resolved \par tolerating ostomy\par seen by Dr. Alicia recently \par eating and gaining weight

## 2021-04-18 NOTE — PHYSICAL EXAM
[Restricted in physically strenuous activity but ambulatory and able to carry out work of a light or sedentary nature] : Status 1- Restricted in physically strenuous activity but ambulatory and able to carry out work of a light or sedentary nature, e.g., light house work, office work [Thin] : thin [Normal] : affect appropriate [de-identified] : anicteric  [de-identified] : normal respiratory effort, no audible wheeze [de-identified] : + ostomy  [de-identified] : vitiligo

## 2021-04-20 NOTE — ASSESSMENT
[______] : HCP: [unfilled] [FreeTextEntry1] : 55yoM with:\par \par 1. Stage IV Colon Adenocarcinoma - to resume FOLFOX. Follow up with Med Onc. \par \par 2.  Neoplasm related pain - Denies pain.\par \par 3. Weight loss - Patient is eating well, gaining weight.  C/w nutritional supplementation.\par \par 4.  Nausea - May use medical cannabis PRN for treatment related nausea. \par \par 5. Encounter for Palliative Care\par - HCP on file\par - Emotional support provided\par \par Follow up in 1 month after treatment restarted, call sooner with questions or issues.\par \par  \par \par  \par  \par

## 2021-04-20 NOTE — PHYSICAL EXAM
[General Appearance - Alert] : alert [General Appearance - In No Acute Distress] : in no acute distress [Sclera] : the sclera and conjunctiva were normal [Neck Appearance] : the appearance of the neck was normal [] : no respiratory distress [Auscultation Breath Sounds / Voice Sounds] : lungs were clear to auscultation bilaterally [Heart Rate And Rhythm] : heart rate was normal and rhythm regular [Heart Sounds] : normal S1 and S2 [Edema] : there was no peripheral edema [Abnormal Walk] : normal gait [No Focal Deficits] : no focal deficits [Oriented To Time, Place, And Person] : oriented to person, place, and time [Affect] : the affect was normal [Mood] : the mood was normal [FreeTextEntry1] : vitiligo

## 2021-04-20 NOTE — HISTORY OF PRESENT ILLNESS
[Home] : at home, [unfilled] , at the time of the visit. [Medical Office: ([facility])___] : at the medical office located in  [Verbal consent obtained from patient] : the patient, [unfilled] [FreeTextEntry1] : 55yoM with Stage IV colon adenocarcinoma presents for follow-up palliative care visit, referred by Oncology. PMH also significant for vitiligo. \par \par Patient presented to medical attention in 1/2021 with weight loss and abdominal pain.  He was found to have to have mass in distal TC with hepatic and RLL metastatic disease seen in CT scan. He was discharged home with outpatient follow up. He had syncope at home on 1/18/2021 and then saw PMD on 1/19/2021 where he was sent to hospital and admitted to [facility] from 1/19/2021 to 1/19/2021. He underwent liver biopsy on 1/25/2021 which confirmed metastatic colon cancer. His CEA was elevated 2882 from 1/21/2021. His CT c/a/p from 1/28/2021 showed findings consistent with metastatic colon cancer evidenced by annular colonic mass and numerous hepatic metastases, ground glass pulmonary opacity consistent with infectious etiology, including COVID 19, improved compared to prior exam. \par \par Patient initially referred for pain and medical cannabis certification. He suffers with mid abdominal pain that comes on intermittently. Sharp in quality, not related to BMs. Goes as high as 8/10 in intensity.  Currently rates it at 1/10. He has been using Oxycodone 5mg PRN, about 2-3 times daily. \par \par Interval History: \par Patient developed abdominal pain, N/V, and distention and was found to have LBO due to obstruction 2/2 transverse colon mass. Patient s/p laparoscopic colon resection with colostomy.\par Patient presents today feeling well.   Reports some discomfort at surgical incision but it is tolerable. \par He is eating well and endorses ~ + 10 lb weight gain.  Sleeping well.  Colostomy is functioning well. \par \par ROS:\par +30lb weight loss in the last 3 months\par + good appetite \par +nocturia\par +dysgeusia - everything tastes sweet to him\par +feels that he is coping well with his diagnosis\par Denies nausea/vomiting, constipation, diarrhea, trouble sleeping. \par All other ROS as outlined or noncontributory. \par \par Patient is , lives with his wife and their two children (ages 10 and 5).  He has a 30 year old daughter, Kacey, who lives in [place]. She is currently staying with him to help out for two months. He was working in building maintenance, is now applying for disability. \par \par I-Stop Ref#: [id number]

## 2021-04-26 ENCOUNTER — RESULT REVIEW (OUTPATIENT)
Age: 56
End: 2021-04-26

## 2021-04-26 ENCOUNTER — LABORATORY RESULT (OUTPATIENT)
Age: 56
End: 2021-04-26

## 2021-04-26 ENCOUNTER — APPOINTMENT (OUTPATIENT)
Dept: INFUSION THERAPY | Facility: HOSPITAL | Age: 56
End: 2021-04-26

## 2021-04-26 LAB
BASOPHILS # BLD AUTO: 0.1 K/UL — SIGNIFICANT CHANGE UP (ref 0–0.2)
BASOPHILS NFR BLD AUTO: 1 % — SIGNIFICANT CHANGE UP (ref 0–2)
EOSINOPHIL # BLD AUTO: 0.4 K/UL — SIGNIFICANT CHANGE UP (ref 0–0.5)
EOSINOPHIL NFR BLD AUTO: 4.2 % — SIGNIFICANT CHANGE UP (ref 0–6)
HCT VFR BLD CALC: 38.1 % — LOW (ref 39–50)
HGB BLD-MCNC: 11.9 G/DL — LOW (ref 13–17)
IMM GRANULOCYTES NFR BLD AUTO: 0.7 % — SIGNIFICANT CHANGE UP (ref 0–1.5)
LYMPHOCYTES # BLD AUTO: 2.88 K/UL — SIGNIFICANT CHANGE UP (ref 1–3.3)
LYMPHOCYTES # BLD AUTO: 30.2 % — SIGNIFICANT CHANGE UP (ref 13–44)
MCHC RBC-ENTMCNC: 23.9 PG — LOW (ref 27–34)
MCHC RBC-ENTMCNC: 31.2 G/DL — LOW (ref 32–36)
MCV RBC AUTO: 76.7 FL — LOW (ref 80–100)
MONOCYTES # BLD AUTO: 0.82 K/UL — SIGNIFICANT CHANGE UP (ref 0–0.9)
MONOCYTES NFR BLD AUTO: 8.6 % — SIGNIFICANT CHANGE UP (ref 2–14)
NEUTROPHILS # BLD AUTO: 5.28 K/UL — SIGNIFICANT CHANGE UP (ref 1.8–7.4)
NEUTROPHILS NFR BLD AUTO: 55.3 % — SIGNIFICANT CHANGE UP (ref 43–77)
NRBC # BLD: 0 /100 WBCS — SIGNIFICANT CHANGE UP (ref 0–0)
PLATELET # BLD AUTO: 289 K/UL — SIGNIFICANT CHANGE UP (ref 150–400)
RBC # BLD: 4.97 M/UL — SIGNIFICANT CHANGE UP (ref 4.2–5.8)
RBC # FLD: 17.2 % — HIGH (ref 10.3–14.5)
WBC # BLD: 9.55 K/UL — SIGNIFICANT CHANGE UP (ref 3.8–10.5)
WBC # FLD AUTO: 9.55 K/UL — SIGNIFICANT CHANGE UP (ref 3.8–10.5)

## 2021-04-27 ENCOUNTER — NON-APPOINTMENT (OUTPATIENT)
Age: 56
End: 2021-04-27

## 2021-04-27 DIAGNOSIS — R11.2 NAUSEA WITH VOMITING, UNSPECIFIED: ICD-10-CM

## 2021-04-27 DIAGNOSIS — Z51.11 ENCOUNTER FOR ANTINEOPLASTIC CHEMOTHERAPY: ICD-10-CM

## 2021-04-28 ENCOUNTER — APPOINTMENT (OUTPATIENT)
Dept: INFUSION THERAPY | Facility: HOSPITAL | Age: 56
End: 2021-04-28

## 2021-05-06 ENCOUNTER — APPOINTMENT (OUTPATIENT)
Dept: COLORECTAL SURGERY | Facility: CLINIC | Age: 56
End: 2021-05-06
Payer: COMMERCIAL

## 2021-05-06 PROCEDURE — 99024 POSTOP FOLLOW-UP VISIT: CPT

## 2021-05-06 NOTE — ASSESSMENT
[FreeTextEntry1] : Stage IV colon cancer\par -Continue diet as tolerated\par -Continue systemic chemotherapy\par -Follow up in 3 months for reevaluation

## 2021-05-06 NOTE — HISTORY OF PRESENT ILLNESS
[FreeTextEntry1] : Status post laparoscopic colostomy creation and partial colectomy Obstructing metastatic colon cancer. The patient progressed well tolerating diet. 3 started chemotherapy otherwise without complaint

## 2021-05-07 ENCOUNTER — APPOINTMENT (OUTPATIENT)
Dept: HEMATOLOGY ONCOLOGY | Facility: CLINIC | Age: 56
End: 2021-05-07
Payer: COMMERCIAL

## 2021-05-07 VITALS
BODY MASS INDEX: 19.43 KG/M2 | OXYGEN SATURATION: 99 % | WEIGHT: 119.49 LBS | HEIGHT: 65.94 IN | TEMPERATURE: 97.9 F | HEART RATE: 100 BPM | SYSTOLIC BLOOD PRESSURE: 114 MMHG | RESPIRATION RATE: 18 BRPM | DIASTOLIC BLOOD PRESSURE: 82 MMHG

## 2021-05-07 PROCEDURE — 99072 ADDL SUPL MATRL&STAF TM PHE: CPT

## 2021-05-07 PROCEDURE — 99213 OFFICE O/P EST LOW 20 MIN: CPT

## 2021-05-07 RX ORDER — METRONIDAZOLE 500 MG/1
500 TABLET ORAL
Qty: 10 | Refills: 0 | Status: COMPLETED | COMMUNITY
Start: 2021-03-29

## 2021-05-07 RX ORDER — ENOXAPARIN SODIUM 100 MG/ML
40 INJECTION SUBCUTANEOUS
Qty: 12 | Refills: 0 | Status: COMPLETED | COMMUNITY
Start: 2021-03-29

## 2021-05-07 RX ORDER — CIPROFLOXACIN HYDROCHLORIDE 500 MG/1
500 TABLET, FILM COATED ORAL
Qty: 10 | Refills: 0 | Status: COMPLETED | COMMUNITY
Start: 2021-03-29

## 2021-05-07 NOTE — PHYSICAL EXAM
[Restricted in physically strenuous activity but ambulatory and able to carry out work of a light or sedentary nature] : Status 1- Restricted in physically strenuous activity but ambulatory and able to carry out work of a light or sedentary nature, e.g., light house work, office work [Thin] : thin [Normal] : normal spine exam without palpable tenderness, no kyphosis or scoliosis [de-identified] : anicteric  [de-identified] : + ostomy  [de-identified] : vitiligo

## 2021-05-07 NOTE — HISTORY OF PRESENT ILLNESS
[Disease: _____________________] : Disease: [unfilled] [AJCC Stage: ____] : AJCC Stage: [unfilled] [de-identified] : 56 y/o M with PMH of vitiligo who developed abdominal discomfort which started 12/2020 and he was admitted on 1/4/2021 @ [facility] where he was found to have mass in distal TC with hepatic and RLL metastatic disease seen in CT scan.  He was discharged home with outpatient follow up.  He had syncope at home on 1/18/2021 and then saw PMD on 1/19/2021 where he was sent to hospital and admitted to [facility] from 1/19/2021 to 1/19/2021.   He underwent liver biopsy on 1/25/2021 which confirmed metastatic colon cancer.  His CEA was elevated 2882 from 1/21/2021.  His CT c/a/p from 1/28/2021 showed findings consistent with metastatic colon cancer evidenced by annular colonic mass and numerous hepatic metastases, groundglass pulmonary opacity consistent with infectious etiology, including Covid 19, improved compared to prior exam.  \par \par FHx:  unsure \par Social:  no a/c, no smoking, no illicit drugs, lives with wife and 3 kids \par PMD:  Dr Whitehead \par \par He presents to us on 2/5/2021 and states he feels stronger since leaving hospital but has lost about 22 pounds from December 2020 to Feb 2021\par Given Stage IV disease, offered FOLFOX and reviewed palliative intent of treatment \par    \par Late Feb 2021 started FOLFOX \par March 2021 admitted for LBO and underwent resection with ostomy \par Resumed FOLFOX as of 4/26/2021\par  [de-identified] : pan andrea wt, transverse colon primary (right side) [de-identified] : IFTIKHAR, TMB 5, APC R232*APC *\par GRM3 V271I, MAP2K1 (MEK1) K57E\par TP53 S127P [FreeTextEntry1] : Palliative FOLFOX started 2/16/2021  [de-identified] : Patient presented to the office for follow up visit. He currently feels fine, denied experiencing any treatment related side effects at this time. He continues to recover from his surgery and tolerates his ostomy, saw Dr. Alicia yesterday.

## 2021-05-10 ENCOUNTER — APPOINTMENT (OUTPATIENT)
Dept: INFUSION THERAPY | Facility: HOSPITAL | Age: 56
End: 2021-05-10

## 2021-05-10 ENCOUNTER — RESULT REVIEW (OUTPATIENT)
Age: 56
End: 2021-05-10

## 2021-05-10 ENCOUNTER — NON-APPOINTMENT (OUTPATIENT)
Age: 56
End: 2021-05-10

## 2021-05-10 ENCOUNTER — LABORATORY RESULT (OUTPATIENT)
Age: 56
End: 2021-05-10

## 2021-05-10 LAB
BASOPHILS # BLD AUTO: 0.06 K/UL — SIGNIFICANT CHANGE UP (ref 0–0.2)
BASOPHILS NFR BLD AUTO: 0.7 % — SIGNIFICANT CHANGE UP (ref 0–2)
EOSINOPHIL # BLD AUTO: 0.35 K/UL — SIGNIFICANT CHANGE UP (ref 0–0.5)
EOSINOPHIL NFR BLD AUTO: 3.9 % — SIGNIFICANT CHANGE UP (ref 0–6)
HCT VFR BLD CALC: 39.1 % — SIGNIFICANT CHANGE UP (ref 39–50)
HGB BLD-MCNC: 12.3 G/DL — LOW (ref 13–17)
IMM GRANULOCYTES NFR BLD AUTO: 0.7 % — SIGNIFICANT CHANGE UP (ref 0–1.5)
LYMPHOCYTES # BLD AUTO: 2.83 K/UL — SIGNIFICANT CHANGE UP (ref 1–3.3)
LYMPHOCYTES # BLD AUTO: 31.5 % — SIGNIFICANT CHANGE UP (ref 13–44)
MCHC RBC-ENTMCNC: 23.7 PG — LOW (ref 27–34)
MCHC RBC-ENTMCNC: 31.5 G/DL — LOW (ref 32–36)
MCV RBC AUTO: 75.5 FL — LOW (ref 80–100)
MONOCYTES # BLD AUTO: 0.69 K/UL — SIGNIFICANT CHANGE UP (ref 0–0.9)
MONOCYTES NFR BLD AUTO: 7.7 % — SIGNIFICANT CHANGE UP (ref 2–14)
NEUTROPHILS # BLD AUTO: 4.98 K/UL — SIGNIFICANT CHANGE UP (ref 1.8–7.4)
NEUTROPHILS NFR BLD AUTO: 55.5 % — SIGNIFICANT CHANGE UP (ref 43–77)
NRBC # BLD: 0 /100 WBCS — SIGNIFICANT CHANGE UP (ref 0–0)
PLATELET # BLD AUTO: 264 K/UL — SIGNIFICANT CHANGE UP (ref 150–400)
RBC # BLD: 5.18 M/UL — SIGNIFICANT CHANGE UP (ref 4.2–5.8)
RBC # FLD: 16.6 % — HIGH (ref 10.3–14.5)
WBC # BLD: 8.97 K/UL — SIGNIFICANT CHANGE UP (ref 3.8–10.5)
WBC # FLD AUTO: 8.97 K/UL — SIGNIFICANT CHANGE UP (ref 3.8–10.5)

## 2021-05-12 ENCOUNTER — APPOINTMENT (OUTPATIENT)
Dept: INFUSION THERAPY | Facility: HOSPITAL | Age: 56
End: 2021-05-12

## 2021-05-15 ENCOUNTER — APPOINTMENT (OUTPATIENT)
Dept: INFUSION THERAPY | Facility: HOSPITAL | Age: 56
End: 2021-05-15

## 2021-05-19 ENCOUNTER — OUTPATIENT (OUTPATIENT)
Dept: OUTPATIENT SERVICES | Facility: HOSPITAL | Age: 56
LOS: 1 days | Discharge: HOME | End: 2021-05-19

## 2021-05-19 DIAGNOSIS — C18.9 MALIGNANT NEOPLASM OF COLON, UNSPECIFIED: ICD-10-CM

## 2021-05-24 ENCOUNTER — APPOINTMENT (OUTPATIENT)
Dept: HEMATOLOGY ONCOLOGY | Facility: CLINIC | Age: 56
End: 2021-05-24
Payer: COMMERCIAL

## 2021-05-24 ENCOUNTER — RESULT REVIEW (OUTPATIENT)
Age: 56
End: 2021-05-24

## 2021-05-24 ENCOUNTER — LABORATORY RESULT (OUTPATIENT)
Age: 56
End: 2021-05-24

## 2021-05-24 ENCOUNTER — APPOINTMENT (OUTPATIENT)
Dept: INFUSION THERAPY | Facility: HOSPITAL | Age: 56
End: 2021-05-24

## 2021-05-24 LAB
BASOPHILS # BLD AUTO: 0.05 K/UL — SIGNIFICANT CHANGE UP (ref 0–0.2)
BASOPHILS NFR BLD AUTO: 0.7 % — SIGNIFICANT CHANGE UP (ref 0–2)
EOSINOPHIL # BLD AUTO: 0.29 K/UL — SIGNIFICANT CHANGE UP (ref 0–0.5)
EOSINOPHIL NFR BLD AUTO: 4.1 % — SIGNIFICANT CHANGE UP (ref 0–6)
HCT VFR BLD CALC: 37.7 % — LOW (ref 39–50)
HGB BLD-MCNC: 11.9 G/DL — LOW (ref 13–17)
IMM GRANULOCYTES NFR BLD AUTO: 0.6 % — SIGNIFICANT CHANGE UP (ref 0–1.5)
LYMPHOCYTES # BLD AUTO: 2.43 K/UL — SIGNIFICANT CHANGE UP (ref 1–3.3)
LYMPHOCYTES # BLD AUTO: 33.9 % — SIGNIFICANT CHANGE UP (ref 13–44)
MCHC RBC-ENTMCNC: 23.7 PG — LOW (ref 27–34)
MCHC RBC-ENTMCNC: 31.6 G/DL — LOW (ref 32–36)
MCV RBC AUTO: 75 FL — LOW (ref 80–100)
MONOCYTES # BLD AUTO: 0.65 K/UL — SIGNIFICANT CHANGE UP (ref 0–0.9)
MONOCYTES NFR BLD AUTO: 9.1 % — SIGNIFICANT CHANGE UP (ref 2–14)
NEUTROPHILS # BLD AUTO: 3.7 K/UL — SIGNIFICANT CHANGE UP (ref 1.8–7.4)
NEUTROPHILS NFR BLD AUTO: 51.6 % — SIGNIFICANT CHANGE UP (ref 43–77)
NRBC # BLD: 0 /100 WBCS — SIGNIFICANT CHANGE UP (ref 0–0)
PLATELET # BLD AUTO: 172 K/UL — SIGNIFICANT CHANGE UP (ref 150–400)
RBC # BLD: 5.03 M/UL — SIGNIFICANT CHANGE UP (ref 4.2–5.8)
RBC # FLD: 16.5 % — HIGH (ref 10.3–14.5)
WBC # BLD: 7.16 K/UL — SIGNIFICANT CHANGE UP (ref 3.8–10.5)
WBC # FLD AUTO: 7.16 K/UL — SIGNIFICANT CHANGE UP (ref 3.8–10.5)

## 2021-05-24 PROCEDURE — 99213 OFFICE O/P EST LOW 20 MIN: CPT

## 2021-05-25 DIAGNOSIS — Z51.11 ENCOUNTER FOR ANTINEOPLASTIC CHEMOTHERAPY: ICD-10-CM

## 2021-05-25 DIAGNOSIS — R11.2 NAUSEA WITH VOMITING, UNSPECIFIED: ICD-10-CM

## 2021-05-25 NOTE — PHYSICAL EXAM
[Restricted in physically strenuous activity but ambulatory and able to carry out work of a light or sedentary nature] : Status 1- Restricted in physically strenuous activity but ambulatory and able to carry out work of a light or sedentary nature, e.g., light house work, office work [Thin] : thin [Normal] : grossly intact [de-identified] : anicteric  [de-identified] : + ostomy  [de-identified] : vitiligo

## 2021-05-25 NOTE — HISTORY OF PRESENT ILLNESS
[Disease: _____________________] : Disease: [unfilled] [AJCC Stage: ____] : AJCC Stage: [unfilled] [de-identified] : 54 y/o M with PMH of vitiligo who developed abdominal discomfort which started 12/2020 and he was admitted on 1/4/2021 @ [facility] where he was found to have mass in distal TC with hepatic and RLL metastatic disease seen in CT scan.  He was discharged home with outpatient follow up.  He had syncope at home on 1/18/2021 and then saw PMD on 1/19/2021 where he was sent to hospital and admitted to [facility] from 1/19/2021 to 1/19/2021.   He underwent liver biopsy on 1/25/2021 which confirmed metastatic colon cancer.  His CEA was elevated 2882 from 1/21/2021.  His CT c/a/p from 1/28/2021 showed findings consistent with metastatic colon cancer evidenced by annular colonic mass and numerous hepatic metastases, groundglass pulmonary opacity consistent with infectious etiology, including Covid 19, improved compared to prior exam.  \par \par FHx:  unsure \par Social:  no a/c, no smoking, no illicit drugs, lives with wife and 3 kids \par PMD:  Dr Whitehead \par \par He presents to us on 2/5/2021 and states he feels stronger since leaving hospital but has lost about 22 pounds from December 2020 to Feb 2021\par Given Stage IV disease, offered FOLFOX and reviewed palliative intent of treatment \par    \par Late Feb 2021 started FOLFOX \par March 2021 admitted for LBO and underwent resection with ostomy \par Resumed FOLFOX as of 4/26/2021\par  [de-identified] : pan andrea wt, transverse colon primary (right side) [de-identified] : IFTIKHAR, TMB 5, APC R232*APC *\par GRM3 V271I, MAP2K1 (MEK1) K57E\par TP53 S127P [FreeTextEntry1] : Palliative FOLFOX started 2/16/2021  [de-identified] : 5/24/2021: Seen pre Cycle 5 FOLFOX:  He continues to recover from his surgery and tolerates his ostomy. He reports numbness and tingling in the R hand finger tips as of last cycle.  Mr. GERARDO denies fevers, chills, headaches, cough, SOB, chest pain, any swelling, nausea, vomiting, diarrhea, rash, or malaise.

## 2021-05-26 ENCOUNTER — APPOINTMENT (OUTPATIENT)
Dept: INFUSION THERAPY | Facility: HOSPITAL | Age: 56
End: 2021-05-26

## 2021-05-29 ENCOUNTER — APPOINTMENT (OUTPATIENT)
Dept: INFUSION THERAPY | Facility: HOSPITAL | Age: 56
End: 2021-05-29

## 2021-06-07 ENCOUNTER — LABORATORY RESULT (OUTPATIENT)
Age: 56
End: 2021-06-07

## 2021-06-07 ENCOUNTER — APPOINTMENT (OUTPATIENT)
Dept: INFUSION THERAPY | Facility: HOSPITAL | Age: 56
End: 2021-06-07

## 2021-06-07 ENCOUNTER — RESULT REVIEW (OUTPATIENT)
Age: 56
End: 2021-06-07

## 2021-06-07 LAB
BASOPHILS # BLD AUTO: 0.05 K/UL — SIGNIFICANT CHANGE UP (ref 0–0.2)
BASOPHILS NFR BLD AUTO: 0.9 % — SIGNIFICANT CHANGE UP (ref 0–2)
EOSINOPHIL # BLD AUTO: 0.17 K/UL — SIGNIFICANT CHANGE UP (ref 0–0.5)
EOSINOPHIL NFR BLD AUTO: 2.9 % — SIGNIFICANT CHANGE UP (ref 0–6)
HCT VFR BLD CALC: 37.1 % — LOW (ref 39–50)
HGB BLD-MCNC: 11.9 G/DL — LOW (ref 13–17)
IMM GRANULOCYTES NFR BLD AUTO: 0.7 % — SIGNIFICANT CHANGE UP (ref 0–1.5)
LYMPHOCYTES # BLD AUTO: 1.91 K/UL — SIGNIFICANT CHANGE UP (ref 1–3.3)
LYMPHOCYTES # BLD AUTO: 32.6 % — SIGNIFICANT CHANGE UP (ref 13–44)
MCHC RBC-ENTMCNC: 23.7 PG — LOW (ref 27–34)
MCHC RBC-ENTMCNC: 32.1 G/DL — SIGNIFICANT CHANGE UP (ref 32–36)
MCV RBC AUTO: 73.9 FL — LOW (ref 80–100)
MONOCYTES # BLD AUTO: 0.72 K/UL — SIGNIFICANT CHANGE UP (ref 0–0.9)
MONOCYTES NFR BLD AUTO: 12.3 % — SIGNIFICANT CHANGE UP (ref 2–14)
NEUTROPHILS # BLD AUTO: 2.97 K/UL — SIGNIFICANT CHANGE UP (ref 1.8–7.4)
NEUTROPHILS NFR BLD AUTO: 50.6 % — SIGNIFICANT CHANGE UP (ref 43–77)
NRBC # BLD: 0 /100 WBCS — SIGNIFICANT CHANGE UP (ref 0–0)
PLATELET # BLD AUTO: 159 K/UL — SIGNIFICANT CHANGE UP (ref 150–400)
RBC # BLD: 5.02 M/UL — SIGNIFICANT CHANGE UP (ref 4.2–5.8)
RBC # FLD: 16.2 % — HIGH (ref 10.3–14.5)
WBC # BLD: 5.86 K/UL — SIGNIFICANT CHANGE UP (ref 3.8–10.5)
WBC # FLD AUTO: 5.86 K/UL — SIGNIFICANT CHANGE UP (ref 3.8–10.5)

## 2021-06-09 ENCOUNTER — APPOINTMENT (OUTPATIENT)
Dept: INFUSION THERAPY | Facility: HOSPITAL | Age: 56
End: 2021-06-09

## 2021-06-18 ENCOUNTER — APPOINTMENT (OUTPATIENT)
Dept: HEMATOLOGY ONCOLOGY | Facility: CLINIC | Age: 56
End: 2021-06-18
Payer: COMMERCIAL

## 2021-06-18 ENCOUNTER — OUTPATIENT (OUTPATIENT)
Dept: OUTPATIENT SERVICES | Facility: HOSPITAL | Age: 56
LOS: 1 days | Discharge: HOME | End: 2021-06-18

## 2021-06-18 VITALS
BODY MASS INDEX: 19.31 KG/M2 | RESPIRATION RATE: 17 BRPM | HEART RATE: 100 BPM | WEIGHT: 120.15 LBS | TEMPERATURE: 96.9 F | OXYGEN SATURATION: 97 % | HEIGHT: 66.14 IN | SYSTOLIC BLOOD PRESSURE: 123 MMHG | DIASTOLIC BLOOD PRESSURE: 87 MMHG

## 2021-06-18 DIAGNOSIS — C18.9 MALIGNANT NEOPLASM OF COLON, UNSPECIFIED: ICD-10-CM

## 2021-06-18 PROCEDURE — 99072 ADDL SUPL MATRL&STAF TM PHE: CPT

## 2021-06-18 PROCEDURE — 99213 OFFICE O/P EST LOW 20 MIN: CPT

## 2021-06-18 NOTE — PHYSICAL EXAM
[Restricted in physically strenuous activity but ambulatory and able to carry out work of a light or sedentary nature] : Status 1- Restricted in physically strenuous activity but ambulatory and able to carry out work of a light or sedentary nature, e.g., light house work, office work [Thin] : thin [Normal] : affect appropriate [de-identified] : anicteric  [de-identified] : normal respiratory effort, no audible wheeze [de-identified] : + ostomy  [de-identified] : vitiligo

## 2021-06-18 NOTE — HISTORY OF PRESENT ILLNESS
[Disease: _____________________] : Disease: [unfilled] [AJCC Stage: ____] : AJCC Stage: [unfilled] [de-identified] : 54 y/o M with PMH of vitiligo who developed abdominal discomfort which started 12/2020 and he was admitted on 1/4/2021 @ [facility] where he was found to have mass in distal TC with hepatic and RLL metastatic disease seen in CT scan.  He was discharged home with outpatient follow up.  He had syncope at home on 1/18/2021 and then saw PMD on 1/19/2021 where he was sent to hospital and admitted to [facility] from 1/19/2021 to 1/19/2021.   He underwent liver biopsy on 1/25/2021 which confirmed metastatic colon cancer.  His CEA was elevated 2882 from 1/21/2021.  His CT c/a/p from 1/28/2021 showed findings consistent with metastatic colon cancer evidenced by annular colonic mass and numerous hepatic metastases, groundglass pulmonary opacity consistent with infectious etiology, including Covid 19, improved compared to prior exam.  \par \par He presents to us on 2/5/2021 and states he feels stronger since leaving hospital but has lost about 22 pounds from December 2020 to Feb 2021\par Given Stage IV disease, offered FOLFOX and reviewed palliative intent of treatment \par    \par Late Feb 2021 started FOLFOX \par March 2021 admitted for LBO and underwent resection with ostomy \par \par April 2021 restarted FOLFOX  [de-identified] : pan andrea wt, transverse colon primary (right side) [de-identified] : IFTIKHAR, TMB 5, APC R232*APC *\par GRM3 V271I, MAP2K1 (MEK1) K57E\par TP53 S127P [FreeTextEntry1] : Palliative FOLFOX started 2/16/2021  [de-identified] : Patient presents for follow-up with no acute complaints.\par Maintaining weight, minimal neuropathy\par No abdominal pain.\par No diarrhea fevers or chills.\par

## 2021-06-21 ENCOUNTER — LABORATORY RESULT (OUTPATIENT)
Age: 56
End: 2021-06-21

## 2021-06-21 ENCOUNTER — RESULT REVIEW (OUTPATIENT)
Age: 56
End: 2021-06-21

## 2021-06-21 ENCOUNTER — APPOINTMENT (OUTPATIENT)
Dept: INFUSION THERAPY | Facility: HOSPITAL | Age: 56
End: 2021-06-21

## 2021-06-21 LAB
BASOPHILS # BLD AUTO: 0.05 K/UL — SIGNIFICANT CHANGE UP (ref 0–0.2)
BASOPHILS NFR BLD AUTO: 0.9 % — SIGNIFICANT CHANGE UP (ref 0–2)
EOSINOPHIL # BLD AUTO: 0.1 K/UL — SIGNIFICANT CHANGE UP (ref 0–0.5)
EOSINOPHIL NFR BLD AUTO: 1.9 % — SIGNIFICANT CHANGE UP (ref 0–6)
HCT VFR BLD CALC: 38.5 % — LOW (ref 39–50)
HGB BLD-MCNC: 12.3 G/DL — LOW (ref 13–17)
IMM GRANULOCYTES NFR BLD AUTO: 0.4 % — SIGNIFICANT CHANGE UP (ref 0–1.5)
LYMPHOCYTES # BLD AUTO: 2.25 K/UL — SIGNIFICANT CHANGE UP (ref 1–3.3)
LYMPHOCYTES # BLD AUTO: 42.7 % — SIGNIFICANT CHANGE UP (ref 13–44)
MCHC RBC-ENTMCNC: 23.8 PG — LOW (ref 27–34)
MCHC RBC-ENTMCNC: 31.9 G/DL — LOW (ref 32–36)
MCV RBC AUTO: 74.5 FL — LOW (ref 80–100)
MONOCYTES # BLD AUTO: 0.53 K/UL — SIGNIFICANT CHANGE UP (ref 0–0.9)
MONOCYTES NFR BLD AUTO: 10.1 % — SIGNIFICANT CHANGE UP (ref 2–14)
NEUTROPHILS # BLD AUTO: 2.32 K/UL — SIGNIFICANT CHANGE UP (ref 1.8–7.4)
NEUTROPHILS NFR BLD AUTO: 44 % — SIGNIFICANT CHANGE UP (ref 43–77)
NRBC # BLD: 0 /100 WBCS — SIGNIFICANT CHANGE UP (ref 0–0)
PLATELET # BLD AUTO: 171 K/UL — SIGNIFICANT CHANGE UP (ref 150–400)
RBC # BLD: 5.17 M/UL — SIGNIFICANT CHANGE UP (ref 4.2–5.8)
RBC # FLD: 16.9 % — HIGH (ref 10.3–14.5)
WBC # BLD: 5.27 K/UL — SIGNIFICANT CHANGE UP (ref 3.8–10.5)
WBC # FLD AUTO: 5.27 K/UL — SIGNIFICANT CHANGE UP (ref 3.8–10.5)

## 2021-06-22 DIAGNOSIS — R11.2 NAUSEA WITH VOMITING, UNSPECIFIED: ICD-10-CM

## 2021-06-22 DIAGNOSIS — Z51.11 ENCOUNTER FOR ANTINEOPLASTIC CHEMOTHERAPY: ICD-10-CM

## 2021-06-23 ENCOUNTER — APPOINTMENT (OUTPATIENT)
Dept: INFUSION THERAPY | Facility: HOSPITAL | Age: 56
End: 2021-06-23

## 2021-06-23 ENCOUNTER — APPOINTMENT (OUTPATIENT)
Dept: HEMATOLOGY ONCOLOGY | Facility: CLINIC | Age: 56
End: 2021-06-23

## 2021-06-25 ENCOUNTER — TRANSCRIPTION ENCOUNTER (OUTPATIENT)
Age: 56
End: 2021-06-25

## 2021-07-06 ENCOUNTER — RESULT REVIEW (OUTPATIENT)
Age: 56
End: 2021-07-06

## 2021-07-06 ENCOUNTER — LABORATORY RESULT (OUTPATIENT)
Age: 56
End: 2021-07-06

## 2021-07-06 ENCOUNTER — APPOINTMENT (OUTPATIENT)
Dept: INFUSION THERAPY | Facility: HOSPITAL | Age: 56
End: 2021-07-06

## 2021-07-06 LAB
BASOPHILS # BLD AUTO: 0.14 K/UL — SIGNIFICANT CHANGE UP (ref 0–0.2)
BASOPHILS NFR BLD AUTO: 3 % — HIGH (ref 0–2)
EOSINOPHIL # BLD AUTO: 0.05 K/UL — SIGNIFICANT CHANGE UP (ref 0–0.5)
EOSINOPHIL NFR BLD AUTO: 1 % — SIGNIFICANT CHANGE UP (ref 0–6)
HCT VFR BLD CALC: 38.6 % — LOW (ref 39–50)
HGB BLD-MCNC: 12.4 G/DL — LOW (ref 13–17)
LYMPHOCYTES # BLD AUTO: 2.29 K/UL — SIGNIFICANT CHANGE UP (ref 1–3.3)
LYMPHOCYTES # BLD AUTO: 48 % — HIGH (ref 13–44)
MCHC RBC-ENTMCNC: 23.6 PG — LOW (ref 27–34)
MCHC RBC-ENTMCNC: 32.1 G/DL — SIGNIFICANT CHANGE UP (ref 32–36)
MCV RBC AUTO: 73.5 FL — LOW (ref 80–100)
MONOCYTES # BLD AUTO: 0.57 K/UL — SIGNIFICANT CHANGE UP (ref 0–0.9)
MONOCYTES NFR BLD AUTO: 12 % — SIGNIFICANT CHANGE UP (ref 2–14)
NEUTROPHILS # BLD AUTO: 1.72 K/UL — LOW (ref 1.8–7.4)
NEUTROPHILS NFR BLD AUTO: 36 % — LOW (ref 43–77)
NRBC # BLD: 0 /100 — SIGNIFICANT CHANGE UP (ref 0–0)
NRBC # BLD: SIGNIFICANT CHANGE UP /100 WBCS (ref 0–0)
PLAT MORPH BLD: NORMAL — SIGNIFICANT CHANGE UP
PLATELET # BLD AUTO: 151 K/UL — SIGNIFICANT CHANGE UP (ref 150–400)
RBC # BLD: 5.25 M/UL — SIGNIFICANT CHANGE UP (ref 4.2–5.8)
RBC # FLD: 17.9 % — HIGH (ref 10.3–14.5)
RBC BLD AUTO: SIGNIFICANT CHANGE UP
WBC # BLD: 4.78 K/UL — SIGNIFICANT CHANGE UP (ref 3.8–10.5)
WBC # FLD AUTO: 4.78 K/UL — SIGNIFICANT CHANGE UP (ref 3.8–10.5)

## 2021-07-07 ENCOUNTER — RESULT REVIEW (OUTPATIENT)
Age: 56
End: 2021-07-07

## 2021-07-08 ENCOUNTER — APPOINTMENT (OUTPATIENT)
Dept: CT IMAGING | Facility: IMAGING CENTER | Age: 56
End: 2021-07-08
Payer: COMMERCIAL

## 2021-07-08 ENCOUNTER — APPOINTMENT (OUTPATIENT)
Dept: INFUSION THERAPY | Facility: HOSPITAL | Age: 56
End: 2021-07-08

## 2021-07-08 ENCOUNTER — RESULT REVIEW (OUTPATIENT)
Age: 56
End: 2021-07-08

## 2021-07-08 ENCOUNTER — OUTPATIENT (OUTPATIENT)
Dept: OUTPATIENT SERVICES | Facility: HOSPITAL | Age: 56
LOS: 1 days | End: 2021-07-08
Payer: COMMERCIAL

## 2021-07-08 DIAGNOSIS — C78.7 SECONDARY MALIGNANT NEOPLASM OF LIVER AND INTRAHEPATIC BILE DUCT: ICD-10-CM

## 2021-07-08 DIAGNOSIS — C18.9 MALIGNANT NEOPLASM OF COLON, UNSPECIFIED: ICD-10-CM

## 2021-07-08 PROCEDURE — 74177 CT ABD & PELVIS W/CONTRAST: CPT

## 2021-07-08 PROCEDURE — 74177 CT ABD & PELVIS W/CONTRAST: CPT | Mod: 26

## 2021-07-08 PROCEDURE — 71260 CT THORAX DX C+: CPT | Mod: 26

## 2021-07-08 PROCEDURE — 71260 CT THORAX DX C+: CPT

## 2021-07-09 ENCOUNTER — APPOINTMENT (OUTPATIENT)
Dept: UROLOGY | Facility: CLINIC | Age: 56
End: 2021-07-09
Payer: COMMERCIAL

## 2021-07-09 VITALS
TEMPERATURE: 98 F | SYSTOLIC BLOOD PRESSURE: 117 MMHG | DIASTOLIC BLOOD PRESSURE: 74 MMHG | HEART RATE: 85 BPM | RESPIRATION RATE: 17 BRPM

## 2021-07-09 PROCEDURE — 99203 OFFICE O/P NEW LOW 30 MIN: CPT

## 2021-07-09 PROCEDURE — 88112 CYTOPATH CELL ENHANCE TECH: CPT | Mod: 26

## 2021-07-09 PROCEDURE — 99072 ADDL SUPL MATRL&STAF TM PHE: CPT

## 2021-07-12 NOTE — HISTORY OF PRESENT ILLNESS
[FreeTextEntry1] : here for evaluation of gross hematuria.\par Lasted fro 3 days with clots. No back or falnk pain nor change in voiding from baseline such as increased frequency or urgency or dysuria.\par no prior h/o same not UTIs or stones.\par in midst of therapy fr metastatic colon cancer. \par recent UA negative.\par HAd CT scan 3/31 - i reviewed- normal upper tract though note hyperdensity on median lobe extending into the bladder: \par

## 2021-07-12 NOTE — PHYSICAL EXAM
[General Appearance - Well Developed] : well developed [General Appearance - Well Nourished] : well nourished [Edema] : no peripheral edema [Exaggerated Use Of Accessory Muscles For Inspiration] : no accessory muscle use [Abdomen Soft] : soft [Abdomen Tenderness] : non-tender [Abdomen Mass (___ Cm)] : no abdominal mass palpated [Penis Abnormality] : normal uncircumcised penis [Urinary Bladder Findings] : the bladder was normal on palpation [Scrotum] : the scrotum was normal [Normal Station and Gait] : the gait and station were normal for the patient's age [] : no rash [No Focal Deficits] : no focal deficits [Oriented To Time, Place, And Person] : oriented to person, place, and time

## 2021-07-15 ENCOUNTER — OUTPATIENT (OUTPATIENT)
Dept: OUTPATIENT SERVICES | Facility: HOSPITAL | Age: 56
LOS: 1 days | Discharge: HOME | End: 2021-07-15

## 2021-07-15 DIAGNOSIS — C18.9 MALIGNANT NEOPLASM OF COLON, UNSPECIFIED: ICD-10-CM

## 2021-07-15 LAB — URINE CYTOLOGY: NORMAL

## 2021-07-19 ENCOUNTER — RESULT REVIEW (OUTPATIENT)
Age: 56
End: 2021-07-19

## 2021-07-19 ENCOUNTER — APPOINTMENT (OUTPATIENT)
Dept: INFUSION THERAPY | Facility: HOSPITAL | Age: 56
End: 2021-07-19

## 2021-07-19 ENCOUNTER — LABORATORY RESULT (OUTPATIENT)
Age: 56
End: 2021-07-19

## 2021-07-19 LAB
BASOPHILS # BLD AUTO: 0 K/UL — SIGNIFICANT CHANGE UP (ref 0–0.2)
BASOPHILS NFR BLD AUTO: 0 % — SIGNIFICANT CHANGE UP (ref 0–2)
EOSINOPHIL # BLD AUTO: 0.05 K/UL — SIGNIFICANT CHANGE UP (ref 0–0.5)
EOSINOPHIL NFR BLD AUTO: 1 % — SIGNIFICANT CHANGE UP (ref 0–6)
HCT VFR BLD CALC: 37.1 % — LOW (ref 39–50)
HGB BLD-MCNC: 11.9 G/DL — LOW (ref 13–17)
LYMPHOCYTES # BLD AUTO: 1.91 K/UL — SIGNIFICANT CHANGE UP (ref 1–3.3)
LYMPHOCYTES # BLD AUTO: 41 % — SIGNIFICANT CHANGE UP (ref 13–44)
MCHC RBC-ENTMCNC: 24 PG — LOW (ref 27–34)
MCHC RBC-ENTMCNC: 32.1 G/DL — SIGNIFICANT CHANGE UP (ref 32–36)
MCV RBC AUTO: 74.8 FL — LOW (ref 80–100)
MONOCYTES # BLD AUTO: 0.79 K/UL — SIGNIFICANT CHANGE UP (ref 0–0.9)
MONOCYTES NFR BLD AUTO: 17 % — HIGH (ref 2–14)
NEUTROPHILS # BLD AUTO: 1.91 K/UL — SIGNIFICANT CHANGE UP (ref 1.8–7.4)
NEUTROPHILS NFR BLD AUTO: 41 % — LOW (ref 43–77)
NRBC # BLD: 0 /100 — SIGNIFICANT CHANGE UP (ref 0–0)
NRBC # BLD: SIGNIFICANT CHANGE UP /100 WBCS (ref 0–0)
PLAT MORPH BLD: NORMAL — SIGNIFICANT CHANGE UP
PLATELET # BLD AUTO: 120 K/UL — LOW (ref 150–400)
RBC # BLD: 4.96 M/UL — SIGNIFICANT CHANGE UP (ref 4.2–5.8)
RBC # FLD: 18.9 % — HIGH (ref 10.3–14.5)
RBC BLD AUTO: SIGNIFICANT CHANGE UP
WBC # BLD: 4.67 K/UL — SIGNIFICANT CHANGE UP (ref 3.8–10.5)
WBC # FLD AUTO: 4.67 K/UL — SIGNIFICANT CHANGE UP (ref 3.8–10.5)

## 2021-07-20 ENCOUNTER — NON-APPOINTMENT (OUTPATIENT)
Age: 56
End: 2021-07-20

## 2021-07-21 ENCOUNTER — APPOINTMENT (OUTPATIENT)
Dept: HEMATOLOGY ONCOLOGY | Facility: CLINIC | Age: 56
End: 2021-07-21
Payer: COMMERCIAL

## 2021-07-21 ENCOUNTER — APPOINTMENT (OUTPATIENT)
Dept: INFUSION THERAPY | Facility: HOSPITAL | Age: 56
End: 2021-07-21

## 2021-07-21 VITALS
BODY MASS INDEX: 19.66 KG/M2 | SYSTOLIC BLOOD PRESSURE: 120 MMHG | HEART RATE: 99 BPM | RESPIRATION RATE: 16 BRPM | WEIGHT: 122.36 LBS | TEMPERATURE: 98 F | DIASTOLIC BLOOD PRESSURE: 79 MMHG | OXYGEN SATURATION: 100 %

## 2021-07-21 PROCEDURE — 99214 OFFICE O/P EST MOD 30 MIN: CPT

## 2021-07-21 PROCEDURE — 99072 ADDL SUPL MATRL&STAF TM PHE: CPT

## 2021-07-26 NOTE — HISTORY OF PRESENT ILLNESS
[Disease: _____________________] : Disease: [unfilled] [AJCC Stage: ____] : AJCC Stage: [unfilled] [de-identified] : 54 y/o M with PMH of vitiligo who developed abdominal discomfort which started 12/2020 and he was admitted on 1/4/2021 @ [facility] where he was found to have mass in distal TC with hepatic and RLL metastatic disease seen in CT scan.  He was discharged home with outpatient follow up.  He had syncope at home on 1/18/2021 and then saw PMD on 1/19/2021 where he was sent to hospital and admitted to [facility] from 1/19/2021 to 1/19/2021.   He underwent liver biopsy on 1/25/2021 which confirmed metastatic colon cancer.  His CEA was elevated 2882 from 1/21/2021.  His CT c/a/p from 1/28/2021 showed findings consistent with metastatic colon cancer evidenced by annular colonic mass and numerous hepatic metastases, groundglass pulmonary opacity consistent with infectious etiology, including Covid 19, improved compared to prior exam.  \par \par He presents to us on 2/5/2021 and states he feels stronger since leaving hospital but has lost about 22 pounds from December 2020 to Feb 2021\par Given Stage IV disease, offered FOLFOX and reviewed palliative intent of treatment \par    \par Late Feb 2021 started FOLFOX \par March 2021 admitted for LBO and underwent resection with ostomy \par \par April 2021 restarted FOLFOX  [de-identified] : pan andrea wt, transverse colon primary (right side) [de-identified] : IFTIKHAR, TMB 5, APC R232*APC *\par GRM3 V271I, MAP2K1 (MEK1) K57E\par TP53 S127P [FreeTextEntry1] : Palliative FOLFOX started 2/16/2021 -> 5-FU only started on 7/19/21 [de-identified] : Patient presents for follow-up with 5-FU disconnect this AM.  Oxaliplatin stopped on this most recent cycle due to development of neuropathy.  Patient reports still only mild numbness in the fingertips but no pain and is able to complete his ADL's without difficulty.  Tolerating current chemotherapy treatment well.  Patient states that his appetite is good and feels that he has been putting on weight.  Denies N/V, diarrhea, abdominal pain, fever or significant fatigue.

## 2021-07-26 NOTE — PHYSICAL EXAM
[Restricted in physically strenuous activity but ambulatory and able to carry out work of a light or sedentary nature] : Status 1- Restricted in physically strenuous activity but ambulatory and able to carry out work of a light or sedentary nature, e.g., light house work, office work [Thin] : thin [Normal] : affect appropriate [de-identified] : anicteric  [de-identified] : normal respiratory effort, no audible wheeze [de-identified] : + ostomy  [de-identified] : vitiligo

## 2021-08-03 ENCOUNTER — OUTPATIENT (OUTPATIENT)
Dept: OUTPATIENT SERVICES | Facility: HOSPITAL | Age: 56
LOS: 1 days | End: 2021-08-03
Payer: COMMERCIAL

## 2021-08-03 ENCOUNTER — APPOINTMENT (OUTPATIENT)
Dept: UROLOGY | Facility: CLINIC | Age: 56
End: 2021-08-03
Payer: COMMERCIAL

## 2021-08-03 VITALS
HEART RATE: 80 BPM | RESPIRATION RATE: 17 BRPM | HEIGHT: 66 IN | BODY MASS INDEX: 19.61 KG/M2 | SYSTOLIC BLOOD PRESSURE: 131 MMHG | DIASTOLIC BLOOD PRESSURE: 71 MMHG | TEMPERATURE: 98 F | WEIGHT: 122 LBS

## 2021-08-03 DIAGNOSIS — R35.0 FREQUENCY OF MICTURITION: ICD-10-CM

## 2021-08-03 DIAGNOSIS — R31.0 GROSS HEMATURIA: ICD-10-CM

## 2021-08-03 PROCEDURE — 52000 CYSTOURETHROSCOPY: CPT

## 2021-08-05 ENCOUNTER — RESULT REVIEW (OUTPATIENT)
Age: 56
End: 2021-08-05

## 2021-08-05 ENCOUNTER — APPOINTMENT (OUTPATIENT)
Dept: INFUSION THERAPY | Facility: HOSPITAL | Age: 56
End: 2021-08-05

## 2021-08-05 ENCOUNTER — LABORATORY RESULT (OUTPATIENT)
Age: 56
End: 2021-08-05

## 2021-08-05 LAB
BASOPHILS # BLD AUTO: 0.05 K/UL — SIGNIFICANT CHANGE UP (ref 0–0.2)
BASOPHILS NFR BLD AUTO: 0.9 % — SIGNIFICANT CHANGE UP (ref 0–2)
EOSINOPHIL # BLD AUTO: 0.13 K/UL — SIGNIFICANT CHANGE UP (ref 0–0.5)
EOSINOPHIL NFR BLD AUTO: 2.5 % — SIGNIFICANT CHANGE UP (ref 0–6)
HCT VFR BLD CALC: 38.9 % — LOW (ref 39–50)
HGB BLD-MCNC: 12.6 G/DL — LOW (ref 13–17)
IMM GRANULOCYTES NFR BLD AUTO: 0.2 % — SIGNIFICANT CHANGE UP (ref 0–1.5)
LYMPHOCYTES # BLD AUTO: 2.23 K/UL — SIGNIFICANT CHANGE UP (ref 1–3.3)
LYMPHOCYTES # BLD AUTO: 42.2 % — SIGNIFICANT CHANGE UP (ref 13–44)
MCHC RBC-ENTMCNC: 24.7 PG — LOW (ref 27–34)
MCHC RBC-ENTMCNC: 32.4 G/DL — SIGNIFICANT CHANGE UP (ref 32–36)
MCV RBC AUTO: 76.3 FL — LOW (ref 80–100)
MONOCYTES # BLD AUTO: 0.68 K/UL — SIGNIFICANT CHANGE UP (ref 0–0.9)
MONOCYTES NFR BLD AUTO: 12.9 % — SIGNIFICANT CHANGE UP (ref 2–14)
NEUTROPHILS # BLD AUTO: 2.18 K/UL — SIGNIFICANT CHANGE UP (ref 1.8–7.4)
NEUTROPHILS NFR BLD AUTO: 41.3 % — LOW (ref 43–77)
NRBC # BLD: 0 /100 WBCS — SIGNIFICANT CHANGE UP (ref 0–0)
PLATELET # BLD AUTO: 134 K/UL — LOW (ref 150–400)
RBC # BLD: 5.1 M/UL — SIGNIFICANT CHANGE UP (ref 4.2–5.8)
RBC # FLD: 19.8 % — HIGH (ref 10.3–14.5)
WBC # BLD: 5.28 K/UL — SIGNIFICANT CHANGE UP (ref 3.8–10.5)
WBC # FLD AUTO: 5.28 K/UL — SIGNIFICANT CHANGE UP (ref 3.8–10.5)

## 2021-08-06 DIAGNOSIS — Z51.11 ENCOUNTER FOR ANTINEOPLASTIC CHEMOTHERAPY: ICD-10-CM

## 2021-08-06 DIAGNOSIS — R11.2 NAUSEA WITH VOMITING, UNSPECIFIED: ICD-10-CM

## 2021-08-07 ENCOUNTER — APPOINTMENT (OUTPATIENT)
Dept: INFUSION THERAPY | Facility: HOSPITAL | Age: 56
End: 2021-08-07

## 2021-08-31 ENCOUNTER — OUTPATIENT (OUTPATIENT)
Dept: OUTPATIENT SERVICES | Facility: HOSPITAL | Age: 56
LOS: 1 days | Discharge: HOME | End: 2021-08-31

## 2021-08-31 DIAGNOSIS — C18.9 MALIGNANT NEOPLASM OF COLON, UNSPECIFIED: ICD-10-CM

## 2021-09-02 ENCOUNTER — APPOINTMENT (OUTPATIENT)
Dept: INFUSION THERAPY | Facility: HOSPITAL | Age: 56
End: 2021-09-02

## 2021-09-02 ENCOUNTER — RESULT REVIEW (OUTPATIENT)
Age: 56
End: 2021-09-02

## 2021-09-02 ENCOUNTER — LABORATORY RESULT (OUTPATIENT)
Age: 56
End: 2021-09-02

## 2021-09-02 LAB
BASOPHILS # BLD AUTO: 0.06 K/UL — SIGNIFICANT CHANGE UP (ref 0–0.2)
BASOPHILS NFR BLD AUTO: 0.8 % — SIGNIFICANT CHANGE UP (ref 0–2)
EOSINOPHIL # BLD AUTO: 0.07 K/UL — SIGNIFICANT CHANGE UP (ref 0–0.5)
EOSINOPHIL NFR BLD AUTO: 0.9 % — SIGNIFICANT CHANGE UP (ref 0–6)
HCT VFR BLD CALC: 39.1 % — SIGNIFICANT CHANGE UP (ref 39–50)
HGB BLD-MCNC: 12.8 G/DL — LOW (ref 13–17)
IMM GRANULOCYTES NFR BLD AUTO: 0.4 % — SIGNIFICANT CHANGE UP (ref 0–1.5)
LYMPHOCYTES # BLD AUTO: 2.55 K/UL — SIGNIFICANT CHANGE UP (ref 1–3.3)
LYMPHOCYTES # BLD AUTO: 34.2 % — SIGNIFICANT CHANGE UP (ref 13–44)
MCHC RBC-ENTMCNC: 25.5 PG — LOW (ref 27–34)
MCHC RBC-ENTMCNC: 32.7 G/DL — SIGNIFICANT CHANGE UP (ref 32–36)
MCV RBC AUTO: 77.9 FL — LOW (ref 80–100)
MONOCYTES # BLD AUTO: 0.93 K/UL — HIGH (ref 0–0.9)
MONOCYTES NFR BLD AUTO: 12.5 % — SIGNIFICANT CHANGE UP (ref 2–14)
NEUTROPHILS # BLD AUTO: 3.81 K/UL — SIGNIFICANT CHANGE UP (ref 1.8–7.4)
NEUTROPHILS NFR BLD AUTO: 51.2 % — SIGNIFICANT CHANGE UP (ref 43–77)
NRBC # BLD: 0 /100 WBCS — SIGNIFICANT CHANGE UP (ref 0–0)
PLATELET # BLD AUTO: 175 K/UL — SIGNIFICANT CHANGE UP (ref 150–400)
RBC # BLD: 5.02 M/UL — SIGNIFICANT CHANGE UP (ref 4.2–5.8)
RBC # FLD: 18.4 % — HIGH (ref 10.3–14.5)
WBC # BLD: 7.45 K/UL — SIGNIFICANT CHANGE UP (ref 3.8–10.5)
WBC # FLD AUTO: 7.45 K/UL — SIGNIFICANT CHANGE UP (ref 3.8–10.5)

## 2021-09-03 DIAGNOSIS — Z51.11 ENCOUNTER FOR ANTINEOPLASTIC CHEMOTHERAPY: ICD-10-CM

## 2021-09-03 DIAGNOSIS — R11.2 NAUSEA WITH VOMITING, UNSPECIFIED: ICD-10-CM

## 2021-09-04 ENCOUNTER — APPOINTMENT (OUTPATIENT)
Dept: INFUSION THERAPY | Facility: HOSPITAL | Age: 56
End: 2021-09-04

## 2021-09-08 ENCOUNTER — APPOINTMENT (OUTPATIENT)
Dept: HEMATOLOGY ONCOLOGY | Facility: CLINIC | Age: 56
End: 2021-09-08
Payer: COMMERCIAL

## 2021-09-08 VITALS
WEIGHT: 126.08 LBS | SYSTOLIC BLOOD PRESSURE: 105 MMHG | HEART RATE: 96 BPM | BODY MASS INDEX: 20.26 KG/M2 | TEMPERATURE: 97.8 F | HEIGHT: 66.1 IN | RESPIRATION RATE: 18 BRPM | OXYGEN SATURATION: 97 % | DIASTOLIC BLOOD PRESSURE: 75 MMHG

## 2021-09-08 PROCEDURE — 99214 OFFICE O/P EST MOD 30 MIN: CPT

## 2021-09-09 ENCOUNTER — APPOINTMENT (OUTPATIENT)
Dept: HEMATOLOGY ONCOLOGY | Facility: CLINIC | Age: 56
End: 2021-09-09

## 2021-09-09 NOTE — PHYSICAL EXAM
[Fully active, able to carry on all pre-disease performance without restriction] : Status 0 - Fully active, able to carry on all pre-disease performance without restriction [Thin] : thin [Normal] : affect appropriate [de-identified] : anicteric  [de-identified] : normal respiratory effort, no audible wheeze [de-identified] : reg rate  [de-identified] : + ostomy  [de-identified] : vitiligo

## 2021-09-09 NOTE — HISTORY OF PRESENT ILLNESS
[Disease: _____________________] : Disease: [unfilled] [AJCC Stage: ____] : AJCC Stage: [unfilled] [de-identified] : 54 y/o M with PMH of vitiligo who developed abdominal discomfort which started 12/2020 and he was admitted on 1/4/2021 @ [facility] where he was found to have mass in distal TC with hepatic and RLL metastatic disease seen in CT scan.  He was discharged home with outpatient follow up.  He had syncope at home on 1/18/2021 and then saw PMD on 1/19/2021 where he was sent to hospital and admitted to [facility] from 1/19/2021 to 1/19/2021.   He underwent liver biopsy on 1/25/2021 which confirmed metastatic colon cancer.  His CEA was elevated 2882 from 1/21/2021.  His CT c/a/p from 1/28/2021 showed findings consistent with metastatic colon cancer evidenced by annular colonic mass and numerous hepatic metastases, groundglass pulmonary opacity consistent with infectious etiology, including Covid 19, improved compared to prior exam.  \par \par He presents to us on 2/5/2021 and states he feels stronger since leaving hospital but has lost about 22 pounds from December 2020 to Feb 2021\par Given Stage IV disease, offered FOLFOX and reviewed palliative intent of treatment \par    \par Late Feb 2021 started FOLFOX \par March 2021 admitted for LBO and underwent resection with ostomy \par \par April 2021 restarted FOLFOX --> stopped oxali July 2021  [de-identified] : pan andrea wt, transverse colon primary (right side) [de-identified] : IFTIKHAR, TMB 5, APC R232*APC *\par GRM3 V271I, MAP2K1 (MEK1) K57E\par TP53 S127P [FreeTextEntry1] : Palliative FOLFOX started 2/16/2021 -> 5-FU only started on 7/19/21 [de-identified] : Patient presents for follow-up with no acute complaints.\par Maintaining weight, minimal neuropathy\par No abdominal pain.\par No diarrhea fevers or chills.\par

## 2021-09-13 ENCOUNTER — APPOINTMENT (OUTPATIENT)
Dept: CT IMAGING | Facility: IMAGING CENTER | Age: 56
End: 2021-09-13

## 2021-09-16 ENCOUNTER — APPOINTMENT (OUTPATIENT)
Dept: INFUSION THERAPY | Facility: HOSPITAL | Age: 56
End: 2021-09-16

## 2021-09-16 ENCOUNTER — RESULT REVIEW (OUTPATIENT)
Age: 56
End: 2021-09-16

## 2021-09-16 LAB
BASOPHILS # BLD AUTO: 0.02 K/UL — SIGNIFICANT CHANGE UP (ref 0–0.2)
BASOPHILS NFR BLD AUTO: 0.3 % — SIGNIFICANT CHANGE UP (ref 0–2)
EOSINOPHIL # BLD AUTO: 0.19 K/UL — SIGNIFICANT CHANGE UP (ref 0–0.5)
EOSINOPHIL NFR BLD AUTO: 2.9 % — SIGNIFICANT CHANGE UP (ref 0–6)
HCT VFR BLD CALC: 40.5 % — SIGNIFICANT CHANGE UP (ref 39–50)
HGB BLD-MCNC: 13.3 G/DL — SIGNIFICANT CHANGE UP (ref 13–17)
IMM GRANULOCYTES NFR BLD AUTO: 0.5 % — SIGNIFICANT CHANGE UP (ref 0–1.5)
LYMPHOCYTES # BLD AUTO: 2.29 K/UL — SIGNIFICANT CHANGE UP (ref 1–3.3)
LYMPHOCYTES # BLD AUTO: 35 % — SIGNIFICANT CHANGE UP (ref 13–44)
MCHC RBC-ENTMCNC: 25.6 PG — LOW (ref 27–34)
MCHC RBC-ENTMCNC: 32.8 G/DL — SIGNIFICANT CHANGE UP (ref 32–36)
MCV RBC AUTO: 78 FL — LOW (ref 80–100)
MONOCYTES # BLD AUTO: 0.58 K/UL — SIGNIFICANT CHANGE UP (ref 0–0.9)
MONOCYTES NFR BLD AUTO: 8.9 % — SIGNIFICANT CHANGE UP (ref 2–14)
NEUTROPHILS # BLD AUTO: 3.43 K/UL — SIGNIFICANT CHANGE UP (ref 1.8–7.4)
NEUTROPHILS NFR BLD AUTO: 52.4 % — SIGNIFICANT CHANGE UP (ref 43–77)
NRBC # BLD: 0 /100 WBCS — SIGNIFICANT CHANGE UP (ref 0–0)
PLATELET # BLD AUTO: 200 K/UL — SIGNIFICANT CHANGE UP (ref 150–400)
RBC # BLD: 5.19 M/UL — SIGNIFICANT CHANGE UP (ref 4.2–5.8)
RBC # FLD: 17.8 % — HIGH (ref 10.3–14.5)
WBC # BLD: 6.54 K/UL — SIGNIFICANT CHANGE UP (ref 3.8–10.5)
WBC # FLD AUTO: 6.54 K/UL — SIGNIFICANT CHANGE UP (ref 3.8–10.5)

## 2021-09-18 ENCOUNTER — APPOINTMENT (OUTPATIENT)
Dept: INFUSION THERAPY | Facility: HOSPITAL | Age: 56
End: 2021-09-18

## 2021-09-25 ENCOUNTER — APPOINTMENT (OUTPATIENT)
Dept: DISASTER EMERGENCY | Facility: OTHER | Age: 56
End: 2021-09-25
Payer: COMMERCIAL

## 2021-09-25 PROCEDURE — 0011A: CPT

## 2021-09-30 ENCOUNTER — OUTPATIENT (OUTPATIENT)
Dept: OUTPATIENT SERVICES | Facility: HOSPITAL | Age: 56
LOS: 1 days | Discharge: HOME | End: 2021-09-30

## 2021-09-30 DIAGNOSIS — C18.9 MALIGNANT NEOPLASM OF COLON, UNSPECIFIED: ICD-10-CM

## 2021-10-05 ENCOUNTER — RESULT REVIEW (OUTPATIENT)
Age: 56
End: 2021-10-05

## 2021-10-05 ENCOUNTER — APPOINTMENT (OUTPATIENT)
Dept: INFUSION THERAPY | Facility: HOSPITAL | Age: 56
End: 2021-10-05

## 2021-10-05 ENCOUNTER — LABORATORY RESULT (OUTPATIENT)
Age: 56
End: 2021-10-05

## 2021-10-05 LAB
BASOPHILS # BLD AUTO: 0.08 K/UL — SIGNIFICANT CHANGE UP (ref 0–0.2)
BASOPHILS NFR BLD AUTO: 1 % — SIGNIFICANT CHANGE UP (ref 0–2)
EOSINOPHIL # BLD AUTO: 0.38 K/UL — SIGNIFICANT CHANGE UP (ref 0–0.5)
EOSINOPHIL NFR BLD AUTO: 4.8 % — SIGNIFICANT CHANGE UP (ref 0–6)
HCT VFR BLD CALC: 41 % — SIGNIFICANT CHANGE UP (ref 39–50)
HGB BLD-MCNC: 13.4 G/DL — SIGNIFICANT CHANGE UP (ref 13–17)
IMM GRANULOCYTES NFR BLD AUTO: 0.6 % — SIGNIFICANT CHANGE UP (ref 0–1.5)
LYMPHOCYTES # BLD AUTO: 2.82 K/UL — SIGNIFICANT CHANGE UP (ref 1–3.3)
LYMPHOCYTES # BLD AUTO: 35.6 % — SIGNIFICANT CHANGE UP (ref 13–44)
MCHC RBC-ENTMCNC: 25 PG — LOW (ref 27–34)
MCHC RBC-ENTMCNC: 32.7 G/DL — SIGNIFICANT CHANGE UP (ref 32–36)
MCV RBC AUTO: 76.6 FL — LOW (ref 80–100)
MONOCYTES # BLD AUTO: 0.78 K/UL — SIGNIFICANT CHANGE UP (ref 0–0.9)
MONOCYTES NFR BLD AUTO: 9.8 % — SIGNIFICANT CHANGE UP (ref 2–14)
NEUTROPHILS # BLD AUTO: 3.81 K/UL — SIGNIFICANT CHANGE UP (ref 1.8–7.4)
NEUTROPHILS NFR BLD AUTO: 48.2 % — SIGNIFICANT CHANGE UP (ref 43–77)
NRBC # BLD: 0 /100 WBCS — SIGNIFICANT CHANGE UP (ref 0–0)
PLATELET # BLD AUTO: 241 K/UL — SIGNIFICANT CHANGE UP (ref 150–400)
RBC # BLD: 5.35 M/UL — SIGNIFICANT CHANGE UP (ref 4.2–5.8)
RBC # FLD: 17.1 % — HIGH (ref 10.3–14.5)
WBC # BLD: 7.92 K/UL — SIGNIFICANT CHANGE UP (ref 3.8–10.5)
WBC # FLD AUTO: 7.92 K/UL — SIGNIFICANT CHANGE UP (ref 3.8–10.5)

## 2021-10-06 ENCOUNTER — NON-APPOINTMENT (OUTPATIENT)
Age: 56
End: 2021-10-06

## 2021-10-06 DIAGNOSIS — Z51.11 ENCOUNTER FOR ANTINEOPLASTIC CHEMOTHERAPY: ICD-10-CM

## 2021-10-06 DIAGNOSIS — R11.2 NAUSEA WITH VOMITING, UNSPECIFIED: ICD-10-CM

## 2021-10-07 ENCOUNTER — APPOINTMENT (OUTPATIENT)
Dept: INFUSION THERAPY | Facility: HOSPITAL | Age: 56
End: 2021-10-07

## 2021-10-18 ENCOUNTER — RESULT REVIEW (OUTPATIENT)
Age: 56
End: 2021-10-18

## 2021-10-18 ENCOUNTER — APPOINTMENT (OUTPATIENT)
Dept: INFUSION THERAPY | Facility: HOSPITAL | Age: 56
End: 2021-10-18

## 2021-10-18 ENCOUNTER — LABORATORY RESULT (OUTPATIENT)
Age: 56
End: 2021-10-18

## 2021-10-18 ENCOUNTER — APPOINTMENT (OUTPATIENT)
Dept: HEMATOLOGY ONCOLOGY | Facility: CLINIC | Age: 56
End: 2021-10-18
Payer: COMMERCIAL

## 2021-10-18 VITALS
DIASTOLIC BLOOD PRESSURE: 75 MMHG | RESPIRATION RATE: 16 BRPM | OXYGEN SATURATION: 98 % | SYSTOLIC BLOOD PRESSURE: 118 MMHG | TEMPERATURE: 97.7 F | HEART RATE: 79 BPM | BODY MASS INDEX: 20.74 KG/M2 | WEIGHT: 128.86 LBS

## 2021-10-18 LAB
BASOPHILS # BLD AUTO: 0.05 K/UL — SIGNIFICANT CHANGE UP (ref 0–0.2)
BASOPHILS NFR BLD AUTO: 0.8 % — SIGNIFICANT CHANGE UP (ref 0–2)
EOSINOPHIL # BLD AUTO: 0.33 K/UL — SIGNIFICANT CHANGE UP (ref 0–0.5)
EOSINOPHIL NFR BLD AUTO: 5.3 % — SIGNIFICANT CHANGE UP (ref 0–6)
HCT VFR BLD CALC: 38.8 % — LOW (ref 39–50)
HGB BLD-MCNC: 12.6 G/DL — LOW (ref 13–17)
IMM GRANULOCYTES NFR BLD AUTO: 0.6 % — SIGNIFICANT CHANGE UP (ref 0–1.5)
LYMPHOCYTES # BLD AUTO: 1.96 K/UL — SIGNIFICANT CHANGE UP (ref 1–3.3)
LYMPHOCYTES # BLD AUTO: 31.6 % — SIGNIFICANT CHANGE UP (ref 13–44)
MCHC RBC-ENTMCNC: 25 PG — LOW (ref 27–34)
MCHC RBC-ENTMCNC: 32.5 G/DL — SIGNIFICANT CHANGE UP (ref 32–36)
MCV RBC AUTO: 77.1 FL — LOW (ref 80–100)
MONOCYTES # BLD AUTO: 0.5 K/UL — SIGNIFICANT CHANGE UP (ref 0–0.9)
MONOCYTES NFR BLD AUTO: 8.1 % — SIGNIFICANT CHANGE UP (ref 2–14)
NEUTROPHILS # BLD AUTO: 3.33 K/UL — SIGNIFICANT CHANGE UP (ref 1.8–7.4)
NEUTROPHILS NFR BLD AUTO: 53.6 % — SIGNIFICANT CHANGE UP (ref 43–77)
NRBC # BLD: 0 /100 WBCS — SIGNIFICANT CHANGE UP (ref 0–0)
PLATELET # BLD AUTO: 170 K/UL — SIGNIFICANT CHANGE UP (ref 150–400)
RBC # BLD: 5.03 M/UL — SIGNIFICANT CHANGE UP (ref 4.2–5.8)
RBC # FLD: 16.7 % — HIGH (ref 10.3–14.5)
WBC # BLD: 6.21 K/UL — SIGNIFICANT CHANGE UP (ref 3.8–10.5)
WBC # FLD AUTO: 6.21 K/UL — SIGNIFICANT CHANGE UP (ref 3.8–10.5)

## 2021-10-18 PROCEDURE — 99213 OFFICE O/P EST LOW 20 MIN: CPT

## 2021-10-18 NOTE — HISTORY OF PRESENT ILLNESS
[Disease: _____________________] : Disease: [unfilled] [AJCC Stage: ____] : AJCC Stage: [unfilled] [de-identified] : 54 y/o M with PMH of vitiligo who developed abdominal discomfort which started 12/2020 and he was admitted on 1/4/2021 @ [facility] where he was found to have mass in distal TC with hepatic and RLL metastatic disease seen in CT scan.  He was discharged home with outpatient follow up.  He had syncope at home on 1/18/2021 and then saw PMD on 1/19/2021 where he was sent to hospital and admitted to [facility] from 1/19/2021 to 1/19/2021.   He underwent liver biopsy on 1/25/2021 which confirmed metastatic colon cancer.  His CEA was elevated 2882 from 1/21/2021.  His CT c/a/p from 1/28/2021 showed findings consistent with metastatic colon cancer evidenced by annular colonic mass and numerous hepatic metastases, groundglass pulmonary opacity consistent with infectious etiology, including Covid 19, improved compared to prior exam.  \par \par He presents to us on 2/5/2021 and states he feels stronger since leaving hospital but has lost about 22 pounds from December 2020 to Feb 2021\par Given Stage IV disease, offered FOLFOX and reviewed palliative intent of treatment \par    \par Late Feb 2021 started FOLFOX \par March 2021 admitted for LBO and underwent resection with ostomy \par \par April 2021 restarted FOLFOX --> stopped oxali July 2021  [de-identified] : pan andrea wt, transverse colon primary (right side) [de-identified] : IFTIKHAR, TMB 5, APC R232*APC *\par GRM3 V271I, MAP2K1 (MEK1) K57E\par TP53 S127P [FreeTextEntry1] : Palliative FOLFOX started 2/16/2021 -> 5-FU only started on 7/19/21 [de-identified] : stable neuropathy\par no fever or chills\par no abdominal pain \par didn't get scans done, unclear as patient went earlier than script intended

## 2021-10-18 NOTE — PHYSICAL EXAM
[Fully active, able to carry on all pre-disease performance without restriction] : Status 0 - Fully active, able to carry on all pre-disease performance without restriction [Thin] : thin [Normal] : affect appropriate [de-identified] : anicteric  [de-identified] : normal respiratory effort, no audible wheeze [de-identified] : + ostomy  [de-identified] : reg rate  [de-identified] : vitiligo

## 2021-10-20 ENCOUNTER — APPOINTMENT (OUTPATIENT)
Dept: INFUSION THERAPY | Facility: HOSPITAL | Age: 56
End: 2021-10-20

## 2021-10-23 ENCOUNTER — APPOINTMENT (OUTPATIENT)
Dept: DISASTER EMERGENCY | Facility: OTHER | Age: 56
End: 2021-10-23

## 2021-10-28 ENCOUNTER — APPOINTMENT (OUTPATIENT)
Dept: CT IMAGING | Facility: IMAGING CENTER | Age: 56
End: 2021-10-28
Payer: COMMERCIAL

## 2021-10-28 ENCOUNTER — OUTPATIENT (OUTPATIENT)
Dept: OUTPATIENT SERVICES | Facility: HOSPITAL | Age: 56
LOS: 1 days | Discharge: HOME | End: 2021-10-28

## 2021-10-28 ENCOUNTER — OUTPATIENT (OUTPATIENT)
Dept: OUTPATIENT SERVICES | Facility: HOSPITAL | Age: 56
LOS: 1 days | End: 2021-10-28
Payer: COMMERCIAL

## 2021-10-28 ENCOUNTER — RESULT REVIEW (OUTPATIENT)
Age: 56
End: 2021-10-28

## 2021-10-28 DIAGNOSIS — Z00.8 ENCOUNTER FOR OTHER GENERAL EXAMINATION: ICD-10-CM

## 2021-10-28 DIAGNOSIS — C18.9 MALIGNANT NEOPLASM OF COLON, UNSPECIFIED: ICD-10-CM

## 2021-10-28 PROCEDURE — 71260 CT THORAX DX C+: CPT

## 2021-10-28 PROCEDURE — 74177 CT ABD & PELVIS W/CONTRAST: CPT

## 2021-10-28 PROCEDURE — 71260 CT THORAX DX C+: CPT | Mod: 26

## 2021-10-28 PROCEDURE — 74177 CT ABD & PELVIS W/CONTRAST: CPT | Mod: 26

## 2021-11-01 ENCOUNTER — RESULT REVIEW (OUTPATIENT)
Age: 56
End: 2021-11-01

## 2021-11-01 ENCOUNTER — APPOINTMENT (OUTPATIENT)
Dept: HEMATOLOGY ONCOLOGY | Facility: CLINIC | Age: 56
End: 2021-11-01
Payer: COMMERCIAL

## 2021-11-01 ENCOUNTER — LABORATORY RESULT (OUTPATIENT)
Age: 56
End: 2021-11-01

## 2021-11-01 ENCOUNTER — APPOINTMENT (OUTPATIENT)
Dept: INFUSION THERAPY | Facility: HOSPITAL | Age: 56
End: 2021-11-01

## 2021-11-01 VITALS
BODY MASS INDEX: 20.52 KG/M2 | DIASTOLIC BLOOD PRESSURE: 78 MMHG | TEMPERATURE: 98.1 F | WEIGHT: 127.49 LBS | SYSTOLIC BLOOD PRESSURE: 116 MMHG | HEART RATE: 70 BPM | RESPIRATION RATE: 16 BRPM | OXYGEN SATURATION: 99 %

## 2021-11-01 LAB
BASOPHILS # BLD AUTO: 0.05 K/UL — SIGNIFICANT CHANGE UP (ref 0–0.2)
BASOPHILS NFR BLD AUTO: 0.9 % — SIGNIFICANT CHANGE UP (ref 0–2)
EOSINOPHIL # BLD AUTO: 0.19 K/UL — SIGNIFICANT CHANGE UP (ref 0–0.5)
EOSINOPHIL NFR BLD AUTO: 3.4 % — SIGNIFICANT CHANGE UP (ref 0–6)
HCT VFR BLD CALC: 38 % — LOW (ref 39–50)
HGB BLD-MCNC: 12.3 G/DL — LOW (ref 13–17)
IMM GRANULOCYTES NFR BLD AUTO: 0.7 % — SIGNIFICANT CHANGE UP (ref 0–1.5)
LYMPHOCYTES # BLD AUTO: 2.11 K/UL — SIGNIFICANT CHANGE UP (ref 1–3.3)
LYMPHOCYTES # BLD AUTO: 37.2 % — SIGNIFICANT CHANGE UP (ref 13–44)
MCHC RBC-ENTMCNC: 24.9 PG — LOW (ref 27–34)
MCHC RBC-ENTMCNC: 32.4 G/DL — SIGNIFICANT CHANGE UP (ref 32–36)
MCV RBC AUTO: 76.9 FL — LOW (ref 80–100)
MONOCYTES # BLD AUTO: 0.63 K/UL — SIGNIFICANT CHANGE UP (ref 0–0.9)
MONOCYTES NFR BLD AUTO: 11.1 % — SIGNIFICANT CHANGE UP (ref 2–14)
NEUTROPHILS # BLD AUTO: 2.65 K/UL — SIGNIFICANT CHANGE UP (ref 1.8–7.4)
NEUTROPHILS NFR BLD AUTO: 46.7 % — SIGNIFICANT CHANGE UP (ref 43–77)
NRBC # BLD: 0 /100 WBCS — SIGNIFICANT CHANGE UP (ref 0–0)
PLATELET # BLD AUTO: 181 K/UL — SIGNIFICANT CHANGE UP (ref 150–400)
RBC # BLD: 4.94 M/UL — SIGNIFICANT CHANGE UP (ref 4.2–5.8)
RBC # FLD: 17 % — HIGH (ref 10.3–14.5)
WBC # BLD: 5.67 K/UL — SIGNIFICANT CHANGE UP (ref 3.8–10.5)
WBC # FLD AUTO: 5.67 K/UL — SIGNIFICANT CHANGE UP (ref 3.8–10.5)

## 2021-11-01 PROCEDURE — 99214 OFFICE O/P EST MOD 30 MIN: CPT

## 2021-11-01 NOTE — HISTORY OF PRESENT ILLNESS
[Disease: _____________________] : Disease: [unfilled] [AJCC Stage: ____] : AJCC Stage: [unfilled] [de-identified] : 54 y/o M with PMH of vitiligo who developed abdominal discomfort which started 12/2020 and he was admitted on 1/4/2021 @ [facility] where he was found to have mass in distal TC with hepatic and RLL metastatic disease seen in CT scan.  He was discharged home with outpatient follow up.  He had syncope at home on 1/18/2021 and then saw PMD on 1/19/2021 where he was sent to hospital and admitted to [facility] from 1/19/2021 to 1/19/2021.   He underwent liver biopsy on 1/25/2021 which confirmed metastatic colon cancer.  His CEA was elevated 2882 from 1/21/2021.  His CT c/a/p from 1/28/2021 showed findings consistent with metastatic colon cancer evidenced by annular colonic mass and numerous hepatic metastases, groundglass pulmonary opacity consistent with infectious etiology, including Covid 19, improved compared to prior exam.  \par \par He presents to us on 2/5/2021 and states he feels stronger since leaving hospital but has lost about 22 pounds from December 2020 to Feb 2021\par Given Stage IV disease, offered FOLFOX and reviewed palliative intent of treatment \par    \par Late Feb 2021 started FOLFOX \par March 2021 admitted for LBO and underwent resection with ostomy \par \par April 2021 restarted FOLFOX --> stopped oxali July 2021  [de-identified] : pan andrea wt, transverse colon primary (right side) [de-identified] : IFTIKHAR, TMB 5, APC R232*APC *\par GRM3 V271I, MAP2K1 (MEK1) K57E\par TP53 S127P [FreeTextEntry1] : Palliative FOLFOX started 2/16/2021 -> 5-FU only started on 7/19/21 [de-identified] : Patient presents today for follow up and states that overall he feels well.  He admits to persistent neuropathy in his fingers and toes but not worsening and not affecting his ADL's.  Denies HA, dizziness, CP, SOB, N/V,anorexia, abdominal pain, diarrhea, constipation, rectal bleeding.

## 2021-11-01 NOTE — REVIEW OF SYSTEMS
[Negative] : Allergic/Immunologic [Fever] : no fever [de-identified] : grade 1 neuropathy in fingers and toes

## 2021-11-01 NOTE — PHYSICAL EXAM
[Fully active, able to carry on all pre-disease performance without restriction] : Status 0 - Fully active, able to carry on all pre-disease performance without restriction [Thin] : thin [Normal] : no JVD, no calf tenderness, venous stasis changes, varices [de-identified] : anicteric  [de-identified] : normal respiratory effort, no audible wheeze [de-identified] : reg rate  [de-identified] : + ostomy, non-tender [de-identified] : vitiligo [de-identified] : grade 1 neuropathy in fingers and toes

## 2021-11-02 ENCOUNTER — NON-APPOINTMENT (OUTPATIENT)
Age: 56
End: 2021-11-02

## 2021-11-02 DIAGNOSIS — R11.2 NAUSEA WITH VOMITING, UNSPECIFIED: ICD-10-CM

## 2021-11-02 DIAGNOSIS — Z51.11 ENCOUNTER FOR ANTINEOPLASTIC CHEMOTHERAPY: ICD-10-CM

## 2021-11-03 ENCOUNTER — APPOINTMENT (OUTPATIENT)
Dept: INFUSION THERAPY | Facility: HOSPITAL | Age: 56
End: 2021-11-03

## 2021-11-06 ENCOUNTER — APPOINTMENT (OUTPATIENT)
Dept: DISASTER EMERGENCY | Facility: OTHER | Age: 56
End: 2021-11-06
Payer: COMMERCIAL

## 2021-11-06 PROCEDURE — 0012A: CPT

## 2021-11-15 ENCOUNTER — LABORATORY RESULT (OUTPATIENT)
Age: 56
End: 2021-11-15

## 2021-11-15 ENCOUNTER — RESULT REVIEW (OUTPATIENT)
Age: 56
End: 2021-11-15

## 2021-11-15 ENCOUNTER — APPOINTMENT (OUTPATIENT)
Dept: INFUSION THERAPY | Facility: HOSPITAL | Age: 56
End: 2021-11-15

## 2021-11-15 LAB
BASOPHILS # BLD AUTO: 0.05 K/UL — SIGNIFICANT CHANGE UP (ref 0–0.2)
BASOPHILS NFR BLD AUTO: 0.7 % — SIGNIFICANT CHANGE UP (ref 0–2)
EOSINOPHIL # BLD AUTO: 0.15 K/UL — SIGNIFICANT CHANGE UP (ref 0–0.5)
EOSINOPHIL NFR BLD AUTO: 2.2 % — SIGNIFICANT CHANGE UP (ref 0–6)
HCT VFR BLD CALC: 39.4 % — SIGNIFICANT CHANGE UP (ref 39–50)
HGB BLD-MCNC: 12.9 G/DL — LOW (ref 13–17)
IMM GRANULOCYTES NFR BLD AUTO: 0.4 % — SIGNIFICANT CHANGE UP (ref 0–1.5)
LYMPHOCYTES # BLD AUTO: 2.32 K/UL — SIGNIFICANT CHANGE UP (ref 1–3.3)
LYMPHOCYTES # BLD AUTO: 33.9 % — SIGNIFICANT CHANGE UP (ref 13–44)
MCHC RBC-ENTMCNC: 25.2 PG — LOW (ref 27–34)
MCHC RBC-ENTMCNC: 32.7 G/DL — SIGNIFICANT CHANGE UP (ref 32–36)
MCV RBC AUTO: 77 FL — LOW (ref 80–100)
MONOCYTES # BLD AUTO: 0.65 K/UL — SIGNIFICANT CHANGE UP (ref 0–0.9)
MONOCYTES NFR BLD AUTO: 9.5 % — SIGNIFICANT CHANGE UP (ref 2–14)
NEUTROPHILS # BLD AUTO: 3.64 K/UL — SIGNIFICANT CHANGE UP (ref 1.8–7.4)
NEUTROPHILS NFR BLD AUTO: 53.3 % — SIGNIFICANT CHANGE UP (ref 43–77)
NRBC # BLD: 0 /100 WBCS — SIGNIFICANT CHANGE UP (ref 0–0)
PLATELET # BLD AUTO: 193 K/UL — SIGNIFICANT CHANGE UP (ref 150–400)
RBC # BLD: 5.12 M/UL — SIGNIFICANT CHANGE UP (ref 4.2–5.8)
RBC # FLD: 17.2 % — HIGH (ref 10.3–14.5)
WBC # BLD: 6.84 K/UL — SIGNIFICANT CHANGE UP (ref 3.8–10.5)
WBC # FLD AUTO: 6.84 K/UL — SIGNIFICANT CHANGE UP (ref 3.8–10.5)

## 2021-11-16 ENCOUNTER — NON-APPOINTMENT (OUTPATIENT)
Age: 56
End: 2021-11-16

## 2021-11-17 ENCOUNTER — APPOINTMENT (OUTPATIENT)
Dept: INFUSION THERAPY | Facility: HOSPITAL | Age: 56
End: 2021-11-17

## 2021-11-24 ENCOUNTER — OUTPATIENT (OUTPATIENT)
Dept: OUTPATIENT SERVICES | Facility: HOSPITAL | Age: 56
LOS: 1 days | Discharge: HOME | End: 2021-11-24

## 2021-11-24 DIAGNOSIS — C18.9 MALIGNANT NEOPLASM OF COLON, UNSPECIFIED: ICD-10-CM

## 2021-11-29 ENCOUNTER — RESULT REVIEW (OUTPATIENT)
Age: 56
End: 2021-11-29

## 2021-11-29 ENCOUNTER — LABORATORY RESULT (OUTPATIENT)
Age: 56
End: 2021-11-29

## 2021-11-29 ENCOUNTER — APPOINTMENT (OUTPATIENT)
Dept: INFUSION THERAPY | Facility: HOSPITAL | Age: 56
End: 2021-11-29

## 2021-11-29 LAB
BASOPHILS # BLD AUTO: 0.06 K/UL — SIGNIFICANT CHANGE UP (ref 0–0.2)
BASOPHILS NFR BLD AUTO: 0.8 % — SIGNIFICANT CHANGE UP (ref 0–2)
EOSINOPHIL # BLD AUTO: 0.17 K/UL — SIGNIFICANT CHANGE UP (ref 0–0.5)
EOSINOPHIL NFR BLD AUTO: 2.3 % — SIGNIFICANT CHANGE UP (ref 0–6)
HCT VFR BLD CALC: 40.6 % — SIGNIFICANT CHANGE UP (ref 39–50)
HGB BLD-MCNC: 13.3 G/DL — SIGNIFICANT CHANGE UP (ref 13–17)
IMM GRANULOCYTES NFR BLD AUTO: 0.4 % — SIGNIFICANT CHANGE UP (ref 0–1.5)
LYMPHOCYTES # BLD AUTO: 2.39 K/UL — SIGNIFICANT CHANGE UP (ref 1–3.3)
LYMPHOCYTES # BLD AUTO: 31.7 % — SIGNIFICANT CHANGE UP (ref 13–44)
MCHC RBC-ENTMCNC: 25.2 PG — LOW (ref 27–34)
MCHC RBC-ENTMCNC: 32.8 G/DL — SIGNIFICANT CHANGE UP (ref 32–36)
MCV RBC AUTO: 77 FL — LOW (ref 80–100)
MONOCYTES # BLD AUTO: 0.65 K/UL — SIGNIFICANT CHANGE UP (ref 0–0.9)
MONOCYTES NFR BLD AUTO: 8.6 % — SIGNIFICANT CHANGE UP (ref 2–14)
NEUTROPHILS # BLD AUTO: 4.23 K/UL — SIGNIFICANT CHANGE UP (ref 1.8–7.4)
NEUTROPHILS NFR BLD AUTO: 56.2 % — SIGNIFICANT CHANGE UP (ref 43–77)
NRBC # BLD: 0 /100 WBCS — SIGNIFICANT CHANGE UP (ref 0–0)
PLATELET # BLD AUTO: 195 K/UL — SIGNIFICANT CHANGE UP (ref 150–400)
RBC # BLD: 5.27 M/UL — SIGNIFICANT CHANGE UP (ref 4.2–5.8)
RBC # FLD: 17.9 % — HIGH (ref 10.3–14.5)
WBC # BLD: 7.53 K/UL — SIGNIFICANT CHANGE UP (ref 3.8–10.5)
WBC # FLD AUTO: 7.53 K/UL — SIGNIFICANT CHANGE UP (ref 3.8–10.5)

## 2021-11-30 DIAGNOSIS — Z51.11 ENCOUNTER FOR ANTINEOPLASTIC CHEMOTHERAPY: ICD-10-CM

## 2021-11-30 DIAGNOSIS — R11.2 NAUSEA WITH VOMITING, UNSPECIFIED: ICD-10-CM

## 2021-12-01 ENCOUNTER — APPOINTMENT (OUTPATIENT)
Dept: INFUSION THERAPY | Facility: HOSPITAL | Age: 56
End: 2021-12-01

## 2021-12-06 ENCOUNTER — APPOINTMENT (OUTPATIENT)
Dept: HEMATOLOGY ONCOLOGY | Facility: CLINIC | Age: 56
End: 2021-12-06
Payer: COMMERCIAL

## 2021-12-06 VITALS
HEART RATE: 70 BPM | WEIGHT: 126.77 LBS | BODY MASS INDEX: 20.4 KG/M2 | RESPIRATION RATE: 16 BRPM | TEMPERATURE: 98 F | OXYGEN SATURATION: 100 % | SYSTOLIC BLOOD PRESSURE: 120 MMHG | DIASTOLIC BLOOD PRESSURE: 80 MMHG

## 2021-12-06 DIAGNOSIS — Z87.19 PERSONAL HISTORY OF OTHER DISEASES OF THE DIGESTIVE SYSTEM: ICD-10-CM

## 2021-12-06 DIAGNOSIS — G89.3 NEOPLASM RELATED PAIN (ACUTE) (CHRONIC): ICD-10-CM

## 2021-12-06 PROCEDURE — 99214 OFFICE O/P EST MOD 30 MIN: CPT

## 2021-12-06 RX ORDER — FERROUS SULFATE TAB EC 324 MG (65 MG FE EQUIVALENT) 324 (65 FE) MG
324 (65 FE) TABLET DELAYED RESPONSE ORAL
Qty: 60 | Refills: 1 | Status: DISCONTINUED | COMMUNITY
Start: 2021-02-05 | End: 2021-12-06

## 2021-12-06 NOTE — REVIEW OF SYSTEMS
[Negative] : Allergic/Immunologic [Fever] : no fever [de-identified] : grade 1 neuropathy in fingers and toes

## 2021-12-06 NOTE — PHYSICAL EXAM
[Fully active, able to carry on all pre-disease performance without restriction] : Status 0 - Fully active, able to carry on all pre-disease performance without restriction [Thin] : thin [Normal] : affect appropriate [de-identified] : anicteric  [de-identified] : normal respiratory effort, no audible wheeze [de-identified] : reg rate  [de-identified] : + ostomy, non-tender [de-identified] : vitiligo [de-identified] : grade 1 neuropathy in fingers and toes

## 2021-12-06 NOTE — HISTORY OF PRESENT ILLNESS
[Disease: _____________________] : Disease: [unfilled] [AJCC Stage: ____] : AJCC Stage: [unfilled] [de-identified] : 56 y/o M with PMH of vitiligo who developed abdominal discomfort which started 12/2020 and he was admitted on 1/4/2021 @ [facility] where he was found to have mass in distal TC with hepatic and RLL metastatic disease seen in CT scan.  He was discharged home with outpatient follow up.  He had syncope at home on 1/18/2021 and then saw PMD on 1/19/2021 where he was sent to hospital and admitted to [facility] from 1/19/2021 to 1/19/2021.   He underwent liver biopsy on 1/25/2021 which confirmed metastatic colon cancer.  His CEA was elevated 2882 from 1/21/2021.  His CT c/a/p from 1/28/2021 showed findings consistent with metastatic colon cancer evidenced by annular colonic mass and numerous hepatic metastases, groundglass pulmonary opacity consistent with infectious etiology, including Covid 19, improved compared to prior exam.  \par \par He presents to us on 2/5/2021 and states he feels stronger since leaving hospital but has lost about 22 pounds from December 2020 to Feb 2021\par Given Stage IV disease, offered FOLFOX and reviewed palliative intent of treatment \par    \par Late Feb 2021 started FOLFOX \par March 2021 admitted for LBO and underwent resection with ostomy \par \par April 2021 restarted FOLFOX --> stopped oxali July 2021  [de-identified] : pan andrea wt, transverse colon primary (right side) [de-identified] : IFTIKHAR, TMB 5, APC R232*APC *\par GRM3 V271I, MAP2K1 (MEK1) K57E\par TP53 S127P [FreeTextEntry1] : Palliative FOLFOX started 2/16/2021 -> 5-FU only started on 7/19/21 [de-identified] : stable neuropathy\par no fever or chills\par no abdominal pain \par

## 2021-12-13 ENCOUNTER — RESULT REVIEW (OUTPATIENT)
Age: 56
End: 2021-12-13

## 2021-12-13 ENCOUNTER — APPOINTMENT (OUTPATIENT)
Dept: INFUSION THERAPY | Facility: HOSPITAL | Age: 56
End: 2021-12-13

## 2021-12-13 LAB
ALBUMIN SERPL ELPH-MCNC: 4 G/DL — SIGNIFICANT CHANGE UP (ref 3.3–5)
ALP SERPL-CCNC: 113 U/L — SIGNIFICANT CHANGE UP (ref 40–120)
ALT FLD-CCNC: 28 U/L — SIGNIFICANT CHANGE UP (ref 10–45)
ANION GAP SERPL CALC-SCNC: 13 MMOL/L — SIGNIFICANT CHANGE UP (ref 5–17)
AST SERPL-CCNC: 29 U/L — SIGNIFICANT CHANGE UP (ref 10–40)
BASOPHILS # BLD AUTO: 0.04 K/UL — SIGNIFICANT CHANGE UP (ref 0–0.2)
BASOPHILS NFR BLD AUTO: 0.6 % — SIGNIFICANT CHANGE UP (ref 0–2)
BILIRUB SERPL-MCNC: 0.5 MG/DL — SIGNIFICANT CHANGE UP (ref 0.2–1.2)
BUN SERPL-MCNC: 15 MG/DL — SIGNIFICANT CHANGE UP (ref 7–23)
CALCIUM SERPL-MCNC: 9.2 MG/DL — SIGNIFICANT CHANGE UP (ref 8.4–10.5)
CHLORIDE SERPL-SCNC: 103 MMOL/L — SIGNIFICANT CHANGE UP (ref 96–108)
CO2 SERPL-SCNC: 23 MMOL/L — SIGNIFICANT CHANGE UP (ref 22–31)
CREAT SERPL-MCNC: 0.91 MG/DL — SIGNIFICANT CHANGE UP (ref 0.5–1.3)
EOSINOPHIL # BLD AUTO: 0.14 K/UL — SIGNIFICANT CHANGE UP (ref 0–0.5)
EOSINOPHIL NFR BLD AUTO: 2 % — SIGNIFICANT CHANGE UP (ref 0–6)
GLUCOSE SERPL-MCNC: 106 MG/DL — HIGH (ref 70–99)
HCT VFR BLD CALC: 38.6 % — LOW (ref 39–50)
HGB BLD-MCNC: 12.6 G/DL — LOW (ref 13–17)
IMM GRANULOCYTES NFR BLD AUTO: 0.4 % — SIGNIFICANT CHANGE UP (ref 0–1.5)
LYMPHOCYTES # BLD AUTO: 1.98 K/UL — SIGNIFICANT CHANGE UP (ref 1–3.3)
LYMPHOCYTES # BLD AUTO: 28.6 % — SIGNIFICANT CHANGE UP (ref 13–44)
MCHC RBC-ENTMCNC: 25.3 PG — LOW (ref 27–34)
MCHC RBC-ENTMCNC: 32.6 G/DL — SIGNIFICANT CHANGE UP (ref 32–36)
MCV RBC AUTO: 77.4 FL — LOW (ref 80–100)
MONOCYTES # BLD AUTO: 0.47 K/UL — SIGNIFICANT CHANGE UP (ref 0–0.9)
MONOCYTES NFR BLD AUTO: 6.8 % — SIGNIFICANT CHANGE UP (ref 2–14)
NEUTROPHILS # BLD AUTO: 4.27 K/UL — SIGNIFICANT CHANGE UP (ref 1.8–7.4)
NEUTROPHILS NFR BLD AUTO: 61.6 % — SIGNIFICANT CHANGE UP (ref 43–77)
NRBC # BLD: 0 /100 WBCS — SIGNIFICANT CHANGE UP (ref 0–0)
PLATELET # BLD AUTO: 194 K/UL — SIGNIFICANT CHANGE UP (ref 150–400)
POTASSIUM SERPL-MCNC: 3.6 MMOL/L — SIGNIFICANT CHANGE UP (ref 3.5–5.3)
POTASSIUM SERPL-SCNC: 3.6 MMOL/L — SIGNIFICANT CHANGE UP (ref 3.5–5.3)
PROT SERPL-MCNC: 7.1 G/DL — SIGNIFICANT CHANGE UP (ref 6–8.3)
RBC # BLD: 4.99 M/UL — SIGNIFICANT CHANGE UP (ref 4.2–5.8)
RBC # FLD: 17.7 % — HIGH (ref 10.3–14.5)
SODIUM SERPL-SCNC: 138 MMOL/L — SIGNIFICANT CHANGE UP (ref 135–145)
WBC # BLD: 6.93 K/UL — SIGNIFICANT CHANGE UP (ref 3.8–10.5)
WBC # FLD AUTO: 6.93 K/UL — SIGNIFICANT CHANGE UP (ref 3.8–10.5)

## 2021-12-15 ENCOUNTER — APPOINTMENT (OUTPATIENT)
Dept: INFUSION THERAPY | Facility: HOSPITAL | Age: 56
End: 2021-12-15

## 2021-12-27 ENCOUNTER — APPOINTMENT (OUTPATIENT)
Dept: INFUSION THERAPY | Facility: HOSPITAL | Age: 56
End: 2021-12-27

## 2021-12-28 ENCOUNTER — OUTPATIENT (OUTPATIENT)
Dept: OUTPATIENT SERVICES | Facility: HOSPITAL | Age: 56
LOS: 1 days | Discharge: HOME | End: 2021-12-28

## 2021-12-28 DIAGNOSIS — C18.9 MALIGNANT NEOPLASM OF COLON, UNSPECIFIED: ICD-10-CM

## 2022-01-03 ENCOUNTER — RESULT REVIEW (OUTPATIENT)
Age: 57
End: 2022-01-03

## 2022-01-03 ENCOUNTER — APPOINTMENT (OUTPATIENT)
Dept: INFUSION THERAPY | Facility: HOSPITAL | Age: 57
End: 2022-01-03

## 2022-01-03 DIAGNOSIS — R11.2 NAUSEA WITH VOMITING, UNSPECIFIED: ICD-10-CM

## 2022-01-03 DIAGNOSIS — Z51.11 ENCOUNTER FOR ANTINEOPLASTIC CHEMOTHERAPY: ICD-10-CM

## 2022-01-03 LAB
ALBUMIN SERPL ELPH-MCNC: 4 G/DL — SIGNIFICANT CHANGE UP (ref 3.3–5)
ALP SERPL-CCNC: 116 U/L — SIGNIFICANT CHANGE UP (ref 40–120)
ALT FLD-CCNC: 34 U/L — SIGNIFICANT CHANGE UP (ref 10–45)
ANION GAP SERPL CALC-SCNC: 13 MMOL/L — SIGNIFICANT CHANGE UP (ref 5–17)
AST SERPL-CCNC: 33 U/L — SIGNIFICANT CHANGE UP (ref 10–40)
BASOPHILS # BLD AUTO: 0.06 K/UL — SIGNIFICANT CHANGE UP (ref 0–0.2)
BASOPHILS NFR BLD AUTO: 0.7 % — SIGNIFICANT CHANGE UP (ref 0–2)
BILIRUB SERPL-MCNC: 0.5 MG/DL — SIGNIFICANT CHANGE UP (ref 0.2–1.2)
BUN SERPL-MCNC: 15 MG/DL — SIGNIFICANT CHANGE UP (ref 7–23)
CALCIUM SERPL-MCNC: 8.9 MG/DL — SIGNIFICANT CHANGE UP (ref 8.4–10.5)
CEA SERPL-MCNC: 58.7 NG/ML — HIGH (ref 0–3.8)
CHLORIDE SERPL-SCNC: 105 MMOL/L — SIGNIFICANT CHANGE UP (ref 96–108)
CO2 SERPL-SCNC: 22 MMOL/L — SIGNIFICANT CHANGE UP (ref 22–31)
CREAT SERPL-MCNC: 0.96 MG/DL — SIGNIFICANT CHANGE UP (ref 0.5–1.3)
EOSINOPHIL # BLD AUTO: 0.3 K/UL — SIGNIFICANT CHANGE UP (ref 0–0.5)
EOSINOPHIL NFR BLD AUTO: 3.5 % — SIGNIFICANT CHANGE UP (ref 0–6)
GLUCOSE SERPL-MCNC: 120 MG/DL — HIGH (ref 70–99)
HCT VFR BLD CALC: 39.4 % — SIGNIFICANT CHANGE UP (ref 39–50)
HGB BLD-MCNC: 12.9 G/DL — LOW (ref 13–17)
IMM GRANULOCYTES NFR BLD AUTO: 0.2 % — SIGNIFICANT CHANGE UP (ref 0–1.5)
LYMPHOCYTES # BLD AUTO: 2.31 K/UL — SIGNIFICANT CHANGE UP (ref 1–3.3)
LYMPHOCYTES # BLD AUTO: 27.3 % — SIGNIFICANT CHANGE UP (ref 13–44)
MCHC RBC-ENTMCNC: 25.4 PG — LOW (ref 27–34)
MCHC RBC-ENTMCNC: 32.7 G/DL — SIGNIFICANT CHANGE UP (ref 32–36)
MCV RBC AUTO: 77.7 FL — LOW (ref 80–100)
MONOCYTES # BLD AUTO: 0.55 K/UL — SIGNIFICANT CHANGE UP (ref 0–0.9)
MONOCYTES NFR BLD AUTO: 6.5 % — SIGNIFICANT CHANGE UP (ref 2–14)
NEUTROPHILS # BLD AUTO: 5.22 K/UL — SIGNIFICANT CHANGE UP (ref 1.8–7.4)
NEUTROPHILS NFR BLD AUTO: 61.8 % — SIGNIFICANT CHANGE UP (ref 43–77)
NRBC # BLD: 0 /100 WBCS — SIGNIFICANT CHANGE UP (ref 0–0)
PLATELET # BLD AUTO: 230 K/UL — SIGNIFICANT CHANGE UP (ref 150–400)
POTASSIUM SERPL-MCNC: 3.9 MMOL/L — SIGNIFICANT CHANGE UP (ref 3.5–5.3)
POTASSIUM SERPL-SCNC: 3.9 MMOL/L — SIGNIFICANT CHANGE UP (ref 3.5–5.3)
PROT SERPL-MCNC: 7 G/DL — SIGNIFICANT CHANGE UP (ref 6–8.3)
RBC # BLD: 5.07 M/UL — SIGNIFICANT CHANGE UP (ref 4.2–5.8)
RBC # FLD: 17.2 % — HIGH (ref 10.3–14.5)
SODIUM SERPL-SCNC: 141 MMOL/L — SIGNIFICANT CHANGE UP (ref 135–145)
WBC # BLD: 8.46 K/UL — SIGNIFICANT CHANGE UP (ref 3.8–10.5)
WBC # FLD AUTO: 8.46 K/UL — SIGNIFICANT CHANGE UP (ref 3.8–10.5)

## 2022-01-05 ENCOUNTER — APPOINTMENT (OUTPATIENT)
Dept: INFUSION THERAPY | Facility: HOSPITAL | Age: 57
End: 2022-01-05

## 2022-01-10 ENCOUNTER — APPOINTMENT (OUTPATIENT)
Dept: INFUSION THERAPY | Facility: HOSPITAL | Age: 57
End: 2022-01-10

## 2022-01-12 ENCOUNTER — APPOINTMENT (OUTPATIENT)
Dept: HEMATOLOGY ONCOLOGY | Facility: CLINIC | Age: 57
End: 2022-01-12

## 2022-01-17 ENCOUNTER — APPOINTMENT (OUTPATIENT)
Dept: CT IMAGING | Facility: IMAGING CENTER | Age: 57
End: 2022-01-17
Payer: COMMERCIAL

## 2022-01-17 ENCOUNTER — OUTPATIENT (OUTPATIENT)
Dept: OUTPATIENT SERVICES | Facility: HOSPITAL | Age: 57
LOS: 1 days | End: 2022-01-17
Payer: COMMERCIAL

## 2022-01-17 DIAGNOSIS — C18.9 MALIGNANT NEOPLASM OF COLON, UNSPECIFIED: ICD-10-CM

## 2022-01-17 PROCEDURE — 74177 CT ABD & PELVIS W/CONTRAST: CPT

## 2022-01-17 PROCEDURE — 71260 CT THORAX DX C+: CPT

## 2022-01-17 PROCEDURE — 71260 CT THORAX DX C+: CPT | Mod: 26

## 2022-01-17 PROCEDURE — 74177 CT ABD & PELVIS W/CONTRAST: CPT | Mod: 26

## 2022-01-18 ENCOUNTER — RESULT REVIEW (OUTPATIENT)
Age: 57
End: 2022-01-18

## 2022-01-18 ENCOUNTER — APPOINTMENT (OUTPATIENT)
Dept: INFUSION THERAPY | Facility: HOSPITAL | Age: 57
End: 2022-01-18

## 2022-01-18 LAB
ALBUMIN SERPL ELPH-MCNC: 3.8 G/DL — SIGNIFICANT CHANGE UP (ref 3.3–5)
ALP SERPL-CCNC: 117 U/L — SIGNIFICANT CHANGE UP (ref 40–120)
ALT FLD-CCNC: 35 U/L — SIGNIFICANT CHANGE UP (ref 10–45)
ANION GAP SERPL CALC-SCNC: 11 MMOL/L — SIGNIFICANT CHANGE UP (ref 5–17)
AST SERPL-CCNC: 38 U/L — SIGNIFICANT CHANGE UP (ref 10–40)
BASOPHILS # BLD AUTO: 0.05 K/UL — SIGNIFICANT CHANGE UP (ref 0–0.2)
BASOPHILS NFR BLD AUTO: 0.7 % — SIGNIFICANT CHANGE UP (ref 0–2)
BILIRUB SERPL-MCNC: 0.4 MG/DL — SIGNIFICANT CHANGE UP (ref 0.2–1.2)
BUN SERPL-MCNC: 15 MG/DL — SIGNIFICANT CHANGE UP (ref 7–23)
CALCIUM SERPL-MCNC: 9 MG/DL — SIGNIFICANT CHANGE UP (ref 8.4–10.5)
CHLORIDE SERPL-SCNC: 108 MMOL/L — SIGNIFICANT CHANGE UP (ref 96–108)
CO2 SERPL-SCNC: 22 MMOL/L — SIGNIFICANT CHANGE UP (ref 22–31)
CREAT SERPL-MCNC: 0.94 MG/DL — SIGNIFICANT CHANGE UP (ref 0.5–1.3)
EOSINOPHIL # BLD AUTO: 0.28 K/UL — SIGNIFICANT CHANGE UP (ref 0–0.5)
EOSINOPHIL NFR BLD AUTO: 3.7 % — SIGNIFICANT CHANGE UP (ref 0–6)
GLUCOSE SERPL-MCNC: 109 MG/DL — HIGH (ref 70–99)
HCT VFR BLD CALC: 40.6 % — SIGNIFICANT CHANGE UP (ref 39–50)
HGB BLD-MCNC: 13 G/DL — SIGNIFICANT CHANGE UP (ref 13–17)
IMM GRANULOCYTES NFR BLD AUTO: 0.4 % — SIGNIFICANT CHANGE UP (ref 0–1.5)
LYMPHOCYTES # BLD AUTO: 1.85 K/UL — SIGNIFICANT CHANGE UP (ref 1–3.3)
LYMPHOCYTES # BLD AUTO: 24.2 % — SIGNIFICANT CHANGE UP (ref 13–44)
MCHC RBC-ENTMCNC: 25 PG — LOW (ref 27–34)
MCHC RBC-ENTMCNC: 32 G/DL — SIGNIFICANT CHANGE UP (ref 32–36)
MCV RBC AUTO: 78.2 FL — LOW (ref 80–100)
MONOCYTES # BLD AUTO: 0.39 K/UL — SIGNIFICANT CHANGE UP (ref 0–0.9)
MONOCYTES NFR BLD AUTO: 5.1 % — SIGNIFICANT CHANGE UP (ref 2–14)
NEUTROPHILS # BLD AUTO: 5.03 K/UL — SIGNIFICANT CHANGE UP (ref 1.8–7.4)
NEUTROPHILS NFR BLD AUTO: 65.9 % — SIGNIFICANT CHANGE UP (ref 43–77)
NRBC # BLD: 0 /100 WBCS — SIGNIFICANT CHANGE UP (ref 0–0)
PLATELET # BLD AUTO: 220 K/UL — SIGNIFICANT CHANGE UP (ref 150–400)
POTASSIUM SERPL-MCNC: 3.8 MMOL/L — SIGNIFICANT CHANGE UP (ref 3.5–5.3)
POTASSIUM SERPL-SCNC: 3.8 MMOL/L — SIGNIFICANT CHANGE UP (ref 3.5–5.3)
PROT SERPL-MCNC: 6.6 G/DL — SIGNIFICANT CHANGE UP (ref 6–8.3)
RBC # BLD: 5.19 M/UL — SIGNIFICANT CHANGE UP (ref 4.2–5.8)
RBC # FLD: 17 % — HIGH (ref 10.3–14.5)
SODIUM SERPL-SCNC: 141 MMOL/L — SIGNIFICANT CHANGE UP (ref 135–145)
WBC # BLD: 7.63 K/UL — SIGNIFICANT CHANGE UP (ref 3.8–10.5)
WBC # FLD AUTO: 7.63 K/UL — SIGNIFICANT CHANGE UP (ref 3.8–10.5)

## 2022-01-18 RX ORDER — FERROUS SULFATE 325(65) MG
1 TABLET ORAL
Qty: 0 | Refills: 0 | DISCHARGE

## 2022-01-19 ENCOUNTER — APPOINTMENT (OUTPATIENT)
Dept: HEMATOLOGY ONCOLOGY | Facility: CLINIC | Age: 57
End: 2022-01-19
Payer: COMMERCIAL

## 2022-01-19 ENCOUNTER — NON-APPOINTMENT (OUTPATIENT)
Age: 57
End: 2022-01-19

## 2022-01-19 VITALS
RESPIRATION RATE: 16 BRPM | BODY MASS INDEX: 21.64 KG/M2 | TEMPERATURE: 97 F | DIASTOLIC BLOOD PRESSURE: 80 MMHG | WEIGHT: 134.48 LBS | OXYGEN SATURATION: 98 % | SYSTOLIC BLOOD PRESSURE: 117 MMHG | HEART RATE: 82 BPM

## 2022-01-19 PROCEDURE — 99214 OFFICE O/P EST MOD 30 MIN: CPT

## 2022-01-19 NOTE — REVIEW OF SYSTEMS
[Negative] : Psychiatric [Dizziness] : no dizziness [Fainting] : no fainting [Difficulty Walking] : no difficulty walking [de-identified] : vitaligo [de-identified] : peripheral neuropathy to fingers and toes

## 2022-01-19 NOTE — PHYSICAL EXAM
[Fully active, able to carry on all pre-disease performance without restriction] : Status 0 - Fully active, able to carry on all pre-disease performance without restriction [Thin] : thin [Normal] : clear to auscultation bilaterally, no dullness, no wheezing [de-identified] : anicteric  [de-identified] : no JVD [de-identified] : normal respiratory effort, no audible wheeze [de-identified] : reg rate  [de-identified] : no LE edema [de-identified] : + ostomy, soft, non-tender [de-identified] : vitiligo [de-identified] : grade 1 neuropathy in fingers and toes

## 2022-01-19 NOTE — HISTORY OF PRESENT ILLNESS
[Disease: _____________________] : Disease: [unfilled] [AJCC Stage: ____] : AJCC Stage: [unfilled] [de-identified] : 56 y/o M with PMH of vitiligo who developed abdominal discomfort which started 12/2020 and he was admitted on 1/4/2021 @ [facility] where he was found to have mass in distal TC with hepatic and RLL metastatic disease seen in CT scan.  He was discharged home with outpatient follow up.  He had syncope at home on 1/18/2021 and then saw PMD on 1/19/2021 where he was sent to hospital and admitted to [facility] from 1/19/2021 to 1/19/2021.   He underwent liver biopsy on 1/25/2021 which confirmed metastatic colon cancer.  His CEA was elevated 2882 from 1/21/2021.  His CT c/a/p from 1/28/2021 showed findings consistent with metastatic colon cancer evidenced by annular colonic mass and numerous hepatic metastases, groundglass pulmonary opacity consistent with infectious etiology, including Covid 19, improved compared to prior exam.  \par \par He presents to us on 2/5/2021 and states he feels stronger since leaving hospital but has lost about 22 pounds from December 2020 to Feb 2021\par Given Stage IV disease, offered FOLFOX and reviewed palliative intent of treatment \par    \par Late Feb 2021 started FOLFOX \par March 2021 admitted for LBO and underwent resection with ostomy \par \par April 2021 restarted FOLFOX --> stopped oxali July 2021  [de-identified] : pan andrea wt, transverse colon primary (right side) [de-identified] : IFTIKHAR, TMB 5, APC R232*APC *\par GRM3 V271I, MAP2K1 (MEK1) K57E\par TP53 S127P [FreeTextEntry1] : Palliative FOLFOX started 2/16/2021 -> 5-FU only started on 7/19/21 [de-identified] : Patient presents for follow up today and reports that overall he feels well.  The peripheral neuropathy that he was experiencing in his fingers and toes is still present but is less intense than previously noted and is not interfering with ADL's or causing him pain.  Denies fever, significant fatigue, HFS, vomiting, diarrhea.

## 2022-01-20 ENCOUNTER — APPOINTMENT (OUTPATIENT)
Dept: INFUSION THERAPY | Facility: HOSPITAL | Age: 57
End: 2022-01-20

## 2022-01-24 ENCOUNTER — APPOINTMENT (OUTPATIENT)
Dept: INFUSION THERAPY | Facility: HOSPITAL | Age: 57
End: 2022-01-24

## 2022-01-28 ENCOUNTER — OUTPATIENT (OUTPATIENT)
Dept: OUTPATIENT SERVICES | Facility: HOSPITAL | Age: 57
LOS: 1 days | Discharge: HOME | End: 2022-01-28

## 2022-01-28 DIAGNOSIS — C18.9 MALIGNANT NEOPLASM OF COLON, UNSPECIFIED: ICD-10-CM

## 2022-01-31 ENCOUNTER — APPOINTMENT (OUTPATIENT)
Dept: INFUSION THERAPY | Facility: HOSPITAL | Age: 57
End: 2022-01-31

## 2022-01-31 ENCOUNTER — RESULT REVIEW (OUTPATIENT)
Age: 57
End: 2022-01-31

## 2022-01-31 LAB
ALBUMIN SERPL ELPH-MCNC: 3.7 G/DL — SIGNIFICANT CHANGE UP (ref 3.3–5)
ALP SERPL-CCNC: 112 U/L — SIGNIFICANT CHANGE UP (ref 40–120)
ALT FLD-CCNC: 24 U/L — SIGNIFICANT CHANGE UP (ref 10–45)
ANION GAP SERPL CALC-SCNC: 11 MMOL/L — SIGNIFICANT CHANGE UP (ref 5–17)
AST SERPL-CCNC: 28 U/L — SIGNIFICANT CHANGE UP (ref 10–40)
BASOPHILS # BLD AUTO: 0.05 K/UL — SIGNIFICANT CHANGE UP (ref 0–0.2)
BASOPHILS NFR BLD AUTO: 0.7 % — SIGNIFICANT CHANGE UP (ref 0–2)
BILIRUB SERPL-MCNC: 0.3 MG/DL — SIGNIFICANT CHANGE UP (ref 0.2–1.2)
BUN SERPL-MCNC: 18 MG/DL — SIGNIFICANT CHANGE UP (ref 7–23)
CALCIUM SERPL-MCNC: 8.7 MG/DL — SIGNIFICANT CHANGE UP (ref 8.4–10.5)
CEA SERPL-MCNC: 59.4 NG/ML — HIGH (ref 0–3.8)
CHLORIDE SERPL-SCNC: 108 MMOL/L — SIGNIFICANT CHANGE UP (ref 96–108)
CO2 SERPL-SCNC: 21 MMOL/L — LOW (ref 22–31)
CREAT SERPL-MCNC: 0.9 MG/DL — SIGNIFICANT CHANGE UP (ref 0.5–1.3)
EOSINOPHIL # BLD AUTO: 0.2 K/UL — SIGNIFICANT CHANGE UP (ref 0–0.5)
EOSINOPHIL NFR BLD AUTO: 2.9 % — SIGNIFICANT CHANGE UP (ref 0–6)
GLUCOSE SERPL-MCNC: 99 MG/DL — SIGNIFICANT CHANGE UP (ref 70–99)
HCT VFR BLD CALC: 41.8 % — SIGNIFICANT CHANGE UP (ref 39–50)
HGB BLD-MCNC: 13.3 G/DL — SIGNIFICANT CHANGE UP (ref 13–17)
IMM GRANULOCYTES NFR BLD AUTO: 0.6 % — SIGNIFICANT CHANGE UP (ref 0–1.5)
LYMPHOCYTES # BLD AUTO: 1.97 K/UL — SIGNIFICANT CHANGE UP (ref 1–3.3)
LYMPHOCYTES # BLD AUTO: 28.3 % — SIGNIFICANT CHANGE UP (ref 13–44)
MCHC RBC-ENTMCNC: 24.9 PG — LOW (ref 27–34)
MCHC RBC-ENTMCNC: 31.8 G/DL — LOW (ref 32–36)
MCV RBC AUTO: 78.1 FL — LOW (ref 80–100)
MONOCYTES # BLD AUTO: 0.51 K/UL — SIGNIFICANT CHANGE UP (ref 0–0.9)
MONOCYTES NFR BLD AUTO: 7.3 % — SIGNIFICANT CHANGE UP (ref 2–14)
NEUTROPHILS # BLD AUTO: 4.2 K/UL — SIGNIFICANT CHANGE UP (ref 1.8–7.4)
NEUTROPHILS NFR BLD AUTO: 60.2 % — SIGNIFICANT CHANGE UP (ref 43–77)
NRBC # BLD: 0 /100 WBCS — SIGNIFICANT CHANGE UP (ref 0–0)
PLATELET # BLD AUTO: 213 K/UL — SIGNIFICANT CHANGE UP (ref 150–400)
POTASSIUM SERPL-MCNC: 3.9 MMOL/L — SIGNIFICANT CHANGE UP (ref 3.5–5.3)
POTASSIUM SERPL-SCNC: 3.9 MMOL/L — SIGNIFICANT CHANGE UP (ref 3.5–5.3)
PROT SERPL-MCNC: 6.6 G/DL — SIGNIFICANT CHANGE UP (ref 6–8.3)
RBC # BLD: 5.35 M/UL — SIGNIFICANT CHANGE UP (ref 4.2–5.8)
RBC # FLD: 16.6 % — HIGH (ref 10.3–14.5)
SODIUM SERPL-SCNC: 140 MMOL/L — SIGNIFICANT CHANGE UP (ref 135–145)
WBC # BLD: 6.97 K/UL — SIGNIFICANT CHANGE UP (ref 3.8–10.5)
WBC # FLD AUTO: 6.97 K/UL — SIGNIFICANT CHANGE UP (ref 3.8–10.5)

## 2022-02-01 DIAGNOSIS — R11.2 NAUSEA WITH VOMITING, UNSPECIFIED: ICD-10-CM

## 2022-02-01 DIAGNOSIS — Z51.11 ENCOUNTER FOR ANTINEOPLASTIC CHEMOTHERAPY: ICD-10-CM

## 2022-02-02 ENCOUNTER — APPOINTMENT (OUTPATIENT)
Dept: INFUSION THERAPY | Facility: HOSPITAL | Age: 57
End: 2022-02-02

## 2022-02-07 ENCOUNTER — APPOINTMENT (OUTPATIENT)
Dept: INFUSION THERAPY | Facility: HOSPITAL | Age: 57
End: 2022-02-07

## 2022-02-09 ENCOUNTER — APPOINTMENT (OUTPATIENT)
Dept: INFUSION THERAPY | Facility: HOSPITAL | Age: 57
End: 2022-02-09

## 2022-02-14 ENCOUNTER — APPOINTMENT (OUTPATIENT)
Dept: HEMATOLOGY ONCOLOGY | Facility: CLINIC | Age: 57
End: 2022-02-14
Payer: COMMERCIAL

## 2022-02-14 ENCOUNTER — APPOINTMENT (OUTPATIENT)
Dept: INFUSION THERAPY | Facility: HOSPITAL | Age: 57
End: 2022-02-14

## 2022-02-14 ENCOUNTER — RESULT REVIEW (OUTPATIENT)
Age: 57
End: 2022-02-14

## 2022-02-14 VITALS
DIASTOLIC BLOOD PRESSURE: 83 MMHG | TEMPERATURE: 99.4 F | SYSTOLIC BLOOD PRESSURE: 125 MMHG | HEART RATE: 92 BPM | OXYGEN SATURATION: 99 % | WEIGHT: 130.07 LBS | BODY MASS INDEX: 21.67 KG/M2 | RESPIRATION RATE: 16 BRPM | HEIGHT: 64.96 IN

## 2022-02-14 DIAGNOSIS — R31.0 GROSS HEMATURIA: ICD-10-CM

## 2022-02-14 LAB
ALBUMIN SERPL ELPH-MCNC: 4.2 G/DL — SIGNIFICANT CHANGE UP (ref 3.3–5)
ALP SERPL-CCNC: 139 U/L — HIGH (ref 40–120)
ALT FLD-CCNC: 45 U/L — SIGNIFICANT CHANGE UP (ref 10–45)
ANION GAP SERPL CALC-SCNC: 13 MMOL/L — SIGNIFICANT CHANGE UP (ref 5–17)
AST SERPL-CCNC: 39 U/L — SIGNIFICANT CHANGE UP (ref 10–40)
BASOPHILS # BLD AUTO: 0.07 K/UL — SIGNIFICANT CHANGE UP (ref 0–0.2)
BASOPHILS NFR BLD AUTO: 0.7 % — SIGNIFICANT CHANGE UP (ref 0–2)
BILIRUB SERPL-MCNC: 0.6 MG/DL — SIGNIFICANT CHANGE UP (ref 0.2–1.2)
BUN SERPL-MCNC: 13 MG/DL — SIGNIFICANT CHANGE UP (ref 7–23)
CALCIUM SERPL-MCNC: 9.3 MG/DL — SIGNIFICANT CHANGE UP (ref 8.4–10.5)
CHLORIDE SERPL-SCNC: 103 MMOL/L — SIGNIFICANT CHANGE UP (ref 96–108)
CO2 SERPL-SCNC: 23 MMOL/L — SIGNIFICANT CHANGE UP (ref 22–31)
CREAT SERPL-MCNC: 0.94 MG/DL — SIGNIFICANT CHANGE UP (ref 0.5–1.3)
EOSINOPHIL # BLD AUTO: 0.26 K/UL — SIGNIFICANT CHANGE UP (ref 0–0.5)
EOSINOPHIL NFR BLD AUTO: 2.5 % — SIGNIFICANT CHANGE UP (ref 0–6)
GLUCOSE SERPL-MCNC: 84 MG/DL — SIGNIFICANT CHANGE UP (ref 70–99)
HCT VFR BLD CALC: 42.5 % — SIGNIFICANT CHANGE UP (ref 39–50)
HGB BLD-MCNC: 13.6 G/DL — SIGNIFICANT CHANGE UP (ref 13–17)
IMM GRANULOCYTES NFR BLD AUTO: 0.5 % — SIGNIFICANT CHANGE UP (ref 0–1.5)
LYMPHOCYTES # BLD AUTO: 2.46 K/UL — SIGNIFICANT CHANGE UP (ref 1–3.3)
LYMPHOCYTES # BLD AUTO: 23.6 % — SIGNIFICANT CHANGE UP (ref 13–44)
MCHC RBC-ENTMCNC: 24.9 PG — LOW (ref 27–34)
MCHC RBC-ENTMCNC: 32 G/DL — SIGNIFICANT CHANGE UP (ref 32–36)
MCV RBC AUTO: 77.7 FL — LOW (ref 80–100)
MONOCYTES # BLD AUTO: 0.78 K/UL — SIGNIFICANT CHANGE UP (ref 0–0.9)
MONOCYTES NFR BLD AUTO: 7.5 % — SIGNIFICANT CHANGE UP (ref 2–14)
NEUTROPHILS # BLD AUTO: 6.81 K/UL — SIGNIFICANT CHANGE UP (ref 1.8–7.4)
NEUTROPHILS NFR BLD AUTO: 65.2 % — SIGNIFICANT CHANGE UP (ref 43–77)
NRBC # BLD: 0 /100 WBCS — SIGNIFICANT CHANGE UP (ref 0–0)
PLATELET # BLD AUTO: 207 K/UL — SIGNIFICANT CHANGE UP (ref 150–400)
POTASSIUM SERPL-MCNC: 4.3 MMOL/L — SIGNIFICANT CHANGE UP (ref 3.5–5.3)
POTASSIUM SERPL-SCNC: 4.3 MMOL/L — SIGNIFICANT CHANGE UP (ref 3.5–5.3)
PROT SERPL-MCNC: 7.2 G/DL — SIGNIFICANT CHANGE UP (ref 6–8.3)
RBC # BLD: 5.47 M/UL — SIGNIFICANT CHANGE UP (ref 4.2–5.8)
RBC # FLD: 16.4 % — HIGH (ref 10.3–14.5)
SODIUM SERPL-SCNC: 140 MMOL/L — SIGNIFICANT CHANGE UP (ref 135–145)
WBC # BLD: 10.43 K/UL — SIGNIFICANT CHANGE UP (ref 3.8–10.5)
WBC # FLD AUTO: 10.43 K/UL — SIGNIFICANT CHANGE UP (ref 3.8–10.5)

## 2022-02-14 PROCEDURE — 99214 OFFICE O/P EST MOD 30 MIN: CPT

## 2022-02-15 ENCOUNTER — NON-APPOINTMENT (OUTPATIENT)
Age: 57
End: 2022-02-15

## 2022-02-15 PROBLEM — R31.0 HEMATURIA, GROSS: Status: ACTIVE | Noted: 2021-07-09

## 2022-02-15 NOTE — REVIEW OF SYSTEMS
[Negative] : Psychiatric [Dizziness] : no dizziness [Fainting] : no fainting [Difficulty Walking] : no difficulty walking [de-identified] : vitaligo [de-identified] : peripheral neuropathy to fingers and toes

## 2022-02-15 NOTE — PHYSICAL EXAM
[Fully active, able to carry on all pre-disease performance without restriction] : Status 0 - Fully active, able to carry on all pre-disease performance without restriction [Thin] : thin [Normal] : affect appropriate [de-identified] : anicteric  [de-identified] : no JVD [de-identified] : normal respiratory effort, no audible wheeze [de-identified] : reg rate  [de-identified] : no LE edema [de-identified] : + ostomy, soft, non-tender [de-identified] : vitiligo [de-identified] : grade 1 neuropathy in fingers and toes

## 2022-02-15 NOTE — HISTORY OF PRESENT ILLNESS
[Disease: _____________________] : Disease: [unfilled] [AJCC Stage: ____] : AJCC Stage: [unfilled] [de-identified] : 56 y/o M with PMH of vitiligo who developed abdominal discomfort which started 12/2020 and he was admitted on 1/4/2021 @ [facility] where he was found to have mass in distal TC with hepatic and RLL metastatic disease seen in CT scan.  He was discharged home with outpatient follow up.  He had syncope at home on 1/18/2021 and then saw PMD on 1/19/2021 where he was sent to hospital and admitted to [facility] from 1/19/2021 to 1/19/2021.   He underwent liver biopsy on 1/25/2021 which confirmed metastatic colon cancer.  His CEA was elevated 2882 from 1/21/2021.  His CT c/a/p from 1/28/2021 showed findings consistent with metastatic colon cancer evidenced by annular colonic mass and numerous hepatic metastases, groundglass pulmonary opacity consistent with infectious etiology, including Covid 19, improved compared to prior exam.  \par \par He presents to us on 2/5/2021 and states he feels stronger since leaving hospital but has lost about 22 pounds from December 2020 to Feb 2021\par Given Stage IV disease, offered FOLFOX and reviewed palliative intent of treatment \par    \par Late Feb 2021 started FOLFOX \par March 2021 admitted for LBO and underwent resection with ostomy \par \par April 2021 restarted FOLFOX --> stopped oxali July 2021  [de-identified] : pan andrea wt, transverse colon primary (right side) [de-identified] : IFTIKHAR, TMB 5, APC R232*APC *\par GRM3 V271I, MAP2K1 (MEK1) K57E\par TP53 S127P [FreeTextEntry1] : Palliative FOLFOX started 2/16/2021 -> 5-FU only started on 7/19/21 [de-identified] : stable neuropathy\par no fever or chills\par no abdominal pain \par no acute issues \par

## 2022-02-16 ENCOUNTER — APPOINTMENT (OUTPATIENT)
Dept: INFUSION THERAPY | Facility: HOSPITAL | Age: 57
End: 2022-02-16

## 2022-02-22 ENCOUNTER — APPOINTMENT (OUTPATIENT)
Dept: INFUSION THERAPY | Facility: HOSPITAL | Age: 57
End: 2022-02-22

## 2022-02-23 ENCOUNTER — OUTPATIENT (OUTPATIENT)
Dept: OUTPATIENT SERVICES | Facility: HOSPITAL | Age: 57
LOS: 1 days | Discharge: HOME | End: 2022-02-23

## 2022-02-23 DIAGNOSIS — C18.9 MALIGNANT NEOPLASM OF COLON, UNSPECIFIED: ICD-10-CM

## 2022-02-24 ENCOUNTER — APPOINTMENT (OUTPATIENT)
Dept: INFUSION THERAPY | Facility: HOSPITAL | Age: 57
End: 2022-02-24

## 2022-02-28 ENCOUNTER — RESULT REVIEW (OUTPATIENT)
Age: 57
End: 2022-02-28

## 2022-02-28 ENCOUNTER — APPOINTMENT (OUTPATIENT)
Dept: INFUSION THERAPY | Facility: HOSPITAL | Age: 57
End: 2022-02-28

## 2022-02-28 LAB
ALBUMIN SERPL ELPH-MCNC: 4 G/DL — SIGNIFICANT CHANGE UP (ref 3.3–5)
ALP SERPL-CCNC: 118 U/L — SIGNIFICANT CHANGE UP (ref 40–120)
ALT FLD-CCNC: 30 U/L — SIGNIFICANT CHANGE UP (ref 10–45)
ANION GAP SERPL CALC-SCNC: 17 MMOL/L — SIGNIFICANT CHANGE UP (ref 5–17)
AST SERPL-CCNC: 36 U/L — SIGNIFICANT CHANGE UP (ref 10–40)
BASOPHILS # BLD AUTO: 0.06 K/UL — SIGNIFICANT CHANGE UP (ref 0–0.2)
BASOPHILS NFR BLD AUTO: 0.7 % — SIGNIFICANT CHANGE UP (ref 0–2)
BILIRUB SERPL-MCNC: 0.5 MG/DL — SIGNIFICANT CHANGE UP (ref 0.2–1.2)
BUN SERPL-MCNC: 17 MG/DL — SIGNIFICANT CHANGE UP (ref 7–23)
CALCIUM SERPL-MCNC: 9.5 MG/DL — SIGNIFICANT CHANGE UP (ref 8.4–10.5)
CEA SERPL-MCNC: 85 NG/ML — HIGH (ref 0–3.8)
CHLORIDE SERPL-SCNC: 105 MMOL/L — SIGNIFICANT CHANGE UP (ref 96–108)
CO2 SERPL-SCNC: 19 MMOL/L — LOW (ref 22–31)
CREAT SERPL-MCNC: 0.94 MG/DL — SIGNIFICANT CHANGE UP (ref 0.5–1.3)
EGFR: 94 ML/MIN/1.73M2 — SIGNIFICANT CHANGE UP
EOSINOPHIL # BLD AUTO: 0.24 K/UL — SIGNIFICANT CHANGE UP (ref 0–0.5)
EOSINOPHIL NFR BLD AUTO: 2.8 % — SIGNIFICANT CHANGE UP (ref 0–6)
GLUCOSE SERPL-MCNC: 111 MG/DL — HIGH (ref 70–99)
HCT VFR BLD CALC: 42 % — SIGNIFICANT CHANGE UP (ref 39–50)
HGB BLD-MCNC: 13.6 G/DL — SIGNIFICANT CHANGE UP (ref 13–17)
IMM GRANULOCYTES NFR BLD AUTO: 0.5 % — SIGNIFICANT CHANGE UP (ref 0–1.5)
LYMPHOCYTES # BLD AUTO: 1.97 K/UL — SIGNIFICANT CHANGE UP (ref 1–3.3)
LYMPHOCYTES # BLD AUTO: 22.8 % — SIGNIFICANT CHANGE UP (ref 13–44)
MCHC RBC-ENTMCNC: 25.2 PG — LOW (ref 27–34)
MCHC RBC-ENTMCNC: 32.4 G/DL — SIGNIFICANT CHANGE UP (ref 32–36)
MCV RBC AUTO: 77.9 FL — LOW (ref 80–100)
MONOCYTES # BLD AUTO: 0.64 K/UL — SIGNIFICANT CHANGE UP (ref 0–0.9)
MONOCYTES NFR BLD AUTO: 7.4 % — SIGNIFICANT CHANGE UP (ref 2–14)
NEUTROPHILS # BLD AUTO: 5.68 K/UL — SIGNIFICANT CHANGE UP (ref 1.8–7.4)
NEUTROPHILS NFR BLD AUTO: 65.8 % — SIGNIFICANT CHANGE UP (ref 43–77)
NRBC # BLD: 0 /100 WBCS — SIGNIFICANT CHANGE UP (ref 0–0)
PLATELET # BLD AUTO: 211 K/UL — SIGNIFICANT CHANGE UP (ref 150–400)
POTASSIUM SERPL-MCNC: 4.3 MMOL/L — SIGNIFICANT CHANGE UP (ref 3.5–5.3)
POTASSIUM SERPL-SCNC: 4.3 MMOL/L — SIGNIFICANT CHANGE UP (ref 3.5–5.3)
PROT SERPL-MCNC: 7 G/DL — SIGNIFICANT CHANGE UP (ref 6–8.3)
RBC # BLD: 5.39 M/UL — SIGNIFICANT CHANGE UP (ref 4.2–5.8)
RBC # FLD: 16.5 % — HIGH (ref 10.3–14.5)
SODIUM SERPL-SCNC: 141 MMOL/L — SIGNIFICANT CHANGE UP (ref 135–145)
WBC # BLD: 8.63 K/UL — SIGNIFICANT CHANGE UP (ref 3.8–10.5)
WBC # FLD AUTO: 8.63 K/UL — SIGNIFICANT CHANGE UP (ref 3.8–10.5)

## 2022-03-01 DIAGNOSIS — Z51.11 ENCOUNTER FOR ANTINEOPLASTIC CHEMOTHERAPY: ICD-10-CM

## 2022-03-01 DIAGNOSIS — R11.2 NAUSEA WITH VOMITING, UNSPECIFIED: ICD-10-CM

## 2022-03-02 ENCOUNTER — APPOINTMENT (OUTPATIENT)
Dept: INFUSION THERAPY | Facility: HOSPITAL | Age: 57
End: 2022-03-02

## 2022-03-12 ENCOUNTER — OUTPATIENT (OUTPATIENT)
Dept: OUTPATIENT SERVICES | Facility: HOSPITAL | Age: 57
LOS: 1 days | Discharge: HOME | End: 2022-03-12

## 2022-03-12 DIAGNOSIS — C18.9 MALIGNANT NEOPLASM OF COLON, UNSPECIFIED: ICD-10-CM

## 2022-03-14 ENCOUNTER — RESULT REVIEW (OUTPATIENT)
Age: 57
End: 2022-03-14

## 2022-03-14 ENCOUNTER — APPOINTMENT (OUTPATIENT)
Dept: INFUSION THERAPY | Facility: HOSPITAL | Age: 57
End: 2022-03-14

## 2022-03-14 ENCOUNTER — NON-APPOINTMENT (OUTPATIENT)
Age: 57
End: 2022-03-14

## 2022-03-14 DIAGNOSIS — R11.2 NAUSEA WITH VOMITING, UNSPECIFIED: ICD-10-CM

## 2022-03-14 DIAGNOSIS — Z51.11 ENCOUNTER FOR ANTINEOPLASTIC CHEMOTHERAPY: ICD-10-CM

## 2022-03-14 LAB
BASOPHILS # BLD AUTO: 0.05 K/UL — SIGNIFICANT CHANGE UP (ref 0–0.2)
BASOPHILS NFR BLD AUTO: 0.6 % — SIGNIFICANT CHANGE UP (ref 0–2)
CEA SERPL-MCNC: 114 NG/ML — HIGH (ref 0–3.8)
EOSINOPHIL # BLD AUTO: 0.21 K/UL — SIGNIFICANT CHANGE UP (ref 0–0.5)
EOSINOPHIL NFR BLD AUTO: 2.5 % — SIGNIFICANT CHANGE UP (ref 0–6)
HCT VFR BLD CALC: 42.5 % — SIGNIFICANT CHANGE UP (ref 39–50)
HGB BLD-MCNC: 14 G/DL — SIGNIFICANT CHANGE UP (ref 13–17)
IMM GRANULOCYTES NFR BLD AUTO: 0.5 % — SIGNIFICANT CHANGE UP (ref 0–1.5)
LYMPHOCYTES # BLD AUTO: 2.41 K/UL — SIGNIFICANT CHANGE UP (ref 1–3.3)
LYMPHOCYTES # BLD AUTO: 28.4 % — SIGNIFICANT CHANGE UP (ref 13–44)
MCHC RBC-ENTMCNC: 25.3 PG — LOW (ref 27–34)
MCHC RBC-ENTMCNC: 32.9 G/DL — SIGNIFICANT CHANGE UP (ref 32–36)
MCV RBC AUTO: 76.9 FL — LOW (ref 80–100)
MONOCYTES # BLD AUTO: 0.66 K/UL — SIGNIFICANT CHANGE UP (ref 0–0.9)
MONOCYTES NFR BLD AUTO: 7.8 % — SIGNIFICANT CHANGE UP (ref 2–14)
NEUTROPHILS # BLD AUTO: 5.12 K/UL — SIGNIFICANT CHANGE UP (ref 1.8–7.4)
NEUTROPHILS NFR BLD AUTO: 60.2 % — SIGNIFICANT CHANGE UP (ref 43–77)
NRBC # BLD: 0 /100 WBCS — SIGNIFICANT CHANGE UP (ref 0–0)
PLATELET # BLD AUTO: 167 K/UL — SIGNIFICANT CHANGE UP (ref 150–400)
RBC # BLD: 5.53 M/UL — SIGNIFICANT CHANGE UP (ref 4.2–5.8)
RBC # FLD: 17.2 % — HIGH (ref 10.3–14.5)
WBC # BLD: 8.49 K/UL — SIGNIFICANT CHANGE UP (ref 3.8–10.5)
WBC # FLD AUTO: 8.49 K/UL — SIGNIFICANT CHANGE UP (ref 3.8–10.5)

## 2022-03-15 ENCOUNTER — NON-APPOINTMENT (OUTPATIENT)
Age: 57
End: 2022-03-15

## 2022-03-16 ENCOUNTER — APPOINTMENT (OUTPATIENT)
Dept: INFUSION THERAPY | Facility: HOSPITAL | Age: 57
End: 2022-03-16

## 2022-03-16 ENCOUNTER — APPOINTMENT (OUTPATIENT)
Dept: HEMATOLOGY ONCOLOGY | Facility: CLINIC | Age: 57
End: 2022-03-16
Payer: COMMERCIAL

## 2022-03-16 VITALS
RESPIRATION RATE: 16 BRPM | TEMPERATURE: 97 F | HEART RATE: 102 BPM | OXYGEN SATURATION: 99 % | SYSTOLIC BLOOD PRESSURE: 130 MMHG | WEIGHT: 130.07 LBS | BODY MASS INDEX: 21.67 KG/M2 | DIASTOLIC BLOOD PRESSURE: 84 MMHG

## 2022-03-16 PROCEDURE — 99214 OFFICE O/P EST MOD 30 MIN: CPT

## 2022-03-17 NOTE — HISTORY OF PRESENT ILLNESS
[Disease: _____________________] : Disease: [unfilled] [AJCC Stage: ____] : AJCC Stage: [unfilled] [de-identified] : 54 y/o M with PMH of vitiligo who developed abdominal discomfort which started 12/2020 and he was admitted on 1/4/2021 @ [facility] where he was found to have mass in distal TC with hepatic and RLL metastatic disease seen in CT scan.  He was discharged home with outpatient follow up.  He had syncope at home on 1/18/2021 and then saw PMD on 1/19/2021 where he was sent to hospital and admitted to [facility] from 1/19/2021 to 1/19/2021.   He underwent liver biopsy on 1/25/2021 which confirmed metastatic colon cancer.  His CEA was elevated 2882 from 1/21/2021.  His CT c/a/p from 1/28/2021 showed findings consistent with metastatic colon cancer evidenced by annular colonic mass and numerous hepatic metastases, groundglass pulmonary opacity consistent with infectious etiology, including Covid 19, improved compared to prior exam.  \par \par He presents to us on 2/5/2021 and states he feels stronger since leaving hospital but has lost about 22 pounds from December 2020 to Feb 2021\par Given Stage IV disease, offered FOLFOX and reviewed palliative intent of treatment \par    \par Late Feb 2021 started FOLFOX \par March 2021 admitted for LBO and underwent resection with ostomy \par \par April 2021 restarted FOLFOX --> stopped oxali July 2021  [de-identified] : pan andrea wt, transverse colon primary (right side) [de-identified] : IFTIKHAR, TMB 5, APC R232*APC *\par GRM3 V271I, MAP2K1 (MEK1) K57E\par TP53 S127P [FreeTextEntry1] : Palliative FOLFOX started 2/16/2021 -> 5-FU only started on 7/19/21 [de-identified] : Patient presents for follow up and is scheduled for disconnect today.  He reports overall feeling well but is stressed because he found out two days ago when he came for treatment that his insurance has lapsed and he is working on getting it reinstated.  He admits to mild neuropathy but states that it is stable.  Denies abdominal pain, weight loss, N/V, diarrhea, constipation.

## 2022-03-17 NOTE — REVIEW OF SYSTEMS
[Negative] : Psychiatric [Dizziness] : no dizziness [Fainting] : no fainting [Difficulty Walking] : no difficulty walking [de-identified] : vitaligo [de-identified] : peripheral neuropathy to fingers and toes

## 2022-03-17 NOTE — PHYSICAL EXAM
[Fully active, able to carry on all pre-disease performance without restriction] : Status 0 - Fully active, able to carry on all pre-disease performance without restriction [Thin] : thin [Normal] : affect appropriate [de-identified] : anicteric  [de-identified] : no JVD [de-identified] : normal respiratory effort, no audible wheeze [de-identified] : reg rate  [de-identified] : no LE edema [de-identified] : + ostomy, soft, non-tender [de-identified] : vitiligo [de-identified] : grade 1 neuropathy in fingers and toes

## 2022-03-28 ENCOUNTER — RESULT REVIEW (OUTPATIENT)
Age: 57
End: 2022-03-28

## 2022-03-28 ENCOUNTER — APPOINTMENT (OUTPATIENT)
Dept: INFUSION THERAPY | Facility: HOSPITAL | Age: 57
End: 2022-03-28

## 2022-03-28 LAB
ALBUMIN SERPL ELPH-MCNC: 4.4 G/DL — SIGNIFICANT CHANGE UP (ref 3.3–5)
ALP SERPL-CCNC: 118 U/L — SIGNIFICANT CHANGE UP (ref 40–120)
ALT FLD-CCNC: 27 U/L — SIGNIFICANT CHANGE UP (ref 10–45)
ANION GAP SERPL CALC-SCNC: 12 MMOL/L — SIGNIFICANT CHANGE UP (ref 5–17)
AST SERPL-CCNC: 34 U/L — SIGNIFICANT CHANGE UP (ref 10–40)
BASOPHILS # BLD AUTO: 0.06 K/UL — SIGNIFICANT CHANGE UP (ref 0–0.2)
BASOPHILS NFR BLD AUTO: 0.8 % — SIGNIFICANT CHANGE UP (ref 0–2)
BILIRUB SERPL-MCNC: 0.6 MG/DL — SIGNIFICANT CHANGE UP (ref 0.2–1.2)
BUN SERPL-MCNC: 14 MG/DL — SIGNIFICANT CHANGE UP (ref 7–23)
CALCIUM SERPL-MCNC: 9.5 MG/DL — SIGNIFICANT CHANGE UP (ref 8.4–10.5)
CEA SERPL-MCNC: 104 NG/ML — HIGH (ref 0–3.8)
CHLORIDE SERPL-SCNC: 106 MMOL/L — SIGNIFICANT CHANGE UP (ref 96–108)
CO2 SERPL-SCNC: 22 MMOL/L — SIGNIFICANT CHANGE UP (ref 22–31)
CREAT SERPL-MCNC: 0.87 MG/DL — SIGNIFICANT CHANGE UP (ref 0.5–1.3)
EGFR: 100 ML/MIN/1.73M2 — SIGNIFICANT CHANGE UP
EOSINOPHIL # BLD AUTO: 0.23 K/UL — SIGNIFICANT CHANGE UP (ref 0–0.5)
EOSINOPHIL NFR BLD AUTO: 3 % — SIGNIFICANT CHANGE UP (ref 0–6)
GLUCOSE SERPL-MCNC: 121 MG/DL — HIGH (ref 70–99)
HCT VFR BLD CALC: 41.8 % — SIGNIFICANT CHANGE UP (ref 39–50)
HGB BLD-MCNC: 13.7 G/DL — SIGNIFICANT CHANGE UP (ref 13–17)
IMM GRANULOCYTES NFR BLD AUTO: 0.3 % — SIGNIFICANT CHANGE UP (ref 0–1.5)
LYMPHOCYTES # BLD AUTO: 2.5 K/UL — SIGNIFICANT CHANGE UP (ref 1–3.3)
LYMPHOCYTES # BLD AUTO: 32.7 % — SIGNIFICANT CHANGE UP (ref 13–44)
MCHC RBC-ENTMCNC: 25.3 PG — LOW (ref 27–34)
MCHC RBC-ENTMCNC: 32.8 G/DL — SIGNIFICANT CHANGE UP (ref 32–36)
MCV RBC AUTO: 77.1 FL — LOW (ref 80–100)
MONOCYTES # BLD AUTO: 0.4 K/UL — SIGNIFICANT CHANGE UP (ref 0–0.9)
MONOCYTES NFR BLD AUTO: 5.2 % — SIGNIFICANT CHANGE UP (ref 2–14)
NEUTROPHILS # BLD AUTO: 4.44 K/UL — SIGNIFICANT CHANGE UP (ref 1.8–7.4)
NEUTROPHILS NFR BLD AUTO: 58 % — SIGNIFICANT CHANGE UP (ref 43–77)
NRBC # BLD: 0 /100 WBCS — SIGNIFICANT CHANGE UP (ref 0–0)
PLATELET # BLD AUTO: 222 K/UL — SIGNIFICANT CHANGE UP (ref 150–400)
POTASSIUM SERPL-MCNC: 3.9 MMOL/L — SIGNIFICANT CHANGE UP (ref 3.5–5.3)
POTASSIUM SERPL-SCNC: 3.9 MMOL/L — SIGNIFICANT CHANGE UP (ref 3.5–5.3)
PROT SERPL-MCNC: 7.3 G/DL — SIGNIFICANT CHANGE UP (ref 6–8.3)
RBC # BLD: 5.42 M/UL — SIGNIFICANT CHANGE UP (ref 4.2–5.8)
RBC # FLD: 17.8 % — HIGH (ref 10.3–14.5)
SODIUM SERPL-SCNC: 140 MMOL/L — SIGNIFICANT CHANGE UP (ref 135–145)
WBC # BLD: 7.65 K/UL — SIGNIFICANT CHANGE UP (ref 3.8–10.5)
WBC # FLD AUTO: 7.65 K/UL — SIGNIFICANT CHANGE UP (ref 3.8–10.5)

## 2022-03-30 ENCOUNTER — APPOINTMENT (OUTPATIENT)
Dept: INFUSION THERAPY | Facility: HOSPITAL | Age: 57
End: 2022-03-30

## 2022-03-31 NOTE — PROGRESS NOTE ADULT - PROBLEM SELECTOR PLAN 6
- Pt is currently medically optimized for colorectal surgery  - EKG in chart  - Revised Cardiac Risk Index: Based on RCRI, pt has 6% 30-day risk of death, cardiac arrest, MI due to intraperitoneal surgery  -However, pt has poor nutritional status which likely have a affect on wound healing and overall prognosis. same name as above - Diet: regualr + ensure per dietitian   - DVT PPX: Lovenox SQ  - Dispo: likely home per PT  - GOC: pt will speak more with wife and children, palliative consult for pain control and further assistance

## 2022-04-04 ENCOUNTER — RESULT REVIEW (OUTPATIENT)
Age: 57
End: 2022-04-04

## 2022-04-08 ENCOUNTER — APPOINTMENT (OUTPATIENT)
Dept: CT IMAGING | Facility: CLINIC | Age: 57
End: 2022-04-08
Payer: COMMERCIAL

## 2022-04-08 PROCEDURE — 71260 CT THORAX DX C+: CPT

## 2022-04-08 PROCEDURE — 74177 CT ABD & PELVIS W/CONTRAST: CPT

## 2022-04-11 ENCOUNTER — RESULT REVIEW (OUTPATIENT)
Age: 57
End: 2022-04-11

## 2022-04-11 ENCOUNTER — APPOINTMENT (OUTPATIENT)
Dept: HEMATOLOGY ONCOLOGY | Facility: CLINIC | Age: 57
End: 2022-04-11

## 2022-04-11 ENCOUNTER — APPOINTMENT (OUTPATIENT)
Dept: INFUSION THERAPY | Facility: HOSPITAL | Age: 57
End: 2022-04-11

## 2022-04-11 LAB
ALBUMIN SERPL ELPH-MCNC: 4.3 G/DL — SIGNIFICANT CHANGE UP (ref 3.3–5)
ALP SERPL-CCNC: 132 U/L — HIGH (ref 40–120)
ALT FLD-CCNC: 30 U/L — SIGNIFICANT CHANGE UP (ref 10–45)
ANION GAP SERPL CALC-SCNC: 11 MMOL/L — SIGNIFICANT CHANGE UP (ref 5–17)
AST SERPL-CCNC: 36 U/L — SIGNIFICANT CHANGE UP (ref 10–40)
BASOPHILS # BLD AUTO: 0.06 K/UL — SIGNIFICANT CHANGE UP (ref 0–0.2)
BASOPHILS NFR BLD AUTO: 0.8 % — SIGNIFICANT CHANGE UP (ref 0–2)
BILIRUB SERPL-MCNC: 0.4 MG/DL — SIGNIFICANT CHANGE UP (ref 0.2–1.2)
BUN SERPL-MCNC: 17 MG/DL — SIGNIFICANT CHANGE UP (ref 7–23)
CALCIUM SERPL-MCNC: 9.3 MG/DL — SIGNIFICANT CHANGE UP (ref 8.4–10.5)
CHLORIDE SERPL-SCNC: 106 MMOL/L — SIGNIFICANT CHANGE UP (ref 96–108)
CO2 SERPL-SCNC: 22 MMOL/L — SIGNIFICANT CHANGE UP (ref 22–31)
CREAT SERPL-MCNC: 0.94 MG/DL — SIGNIFICANT CHANGE UP (ref 0.5–1.3)
EGFR: 94 ML/MIN/1.73M2 — SIGNIFICANT CHANGE UP
EOSINOPHIL # BLD AUTO: 0.28 K/UL — SIGNIFICANT CHANGE UP (ref 0–0.5)
EOSINOPHIL NFR BLD AUTO: 3.8 % — SIGNIFICANT CHANGE UP (ref 0–6)
GLUCOSE SERPL-MCNC: 103 MG/DL — HIGH (ref 70–99)
HCT VFR BLD CALC: 42.4 % — SIGNIFICANT CHANGE UP (ref 39–50)
HGB BLD-MCNC: 13.8 G/DL — SIGNIFICANT CHANGE UP (ref 13–17)
IMM GRANULOCYTES NFR BLD AUTO: 0.3 % — SIGNIFICANT CHANGE UP (ref 0–1.5)
LYMPHOCYTES # BLD AUTO: 2.04 K/UL — SIGNIFICANT CHANGE UP (ref 1–3.3)
LYMPHOCYTES # BLD AUTO: 27.8 % — SIGNIFICANT CHANGE UP (ref 13–44)
MCHC RBC-ENTMCNC: 25.1 PG — LOW (ref 27–34)
MCHC RBC-ENTMCNC: 32.5 G/DL — SIGNIFICANT CHANGE UP (ref 32–36)
MCV RBC AUTO: 77.1 FL — LOW (ref 80–100)
MONOCYTES # BLD AUTO: 0.6 K/UL — SIGNIFICANT CHANGE UP (ref 0–0.9)
MONOCYTES NFR BLD AUTO: 8.2 % — SIGNIFICANT CHANGE UP (ref 2–14)
NEUTROPHILS # BLD AUTO: 4.33 K/UL — SIGNIFICANT CHANGE UP (ref 1.8–7.4)
NEUTROPHILS NFR BLD AUTO: 59.1 % — SIGNIFICANT CHANGE UP (ref 43–77)
NRBC # BLD: 0 /100 WBCS — SIGNIFICANT CHANGE UP (ref 0–0)
PLATELET # BLD AUTO: 218 K/UL — SIGNIFICANT CHANGE UP (ref 150–400)
POTASSIUM SERPL-MCNC: 4.2 MMOL/L — SIGNIFICANT CHANGE UP (ref 3.5–5.3)
POTASSIUM SERPL-SCNC: 4.2 MMOL/L — SIGNIFICANT CHANGE UP (ref 3.5–5.3)
PROT SERPL-MCNC: 7.4 G/DL — SIGNIFICANT CHANGE UP (ref 6–8.3)
RBC # BLD: 5.5 M/UL — SIGNIFICANT CHANGE UP (ref 4.2–5.8)
RBC # FLD: 18.4 % — HIGH (ref 10.3–14.5)
SODIUM SERPL-SCNC: 139 MMOL/L — SIGNIFICANT CHANGE UP (ref 135–145)
WBC # BLD: 7.33 K/UL — SIGNIFICANT CHANGE UP (ref 3.8–10.5)
WBC # FLD AUTO: 7.33 K/UL — SIGNIFICANT CHANGE UP (ref 3.8–10.5)

## 2022-04-12 ENCOUNTER — OUTPATIENT (OUTPATIENT)
Dept: OUTPATIENT SERVICES | Facility: HOSPITAL | Age: 57
LOS: 1 days | Discharge: HOME | End: 2022-04-12

## 2022-04-12 DIAGNOSIS — C18.9 MALIGNANT NEOPLASM OF COLON, UNSPECIFIED: ICD-10-CM

## 2022-04-13 ENCOUNTER — APPOINTMENT (OUTPATIENT)
Dept: HEMATOLOGY ONCOLOGY | Facility: CLINIC | Age: 57
End: 2022-04-13
Payer: COMMERCIAL

## 2022-04-13 ENCOUNTER — APPOINTMENT (OUTPATIENT)
Dept: INFUSION THERAPY | Facility: HOSPITAL | Age: 57
End: 2022-04-13

## 2022-04-13 VITALS
HEART RATE: 94 BPM | HEIGHT: 64.96 IN | RESPIRATION RATE: 16 BRPM | WEIGHT: 127.21 LBS | OXYGEN SATURATION: 97 % | TEMPERATURE: 98.4 F | BODY MASS INDEX: 21.19 KG/M2

## 2022-04-13 PROCEDURE — 99215 OFFICE O/P EST HI 40 MIN: CPT

## 2022-04-14 NOTE — REVIEW OF SYSTEMS
[Negative] : Psychiatric [Dizziness] : no dizziness [Fainting] : no fainting [Difficulty Walking] : no difficulty walking [de-identified] : vitaligo [de-identified] : peripheral neuropathy to fingers and toes

## 2022-04-14 NOTE — PHYSICAL EXAM
[Fully active, able to carry on all pre-disease performance without restriction] : Status 0 - Fully active, able to carry on all pre-disease performance without restriction [Thin] : thin [Normal] : affect appropriate [de-identified] : anicteric  [de-identified] : no JVD [de-identified] : normal respiratory effort, no audible wheeze [de-identified] : reg rate  [de-identified] : no LE edema [de-identified] : + ostomy, soft, non-tender [de-identified] : vitiligo [de-identified] : grade 1 neuropathy in fingers and toes

## 2022-04-14 NOTE — HISTORY OF PRESENT ILLNESS
[Disease: _____________________] : Disease: [unfilled] [AJCC Stage: ____] : AJCC Stage: [unfilled] [de-identified] : 56 y/o M with PMH of vitiligo who developed abdominal discomfort which started 12/2020 and he was admitted on 1/4/2021 @ [facility] where he was found to have mass in distal TC with hepatic and RLL metastatic disease seen in CT scan.  He was discharged home with outpatient follow up.  He had syncope at home on 1/18/2021 and then saw PMD on 1/19/2021 where he was sent to hospital and admitted to [facility] from 1/19/2021 to 1/19/2021.   He underwent liver biopsy on 1/25/2021 which confirmed metastatic colon cancer.  His CEA was elevated 2882 from 1/21/2021.  His CT c/a/p from 1/28/2021 showed findings consistent with metastatic colon cancer evidenced by annular colonic mass and numerous hepatic metastases, groundglass pulmonary opacity consistent with infectious etiology, including Covid 19, improved compared to prior exam.  \par \par He presents to us on 2/5/2021 and states he feels stronger since leaving hospital but has lost about 22 pounds from December 2020 to Feb 2021\par Given Stage IV disease, offered FOLFOX and reviewed palliative intent of treatment \par    \par Late Feb 2021 started FOLFOX \par March 2021 admitted for LBO and underwent resection with ostomy \par \par April 2021 restarted FOLFOX --> stopped oxali July 2021  [de-identified] : pan andrea wt, transverse colon primary (right side) [de-identified] : IFTIKHAR, TMB 5, APC R232*APC *\par GRM3 V271I, MAP2K1 (MEK1) K57E\par TP53 S127P [FreeTextEntry1] : Palliative FOLFOX started 2/16/2021 -> 5-FU only started on 7/19/21

## 2022-04-25 ENCOUNTER — APPOINTMENT (OUTPATIENT)
Dept: HEMATOLOGY ONCOLOGY | Facility: CLINIC | Age: 57
End: 2022-04-25

## 2022-04-25 ENCOUNTER — RESULT REVIEW (OUTPATIENT)
Age: 57
End: 2022-04-25

## 2022-04-25 ENCOUNTER — APPOINTMENT (OUTPATIENT)
Dept: INFUSION THERAPY | Facility: HOSPITAL | Age: 57
End: 2022-04-25

## 2022-04-25 LAB
ALBUMIN SERPL ELPH-MCNC: 4.2 G/DL — SIGNIFICANT CHANGE UP (ref 3.3–5)
ALP SERPL-CCNC: 132 U/L — HIGH (ref 40–120)
ALT FLD-CCNC: 30 U/L — SIGNIFICANT CHANGE UP (ref 10–45)
ANION GAP SERPL CALC-SCNC: 13 MMOL/L — SIGNIFICANT CHANGE UP (ref 5–17)
AST SERPL-CCNC: 39 U/L — SIGNIFICANT CHANGE UP (ref 10–40)
BASOPHILS # BLD AUTO: 0.07 K/UL — SIGNIFICANT CHANGE UP (ref 0–0.2)
BASOPHILS NFR BLD AUTO: 0.8 % — SIGNIFICANT CHANGE UP (ref 0–2)
BILIRUB SERPL-MCNC: 0.8 MG/DL — SIGNIFICANT CHANGE UP (ref 0.2–1.2)
BUN SERPL-MCNC: 15 MG/DL — SIGNIFICANT CHANGE UP (ref 7–23)
CALCIUM SERPL-MCNC: 9.2 MG/DL — SIGNIFICANT CHANGE UP (ref 8.4–10.5)
CHLORIDE SERPL-SCNC: 106 MMOL/L — SIGNIFICANT CHANGE UP (ref 96–108)
CO2 SERPL-SCNC: 22 MMOL/L — SIGNIFICANT CHANGE UP (ref 22–31)
CREAT ?TM UR-MCNC: 49 MG/DL — SIGNIFICANT CHANGE UP
CREAT SERPL-MCNC: 0.99 MG/DL — SIGNIFICANT CHANGE UP (ref 0.5–1.3)
EGFR: 88 ML/MIN/1.73M2 — SIGNIFICANT CHANGE UP
EOSINOPHIL # BLD AUTO: 0.33 K/UL — SIGNIFICANT CHANGE UP (ref 0–0.5)
EOSINOPHIL NFR BLD AUTO: 3.5 % — SIGNIFICANT CHANGE UP (ref 0–6)
GLUCOSE SERPL-MCNC: 91 MG/DL — SIGNIFICANT CHANGE UP (ref 70–99)
HCT VFR BLD CALC: 40.7 % — SIGNIFICANT CHANGE UP (ref 39–50)
HGB BLD-MCNC: 12.9 G/DL — LOW (ref 13–17)
IMM GRANULOCYTES NFR BLD AUTO: 0.2 % — SIGNIFICANT CHANGE UP (ref 0–1.5)
LYMPHOCYTES # BLD AUTO: 1.89 K/UL — SIGNIFICANT CHANGE UP (ref 1–3.3)
LYMPHOCYTES # BLD AUTO: 20.3 % — SIGNIFICANT CHANGE UP (ref 13–44)
MCHC RBC-ENTMCNC: 24.8 PG — LOW (ref 27–34)
MCHC RBC-ENTMCNC: 31.7 G/DL — LOW (ref 32–36)
MCV RBC AUTO: 78.1 FL — LOW (ref 80–100)
MONOCYTES # BLD AUTO: 0.81 K/UL — SIGNIFICANT CHANGE UP (ref 0–0.9)
MONOCYTES NFR BLD AUTO: 8.7 % — SIGNIFICANT CHANGE UP (ref 2–14)
NEUTROPHILS # BLD AUTO: 6.19 K/UL — SIGNIFICANT CHANGE UP (ref 1.8–7.4)
NEUTROPHILS NFR BLD AUTO: 66.5 % — SIGNIFICANT CHANGE UP (ref 43–77)
NRBC # BLD: 0 /100 WBCS — SIGNIFICANT CHANGE UP (ref 0–0)
PLATELET # BLD AUTO: 213 K/UL — SIGNIFICANT CHANGE UP (ref 150–400)
POTASSIUM SERPL-MCNC: 4.2 MMOL/L — SIGNIFICANT CHANGE UP (ref 3.5–5.3)
POTASSIUM SERPL-SCNC: 4.2 MMOL/L — SIGNIFICANT CHANGE UP (ref 3.5–5.3)
PROT ?TM UR-MCNC: 7 MG/DL — SIGNIFICANT CHANGE UP (ref 0–12)
PROT SERPL-MCNC: 7.3 G/DL — SIGNIFICANT CHANGE UP (ref 6–8.3)
PROT/CREAT UR-RTO: 0.1 RATIO — SIGNIFICANT CHANGE UP (ref 0–0.2)
RBC # BLD: 5.21 M/UL — SIGNIFICANT CHANGE UP (ref 4.2–5.8)
RBC # FLD: 18.4 % — HIGH (ref 10.3–14.5)
SODIUM SERPL-SCNC: 141 MMOL/L — SIGNIFICANT CHANGE UP (ref 135–145)
WBC # BLD: 9.31 K/UL — SIGNIFICANT CHANGE UP (ref 3.8–10.5)
WBC # FLD AUTO: 9.31 K/UL — SIGNIFICANT CHANGE UP (ref 3.8–10.5)

## 2022-04-26 ENCOUNTER — NON-APPOINTMENT (OUTPATIENT)
Age: 57
End: 2022-04-26

## 2022-04-26 DIAGNOSIS — R11.2 NAUSEA WITH VOMITING, UNSPECIFIED: ICD-10-CM

## 2022-04-26 DIAGNOSIS — Z51.11 ENCOUNTER FOR ANTINEOPLASTIC CHEMOTHERAPY: ICD-10-CM

## 2022-04-27 ENCOUNTER — APPOINTMENT (OUTPATIENT)
Dept: INFUSION THERAPY | Facility: HOSPITAL | Age: 57
End: 2022-04-27

## 2022-05-09 ENCOUNTER — RESULT REVIEW (OUTPATIENT)
Age: 57
End: 2022-05-09

## 2022-05-09 ENCOUNTER — APPOINTMENT (OUTPATIENT)
Dept: HEMATOLOGY ONCOLOGY | Facility: CLINIC | Age: 57
End: 2022-05-09
Payer: COMMERCIAL

## 2022-05-09 ENCOUNTER — APPOINTMENT (OUTPATIENT)
Dept: ULTRASOUND IMAGING | Facility: IMAGING CENTER | Age: 57
End: 2022-05-09
Payer: COMMERCIAL

## 2022-05-09 ENCOUNTER — APPOINTMENT (OUTPATIENT)
Dept: INFUSION THERAPY | Facility: HOSPITAL | Age: 57
End: 2022-05-09

## 2022-05-09 ENCOUNTER — OUTPATIENT (OUTPATIENT)
Dept: OUTPATIENT SERVICES | Facility: HOSPITAL | Age: 57
LOS: 1 days | End: 2022-05-09
Payer: COMMERCIAL

## 2022-05-09 DIAGNOSIS — C18.9 MALIGNANT NEOPLASM OF COLON, UNSPECIFIED: ICD-10-CM

## 2022-05-09 LAB
ALBUMIN SERPL ELPH-MCNC: 3.8 G/DL — SIGNIFICANT CHANGE UP (ref 3.3–5)
ALP SERPL-CCNC: 120 U/L — SIGNIFICANT CHANGE UP (ref 40–120)
ALT FLD-CCNC: 33 U/L — SIGNIFICANT CHANGE UP (ref 10–45)
ANION GAP SERPL CALC-SCNC: 11 MMOL/L — SIGNIFICANT CHANGE UP (ref 5–17)
AST SERPL-CCNC: 34 U/L — SIGNIFICANT CHANGE UP (ref 10–40)
BASOPHILS # BLD AUTO: 0.05 K/UL — SIGNIFICANT CHANGE UP (ref 0–0.2)
BASOPHILS NFR BLD AUTO: 0.9 % — SIGNIFICANT CHANGE UP (ref 0–2)
BILIRUB SERPL-MCNC: 0.2 MG/DL — SIGNIFICANT CHANGE UP (ref 0.2–1.2)
BUN SERPL-MCNC: 23 MG/DL — SIGNIFICANT CHANGE UP (ref 7–23)
CALCIUM SERPL-MCNC: 9.1 MG/DL — SIGNIFICANT CHANGE UP (ref 8.4–10.5)
CEA SERPL-MCNC: 166 NG/ML — HIGH (ref 0–3.8)
CHLORIDE SERPL-SCNC: 108 MMOL/L — SIGNIFICANT CHANGE UP (ref 96–108)
CO2 SERPL-SCNC: 23 MMOL/L — SIGNIFICANT CHANGE UP (ref 22–31)
CREAT ?TM UR-MCNC: 80 MG/DL — SIGNIFICANT CHANGE UP
CREAT SERPL-MCNC: 0.93 MG/DL — SIGNIFICANT CHANGE UP (ref 0.5–1.3)
EGFR: 95 ML/MIN/1.73M2 — SIGNIFICANT CHANGE UP
EOSINOPHIL # BLD AUTO: 0.3 K/UL — SIGNIFICANT CHANGE UP (ref 0–0.5)
EOSINOPHIL NFR BLD AUTO: 5.1 % — SIGNIFICANT CHANGE UP (ref 0–6)
GLUCOSE SERPL-MCNC: 162 MG/DL — HIGH (ref 70–99)
HCT VFR BLD CALC: 38.8 % — LOW (ref 39–50)
HGB BLD-MCNC: 12.7 G/DL — LOW (ref 13–17)
IMM GRANULOCYTES NFR BLD AUTO: 0.3 % — SIGNIFICANT CHANGE UP (ref 0–1.5)
LYMPHOCYTES # BLD AUTO: 2.03 K/UL — SIGNIFICANT CHANGE UP (ref 1–3.3)
LYMPHOCYTES # BLD AUTO: 34.7 % — SIGNIFICANT CHANGE UP (ref 13–44)
MCHC RBC-ENTMCNC: 25.4 PG — LOW (ref 27–34)
MCHC RBC-ENTMCNC: 32.7 G/DL — SIGNIFICANT CHANGE UP (ref 32–36)
MCV RBC AUTO: 77.6 FL — LOW (ref 80–100)
MONOCYTES # BLD AUTO: 0.34 K/UL — SIGNIFICANT CHANGE UP (ref 0–0.9)
MONOCYTES NFR BLD AUTO: 5.8 % — SIGNIFICANT CHANGE UP (ref 2–14)
NEUTROPHILS # BLD AUTO: 3.11 K/UL — SIGNIFICANT CHANGE UP (ref 1.8–7.4)
NEUTROPHILS NFR BLD AUTO: 53.2 % — SIGNIFICANT CHANGE UP (ref 43–77)
NRBC # BLD: 0 /100 WBCS — SIGNIFICANT CHANGE UP (ref 0–0)
PLATELET # BLD AUTO: 231 K/UL — SIGNIFICANT CHANGE UP (ref 150–400)
POTASSIUM SERPL-MCNC: 4 MMOL/L — SIGNIFICANT CHANGE UP (ref 3.5–5.3)
POTASSIUM SERPL-SCNC: 4 MMOL/L — SIGNIFICANT CHANGE UP (ref 3.5–5.3)
PROT ?TM UR-MCNC: 9 MG/DL — SIGNIFICANT CHANGE UP (ref 0–12)
PROT SERPL-MCNC: 6.6 G/DL — SIGNIFICANT CHANGE UP (ref 6–8.3)
PROT/CREAT UR-RTO: 0.1 RATIO — SIGNIFICANT CHANGE UP (ref 0–0.2)
RBC # BLD: 5 M/UL — SIGNIFICANT CHANGE UP (ref 4.2–5.8)
RBC # FLD: 18.4 % — HIGH (ref 10.3–14.5)
SODIUM SERPL-SCNC: 141 MMOL/L — SIGNIFICANT CHANGE UP (ref 135–145)
WBC # BLD: 5.85 K/UL — SIGNIFICANT CHANGE UP (ref 3.8–10.5)
WBC # FLD AUTO: 5.85 K/UL — SIGNIFICANT CHANGE UP (ref 3.8–10.5)

## 2022-05-09 PROCEDURE — 93971 EXTREMITY STUDY: CPT

## 2022-05-09 PROCEDURE — 99214 OFFICE O/P EST MOD 30 MIN: CPT

## 2022-05-09 PROCEDURE — 93971 EXTREMITY STUDY: CPT | Mod: 26,RT

## 2022-05-10 NOTE — HISTORY OF PRESENT ILLNESS
[Disease: _____________________] : Disease: [unfilled] [AJCC Stage: ____] : AJCC Stage: [unfilled] [de-identified] : 54 y/o M with PMH of vitiligo who developed abdominal discomfort which started 12/2020 and he was admitted on 1/4/2021 @ [facility] where he was found to have mass in distal TC with hepatic and RLL metastatic disease seen in CT scan.  He was discharged home with outpatient follow up.  He had syncope at home on 1/18/2021 and then saw PMD on 1/19/2021 where he was sent to hospital and admitted to [facility] from 1/19/2021 to 1/19/2021.   He underwent liver biopsy on 1/25/2021 which confirmed metastatic colon cancer.  His CEA was elevated 2882 from 1/21/2021.  His CT c/a/p from 1/28/2021 showed findings consistent with metastatic colon cancer evidenced by annular colonic mass and numerous hepatic metastases, groundglass pulmonary opacity consistent with infectious etiology, including Covid 19, improved compared to prior exam.  \par \par He presents to us on 2/5/2021 and states he feels stronger since leaving hospital but has lost about 22 pounds from December 2020 to Feb 2021\par Given Stage IV disease, offered FOLFOX and reviewed palliative intent of treatment \par    \par Late Feb 2021 started FOLFOX \par March 2021 admitted for LBO and underwent resection with ostomy \par \par April 2021 restarted FOLFOX --> stopped oxali July 2021  [de-identified] : pan andrea wt, transverse colon primary (right side) [de-identified] : IFTIKHAR, TMB 5, APC R232*APC *\par GRM3 V271I, MAP2K1 (MEK1) K57E\par TP53 S127P [FreeTextEntry1] : Palliative FOLFOX started 2/16/2021 -> 5-FU only started on 7/19/21 --> POD 4/2022 ---> FOLFIRI/Avastin started 4/25/22 [de-identified] : Patient presents for follow up while in the treatment room today for re-evaluation s/p C1 FOLFIRI/Avastin.  Patient reports some abdominal cramping for a few days after treatment but denies anorexia, N/V, abdominal pain, diarrhea or constipation.  He states that symptoms resolved on their own without taking any medication.  He also reports new right scapular pain x 3 days.  He admits to heavy lifting (groceries and a case of water) one day prior to onset of symptoms.  He woke up with the pain and is unsure if its related to the heavy lifting or if he "slept wrong."  He admits to increased pain with palpation to the area and with ROM of the shoulder.  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  Denies taking medication for pain.

## 2022-05-10 NOTE — REVIEW OF SYSTEMS
[Negative] : Psychiatric [Muscle Pain] : muscle pain [Abdominal Pain] : no abdominal pain [Vomiting] : no vomiting [Constipation] : no constipation [Diarrhea] : no diarrhea [Dizziness] : no dizziness [Fainting] : no fainting [Difficulty Walking] : no difficulty walking [FreeTextEntry7] : episodes of abdominal cramping without pain [FreeTextEntry9] : right scapular pain [de-identified] : vitaligo [de-identified] : peripheral neuropathy to fingers and toes

## 2022-05-11 ENCOUNTER — APPOINTMENT (OUTPATIENT)
Dept: INFUSION THERAPY | Facility: HOSPITAL | Age: 57
End: 2022-05-11

## 2022-05-17 ENCOUNTER — NON-APPOINTMENT (OUTPATIENT)
Age: 57
End: 2022-05-17

## 2022-05-17 ENCOUNTER — OUTPATIENT (OUTPATIENT)
Dept: OUTPATIENT SERVICES | Facility: HOSPITAL | Age: 57
LOS: 1 days | Discharge: HOME | End: 2022-05-17

## 2022-05-17 DIAGNOSIS — C18.9 MALIGNANT NEOPLASM OF COLON, UNSPECIFIED: ICD-10-CM

## 2022-05-23 ENCOUNTER — RESULT REVIEW (OUTPATIENT)
Age: 57
End: 2022-05-23

## 2022-05-23 ENCOUNTER — APPOINTMENT (OUTPATIENT)
Dept: INFUSION THERAPY | Facility: HOSPITAL | Age: 57
End: 2022-05-23

## 2022-05-23 LAB
ALBUMIN SERPL ELPH-MCNC: 3.9 G/DL — SIGNIFICANT CHANGE UP (ref 3.3–5)
ALP SERPL-CCNC: 106 U/L — SIGNIFICANT CHANGE UP (ref 40–120)
ALT FLD-CCNC: 36 U/L — SIGNIFICANT CHANGE UP (ref 10–45)
ANION GAP SERPL CALC-SCNC: 14 MMOL/L — SIGNIFICANT CHANGE UP (ref 5–17)
AST SERPL-CCNC: 35 U/L — SIGNIFICANT CHANGE UP (ref 10–40)
BASOPHILS # BLD AUTO: 0.07 K/UL — SIGNIFICANT CHANGE UP (ref 0–0.2)
BASOPHILS NFR BLD AUTO: 1 % — SIGNIFICANT CHANGE UP (ref 0–2)
BILIRUB SERPL-MCNC: 0.4 MG/DL — SIGNIFICANT CHANGE UP (ref 0.2–1.2)
BUN SERPL-MCNC: 19 MG/DL — SIGNIFICANT CHANGE UP (ref 7–23)
CALCIUM SERPL-MCNC: 8.5 MG/DL — SIGNIFICANT CHANGE UP (ref 8.4–10.5)
CHLORIDE SERPL-SCNC: 106 MMOL/L — SIGNIFICANT CHANGE UP (ref 96–108)
CO2 SERPL-SCNC: 19 MMOL/L — LOW (ref 22–31)
CREAT ?TM UR-MCNC: 228 MG/DL — SIGNIFICANT CHANGE UP
CREAT SERPL-MCNC: 0.98 MG/DL — SIGNIFICANT CHANGE UP (ref 0.5–1.3)
EGFR: 89 ML/MIN/1.73M2 — SIGNIFICANT CHANGE UP
EOSINOPHIL # BLD AUTO: 0.36 K/UL — SIGNIFICANT CHANGE UP (ref 0–0.5)
EOSINOPHIL NFR BLD AUTO: 4.9 % — SIGNIFICANT CHANGE UP (ref 0–6)
GLUCOSE SERPL-MCNC: 119 MG/DL — HIGH (ref 70–99)
HCT VFR BLD CALC: 40.5 % — SIGNIFICANT CHANGE UP (ref 39–50)
HGB BLD-MCNC: 13.1 G/DL — SIGNIFICANT CHANGE UP (ref 13–17)
IMM GRANULOCYTES NFR BLD AUTO: 0.5 % — SIGNIFICANT CHANGE UP (ref 0–1.5)
LYMPHOCYTES # BLD AUTO: 2.02 K/UL — SIGNIFICANT CHANGE UP (ref 1–3.3)
LYMPHOCYTES # BLD AUTO: 27.6 % — SIGNIFICANT CHANGE UP (ref 13–44)
MCHC RBC-ENTMCNC: 25.3 PG — LOW (ref 27–34)
MCHC RBC-ENTMCNC: 32.3 G/DL — SIGNIFICANT CHANGE UP (ref 32–36)
MCV RBC AUTO: 78.2 FL — LOW (ref 80–100)
MONOCYTES # BLD AUTO: 0.55 K/UL — SIGNIFICANT CHANGE UP (ref 0–0.9)
MONOCYTES NFR BLD AUTO: 7.5 % — SIGNIFICANT CHANGE UP (ref 2–14)
NEUTROPHILS # BLD AUTO: 4.29 K/UL — SIGNIFICANT CHANGE UP (ref 1.8–7.4)
NEUTROPHILS NFR BLD AUTO: 58.5 % — SIGNIFICANT CHANGE UP (ref 43–77)
NRBC # BLD: 0 /100 WBCS — SIGNIFICANT CHANGE UP (ref 0–0)
PLATELET # BLD AUTO: 244 K/UL — SIGNIFICANT CHANGE UP (ref 150–400)
POTASSIUM SERPL-MCNC: 3.9 MMOL/L — SIGNIFICANT CHANGE UP (ref 3.5–5.3)
POTASSIUM SERPL-SCNC: 3.9 MMOL/L — SIGNIFICANT CHANGE UP (ref 3.5–5.3)
PROT ?TM UR-MCNC: 17 MG/DL — HIGH (ref 0–12)
PROT SERPL-MCNC: 6.4 G/DL — SIGNIFICANT CHANGE UP (ref 6–8.3)
PROT/CREAT UR-RTO: 0.1 RATIO — SIGNIFICANT CHANGE UP (ref 0–0.2)
RBC # BLD: 5.18 M/UL — SIGNIFICANT CHANGE UP (ref 4.2–5.8)
RBC # FLD: 18.6 % — HIGH (ref 10.3–14.5)
SODIUM SERPL-SCNC: 139 MMOL/L — SIGNIFICANT CHANGE UP (ref 135–145)
WBC # BLD: 7.33 K/UL — SIGNIFICANT CHANGE UP (ref 3.8–10.5)
WBC # FLD AUTO: 7.33 K/UL — SIGNIFICANT CHANGE UP (ref 3.8–10.5)

## 2022-05-24 DIAGNOSIS — Z51.11 ENCOUNTER FOR ANTINEOPLASTIC CHEMOTHERAPY: ICD-10-CM

## 2022-05-24 DIAGNOSIS — R11.2 NAUSEA WITH VOMITING, UNSPECIFIED: ICD-10-CM

## 2022-05-25 ENCOUNTER — APPOINTMENT (OUTPATIENT)
Dept: INFUSION THERAPY | Facility: HOSPITAL | Age: 57
End: 2022-05-25

## 2022-06-06 ENCOUNTER — RESULT REVIEW (OUTPATIENT)
Age: 57
End: 2022-06-06

## 2022-06-06 ENCOUNTER — APPOINTMENT (OUTPATIENT)
Dept: HEMATOLOGY ONCOLOGY | Facility: CLINIC | Age: 57
End: 2022-06-06
Payer: COMMERCIAL

## 2022-06-06 ENCOUNTER — NON-APPOINTMENT (OUTPATIENT)
Age: 57
End: 2022-06-06

## 2022-06-06 ENCOUNTER — APPOINTMENT (OUTPATIENT)
Dept: INFUSION THERAPY | Facility: HOSPITAL | Age: 57
End: 2022-06-06

## 2022-06-06 LAB
ALBUMIN SERPL ELPH-MCNC: 3.7 G/DL — SIGNIFICANT CHANGE UP (ref 3.3–5)
ALP SERPL-CCNC: 110 U/L — SIGNIFICANT CHANGE UP (ref 40–120)
ALT FLD-CCNC: 29 U/L — SIGNIFICANT CHANGE UP (ref 10–45)
ANION GAP SERPL CALC-SCNC: 11 MMOL/L — SIGNIFICANT CHANGE UP (ref 5–17)
ANISOCYTOSIS BLD QL: SLIGHT — SIGNIFICANT CHANGE UP
AST SERPL-CCNC: 35 U/L — SIGNIFICANT CHANGE UP (ref 10–40)
BASOPHILS # BLD AUTO: 0 K/UL — SIGNIFICANT CHANGE UP (ref 0–0.2)
BASOPHILS NFR BLD AUTO: 0 % — SIGNIFICANT CHANGE UP (ref 0–2)
BILIRUB SERPL-MCNC: 0.4 MG/DL — SIGNIFICANT CHANGE UP (ref 0.2–1.2)
BUN SERPL-MCNC: 13 MG/DL — SIGNIFICANT CHANGE UP (ref 7–23)
CALCIUM SERPL-MCNC: 8.3 MG/DL — LOW (ref 8.4–10.5)
CEA SERPL-MCNC: 106 NG/ML — HIGH (ref 0–3.8)
CHLORIDE SERPL-SCNC: 106 MMOL/L — SIGNIFICANT CHANGE UP (ref 96–108)
CO2 SERPL-SCNC: 22 MMOL/L — SIGNIFICANT CHANGE UP (ref 22–31)
CREAT ?TM UR-MCNC: 53 MG/DL — SIGNIFICANT CHANGE UP
CREAT SERPL-MCNC: 0.91 MG/DL — SIGNIFICANT CHANGE UP (ref 0.5–1.3)
EGFR: 98 ML/MIN/1.73M2 — SIGNIFICANT CHANGE UP
ELLIPTOCYTES BLD QL SMEAR: SLIGHT — SIGNIFICANT CHANGE UP
EOSINOPHIL # BLD AUTO: 0.13 K/UL — SIGNIFICANT CHANGE UP (ref 0–0.5)
EOSINOPHIL NFR BLD AUTO: 3 % — SIGNIFICANT CHANGE UP (ref 0–6)
GLUCOSE SERPL-MCNC: 103 MG/DL — HIGH (ref 70–99)
HCT VFR BLD CALC: 38.2 % — LOW (ref 39–50)
HGB BLD-MCNC: 12.6 G/DL — LOW (ref 13–17)
LYMPHOCYTES # BLD AUTO: 2.45 K/UL — SIGNIFICANT CHANGE UP (ref 1–3.3)
LYMPHOCYTES # BLD AUTO: 55 % — HIGH (ref 13–44)
MCHC RBC-ENTMCNC: 25.4 PG — LOW (ref 27–34)
MCHC RBC-ENTMCNC: 33 G/DL — SIGNIFICANT CHANGE UP (ref 32–36)
MCV RBC AUTO: 76.9 FL — LOW (ref 80–100)
MONOCYTES # BLD AUTO: 0.45 K/UL — SIGNIFICANT CHANGE UP (ref 0–0.9)
MONOCYTES NFR BLD AUTO: 10 % — SIGNIFICANT CHANGE UP (ref 2–14)
NEUTROPHILS # BLD AUTO: 1.42 K/UL — LOW (ref 1.8–7.4)
NEUTROPHILS NFR BLD AUTO: 32 % — LOW (ref 43–77)
NRBC # BLD: 0 /100 — SIGNIFICANT CHANGE UP (ref 0–0)
NRBC # BLD: SIGNIFICANT CHANGE UP /100 WBCS (ref 0–0)
PLAT MORPH BLD: NORMAL — SIGNIFICANT CHANGE UP
PLATELET # BLD AUTO: 184 K/UL — SIGNIFICANT CHANGE UP (ref 150–400)
POIKILOCYTOSIS BLD QL AUTO: SLIGHT — SIGNIFICANT CHANGE UP
POTASSIUM SERPL-MCNC: 4 MMOL/L — SIGNIFICANT CHANGE UP (ref 3.5–5.3)
POTASSIUM SERPL-SCNC: 4 MMOL/L — SIGNIFICANT CHANGE UP (ref 3.5–5.3)
PROT ?TM UR-MCNC: 6 MG/DL — SIGNIFICANT CHANGE UP (ref 0–12)
PROT SERPL-MCNC: 6.4 G/DL — SIGNIFICANT CHANGE UP (ref 6–8.3)
PROT/CREAT UR-RTO: 0.1 RATIO — SIGNIFICANT CHANGE UP (ref 0–0.2)
RBC # BLD: 4.97 M/UL — SIGNIFICANT CHANGE UP (ref 4.2–5.8)
RBC # FLD: 17.9 % — HIGH (ref 10.3–14.5)
RBC BLD AUTO: ABNORMAL
SODIUM SERPL-SCNC: 140 MMOL/L — SIGNIFICANT CHANGE UP (ref 135–145)
TARGETS BLD QL SMEAR: SLIGHT — SIGNIFICANT CHANGE UP
WBC # BLD: 4.45 K/UL — SIGNIFICANT CHANGE UP (ref 3.8–10.5)
WBC # FLD AUTO: 4.45 K/UL — SIGNIFICANT CHANGE UP (ref 3.8–10.5)

## 2022-06-06 PROCEDURE — 99214 OFFICE O/P EST MOD 30 MIN: CPT

## 2022-06-07 ENCOUNTER — APPOINTMENT (OUTPATIENT)
Dept: HEMATOLOGY ONCOLOGY | Facility: CLINIC | Age: 57
End: 2022-06-07

## 2022-06-07 NOTE — PHYSICAL EXAM
[Fully active, able to carry on all pre-disease performance without restriction] : Status 0 - Fully active, able to carry on all pre-disease performance without restriction [Thin] : thin [Normal] : affect appropriate [de-identified] : anicteric  [de-identified] : no JVD, no swelling or tenderness, FROM intact without pain [de-identified] : normal respiratory effort, no audible wheeze [de-identified] : reg rate  [de-identified] : no LE edema [de-identified] : + ostomy, soft, non-tender [de-identified] : ROM intact B/L UE [de-identified] : vitiligo [de-identified] : grade 1 neuropathy in fingers and toes

## 2022-06-07 NOTE — REVIEW OF SYSTEMS
[Muscle Pain] : muscle pain [Negative] : Psychiatric [Abdominal Pain] : no abdominal pain [Vomiting] : no vomiting [Constipation] : no constipation [Diarrhea] : no diarrhea [Dizziness] : no dizziness [Fainting] : no fainting [Difficulty Walking] : no difficulty walking [FreeTextEntry9] : right scapular intermittent pain [de-identified] : vitaligo [de-identified] : peripheral neuropathy to fingers and toes

## 2022-06-07 NOTE — HISTORY OF PRESENT ILLNESS
[Disease: _____________________] : Disease: [unfilled] [AJCC Stage: ____] : AJCC Stage: [unfilled] [de-identified] : 54 y/o M with PMH of vitiligo who developed abdominal discomfort which started 12/2020 and he was admitted on 1/4/2021 @ [facility] where he was found to have mass in distal TC with hepatic and RLL metastatic disease seen in CT scan.  He was discharged home with outpatient follow up.  He had syncope at home on 1/18/2021 and then saw PMD on 1/19/2021 where he was sent to hospital and admitted to [facility] from 1/19/2021 to 1/19/2021.   He underwent liver biopsy on 1/25/2021 which confirmed metastatic colon cancer.  His CEA was elevated 2882 from 1/21/2021.  His CT c/a/p from 1/28/2021 showed findings consistent with metastatic colon cancer evidenced by annular colonic mass and numerous hepatic metastases, groundglass pulmonary opacity consistent with infectious etiology, including Covid 19, improved compared to prior exam.  \par \par He presents to us on 2/5/2021 and states he feels stronger since leaving hospital but has lost about 22 pounds from December 2020 to Feb 2021\par Given Stage IV disease, offered FOLFOX and reviewed palliative intent of treatment \par    \par Late Feb 2021 started FOLFOX \par March 2021 admitted for LBO and underwent resection with ostomy \par \par April 2021 restarted FOLFOX --> stopped oxali July 2021  [de-identified] : pan andrea wt, transverse colon primary (right side) [de-identified] : IFTIKHAR, TMB 5, APC R232*APC *\par GRM3 V271I, MAP2K1 (MEK1) K57E\par TP53 S127P [FreeTextEntry1] : Palliative FOLFOX started 2/16/2021 -> 5-FU only started on 7/19/21 --> POD 4/2022 ---> FOLFIRI/Avastin started 4/25/22 [de-identified] : Patient presents for follow up while receiving C4 of current regimen.  He reports tolerating the treatment well. He admits to some fatigue in the first few days after treatment but then resolves.  Denies anorexia, weight loss, abdominal pain, vomiting, diarrhea, constipation, leg swelling.  He reports that his previous right shoulder pain has improved but he still feels some pain which limits his ROM towards the end of the day.  He takes Tylenol intermittently with relief.  He is scheduled to see his PCP next week for further evaluation.

## 2022-06-08 ENCOUNTER — APPOINTMENT (OUTPATIENT)
Dept: INFUSION THERAPY | Facility: HOSPITAL | Age: 57
End: 2022-06-08

## 2022-06-10 ENCOUNTER — RESULT REVIEW (OUTPATIENT)
Age: 57
End: 2022-06-10

## 2022-06-13 ENCOUNTER — OUTPATIENT (OUTPATIENT)
Dept: OUTPATIENT SERVICES | Facility: HOSPITAL | Age: 57
LOS: 1 days | Discharge: HOME | End: 2022-06-13

## 2022-06-13 DIAGNOSIS — C18.9 MALIGNANT NEOPLASM OF COLON, UNSPECIFIED: ICD-10-CM

## 2022-06-15 NOTE — H&P ADULT - PROBLEM SELECTOR PLAN 1
appropriate Unclear etiology. Unclear etiology. Suspect 2/2 orthostatic hypotension, but can also be reflex-mediated/vasovagal or cardiac (structural heart disease, arrhythmias) or in setting of anemia.  - Orthostatics checked s/p 1L NS bolus in ED and positive. Pt with increase in HR of >30 bpm when going from sitting to standing.   - C/w IVF with NS at 125 cc/hr x8 hrs overnight  - CTA chest negative for PE  - Hs-trop drawn in ED but result unable to be obtained due to lab issues. Will check hs-trop in AM. EKG without any acute ischemic changes. Low suspicion for myocardial ischemia/ACS as etiology at this time.  - Check TTE to evaluate for any structural heart disease  - Monitor on tele for any concerning arrhythmias  - Anemia workup as below  - PT consult  - Fall risk protocol

## 2022-06-20 ENCOUNTER — RESULT REVIEW (OUTPATIENT)
Age: 57
End: 2022-06-20

## 2022-06-20 ENCOUNTER — APPOINTMENT (OUTPATIENT)
Dept: INFUSION THERAPY | Facility: HOSPITAL | Age: 57
End: 2022-06-20

## 2022-06-20 LAB
ALBUMIN SERPL ELPH-MCNC: 3.8 G/DL — SIGNIFICANT CHANGE UP (ref 3.3–5)
ALP SERPL-CCNC: 105 U/L — SIGNIFICANT CHANGE UP (ref 40–120)
ALT FLD-CCNC: 33 U/L — SIGNIFICANT CHANGE UP (ref 10–45)
ANION GAP SERPL CALC-SCNC: 12 MMOL/L — SIGNIFICANT CHANGE UP (ref 5–17)
AST SERPL-CCNC: 37 U/L — SIGNIFICANT CHANGE UP (ref 10–40)
BASOPHILS # BLD AUTO: 0.04 K/UL — SIGNIFICANT CHANGE UP (ref 0–0.2)
BASOPHILS NFR BLD AUTO: 0.7 % — SIGNIFICANT CHANGE UP (ref 0–2)
BILIRUB SERPL-MCNC: 0.2 MG/DL — SIGNIFICANT CHANGE UP (ref 0.2–1.2)
BUN SERPL-MCNC: 19 MG/DL — SIGNIFICANT CHANGE UP (ref 7–23)
CALCIUM SERPL-MCNC: 8.6 MG/DL — SIGNIFICANT CHANGE UP (ref 8.4–10.5)
CEA SERPL-MCNC: 89.8 NG/ML — HIGH (ref 0–3.8)
CHLORIDE SERPL-SCNC: 107 MMOL/L — SIGNIFICANT CHANGE UP (ref 96–108)
CO2 SERPL-SCNC: 22 MMOL/L — SIGNIFICANT CHANGE UP (ref 22–31)
CREAT SERPL-MCNC: 0.9 MG/DL — SIGNIFICANT CHANGE UP (ref 0.5–1.3)
EGFR: 99 ML/MIN/1.73M2 — SIGNIFICANT CHANGE UP
EOSINOPHIL # BLD AUTO: 0.48 K/UL — SIGNIFICANT CHANGE UP (ref 0–0.5)
EOSINOPHIL NFR BLD AUTO: 8.8 % — HIGH (ref 0–6)
GLUCOSE SERPL-MCNC: 147 MG/DL — HIGH (ref 70–99)
HCT VFR BLD CALC: 38.2 % — LOW (ref 39–50)
HGB BLD-MCNC: 12.4 G/DL — LOW (ref 13–17)
IMM GRANULOCYTES NFR BLD AUTO: 0.5 % — SIGNIFICANT CHANGE UP (ref 0–1.5)
LYMPHOCYTES # BLD AUTO: 2.26 K/UL — SIGNIFICANT CHANGE UP (ref 1–3.3)
LYMPHOCYTES # BLD AUTO: 41.4 % — SIGNIFICANT CHANGE UP (ref 13–44)
MCHC RBC-ENTMCNC: 25.1 PG — LOW (ref 27–34)
MCHC RBC-ENTMCNC: 32.5 G/DL — SIGNIFICANT CHANGE UP (ref 32–36)
MCV RBC AUTO: 77.2 FL — LOW (ref 80–100)
MONOCYTES # BLD AUTO: 0.46 K/UL — SIGNIFICANT CHANGE UP (ref 0–0.9)
MONOCYTES NFR BLD AUTO: 8.4 % — SIGNIFICANT CHANGE UP (ref 2–14)
NEUTROPHILS # BLD AUTO: 2.19 K/UL — SIGNIFICANT CHANGE UP (ref 1.8–7.4)
NEUTROPHILS NFR BLD AUTO: 40.2 % — LOW (ref 43–77)
NRBC # BLD: 0 /100 WBCS — SIGNIFICANT CHANGE UP (ref 0–0)
PLATELET # BLD AUTO: 210 K/UL — SIGNIFICANT CHANGE UP (ref 150–400)
POTASSIUM SERPL-MCNC: 4 MMOL/L — SIGNIFICANT CHANGE UP (ref 3.5–5.3)
POTASSIUM SERPL-SCNC: 4 MMOL/L — SIGNIFICANT CHANGE UP (ref 3.5–5.3)
PROT SERPL-MCNC: 6.4 G/DL — SIGNIFICANT CHANGE UP (ref 6–8.3)
RBC # BLD: 4.95 M/UL — SIGNIFICANT CHANGE UP (ref 4.2–5.8)
RBC # FLD: 18.2 % — HIGH (ref 10.3–14.5)
SODIUM SERPL-SCNC: 141 MMOL/L — SIGNIFICANT CHANGE UP (ref 135–145)
WBC # BLD: 5.46 K/UL — SIGNIFICANT CHANGE UP (ref 3.8–10.5)
WBC # FLD AUTO: 5.46 K/UL — SIGNIFICANT CHANGE UP (ref 3.8–10.5)

## 2022-06-21 DIAGNOSIS — R11.2 NAUSEA WITH VOMITING, UNSPECIFIED: ICD-10-CM

## 2022-06-21 DIAGNOSIS — Z51.11 ENCOUNTER FOR ANTINEOPLASTIC CHEMOTHERAPY: ICD-10-CM

## 2022-06-21 LAB
CREAT ?TM UR-MCNC: 71 MG/DL — SIGNIFICANT CHANGE UP
PROT ?TM UR-MCNC: 7 MG/DL — SIGNIFICANT CHANGE UP (ref 0–12)
PROT/CREAT UR-RTO: 0.1 RATIO — SIGNIFICANT CHANGE UP (ref 0–0.2)

## 2022-06-22 ENCOUNTER — APPOINTMENT (OUTPATIENT)
Dept: INFUSION THERAPY | Facility: HOSPITAL | Age: 57
End: 2022-06-22

## 2022-06-23 ENCOUNTER — OUTPATIENT (OUTPATIENT)
Dept: OUTPATIENT SERVICES | Facility: HOSPITAL | Age: 57
LOS: 1 days | End: 2022-06-23
Payer: COMMERCIAL

## 2022-06-23 ENCOUNTER — APPOINTMENT (OUTPATIENT)
Dept: CT IMAGING | Facility: IMAGING CENTER | Age: 57
End: 2022-06-23

## 2022-06-23 DIAGNOSIS — Z00.8 ENCOUNTER FOR OTHER GENERAL EXAMINATION: ICD-10-CM

## 2022-06-23 DIAGNOSIS — C18.9 MALIGNANT NEOPLASM OF COLON, UNSPECIFIED: ICD-10-CM

## 2022-06-23 PROCEDURE — 74177 CT ABD & PELVIS W/CONTRAST: CPT | Mod: 26

## 2022-06-23 PROCEDURE — 71260 CT THORAX DX C+: CPT

## 2022-06-23 PROCEDURE — 74177 CT ABD & PELVIS W/CONTRAST: CPT

## 2022-06-23 PROCEDURE — 71260 CT THORAX DX C+: CPT | Mod: 26

## 2022-07-11 ENCOUNTER — RESULT REVIEW (OUTPATIENT)
Age: 57
End: 2022-07-11

## 2022-07-11 ENCOUNTER — NON-APPOINTMENT (OUTPATIENT)
Age: 57
End: 2022-07-11

## 2022-07-11 ENCOUNTER — APPOINTMENT (OUTPATIENT)
Dept: HEMATOLOGY ONCOLOGY | Facility: CLINIC | Age: 57
End: 2022-07-11

## 2022-07-11 ENCOUNTER — APPOINTMENT (OUTPATIENT)
Dept: INFUSION THERAPY | Facility: HOSPITAL | Age: 57
End: 2022-07-11

## 2022-07-11 VITALS
HEIGHT: 64.96 IN | DIASTOLIC BLOOD PRESSURE: 64 MMHG | TEMPERATURE: 97.7 F | BODY MASS INDEX: 21.19 KG/M2 | OXYGEN SATURATION: 99 % | SYSTOLIC BLOOD PRESSURE: 105 MMHG | RESPIRATION RATE: 16 BRPM | WEIGHT: 127.21 LBS | HEART RATE: 93 BPM

## 2022-07-11 LAB
ALBUMIN SERPL ELPH-MCNC: 3.9 G/DL — SIGNIFICANT CHANGE UP (ref 3.3–5)
ALP SERPL-CCNC: 79 U/L — SIGNIFICANT CHANGE UP (ref 40–120)
ALT FLD-CCNC: 26 U/L — SIGNIFICANT CHANGE UP (ref 10–45)
ANION GAP SERPL CALC-SCNC: 10 MMOL/L — SIGNIFICANT CHANGE UP (ref 5–17)
AST SERPL-CCNC: 31 U/L — SIGNIFICANT CHANGE UP (ref 10–40)
BASOPHILS # BLD AUTO: 0.1 K/UL — SIGNIFICANT CHANGE UP (ref 0–0.2)
BASOPHILS NFR BLD AUTO: 2 % — SIGNIFICANT CHANGE UP (ref 0–2)
BILIRUB SERPL-MCNC: 0.4 MG/DL — SIGNIFICANT CHANGE UP (ref 0.2–1.2)
BUN SERPL-MCNC: 13 MG/DL — SIGNIFICANT CHANGE UP (ref 7–23)
CALCIUM SERPL-MCNC: 8.8 MG/DL — SIGNIFICANT CHANGE UP (ref 8.4–10.5)
CHLORIDE SERPL-SCNC: 108 MMOL/L — SIGNIFICANT CHANGE UP (ref 96–108)
CO2 SERPL-SCNC: 24 MMOL/L — SIGNIFICANT CHANGE UP (ref 22–31)
CREAT SERPL-MCNC: 0.91 MG/DL — SIGNIFICANT CHANGE UP (ref 0.5–1.3)
EGFR: 98 ML/MIN/1.73M2 — SIGNIFICANT CHANGE UP
EOSINOPHIL # BLD AUTO: 0.16 K/UL — SIGNIFICANT CHANGE UP (ref 0–0.5)
EOSINOPHIL NFR BLD AUTO: 3 % — SIGNIFICANT CHANGE UP (ref 0–6)
GLUCOSE SERPL-MCNC: 68 MG/DL — LOW (ref 70–99)
HCT VFR BLD CALC: 40.2 % — SIGNIFICANT CHANGE UP (ref 39–50)
HGB BLD-MCNC: 13 G/DL — SIGNIFICANT CHANGE UP (ref 13–17)
LYMPHOCYTES # BLD AUTO: 2.91 K/UL — SIGNIFICANT CHANGE UP (ref 1–3.3)
LYMPHOCYTES # BLD AUTO: 56 % — HIGH (ref 13–44)
MCHC RBC-ENTMCNC: 25 PG — LOW (ref 27–34)
MCHC RBC-ENTMCNC: 32.3 G/DL — SIGNIFICANT CHANGE UP (ref 32–36)
MCV RBC AUTO: 77.5 FL — LOW (ref 80–100)
MONOCYTES # BLD AUTO: 0.26 K/UL — SIGNIFICANT CHANGE UP (ref 0–0.9)
MONOCYTES NFR BLD AUTO: 5 % — SIGNIFICANT CHANGE UP (ref 2–14)
NEUTROPHILS # BLD AUTO: 1.76 K/UL — LOW (ref 1.8–7.4)
NEUTROPHILS NFR BLD AUTO: 34 % — LOW (ref 43–77)
NRBC # BLD: 0 /100 — SIGNIFICANT CHANGE UP (ref 0–0)
NRBC # BLD: SIGNIFICANT CHANGE UP /100 WBCS (ref 0–0)
PLAT MORPH BLD: NORMAL — SIGNIFICANT CHANGE UP
PLATELET # BLD AUTO: 247 K/UL — SIGNIFICANT CHANGE UP (ref 150–400)
POTASSIUM SERPL-MCNC: 4.2 MMOL/L — SIGNIFICANT CHANGE UP (ref 3.5–5.3)
POTASSIUM SERPL-SCNC: 4.2 MMOL/L — SIGNIFICANT CHANGE UP (ref 3.5–5.3)
PROT SERPL-MCNC: 6.7 G/DL — SIGNIFICANT CHANGE UP (ref 6–8.3)
RBC # BLD: 5.19 M/UL — SIGNIFICANT CHANGE UP (ref 4.2–5.8)
RBC # FLD: 18.6 % — HIGH (ref 10.3–14.5)
RBC BLD AUTO: SIGNIFICANT CHANGE UP
SODIUM SERPL-SCNC: 141 MMOL/L — SIGNIFICANT CHANGE UP (ref 135–145)
WBC # BLD: 5.19 K/UL — SIGNIFICANT CHANGE UP (ref 3.8–10.5)
WBC # FLD AUTO: 5.19 K/UL — SIGNIFICANT CHANGE UP (ref 3.8–10.5)

## 2022-07-11 PROCEDURE — 99214 OFFICE O/P EST MOD 30 MIN: CPT

## 2022-07-12 NOTE — HISTORY OF PRESENT ILLNESS
[Disease: _____________________] : Disease: [unfilled] [AJCC Stage: ____] : AJCC Stage: [unfilled] [de-identified] : 54 y/o M with PMH of vitiligo who developed abdominal discomfort which started 12/2020 and he was admitted on 1/4/2021 @ [facility] where he was found to have mass in distal TC with hepatic and RLL metastatic disease seen in CT scan.  He was discharged home with outpatient follow up.  He had syncope at home on 1/18/2021 and then saw PMD on 1/19/2021 where he was sent to hospital and admitted to [facility] from 1/19/2021 to 1/19/2021.   He underwent liver biopsy on 1/25/2021 which confirmed metastatic colon cancer.  His CEA was elevated 2882 from 1/21/2021.  His CT c/a/p from 1/28/2021 showed findings consistent with metastatic colon cancer evidenced by annular colonic mass and numerous hepatic metastases, groundglass pulmonary opacity consistent with infectious etiology, including Covid 19, improved compared to prior exam.  \par \par He presents to us on 2/5/2021 and states he feels stronger since leaving hospital but has lost about 22 pounds from December 2020 to Feb 2021\par Given Stage IV disease, offered FOLFOX and reviewed palliative intent of treatment \par    \par Late Feb 2021 started FOLFOX \par March 2021 admitted for LBO and underwent resection with ostomy \par \par April 2021 restarted FOLFOX --> stopped oxali July 2021 \par April 2022 POD switched to FOLFIRI Day [de-identified] : pan andrea wt, transverse colon primary (right side) [de-identified] : IFTIKHAR, TMB 5, APC R232*APC *\par GRM3 V271I, MAP2K1 (MEK1) K57E\par TP53 S127P [FreeTextEntry1] :  FOLFIRI/Avastin started 4/25/22 [de-identified] : presents in follow up , no acute complaints\par no fever or chills\par here to review imaging to determine response to therapy\par active and traveling when able

## 2022-07-12 NOTE — PHYSICAL EXAM
[Fully active, able to carry on all pre-disease performance without restriction] : Status 0 - Fully active, able to carry on all pre-disease performance without restriction [Thin] : thin [Normal] : affect appropriate [de-identified] : anicteric  [de-identified] : normal respiratory effort, no audible wheeze [de-identified] : reg rate  [de-identified] : no LE edema [de-identified] : + ostomy, soft, non-tender [de-identified] : vitiligo

## 2022-07-12 NOTE — REVIEW OF SYSTEMS
[Negative] : Allergic/Immunologic [Abdominal Pain] : no abdominal pain [Vomiting] : no vomiting [Constipation] : no constipation [Diarrhea] : no diarrhea [Dizziness] : no dizziness [Fainting] : no fainting [Difficulty Walking] : no difficulty walking [de-identified] : vitaligo

## 2022-07-13 ENCOUNTER — APPOINTMENT (OUTPATIENT)
Dept: INFUSION THERAPY | Facility: HOSPITAL | Age: 57
End: 2022-07-13

## 2022-07-25 ENCOUNTER — RESULT REVIEW (OUTPATIENT)
Age: 57
End: 2022-07-25

## 2022-07-25 ENCOUNTER — APPOINTMENT (OUTPATIENT)
Dept: INFUSION THERAPY | Facility: HOSPITAL | Age: 57
End: 2022-07-25

## 2022-07-25 LAB
ALBUMIN SERPL ELPH-MCNC: 3.7 G/DL — SIGNIFICANT CHANGE UP (ref 3.3–5)
ALP SERPL-CCNC: 79 U/L — SIGNIFICANT CHANGE UP (ref 40–120)
ALT FLD-CCNC: 24 U/L — SIGNIFICANT CHANGE UP (ref 10–45)
ANION GAP SERPL CALC-SCNC: 11 MMOL/L — SIGNIFICANT CHANGE UP (ref 5–17)
AST SERPL-CCNC: 28 U/L — SIGNIFICANT CHANGE UP (ref 10–40)
BASOPHILS # BLD AUTO: 0.05 K/UL — SIGNIFICANT CHANGE UP (ref 0–0.2)
BASOPHILS NFR BLD AUTO: 0.9 % — SIGNIFICANT CHANGE UP (ref 0–2)
BILIRUB SERPL-MCNC: 0.5 MG/DL — SIGNIFICANT CHANGE UP (ref 0.2–1.2)
BUN SERPL-MCNC: 13 MG/DL — SIGNIFICANT CHANGE UP (ref 7–23)
CALCIUM SERPL-MCNC: 8.8 MG/DL — SIGNIFICANT CHANGE UP (ref 8.4–10.5)
CEA SERPL-MCNC: 76.2 NG/ML — HIGH (ref 0–3.8)
CHLORIDE SERPL-SCNC: 107 MMOL/L — SIGNIFICANT CHANGE UP (ref 96–108)
CO2 SERPL-SCNC: 22 MMOL/L — SIGNIFICANT CHANGE UP (ref 22–31)
CREAT SERPL-MCNC: 0.92 MG/DL — SIGNIFICANT CHANGE UP (ref 0.5–1.3)
EGFR: 96 ML/MIN/1.73M2 — SIGNIFICANT CHANGE UP
EOSINOPHIL # BLD AUTO: 0.25 K/UL — SIGNIFICANT CHANGE UP (ref 0–0.5)
EOSINOPHIL NFR BLD AUTO: 4.3 % — SIGNIFICANT CHANGE UP (ref 0–6)
GLUCOSE SERPL-MCNC: 158 MG/DL — HIGH (ref 70–99)
HCT VFR BLD CALC: 39.5 % — SIGNIFICANT CHANGE UP (ref 39–50)
HGB BLD-MCNC: 12.9 G/DL — LOW (ref 13–17)
IMM GRANULOCYTES NFR BLD AUTO: 0.3 % — SIGNIFICANT CHANGE UP (ref 0–1.5)
LYMPHOCYTES # BLD AUTO: 1.97 K/UL — SIGNIFICANT CHANGE UP (ref 1–3.3)
LYMPHOCYTES # BLD AUTO: 34.2 % — SIGNIFICANT CHANGE UP (ref 13–44)
MCHC RBC-ENTMCNC: 25.2 PG — LOW (ref 27–34)
MCHC RBC-ENTMCNC: 32.7 G/DL — SIGNIFICANT CHANGE UP (ref 32–36)
MCV RBC AUTO: 77.3 FL — LOW (ref 80–100)
MONOCYTES # BLD AUTO: 0.34 K/UL — SIGNIFICANT CHANGE UP (ref 0–0.9)
MONOCYTES NFR BLD AUTO: 5.9 % — SIGNIFICANT CHANGE UP (ref 2–14)
NEUTROPHILS # BLD AUTO: 3.13 K/UL — SIGNIFICANT CHANGE UP (ref 1.8–7.4)
NEUTROPHILS NFR BLD AUTO: 54.4 % — SIGNIFICANT CHANGE UP (ref 43–77)
NRBC # BLD: 0 /100 WBCS — SIGNIFICANT CHANGE UP (ref 0–0)
PLATELET # BLD AUTO: 211 K/UL — SIGNIFICANT CHANGE UP (ref 150–400)
POTASSIUM SERPL-MCNC: 3.8 MMOL/L — SIGNIFICANT CHANGE UP (ref 3.5–5.3)
POTASSIUM SERPL-SCNC: 3.8 MMOL/L — SIGNIFICANT CHANGE UP (ref 3.5–5.3)
PROT SERPL-MCNC: 6.3 G/DL — SIGNIFICANT CHANGE UP (ref 6–8.3)
RBC # BLD: 5.11 M/UL — SIGNIFICANT CHANGE UP (ref 4.2–5.8)
RBC # FLD: 17.3 % — HIGH (ref 10.3–14.5)
SODIUM SERPL-SCNC: 141 MMOL/L — SIGNIFICANT CHANGE UP (ref 135–145)
WBC # BLD: 5.76 K/UL — SIGNIFICANT CHANGE UP (ref 3.8–10.5)
WBC # FLD AUTO: 5.76 K/UL — SIGNIFICANT CHANGE UP (ref 3.8–10.5)

## 2022-07-27 ENCOUNTER — APPOINTMENT (OUTPATIENT)
Dept: INFUSION THERAPY | Facility: HOSPITAL | Age: 57
End: 2022-07-27

## 2022-08-03 ENCOUNTER — APPOINTMENT (OUTPATIENT)
Dept: HEMATOLOGY ONCOLOGY | Facility: CLINIC | Age: 57
End: 2022-08-03

## 2022-08-03 VITALS
HEART RATE: 76 BPM | RESPIRATION RATE: 16 BRPM | BODY MASS INDEX: 21.75 KG/M2 | TEMPERATURE: 98.1 F | DIASTOLIC BLOOD PRESSURE: 75 MMHG | HEIGHT: 64 IN | OXYGEN SATURATION: 99 % | SYSTOLIC BLOOD PRESSURE: 112 MMHG | WEIGHT: 127.43 LBS

## 2022-08-03 PROCEDURE — 99214 OFFICE O/P EST MOD 30 MIN: CPT

## 2022-08-04 RX ORDER — CYCLOBENZAPRINE HYDROCHLORIDE 5 MG/1
5 TABLET, FILM COATED ORAL
Qty: 30 | Refills: 0 | Status: COMPLETED | COMMUNITY
Start: 2022-05-09 | End: 2022-08-04

## 2022-08-04 NOTE — REVIEW OF SYSTEMS
[Negative] : Allergic/Immunologic [Abdominal Pain] : no abdominal pain [Vomiting] : no vomiting [Constipation] : no constipation [Diarrhea] : no diarrhea [Dizziness] : no dizziness [Fainting] : no fainting [Difficulty Walking] : no difficulty walking [de-identified] : vitaligo

## 2022-08-04 NOTE — HISTORY OF PRESENT ILLNESS
[Disease: _____________________] : Disease: [unfilled] [AJCC Stage: ____] : AJCC Stage: [unfilled] [de-identified] : 54 y/o M with PMH of vitiligo who developed abdominal discomfort which started 12/2020 and he was admitted on 1/4/2021 @ [facility] where he was found to have mass in distal TC with hepatic and RLL metastatic disease seen in CT scan.  He was discharged home with outpatient follow up.  He had syncope at home on 1/18/2021 and then saw PMD on 1/19/2021 where he was sent to hospital and admitted to [facility] from 1/19/2021 to 1/19/2021.   He underwent liver biopsy on 1/25/2021 which confirmed metastatic colon cancer.  His CEA was elevated 2882 from 1/21/2021.  His CT c/a/p from 1/28/2021 showed findings consistent with metastatic colon cancer evidenced by annular colonic mass and numerous hepatic metastases, groundglass pulmonary opacity consistent with infectious etiology, including Covid 19, improved compared to prior exam.  \par \par He presents to us on 2/5/2021 and states he feels stronger since leaving hospital but has lost about 22 pounds from December 2020 to Feb 2021\par Given Stage IV disease, offered FOLFOX and reviewed palliative intent of treatment \par    \par Late Feb 2021 started FOLFOX \par March 2021 admitted for LBO and underwent resection with ostomy \par \par April 2021 restarted FOLFOX --> stopped oxali July 2021 \par April 2022 POD switched to FOLFIRI Day [de-identified] : pan andrea wt, transverse colon primary (right side) [de-identified] : IFTIKHAR, TMB 5, APC R232*APC *\par GRM3 V271I, MAP2K1 (MEK1) K57E\par TP53 S127P [FreeTextEntry1] :  FOLFIRI/Avastin started 4/25/22 [de-identified] : Patient presents for follow up today and reports feeling well.  He admits to some nausea and fatigue with treatment but manageable.  Denies fever, anorexia, weight loss, abdominal pain, change in bowel habits.

## 2022-08-04 NOTE — PHYSICAL EXAM
[Fully active, able to carry on all pre-disease performance without restriction] : Status 0 - Fully active, able to carry on all pre-disease performance without restriction [Thin] : thin [Normal] : affect appropriate [de-identified] : anicteric  [de-identified] : normal respiratory effort, no audible wheeze [de-identified] : reg rate  [de-identified] : no LE edema [de-identified] : + ostomy, soft, non-tender [de-identified] : vitiligo

## 2022-08-08 ENCOUNTER — RESULT REVIEW (OUTPATIENT)
Age: 57
End: 2022-08-08

## 2022-08-08 ENCOUNTER — APPOINTMENT (OUTPATIENT)
Dept: INFUSION THERAPY | Facility: HOSPITAL | Age: 57
End: 2022-08-08

## 2022-08-08 LAB
ALBUMIN SERPL ELPH-MCNC: 4 G/DL — SIGNIFICANT CHANGE UP (ref 3.3–5)
ALP SERPL-CCNC: 75 U/L — SIGNIFICANT CHANGE UP (ref 40–120)
ALT FLD-CCNC: 20 U/L — SIGNIFICANT CHANGE UP (ref 10–45)
ANION GAP SERPL CALC-SCNC: 9 MMOL/L — SIGNIFICANT CHANGE UP (ref 5–17)
AST SERPL-CCNC: 26 U/L — SIGNIFICANT CHANGE UP (ref 10–40)
BASOPHILS # BLD AUTO: 0.04 K/UL — SIGNIFICANT CHANGE UP (ref 0–0.2)
BASOPHILS NFR BLD AUTO: 0.8 % — SIGNIFICANT CHANGE UP (ref 0–2)
BILIRUB SERPL-MCNC: 0.3 MG/DL — SIGNIFICANT CHANGE UP (ref 0.2–1.2)
BUN SERPL-MCNC: 16 MG/DL — SIGNIFICANT CHANGE UP (ref 7–23)
CALCIUM SERPL-MCNC: 9 MG/DL — SIGNIFICANT CHANGE UP (ref 8.4–10.5)
CHLORIDE SERPL-SCNC: 108 MMOL/L — SIGNIFICANT CHANGE UP (ref 96–108)
CO2 SERPL-SCNC: 23 MMOL/L — SIGNIFICANT CHANGE UP (ref 22–31)
CREAT SERPL-MCNC: 0.88 MG/DL — SIGNIFICANT CHANGE UP (ref 0.5–1.3)
EGFR: 100 ML/MIN/1.73M2 — SIGNIFICANT CHANGE UP
EOSINOPHIL # BLD AUTO: 0.29 K/UL — SIGNIFICANT CHANGE UP (ref 0–0.5)
EOSINOPHIL NFR BLD AUTO: 5.6 % — SIGNIFICANT CHANGE UP (ref 0–6)
GLUCOSE SERPL-MCNC: 99 MG/DL — SIGNIFICANT CHANGE UP (ref 70–99)
HCT VFR BLD CALC: 39 % — SIGNIFICANT CHANGE UP (ref 39–50)
HGB BLD-MCNC: 12.5 G/DL — LOW (ref 13–17)
IMM GRANULOCYTES NFR BLD AUTO: 0.6 % — SIGNIFICANT CHANGE UP (ref 0–1.5)
LYMPHOCYTES # BLD AUTO: 1.92 K/UL — SIGNIFICANT CHANGE UP (ref 1–3.3)
LYMPHOCYTES # BLD AUTO: 36.9 % — SIGNIFICANT CHANGE UP (ref 13–44)
MCHC RBC-ENTMCNC: 25.3 PG — LOW (ref 27–34)
MCHC RBC-ENTMCNC: 32.1 G/DL — SIGNIFICANT CHANGE UP (ref 32–36)
MCV RBC AUTO: 78.9 FL — LOW (ref 80–100)
MONOCYTES # BLD AUTO: 0.5 K/UL — SIGNIFICANT CHANGE UP (ref 0–0.9)
MONOCYTES NFR BLD AUTO: 9.6 % — SIGNIFICANT CHANGE UP (ref 2–14)
NEUTROPHILS # BLD AUTO: 2.42 K/UL — SIGNIFICANT CHANGE UP (ref 1.8–7.4)
NEUTROPHILS NFR BLD AUTO: 46.5 % — SIGNIFICANT CHANGE UP (ref 43–77)
NRBC # BLD: 0 /100 WBCS — SIGNIFICANT CHANGE UP (ref 0–0)
PLATELET # BLD AUTO: 214 K/UL — SIGNIFICANT CHANGE UP (ref 150–400)
POTASSIUM SERPL-MCNC: 3.9 MMOL/L — SIGNIFICANT CHANGE UP (ref 3.5–5.3)
POTASSIUM SERPL-SCNC: 3.9 MMOL/L — SIGNIFICANT CHANGE UP (ref 3.5–5.3)
PROT SERPL-MCNC: 6.4 G/DL — SIGNIFICANT CHANGE UP (ref 6–8.3)
RBC # BLD: 4.94 M/UL — SIGNIFICANT CHANGE UP (ref 4.2–5.8)
RBC # FLD: 17.6 % — HIGH (ref 10.3–14.5)
SODIUM SERPL-SCNC: 139 MMOL/L — SIGNIFICANT CHANGE UP (ref 135–145)
WBC # BLD: 5.2 K/UL — SIGNIFICANT CHANGE UP (ref 3.8–10.5)
WBC # FLD AUTO: 5.2 K/UL — SIGNIFICANT CHANGE UP (ref 3.8–10.5)

## 2022-08-09 LAB
CREAT ?TM UR-MCNC: 70 MG/DL — SIGNIFICANT CHANGE UP
PROT ?TM UR-MCNC: 14 MG/DL — HIGH (ref 0–12)
PROT/CREAT UR-RTO: 0.2 RATIO — SIGNIFICANT CHANGE UP (ref 0–0.2)

## 2022-08-10 ENCOUNTER — APPOINTMENT (OUTPATIENT)
Dept: INFUSION THERAPY | Facility: HOSPITAL | Age: 57
End: 2022-08-10

## 2022-08-22 ENCOUNTER — APPOINTMENT (OUTPATIENT)
Dept: INFUSION THERAPY | Facility: HOSPITAL | Age: 57
End: 2022-08-22

## 2022-08-24 ENCOUNTER — APPOINTMENT (OUTPATIENT)
Dept: INFUSION THERAPY | Facility: HOSPITAL | Age: 57
End: 2022-08-24

## 2022-08-30 ENCOUNTER — OUTPATIENT (OUTPATIENT)
Dept: OUTPATIENT SERVICES | Facility: HOSPITAL | Age: 57
LOS: 1 days | Discharge: HOME | End: 2022-08-30

## 2022-08-30 DIAGNOSIS — C18.9 MALIGNANT NEOPLASM OF COLON, UNSPECIFIED: ICD-10-CM

## 2022-09-01 ENCOUNTER — OUTPATIENT (OUTPATIENT)
Dept: OUTPATIENT SERVICES | Facility: HOSPITAL | Age: 57
LOS: 1 days | Discharge: HOME | End: 2022-09-01

## 2022-09-01 DIAGNOSIS — C18.9 MALIGNANT NEOPLASM OF COLON, UNSPECIFIED: ICD-10-CM

## 2022-09-02 ENCOUNTER — APPOINTMENT (OUTPATIENT)
Dept: HEMATOLOGY ONCOLOGY | Facility: CLINIC | Age: 57
End: 2022-09-02

## 2022-09-06 ENCOUNTER — APPOINTMENT (OUTPATIENT)
Dept: INFUSION THERAPY | Facility: HOSPITAL | Age: 57
End: 2022-09-06

## 2022-09-08 ENCOUNTER — APPOINTMENT (OUTPATIENT)
Dept: INFUSION THERAPY | Facility: HOSPITAL | Age: 57
End: 2022-09-08

## 2022-09-08 NOTE — PROGRESS NOTE ADULT - REASON FOR ADMISSION
Near syncope
Pharmacokinetic Assessment: IV Vancomycin    Vancomycin serum concentration assessment(s):    - Random level is subtherapeutic at 11.7mcg/mL  - Goal trough 15-20 mcg/mL  - Renal function stable w/ good UOP (1.3 cc/kg/hr)  - Pt bed bound d/t ALS. Scr is not the best indicator of renal function d/t limited muscle mass  -  Pt didn't tolerate scheduled q24h regimen. Will complete remaining course by pulse dosing as needed  - Give 750 mg x1 now  - Collect random level on 9/9 w/ AM labs  - Redose based on level and renal function  - Last day of abx 9/13    Drug levels (last 3 results):  Recent Labs   Lab Result Units 09/05/22  1714 09/07/22  0016 09/08/22  0244   Vancomycin, Random ug/mL  --  19.9 11.7   Vancomycin-Trough ug/mL 22.9*  --   --          Pharmacy will continue to follow and monitor vancomycin.    Please contact pharmacy at extension [phone] for questions regarding this assessment.      Thank you for the consult,     Corinne Bautista, PharmD, BCCCP  Neurocritical Care Clinical Pharmacist  Ext. [phone]         Patient brief summary:  Wei Dominique is a 64 y.o. male initiated on antimicrobial therapy with IV Vancomycin for treatment of bacteremia      Drug Allergies:   Review of patient's allergies indicates:  No Known Allergies      Renal Function:   Estimated Creatinine Clearance: 140.6 mL/min (based on SCr of 0.5 mg/dL).,     Dialysis Method (if applicable):  N/A      
Near syncope

## 2022-09-15 ENCOUNTER — OUTPATIENT (OUTPATIENT)
Dept: OUTPATIENT SERVICES | Facility: HOSPITAL | Age: 57
LOS: 1 days | Discharge: HOME | End: 2022-09-15

## 2022-09-15 DIAGNOSIS — C18.9 MALIGNANT NEOPLASM OF COLON, UNSPECIFIED: ICD-10-CM

## 2022-09-19 ENCOUNTER — RESULT REVIEW (OUTPATIENT)
Age: 57
End: 2022-09-19

## 2022-09-19 ENCOUNTER — APPOINTMENT (OUTPATIENT)
Dept: HEMATOLOGY ONCOLOGY | Facility: CLINIC | Age: 57
End: 2022-09-19

## 2022-09-19 ENCOUNTER — APPOINTMENT (OUTPATIENT)
Dept: INFUSION THERAPY | Facility: HOSPITAL | Age: 57
End: 2022-09-19

## 2022-09-19 ENCOUNTER — NON-APPOINTMENT (OUTPATIENT)
Age: 57
End: 2022-09-19

## 2022-09-19 VITALS
RESPIRATION RATE: 16 BRPM | SYSTOLIC BLOOD PRESSURE: 122 MMHG | DIASTOLIC BLOOD PRESSURE: 82 MMHG | HEART RATE: 80 BPM | WEIGHT: 130.51 LBS | OXYGEN SATURATION: 99 % | HEIGHT: 63.98 IN | BODY MASS INDEX: 22.28 KG/M2 | TEMPERATURE: 98.2 F

## 2022-09-19 LAB
ALBUMIN SERPL ELPH-MCNC: 4.1 G/DL — SIGNIFICANT CHANGE UP (ref 3.3–5)
ALP SERPL-CCNC: 111 U/L — SIGNIFICANT CHANGE UP (ref 40–120)
ALT FLD-CCNC: 38 U/L — SIGNIFICANT CHANGE UP (ref 10–45)
ANION GAP SERPL CALC-SCNC: 12 MMOL/L — SIGNIFICANT CHANGE UP (ref 5–17)
AST SERPL-CCNC: 38 U/L — SIGNIFICANT CHANGE UP (ref 10–40)
BASOPHILS # BLD AUTO: 0.07 K/UL — SIGNIFICANT CHANGE UP (ref 0–0.2)
BASOPHILS NFR BLD AUTO: 0.7 % — SIGNIFICANT CHANGE UP (ref 0–2)
BILIRUB SERPL-MCNC: 0.4 MG/DL — SIGNIFICANT CHANGE UP (ref 0.2–1.2)
BUN SERPL-MCNC: 13 MG/DL — SIGNIFICANT CHANGE UP (ref 7–23)
CALCIUM SERPL-MCNC: 9.2 MG/DL — SIGNIFICANT CHANGE UP (ref 8.4–10.5)
CEA SERPL-MCNC: 93.5 NG/ML — HIGH (ref 0–3.8)
CHLORIDE SERPL-SCNC: 106 MMOL/L — SIGNIFICANT CHANGE UP (ref 96–108)
CO2 SERPL-SCNC: 24 MMOL/L — SIGNIFICANT CHANGE UP (ref 22–31)
CREAT SERPL-MCNC: 0.88 MG/DL — SIGNIFICANT CHANGE UP (ref 0.5–1.3)
EGFR: 99 ML/MIN/1.73M2 — SIGNIFICANT CHANGE UP
EOSINOPHIL # BLD AUTO: 0.27 K/UL — SIGNIFICANT CHANGE UP (ref 0–0.5)
EOSINOPHIL NFR BLD AUTO: 2.7 % — SIGNIFICANT CHANGE UP (ref 0–6)
GLUCOSE SERPL-MCNC: 103 MG/DL — HIGH (ref 70–99)
HCT VFR BLD CALC: 40.4 % — SIGNIFICANT CHANGE UP (ref 39–50)
HGB BLD-MCNC: 13 G/DL — SIGNIFICANT CHANGE UP (ref 13–17)
IMM GRANULOCYTES NFR BLD AUTO: 0.4 % — SIGNIFICANT CHANGE UP (ref 0–0.9)
LYMPHOCYTES # BLD AUTO: 2.44 K/UL — SIGNIFICANT CHANGE UP (ref 1–3.3)
LYMPHOCYTES # BLD AUTO: 24.7 % — SIGNIFICANT CHANGE UP (ref 13–44)
MCHC RBC-ENTMCNC: 24.8 PG — LOW (ref 27–34)
MCHC RBC-ENTMCNC: 32.2 G/DL — SIGNIFICANT CHANGE UP (ref 32–36)
MCV RBC AUTO: 77.1 FL — LOW (ref 80–100)
MONOCYTES # BLD AUTO: 0.71 K/UL — SIGNIFICANT CHANGE UP (ref 0–0.9)
MONOCYTES NFR BLD AUTO: 7.2 % — SIGNIFICANT CHANGE UP (ref 2–14)
NEUTROPHILS # BLD AUTO: 6.36 K/UL — SIGNIFICANT CHANGE UP (ref 1.8–7.4)
NEUTROPHILS NFR BLD AUTO: 64.3 % — SIGNIFICANT CHANGE UP (ref 43–77)
NRBC # BLD: 0 /100 WBCS — SIGNIFICANT CHANGE UP (ref 0–0)
PLATELET # BLD AUTO: 233 K/UL — SIGNIFICANT CHANGE UP (ref 150–400)
POTASSIUM SERPL-MCNC: 4 MMOL/L — SIGNIFICANT CHANGE UP (ref 3.5–5.3)
POTASSIUM SERPL-SCNC: 4 MMOL/L — SIGNIFICANT CHANGE UP (ref 3.5–5.3)
PROT SERPL-MCNC: 7.1 G/DL — SIGNIFICANT CHANGE UP (ref 6–8.3)
RBC # BLD: 5.24 M/UL — SIGNIFICANT CHANGE UP (ref 4.2–5.8)
RBC # FLD: 15.8 % — HIGH (ref 10.3–14.5)
SODIUM SERPL-SCNC: 142 MMOL/L — SIGNIFICANT CHANGE UP (ref 135–145)
WBC # BLD: 9.89 K/UL — SIGNIFICANT CHANGE UP (ref 3.8–10.5)
WBC # FLD AUTO: 9.89 K/UL — SIGNIFICANT CHANGE UP (ref 3.8–10.5)

## 2022-09-19 PROCEDURE — 99214 OFFICE O/P EST MOD 30 MIN: CPT

## 2022-09-19 NOTE — PHYSICAL EXAM
[Fully active, able to carry on all pre-disease performance without restriction] : Status 0 - Fully active, able to carry on all pre-disease performance without restriction [Thin] : thin [Normal] : affect appropriate [de-identified] : anicteric  [de-identified] : normal respiratory effort, no audible wheeze [de-identified] : reg rate  [de-identified] : no LE edema [de-identified] : + ostomy, soft, non-tender [de-identified] : vitiligo

## 2022-09-19 NOTE — REVIEW OF SYSTEMS
[Negative] : Allergic/Immunologic [Abdominal Pain] : no abdominal pain [Vomiting] : no vomiting [Constipation] : no constipation [Diarrhea] : no diarrhea [Dizziness] : no dizziness [Fainting] : no fainting [Difficulty Walking] : no difficulty walking [de-identified] : vitaligo

## 2022-09-19 NOTE — HISTORY OF PRESENT ILLNESS
[Disease: _____________________] : Disease: [unfilled] [AJCC Stage: ____] : AJCC Stage: [unfilled] [de-identified] : 56 y/o M with PMH of vitiligo who developed abdominal discomfort which started 12/2020 and he was admitted on 1/4/2021 @ [facility] where he was found to have mass in distal TC with hepatic and RLL metastatic disease seen in CT scan.  He was discharged home with outpatient follow up.  He had syncope at home on 1/18/2021 and then saw PMD on 1/19/2021 where he was sent to hospital and admitted to [facility] from 1/19/2021 to 1/19/2021.   He underwent liver biopsy on 1/25/2021 which confirmed metastatic colon cancer.  His CEA was elevated 2882 from 1/21/2021.  His CT c/a/p from 1/28/2021 showed findings consistent with metastatic colon cancer evidenced by annular colonic mass and numerous hepatic metastases, groundglass pulmonary opacity consistent with infectious etiology, including Covid 19, improved compared to prior exam.  \par \par He presents to us on 2/5/2021 and states he feels stronger since leaving hospital but has lost about 22 pounds from December 2020 to Feb 2021\par Given Stage IV disease, offered FOLFOX and reviewed palliative intent of treatment \par    \par Late Feb 2021 started FOLFOX \par March 2021 admitted for LBO and underwent resection with ostomy \par \par April 2021 restarted FOLFOX --> stopped oxali July 2021 \par April 2022 POD switched to FOLFIRI Day [de-identified] : pan andrea wt, transverse colon primary (right side) [de-identified] : IFTIKHAR, TMB 5, APC R232*APC *\par GRM3 V271I, MAP2K1 (MEK1) K57E\par TP53 S127P [FreeTextEntry1] :  FOLFIRI/Avastin started 4/25/22 [de-identified] : Patient presents for follow and is scheduled for treatment today.  His last treatment was on 8/8 due to travel and then lost his insurance at the beginning of this month.  He reports feeling well.  Denies anorexia, weight loss, abdominal pain, N/V, change in bowel habits or blood in his stool.

## 2022-09-20 DIAGNOSIS — Z51.11 ENCOUNTER FOR ANTINEOPLASTIC CHEMOTHERAPY: ICD-10-CM

## 2022-09-20 DIAGNOSIS — R11.2 NAUSEA WITH VOMITING, UNSPECIFIED: ICD-10-CM

## 2022-09-21 ENCOUNTER — APPOINTMENT (OUTPATIENT)
Dept: INFUSION THERAPY | Facility: HOSPITAL | Age: 57
End: 2022-09-21

## 2022-09-29 ENCOUNTER — RESULT REVIEW (OUTPATIENT)
Age: 57
End: 2022-09-29

## 2022-10-03 ENCOUNTER — RESULT REVIEW (OUTPATIENT)
Age: 57
End: 2022-10-03

## 2022-10-03 ENCOUNTER — APPOINTMENT (OUTPATIENT)
Dept: INFUSION THERAPY | Facility: HOSPITAL | Age: 57
End: 2022-10-03

## 2022-10-03 LAB
ALBUMIN SERPL ELPH-MCNC: 3.9 G/DL — SIGNIFICANT CHANGE UP (ref 3.3–5)
ALP SERPL-CCNC: 98 U/L — SIGNIFICANT CHANGE UP (ref 40–120)
ALT FLD-CCNC: 31 U/L — SIGNIFICANT CHANGE UP (ref 10–45)
ANION GAP SERPL CALC-SCNC: 11 MMOL/L — SIGNIFICANT CHANGE UP (ref 5–17)
AST SERPL-CCNC: 30 U/L — SIGNIFICANT CHANGE UP (ref 10–40)
BASOPHILS # BLD AUTO: 0.05 K/UL — SIGNIFICANT CHANGE UP (ref 0–0.2)
BASOPHILS NFR BLD AUTO: 0.8 % — SIGNIFICANT CHANGE UP (ref 0–2)
BILIRUB SERPL-MCNC: 0.2 MG/DL — SIGNIFICANT CHANGE UP (ref 0.2–1.2)
BUN SERPL-MCNC: 14 MG/DL — SIGNIFICANT CHANGE UP (ref 7–23)
CALCIUM SERPL-MCNC: 9.1 MG/DL — SIGNIFICANT CHANGE UP (ref 8.4–10.5)
CHLORIDE SERPL-SCNC: 107 MMOL/L — SIGNIFICANT CHANGE UP (ref 96–108)
CO2 SERPL-SCNC: 23 MMOL/L — SIGNIFICANT CHANGE UP (ref 22–31)
CREAT SERPL-MCNC: 0.86 MG/DL — SIGNIFICANT CHANGE UP (ref 0.5–1.3)
EGFR: 100 ML/MIN/1.73M2 — SIGNIFICANT CHANGE UP
EOSINOPHIL # BLD AUTO: 0.34 K/UL — SIGNIFICANT CHANGE UP (ref 0–0.5)
EOSINOPHIL NFR BLD AUTO: 5.3 % — SIGNIFICANT CHANGE UP (ref 0–6)
GLUCOSE SERPL-MCNC: 97 MG/DL — SIGNIFICANT CHANGE UP (ref 70–99)
HCT VFR BLD CALC: 40.5 % — SIGNIFICANT CHANGE UP (ref 39–50)
HGB BLD-MCNC: 13 G/DL — SIGNIFICANT CHANGE UP (ref 13–17)
IMM GRANULOCYTES NFR BLD AUTO: 1.6 % — HIGH (ref 0–0.9)
LYMPHOCYTES # BLD AUTO: 2.34 K/UL — SIGNIFICANT CHANGE UP (ref 1–3.3)
LYMPHOCYTES # BLD AUTO: 36.8 % — SIGNIFICANT CHANGE UP (ref 13–44)
MCHC RBC-ENTMCNC: 24.5 PG — LOW (ref 27–34)
MCHC RBC-ENTMCNC: 32.1 G/DL — SIGNIFICANT CHANGE UP (ref 32–36)
MCV RBC AUTO: 76.3 FL — LOW (ref 80–100)
MONOCYTES # BLD AUTO: 0.45 K/UL — SIGNIFICANT CHANGE UP (ref 0–0.9)
MONOCYTES NFR BLD AUTO: 7.1 % — SIGNIFICANT CHANGE UP (ref 2–14)
NEUTROPHILS # BLD AUTO: 3.08 K/UL — SIGNIFICANT CHANGE UP (ref 1.8–7.4)
NEUTROPHILS NFR BLD AUTO: 48.4 % — SIGNIFICANT CHANGE UP (ref 43–77)
NRBC # BLD: 0 /100 WBCS — SIGNIFICANT CHANGE UP (ref 0–0)
PLATELET # BLD AUTO: 231 K/UL — SIGNIFICANT CHANGE UP (ref 150–400)
POTASSIUM SERPL-MCNC: 4.3 MMOL/L — SIGNIFICANT CHANGE UP (ref 3.5–5.3)
POTASSIUM SERPL-SCNC: 4.3 MMOL/L — SIGNIFICANT CHANGE UP (ref 3.5–5.3)
PROT SERPL-MCNC: 6.5 G/DL — SIGNIFICANT CHANGE UP (ref 6–8.3)
RBC # BLD: 5.31 M/UL — SIGNIFICANT CHANGE UP (ref 4.2–5.8)
RBC # FLD: 15.8 % — HIGH (ref 10.3–14.5)
SODIUM SERPL-SCNC: 141 MMOL/L — SIGNIFICANT CHANGE UP (ref 135–145)
WBC # BLD: 6.36 K/UL — SIGNIFICANT CHANGE UP (ref 3.8–10.5)
WBC # FLD AUTO: 6.36 K/UL — SIGNIFICANT CHANGE UP (ref 3.8–10.5)

## 2022-10-05 ENCOUNTER — APPOINTMENT (OUTPATIENT)
Dept: INFUSION THERAPY | Facility: HOSPITAL | Age: 57
End: 2022-10-05

## 2022-10-07 ENCOUNTER — OUTPATIENT (OUTPATIENT)
Dept: OUTPATIENT SERVICES | Facility: HOSPITAL | Age: 57
LOS: 1 days | End: 2022-10-07
Payer: MEDICAID

## 2022-10-07 ENCOUNTER — APPOINTMENT (OUTPATIENT)
Dept: CT IMAGING | Facility: IMAGING CENTER | Age: 57
End: 2022-10-07

## 2022-10-07 DIAGNOSIS — C18.9 MALIGNANT NEOPLASM OF COLON, UNSPECIFIED: ICD-10-CM

## 2022-10-07 DIAGNOSIS — Z00.8 ENCOUNTER FOR OTHER GENERAL EXAMINATION: ICD-10-CM

## 2022-10-07 PROCEDURE — 71260 CT THORAX DX C+: CPT | Mod: 26

## 2022-10-07 PROCEDURE — 74177 CT ABD & PELVIS W/CONTRAST: CPT

## 2022-10-07 PROCEDURE — 74177 CT ABD & PELVIS W/CONTRAST: CPT | Mod: 26

## 2022-10-07 PROCEDURE — 71260 CT THORAX DX C+: CPT

## 2022-10-14 ENCOUNTER — APPOINTMENT (OUTPATIENT)
Dept: HEMATOLOGY ONCOLOGY | Facility: CLINIC | Age: 57
End: 2022-10-14

## 2022-10-14 VITALS
RESPIRATION RATE: 16 BRPM | SYSTOLIC BLOOD PRESSURE: 121 MMHG | WEIGHT: 130.07 LBS | HEIGHT: 65.75 IN | HEART RATE: 78 BPM | DIASTOLIC BLOOD PRESSURE: 84 MMHG | OXYGEN SATURATION: 99 % | BODY MASS INDEX: 21.16 KG/M2 | TEMPERATURE: 96.8 F

## 2022-10-14 PROCEDURE — 99214 OFFICE O/P EST MOD 30 MIN: CPT

## 2022-10-14 NOTE — HISTORY OF PRESENT ILLNESS
[Disease: _____________________] : Disease: [unfilled] [AJCC Stage: ____] : AJCC Stage: [unfilled] [de-identified] : pan andrea wt, transverse colon primary (right side) [de-identified] : 56 y/o M with PMH of vitiligo who developed abdominal discomfort which started 12/2020 and he was admitted on 1/4/2021 @ [facility] where he was found to have mass in distal TC with hepatic and RLL metastatic disease seen in CT scan.  He was discharged home with outpatient follow up.  He had syncope at home on 1/18/2021 and then saw PMD on 1/19/2021 where he was sent to hospital and admitted to [facility] from 1/19/2021 to 1/19/2021.   He underwent liver biopsy on 1/25/2021 which confirmed metastatic colon cancer.  His CEA was elevated 2882 from 1/21/2021.  His CT c/a/p from 1/28/2021 showed findings consistent with metastatic colon cancer evidenced by annular colonic mass and numerous hepatic metastases, groundglass pulmonary opacity consistent with infectious etiology, including Covid 19, improved compared to prior exam.  \par \par He presents to us on 2/5/2021 and states he feels stronger since leaving hospital but has lost about 22 pounds from December 2020 to Feb 2021\par Given Stage IV disease, offered FOLFOX and reviewed palliative intent of treatment \par    \par Late Feb 2021 started FOLFOX \par March 2021 admitted for LBO and underwent resection with ostomy \par \par April 2021 restarted FOLFOX --> stopped oxali July 2021 \par April 2022 POD switched to FOLFIRI Day [de-identified] : IFTIKHAR, TMB 5, APC R232*APC *\par GRM3 V271I, MAP2K1 (MEK1) K57E\par TP53 S127P [FreeTextEntry1] :  FOLFIRI/Avastin started 4/25/22 [de-identified] : presents in follow up , no acute complaints\par tolerating treatment \par no abdominal pain , fevers or chills

## 2022-10-14 NOTE — PHYSICAL EXAM
[Fully active, able to carry on all pre-disease performance without restriction] : Status 0 - Fully active, able to carry on all pre-disease performance without restriction [Thin] : thin [Normal] : affect appropriate [de-identified] : normal respiratory effort, no audible wheeze [de-identified] : anicteric  [de-identified] : reg rate  [de-identified] : no LE edema [de-identified] : + ostomy, soft, non-tender [de-identified] : vitiligo

## 2022-10-17 ENCOUNTER — APPOINTMENT (OUTPATIENT)
Dept: INFUSION THERAPY | Facility: HOSPITAL | Age: 57
End: 2022-10-17

## 2022-10-17 ENCOUNTER — RESULT REVIEW (OUTPATIENT)
Age: 57
End: 2022-10-17

## 2022-10-17 LAB
ALBUMIN SERPL ELPH-MCNC: 4 G/DL — SIGNIFICANT CHANGE UP (ref 3.3–5)
ALP SERPL-CCNC: 90 U/L — SIGNIFICANT CHANGE UP (ref 40–120)
ALT FLD-CCNC: 28 U/L — SIGNIFICANT CHANGE UP (ref 10–45)
ANION GAP SERPL CALC-SCNC: 11 MMOL/L — SIGNIFICANT CHANGE UP (ref 5–17)
AST SERPL-CCNC: 30 U/L — SIGNIFICANT CHANGE UP (ref 10–40)
BASOPHILS # BLD AUTO: 0.03 K/UL — SIGNIFICANT CHANGE UP (ref 0–0.2)
BASOPHILS NFR BLD AUTO: 0.4 % — SIGNIFICANT CHANGE UP (ref 0–2)
BILIRUB SERPL-MCNC: 0.3 MG/DL — SIGNIFICANT CHANGE UP (ref 0.2–1.2)
BUN SERPL-MCNC: 20 MG/DL — SIGNIFICANT CHANGE UP (ref 7–23)
CALCIUM SERPL-MCNC: 8.9 MG/DL — SIGNIFICANT CHANGE UP (ref 8.4–10.5)
CEA SERPL-MCNC: 139 NG/ML — HIGH (ref 0–3.8)
CHLORIDE SERPL-SCNC: 106 MMOL/L — SIGNIFICANT CHANGE UP (ref 96–108)
CO2 SERPL-SCNC: 22 MMOL/L — SIGNIFICANT CHANGE UP (ref 22–31)
CREAT SERPL-MCNC: 0.88 MG/DL — SIGNIFICANT CHANGE UP (ref 0.5–1.3)
EGFR: 99 ML/MIN/1.73M2 — SIGNIFICANT CHANGE UP
EOSINOPHIL # BLD AUTO: 0.27 K/UL — SIGNIFICANT CHANGE UP (ref 0–0.5)
EOSINOPHIL NFR BLD AUTO: 4 % — SIGNIFICANT CHANGE UP (ref 0–6)
GLUCOSE SERPL-MCNC: 146 MG/DL — HIGH (ref 70–99)
HCT VFR BLD CALC: 42.7 % — SIGNIFICANT CHANGE UP (ref 39–50)
HGB BLD-MCNC: 13.5 G/DL — SIGNIFICANT CHANGE UP (ref 13–17)
IMM GRANULOCYTES NFR BLD AUTO: 0.4 % — SIGNIFICANT CHANGE UP (ref 0–0.9)
LYMPHOCYTES # BLD AUTO: 2.55 K/UL — SIGNIFICANT CHANGE UP (ref 1–3.3)
LYMPHOCYTES # BLD AUTO: 37.9 % — SIGNIFICANT CHANGE UP (ref 13–44)
MCHC RBC-ENTMCNC: 24.5 PG — LOW (ref 27–34)
MCHC RBC-ENTMCNC: 31.6 G/DL — LOW (ref 32–36)
MCV RBC AUTO: 77.6 FL — LOW (ref 80–100)
MONOCYTES # BLD AUTO: 0.48 K/UL — SIGNIFICANT CHANGE UP (ref 0–0.9)
MONOCYTES NFR BLD AUTO: 7.1 % — SIGNIFICANT CHANGE UP (ref 2–14)
NEUTROPHILS # BLD AUTO: 3.36 K/UL — SIGNIFICANT CHANGE UP (ref 1.8–7.4)
NEUTROPHILS NFR BLD AUTO: 50.2 % — SIGNIFICANT CHANGE UP (ref 43–77)
NRBC # BLD: 0 /100 WBCS — SIGNIFICANT CHANGE UP (ref 0–0)
PLATELET # BLD AUTO: 229 K/UL — SIGNIFICANT CHANGE UP (ref 150–400)
POTASSIUM SERPL-MCNC: 4.1 MMOL/L — SIGNIFICANT CHANGE UP (ref 3.5–5.3)
POTASSIUM SERPL-SCNC: 4.1 MMOL/L — SIGNIFICANT CHANGE UP (ref 3.5–5.3)
PROT SERPL-MCNC: 6.7 G/DL — SIGNIFICANT CHANGE UP (ref 6–8.3)
RBC # BLD: 5.5 M/UL — SIGNIFICANT CHANGE UP (ref 4.2–5.8)
RBC # FLD: 15.6 % — HIGH (ref 10.3–14.5)
SODIUM SERPL-SCNC: 140 MMOL/L — SIGNIFICANT CHANGE UP (ref 135–145)
WBC # BLD: 6.72 K/UL — SIGNIFICANT CHANGE UP (ref 3.8–10.5)
WBC # FLD AUTO: 6.72 K/UL — SIGNIFICANT CHANGE UP (ref 3.8–10.5)

## 2022-10-18 ENCOUNTER — NON-APPOINTMENT (OUTPATIENT)
Age: 57
End: 2022-10-18

## 2022-10-18 LAB
CREAT ?TM UR-MCNC: 63 MG/DL — SIGNIFICANT CHANGE UP
PROT ?TM UR-MCNC: 6 MG/DL — SIGNIFICANT CHANGE UP (ref 0–12)
PROT/CREAT UR-RTO: 0.1 RATIO — SIGNIFICANT CHANGE UP (ref 0–0.2)

## 2022-10-19 ENCOUNTER — APPOINTMENT (OUTPATIENT)
Dept: INFUSION THERAPY | Facility: HOSPITAL | Age: 57
End: 2022-10-19

## 2022-10-25 ENCOUNTER — APPOINTMENT (OUTPATIENT)
Dept: COLORECTAL SURGERY | Facility: CLINIC | Age: 57
End: 2022-10-25

## 2022-10-25 PROCEDURE — 99213 OFFICE O/P EST LOW 20 MIN: CPT

## 2022-10-25 NOTE — ASSESSMENT
[FreeTextEntry1] : Metastatic colon cancer\par -Recent CT scan and imaging review\par -Continue systemic chemotherapy\par -No change in regimen regarding colostomy\par -Follow up in 6 months for reevaluation

## 2022-10-25 NOTE — HISTORY OF PRESENT ILLNESS
[FreeTextEntry1] : Status post laparoscopic colostomy creation and partial colectomy Obstructing metastatic colon cancer. The patient progressed well tolerating diet. 3 started chemotherapy otherwise without complaint\par \par Stage IV colon cancer currently on systemic chemotherapy. Patient presents for followup. He denies abdominal pain. No weight loss. Tolerating diet. Stoma functioning approximately 3-4 times per day with solid stool. No nausea or vomiting. Tolerating systemic chemotherapy without event. No specific aggravating factors no fevers or chills

## 2022-10-31 ENCOUNTER — RESULT REVIEW (OUTPATIENT)
Age: 57
End: 2022-10-31

## 2022-10-31 ENCOUNTER — APPOINTMENT (OUTPATIENT)
Dept: HEMATOLOGY ONCOLOGY | Facility: CLINIC | Age: 57
End: 2022-10-31

## 2022-10-31 ENCOUNTER — APPOINTMENT (OUTPATIENT)
Dept: INFUSION THERAPY | Facility: HOSPITAL | Age: 57
End: 2022-10-31

## 2022-10-31 LAB
ALBUMIN SERPL ELPH-MCNC: 3.9 G/DL — SIGNIFICANT CHANGE UP (ref 3.3–5)
ALP SERPL-CCNC: 91 U/L — SIGNIFICANT CHANGE UP (ref 40–120)
ALT FLD-CCNC: 34 U/L — SIGNIFICANT CHANGE UP (ref 10–45)
ANION GAP SERPL CALC-SCNC: 15 MMOL/L — SIGNIFICANT CHANGE UP (ref 5–17)
AST SERPL-CCNC: 34 U/L — SIGNIFICANT CHANGE UP (ref 10–40)
BASOPHILS # BLD AUTO: 0.03 K/UL — SIGNIFICANT CHANGE UP (ref 0–0.2)
BASOPHILS NFR BLD AUTO: 0.4 % — SIGNIFICANT CHANGE UP (ref 0–2)
BILIRUB SERPL-MCNC: 0.3 MG/DL — SIGNIFICANT CHANGE UP (ref 0.2–1.2)
BUN SERPL-MCNC: 18 MG/DL — SIGNIFICANT CHANGE UP (ref 7–23)
CALCIUM SERPL-MCNC: 9.3 MG/DL — SIGNIFICANT CHANGE UP (ref 8.4–10.5)
CEA SERPL-MCNC: 154 NG/ML — HIGH (ref 0–3.8)
CHLORIDE SERPL-SCNC: 104 MMOL/L — SIGNIFICANT CHANGE UP (ref 96–108)
CO2 SERPL-SCNC: 20 MMOL/L — LOW (ref 22–31)
CREAT SERPL-MCNC: 0.88 MG/DL — SIGNIFICANT CHANGE UP (ref 0.5–1.3)
EGFR: 99 ML/MIN/1.73M2 — SIGNIFICANT CHANGE UP
EOSINOPHIL # BLD AUTO: 0.25 K/UL — SIGNIFICANT CHANGE UP (ref 0–0.5)
EOSINOPHIL NFR BLD AUTO: 3.7 % — SIGNIFICANT CHANGE UP (ref 0–6)
GLUCOSE SERPL-MCNC: 183 MG/DL — HIGH (ref 70–99)
HCT VFR BLD CALC: 42.8 % — SIGNIFICANT CHANGE UP (ref 39–50)
HGB BLD-MCNC: 13.6 G/DL — SIGNIFICANT CHANGE UP (ref 13–17)
IMM GRANULOCYTES NFR BLD AUTO: 0.6 % — SIGNIFICANT CHANGE UP (ref 0–0.9)
LYMPHOCYTES # BLD AUTO: 2.52 K/UL — SIGNIFICANT CHANGE UP (ref 1–3.3)
LYMPHOCYTES # BLD AUTO: 36.8 % — SIGNIFICANT CHANGE UP (ref 13–44)
MCHC RBC-ENTMCNC: 24.2 PG — LOW (ref 27–34)
MCHC RBC-ENTMCNC: 31.8 G/DL — LOW (ref 32–36)
MCV RBC AUTO: 76.3 FL — LOW (ref 80–100)
MONOCYTES # BLD AUTO: 0.53 K/UL — SIGNIFICANT CHANGE UP (ref 0–0.9)
MONOCYTES NFR BLD AUTO: 7.7 % — SIGNIFICANT CHANGE UP (ref 2–14)
NEUTROPHILS # BLD AUTO: 3.47 K/UL — SIGNIFICANT CHANGE UP (ref 1.8–7.4)
NEUTROPHILS NFR BLD AUTO: 50.8 % — SIGNIFICANT CHANGE UP (ref 43–77)
NRBC # BLD: 0 /100 WBCS — SIGNIFICANT CHANGE UP (ref 0–0)
PLATELET # BLD AUTO: 250 K/UL — SIGNIFICANT CHANGE UP (ref 150–400)
POTASSIUM SERPL-MCNC: 4.1 MMOL/L — SIGNIFICANT CHANGE UP (ref 3.5–5.3)
POTASSIUM SERPL-SCNC: 4.1 MMOL/L — SIGNIFICANT CHANGE UP (ref 3.5–5.3)
PROT SERPL-MCNC: 6.7 G/DL — SIGNIFICANT CHANGE UP (ref 6–8.3)
RBC # BLD: 5.61 M/UL — SIGNIFICANT CHANGE UP (ref 4.2–5.8)
RBC # FLD: 16.6 % — HIGH (ref 10.3–14.5)
SODIUM SERPL-SCNC: 138 MMOL/L — SIGNIFICANT CHANGE UP (ref 135–145)
WBC # BLD: 6.84 K/UL — SIGNIFICANT CHANGE UP (ref 3.8–10.5)
WBC # FLD AUTO: 6.84 K/UL — SIGNIFICANT CHANGE UP (ref 3.8–10.5)

## 2022-10-31 PROCEDURE — 99214 OFFICE O/P EST MOD 30 MIN: CPT

## 2022-10-31 NOTE — PHYSICAL EXAM
[Fully active, able to carry on all pre-disease performance without restriction] : Status 0 - Fully active, able to carry on all pre-disease performance without restriction [Thin] : thin [Normal] : affect appropriate [de-identified] : anicteric  [de-identified] : normal respiratory effort, no audible wheeze [de-identified] : reg rate  [de-identified] : no LE edema [de-identified] : + ostomy, soft, non-tender [de-identified] : vitiligo

## 2022-10-31 NOTE — REVIEW OF SYSTEMS
[Negative] : Allergic/Immunologic [Abdominal Pain] : no abdominal pain [Vomiting] : no vomiting [Constipation] : no constipation [Diarrhea] : no diarrhea [Dizziness] : no dizziness [Fainting] : no fainting [Difficulty Walking] : no difficulty walking [de-identified] : vitaligo

## 2022-10-31 NOTE — HISTORY OF PRESENT ILLNESS
[Disease: _____________________] : Disease: [unfilled] [AJCC Stage: ____] : AJCC Stage: [unfilled] [de-identified] : 56 y/o M with PMH of vitiligo who developed abdominal discomfort which started 12/2020 and he was admitted on 1/4/2021 @ [facility] where he was found to have mass in distal TC with hepatic and RLL metastatic disease seen in CT scan.  He was discharged home with outpatient follow up.  He had syncope at home on 1/18/2021 and then saw PMD on 1/19/2021 where he was sent to hospital and admitted to [facility] from 1/19/2021 to 1/19/2021.   He underwent liver biopsy on 1/25/2021 which confirmed metastatic colon cancer.  His CEA was elevated 2882 from 1/21/2021.  His CT c/a/p from 1/28/2021 showed findings consistent with metastatic colon cancer evidenced by annular colonic mass and numerous hepatic metastases, groundglass pulmonary opacity consistent with infectious etiology, including Covid 19, improved compared to prior exam.  \par \par He presents to us on 2/5/2021 and states he feels stronger since leaving hospital but has lost about 22 pounds from December 2020 to Feb 2021\par Given Stage IV disease, offered FOLFOX and reviewed palliative intent of treatment \par    \par Late Feb 2021 started FOLFOX \par March 2021 admitted for LBO and underwent resection with ostomy \par \par April 2021 restarted FOLFOX --> stopped oxali July 2021 \par April 2022 POD switched to FOLFIRI Day [de-identified] : pan andrea wt, transverse colon primary (right side) [de-identified] : IFTIKHAR, TMB 5, APC R232*APC *\par GRM3 V271I, MAP2K1 (MEK1) K57E\par TP53 S127P [FreeTextEntry1] :  FOLFIRI/Avastin started 4/25/22 [de-identified] : Patient presents for follow up seen in the treatment room and reports feeling well.  He denies anorexia, weight loss, abdominal pain, vomiting, change in bowel habits or melena.  He recently followed up with Dr. Alicia for evaluation of his ostomy with no reported issues.

## 2022-11-01 ENCOUNTER — NON-APPOINTMENT (OUTPATIENT)
Age: 57
End: 2022-11-01

## 2022-11-02 ENCOUNTER — APPOINTMENT (OUTPATIENT)
Dept: INFUSION THERAPY | Facility: HOSPITAL | Age: 57
End: 2022-11-02

## 2022-11-08 ENCOUNTER — OUTPATIENT (OUTPATIENT)
Dept: OUTPATIENT SERVICES | Facility: HOSPITAL | Age: 57
LOS: 1 days | Discharge: HOME | End: 2022-11-08
Payer: MEDICAID

## 2022-11-08 DIAGNOSIS — C18.9 MALIGNANT NEOPLASM OF COLON, UNSPECIFIED: ICD-10-CM

## 2022-11-14 ENCOUNTER — RESULT REVIEW (OUTPATIENT)
Age: 57
End: 2022-11-14

## 2022-11-14 ENCOUNTER — APPOINTMENT (OUTPATIENT)
Dept: INFUSION THERAPY | Facility: HOSPITAL | Age: 57
End: 2022-11-14

## 2022-11-14 LAB
ALBUMIN SERPL ELPH-MCNC: 3.8 G/DL — SIGNIFICANT CHANGE UP (ref 3.3–5)
ALP SERPL-CCNC: 82 U/L — SIGNIFICANT CHANGE UP (ref 40–120)
ALT FLD-CCNC: 27 U/L — SIGNIFICANT CHANGE UP (ref 10–45)
ANION GAP SERPL CALC-SCNC: 10 MMOL/L — SIGNIFICANT CHANGE UP (ref 5–17)
AST SERPL-CCNC: 30 U/L — SIGNIFICANT CHANGE UP (ref 10–40)
BASOPHILS # BLD AUTO: 0.05 K/UL — SIGNIFICANT CHANGE UP (ref 0–0.2)
BASOPHILS NFR BLD AUTO: 0.7 % — SIGNIFICANT CHANGE UP (ref 0–2)
BILIRUB SERPL-MCNC: 0.4 MG/DL — SIGNIFICANT CHANGE UP (ref 0.2–1.2)
BUN SERPL-MCNC: 18 MG/DL — SIGNIFICANT CHANGE UP (ref 7–23)
CALCIUM SERPL-MCNC: 8.8 MG/DL — SIGNIFICANT CHANGE UP (ref 8.4–10.5)
CEA SERPL-MCNC: 159 NG/ML — HIGH (ref 0–3.8)
CHLORIDE SERPL-SCNC: 107 MMOL/L — SIGNIFICANT CHANGE UP (ref 96–108)
CO2 SERPL-SCNC: 22 MMOL/L — SIGNIFICANT CHANGE UP (ref 22–31)
CREAT ?TM UR-MCNC: 75 MG/DL — SIGNIFICANT CHANGE UP
CREAT SERPL-MCNC: 0.96 MG/DL — SIGNIFICANT CHANGE UP (ref 0.5–1.3)
EGFR: 91 ML/MIN/1.73M2 — SIGNIFICANT CHANGE UP
EOSINOPHIL # BLD AUTO: 0.35 K/UL — SIGNIFICANT CHANGE UP (ref 0–0.5)
EOSINOPHIL NFR BLD AUTO: 5 % — SIGNIFICANT CHANGE UP (ref 0–6)
GLUCOSE SERPL-MCNC: 96 MG/DL — SIGNIFICANT CHANGE UP (ref 70–99)
HCT VFR BLD CALC: 42.5 % — SIGNIFICANT CHANGE UP (ref 39–50)
HGB BLD-MCNC: 13.5 G/DL — SIGNIFICANT CHANGE UP (ref 13–17)
IMM GRANULOCYTES NFR BLD AUTO: 0.4 % — SIGNIFICANT CHANGE UP (ref 0–0.9)
LYMPHOCYTES # BLD AUTO: 2.64 K/UL — SIGNIFICANT CHANGE UP (ref 1–3.3)
LYMPHOCYTES # BLD AUTO: 37.5 % — SIGNIFICANT CHANGE UP (ref 13–44)
MCHC RBC-ENTMCNC: 24.2 PG — LOW (ref 27–34)
MCHC RBC-ENTMCNC: 31.8 G/DL — LOW (ref 32–36)
MCV RBC AUTO: 76.2 FL — LOW (ref 80–100)
MONOCYTES # BLD AUTO: 0.66 K/UL — SIGNIFICANT CHANGE UP (ref 0–0.9)
MONOCYTES NFR BLD AUTO: 9.4 % — SIGNIFICANT CHANGE UP (ref 2–14)
NEUTROPHILS # BLD AUTO: 3.31 K/UL — SIGNIFICANT CHANGE UP (ref 1.8–7.4)
NEUTROPHILS NFR BLD AUTO: 47 % — SIGNIFICANT CHANGE UP (ref 43–77)
NRBC # BLD: 0 /100 WBCS — SIGNIFICANT CHANGE UP (ref 0–0)
PLATELET # BLD AUTO: 216 K/UL — SIGNIFICANT CHANGE UP (ref 150–400)
POTASSIUM SERPL-MCNC: 4.4 MMOL/L — SIGNIFICANT CHANGE UP (ref 3.5–5.3)
POTASSIUM SERPL-SCNC: 4.4 MMOL/L — SIGNIFICANT CHANGE UP (ref 3.5–5.3)
PROT ?TM UR-MCNC: 8 MG/DL — SIGNIFICANT CHANGE UP (ref 0–12)
PROT SERPL-MCNC: 6.5 G/DL — SIGNIFICANT CHANGE UP (ref 6–8.3)
PROT/CREAT UR-RTO: 0.1 RATIO — SIGNIFICANT CHANGE UP (ref 0–0.2)
RBC # BLD: 5.58 M/UL — SIGNIFICANT CHANGE UP (ref 4.2–5.8)
RBC # FLD: 17.2 % — HIGH (ref 10.3–14.5)
SODIUM SERPL-SCNC: 140 MMOL/L — SIGNIFICANT CHANGE UP (ref 135–145)
WBC # BLD: 7.04 K/UL — SIGNIFICANT CHANGE UP (ref 3.8–10.5)
WBC # FLD AUTO: 7.04 K/UL — SIGNIFICANT CHANGE UP (ref 3.8–10.5)

## 2022-11-15 DIAGNOSIS — Z51.11 ENCOUNTER FOR ANTINEOPLASTIC CHEMOTHERAPY: ICD-10-CM

## 2022-11-15 DIAGNOSIS — R11.2 NAUSEA WITH VOMITING, UNSPECIFIED: ICD-10-CM

## 2022-11-16 ENCOUNTER — APPOINTMENT (OUTPATIENT)
Dept: INFUSION THERAPY | Facility: HOSPITAL | Age: 57
End: 2022-11-16

## 2022-11-28 ENCOUNTER — APPOINTMENT (OUTPATIENT)
Dept: HEMATOLOGY ONCOLOGY | Facility: CLINIC | Age: 57
End: 2022-11-28

## 2022-11-28 ENCOUNTER — APPOINTMENT (OUTPATIENT)
Dept: INFUSION THERAPY | Facility: HOSPITAL | Age: 57
End: 2022-11-28

## 2022-11-28 ENCOUNTER — RESULT REVIEW (OUTPATIENT)
Age: 57
End: 2022-11-28

## 2022-11-28 LAB
ALBUMIN SERPL ELPH-MCNC: 3.7 G/DL — SIGNIFICANT CHANGE UP (ref 3.3–5)
ALP SERPL-CCNC: 79 U/L — SIGNIFICANT CHANGE UP (ref 40–120)
ALT FLD-CCNC: 25 U/L — SIGNIFICANT CHANGE UP (ref 10–45)
ANION GAP SERPL CALC-SCNC: 11 MMOL/L — SIGNIFICANT CHANGE UP (ref 5–17)
AST SERPL-CCNC: 30 U/L — SIGNIFICANT CHANGE UP (ref 10–40)
BASOPHILS # BLD AUTO: 0.06 K/UL — SIGNIFICANT CHANGE UP (ref 0–0.2)
BASOPHILS NFR BLD AUTO: 0.9 % — SIGNIFICANT CHANGE UP (ref 0–2)
BILIRUB SERPL-MCNC: 0.3 MG/DL — SIGNIFICANT CHANGE UP (ref 0.2–1.2)
BUN SERPL-MCNC: 16 MG/DL — SIGNIFICANT CHANGE UP (ref 7–23)
CALCIUM SERPL-MCNC: 9.1 MG/DL — SIGNIFICANT CHANGE UP (ref 8.4–10.5)
CEA SERPL-MCNC: 162 NG/ML — HIGH (ref 0–3.8)
CHLORIDE SERPL-SCNC: 107 MMOL/L — SIGNIFICANT CHANGE UP (ref 96–108)
CO2 SERPL-SCNC: 24 MMOL/L — SIGNIFICANT CHANGE UP (ref 22–31)
CREAT SERPL-MCNC: 0.89 MG/DL — SIGNIFICANT CHANGE UP (ref 0.5–1.3)
EGFR: 99 ML/MIN/1.73M2 — SIGNIFICANT CHANGE UP
EOSINOPHIL # BLD AUTO: 0.25 K/UL — SIGNIFICANT CHANGE UP (ref 0–0.5)
EOSINOPHIL NFR BLD AUTO: 3.8 % — SIGNIFICANT CHANGE UP (ref 0–6)
GLUCOSE SERPL-MCNC: 144 MG/DL — HIGH (ref 70–99)
HCT VFR BLD CALC: 40.1 % — SIGNIFICANT CHANGE UP (ref 39–50)
HGB BLD-MCNC: 12.7 G/DL — LOW (ref 13–17)
IMM GRANULOCYTES NFR BLD AUTO: 0.3 % — SIGNIFICANT CHANGE UP (ref 0–0.9)
LYMPHOCYTES # BLD AUTO: 2.33 K/UL — SIGNIFICANT CHANGE UP (ref 1–3.3)
LYMPHOCYTES # BLD AUTO: 35.5 % — SIGNIFICANT CHANGE UP (ref 13–44)
MCHC RBC-ENTMCNC: 23.9 PG — LOW (ref 27–34)
MCHC RBC-ENTMCNC: 31.7 G/DL — LOW (ref 32–36)
MCV RBC AUTO: 75.4 FL — LOW (ref 80–100)
MONOCYTES # BLD AUTO: 0.66 K/UL — SIGNIFICANT CHANGE UP (ref 0–0.9)
MONOCYTES NFR BLD AUTO: 10 % — SIGNIFICANT CHANGE UP (ref 2–14)
NEUTROPHILS # BLD AUTO: 3.25 K/UL — SIGNIFICANT CHANGE UP (ref 1.8–7.4)
NEUTROPHILS NFR BLD AUTO: 49.5 % — SIGNIFICANT CHANGE UP (ref 43–77)
NRBC # BLD: 0 /100 WBCS — SIGNIFICANT CHANGE UP (ref 0–0)
PLATELET # BLD AUTO: 237 K/UL — SIGNIFICANT CHANGE UP (ref 150–400)
POTASSIUM SERPL-MCNC: 4.1 MMOL/L — SIGNIFICANT CHANGE UP (ref 3.5–5.3)
POTASSIUM SERPL-SCNC: 4.1 MMOL/L — SIGNIFICANT CHANGE UP (ref 3.5–5.3)
PROT SERPL-MCNC: 6.4 G/DL — SIGNIFICANT CHANGE UP (ref 6–8.3)
RBC # BLD: 5.32 M/UL — SIGNIFICANT CHANGE UP (ref 4.2–5.8)
RBC # FLD: 18.4 % — HIGH (ref 10.3–14.5)
SODIUM SERPL-SCNC: 141 MMOL/L — SIGNIFICANT CHANGE UP (ref 135–145)
WBC # BLD: 6.57 K/UL — SIGNIFICANT CHANGE UP (ref 3.8–10.5)
WBC # FLD AUTO: 6.57 K/UL — SIGNIFICANT CHANGE UP (ref 3.8–10.5)

## 2022-11-28 PROCEDURE — 99214 OFFICE O/P EST MOD 30 MIN: CPT

## 2022-11-29 ENCOUNTER — NON-APPOINTMENT (OUTPATIENT)
Age: 57
End: 2022-11-29

## 2022-11-29 NOTE — PHYSICAL EXAM
[Fully active, able to carry on all pre-disease performance without restriction] : Status 0 - Fully active, able to carry on all pre-disease performance without restriction [Thin] : thin [Normal] : affect appropriate [de-identified] : anicteric  [de-identified] : normal respiratory effort, no audible wheeze [de-identified] : reg rate  [de-identified] : no LE edema [de-identified] : + ostomy, soft, non-tender [de-identified] : vitiligo

## 2022-11-29 NOTE — HISTORY OF PRESENT ILLNESS
[Disease: _____________________] : Disease: [unfilled] [AJCC Stage: ____] : AJCC Stage: [unfilled] [de-identified] : 56 y/o M with PMH of vitiligo who developed abdominal discomfort which started 12/2020 and he was admitted on 1/4/2021 @ [facility] where he was found to have mass in distal TC with hepatic and RLL metastatic disease seen in CT scan.  He was discharged home with outpatient follow up.  He had syncope at home on 1/18/2021 and then saw PMD on 1/19/2021 where he was sent to hospital and admitted to [facility] from 1/19/2021 to 1/19/2021.   He underwent liver biopsy on 1/25/2021 which confirmed metastatic colon cancer.  His CEA was elevated 2882 from 1/21/2021.  His CT c/a/p from 1/28/2021 showed findings consistent with metastatic colon cancer evidenced by annular colonic mass and numerous hepatic metastases, groundglass pulmonary opacity consistent with infectious etiology, including Covid 19, improved compared to prior exam.  \par \par He presents to us on 2/5/2021 and states he feels stronger since leaving hospital but has lost about 22 pounds from December 2020 to Feb 2021\par Given Stage IV disease, offered FOLFOX and reviewed palliative intent of treatment \par    \par Late Feb 2021 started FOLFOX \par March 2021 admitted for LBO and underwent resection with ostomy \par \par April 2021 restarted FOLFOX --> stopped oxali July 2021 \par April 2022 POD switched to FOLFIRI Day [de-identified] : pan andrea wt, transverse colon primary (right side) [de-identified] : IFTIKHAR, TMB 5, APC R232*APC *\par GRM3 V271I, MAP2K1 (MEK1) K57E\par TP53 S127P [FreeTextEntry1] :  FOLFIRI/Avastin started 4/25/22 [de-identified] : Patient presents for follow up today while on treatment.  He reports overall feeling well.  He has been having episodes of epistaxis (blood tinged tissue with nose blowing especially in the morning.  Denies HA, dizziness, facial pain, purulent discharge, nasal congestion, sore throat, CP, SOB, cough, anorexia, N/V, change in bowel habits, melena, abdominal pain.

## 2022-11-29 NOTE — REVIEW OF SYSTEMS
[Negative] : Allergic/Immunologic [Nosebleeds] : nosebleeds [Dysphagia] : no dysphagia [Hoarseness] : no hoarseness [Odynophagia] : no odynophagia [Mucosal Pain] : no mucosal pain [Abdominal Pain] : no abdominal pain [Vomiting] : no vomiting [Constipation] : no constipation [Diarrhea] : no diarrhea [Dizziness] : no dizziness [Fainting] : no fainting [Difficulty Walking] : no difficulty walking [de-identified] : vitaligo

## 2022-11-30 ENCOUNTER — APPOINTMENT (OUTPATIENT)
Dept: INFUSION THERAPY | Facility: HOSPITAL | Age: 57
End: 2022-11-30

## 2022-11-30 ENCOUNTER — RESULT REVIEW (OUTPATIENT)
Age: 57
End: 2022-11-30

## 2022-12-01 ENCOUNTER — OUTPATIENT (OUTPATIENT)
Dept: OUTPATIENT SERVICES | Facility: HOSPITAL | Age: 57
LOS: 1 days | End: 2022-12-01
Payer: MEDICAID

## 2022-12-01 ENCOUNTER — APPOINTMENT (OUTPATIENT)
Dept: CT IMAGING | Facility: IMAGING CENTER | Age: 57
End: 2022-12-01

## 2022-12-01 DIAGNOSIS — C18.9 MALIGNANT NEOPLASM OF COLON, UNSPECIFIED: ICD-10-CM

## 2022-12-01 DIAGNOSIS — Z00.8 ENCOUNTER FOR OTHER GENERAL EXAMINATION: ICD-10-CM

## 2022-12-01 PROCEDURE — 71260 CT THORAX DX C+: CPT | Mod: 26

## 2022-12-01 PROCEDURE — 71260 CT THORAX DX C+: CPT

## 2022-12-01 PROCEDURE — 74177 CT ABD & PELVIS W/CONTRAST: CPT | Mod: 26

## 2022-12-01 PROCEDURE — 74177 CT ABD & PELVIS W/CONTRAST: CPT

## 2022-12-09 ENCOUNTER — APPOINTMENT (OUTPATIENT)
Dept: HEMATOLOGY ONCOLOGY | Facility: CLINIC | Age: 57
End: 2022-12-09

## 2022-12-09 VITALS
HEART RATE: 78 BPM | SYSTOLIC BLOOD PRESSURE: 126 MMHG | OXYGEN SATURATION: 99 % | WEIGHT: 130.73 LBS | TEMPERATURE: 97.5 F | RESPIRATION RATE: 16 BRPM | HEIGHT: 65.75 IN | DIASTOLIC BLOOD PRESSURE: 81 MMHG | BODY MASS INDEX: 21.26 KG/M2

## 2022-12-09 DIAGNOSIS — Z51.11 ENCOUNTER FOR ANTINEOPLASTIC CHEMOTHERAPY: ICD-10-CM

## 2022-12-09 DIAGNOSIS — S46.911A STRAIN OF UNSPECIFIED MUSCLE, FASCIA AND TENDON AT SHOULDER AND UPPER ARM LEVEL, RIGHT ARM, INITIAL ENCOUNTER: ICD-10-CM

## 2022-12-09 PROCEDURE — 99214 OFFICE O/P EST MOD 30 MIN: CPT

## 2022-12-12 ENCOUNTER — RESULT REVIEW (OUTPATIENT)
Age: 57
End: 2022-12-12

## 2022-12-12 ENCOUNTER — APPOINTMENT (OUTPATIENT)
Dept: INFUSION THERAPY | Facility: HOSPITAL | Age: 57
End: 2022-12-12

## 2022-12-12 LAB
ALBUMIN SERPL ELPH-MCNC: 3.9 G/DL — SIGNIFICANT CHANGE UP (ref 3.3–5)
ALP SERPL-CCNC: 88 U/L — SIGNIFICANT CHANGE UP (ref 40–120)
ALT FLD-CCNC: 27 U/L — SIGNIFICANT CHANGE UP (ref 10–45)
ANION GAP SERPL CALC-SCNC: 11 MMOL/L — SIGNIFICANT CHANGE UP (ref 5–17)
AST SERPL-CCNC: 32 U/L — SIGNIFICANT CHANGE UP (ref 10–40)
BASOPHILS # BLD AUTO: 0.06 K/UL — SIGNIFICANT CHANGE UP (ref 0–0.2)
BASOPHILS NFR BLD AUTO: 0.8 % — SIGNIFICANT CHANGE UP (ref 0–2)
BILIRUB SERPL-MCNC: 0.4 MG/DL — SIGNIFICANT CHANGE UP (ref 0.2–1.2)
BUN SERPL-MCNC: 14 MG/DL — SIGNIFICANT CHANGE UP (ref 7–23)
CALCIUM SERPL-MCNC: 9.3 MG/DL — SIGNIFICANT CHANGE UP (ref 8.4–10.5)
CEA SERPL-MCNC: 188 NG/ML — HIGH (ref 0–3.8)
CHLORIDE SERPL-SCNC: 105 MMOL/L — SIGNIFICANT CHANGE UP (ref 96–108)
CO2 SERPL-SCNC: 24 MMOL/L — SIGNIFICANT CHANGE UP (ref 22–31)
CREAT ?TM UR-MCNC: 82 MG/DL — SIGNIFICANT CHANGE UP
CREAT SERPL-MCNC: 0.9 MG/DL — SIGNIFICANT CHANGE UP (ref 0.5–1.3)
EGFR: 98 ML/MIN/1.73M2 — SIGNIFICANT CHANGE UP
EOSINOPHIL # BLD AUTO: 0.24 K/UL — SIGNIFICANT CHANGE UP (ref 0–0.5)
EOSINOPHIL NFR BLD AUTO: 3.4 % — SIGNIFICANT CHANGE UP (ref 0–6)
GLUCOSE SERPL-MCNC: 115 MG/DL — HIGH (ref 70–99)
HCT VFR BLD CALC: 39 % — SIGNIFICANT CHANGE UP (ref 39–50)
HGB BLD-MCNC: 12.5 G/DL — LOW (ref 13–17)
IMM GRANULOCYTES NFR BLD AUTO: 0.4 % — SIGNIFICANT CHANGE UP (ref 0–0.9)
LYMPHOCYTES # BLD AUTO: 2.31 K/UL — SIGNIFICANT CHANGE UP (ref 1–3.3)
LYMPHOCYTES # BLD AUTO: 32.5 % — SIGNIFICANT CHANGE UP (ref 13–44)
MCHC RBC-ENTMCNC: 24.2 PG — LOW (ref 27–34)
MCHC RBC-ENTMCNC: 32.1 G/DL — SIGNIFICANT CHANGE UP (ref 32–36)
MCV RBC AUTO: 75.4 FL — LOW (ref 80–100)
MONOCYTES # BLD AUTO: 0.6 K/UL — SIGNIFICANT CHANGE UP (ref 0–0.9)
MONOCYTES NFR BLD AUTO: 8.5 % — SIGNIFICANT CHANGE UP (ref 2–14)
NEUTROPHILS # BLD AUTO: 3.86 K/UL — SIGNIFICANT CHANGE UP (ref 1.8–7.4)
NEUTROPHILS NFR BLD AUTO: 54.4 % — SIGNIFICANT CHANGE UP (ref 43–77)
NRBC # BLD: 0 /100 WBCS — SIGNIFICANT CHANGE UP (ref 0–0)
PLATELET # BLD AUTO: 218 K/UL — SIGNIFICANT CHANGE UP (ref 150–400)
POTASSIUM SERPL-MCNC: 3.9 MMOL/L — SIGNIFICANT CHANGE UP (ref 3.5–5.3)
POTASSIUM SERPL-SCNC: 3.9 MMOL/L — SIGNIFICANT CHANGE UP (ref 3.5–5.3)
PROT ?TM UR-MCNC: 9 MG/DL — SIGNIFICANT CHANGE UP (ref 0–12)
PROT SERPL-MCNC: 6.8 G/DL — SIGNIFICANT CHANGE UP (ref 6–8.3)
PROT/CREAT UR-RTO: 0.1 RATIO — SIGNIFICANT CHANGE UP (ref 0–0.2)
RBC # BLD: 5.17 M/UL — SIGNIFICANT CHANGE UP (ref 4.2–5.8)
RBC # FLD: 18.9 % — HIGH (ref 10.3–14.5)
SODIUM SERPL-SCNC: 141 MMOL/L — SIGNIFICANT CHANGE UP (ref 135–145)
WBC # BLD: 7.1 K/UL — SIGNIFICANT CHANGE UP (ref 3.8–10.5)
WBC # FLD AUTO: 7.1 K/UL — SIGNIFICANT CHANGE UP (ref 3.8–10.5)

## 2022-12-13 PROBLEM — Z51.11 ENCOUNTER FOR CHEMOTHERAPY MANAGEMENT: Status: ACTIVE | Noted: 2022-09-19

## 2022-12-13 PROBLEM — S46.911A: Status: RESOLVED | Noted: 2022-05-09 | Resolved: 2022-12-13

## 2022-12-13 NOTE — REVIEW OF SYSTEMS
[Negative] : Allergic/Immunologic [Dysphagia] : no dysphagia [Hoarseness] : no hoarseness [Odynophagia] : no odynophagia [Mucosal Pain] : no mucosal pain [Abdominal Pain] : no abdominal pain [Vomiting] : no vomiting [Constipation] : no constipation [Diarrhea] : no diarrhea [Dizziness] : no dizziness [Fainting] : no fainting [Difficulty Walking] : no difficulty walking [de-identified] : vitaligo

## 2022-12-13 NOTE — HISTORY OF PRESENT ILLNESS
[Disease: _____________________] : Disease: [unfilled] [AJCC Stage: ____] : AJCC Stage: [unfilled] [de-identified] : 54 y/o M with PMH of vitiligo who developed abdominal discomfort which started 12/2020 and he was admitted on 1/4/2021 @ [facility] where he was found to have mass in distal TC with hepatic and RLL metastatic disease seen in CT scan.  He was discharged home with outpatient follow up.  He had syncope at home on 1/18/2021 and then saw PMD on 1/19/2021 where he was sent to hospital and admitted to [facility] from 1/19/2021 to 1/19/2021.   He underwent liver biopsy on 1/25/2021 which confirmed metastatic colon cancer.  His CEA was elevated 2882 from 1/21/2021.  His CT c/a/p from 1/28/2021 showed findings consistent with metastatic colon cancer evidenced by annular colonic mass and numerous hepatic metastases, groundglass pulmonary opacity consistent with infectious etiology, including Covid 19, improved compared to prior exam.  \par \par He presents to us on 2/5/2021 and states he feels stronger since leaving hospital but has lost about 22 pounds from December 2020 to Feb 2021\par Given Stage IV disease, offered FOLFOX and reviewed palliative intent of treatment \par    \par Late Feb 2021 started FOLFOX \par March 2021 admitted for LBO and underwent resection with ostomy \par \par April 2021 restarted FOLFOX --> stopped oxali July 2021 \par April 2022 POD switched to FOLFIRI Day [de-identified] : pan andrea wt, transverse colon primary (right side) [de-identified] : IFTIKHAR, TMB 5, APC R232*APC *\par GRM3 V271I, MAP2K1 (MEK1) K57E\par TP53 S127P [FreeTextEntry1] :  FOLFIRI/Avastin started 4/25/22 [de-identified] : presents in follow up , no acute complaints\par tolerating treatment \par no abdominal pain , fevers or chills \par here to review recent imaging \par

## 2022-12-13 NOTE — PHYSICAL EXAM
[Fully active, able to carry on all pre-disease performance without restriction] : Status 0 - Fully active, able to carry on all pre-disease performance without restriction [Thin] : thin [Normal] : affect appropriate [de-identified] : anicteric  [de-identified] : normal respiratory effort, no audible wheeze [de-identified] : reg rate  [de-identified] : no LE edema [de-identified] : + ostomy, soft, non-tender [de-identified] : vitiligo

## 2022-12-14 ENCOUNTER — APPOINTMENT (OUTPATIENT)
Dept: INFUSION THERAPY | Facility: HOSPITAL | Age: 57
End: 2022-12-14

## 2022-12-27 ENCOUNTER — RESULT REVIEW (OUTPATIENT)
Age: 57
End: 2022-12-27

## 2022-12-27 ENCOUNTER — APPOINTMENT (OUTPATIENT)
Dept: INFUSION THERAPY | Facility: HOSPITAL | Age: 57
End: 2022-12-27

## 2022-12-27 ENCOUNTER — NON-APPOINTMENT (OUTPATIENT)
Age: 57
End: 2022-12-27

## 2022-12-27 LAB
ALBUMIN SERPL ELPH-MCNC: 3.8 G/DL — SIGNIFICANT CHANGE UP (ref 3.3–5)
ALP SERPL-CCNC: 77 U/L — SIGNIFICANT CHANGE UP (ref 40–120)
ALT FLD-CCNC: 21 U/L — SIGNIFICANT CHANGE UP (ref 10–45)
ANION GAP SERPL CALC-SCNC: 8 MMOL/L — SIGNIFICANT CHANGE UP (ref 5–17)
AST SERPL-CCNC: 24 U/L — SIGNIFICANT CHANGE UP (ref 10–40)
BASOPHILS # BLD AUTO: 0.04 K/UL — SIGNIFICANT CHANGE UP (ref 0–0.2)
BASOPHILS NFR BLD AUTO: 0.6 % — SIGNIFICANT CHANGE UP (ref 0–2)
BILIRUB SERPL-MCNC: 0.3 MG/DL — SIGNIFICANT CHANGE UP (ref 0.2–1.2)
BUN SERPL-MCNC: 17 MG/DL — SIGNIFICANT CHANGE UP (ref 7–23)
CALCIUM SERPL-MCNC: 9 MG/DL — SIGNIFICANT CHANGE UP (ref 8.4–10.5)
CEA SERPL-MCNC: 170 NG/ML — HIGH (ref 0–3.8)
CHLORIDE SERPL-SCNC: 107 MMOL/L — SIGNIFICANT CHANGE UP (ref 96–108)
CO2 SERPL-SCNC: 27 MMOL/L — SIGNIFICANT CHANGE UP (ref 22–31)
CREAT SERPL-MCNC: 0.84 MG/DL — SIGNIFICANT CHANGE UP (ref 0.5–1.3)
EGFR: 100 ML/MIN/1.73M2 — SIGNIFICANT CHANGE UP
EOSINOPHIL # BLD AUTO: 0.3 K/UL — SIGNIFICANT CHANGE UP (ref 0–0.5)
EOSINOPHIL NFR BLD AUTO: 4.6 % — SIGNIFICANT CHANGE UP (ref 0–6)
GLUCOSE SERPL-MCNC: 127 MG/DL — HIGH (ref 70–99)
HCT VFR BLD CALC: 38.6 % — LOW (ref 39–50)
HGB BLD-MCNC: 12.4 G/DL — LOW (ref 13–17)
IMM GRANULOCYTES NFR BLD AUTO: 0.3 % — SIGNIFICANT CHANGE UP (ref 0–0.9)
LYMPHOCYTES # BLD AUTO: 1.95 K/UL — SIGNIFICANT CHANGE UP (ref 1–3.3)
LYMPHOCYTES # BLD AUTO: 29.7 % — SIGNIFICANT CHANGE UP (ref 13–44)
MCHC RBC-ENTMCNC: 24.7 PG — LOW (ref 27–34)
MCHC RBC-ENTMCNC: 32.1 G/DL — SIGNIFICANT CHANGE UP (ref 32–36)
MCV RBC AUTO: 76.7 FL — LOW (ref 80–100)
MONOCYTES # BLD AUTO: 0.45 K/UL — SIGNIFICANT CHANGE UP (ref 0–0.9)
MONOCYTES NFR BLD AUTO: 6.9 % — SIGNIFICANT CHANGE UP (ref 2–14)
NEUTROPHILS # BLD AUTO: 3.8 K/UL — SIGNIFICANT CHANGE UP (ref 1.8–7.4)
NEUTROPHILS NFR BLD AUTO: 57.9 % — SIGNIFICANT CHANGE UP (ref 43–77)
NRBC # BLD: 0 /100 WBCS — SIGNIFICANT CHANGE UP (ref 0–0)
PLATELET # BLD AUTO: 193 K/UL — SIGNIFICANT CHANGE UP (ref 150–400)
POTASSIUM SERPL-MCNC: 4 MMOL/L — SIGNIFICANT CHANGE UP (ref 3.5–5.3)
POTASSIUM SERPL-SCNC: 4 MMOL/L — SIGNIFICANT CHANGE UP (ref 3.5–5.3)
PROT SERPL-MCNC: 6.1 G/DL — SIGNIFICANT CHANGE UP (ref 6–8.3)
RBC # BLD: 5.03 M/UL — SIGNIFICANT CHANGE UP (ref 4.2–5.8)
RBC # FLD: 19.7 % — HIGH (ref 10.3–14.5)
SODIUM SERPL-SCNC: 142 MMOL/L — SIGNIFICANT CHANGE UP (ref 135–145)
WBC # BLD: 6.56 K/UL — SIGNIFICANT CHANGE UP (ref 3.8–10.5)
WBC # FLD AUTO: 6.56 K/UL — SIGNIFICANT CHANGE UP (ref 3.8–10.5)

## 2022-12-27 PROCEDURE — 93010 ELECTROCARDIOGRAM REPORT: CPT

## 2022-12-29 ENCOUNTER — APPOINTMENT (OUTPATIENT)
Dept: INFUSION THERAPY | Facility: HOSPITAL | Age: 57
End: 2022-12-29

## 2023-01-03 ENCOUNTER — OUTPATIENT (OUTPATIENT)
Dept: OUTPATIENT SERVICES | Facility: HOSPITAL | Age: 58
LOS: 1 days | Discharge: HOME | End: 2023-01-03
Payer: MEDICAID

## 2023-01-03 DIAGNOSIS — C18.9 MALIGNANT NEOPLASM OF COLON, UNSPECIFIED: ICD-10-CM

## 2023-01-04 DIAGNOSIS — Z83.3 FAMILY HISTORY OF DIABETES MELLITUS: ICD-10-CM

## 2023-01-04 DIAGNOSIS — Z92.21 PERSONAL HISTORY OF ANTINEOPLASTIC CHEMOTHERAPY: ICD-10-CM

## 2023-01-04 RX ORDER — PROCHLORPERAZINE MALEATE 10 MG/1
10 TABLET ORAL EVERY 6 HOURS
Qty: 20 | Refills: 6 | Status: DISCONTINUED | COMMUNITY
Start: 2021-02-05 | End: 2023-01-04

## 2023-01-04 RX ORDER — LIDOCAINE AND PRILOCAINE 25; 25 MG/G; MG/G
2.5-2.5 CREAM TOPICAL
Qty: 30 | Refills: 2 | Status: DISCONTINUED | COMMUNITY
Start: 2021-02-05 | End: 2023-01-04

## 2023-01-06 ENCOUNTER — APPOINTMENT (OUTPATIENT)
Dept: CARDIOLOGY | Facility: CLINIC | Age: 58
End: 2023-01-06
Payer: MEDICAID

## 2023-01-06 ENCOUNTER — RESULT REVIEW (OUTPATIENT)
Age: 58
End: 2023-01-06

## 2023-01-06 VITALS
HEART RATE: 78 BPM | BODY MASS INDEX: 21.01 KG/M2 | WEIGHT: 129.19 LBS | HEIGHT: 65.75 IN | DIASTOLIC BLOOD PRESSURE: 81 MMHG | SYSTOLIC BLOOD PRESSURE: 126 MMHG | TEMPERATURE: 99.1 F | RESPIRATION RATE: 16 BRPM | OXYGEN SATURATION: 100 %

## 2023-01-06 LAB
ALBUMIN SERPL ELPH-MCNC: 4 G/DL
ALP BLD-CCNC: 104 U/L
ALT SERPL-CCNC: 29 U/L
ANION GAP SERPL CALC-SCNC: 13 MMOL/L
AST SERPL-CCNC: 29 U/L
BASOPHILS # BLD AUTO: 0.04 K/UL — SIGNIFICANT CHANGE UP (ref 0–0.2)
BASOPHILS NFR BLD AUTO: 0.5 % — SIGNIFICANT CHANGE UP (ref 0–2)
BILIRUB SERPL-MCNC: 0.3 MG/DL
BUN SERPL-MCNC: 18 MG/DL
CALCIUM SERPL-MCNC: 9 MG/DL
CHLORIDE SERPL-SCNC: 105 MMOL/L
CO2 SERPL-SCNC: 23 MMOL/L
CREAT SERPL-MCNC: 0.94 MG/DL
EGFR: 93 ML/MIN/1.73M2
EOSINOPHIL # BLD AUTO: 0.09 K/UL — SIGNIFICANT CHANGE UP (ref 0–0.5)
EOSINOPHIL NFR BLD AUTO: 1.1 % — SIGNIFICANT CHANGE UP (ref 0–6)
GLUCOSE SERPL-MCNC: 121 MG/DL
HCT VFR BLD CALC: 38.7 % — LOW (ref 39–50)
HGB BLD-MCNC: 12.1 G/DL — LOW (ref 13–17)
IMM GRANULOCYTES NFR BLD AUTO: 0.5 % — SIGNIFICANT CHANGE UP (ref 0–0.9)
LYMPHOCYTES # BLD AUTO: 1.79 K/UL — SIGNIFICANT CHANGE UP (ref 1–3.3)
LYMPHOCYTES # BLD AUTO: 21.8 % — SIGNIFICANT CHANGE UP (ref 13–44)
MCHC RBC-ENTMCNC: 24.5 PG — LOW (ref 27–34)
MCHC RBC-ENTMCNC: 31.3 G/DL — LOW (ref 32–36)
MCV RBC AUTO: 78.5 FL — LOW (ref 80–100)
MONOCYTES # BLD AUTO: 0.82 K/UL — SIGNIFICANT CHANGE UP (ref 0–0.9)
MONOCYTES NFR BLD AUTO: 10 % — SIGNIFICANT CHANGE UP (ref 2–14)
NEUTROPHILS # BLD AUTO: 5.42 K/UL — SIGNIFICANT CHANGE UP (ref 1.8–7.4)
NEUTROPHILS NFR BLD AUTO: 66.1 % — SIGNIFICANT CHANGE UP (ref 43–77)
NRBC # BLD: 0 /100 WBCS — SIGNIFICANT CHANGE UP (ref 0–0)
PLATELET # BLD AUTO: 291 K/UL — SIGNIFICANT CHANGE UP (ref 150–400)
POTASSIUM SERPL-SCNC: 4 MMOL/L
PROT SERPL-MCNC: 7 G/DL
RBC # BLD: 4.93 M/UL — SIGNIFICANT CHANGE UP (ref 4.2–5.8)
RBC # FLD: 19.1 % — HIGH (ref 10.3–14.5)
SODIUM SERPL-SCNC: 141 MMOL/L
WBC # BLD: 8.2 K/UL — SIGNIFICANT CHANGE UP (ref 3.8–10.5)
WBC # FLD AUTO: 8.2 K/UL — SIGNIFICANT CHANGE UP (ref 3.8–10.5)

## 2023-01-06 PROCEDURE — 93000 ELECTROCARDIOGRAM COMPLETE: CPT

## 2023-01-06 PROCEDURE — 93010 ELECTROCARDIOGRAM REPORT: CPT

## 2023-01-06 PROCEDURE — 99205 OFFICE O/P NEW HI 60 MIN: CPT

## 2023-01-06 NOTE — REASON FOR VISIT
[FreeTextEntry3] : Dr. Tu Byers [FreeTextEntry1] : YAKELIN GERARDO is a 57 year man with a history of stage IV colon cancer referred for chest pain.\par \par Prior Cancer Treatments:\par ------------------------------------------------------------------------\par Chemo/targeted therapy:\par 2/2021-4/2022: FOLFOX\par 4/25/2022-present: FOLFIRI Bevacizumab\par ------------------------------------------------------------------------\par Surgery:\par 3/2021: resection with ostomy for obstruction\par ------------------------------------------------------------------------\par Radiation:

## 2023-01-06 NOTE — HISTORY OF PRESENT ILLNESS
[FreeTextEntry1] : YAKELIN GERARDO is a 57 year man with a history of metastatic colorectal cancer. He tolerated infusional 5FU based chemotherapy, but after recent change to FOLFIRI with bevacizumab developed chest pain.\par \par He reports retrosternal chest pain at night which interferes with his sleep. Pain occurred at home while wearing 5FU infusion pump and resolved the day after returning it, each of the past 3 cycles (when treatment changed to current regimen). He notes pain is better when sitting upright and he denies any exertional chest pain, shortness of breath. Never had chest pain during prior infusions.\par \par No history of heart disease, HLD, diabetes smoking, uncle had MI in his 50s. Possibly "borderline" HTN. No medications aside from multivitamin.\par \par

## 2023-01-06 NOTE — ASSESSMENT
[FreeTextEntry1] : 57 year old man with chest pain associated with infusional 5FU, which began after regimen changed to FOLFIRI with bevacizumab. 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 Discussed with patient.\par \par Needs chest pain workup with echo and stress test.\par Will expedite stress test\par If CAD ruled out, would change to bolus dosing and premedicate with nifedipine and isosorbide\par Labs today for ASCVD risk stratification\par \par Starting morning of cycle\par - nifedipine 30-60 mg  and Imdur 30-60 mg AM of bolus each cycle, repeat 24 hours after\par - diltiazem 30 mg IR one hour prior to 5FU bolus\par - sublingual nitroglycerin PRN for chest pain\par \par \par Monitor BP during VEGF TKI (Avastin) treatment\par \par Follow up in one month with me.\par \par Above discussed with the patient and all questions were answered to the best of my ability and to his apparent satisfaction.\par 
- - -

## 2023-01-06 NOTE — CARDIOLOGY SUMMARY
[de-identified] : 1/6/2023: NSR 71 bpm, normal ECG\par 12/27/2022: NSR 67 bpm, septal infarct age undetermined. [de-identified] : 12/2/2022: atherosclerotic calcifications of coronaries and aorta

## 2023-01-06 NOTE — PHYSICAL EXAM
[Normal] : well developed, well nourished, no acute distress [Normal Conjunctiva] : normal conjunctiva [No Xanthelasma] : no xanthelasma [Normal Venous Pressure] : normal venous pressure [No Carotid Bruit] : no carotid bruit [Normal S1, S2] : normal S1, S2 [No Murmur] : no murmur [No Rub] : no rub [No Gallop] : no gallop [Clear Lung Fields] : clear lung fields [Good Air Entry] : good air entry [No Respiratory Distress] : no respiratory distress  [Soft] : abdomen soft [Normal Gait] : normal gait [Gait - Sufficient for Exercise Testing] : gait - sufficient for exercise testing [No Edema] : no edema [No Cyanosis] : no cyanosis [No Clubbing] : no clubbing [No Varicosities] : no varicosities [Moves all extremities] : moves all extremities [No Focal Deficits] : no focal deficits [Normal Speech] : normal speech [Alert and Oriented] : alert and oriented [de-identified] : vitiligo

## 2023-01-07 LAB
CHOLEST SERPL-MCNC: 185 MG/DL
ESTIMATED AVERAGE GLUCOSE: 134 MG/DL
HBA1C MFR BLD HPLC: 6.3 %
HDLC SERPL-MCNC: 58 MG/DL
LDLC SERPL CALC-MCNC: 111 MG/DL
NONHDLC SERPL-MCNC: 127 MG/DL
TRIGL SERPL-MCNC: 80 MG/DL

## 2023-01-09 ENCOUNTER — APPOINTMENT (OUTPATIENT)
Dept: HEMATOLOGY ONCOLOGY | Facility: CLINIC | Age: 58
End: 2023-01-09
Payer: MEDICAID

## 2023-01-09 VITALS
DIASTOLIC BLOOD PRESSURE: 78 MMHG | WEIGHT: 130.95 LBS | HEART RATE: 78 BPM | RESPIRATION RATE: 16 BRPM | SYSTOLIC BLOOD PRESSURE: 114 MMHG | TEMPERATURE: 98.4 F | HEIGHT: 65.75 IN | OXYGEN SATURATION: 99 % | BODY MASS INDEX: 21.3 KG/M2

## 2023-01-09 PROCEDURE — 99214 OFFICE O/P EST MOD 30 MIN: CPT

## 2023-01-10 ENCOUNTER — APPOINTMENT (OUTPATIENT)
Dept: INFUSION THERAPY | Facility: HOSPITAL | Age: 58
End: 2023-01-10

## 2023-01-10 ENCOUNTER — RESULT REVIEW (OUTPATIENT)
Age: 58
End: 2023-01-10

## 2023-01-10 LAB
ALBUMIN SERPL ELPH-MCNC: 3.5 G/DL — SIGNIFICANT CHANGE UP (ref 3.3–5)
ALP SERPL-CCNC: 92 U/L — SIGNIFICANT CHANGE UP (ref 40–120)
ALT FLD-CCNC: 18 U/L — SIGNIFICANT CHANGE UP (ref 10–45)
ANION GAP SERPL CALC-SCNC: 12 MMOL/L — SIGNIFICANT CHANGE UP (ref 5–17)
AST SERPL-CCNC: 22 U/L — SIGNIFICANT CHANGE UP (ref 10–40)
BASOPHILS # BLD AUTO: 0.04 K/UL — SIGNIFICANT CHANGE UP (ref 0–0.2)
BASOPHILS NFR BLD AUTO: 0.5 % — SIGNIFICANT CHANGE UP (ref 0–2)
BILIRUB SERPL-MCNC: 0.4 MG/DL — SIGNIFICANT CHANGE UP (ref 0.2–1.2)
BUN SERPL-MCNC: 12 MG/DL — SIGNIFICANT CHANGE UP (ref 7–23)
CALCIUM SERPL-MCNC: 8.8 MG/DL — SIGNIFICANT CHANGE UP (ref 8.4–10.5)
CEA SERPL-MCNC: 149 NG/ML — HIGH (ref 0–3.8)
CHLORIDE SERPL-SCNC: 106 MMOL/L — SIGNIFICANT CHANGE UP (ref 96–108)
CO2 SERPL-SCNC: 21 MMOL/L — LOW (ref 22–31)
CREAT ?TM UR-MCNC: 141 MG/DL — SIGNIFICANT CHANGE UP
CREAT SERPL-MCNC: 0.8 MG/DL — SIGNIFICANT CHANGE UP (ref 0.5–1.3)
EGFR: 102 ML/MIN/1.73M2 — SIGNIFICANT CHANGE UP
EOSINOPHIL # BLD AUTO: 0.31 K/UL — SIGNIFICANT CHANGE UP (ref 0–0.5)
EOSINOPHIL NFR BLD AUTO: 3.5 % — SIGNIFICANT CHANGE UP (ref 0–6)
GLUCOSE SERPL-MCNC: 203 MG/DL — HIGH (ref 70–99)
HCT VFR BLD CALC: 38.6 % — LOW (ref 39–50)
HGB BLD-MCNC: 12.3 G/DL — LOW (ref 13–17)
IMM GRANULOCYTES NFR BLD AUTO: 0.7 % — SIGNIFICANT CHANGE UP (ref 0–0.9)
LYMPHOCYTES # BLD AUTO: 2.55 K/UL — SIGNIFICANT CHANGE UP (ref 1–3.3)
LYMPHOCYTES # BLD AUTO: 28.9 % — SIGNIFICANT CHANGE UP (ref 13–44)
MCHC RBC-ENTMCNC: 24.6 PG — LOW (ref 27–34)
MCHC RBC-ENTMCNC: 31.9 G/DL — LOW (ref 32–36)
MCV RBC AUTO: 77 FL — LOW (ref 80–100)
MONOCYTES # BLD AUTO: 0.71 K/UL — SIGNIFICANT CHANGE UP (ref 0–0.9)
MONOCYTES NFR BLD AUTO: 8.1 % — SIGNIFICANT CHANGE UP (ref 2–14)
NEUTROPHILS # BLD AUTO: 5.14 K/UL — SIGNIFICANT CHANGE UP (ref 1.8–7.4)
NEUTROPHILS NFR BLD AUTO: 58.3 % — SIGNIFICANT CHANGE UP (ref 43–77)
NRBC # BLD: 0 /100 WBCS — SIGNIFICANT CHANGE UP (ref 0–0)
PLATELET # BLD AUTO: 306 K/UL — SIGNIFICANT CHANGE UP (ref 150–400)
POTASSIUM SERPL-MCNC: 3.9 MMOL/L — SIGNIFICANT CHANGE UP (ref 3.5–5.3)
POTASSIUM SERPL-SCNC: 3.9 MMOL/L — SIGNIFICANT CHANGE UP (ref 3.5–5.3)
PROT ?TM UR-MCNC: 16 MG/DL — HIGH (ref 0–12)
PROT SERPL-MCNC: 6.2 G/DL — SIGNIFICANT CHANGE UP (ref 6–8.3)
PROT/CREAT UR-RTO: 0.1 RATIO — SIGNIFICANT CHANGE UP (ref 0–0.2)
RBC # BLD: 5.01 M/UL — SIGNIFICANT CHANGE UP (ref 4.2–5.8)
RBC # FLD: 18 % — HIGH (ref 10.3–14.5)
SODIUM SERPL-SCNC: 139 MMOL/L — SIGNIFICANT CHANGE UP (ref 135–145)
WBC # BLD: 8.81 K/UL — SIGNIFICANT CHANGE UP (ref 3.8–10.5)
WBC # FLD AUTO: 8.81 K/UL — SIGNIFICANT CHANGE UP (ref 3.8–10.5)

## 2023-01-10 NOTE — PHYSICAL EXAM
[Fully active, able to carry on all pre-disease performance without restriction] : Status 0 - Fully active, able to carry on all pre-disease performance without restriction [Thin] : thin [Normal] : affect appropriate [de-identified] : anicteric  [de-identified] : normal respiratory effort, no audible wheeze [de-identified] : reg rate  [de-identified] : no LE edema [de-identified] : + ostomy, soft, non-tender [de-identified] : vitiligo

## 2023-01-10 NOTE — REVIEW OF SYSTEMS
[Negative] : Allergic/Immunologic [Chest Pain] : chest pain [Dysphagia] : no dysphagia [Hoarseness] : no hoarseness [Odynophagia] : no odynophagia [Mucosal Pain] : no mucosal pain [Abdominal Pain] : no abdominal pain [Vomiting] : no vomiting [Constipation] : no constipation [Diarrhea] : no diarrhea [Dizziness] : no dizziness [Fainting] : no fainting [Difficulty Walking] : no difficulty walking [FreeTextEntry5] : episodes of chest pain with lying flat for first few days after treatment [de-identified] : vitaligo

## 2023-01-10 NOTE — HISTORY OF PRESENT ILLNESS
[Disease: _____________________] : Disease: [unfilled] [AJCC Stage: ____] : AJCC Stage: [unfilled] [de-identified] : 54 y/o M with PMH of vitiligo who developed abdominal discomfort which started 12/2020 and he was admitted on 1/4/2021 @ [facility] where he was found to have mass in distal TC with hepatic and RLL metastatic disease seen in CT scan.  He was discharged home with outpatient follow up.  He had syncope at home on 1/18/2021 and then saw PMD on 1/19/2021 where he was sent to hospital and admitted to [facility] from 1/19/2021 to 1/19/2021.   He underwent liver biopsy on 1/25/2021 which confirmed metastatic colon cancer.  His CEA was elevated 2882 from 1/21/2021.  His CT c/a/p from 1/28/2021 showed findings consistent with metastatic colon cancer evidenced by annular colonic mass and numerous hepatic metastases, groundglass pulmonary opacity consistent with infectious etiology, including Covid 19, improved compared to prior exam.  \par \par He presents to us on 2/5/2021 and states he feels stronger since leaving hospital but has lost about 22 pounds from December 2020 to Feb 2021\par Given Stage IV disease, offered FOLFOX and reviewed palliative intent of treatment \par    \par Late Feb 2021 started FOLFOX \par March 2021 admitted for LBO and underwent resection with ostomy \par \par April 2021 restarted FOLFOX --> stopped oxali July 2021 \par April 2022 POD switched to FOLFIRI Day [de-identified] : pan andrea wt, transverse colon primary (right side) [de-identified] : IFTIKHAR, TMB 5, APC R232*APC *\par GRM3 V271I, MAP2K1 (MEK1) K57E\par TP53 S127P [FreeTextEntry1] :  FOLFIRI/Avastin started 4/25/22 --> 5FU held 12/27/22 [de-identified] : Patient presents for follow up today and is scheduled for treatment tomorrow.  His 5FU infusion was held with his last treatment due to reported episodes of chest pain during his infusions for his last 3 cycles.  He was seen by Cardiology last week and is scheduled for further cardiac work-up (stress test and Echo) later this week.  He reports substernal chest burning sensation with his most recent cycle for the first 2 days after treatment.  He states that symptoms only occurred with lying flat.  He denies palpitations, SOB/VELEZ, radiating pain, N/V or abdominal pain with symptoms.  He is otherwise tolerating treatment.  He denies anorexia, weight loss, change in bowel habits.

## 2023-01-11 DIAGNOSIS — R11.2 NAUSEA WITH VOMITING, UNSPECIFIED: ICD-10-CM

## 2023-01-11 DIAGNOSIS — Z51.11 ENCOUNTER FOR ANTINEOPLASTIC CHEMOTHERAPY: ICD-10-CM

## 2023-01-12 ENCOUNTER — APPOINTMENT (OUTPATIENT)
Dept: INFUSION THERAPY | Facility: HOSPITAL | Age: 58
End: 2023-01-12

## 2023-01-13 ENCOUNTER — APPOINTMENT (OUTPATIENT)
Dept: CV DIAGNOSTICS | Facility: HOSPITAL | Age: 58
End: 2023-01-13

## 2023-01-13 ENCOUNTER — OUTPATIENT (OUTPATIENT)
Dept: OUTPATIENT SERVICES | Facility: HOSPITAL | Age: 58
LOS: 1 days | End: 2023-01-13

## 2023-01-13 DIAGNOSIS — R07.9 CHEST PAIN, UNSPECIFIED: ICD-10-CM

## 2023-01-13 PROCEDURE — 93016 CV STRESS TEST SUPVJ ONLY: CPT | Mod: GC

## 2023-01-13 PROCEDURE — 78452 HT MUSCLE IMAGE SPECT MULT: CPT | Mod: 26,MH

## 2023-01-13 PROCEDURE — 93018 CV STRESS TEST I&R ONLY: CPT | Mod: GC

## 2023-01-17 ENCOUNTER — OUTPATIENT (OUTPATIENT)
Dept: OUTPATIENT SERVICES | Facility: HOSPITAL | Age: 58
LOS: 1 days | End: 2023-01-17

## 2023-01-17 ENCOUNTER — APPOINTMENT (OUTPATIENT)
Dept: CV DIAGNOSITCS | Facility: HOSPITAL | Age: 58
End: 2023-01-17

## 2023-01-17 DIAGNOSIS — R07.9 CHEST PAIN, UNSPECIFIED: ICD-10-CM

## 2023-01-17 PROCEDURE — 93306 TTE W/DOPPLER COMPLETE: CPT | Mod: 26

## 2023-01-19 ENCOUNTER — NON-APPOINTMENT (OUTPATIENT)
Age: 58
End: 2023-01-19

## 2023-01-23 ENCOUNTER — RESULT REVIEW (OUTPATIENT)
Age: 58
End: 2023-01-23

## 2023-01-23 ENCOUNTER — APPOINTMENT (OUTPATIENT)
Dept: INFUSION THERAPY | Facility: HOSPITAL | Age: 58
End: 2023-01-23

## 2023-01-23 LAB
ALBUMIN SERPL ELPH-MCNC: 3.6 G/DL — SIGNIFICANT CHANGE UP (ref 3.3–5)
ALP SERPL-CCNC: 93 U/L — SIGNIFICANT CHANGE UP (ref 40–120)
ALT FLD-CCNC: 21 U/L — SIGNIFICANT CHANGE UP (ref 10–45)
ANION GAP SERPL CALC-SCNC: 12 MMOL/L — SIGNIFICANT CHANGE UP (ref 5–17)
AST SERPL-CCNC: 25 U/L — SIGNIFICANT CHANGE UP (ref 10–40)
BASOPHILS # BLD AUTO: 0.06 K/UL — SIGNIFICANT CHANGE UP (ref 0–0.2)
BASOPHILS NFR BLD AUTO: 0.7 % — SIGNIFICANT CHANGE UP (ref 0–2)
BILIRUB SERPL-MCNC: 0.2 MG/DL — SIGNIFICANT CHANGE UP (ref 0.2–1.2)
BUN SERPL-MCNC: 17 MG/DL — SIGNIFICANT CHANGE UP (ref 7–23)
CALCIUM SERPL-MCNC: 9.4 MG/DL — SIGNIFICANT CHANGE UP (ref 8.4–10.5)
CEA SERPL-MCNC: 154 NG/ML — HIGH (ref 0–3.8)
CHLORIDE SERPL-SCNC: 105 MMOL/L — SIGNIFICANT CHANGE UP (ref 96–108)
CO2 SERPL-SCNC: 24 MMOL/L — SIGNIFICANT CHANGE UP (ref 22–31)
CREAT SERPL-MCNC: 1.02 MG/DL — SIGNIFICANT CHANGE UP (ref 0.5–1.3)
EGFR: 85 ML/MIN/1.73M2 — SIGNIFICANT CHANGE UP
EOSINOPHIL # BLD AUTO: 0.23 K/UL — SIGNIFICANT CHANGE UP (ref 0–0.5)
EOSINOPHIL NFR BLD AUTO: 2.6 % — SIGNIFICANT CHANGE UP (ref 0–6)
GLUCOSE SERPL-MCNC: 178 MG/DL — HIGH (ref 70–99)
HCT VFR BLD CALC: 34 % — LOW (ref 39–50)
HGB BLD-MCNC: 10.8 G/DL — LOW (ref 13–17)
IMM GRANULOCYTES NFR BLD AUTO: 0.8 % — SIGNIFICANT CHANGE UP (ref 0–0.9)
LYMPHOCYTES # BLD AUTO: 2.25 K/UL — SIGNIFICANT CHANGE UP (ref 1–3.3)
LYMPHOCYTES # BLD AUTO: 25.9 % — SIGNIFICANT CHANGE UP (ref 13–44)
MCHC RBC-ENTMCNC: 24.4 PG — LOW (ref 27–34)
MCHC RBC-ENTMCNC: 31.8 G/DL — LOW (ref 32–36)
MCV RBC AUTO: 76.9 FL — LOW (ref 80–100)
MONOCYTES # BLD AUTO: 0.66 K/UL — SIGNIFICANT CHANGE UP (ref 0–0.9)
MONOCYTES NFR BLD AUTO: 7.6 % — SIGNIFICANT CHANGE UP (ref 2–14)
NEUTROPHILS # BLD AUTO: 5.43 K/UL — SIGNIFICANT CHANGE UP (ref 1.8–7.4)
NEUTROPHILS NFR BLD AUTO: 62.4 % — SIGNIFICANT CHANGE UP (ref 43–77)
NRBC # BLD: 0 /100 WBCS — SIGNIFICANT CHANGE UP (ref 0–0)
PLATELET # BLD AUTO: 298 K/UL — SIGNIFICANT CHANGE UP (ref 150–400)
POTASSIUM SERPL-MCNC: 3.9 MMOL/L — SIGNIFICANT CHANGE UP (ref 3.5–5.3)
POTASSIUM SERPL-SCNC: 3.9 MMOL/L — SIGNIFICANT CHANGE UP (ref 3.5–5.3)
PROT SERPL-MCNC: 6.5 G/DL — SIGNIFICANT CHANGE UP (ref 6–8.3)
RBC # BLD: 4.42 M/UL — SIGNIFICANT CHANGE UP (ref 4.2–5.8)
RBC # FLD: 17.2 % — HIGH (ref 10.3–14.5)
SODIUM SERPL-SCNC: 142 MMOL/L — SIGNIFICANT CHANGE UP (ref 135–145)
WBC # BLD: 8.7 K/UL — SIGNIFICANT CHANGE UP (ref 3.8–10.5)
WBC # FLD AUTO: 8.7 K/UL — SIGNIFICANT CHANGE UP (ref 3.8–10.5)

## 2023-01-25 ENCOUNTER — APPOINTMENT (OUTPATIENT)
Dept: INFUSION THERAPY | Facility: HOSPITAL | Age: 58
End: 2023-01-25

## 2023-02-06 ENCOUNTER — RESULT REVIEW (OUTPATIENT)
Age: 58
End: 2023-02-06

## 2023-02-06 ENCOUNTER — APPOINTMENT (OUTPATIENT)
Dept: INFUSION THERAPY | Facility: HOSPITAL | Age: 58
End: 2023-02-06

## 2023-02-06 ENCOUNTER — APPOINTMENT (OUTPATIENT)
Dept: HEMATOLOGY ONCOLOGY | Facility: CLINIC | Age: 58
End: 2023-02-06

## 2023-02-06 ENCOUNTER — APPOINTMENT (OUTPATIENT)
Dept: HEMATOLOGY ONCOLOGY | Facility: CLINIC | Age: 58
End: 2023-02-06
Payer: MEDICAID

## 2023-02-06 VITALS
BODY MASS INDEX: 20.95 KG/M2 | SYSTOLIC BLOOD PRESSURE: 99 MMHG | WEIGHT: 128.84 LBS | HEIGHT: 65.75 IN | TEMPERATURE: 97 F | HEART RATE: 98 BPM | OXYGEN SATURATION: 98 % | DIASTOLIC BLOOD PRESSURE: 64 MMHG | RESPIRATION RATE: 16 BRPM

## 2023-02-06 LAB
ALBUMIN SERPL ELPH-MCNC: 3.5 G/DL — SIGNIFICANT CHANGE UP (ref 3.3–5)
ALP SERPL-CCNC: 91 U/L — SIGNIFICANT CHANGE UP (ref 40–120)
ALT FLD-CCNC: 25 U/L — SIGNIFICANT CHANGE UP (ref 10–45)
ANION GAP SERPL CALC-SCNC: 16 MMOL/L — SIGNIFICANT CHANGE UP (ref 5–17)
AST SERPL-CCNC: 32 U/L — SIGNIFICANT CHANGE UP (ref 10–40)
BASOPHILS # BLD AUTO: 0.05 K/UL — SIGNIFICANT CHANGE UP (ref 0–0.2)
BASOPHILS NFR BLD AUTO: 0.7 % — SIGNIFICANT CHANGE UP (ref 0–2)
BILIRUB SERPL-MCNC: 0.3 MG/DL — SIGNIFICANT CHANGE UP (ref 0.2–1.2)
BUN SERPL-MCNC: 18 MG/DL — SIGNIFICANT CHANGE UP (ref 7–23)
CALCIUM SERPL-MCNC: 9.1 MG/DL — SIGNIFICANT CHANGE UP (ref 8.4–10.5)
CEA SERPL-MCNC: 182 NG/ML — HIGH (ref 0–3.8)
CHLORIDE SERPL-SCNC: 102 MMOL/L — SIGNIFICANT CHANGE UP (ref 96–108)
CO2 SERPL-SCNC: 21 MMOL/L — LOW (ref 22–31)
CREAT SERPL-MCNC: 0.89 MG/DL — SIGNIFICANT CHANGE UP (ref 0.5–1.3)
EGFR: 99 ML/MIN/1.73M2 — SIGNIFICANT CHANGE UP
EOSINOPHIL # BLD AUTO: 0.24 K/UL — SIGNIFICANT CHANGE UP (ref 0–0.5)
EOSINOPHIL NFR BLD AUTO: 3.2 % — SIGNIFICANT CHANGE UP (ref 0–6)
GLUCOSE SERPL-MCNC: 137 MG/DL — HIGH (ref 70–99)
HCT VFR BLD CALC: 35.4 % — LOW (ref 39–50)
HGB BLD-MCNC: 11.4 G/DL — LOW (ref 13–17)
IMM GRANULOCYTES NFR BLD AUTO: 0.5 % — SIGNIFICANT CHANGE UP (ref 0–0.9)
LYMPHOCYTES # BLD AUTO: 2.56 K/UL — SIGNIFICANT CHANGE UP (ref 1–3.3)
LYMPHOCYTES # BLD AUTO: 33.9 % — SIGNIFICANT CHANGE UP (ref 13–44)
MCHC RBC-ENTMCNC: 24.8 PG — LOW (ref 27–34)
MCHC RBC-ENTMCNC: 32.2 G/DL — SIGNIFICANT CHANGE UP (ref 32–36)
MCV RBC AUTO: 77.1 FL — LOW (ref 80–100)
MONOCYTES # BLD AUTO: 0.69 K/UL — SIGNIFICANT CHANGE UP (ref 0–0.9)
MONOCYTES NFR BLD AUTO: 9.1 % — SIGNIFICANT CHANGE UP (ref 2–14)
NEUTROPHILS # BLD AUTO: 3.98 K/UL — SIGNIFICANT CHANGE UP (ref 1.8–7.4)
NEUTROPHILS NFR BLD AUTO: 52.6 % — SIGNIFICANT CHANGE UP (ref 43–77)
NRBC # BLD: 0 /100 WBCS — SIGNIFICANT CHANGE UP (ref 0–0)
PLATELET # BLD AUTO: 248 K/UL — SIGNIFICANT CHANGE UP (ref 150–400)
POTASSIUM SERPL-MCNC: 3.9 MMOL/L — SIGNIFICANT CHANGE UP (ref 3.5–5.3)
POTASSIUM SERPL-SCNC: 3.9 MMOL/L — SIGNIFICANT CHANGE UP (ref 3.5–5.3)
PROT SERPL-MCNC: 6.3 G/DL — SIGNIFICANT CHANGE UP (ref 6–8.3)
RBC # BLD: 4.59 M/UL — SIGNIFICANT CHANGE UP (ref 4.2–5.8)
RBC # FLD: 17 % — HIGH (ref 10.3–14.5)
SODIUM SERPL-SCNC: 139 MMOL/L — SIGNIFICANT CHANGE UP (ref 135–145)
WBC # BLD: 7.56 K/UL — SIGNIFICANT CHANGE UP (ref 3.8–10.5)
WBC # FLD AUTO: 7.56 K/UL — SIGNIFICANT CHANGE UP (ref 3.8–10.5)

## 2023-02-06 PROCEDURE — 99214 OFFICE O/P EST MOD 30 MIN: CPT

## 2023-02-06 RX ORDER — MULTIVITAMIN
TABLET ORAL
Refills: 0 | Status: DISCONTINUED | COMMUNITY
End: 2023-02-06

## 2023-02-06 NOTE — PHYSICAL EXAM
[Fully active, able to carry on all pre-disease performance without restriction] : Status 0 - Fully active, able to carry on all pre-disease performance without restriction [Thin] : thin [Normal] : affect appropriate [de-identified] : anicteric  [de-identified] : normal respiratory effort, no audible wheeze [de-identified] : reg rate  [de-identified] : no LE edema [de-identified] : + ostomy, soft, non-tender [de-identified] : vitiligo

## 2023-02-06 NOTE — HISTORY OF PRESENT ILLNESS
[Disease: _____________________] : Disease: [unfilled] [AJCC Stage: ____] : AJCC Stage: [unfilled] [de-identified] : 56 y/o M with PMH of vitiligo who developed abdominal discomfort which started 12/2020 and he was admitted on 1/4/2021 @ [facility] where he was found to have mass in distal TC with hepatic and RLL metastatic disease seen in CT scan.  He was discharged home with outpatient follow up.  He had syncope at home on 1/18/2021 and then saw PMD on 1/19/2021 where he was sent to hospital and admitted to [facility] from 1/19/2021 to 1/19/2021.   He underwent liver biopsy on 1/25/2021 which confirmed metastatic colon cancer.  His CEA was elevated 2882 from 1/21/2021.  His CT c/a/p from 1/28/2021 showed findings consistent with metastatic colon cancer evidenced by annular colonic mass and numerous hepatic metastases, groundglass pulmonary opacity consistent with infectious etiology, including Covid 19, improved compared to prior exam.  \par \par He presents to us on 2/5/2021 and states he feels stronger since leaving hospital but has lost about 22 pounds from December 2020 to Feb 2021\par Given Stage IV disease, offered FOLFOX and reviewed palliative intent of treatment \par    \par Late Feb 2021 started FOLFOX \par March 2021 admitted for LBO and underwent resection with ostomy \par \par April 2021 restarted FOLFOX --> stopped oxali July 2021 \par April 2022 POD switched to FOLFIRI Day [de-identified] : pan andrea wt, transverse colon primary (right side) [de-identified] : IFTIKHAR, TMB 5, APC R232*APC *\par GRM3 V271I, MAP2K1 (MEK1) K57E\par TP53 S127P [FreeTextEntry1] :  FOLFIRI/Avastin started 4/25/22 --> 5FU held 12/27/22 [de-identified] : presents in follow up , no acute complaints\par tolerating treatment now that bolus 5Fu given not infusional given cardiac symptoms  \par no abdominal pain , fevers or chills \par  \par

## 2023-02-06 NOTE — REVIEW OF SYSTEMS
[Negative] : Allergic/Immunologic [Dysphagia] : no dysphagia [Hoarseness] : no hoarseness [Odynophagia] : no odynophagia [Mucosal Pain] : no mucosal pain [Abdominal Pain] : no abdominal pain [Vomiting] : no vomiting [Constipation] : no constipation [Diarrhea] : no diarrhea [Dizziness] : no dizziness [Fainting] : no fainting [Difficulty Walking] : no difficulty walking [de-identified] : vitiligo

## 2023-02-07 ENCOUNTER — RESULT REVIEW (OUTPATIENT)
Age: 58
End: 2023-02-07

## 2023-02-07 LAB
CREAT ?TM UR-MCNC: 260 MG/DL — SIGNIFICANT CHANGE UP
PROT ?TM UR-MCNC: 21 MG/DL — HIGH (ref 0–12)
PROT/CREAT UR-RTO: 0.1 RATIO — SIGNIFICANT CHANGE UP (ref 0–0.2)

## 2023-02-08 ENCOUNTER — APPOINTMENT (OUTPATIENT)
Dept: INFUSION THERAPY | Facility: HOSPITAL | Age: 58
End: 2023-02-08

## 2023-02-13 ENCOUNTER — RX CHANGE (OUTPATIENT)
Age: 58
End: 2023-02-13

## 2023-02-13 ENCOUNTER — APPOINTMENT (OUTPATIENT)
Dept: CARDIOLOGY | Facility: CLINIC | Age: 58
End: 2023-02-13
Payer: MEDICAID

## 2023-02-13 VITALS
RESPIRATION RATE: 16 BRPM | WEIGHT: 127.87 LBS | HEART RATE: 95 BPM | SYSTOLIC BLOOD PRESSURE: 128 MMHG | DIASTOLIC BLOOD PRESSURE: 78 MMHG | HEIGHT: 65.75 IN | OXYGEN SATURATION: 97 % | BODY MASS INDEX: 20.8 KG/M2 | TEMPERATURE: 97 F

## 2023-02-13 PROCEDURE — 93000 ELECTROCARDIOGRAM COMPLETE: CPT

## 2023-02-13 PROCEDURE — 99215 OFFICE O/P EST HI 40 MIN: CPT

## 2023-02-13 PROCEDURE — 93010 ELECTROCARDIOGRAM REPORT: CPT

## 2023-02-13 RX ORDER — NITROGLYCERIN 0.4 MG/1
0.4 TABLET SUBLINGUAL
Qty: 30 | Refills: 1 | Status: ACTIVE | COMMUNITY
Start: 2023-02-13 | End: 1900-01-01

## 2023-02-13 RX ORDER — NIFEDIPINE 30 MG/1
30 TABLET, EXTENDED RELEASE ORAL
Qty: 30 | Refills: 1 | Status: DISCONTINUED | COMMUNITY
Start: 2023-01-19 | End: 2023-02-13

## 2023-02-13 NOTE — HISTORY OF PRESENT ILLNESS
[FreeTextEntry1] : Interval History:\par Denies any further chest pain and tolerated most recent cycle with bolus 5FU without incident.\par Denies side effects from Imdur, nifedipine taken for spasm prophylaxis.\par He is exercising on treadmill.\par \par \par History:\par YAKELIN GERARDO is a 57 year man with a history of metastatic colorectal cancer. He tolerated infusional 5FU based chemotherapy previously, but after recent change to FOLFIRI with bevacizumab developed chest pain.\par \par He reports retrosternal chest pain at night which interferes with his sleep. Pain occurred at home while wearing 5FU infusion pump and resolved the day after returning it, each of the past 3 cycles (when treatment changed to current regimen). He notes pain is better when sitting upright and he denies any exertional chest pain, shortness of breath. Never had chest pain during prior infusions.\par \par No history of heart disease, HLD, diabetes, smoking, uncle had MI in his 50s. Possibly "borderline" HTN. No medications aside from multivitamin.

## 2023-02-13 NOTE — ASSESSMENT
[FreeTextEntry1] : 57 year old man with chest pain associated with infusional 5FU, which began after regimen changed to FOLFIRI with bevacizumab. 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 \par \par Nuclear stress test no ischemia and normal perfusion, Echo also normal.\par Symptoms did not recur during bolus re-challenge with Imdur and nifedipine.\par \par His oncologist would prefer to resume infusional 5FU if possible, given superior efficacy.\par \par Discussed with patient. Chest pain may recur with infusional 5FU and may increase in severity as well.\par \par Will proceed cautiously.\par \par Starting morning of cycle\par - nifedipine 30 mg  and Imdur 30 mg AM every 12 hours to continue throughout infusion and for one day after.\par - sublingual nitroglycerin PRN for chest pain\par - if any chest pain during infusion, he must disconnect the pump immediately\par - if chest pain recurs after disconnecting or does not respond to nitroglycerin, he should call me and go to the ER\par - patient verbalized understanding and was given an opportunity to participate in shared decision making\par \par Monitor BP during VEGF TKI (Avastin) treatment, normal today. \par \par Follow up in one month with me.\par \par Above discussed with the patient and all questions were answered to the best of my ability and to his apparent satisfaction. Coordinated care with Dr. Ospina's team as well.\par

## 2023-02-13 NOTE — CARDIOLOGY SUMMARY
[de-identified] : \par 2/13/2023:NSR 94, normal ECG\par 1/6/2023: NSR 71 bpm, normal ECG\par 12/27/2022: NSR 67 bpm, septal infarct age undetermined. [de-identified] : \par 1/13/2023: 6 Mets Nic protocol, no ischemia or scar. Normal perfusion study [de-identified] : \par 1/17/2023: EF 61 %, GLS - 16.4 [de-identified] : \par 12/2/2022: atherosclerotic calcifications of coronaries and aorta

## 2023-02-13 NOTE — REASON FOR VISIT
[FreeTextEntry3] : Dr. Tu Byers [FreeTextEntry1] : YAKELIN GERARDO is a 57 year old man with stage IV colon adenocarcinoma seen for follow up of suspected 5FU related chest pain.\par \par Prior Cancer Treatments:\par ------------------------------------------------------------------------\par Chemo/targeted therapy:\par 2/2021-4/2022: FOLFOX\par 4/25/2022-present: FOLFIRI Bevacizumab, 5FU changed to bolus in 1/2023\par ------------------------------------------------------------------------\par Surgery:\par 3/2021: resection with ostomy for obstruction\par ------------------------------------------------------------------------\par Radiation:

## 2023-02-13 NOTE — PHYSICAL EXAM
[Normal] : well developed, well nourished, no acute distress [Normal Conjunctiva] : normal conjunctiva [No Xanthelasma] : no xanthelasma [Normal Venous Pressure] : normal venous pressure [Normal S1, S2] : normal S1, S2 [No Murmur] : no murmur [No Rub] : no rub [No Gallop] : no gallop [Clear Lung Fields] : clear lung fields [Good Air Entry] : good air entry [No Respiratory Distress] : no respiratory distress  [Soft] : abdomen soft [Normal Gait] : normal gait [Gait - Sufficient for Exercise Testing] : gait - sufficient for exercise testing [No Edema] : no edema [No Cyanosis] : no cyanosis [No Clubbing] : no clubbing [No Varicosities] : no varicosities [Moves all extremities] : moves all extremities [No Focal Deficits] : no focal deficits [Normal Speech] : normal speech [Alert and Oriented] : alert and oriented [Normal memory] : normal memory [de-identified] : vitiligo

## 2023-02-21 ENCOUNTER — RESULT REVIEW (OUTPATIENT)
Age: 58
End: 2023-02-21

## 2023-02-21 ENCOUNTER — NON-APPOINTMENT (OUTPATIENT)
Age: 58
End: 2023-02-21

## 2023-02-21 ENCOUNTER — APPOINTMENT (OUTPATIENT)
Dept: INFUSION THERAPY | Facility: HOSPITAL | Age: 58
End: 2023-02-21

## 2023-02-21 LAB
ALBUMIN SERPL ELPH-MCNC: 3.6 G/DL — SIGNIFICANT CHANGE UP (ref 3.3–5)
ALP SERPL-CCNC: 92 U/L — SIGNIFICANT CHANGE UP (ref 40–120)
ALT FLD-CCNC: 23 U/L — SIGNIFICANT CHANGE UP (ref 10–45)
ANION GAP SERPL CALC-SCNC: 14 MMOL/L — SIGNIFICANT CHANGE UP (ref 5–17)
AST SERPL-CCNC: 32 U/L — SIGNIFICANT CHANGE UP (ref 10–40)
BASOPHILS # BLD AUTO: 0.06 K/UL — SIGNIFICANT CHANGE UP (ref 0–0.2)
BASOPHILS NFR BLD AUTO: 0.7 % — SIGNIFICANT CHANGE UP (ref 0–2)
BILIRUB SERPL-MCNC: 0.4 MG/DL — SIGNIFICANT CHANGE UP (ref 0.2–1.2)
BUN SERPL-MCNC: 19 MG/DL — SIGNIFICANT CHANGE UP (ref 7–23)
CALCIUM SERPL-MCNC: 8.8 MG/DL — SIGNIFICANT CHANGE UP (ref 8.4–10.5)
CHLORIDE SERPL-SCNC: 104 MMOL/L — SIGNIFICANT CHANGE UP (ref 96–108)
CO2 SERPL-SCNC: 22 MMOL/L — SIGNIFICANT CHANGE UP (ref 22–31)
CREAT SERPL-MCNC: 0.95 MG/DL — SIGNIFICANT CHANGE UP (ref 0.5–1.3)
EGFR: 92 ML/MIN/1.73M2 — SIGNIFICANT CHANGE UP
EOSINOPHIL # BLD AUTO: 0.27 K/UL — SIGNIFICANT CHANGE UP (ref 0–0.5)
EOSINOPHIL NFR BLD AUTO: 3.2 % — SIGNIFICANT CHANGE UP (ref 0–6)
GLUCOSE SERPL-MCNC: 224 MG/DL — HIGH (ref 70–99)
HCT VFR BLD CALC: 37.2 % — LOW (ref 39–50)
HGB BLD-MCNC: 11.8 G/DL — LOW (ref 13–17)
IMM GRANULOCYTES NFR BLD AUTO: 0.5 % — SIGNIFICANT CHANGE UP (ref 0–0.9)
LYMPHOCYTES # BLD AUTO: 3.35 K/UL — HIGH (ref 1–3.3)
LYMPHOCYTES # BLD AUTO: 39.5 % — SIGNIFICANT CHANGE UP (ref 13–44)
MCHC RBC-ENTMCNC: 24.6 PG — LOW (ref 27–34)
MCHC RBC-ENTMCNC: 31.7 G/DL — LOW (ref 32–36)
MCV RBC AUTO: 77.7 FL — LOW (ref 80–100)
MONOCYTES # BLD AUTO: 0.71 K/UL — SIGNIFICANT CHANGE UP (ref 0–0.9)
MONOCYTES NFR BLD AUTO: 8.4 % — SIGNIFICANT CHANGE UP (ref 2–14)
NEUTROPHILS # BLD AUTO: 4.05 K/UL — SIGNIFICANT CHANGE UP (ref 1.8–7.4)
NEUTROPHILS NFR BLD AUTO: 47.7 % — SIGNIFICANT CHANGE UP (ref 43–77)
NRBC # BLD: 0 /100 WBCS — SIGNIFICANT CHANGE UP (ref 0–0)
PLATELET # BLD AUTO: 242 K/UL — SIGNIFICANT CHANGE UP (ref 150–400)
POTASSIUM SERPL-MCNC: 4.2 MMOL/L — SIGNIFICANT CHANGE UP (ref 3.5–5.3)
POTASSIUM SERPL-SCNC: 4.2 MMOL/L — SIGNIFICANT CHANGE UP (ref 3.5–5.3)
PROT SERPL-MCNC: 6.3 G/DL — SIGNIFICANT CHANGE UP (ref 6–8.3)
RBC # BLD: 4.79 M/UL — SIGNIFICANT CHANGE UP (ref 4.2–5.8)
RBC # FLD: 16.9 % — HIGH (ref 10.3–14.5)
SODIUM SERPL-SCNC: 140 MMOL/L — SIGNIFICANT CHANGE UP (ref 135–145)
WBC # BLD: 8.48 K/UL — SIGNIFICANT CHANGE UP (ref 3.8–10.5)
WBC # FLD AUTO: 8.48 K/UL — SIGNIFICANT CHANGE UP (ref 3.8–10.5)

## 2023-02-21 RX ORDER — MULTIVIT-MIN/FERROUS GLUCONATE 9 MG/15 ML
1 LIQUID (ML) ORAL
Qty: 0 | Refills: 0 | DISCHARGE

## 2023-02-23 ENCOUNTER — APPOINTMENT (OUTPATIENT)
Dept: INFUSION THERAPY | Facility: HOSPITAL | Age: 58
End: 2023-02-23

## 2023-02-24 ENCOUNTER — APPOINTMENT (OUTPATIENT)
Dept: CT IMAGING | Facility: IMAGING CENTER | Age: 58
End: 2023-02-24
Payer: MEDICAID

## 2023-02-24 ENCOUNTER — OUTPATIENT (OUTPATIENT)
Dept: OUTPATIENT SERVICES | Facility: HOSPITAL | Age: 58
LOS: 1 days | End: 2023-02-24
Payer: MEDICAID

## 2023-02-24 ENCOUNTER — RESULT REVIEW (OUTPATIENT)
Age: 58
End: 2023-02-24

## 2023-02-24 DIAGNOSIS — Z00.8 ENCOUNTER FOR OTHER GENERAL EXAMINATION: ICD-10-CM

## 2023-02-24 DIAGNOSIS — C18.9 MALIGNANT NEOPLASM OF COLON, UNSPECIFIED: ICD-10-CM

## 2023-02-24 PROCEDURE — 71260 CT THORAX DX C+: CPT

## 2023-02-24 PROCEDURE — 74177 CT ABD & PELVIS W/CONTRAST: CPT

## 2023-02-24 PROCEDURE — 74177 CT ABD & PELVIS W/CONTRAST: CPT | Mod: 26

## 2023-02-24 PROCEDURE — 71260 CT THORAX DX C+: CPT | Mod: 26

## 2023-02-27 ENCOUNTER — OUTPATIENT (OUTPATIENT)
Dept: OUTPATIENT SERVICES | Facility: HOSPITAL | Age: 58
LOS: 1 days | Discharge: HOME | End: 2023-02-27
Payer: MEDICAID

## 2023-02-27 DIAGNOSIS — C18.9 MALIGNANT NEOPLASM OF COLON, UNSPECIFIED: ICD-10-CM

## 2023-03-03 ENCOUNTER — APPOINTMENT (OUTPATIENT)
Dept: HEMATOLOGY ONCOLOGY | Facility: CLINIC | Age: 58
End: 2023-03-03
Payer: MEDICAID

## 2023-03-03 VITALS
TEMPERATURE: 97.2 F | SYSTOLIC BLOOD PRESSURE: 124 MMHG | BODY MASS INDEX: 20.79 KG/M2 | HEART RATE: 82 BPM | RESPIRATION RATE: 15 BRPM | OXYGEN SATURATION: 98 % | DIASTOLIC BLOOD PRESSURE: 83 MMHG | WEIGHT: 127.87 LBS

## 2023-03-03 DIAGNOSIS — K12.30 ORAL MUCOSITIS (ULCERATIVE), UNSPECIFIED: ICD-10-CM

## 2023-03-03 PROCEDURE — 99215 OFFICE O/P EST HI 40 MIN: CPT

## 2023-03-03 RX ORDER — DIPHENHYDRAMINE HYDROCHLORIDE AND LIDOCAINE HYDROCHLORIDE AND ALUMINUM HYDROXIDE AND MAGNESIUM HYDRO
KIT EVERY 6 HOURS
Qty: 1 | Refills: 0 | Status: ACTIVE | COMMUNITY
Start: 2023-03-03 | End: 1900-01-01

## 2023-03-06 ENCOUNTER — RESULT REVIEW (OUTPATIENT)
Age: 58
End: 2023-03-06

## 2023-03-06 ENCOUNTER — APPOINTMENT (OUTPATIENT)
Dept: INFUSION THERAPY | Facility: HOSPITAL | Age: 58
End: 2023-03-06

## 2023-03-06 PROBLEM — K12.30 MUCOSITIS ORAL: Status: ACTIVE | Noted: 2023-03-03

## 2023-03-06 LAB
ALBUMIN SERPL ELPH-MCNC: 3.6 G/DL — SIGNIFICANT CHANGE UP (ref 3.3–5)
ALP SERPL-CCNC: 111 U/L — SIGNIFICANT CHANGE UP (ref 40–120)
ALT FLD-CCNC: 26 U/L — SIGNIFICANT CHANGE UP (ref 10–45)
ANION GAP SERPL CALC-SCNC: 13 MMOL/L — SIGNIFICANT CHANGE UP (ref 5–17)
AST SERPL-CCNC: 30 U/L — SIGNIFICANT CHANGE UP (ref 10–40)
BASOPHILS # BLD AUTO: 0.06 K/UL — SIGNIFICANT CHANGE UP (ref 0–0.2)
BASOPHILS NFR BLD AUTO: 0.7 % — SIGNIFICANT CHANGE UP (ref 0–2)
BILIRUB SERPL-MCNC: 0.4 MG/DL — SIGNIFICANT CHANGE UP (ref 0.2–1.2)
BUN SERPL-MCNC: 12 MG/DL — SIGNIFICANT CHANGE UP (ref 7–23)
CALCIUM SERPL-MCNC: 9.2 MG/DL — SIGNIFICANT CHANGE UP (ref 8.4–10.5)
CEA SERPL-MCNC: 178 NG/ML — HIGH (ref 0–3.8)
CHLORIDE SERPL-SCNC: 104 MMOL/L — SIGNIFICANT CHANGE UP (ref 96–108)
CO2 SERPL-SCNC: 23 MMOL/L — SIGNIFICANT CHANGE UP (ref 22–31)
CREAT ?TM UR-MCNC: 158 MG/DL — SIGNIFICANT CHANGE UP
CREAT SERPL-MCNC: 0.93 MG/DL — SIGNIFICANT CHANGE UP (ref 0.5–1.3)
EGFR: 95 ML/MIN/1.73M2 — SIGNIFICANT CHANGE UP
EOSINOPHIL # BLD AUTO: 0.2 K/UL — SIGNIFICANT CHANGE UP (ref 0–0.5)
EOSINOPHIL NFR BLD AUTO: 2.4 % — SIGNIFICANT CHANGE UP (ref 0–6)
GLUCOSE SERPL-MCNC: 215 MG/DL — HIGH (ref 70–99)
HCT VFR BLD CALC: 37.9 % — LOW (ref 39–50)
HGB BLD-MCNC: 12 G/DL — LOW (ref 13–17)
IMM GRANULOCYTES NFR BLD AUTO: 0.5 % — SIGNIFICANT CHANGE UP (ref 0–0.9)
LYMPHOCYTES # BLD AUTO: 2.12 K/UL — SIGNIFICANT CHANGE UP (ref 1–3.3)
LYMPHOCYTES # BLD AUTO: 25.8 % — SIGNIFICANT CHANGE UP (ref 13–44)
MCHC RBC-ENTMCNC: 24.3 PG — LOW (ref 27–34)
MCHC RBC-ENTMCNC: 31.7 G/DL — LOW (ref 32–36)
MCV RBC AUTO: 76.9 FL — LOW (ref 80–100)
MONOCYTES # BLD AUTO: 0.55 K/UL — SIGNIFICANT CHANGE UP (ref 0–0.9)
MONOCYTES NFR BLD AUTO: 6.7 % — SIGNIFICANT CHANGE UP (ref 2–14)
NEUTROPHILS # BLD AUTO: 5.25 K/UL — SIGNIFICANT CHANGE UP (ref 1.8–7.4)
NEUTROPHILS NFR BLD AUTO: 63.9 % — SIGNIFICANT CHANGE UP (ref 43–77)
NRBC # BLD: 0 /100 WBCS — SIGNIFICANT CHANGE UP (ref 0–0)
PLATELET # BLD AUTO: 301 K/UL — SIGNIFICANT CHANGE UP (ref 150–400)
POTASSIUM SERPL-MCNC: 4 MMOL/L — SIGNIFICANT CHANGE UP (ref 3.5–5.3)
POTASSIUM SERPL-SCNC: 4 MMOL/L — SIGNIFICANT CHANGE UP (ref 3.5–5.3)
PROT ?TM UR-MCNC: 23 MG/DL — HIGH (ref 0–12)
PROT SERPL-MCNC: 6.3 G/DL — SIGNIFICANT CHANGE UP (ref 6–8.3)
PROT/CREAT UR-RTO: 0.2 RATIO — SIGNIFICANT CHANGE UP (ref 0–0.2)
RBC # BLD: 4.93 M/UL — SIGNIFICANT CHANGE UP (ref 4.2–5.8)
RBC # FLD: 16.7 % — HIGH (ref 10.3–14.5)
SODIUM SERPL-SCNC: 140 MMOL/L — SIGNIFICANT CHANGE UP (ref 135–145)
WBC # BLD: 8.22 K/UL — SIGNIFICANT CHANGE UP (ref 3.8–10.5)
WBC # FLD AUTO: 8.22 K/UL — SIGNIFICANT CHANGE UP (ref 3.8–10.5)

## 2023-03-06 NOTE — REVIEW OF SYSTEMS
[Negative] : Allergic/Immunologic [Dysphagia] : no dysphagia [Hoarseness] : no hoarseness [Odynophagia] : no odynophagia [Mucosal Pain] : no mucosal pain [Abdominal Pain] : no abdominal pain [Vomiting] : no vomiting [Constipation] : no constipation [Diarrhea] : no diarrhea [Dizziness] : no dizziness [Fainting] : no fainting [Difficulty Walking] : no difficulty walking [de-identified] : vitiligo

## 2023-03-06 NOTE — PHYSICAL EXAM
[Fully active, able to carry on all pre-disease performance without restriction] : Status 0 - Fully active, able to carry on all pre-disease performance without restriction [Thin] : thin [Normal] : affect appropriate [de-identified] : anicteric  [de-identified] : normal respiratory effort, no audible wheeze [de-identified] : reg rate  [de-identified] : no LE edema [de-identified] : vitiligo [de-identified] : + ostomy, soft, non-tender

## 2023-03-06 NOTE — HISTORY OF PRESENT ILLNESS
[Disease: _____________________] : Disease: [unfilled] [AJCC Stage: ____] : AJCC Stage: [unfilled] [de-identified] : 54 y/o M with PMH of vitiligo who developed abdominal discomfort which started 12/2020 and he was admitted on 1/4/2021 @ [facility] where he was found to have mass in distal TC with hepatic and RLL metastatic disease seen in CT scan.  He was discharged home with outpatient follow up.  He had syncope at home on 1/18/2021 and then saw PMD on 1/19/2021 where he was sent to hospital and admitted to [facility] from 1/19/2021 to 1/19/2021.   He underwent liver biopsy on 1/25/2021 which confirmed metastatic colon cancer.  His CEA was elevated 2882 from 1/21/2021.  His CT c/a/p from 1/28/2021 showed findings consistent with metastatic colon cancer evidenced by annular colonic mass and numerous hepatic metastases, groundglass pulmonary opacity consistent with infectious etiology, including Covid 19, improved compared to prior exam.  \par \par He presents to us on 2/5/2021 and states he feels stronger since leaving hospital but has lost about 22 pounds from December 2020 to Feb 2021\par Given Stage IV disease, offered FOLFOX and reviewed palliative intent of treatment \par    \par Late Feb 2021 started FOLFOX \par March 2021 admitted for LBO and underwent resection with ostomy \par \par April 2021 restarted FOLFOX --> stopped oxali July 2021 \par April 2022 POD switched to FOLFIRI Day ( 5FU held 12/27/22 for possible coronary vasospasm then bolus 5FU for 1 month and after cardiac eval , infusional 5fu restart late Feb 2023 after cardiology optimization  [de-identified] : pan andrea wt, transverse colon primary (right side) [de-identified] : IFTIKHAR, TMB 5, APC R232*APC *\par GRM3 V271I, MAP2K1 (MEK1) K57E\par TP53 S127P [FreeTextEntry1] :  NAVA olivav [de-identified] : presents in follow up , no acute complaints\par tolerating FOLFIRI again with infusional 5Fu and not bolus 5FU\par no abdominal pain , fevers or chills \par here to review imaging \par +mucositis \par  \par

## 2023-03-07 DIAGNOSIS — R11.2 NAUSEA WITH VOMITING, UNSPECIFIED: ICD-10-CM

## 2023-03-07 DIAGNOSIS — Z51.11 ENCOUNTER FOR ANTINEOPLASTIC CHEMOTHERAPY: ICD-10-CM

## 2023-03-08 ENCOUNTER — APPOINTMENT (OUTPATIENT)
Dept: INFUSION THERAPY | Facility: HOSPITAL | Age: 58
End: 2023-03-08

## 2023-03-20 ENCOUNTER — RESULT REVIEW (OUTPATIENT)
Age: 58
End: 2023-03-20

## 2023-03-20 ENCOUNTER — APPOINTMENT (OUTPATIENT)
Dept: INFUSION THERAPY | Facility: HOSPITAL | Age: 58
End: 2023-03-20

## 2023-03-20 LAB
ALBUMIN SERPL ELPH-MCNC: 3.8 G/DL — SIGNIFICANT CHANGE UP (ref 3.3–5)
ALP SERPL-CCNC: 116 U/L — SIGNIFICANT CHANGE UP (ref 40–120)
ALT FLD-CCNC: 21 U/L — SIGNIFICANT CHANGE UP (ref 10–45)
ANION GAP SERPL CALC-SCNC: 13 MMOL/L — SIGNIFICANT CHANGE UP (ref 5–17)
AST SERPL-CCNC: 29 U/L — SIGNIFICANT CHANGE UP (ref 10–40)
BASOPHILS # BLD AUTO: 0.06 K/UL — SIGNIFICANT CHANGE UP (ref 0–0.2)
BASOPHILS NFR BLD AUTO: 0.6 % — SIGNIFICANT CHANGE UP (ref 0–2)
BILIRUB SERPL-MCNC: 0.3 MG/DL — SIGNIFICANT CHANGE UP (ref 0.2–1.2)
BUN SERPL-MCNC: 19 MG/DL — SIGNIFICANT CHANGE UP (ref 7–23)
CALCIUM SERPL-MCNC: 9 MG/DL — SIGNIFICANT CHANGE UP (ref 8.4–10.5)
CHLORIDE SERPL-SCNC: 102 MMOL/L — SIGNIFICANT CHANGE UP (ref 96–108)
CO2 SERPL-SCNC: 23 MMOL/L — SIGNIFICANT CHANGE UP (ref 22–31)
CREAT SERPL-MCNC: 0.97 MG/DL — SIGNIFICANT CHANGE UP (ref 0.5–1.3)
EGFR: 90 ML/MIN/1.73M2 — SIGNIFICANT CHANGE UP
EOSINOPHIL # BLD AUTO: 0.31 K/UL — SIGNIFICANT CHANGE UP (ref 0–0.5)
EOSINOPHIL NFR BLD AUTO: 3.3 % — SIGNIFICANT CHANGE UP (ref 0–6)
GLUCOSE SERPL-MCNC: 231 MG/DL — HIGH (ref 70–99)
HCT VFR BLD CALC: 39.8 % — SIGNIFICANT CHANGE UP (ref 39–50)
HGB BLD-MCNC: 12.6 G/DL — LOW (ref 13–17)
IMM GRANULOCYTES NFR BLD AUTO: 0.6 % — SIGNIFICANT CHANGE UP (ref 0–0.9)
LYMPHOCYTES # BLD AUTO: 3.1 K/UL — SIGNIFICANT CHANGE UP (ref 1–3.3)
LYMPHOCYTES # BLD AUTO: 32.9 % — SIGNIFICANT CHANGE UP (ref 13–44)
MCHC RBC-ENTMCNC: 24.4 PG — LOW (ref 27–34)
MCHC RBC-ENTMCNC: 31.7 G/DL — LOW (ref 32–36)
MCV RBC AUTO: 77 FL — LOW (ref 80–100)
MONOCYTES # BLD AUTO: 0.7 K/UL — SIGNIFICANT CHANGE UP (ref 0–0.9)
MONOCYTES NFR BLD AUTO: 7.4 % — SIGNIFICANT CHANGE UP (ref 2–14)
NEUTROPHILS # BLD AUTO: 5.19 K/UL — SIGNIFICANT CHANGE UP (ref 1.8–7.4)
NEUTROPHILS NFR BLD AUTO: 55.2 % — SIGNIFICANT CHANGE UP (ref 43–77)
NRBC # BLD: 0 /100 WBCS — SIGNIFICANT CHANGE UP (ref 0–0)
PLATELET # BLD AUTO: 321 K/UL — SIGNIFICANT CHANGE UP (ref 150–400)
POTASSIUM SERPL-MCNC: 4.1 MMOL/L — SIGNIFICANT CHANGE UP (ref 3.5–5.3)
POTASSIUM SERPL-SCNC: 4.1 MMOL/L — SIGNIFICANT CHANGE UP (ref 3.5–5.3)
PROT SERPL-MCNC: 6.5 G/DL — SIGNIFICANT CHANGE UP (ref 6–8.3)
RBC # BLD: 5.17 M/UL — SIGNIFICANT CHANGE UP (ref 4.2–5.8)
RBC # FLD: 16.6 % — HIGH (ref 10.3–14.5)
SODIUM SERPL-SCNC: 138 MMOL/L — SIGNIFICANT CHANGE UP (ref 135–145)
WBC # BLD: 9.42 K/UL — SIGNIFICANT CHANGE UP (ref 3.8–10.5)
WBC # FLD AUTO: 9.42 K/UL — SIGNIFICANT CHANGE UP (ref 3.8–10.5)

## 2023-03-22 ENCOUNTER — APPOINTMENT (OUTPATIENT)
Dept: INFUSION THERAPY | Facility: HOSPITAL | Age: 58
End: 2023-03-22

## 2023-03-31 ENCOUNTER — APPOINTMENT (OUTPATIENT)
Dept: CARDIOLOGY | Facility: CLINIC | Age: 58
End: 2023-03-31
Payer: MEDICAID

## 2023-03-31 ENCOUNTER — APPOINTMENT (OUTPATIENT)
Dept: HEMATOLOGY ONCOLOGY | Facility: CLINIC | Age: 58
End: 2023-03-31
Payer: MEDICAID

## 2023-03-31 VITALS
BODY MASS INDEX: 20.8 KG/M2 | WEIGHT: 127.87 LBS | OXYGEN SATURATION: 98 % | DIASTOLIC BLOOD PRESSURE: 77 MMHG | RESPIRATION RATE: 16 BRPM | HEIGHT: 65.75 IN | SYSTOLIC BLOOD PRESSURE: 113 MMHG | HEART RATE: 70 BPM | TEMPERATURE: 98 F

## 2023-03-31 PROCEDURE — 93010 ELECTROCARDIOGRAM REPORT: CPT

## 2023-03-31 PROCEDURE — 99214 OFFICE O/P EST MOD 30 MIN: CPT

## 2023-03-31 PROCEDURE — 93000 ELECTROCARDIOGRAM COMPLETE: CPT

## 2023-03-31 RX ORDER — ISOSORBIDE MONONITRATE 30 MG/1
30 TABLET, EXTENDED RELEASE ORAL
Qty: 60 | Refills: 3 | Status: COMPLETED | COMMUNITY
Start: 2023-01-19 | End: 2023-03-31

## 2023-03-31 NOTE — REASON FOR VISIT
[FreeTextEntry3] : Dr. Tu Byers [FreeTextEntry1] : YAKELIN GERARDO is a 57 year old man with stage IV colon adenocarcinoma seen for follow up of suspected 5FU related chest pain.\par \par Prior Cancer Treatments:\par ------------------------------------------------------------------------\par Chemo/targeted therapy:\par 2/2021-4/2022: FOLFOX\par 4/25/2022-present: FOLFIRI Bevacizumab, 5FU changed to bolus in 1/2023\par 3/2023: resumed FOLFIRI w infusional 5FU\par ------------------------------------------------------------------------\par Surgery:\par 3/2021: resection with ostomy for obstruction\par ------------------------------------------------------------------------\par Radiation:

## 2023-03-31 NOTE — ASSESSMENT
[FreeTextEntry1] : 57 year old man with chest pain associated with infusional 5FU, which began after regimen changed to FOLFIRI with bevacizumab. 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 \par \par Nuclear stress test no ischemia and normal perfusion, Echo also normal.\par Symptoms did not recur during bolus re-challenge with Imdur and nifedipine.\par \par After discussion with Dr. Byers, his oncologist, we agreed to rechallenge with infusional 5FU, given superior efficacy for the patient's disease. Has tolerated rechallenge thus far, with limited chest pain relieved by sublingual nitro. We discussed that pain may intensify or occur earlier with subsequent cycles.\par \par Will proceed cautiously.\par \par To increase Imdur to 60 AM and continue 30 in PM along with 30 mg nifedipine twice daily and sublingual nitro PRN for next cycle.\par \par Starting morning of cycle:\par - nifedipine 30 mg  every 12 hours to continue throughout infusion and for one day after.\par - Imdur 60 in AM and 30 in PM (every 12 hours) during infusion and for one day after\par - sublingual nitroglycerin PRN for chest pain\par - if any chest pain during infusion, he must disconnect the pump immediately\par - if chest pain recurs after disconnecting or does not respond to nitroglycerin, he should call me and go to the ER\par - patient verbalized understanding and was given an opportunity to participate in shared decision making\par \par Monitor BP during treatment. Normal today.\par \par Follow up in 6 weeks with me.\par \par Above discussed with the patient and all questions were answered to the best of my ability and to his apparent satisfaction.\par

## 2023-03-31 NOTE — CARDIOLOGY SUMMARY
[de-identified] : \par 3/31/2023: NSR 63 bpm, normal ECG\par 2/13/2023:NSR 94, normal ECG\par 1/6/2023: NSR 71 bpm, normal ECG\par 12/27/2022: NSR 67 bpm, septal infarct age undetermined. [de-identified] : \par 1/13/2023: 6 Mets Nic protocol, no ischemia or scar. Normal perfusion study [de-identified] : \par 1/17/2023: EF 61 %, GLS - 16.4 [de-identified] : \par 12/2/2022: atherosclerotic calcifications of coronaries and aorta

## 2023-03-31 NOTE — HISTORY OF PRESENT ILLNESS
[FreeTextEntry1] : Interval History:\par Resumed FOLFIRI with infusional 5FU, s/p 2 cycles thus far, which he reports tolerating well overall.\par He had chest tightness the first night of the infusion which he noticed when laying in bed, relieved by one sublingual nitroglycerin and did not recur. Same thing occurred during second cycle, first night of infusion only.\par \par History:\par YAKELIN GERARDO is a 57 year man with a history of metastatic colorectal cancer. He tolerated infusional 5FU based chemotherapy previously, but after recent change to FOLFIRI with bevacizumab developed chest pain.\par \par He reports retrosternal chest pain at night which interferes with his sleep. Pain occurred at home while wearing 5FU infusion pump and resolved the day after returning it, each of the past 3 cycles (when treatment changed to current regimen). He notes pain is better when sitting upright and he denies any exertional chest pain, shortness of breath. Never had chest pain during prior infusions.\par \par No history of heart disease, HLD, diabetes, smoking, uncle had MI in his 50s. Possibly "borderline" HTN. No medications aside from multivitamin.\par \par Feb 13, 2023:\par Denies any further chest pain and tolerated most recent cycle with bolus 5FU without incident.\par Denies side effects from Imdur, nifedipine taken for spasm prophylaxis.\par He is exercising on treadmill.

## 2023-03-31 NOTE — PHYSICAL EXAM
[Normal] : well developed, well nourished, no acute distress [Normal Conjunctiva] : normal conjunctiva [No Xanthelasma] : no xanthelasma [Normal Venous Pressure] : normal venous pressure [Normal S1, S2] : normal S1, S2 [No Murmur] : no murmur [No Rub] : no rub [No Gallop] : no gallop [Clear Lung Fields] : clear lung fields [Good Air Entry] : good air entry [No Respiratory Distress] : no respiratory distress  [Soft] : abdomen soft [Normal Gait] : normal gait [Gait - Sufficient for Exercise Testing] : gait - sufficient for exercise testing [No Edema] : no edema [No Cyanosis] : no cyanosis [No Clubbing] : no clubbing [No Varicosities] : no varicosities [Moves all extremities] : moves all extremities [No Focal Deficits] : no focal deficits [Normal Speech] : normal speech [Alert and Oriented] : alert and oriented [Normal memory] : normal memory [de-identified] : vitiligo

## 2023-04-01 NOTE — PHYSICAL EXAM
[Fully active, able to carry on all pre-disease performance without restriction] : Status 0 - Fully active, able to carry on all pre-disease performance without restriction [Thin] : thin [Normal] : affect appropriate [de-identified] : anicteric  [de-identified] : normal respiratory effort, no audible wheeze [de-identified] : reg rate  [de-identified] : no LE edema [de-identified] : + ostomy, soft, non-tender [de-identified] : vitiligo

## 2023-04-01 NOTE — REVIEW OF SYSTEMS
[Negative] : Allergic/Immunologic [Dysphagia] : no dysphagia [Hoarseness] : no hoarseness [Odynophagia] : no odynophagia [Mucosal Pain] : no mucosal pain [Abdominal Pain] : no abdominal pain [Vomiting] : no vomiting [Constipation] : no constipation [Diarrhea] : no diarrhea [Dizziness] : no dizziness [Fainting] : no fainting [Difficulty Walking] : no difficulty walking [de-identified] : vitiligo

## 2023-04-01 NOTE — HISTORY OF PRESENT ILLNESS
[Disease: _____________________] : Disease: [unfilled] [AJCC Stage: ____] : AJCC Stage: [unfilled] [de-identified] : 54 y/o M with PMH of vitiligo who developed abdominal discomfort which started 12/2020 and he was admitted on 1/4/2021 @ [facility] where he was found to have mass in distal TC with hepatic and RLL metastatic disease seen in CT scan.  He was discharged home with outpatient follow up.  He had syncope at home on 1/18/2021 and then saw PMD on 1/19/2021 where he was sent to hospital and admitted to [facility] from 1/19/2021 to 1/19/2021.   He underwent liver biopsy on 1/25/2021 which confirmed metastatic colon cancer.  His CEA was elevated 2882 from 1/21/2021.  His CT c/a/p from 1/28/2021 showed findings consistent with metastatic colon cancer evidenced by annular colonic mass and numerous hepatic metastases, groundglass pulmonary opacity consistent with infectious etiology, including Covid 19, improved compared to prior exam.  \par \par He presents to us on 2/5/2021 and states he feels stronger since leaving hospital but has lost about 22 pounds from December 2020 to Feb 2021\par Given Stage IV disease, offered FOLFOX and reviewed palliative intent of treatment \par    \par Late Feb 2021 started FOLFOX \par March 2021 admitted for LBO and underwent resection with ostomy \par \par April 2021 restarted FOLFOX --> stopped oxali July 2021 \par April 2022 POD switched to FOLFIRI Day ( 5FU held 12/27/22 for possible coronary vasospasm then bolus 5FU for 1 month and after cardiac eval , infusional 5fu restart late Feb 2023 after cardiology optimization  [de-identified] : pan andrea wt, transverse colon primary (right side) [de-identified] : IFTIKHAR, TMB 5, APC R232*APC *\par GRM3 V271I, MAP2K1 (MEK1) K57E\par TP53 S127P [FreeTextEntry1] :  NAVA olivav [de-identified] : Patient presents for follow up today and also saw cardiologist, Dr. Chun, this morning as well.  He reports intermittent mild CP during the first night of treatment for which he sometimes has to take SL Nitro with relief.  He denies HA, dizziness, palpitations, radiating pain, SOB, VELEZ, N/V.  He reports that Dr. Chun has increased the dose of Imdur for the next cycle. He denies anorexia, weight loss, vomiting, change in bowel habits.  His previously reported mucositis resolved with the use of BLM and did not reoccur with the next cycles.

## 2023-04-03 ENCOUNTER — RESULT REVIEW (OUTPATIENT)
Age: 58
End: 2023-04-03

## 2023-04-03 ENCOUNTER — APPOINTMENT (OUTPATIENT)
Dept: INFUSION THERAPY | Facility: HOSPITAL | Age: 58
End: 2023-04-03

## 2023-04-03 LAB
ALBUMIN SERPL ELPH-MCNC: 3.8 G/DL — SIGNIFICANT CHANGE UP (ref 3.3–5)
ALP SERPL-CCNC: 117 U/L — SIGNIFICANT CHANGE UP (ref 40–120)
ALT FLD-CCNC: 27 U/L — SIGNIFICANT CHANGE UP (ref 10–45)
ANION GAP SERPL CALC-SCNC: 11 MMOL/L — SIGNIFICANT CHANGE UP (ref 5–17)
AST SERPL-CCNC: 29 U/L — SIGNIFICANT CHANGE UP (ref 10–40)
BASOPHILS # BLD AUTO: 0.06 K/UL — SIGNIFICANT CHANGE UP (ref 0–0.2)
BASOPHILS NFR BLD AUTO: 0.7 % — SIGNIFICANT CHANGE UP (ref 0–2)
BILIRUB SERPL-MCNC: 0.3 MG/DL — SIGNIFICANT CHANGE UP (ref 0.2–1.2)
BUN SERPL-MCNC: 20 MG/DL — SIGNIFICANT CHANGE UP (ref 7–23)
CALCIUM SERPL-MCNC: 9.2 MG/DL — SIGNIFICANT CHANGE UP (ref 8.4–10.5)
CHLORIDE SERPL-SCNC: 104 MMOL/L — SIGNIFICANT CHANGE UP (ref 96–108)
CO2 SERPL-SCNC: 24 MMOL/L — SIGNIFICANT CHANGE UP (ref 22–31)
CREAT SERPL-MCNC: 0.95 MG/DL — SIGNIFICANT CHANGE UP (ref 0.5–1.3)
EGFR: 92 ML/MIN/1.73M2 — SIGNIFICANT CHANGE UP
EOSINOPHIL # BLD AUTO: 0.2 K/UL — SIGNIFICANT CHANGE UP (ref 0–0.5)
EOSINOPHIL NFR BLD AUTO: 2.3 % — SIGNIFICANT CHANGE UP (ref 0–6)
GLUCOSE SERPL-MCNC: 254 MG/DL — HIGH (ref 70–99)
HCT VFR BLD CALC: 37.7 % — LOW (ref 39–50)
HGB BLD-MCNC: 12 G/DL — LOW (ref 13–17)
IMM GRANULOCYTES NFR BLD AUTO: 0.6 % — SIGNIFICANT CHANGE UP (ref 0–0.9)
LYMPHOCYTES # BLD AUTO: 1.95 K/UL — SIGNIFICANT CHANGE UP (ref 1–3.3)
LYMPHOCYTES # BLD AUTO: 22.6 % — SIGNIFICANT CHANGE UP (ref 13–44)
MCHC RBC-ENTMCNC: 24.1 PG — LOW (ref 27–34)
MCHC RBC-ENTMCNC: 31.8 G/DL — LOW (ref 32–36)
MCV RBC AUTO: 75.9 FL — LOW (ref 80–100)
MONOCYTES # BLD AUTO: 0.61 K/UL — SIGNIFICANT CHANGE UP (ref 0–0.9)
MONOCYTES NFR BLD AUTO: 7.1 % — SIGNIFICANT CHANGE UP (ref 2–14)
NEUTROPHILS # BLD AUTO: 5.74 K/UL — SIGNIFICANT CHANGE UP (ref 1.8–7.4)
NEUTROPHILS NFR BLD AUTO: 66.7 % — SIGNIFICANT CHANGE UP (ref 43–77)
NRBC # BLD: 0 /100 WBCS — SIGNIFICANT CHANGE UP (ref 0–0)
PLATELET # BLD AUTO: 261 K/UL — SIGNIFICANT CHANGE UP (ref 150–400)
POTASSIUM SERPL-MCNC: 4.4 MMOL/L — SIGNIFICANT CHANGE UP (ref 3.5–5.3)
POTASSIUM SERPL-SCNC: 4.4 MMOL/L — SIGNIFICANT CHANGE UP (ref 3.5–5.3)
PROT SERPL-MCNC: 6.3 G/DL — SIGNIFICANT CHANGE UP (ref 6–8.3)
RBC # BLD: 4.97 M/UL — SIGNIFICANT CHANGE UP (ref 4.2–5.8)
RBC # FLD: 17.2 % — HIGH (ref 10.3–14.5)
SODIUM SERPL-SCNC: 139 MMOL/L — SIGNIFICANT CHANGE UP (ref 135–145)
WBC # BLD: 8.61 K/UL — SIGNIFICANT CHANGE UP (ref 3.8–10.5)
WBC # FLD AUTO: 8.61 K/UL — SIGNIFICANT CHANGE UP (ref 3.8–10.5)

## 2023-04-05 ENCOUNTER — APPOINTMENT (OUTPATIENT)
Dept: INFUSION THERAPY | Facility: HOSPITAL | Age: 58
End: 2023-04-05

## 2023-04-14 ENCOUNTER — RX CHANGE (OUTPATIENT)
Age: 58
End: 2023-04-14

## 2023-04-14 RX ORDER — NIFEDIPINE 30 MG/1
30 TABLET, EXTENDED RELEASE ORAL
Qty: 60 | Refills: 3 | Status: DISCONTINUED | COMMUNITY
Start: 2023-02-13 | End: 2023-04-14

## 2023-04-17 ENCOUNTER — RESULT REVIEW (OUTPATIENT)
Age: 58
End: 2023-04-17

## 2023-04-17 ENCOUNTER — APPOINTMENT (OUTPATIENT)
Dept: INFUSION THERAPY | Facility: HOSPITAL | Age: 58
End: 2023-04-17

## 2023-04-17 ENCOUNTER — NON-APPOINTMENT (OUTPATIENT)
Age: 58
End: 2023-04-17

## 2023-04-17 LAB
ALBUMIN SERPL ELPH-MCNC: 3.8 G/DL — SIGNIFICANT CHANGE UP (ref 3.3–5)
ALP SERPL-CCNC: 121 U/L — HIGH (ref 40–120)
ALT FLD-CCNC: 24 U/L — SIGNIFICANT CHANGE UP (ref 10–45)
ANION GAP SERPL CALC-SCNC: 14 MMOL/L — SIGNIFICANT CHANGE UP (ref 5–17)
AST SERPL-CCNC: 35 U/L — SIGNIFICANT CHANGE UP (ref 10–40)
BASOPHILS # BLD AUTO: 0.04 K/UL — SIGNIFICANT CHANGE UP (ref 0–0.2)
BASOPHILS NFR BLD AUTO: 0.5 % — SIGNIFICANT CHANGE UP (ref 0–2)
BILIRUB SERPL-MCNC: 0.4 MG/DL — SIGNIFICANT CHANGE UP (ref 0.2–1.2)
BUN SERPL-MCNC: 14 MG/DL — SIGNIFICANT CHANGE UP (ref 7–23)
CALCIUM SERPL-MCNC: 9.1 MG/DL — SIGNIFICANT CHANGE UP (ref 8.4–10.5)
CEA SERPL-MCNC: 260 NG/ML — HIGH (ref 0–3.8)
CHLORIDE SERPL-SCNC: 105 MMOL/L — SIGNIFICANT CHANGE UP (ref 96–108)
CO2 SERPL-SCNC: 21 MMOL/L — LOW (ref 22–31)
CREAT ?TM UR-MCNC: 110 MG/DL — SIGNIFICANT CHANGE UP
CREAT SERPL-MCNC: 0.88 MG/DL — SIGNIFICANT CHANGE UP (ref 0.5–1.3)
EGFR: 99 ML/MIN/1.73M2 — SIGNIFICANT CHANGE UP
EOSINOPHIL # BLD AUTO: 0.23 K/UL — SIGNIFICANT CHANGE UP (ref 0–0.5)
EOSINOPHIL NFR BLD AUTO: 2.8 % — SIGNIFICANT CHANGE UP (ref 0–6)
GLUCOSE SERPL-MCNC: 222 MG/DL — HIGH (ref 70–99)
HCT VFR BLD CALC: 39.4 % — SIGNIFICANT CHANGE UP (ref 39–50)
HGB BLD-MCNC: 12.5 G/DL — LOW (ref 13–17)
IMM GRANULOCYTES NFR BLD AUTO: 0.9 % — SIGNIFICANT CHANGE UP (ref 0–0.9)
LYMPHOCYTES # BLD AUTO: 1.96 K/UL — SIGNIFICANT CHANGE UP (ref 1–3.3)
LYMPHOCYTES # BLD AUTO: 24.1 % — SIGNIFICANT CHANGE UP (ref 13–44)
MCHC RBC-ENTMCNC: 24.1 PG — LOW (ref 27–34)
MCHC RBC-ENTMCNC: 31.7 G/DL — LOW (ref 32–36)
MCV RBC AUTO: 75.9 FL — LOW (ref 80–100)
MONOCYTES # BLD AUTO: 0.54 K/UL — SIGNIFICANT CHANGE UP (ref 0–0.9)
MONOCYTES NFR BLD AUTO: 6.6 % — SIGNIFICANT CHANGE UP (ref 2–14)
NEUTROPHILS # BLD AUTO: 5.29 K/UL — SIGNIFICANT CHANGE UP (ref 1.8–7.4)
NEUTROPHILS NFR BLD AUTO: 65.1 % — SIGNIFICANT CHANGE UP (ref 43–77)
NRBC # BLD: 0 /100 WBCS — SIGNIFICANT CHANGE UP (ref 0–0)
PLATELET # BLD AUTO: 269 K/UL — SIGNIFICANT CHANGE UP (ref 150–400)
POTASSIUM SERPL-MCNC: 4.1 MMOL/L — SIGNIFICANT CHANGE UP (ref 3.5–5.3)
POTASSIUM SERPL-SCNC: 4.1 MMOL/L — SIGNIFICANT CHANGE UP (ref 3.5–5.3)
PROT ?TM UR-MCNC: 12 MG/DL — SIGNIFICANT CHANGE UP (ref 0–12)
PROT SERPL-MCNC: 6.4 G/DL — SIGNIFICANT CHANGE UP (ref 6–8.3)
PROT/CREAT UR-RTO: 0.1 RATIO — SIGNIFICANT CHANGE UP (ref 0–0.2)
RBC # BLD: 5.19 M/UL — SIGNIFICANT CHANGE UP (ref 4.2–5.8)
RBC # FLD: 17.3 % — HIGH (ref 10.3–14.5)
SODIUM SERPL-SCNC: 140 MMOL/L — SIGNIFICANT CHANGE UP (ref 135–145)
WBC # BLD: 8.13 K/UL — SIGNIFICANT CHANGE UP (ref 3.8–10.5)
WBC # FLD AUTO: 8.13 K/UL — SIGNIFICANT CHANGE UP (ref 3.8–10.5)

## 2023-04-19 ENCOUNTER — APPOINTMENT (OUTPATIENT)
Dept: INFUSION THERAPY | Facility: HOSPITAL | Age: 58
End: 2023-04-19

## 2023-04-21 ENCOUNTER — APPOINTMENT (OUTPATIENT)
Dept: CT IMAGING | Facility: IMAGING CENTER | Age: 58
End: 2023-04-21
Payer: MEDICAID

## 2023-04-21 ENCOUNTER — OUTPATIENT (OUTPATIENT)
Dept: OUTPATIENT SERVICES | Facility: HOSPITAL | Age: 58
LOS: 1 days | End: 2023-04-21
Payer: MEDICAID

## 2023-04-21 DIAGNOSIS — C18.9 MALIGNANT NEOPLASM OF COLON, UNSPECIFIED: ICD-10-CM

## 2023-04-21 DIAGNOSIS — Z00.8 ENCOUNTER FOR OTHER GENERAL EXAMINATION: ICD-10-CM

## 2023-04-21 PROCEDURE — 71260 CT THORAX DX C+: CPT

## 2023-04-21 PROCEDURE — 74177 CT ABD & PELVIS W/CONTRAST: CPT

## 2023-04-21 PROCEDURE — 74177 CT ABD & PELVIS W/CONTRAST: CPT | Mod: 26

## 2023-04-21 PROCEDURE — 71260 CT THORAX DX C+: CPT | Mod: 26

## 2023-04-24 ENCOUNTER — OUTPATIENT (OUTPATIENT)
Dept: OUTPATIENT SERVICES | Facility: HOSPITAL | Age: 58
LOS: 1 days | Discharge: HOME | End: 2023-04-24
Payer: MEDICAID

## 2023-04-24 DIAGNOSIS — C18.9 MALIGNANT NEOPLASM OF COLON, UNSPECIFIED: ICD-10-CM

## 2023-04-27 NOTE — PHYSICAL THERAPY INITIAL EVALUATION ADULT - ONSET DATE, REHAB EVAL
26-Mar-2021 [place] Counseling: I discussed with the patient the risks of tildrakizumab including but not limited to immunosuppression, malignancy, posterior leukoencephalopathy syndrome, and serious infections.  The patient understands that monitoring is required including a PPD at baseline and must alert us or the primary physician if symptoms of infection or other concerning signs are noted.

## 2023-05-01 ENCOUNTER — RESULT REVIEW (OUTPATIENT)
Age: 58
End: 2023-05-01

## 2023-05-01 ENCOUNTER — APPOINTMENT (OUTPATIENT)
Dept: INFUSION THERAPY | Facility: HOSPITAL | Age: 58
End: 2023-05-01

## 2023-05-01 ENCOUNTER — APPOINTMENT (OUTPATIENT)
Dept: HEMATOLOGY ONCOLOGY | Facility: CLINIC | Age: 58
End: 2023-05-01
Payer: MEDICAID

## 2023-05-01 ENCOUNTER — NON-APPOINTMENT (OUTPATIENT)
Age: 58
End: 2023-05-01

## 2023-05-01 VITALS
TEMPERATURE: 97.4 F | DIASTOLIC BLOOD PRESSURE: 57 MMHG | SYSTOLIC BLOOD PRESSURE: 90 MMHG | OXYGEN SATURATION: 99 % | WEIGHT: 126.32 LBS | BODY MASS INDEX: 20.54 KG/M2 | RESPIRATION RATE: 16 BRPM | HEART RATE: 88 BPM

## 2023-05-01 LAB
BASOPHILS # BLD AUTO: 0.03 K/UL — SIGNIFICANT CHANGE UP (ref 0–0.2)
BASOPHILS NFR BLD AUTO: 0.5 % — SIGNIFICANT CHANGE UP (ref 0–2)
CEA SERPL-MCNC: 246 NG/ML — HIGH (ref 0–3.8)
EOSINOPHIL # BLD AUTO: 0.15 K/UL — SIGNIFICANT CHANGE UP (ref 0–0.5)
EOSINOPHIL NFR BLD AUTO: 2.3 % — SIGNIFICANT CHANGE UP (ref 0–6)
HCT VFR BLD CALC: 36.1 % — LOW (ref 39–50)
HGB BLD-MCNC: 11.5 G/DL — LOW (ref 13–17)
IMM GRANULOCYTES NFR BLD AUTO: 0.5 % — SIGNIFICANT CHANGE UP (ref 0–0.9)
LYMPHOCYTES # BLD AUTO: 1.73 K/UL — SIGNIFICANT CHANGE UP (ref 1–3.3)
LYMPHOCYTES # BLD AUTO: 26.4 % — SIGNIFICANT CHANGE UP (ref 13–44)
MCHC RBC-ENTMCNC: 24 PG — LOW (ref 27–34)
MCHC RBC-ENTMCNC: 31.9 G/DL — LOW (ref 32–36)
MCV RBC AUTO: 75.4 FL — LOW (ref 80–100)
MONOCYTES # BLD AUTO: 0.47 K/UL — SIGNIFICANT CHANGE UP (ref 0–0.9)
MONOCYTES NFR BLD AUTO: 7.2 % — SIGNIFICANT CHANGE UP (ref 2–14)
NEUTROPHILS # BLD AUTO: 4.15 K/UL — SIGNIFICANT CHANGE UP (ref 1.8–7.4)
NEUTROPHILS NFR BLD AUTO: 63.1 % — SIGNIFICANT CHANGE UP (ref 43–77)
NRBC # BLD: 0 /100 WBCS — SIGNIFICANT CHANGE UP (ref 0–0)
PLATELET # BLD AUTO: 257 K/UL — SIGNIFICANT CHANGE UP (ref 150–400)
RBC # BLD: 4.79 M/UL — SIGNIFICANT CHANGE UP (ref 4.2–5.8)
RBC # FLD: 17.1 % — HIGH (ref 10.3–14.5)
WBC # BLD: 6.56 K/UL — SIGNIFICANT CHANGE UP (ref 3.8–10.5)
WBC # FLD AUTO: 6.56 K/UL — SIGNIFICANT CHANGE UP (ref 3.8–10.5)

## 2023-05-01 PROCEDURE — 99214 OFFICE O/P EST MOD 30 MIN: CPT

## 2023-05-01 RX ORDER — ASCORBIC ACID 60 MG
1 TABLET,CHEWABLE ORAL
Qty: 0 | Refills: 0 | DISCHARGE

## 2023-05-01 NOTE — PHYSICAL EXAM
[Fully active, able to carry on all pre-disease performance without restriction] : Status 0 - Fully active, able to carry on all pre-disease performance without restriction [Thin] : thin [Normal] : affect appropriate [de-identified] : anicteric  [de-identified] : normal respiratory effort, no audible wheeze [de-identified] : reg rate  [de-identified] : no LE edema [de-identified] : + ostomy, soft, non-tender [de-identified] : vitiligo

## 2023-05-01 NOTE — REVIEW OF SYSTEMS
[Negative] : Allergic/Immunologic [Dysphagia] : no dysphagia [Hoarseness] : no hoarseness [Odynophagia] : no odynophagia [Mucosal Pain] : no mucosal pain [Abdominal Pain] : no abdominal pain [Vomiting] : no vomiting [Constipation] : no constipation [Diarrhea] : no diarrhea [Dizziness] : no dizziness [Fainting] : no fainting [Difficulty Walking] : no difficulty walking [de-identified] : vitiligo

## 2023-05-01 NOTE — HISTORY OF PRESENT ILLNESS
[Disease: _____________________] : Disease: [unfilled] [AJCC Stage: ____] : AJCC Stage: [unfilled] [de-identified] : 54 y/o M with PMH of vitiligo who developed abdominal discomfort which started 12/2020 and he was admitted on 1/4/2021 @ [facility] where he was found to have mass in distal TC with hepatic and RLL metastatic disease seen in CT scan.  He was discharged home with outpatient follow up.  He had syncope at home on 1/18/2021 and then saw PMD on 1/19/2021 where he was sent to hospital and admitted to [facility] from 1/19/2021 to 1/19/2021.   He underwent liver biopsy on 1/25/2021 which confirmed metastatic colon cancer.  His CEA was elevated 2882 from 1/21/2021.  His CT c/a/p from 1/28/2021 showed findings consistent with metastatic colon cancer evidenced by annular colonic mass and numerous hepatic metastases, groundglass pulmonary opacity consistent with infectious etiology, including Covid 19, improved compared to prior exam.  \par \par He presents to us on 2/5/2021 and states he feels stronger since leaving hospital but has lost about 22 pounds from December 2020 to Feb 2021\par Given Stage IV disease, offered FOLFOX and reviewed palliative intent of treatment \par    \par Late Feb 2021 started FOLFOX \par March 2021 admitted for LBO and underwent resection with ostomy \par \par April 2021 restarted FOLFOX --> stopped oxali July 2021 \par April 2022 POD switched to FOLFIRI Day ( 5FU held 12/27/22 for possible coronary vasospasm then bolus 5FU for 1 month and after cardiac eval , infusional 5fu restart late Feb 2023 after cardiology optimization  [de-identified] : pan andrea wt, transverse colon primary (right side) [de-identified] : IFTIKHAR, TMB 5, APC R232*APC *\par GRM3 V271I, MAP2K1 (MEK1) K57E\par TP53 S127P [FreeTextEntry1] :  NAVA olivav [de-identified] : presents in follow up , no acute complaints\par here to review recent imaging \par no abdominal pain , fevers or chills \par  \par  \par

## 2023-05-02 DIAGNOSIS — R11.2 NAUSEA WITH VOMITING, UNSPECIFIED: ICD-10-CM

## 2023-05-02 DIAGNOSIS — Z51.11 ENCOUNTER FOR ANTINEOPLASTIC CHEMOTHERAPY: ICD-10-CM

## 2023-05-03 ENCOUNTER — APPOINTMENT (OUTPATIENT)
Dept: INFUSION THERAPY | Facility: HOSPITAL | Age: 58
End: 2023-05-03

## 2023-05-08 ENCOUNTER — APPOINTMENT (OUTPATIENT)
Dept: CARDIOLOGY | Facility: CLINIC | Age: 58
End: 2023-05-08
Payer: MEDICAID

## 2023-05-08 VITALS
RESPIRATION RATE: 16 BRPM | HEIGHT: 65.75 IN | SYSTOLIC BLOOD PRESSURE: 117 MMHG | TEMPERATURE: 98.1 F | DIASTOLIC BLOOD PRESSURE: 82 MMHG | WEIGHT: 125.66 LBS | HEART RATE: 105 BPM | OXYGEN SATURATION: 99 % | BODY MASS INDEX: 20.44 KG/M2

## 2023-05-08 DIAGNOSIS — R07.9 CHEST PAIN, UNSPECIFIED: ICD-10-CM

## 2023-05-08 PROCEDURE — 99214 OFFICE O/P EST MOD 30 MIN: CPT

## 2023-05-08 PROCEDURE — 93010 ELECTROCARDIOGRAM REPORT: CPT

## 2023-05-08 PROCEDURE — 93000 ELECTROCARDIOGRAM COMPLETE: CPT

## 2023-05-08 RX ORDER — NIFEDIPINE 30 MG/1
30 TABLET, EXTENDED RELEASE ORAL
Qty: 180 | Refills: 1 | Status: ACTIVE | COMMUNITY
Start: 2023-04-14 | End: 1900-01-01

## 2023-05-08 RX ORDER — ISOSORBIDE MONONITRATE 30 MG/1
30 TABLET, EXTENDED RELEASE ORAL
Qty: 75 | Refills: 3 | Status: ACTIVE | COMMUNITY
Start: 2023-03-31 | End: 1900-01-01

## 2023-05-08 NOTE — PHYSICAL EXAM
[Normal] : well developed, well nourished, no acute distress [Normal Conjunctiva] : normal conjunctiva [No Xanthelasma] : no xanthelasma [Normal Venous Pressure] : normal venous pressure [Normal S1, S2] : normal S1, S2 [No Murmur] : no murmur [No Rub] : no rub [No Gallop] : no gallop [Clear Lung Fields] : clear lung fields [Good Air Entry] : good air entry [No Respiratory Distress] : no respiratory distress  [Soft] : abdomen soft [Normal Gait] : normal gait [Gait - Sufficient for Exercise Testing] : gait - sufficient for exercise testing [No Edema] : no edema [No Cyanosis] : no cyanosis [No Clubbing] : no clubbing [No Varicosities] : no varicosities [Moves all extremities] : moves all extremities [No Focal Deficits] : no focal deficits [Normal Speech] : normal speech [Alert and Oriented] : alert and oriented [Normal memory] : normal memory [de-identified] : vitiligo

## 2023-05-08 NOTE — ASSESSMENT
[FreeTextEntry1] : 57 year old man with chest pain associated with infusional 5FU, which began after regimen changed to FOLFIRI with bevacizumab. 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 \par \par Nuclear stress test no ischemia and normal perfusion, Echo also normal.\par Symptoms did not recur during bolus re-challenge with Imdur and nifedipine.\par \par After discussion with Dr. Byers, his oncologist, we agreed to rechallenge with infusional 5FU, given superior efficacy. Tolerating rechallenge thus far, with limited chest pain relieved by sublingual nitro. We discussed that pain may intensify or occur earlier with subsequent cycles.\par \par Continue Imdur to 60 AM and continue 30 in PM along with 30 mg nifedipine twice daily and sublingual nitro PRN for next cycle. If symptoms recur or increase, will let us know and we will adjust antianginal regimen.\par \par Starting morning of cycle:\par - nifedipine 30 mg  every 12 hours to continue throughout infusion and for one day after.\par - Imdur 60 in AM and 30 in PM (every 12 hours) during infusion and for one day after\par - sublingual nitroglycerin PRN for chest pain\par - if any chest pain during infusion, he must disconnect the pump immediately\par - if chest pain recurs after disconnecting or does not respond to nitroglycerin, he should call me and go to the ER\par - patient verbalized understanding and was given an opportunity to participate in shared decision making\par \par Monitor BP during treatment. Normal today.\par \par Follow up in 4 weeks with me.\par \par Above discussed with the patient and all questions were answered to the best of my ability and to his apparent satisfaction.\par

## 2023-05-08 NOTE — CARDIOLOGY SUMMARY
[de-identified] : \par 5/8/2023: NSR 86, normal ECG\par 3/31/2023: NSR 63 bpm, normal ECG\par 2/13/2023:NSR 94, normal ECG\par 1/6/2023: NSR 71 bpm, normal ECG\par 12/27/2022: NSR 67 bpm, septal infarct age undetermined. [de-identified] : \par 1/13/2023: 6 Mets Nic protocol, no ischemia or scar. Normal perfusion study [de-identified] : \par 1/17/2023: EF 61 %, GLS - 16.4 [de-identified] : \par 12/2/2022: atherosclerotic calcifications of coronaries and aorta

## 2023-05-08 NOTE — HISTORY OF PRESENT ILLNESS
[FreeTextEntry1] : Interval History:\par Feeling well.\par No chest pain with last cycle after anti-anginal adjustment (increased AM Imdur dose). Did not require any sublingual nitro.\par No symptoms of hypotension, though BP lower at last visit.\par \par History:\par YAKELIN GERARDO is a 57 year man with a history of metastatic colorectal cancer. He tolerated infusional 5FU based chemotherapy previously, but after recent change to FOLFIRI with bevacizumab developed chest pain.\par \par He reports retrosternal chest pain at night which interferes with his sleep. Pain occurred at home while wearing 5FU infusion pump and resolved the day after returning it, each of the past 3 cycles (when treatment changed to current regimen). He notes pain is better when sitting upright and he denies any exertional chest pain, shortness of breath. Never had chest pain during prior infusions.\par \par No history of heart disease, HLD, diabetes, smoking, uncle had MI in his 50s. Possibly "borderline" HTN. No medications aside from multivitamin.\par \par Feb 13, 2023:\par Denies any further chest pain and tolerated most recent cycle with bolus 5FU without incident.\par Denies side effects from Imdur, nifedipine taken for spasm prophylaxis.\par He is exercising on treadmill.\par \par Mar 31, 2023:\par Resumed FOLFIRI with infusional 5FU, s/p 2 cycles thus far, which he reports tolerating well overall.\par He had chest tightness the first night of the infusion which he noticed when laying in bed, relieved by one sublingual nitroglycerin and did not recur. Same thing occurred during second cycle, first night of infusion only.

## 2023-05-08 NOTE — REASON FOR VISIT
[FreeTextEntry3] : Dr. Tu Byers [FreeTextEntry1] : YAKELIN GERARDO is a 57 year old man with stage IV colon adenocarcinoma seen for follow up of 5FU related chest pain.\par \par Prior Cancer Treatments:\par ------------------------------------------------------------------------\par Chemo/targeted therapy:\par 2/2021-4/2022: FOLFOX\par 4/25/2022-present: FOLFIRI Bevacizumab, 5FU changed to bolus in 1/2023\par 3/2023: resumed FOLFIRI w infusional 5FU\par ------------------------------------------------------------------------\par Surgery:\par 3/2021: resection with ostomy for obstruction\par ------------------------------------------------------------------------\par Radiation:

## 2023-05-15 ENCOUNTER — RESULT REVIEW (OUTPATIENT)
Age: 58
End: 2023-05-15

## 2023-05-15 ENCOUNTER — APPOINTMENT (OUTPATIENT)
Dept: INFUSION THERAPY | Facility: HOSPITAL | Age: 58
End: 2023-05-15

## 2023-05-15 LAB
ALBUMIN SERPL ELPH-MCNC: 3.4 G/DL — SIGNIFICANT CHANGE UP (ref 3.3–5)
ALP SERPL-CCNC: 95 U/L — SIGNIFICANT CHANGE UP (ref 40–120)
ALT FLD-CCNC: 16 U/L — SIGNIFICANT CHANGE UP (ref 10–45)
ANION GAP SERPL CALC-SCNC: 12 MMOL/L — SIGNIFICANT CHANGE UP (ref 5–17)
AST SERPL-CCNC: 30 U/L — SIGNIFICANT CHANGE UP (ref 10–40)
BASOPHILS # BLD AUTO: 0.06 K/UL — SIGNIFICANT CHANGE UP (ref 0–0.2)
BASOPHILS NFR BLD AUTO: 0.7 % — SIGNIFICANT CHANGE UP (ref 0–2)
BILIRUB SERPL-MCNC: 0.3 MG/DL — SIGNIFICANT CHANGE UP (ref 0.2–1.2)
BUN SERPL-MCNC: 15 MG/DL — SIGNIFICANT CHANGE UP (ref 7–23)
CALCIUM SERPL-MCNC: 8.7 MG/DL — SIGNIFICANT CHANGE UP (ref 8.4–10.5)
CHLORIDE SERPL-SCNC: 99 MMOL/L — SIGNIFICANT CHANGE UP (ref 96–108)
CO2 SERPL-SCNC: 21 MMOL/L — LOW (ref 22–31)
CREAT SERPL-MCNC: 0.84 MG/DL — SIGNIFICANT CHANGE UP (ref 0.5–1.3)
EGFR: 100 ML/MIN/1.73M2 — SIGNIFICANT CHANGE UP
EOSINOPHIL # BLD AUTO: 0.28 K/UL — SIGNIFICANT CHANGE UP (ref 0–0.5)
EOSINOPHIL NFR BLD AUTO: 3.3 % — SIGNIFICANT CHANGE UP (ref 0–6)
GLUCOSE SERPL-MCNC: 123 MG/DL — HIGH (ref 70–99)
HCT VFR BLD CALC: 37.8 % — LOW (ref 39–50)
HGB BLD-MCNC: 11.9 G/DL — LOW (ref 13–17)
IMM GRANULOCYTES NFR BLD AUTO: 0.4 % — SIGNIFICANT CHANGE UP (ref 0–0.9)
LYMPHOCYTES # BLD AUTO: 2.26 K/UL — SIGNIFICANT CHANGE UP (ref 1–3.3)
LYMPHOCYTES # BLD AUTO: 27 % — SIGNIFICANT CHANGE UP (ref 13–44)
MCHC RBC-ENTMCNC: 23.9 PG — LOW (ref 27–34)
MCHC RBC-ENTMCNC: 31.5 G/DL — LOW (ref 32–36)
MCV RBC AUTO: 76.1 FL — LOW (ref 80–100)
MONOCYTES # BLD AUTO: 0.64 K/UL — SIGNIFICANT CHANGE UP (ref 0–0.9)
MONOCYTES NFR BLD AUTO: 7.6 % — SIGNIFICANT CHANGE UP (ref 2–14)
NEUTROPHILS # BLD AUTO: 5.11 K/UL — SIGNIFICANT CHANGE UP (ref 1.8–7.4)
NEUTROPHILS NFR BLD AUTO: 61 % — SIGNIFICANT CHANGE UP (ref 43–77)
NRBC # BLD: 0 /100 WBCS — SIGNIFICANT CHANGE UP (ref 0–0)
PLATELET # BLD AUTO: 246 K/UL — SIGNIFICANT CHANGE UP (ref 150–400)
POTASSIUM SERPL-MCNC: 3.2 MMOL/L — LOW (ref 3.5–5.3)
POTASSIUM SERPL-SCNC: 3.2 MMOL/L — LOW (ref 3.5–5.3)
PROT SERPL-MCNC: 6 G/DL — SIGNIFICANT CHANGE UP (ref 6–8.3)
RBC # BLD: 4.97 M/UL — SIGNIFICANT CHANGE UP (ref 4.2–5.8)
RBC # FLD: 17.3 % — HIGH (ref 10.3–14.5)
SODIUM SERPL-SCNC: 133 MMOL/L — LOW (ref 135–145)
WBC # BLD: 8.38 K/UL — SIGNIFICANT CHANGE UP (ref 3.8–10.5)
WBC # FLD AUTO: 8.38 K/UL — SIGNIFICANT CHANGE UP (ref 3.8–10.5)

## 2023-05-16 LAB
CREAT ?TM UR-MCNC: 106 MG/DL — SIGNIFICANT CHANGE UP
PROT ?TM UR-MCNC: 17 MG/DL — HIGH (ref 0–12)
PROT/CREAT UR-RTO: 0.2 RATIO — SIGNIFICANT CHANGE UP (ref 0–0.2)

## 2023-05-17 ENCOUNTER — APPOINTMENT (OUTPATIENT)
Dept: INFUSION THERAPY | Facility: HOSPITAL | Age: 58
End: 2023-05-17

## 2023-05-26 ENCOUNTER — APPOINTMENT (OUTPATIENT)
Dept: HEMATOLOGY ONCOLOGY | Facility: CLINIC | Age: 58
End: 2023-05-26
Payer: MEDICAID

## 2023-05-26 VITALS
OXYGEN SATURATION: 99 % | TEMPERATURE: 97.9 F | RESPIRATION RATE: 16 BRPM | BODY MASS INDEX: 20.72 KG/M2 | WEIGHT: 127.43 LBS | DIASTOLIC BLOOD PRESSURE: 85 MMHG | HEART RATE: 89 BPM | SYSTOLIC BLOOD PRESSURE: 123 MMHG

## 2023-05-26 PROCEDURE — 99213 OFFICE O/P EST LOW 20 MIN: CPT

## 2023-05-30 NOTE — PHYSICAL EXAM
[Fully active, able to carry on all pre-disease performance without restriction] : Status 0 - Fully active, able to carry on all pre-disease performance without restriction [Thin] : thin [Normal] : affect appropriate [de-identified] : anicteric  [de-identified] : normal respiratory effort, no audible wheeze [de-identified] : reg rate  [de-identified] : no LE edema [de-identified] : + ostomy, soft, non-tender [de-identified] : vitiligo

## 2023-05-30 NOTE — REVIEW OF SYSTEMS
[Negative] : Allergic/Immunologic [Dysphagia] : no dysphagia [Hoarseness] : no hoarseness [Odynophagia] : no odynophagia [Mucosal Pain] : no mucosal pain [Abdominal Pain] : no abdominal pain [Vomiting] : no vomiting [Constipation] : no constipation [Dizziness] : no dizziness [Fainting] : no fainting [Difficulty Walking] : no difficulty walking [de-identified] : vitiligo

## 2023-05-30 NOTE — HISTORY OF PRESENT ILLNESS
[Disease: _____________________] : Disease: [unfilled] [AJCC Stage: ____] : AJCC Stage: [unfilled] [de-identified] : 54 y/o M with PMH of vitiligo who developed abdominal discomfort which started 12/2020 and he was admitted on 1/4/2021 @ [facility] where he was found to have mass in distal TC with hepatic and RLL metastatic disease seen in CT scan.  He was discharged home with outpatient follow up.  He had syncope at home on 1/18/2021 and then saw PMD on 1/19/2021 where he was sent to hospital and admitted to [facility] from 1/19/2021 to 1/19/2021.   He underwent liver biopsy on 1/25/2021 which confirmed metastatic colon cancer.  His CEA was elevated 2882 from 1/21/2021.  His CT c/a/p from 1/28/2021 showed findings consistent with metastatic colon cancer evidenced by annular colonic mass and numerous hepatic metastases, groundglass pulmonary opacity consistent with infectious etiology, including Covid 19, improved compared to prior exam.  \par \par He presents to us on 2/5/2021 and states he feels stronger since leaving hospital but has lost about 22 pounds from December 2020 to Feb 2021\par Given Stage IV disease, offered FOLFOX and reviewed palliative intent of treatment \par    \par Late Feb 2021 started FOLFOX \par March 2021 admitted for LBO and underwent resection with ostomy \par \par April 2021 restarted FOLFOX --> stopped oxali July 2021 \par April 2022 POD switched to FOLFIRI Day ( 5FU held 12/27/22 for possible coronary vasospasm then bolus 5FU for 1 month and after cardiac eval , infusional 5fu restart late Feb 2023 after cardiology optimization  [de-identified] : pan andrea wt, transverse colon primary (right side) [de-identified] : IFTIKHAR, TMB 5, APC R232*APC *\par GRM3 V271I, MAP2K1 (MEK1) K57E\par TP53 S127P [FreeTextEntry1] :  NAVA olivav [de-identified] : presents in follow up , no acute complaints\par  \par no abdominal pain , fevers or chills \par  \par  \par

## 2023-05-31 ENCOUNTER — RESULT REVIEW (OUTPATIENT)
Age: 58
End: 2023-05-31

## 2023-05-31 ENCOUNTER — APPOINTMENT (OUTPATIENT)
Dept: HEMATOLOGY ONCOLOGY | Facility: CLINIC | Age: 58
End: 2023-05-31

## 2023-05-31 ENCOUNTER — APPOINTMENT (OUTPATIENT)
Dept: INFUSION THERAPY | Facility: HOSPITAL | Age: 58
End: 2023-05-31

## 2023-05-31 LAB
ALBUMIN SERPL ELPH-MCNC: 3.7 G/DL — SIGNIFICANT CHANGE UP (ref 3.3–5)
ALP SERPL-CCNC: 122 U/L — HIGH (ref 40–120)
ALT FLD-CCNC: 29 U/L — SIGNIFICANT CHANGE UP (ref 10–45)
ANION GAP SERPL CALC-SCNC: 14 MMOL/L — SIGNIFICANT CHANGE UP (ref 5–17)
AST SERPL-CCNC: 35 U/L — SIGNIFICANT CHANGE UP (ref 10–40)
BASOPHILS # BLD AUTO: 0.07 K/UL — SIGNIFICANT CHANGE UP (ref 0–0.2)
BASOPHILS NFR BLD AUTO: 0.7 % — SIGNIFICANT CHANGE UP (ref 0–2)
BILIRUB SERPL-MCNC: 0.6 MG/DL — SIGNIFICANT CHANGE UP (ref 0.2–1.2)
BUN SERPL-MCNC: 20 MG/DL — SIGNIFICANT CHANGE UP (ref 7–23)
CALCIUM SERPL-MCNC: 9.2 MG/DL — SIGNIFICANT CHANGE UP (ref 8.4–10.5)
CEA SERPL-MCNC: 348 NG/ML — HIGH (ref 0–3.8)
CHLORIDE SERPL-SCNC: 103 MMOL/L — SIGNIFICANT CHANGE UP (ref 96–108)
CO2 SERPL-SCNC: 23 MMOL/L — SIGNIFICANT CHANGE UP (ref 22–31)
CREAT SERPL-MCNC: 0.96 MG/DL — SIGNIFICANT CHANGE UP (ref 0.5–1.3)
EGFR: 91 ML/MIN/1.73M2 — SIGNIFICANT CHANGE UP
EOSINOPHIL # BLD AUTO: 0.32 K/UL — SIGNIFICANT CHANGE UP (ref 0–0.5)
EOSINOPHIL NFR BLD AUTO: 3.4 % — SIGNIFICANT CHANGE UP (ref 0–6)
GLUCOSE SERPL-MCNC: 203 MG/DL — HIGH (ref 70–99)
HCT VFR BLD CALC: 39.7 % — SIGNIFICANT CHANGE UP (ref 39–50)
HGB BLD-MCNC: 12.6 G/DL — LOW (ref 13–17)
IMM GRANULOCYTES NFR BLD AUTO: 0.6 % — SIGNIFICANT CHANGE UP (ref 0–0.9)
LYMPHOCYTES # BLD AUTO: 2.8 K/UL — SIGNIFICANT CHANGE UP (ref 1–3.3)
LYMPHOCYTES # BLD AUTO: 29.5 % — SIGNIFICANT CHANGE UP (ref 13–44)
MCHC RBC-ENTMCNC: 23.9 PG — LOW (ref 27–34)
MCHC RBC-ENTMCNC: 31.7 G/DL — LOW (ref 32–36)
MCV RBC AUTO: 75.3 FL — LOW (ref 80–100)
MONOCYTES # BLD AUTO: 0.87 K/UL — SIGNIFICANT CHANGE UP (ref 0–0.9)
MONOCYTES NFR BLD AUTO: 9.2 % — SIGNIFICANT CHANGE UP (ref 2–14)
NEUTROPHILS # BLD AUTO: 5.38 K/UL — SIGNIFICANT CHANGE UP (ref 1.8–7.4)
NEUTROPHILS NFR BLD AUTO: 56.6 % — SIGNIFICANT CHANGE UP (ref 43–77)
NRBC # BLD: 0 /100 WBCS — SIGNIFICANT CHANGE UP (ref 0–0)
PLATELET # BLD AUTO: 299 K/UL — SIGNIFICANT CHANGE UP (ref 150–400)
POTASSIUM SERPL-MCNC: 4.3 MMOL/L — SIGNIFICANT CHANGE UP (ref 3.5–5.3)
POTASSIUM SERPL-SCNC: 4.3 MMOL/L — SIGNIFICANT CHANGE UP (ref 3.5–5.3)
PROT SERPL-MCNC: 6.4 G/DL — SIGNIFICANT CHANGE UP (ref 6–8.3)
RBC # BLD: 5.27 M/UL — SIGNIFICANT CHANGE UP (ref 4.2–5.8)
RBC # FLD: 17.6 % — HIGH (ref 10.3–14.5)
SODIUM SERPL-SCNC: 140 MMOL/L — SIGNIFICANT CHANGE UP (ref 135–145)
WBC # BLD: 9.5 K/UL — SIGNIFICANT CHANGE UP (ref 3.8–10.5)
WBC # FLD AUTO: 9.5 K/UL — SIGNIFICANT CHANGE UP (ref 3.8–10.5)

## 2023-06-01 LAB
CREAT ?TM UR-MCNC: 164 MG/DL — SIGNIFICANT CHANGE UP
PROT ?TM UR-MCNC: 18 MG/DL — HIGH (ref 0–12)
PROT/CREAT UR-RTO: 0.1 RATIO — SIGNIFICANT CHANGE UP (ref 0–0.2)

## 2023-06-02 ENCOUNTER — APPOINTMENT (OUTPATIENT)
Dept: INFUSION THERAPY | Facility: HOSPITAL | Age: 58
End: 2023-06-02

## 2023-06-02 ENCOUNTER — APPOINTMENT (OUTPATIENT)
Dept: CARDIOLOGY | Facility: CLINIC | Age: 58
End: 2023-06-02
Payer: MEDICAID

## 2023-06-02 VITALS
DIASTOLIC BLOOD PRESSURE: 80 MMHG | OXYGEN SATURATION: 98 % | TEMPERATURE: 100 F | HEART RATE: 103 BPM | HEIGHT: 65.75 IN | SYSTOLIC BLOOD PRESSURE: 116 MMHG | WEIGHT: 126.54 LBS | RESPIRATION RATE: 16 BRPM | BODY MASS INDEX: 20.58 KG/M2

## 2023-06-02 DIAGNOSIS — I20.1 ANGINA PECTORIS WITH DOCUMENTED SPASM: ICD-10-CM

## 2023-06-02 DIAGNOSIS — R73.03 PREDIABETES.: ICD-10-CM

## 2023-06-02 PROCEDURE — 99215 OFFICE O/P EST HI 40 MIN: CPT

## 2023-06-02 PROCEDURE — 93010 ELECTROCARDIOGRAM REPORT: CPT

## 2023-06-02 PROCEDURE — 93000 ELECTROCARDIOGRAM COMPLETE: CPT

## 2023-06-02 NOTE — CARDIOLOGY SUMMARY
[de-identified] : \par 6/2/2023: sinus tachycardia 102 bpm, otherwise normal\par 5/8/2023: NSR 86, normal ECG\par 3/31/2023: NSR 63 bpm, normal ECG\par 2/13/2023:NSR 94, normal ECG\par 1/6/2023: NSR 71 bpm, normal ECG\par 12/27/2022: NSR 67 bpm, septal infarct age undetermined. [de-identified] : \par 1/13/2023: 6 Mets Nic protocol, no ischemia or scar. Normal perfusion study [de-identified] : \par 1/17/2023: EF 61 %, GLS - 16.4 [de-identified] : \par 12/2/2022: atherosclerotic calcifications of coronaries and aorta

## 2023-06-02 NOTE — REASON FOR VISIT
[FreeTextEntry3] : Dr. Tu Byers [FreeTextEntry1] : YAKELIN GERARDO is a 57 year old man with stage IV colon adenocarcinoma seen for follow up of 5FU related chest pain.\par \par Prior Cancer Treatments:\par ------------------------------------------------------------------------\par Chemo/targeted therapy:\par 2/2021-4/2022: FOLFOX\par 4/25/2022-present: FOLFIRI Bevacizumab, 5FU changed to bolus in 1/2023\par 3/2023: resumed FOLFIRI w infusional 5FU\par ------------------------------------------------------------------------\par Surgery:\par 3/2021: resection with ostomy for obstruction\par ------------------------------------------------------------------------

## 2023-06-02 NOTE — ASSESSMENT
[FreeTextEntry1] : 57 year old man with chest pain associated with infusional 5FU, which began after regimen changed to FOLFIRI with bevacizumab. 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 \par \par Nuclear stress test no ischemia and normal perfusion, Echo also normal.\par Symptoms did not recur during bolus re-challenge with Imdur and nifedipine.\par \par After discussion with Dr. Byers, his oncologist, we agreed to rechallenge with infusional 5FU, given superior efficacy. Tolerating rechallenge thus far, with limited chest pain relieved by sublingual nitro. We discussed that pain may intensify or occur earlier with subsequent cycles.\par \par Continue Imdur to 60 AM and continue 30 in PM along with 30 mg nifedipine twice daily and sublingual nitro PRN for next cycle. If symptoms recur or increase, will let us know and we will adjust antianginal regimen.\par \par # Vasospasm: Starting morning of cycle:\par - nifedipine 30 mg  every 12 hours to continue throughout infusion and for one day after.\par - Imdur 60 in AM and 30 in PM (every 12 hours) during infusion and for one day after\par - sublingual nitroglycerin PRN for chest pain\par - if any chest pain during infusion, he must disconnect the pump immediately\par - if chest pain recurs after disconnecting or does not respond to nitroglycerin, he should call me and go to the ER\par - patient verbalized understanding and was given an opportunity to participate in shared decision making\par \par # Atherosclerotic plaque, hyperglycemia, A1c 6.3 % (pre-diabetes)\par - Monitor BP during treatment. Normal today.\par - discussed role of statin therapy given plaque and ASCVD risk factors\par - will Rx rosuvastatin 5 mg daily pending Onc f/u\par \par # Sinus tachycardia, temp 100. He tells me this is typical for him following cycles (completing today)\par - CT from April reviewed, known pulmonary mets\par - will monitor symptoms, advised to call if fever or feels worse\par - encouraged to ensure adequate PO hydration\par \par Follow up in 3 months with me if remains chest pain free during upcoming cycles. Otherwise, to RTC for med titration PRN.\par \par Above discussed with the patient and all questions were answered to the best of my ability and his apparent satisfaction.\par

## 2023-06-02 NOTE — HISTORY OF PRESENT ILLNESS
[FreeTextEntry1] : Interval History:\par Completing FU infusion pump today. Feels slight chill (usually does near end of cycle) and fatigue. But no chest tightness or pain this cycle. Has not used any sublingual nitroglycerin.\par Will travel to [place] for 10 days this month on vacation.\par \par History:\par YAKELIN GERARDO is a 57 year man with a history of metastatic colorectal cancer. He tolerated infusional 5FU based chemotherapy previously, but after recent change to FOLFIRI with bevacizumab developed chest pain.\par \par He reports retrosternal chest pain at night which interferes with his sleep. Pain occurred at home while wearing 5FU infusion pump and resolved the day after returning it, each of the past 3 cycles (when treatment changed to current regimen). He notes pain is better when sitting upright and he denies any exertional chest pain, shortness of breath. Never had chest pain during prior infusions.\par \par No history of heart disease, HLD, diabetes, smoking, uncle had MI in his 50s. Possibly "borderline" HTN. No medications aside from multivitamin.\par \par Feb 13, 2023:\par Denies any further chest pain and tolerated most recent cycle with bolus 5FU without incident.\par Denies side effects from Imdur, nifedipine taken for spasm prophylaxis.\par He is exercising on treadmill.\par \par Mar 31, 2023:\par Resumed FOLFIRI with infusional 5FU, s/p 2 cycles thus far, which he reports tolerating well overall.\par He had chest tightness the first night of the infusion which he noticed when laying in bed, relieved by one sublingual nitroglycerin and did not recur. Same thing occurred during second cycle, first night of infusion only.\par \par May 8, 2023:\par Feeling well.\par No chest pain with last cycle after anti-anginal adjustment (increased AM Imdur dose). Did not require any sublingual nitro.\par No symptoms of hypotension, though BP lower at last visit.

## 2023-06-02 NOTE — PHYSICAL EXAM
[Normal] : well developed, well nourished, no acute distress [Normal Conjunctiva] : normal conjunctiva [No Xanthelasma] : no xanthelasma [Normal Venous Pressure] : normal venous pressure [Normal S1, S2] : normal S1, S2 [No Murmur] : no murmur [No Rub] : no rub [No Gallop] : no gallop [Clear Lung Fields] : clear lung fields [Good Air Entry] : good air entry [No Respiratory Distress] : no respiratory distress  [Soft] : abdomen soft [Normal Gait] : normal gait [Gait - Sufficient for Exercise Testing] : gait - sufficient for exercise testing [No Edema] : no edema [No Cyanosis] : no cyanosis [No Clubbing] : no clubbing [No Varicosities] : no varicosities [Moves all extremities] : moves all extremities [No Focal Deficits] : no focal deficits [Normal Speech] : normal speech [Alert and Oriented] : alert and oriented [Normal memory] : normal memory [de-identified] : vitiligo

## 2023-06-13 ENCOUNTER — RESULT REVIEW (OUTPATIENT)
Age: 58
End: 2023-06-13

## 2023-06-13 ENCOUNTER — APPOINTMENT (OUTPATIENT)
Dept: INFUSION THERAPY | Facility: HOSPITAL | Age: 58
End: 2023-06-13

## 2023-06-13 ENCOUNTER — APPOINTMENT (OUTPATIENT)
Dept: HEMATOLOGY ONCOLOGY | Facility: CLINIC | Age: 58
End: 2023-06-13

## 2023-06-13 LAB
ALBUMIN SERPL ELPH-MCNC: 3.5 G/DL — SIGNIFICANT CHANGE UP (ref 3.3–5)
ALP SERPL-CCNC: 121 U/L — HIGH (ref 40–120)
ALT FLD-CCNC: 21 U/L — SIGNIFICANT CHANGE UP (ref 10–45)
ANION GAP SERPL CALC-SCNC: 12 MMOL/L — SIGNIFICANT CHANGE UP (ref 5–17)
AST SERPL-CCNC: 31 U/L — SIGNIFICANT CHANGE UP (ref 10–40)
BASOPHILS # BLD AUTO: 0.04 K/UL — SIGNIFICANT CHANGE UP (ref 0–0.2)
BASOPHILS NFR BLD AUTO: 0.5 % — SIGNIFICANT CHANGE UP (ref 0–2)
BILIRUB SERPL-MCNC: 0.3 MG/DL — SIGNIFICANT CHANGE UP (ref 0.2–1.2)
BUN SERPL-MCNC: 18 MG/DL — SIGNIFICANT CHANGE UP (ref 7–23)
CALCIUM SERPL-MCNC: 8.7 MG/DL — SIGNIFICANT CHANGE UP (ref 8.4–10.5)
CHLORIDE SERPL-SCNC: 105 MMOL/L — SIGNIFICANT CHANGE UP (ref 96–108)
CO2 SERPL-SCNC: 23 MMOL/L — SIGNIFICANT CHANGE UP (ref 22–31)
CREAT SERPL-MCNC: 0.97 MG/DL — SIGNIFICANT CHANGE UP (ref 0.5–1.3)
EGFR: 90 ML/MIN/1.73M2 — SIGNIFICANT CHANGE UP
EOSINOPHIL # BLD AUTO: 0.18 K/UL — SIGNIFICANT CHANGE UP (ref 0–0.5)
EOSINOPHIL NFR BLD AUTO: 2.4 % — SIGNIFICANT CHANGE UP (ref 0–6)
GLUCOSE SERPL-MCNC: 124 MG/DL — HIGH (ref 70–99)
HCT VFR BLD CALC: 38.1 % — LOW (ref 39–50)
HGB BLD-MCNC: 12 G/DL — LOW (ref 13–17)
IMM GRANULOCYTES NFR BLD AUTO: 0.4 % — SIGNIFICANT CHANGE UP (ref 0–0.9)
LYMPHOCYTES # BLD AUTO: 1.81 K/UL — SIGNIFICANT CHANGE UP (ref 1–3.3)
LYMPHOCYTES # BLD AUTO: 23.7 % — SIGNIFICANT CHANGE UP (ref 13–44)
MCHC RBC-ENTMCNC: 23.8 PG — LOW (ref 27–34)
MCHC RBC-ENTMCNC: 31.5 G/DL — LOW (ref 32–36)
MCV RBC AUTO: 75.4 FL — LOW (ref 80–100)
MONOCYTES # BLD AUTO: 0.56 K/UL — SIGNIFICANT CHANGE UP (ref 0–0.9)
MONOCYTES NFR BLD AUTO: 7.3 % — SIGNIFICANT CHANGE UP (ref 2–14)
NEUTROPHILS # BLD AUTO: 5.01 K/UL — SIGNIFICANT CHANGE UP (ref 1.8–7.4)
NEUTROPHILS NFR BLD AUTO: 65.7 % — SIGNIFICANT CHANGE UP (ref 43–77)
NRBC # BLD: 0 /100 WBCS — SIGNIFICANT CHANGE UP (ref 0–0)
PLATELET # BLD AUTO: 282 K/UL — SIGNIFICANT CHANGE UP (ref 150–400)
POTASSIUM SERPL-MCNC: 4.1 MMOL/L — SIGNIFICANT CHANGE UP (ref 3.5–5.3)
POTASSIUM SERPL-SCNC: 4.1 MMOL/L — SIGNIFICANT CHANGE UP (ref 3.5–5.3)
PROT SERPL-MCNC: 6.1 G/DL — SIGNIFICANT CHANGE UP (ref 6–8.3)
RBC # BLD: 5.05 M/UL — SIGNIFICANT CHANGE UP (ref 4.2–5.8)
RBC # FLD: 17.1 % — HIGH (ref 10.3–14.5)
SODIUM SERPL-SCNC: 140 MMOL/L — SIGNIFICANT CHANGE UP (ref 135–145)
WBC # BLD: 7.63 K/UL — SIGNIFICANT CHANGE UP (ref 3.8–10.5)
WBC # FLD AUTO: 7.63 K/UL — SIGNIFICANT CHANGE UP (ref 3.8–10.5)

## 2023-06-15 ENCOUNTER — OUTPATIENT (OUTPATIENT)
Dept: OUTPATIENT SERVICES | Facility: HOSPITAL | Age: 58
LOS: 1 days | Discharge: HOME | End: 2023-06-15

## 2023-06-15 ENCOUNTER — APPOINTMENT (OUTPATIENT)
Dept: INFUSION THERAPY | Facility: HOSPITAL | Age: 58
End: 2023-06-15

## 2023-06-15 DIAGNOSIS — C18.9 MALIGNANT NEOPLASM OF COLON, UNSPECIFIED: ICD-10-CM

## 2023-06-26 ENCOUNTER — APPOINTMENT (OUTPATIENT)
Dept: HEMATOLOGY ONCOLOGY | Facility: CLINIC | Age: 58
End: 2023-06-26

## 2023-06-26 ENCOUNTER — APPOINTMENT (OUTPATIENT)
Dept: INFUSION THERAPY | Facility: HOSPITAL | Age: 58
End: 2023-06-26

## 2023-06-28 ENCOUNTER — APPOINTMENT (OUTPATIENT)
Dept: INFUSION THERAPY | Facility: HOSPITAL | Age: 58
End: 2023-06-28

## 2023-07-05 ENCOUNTER — RESULT REVIEW (OUTPATIENT)
Age: 58
End: 2023-07-05

## 2023-07-08 ENCOUNTER — APPOINTMENT (OUTPATIENT)
Dept: CT IMAGING | Facility: IMAGING CENTER | Age: 58
End: 2023-07-08
Payer: MEDICARE

## 2023-07-08 ENCOUNTER — OUTPATIENT (OUTPATIENT)
Dept: OUTPATIENT SERVICES | Facility: HOSPITAL | Age: 58
LOS: 1 days | End: 2023-07-08
Payer: MEDICARE

## 2023-07-08 DIAGNOSIS — C18.9 MALIGNANT NEOPLASM OF COLON, UNSPECIFIED: ICD-10-CM

## 2023-07-08 PROCEDURE — 71260 CT THORAX DX C+: CPT | Mod: 26,MH

## 2023-07-08 PROCEDURE — 71260 CT THORAX DX C+: CPT

## 2023-07-08 PROCEDURE — 74177 CT ABD & PELVIS W/CONTRAST: CPT

## 2023-07-08 PROCEDURE — 74177 CT ABD & PELVIS W/CONTRAST: CPT | Mod: 26,MH

## 2023-07-10 ENCOUNTER — RESULT REVIEW (OUTPATIENT)
Age: 58
End: 2023-07-10

## 2023-07-10 ENCOUNTER — NON-APPOINTMENT (OUTPATIENT)
Age: 58
End: 2023-07-10

## 2023-07-10 ENCOUNTER — APPOINTMENT (OUTPATIENT)
Dept: INFUSION THERAPY | Facility: HOSPITAL | Age: 58
End: 2023-07-10

## 2023-07-10 ENCOUNTER — APPOINTMENT (OUTPATIENT)
Dept: HEMATOLOGY ONCOLOGY | Facility: CLINIC | Age: 58
End: 2023-07-10
Payer: MEDICARE

## 2023-07-10 VITALS — DIASTOLIC BLOOD PRESSURE: 55 MMHG | SYSTOLIC BLOOD PRESSURE: 90 MMHG

## 2023-07-10 VITALS
HEART RATE: 108 BPM | WEIGHT: 128.75 LBS | BODY MASS INDEX: 20.94 KG/M2 | TEMPERATURE: 97 F | DIASTOLIC BLOOD PRESSURE: 55 MMHG | SYSTOLIC BLOOD PRESSURE: 82 MMHG | OXYGEN SATURATION: 99 % | RESPIRATION RATE: 16 BRPM

## 2023-07-10 LAB
ALBUMIN SERPL ELPH-MCNC: 3.9 G/DL — SIGNIFICANT CHANGE UP (ref 3.3–5)
ALP SERPL-CCNC: 131 U/L — HIGH (ref 40–120)
ALT FLD-CCNC: 32 U/L — SIGNIFICANT CHANGE UP (ref 10–45)
ANION GAP SERPL CALC-SCNC: 14 MMOL/L — SIGNIFICANT CHANGE UP (ref 5–17)
AST SERPL-CCNC: 62 U/L — HIGH (ref 10–40)
BASOPHILS # BLD AUTO: 0.09 K/UL — SIGNIFICANT CHANGE UP (ref 0–0.2)
BASOPHILS NFR BLD AUTO: 0.9 % — SIGNIFICANT CHANGE UP (ref 0–2)
BILIRUB SERPL-MCNC: 0.4 MG/DL — SIGNIFICANT CHANGE UP (ref 0.2–1.2)
BUN SERPL-MCNC: 16 MG/DL — SIGNIFICANT CHANGE UP (ref 7–23)
CALCIUM SERPL-MCNC: 8.7 MG/DL — SIGNIFICANT CHANGE UP (ref 8.4–10.5)
CEA SERPL-MCNC: 381 NG/ML — HIGH (ref 0–3.8)
CHLORIDE SERPL-SCNC: 102 MMOL/L — SIGNIFICANT CHANGE UP (ref 96–108)
CO2 SERPL-SCNC: 22 MMOL/L — SIGNIFICANT CHANGE UP (ref 22–31)
CREAT ?TM UR-MCNC: 239 MG/DL — SIGNIFICANT CHANGE UP
CREAT SERPL-MCNC: 0.93 MG/DL — SIGNIFICANT CHANGE UP (ref 0.5–1.3)
EGFR: 95 ML/MIN/1.73M2 — SIGNIFICANT CHANGE UP
EOSINOPHIL # BLD AUTO: 0.34 K/UL — SIGNIFICANT CHANGE UP (ref 0–0.5)
EOSINOPHIL NFR BLD AUTO: 3.3 % — SIGNIFICANT CHANGE UP (ref 0–6)
GLUCOSE SERPL-MCNC: 168 MG/DL — HIGH (ref 70–99)
HCT VFR BLD CALC: 41.6 % — SIGNIFICANT CHANGE UP (ref 39–50)
HGB BLD-MCNC: 12.9 G/DL — LOW (ref 13–17)
IMM GRANULOCYTES NFR BLD AUTO: 0.7 % — SIGNIFICANT CHANGE UP (ref 0–0.9)
LYMPHOCYTES # BLD AUTO: 2.88 K/UL — SIGNIFICANT CHANGE UP (ref 1–3.3)
LYMPHOCYTES # BLD AUTO: 28.3 % — SIGNIFICANT CHANGE UP (ref 13–44)
MCHC RBC-ENTMCNC: 23.5 PG — LOW (ref 27–34)
MCHC RBC-ENTMCNC: 31 G/DL — LOW (ref 32–36)
MCV RBC AUTO: 75.9 FL — LOW (ref 80–100)
MONOCYTES # BLD AUTO: 0.8 K/UL — SIGNIFICANT CHANGE UP (ref 0–0.9)
MONOCYTES NFR BLD AUTO: 7.9 % — SIGNIFICANT CHANGE UP (ref 2–14)
NEUTROPHILS # BLD AUTO: 5.99 K/UL — SIGNIFICANT CHANGE UP (ref 1.8–7.4)
NEUTROPHILS NFR BLD AUTO: 58.9 % — SIGNIFICANT CHANGE UP (ref 43–77)
NRBC # BLD: 0 /100 WBCS — SIGNIFICANT CHANGE UP (ref 0–0)
PLATELET # BLD AUTO: 241 K/UL — SIGNIFICANT CHANGE UP (ref 150–400)
POTASSIUM SERPL-MCNC: 3.9 MMOL/L — SIGNIFICANT CHANGE UP (ref 3.5–5.3)
POTASSIUM SERPL-SCNC: 3.9 MMOL/L — SIGNIFICANT CHANGE UP (ref 3.5–5.3)
PROT ?TM UR-MCNC: 29 MG/DL — HIGH (ref 0–12)
PROT SERPL-MCNC: 6.5 G/DL — SIGNIFICANT CHANGE UP (ref 6–8.3)
PROT/CREAT UR-RTO: 0.1 RATIO — SIGNIFICANT CHANGE UP (ref 0–0.2)
RBC # BLD: 5.48 M/UL — SIGNIFICANT CHANGE UP (ref 4.2–5.8)
RBC # FLD: 17 % — HIGH (ref 10.3–14.5)
SODIUM SERPL-SCNC: 138 MMOL/L — SIGNIFICANT CHANGE UP (ref 135–145)
WBC # BLD: 10.17 K/UL — SIGNIFICANT CHANGE UP (ref 3.8–10.5)
WBC # FLD AUTO: 10.17 K/UL — SIGNIFICANT CHANGE UP (ref 3.8–10.5)

## 2023-07-10 PROCEDURE — 99215 OFFICE O/P EST HI 40 MIN: CPT

## 2023-07-10 RX ORDER — DOXYCYCLINE HYCLATE 100 MG/1
100 CAPSULE ORAL TWICE DAILY
Qty: 60 | Refills: 0 | Status: ACTIVE | COMMUNITY
Start: 2023-07-10 | End: 1900-01-01

## 2023-07-10 RX ORDER — POTASSIUM CHLORIDE 1500 MG/1
20 TABLET, FILM COATED, EXTENDED RELEASE ORAL
Qty: 4 | Refills: 0 | Status: DISCONTINUED | COMMUNITY
Start: 2023-05-16 | End: 2023-07-10

## 2023-07-10 NOTE — HISTORY OF PRESENT ILLNESS
[Disease: _____________________] : Disease: [unfilled] [AJCC Stage: ____] : AJCC Stage: [unfilled] [de-identified] : 56 y/o M with PMH of vitiligo who developed abdominal discomfort which started 12/2020 and he was admitted on 1/4/2021 @ [facility] where he was found to have mass in distal TC with hepatic and RLL metastatic disease seen in CT scan.  He was discharged home with outpatient follow up.  He had syncope at home on 1/18/2021 and then saw PMD on 1/19/2021 where he was sent to hospital and admitted to [facility] from 1/19/2021 to 1/19/2021.   He underwent liver biopsy on 1/25/2021 which confirmed metastatic colon cancer.  His CEA was elevated 2882 from 1/21/2021.  His CT c/a/p from 1/28/2021 showed findings consistent with metastatic colon cancer evidenced by annular colonic mass and numerous hepatic metastases, groundglass pulmonary opacity consistent with infectious etiology, including Covid 19, improved compared to prior exam.  \par \par He presents to us on 2/5/2021 and states he feels stronger since leaving hospital but has lost about 22 pounds from December 2020 to Feb 2021\par Given Stage IV disease, offered FOLFOX and reviewed palliative intent of treatment \par    \par Late Feb 2021 started FOLFOX \par March 2021 admitted for LBO and underwent resection with ostomy \par \par April 2021 restarted FOLFOX --> stopped oxali July 2021 \par April 2022 POD switched to FOLFIRI Day ( 5FU held 12/27/22 for possible coronary vasospasm then bolus 5FU for 1 month and after cardiac eval , infusional 5fu restart late Feb 2023 after cardiology optimization  [de-identified] : pan andrea wt, transverse colon primary (right side) [de-identified] : IFTIKHAR, TMB 5, APC R232*APC *\par GRM3 V271I, MAP2K1 (MEK1) K57E\par TP53 S127P [FreeTextEntry1] :  NAVA olivav [de-identified] : presents for follow up \par here to review imaging , recent travel to [place] with family\par no acute complaints \par rise in CEA recently

## 2023-07-10 NOTE — REVIEW OF SYSTEMS
[Negative] : Allergic/Immunologic [Dysphagia] : no dysphagia [Hoarseness] : no hoarseness [Odynophagia] : no odynophagia [Mucosal Pain] : no mucosal pain [Abdominal Pain] : no abdominal pain [Vomiting] : no vomiting [Constipation] : no constipation [Dizziness] : no dizziness [Fainting] : no fainting [Difficulty Walking] : no difficulty walking [de-identified] : vitiligo

## 2023-07-10 NOTE — PHYSICAL EXAM
[Thin] : thin [Normal] : affect appropriate [Fully active, able to carry on all pre-disease performance without restriction] : Status 0 - Fully active, able to carry on all pre-disease performance without restriction [de-identified] : anicteric  [de-identified] : normal respiratory effort, no audible wheeze [de-identified] : reg rate  [de-identified] : no LE edema [de-identified] : + ostomy, soft, non-tender [de-identified] : vitiligo

## 2023-07-11 DIAGNOSIS — R11.2 NAUSEA WITH VOMITING, UNSPECIFIED: ICD-10-CM

## 2023-07-11 DIAGNOSIS — Z51.11 ENCOUNTER FOR ANTINEOPLASTIC CHEMOTHERAPY: ICD-10-CM

## 2023-07-12 ENCOUNTER — APPOINTMENT (OUTPATIENT)
Dept: INFUSION THERAPY | Facility: HOSPITAL | Age: 58
End: 2023-07-12

## 2023-07-24 ENCOUNTER — APPOINTMENT (OUTPATIENT)
Dept: HEMATOLOGY ONCOLOGY | Facility: CLINIC | Age: 58
End: 2023-07-24

## 2023-07-24 ENCOUNTER — RESULT REVIEW (OUTPATIENT)
Age: 58
End: 2023-07-24

## 2023-07-24 ENCOUNTER — APPOINTMENT (OUTPATIENT)
Dept: INFUSION THERAPY | Facility: HOSPITAL | Age: 58
End: 2023-07-24

## 2023-07-24 LAB
BASOPHILS # BLD AUTO: 0.05 K/UL — SIGNIFICANT CHANGE UP (ref 0–0.2)
BASOPHILS NFR BLD AUTO: 0.6 % — SIGNIFICANT CHANGE UP (ref 0–2)
CEA SERPL-MCNC: 429 NG/ML — HIGH (ref 0–3.8)
EOSINOPHIL # BLD AUTO: 0.46 K/UL — SIGNIFICANT CHANGE UP (ref 0–0.5)
EOSINOPHIL NFR BLD AUTO: 5.4 % — SIGNIFICANT CHANGE UP (ref 0–6)
HCT VFR BLD CALC: 39 % — SIGNIFICANT CHANGE UP (ref 39–50)
HGB BLD-MCNC: 12.4 G/DL — LOW (ref 13–17)
IMM GRANULOCYTES NFR BLD AUTO: 0.4 % — SIGNIFICANT CHANGE UP (ref 0–0.9)
LYMPHOCYTES # BLD AUTO: 2.24 K/UL — SIGNIFICANT CHANGE UP (ref 1–3.3)
LYMPHOCYTES # BLD AUTO: 26.3 % — SIGNIFICANT CHANGE UP (ref 13–44)
MCHC RBC-ENTMCNC: 23.8 PG — LOW (ref 27–34)
MCHC RBC-ENTMCNC: 31.8 G/DL — LOW (ref 32–36)
MCV RBC AUTO: 74.9 FL — LOW (ref 80–100)
MONOCYTES # BLD AUTO: 0.66 K/UL — SIGNIFICANT CHANGE UP (ref 0–0.9)
MONOCYTES NFR BLD AUTO: 7.8 % — SIGNIFICANT CHANGE UP (ref 2–14)
NEUTROPHILS # BLD AUTO: 5.07 K/UL — SIGNIFICANT CHANGE UP (ref 1.8–7.4)
NEUTROPHILS NFR BLD AUTO: 59.5 % — SIGNIFICANT CHANGE UP (ref 43–77)
NRBC # BLD: 0 /100 WBCS — SIGNIFICANT CHANGE UP (ref 0–0)
PLATELET # BLD AUTO: 240 K/UL — SIGNIFICANT CHANGE UP (ref 150–400)
RBC # BLD: 5.21 M/UL — SIGNIFICANT CHANGE UP (ref 4.2–5.8)
RBC # FLD: 16.1 % — HIGH (ref 10.3–14.5)
WBC # BLD: 8.51 K/UL — SIGNIFICANT CHANGE UP (ref 3.8–10.5)
WBC # FLD AUTO: 8.51 K/UL — SIGNIFICANT CHANGE UP (ref 3.8–10.5)

## 2023-07-25 LAB
ALBUMIN SERPL ELPH-MCNC: 3.5 G/DL — SIGNIFICANT CHANGE UP (ref 3.3–5)
ALP SERPL-CCNC: 177 U/L — HIGH (ref 40–120)
ALT FLD-CCNC: 32 U/L — SIGNIFICANT CHANGE UP (ref 10–45)
ANION GAP SERPL CALC-SCNC: 13 MMOL/L — SIGNIFICANT CHANGE UP (ref 5–17)
AST SERPL-CCNC: 40 U/L — SIGNIFICANT CHANGE UP (ref 10–40)
BILIRUB SERPL-MCNC: 0.3 MG/DL — SIGNIFICANT CHANGE UP (ref 0.2–1.2)
BUN SERPL-MCNC: 14 MG/DL — SIGNIFICANT CHANGE UP (ref 7–23)
CALCIUM SERPL-MCNC: 8.8 MG/DL — SIGNIFICANT CHANGE UP (ref 8.4–10.5)
CHLORIDE SERPL-SCNC: 102 MMOL/L — SIGNIFICANT CHANGE UP (ref 96–108)
CO2 SERPL-SCNC: 22 MMOL/L — SIGNIFICANT CHANGE UP (ref 22–31)
CREAT SERPL-MCNC: 0.83 MG/DL — SIGNIFICANT CHANGE UP (ref 0.5–1.3)
EGFR: 101 ML/MIN/1.73M2 — SIGNIFICANT CHANGE UP
GLUCOSE SERPL-MCNC: 194 MG/DL — HIGH (ref 70–99)
MAGNESIUM SERPL-MCNC: 2 MG/DL — SIGNIFICANT CHANGE UP (ref 1.6–2.6)
POTASSIUM SERPL-MCNC: 4.1 MMOL/L — SIGNIFICANT CHANGE UP (ref 3.5–5.3)
POTASSIUM SERPL-SCNC: 4.1 MMOL/L — SIGNIFICANT CHANGE UP (ref 3.5–5.3)
PROT SERPL-MCNC: 6.5 G/DL — SIGNIFICANT CHANGE UP (ref 6–8.3)
SODIUM SERPL-SCNC: 137 MMOL/L — SIGNIFICANT CHANGE UP (ref 135–145)

## 2023-07-26 ENCOUNTER — APPOINTMENT (OUTPATIENT)
Dept: INFUSION THERAPY | Facility: HOSPITAL | Age: 58
End: 2023-07-26

## 2023-08-04 ENCOUNTER — APPOINTMENT (OUTPATIENT)
Dept: HEMATOLOGY ONCOLOGY | Facility: CLINIC | Age: 58
End: 2023-08-04
Payer: MEDICARE

## 2023-08-04 ENCOUNTER — OUTPATIENT (OUTPATIENT)
Dept: OUTPATIENT SERVICES | Facility: HOSPITAL | Age: 58
LOS: 1 days | Discharge: HOME | End: 2023-08-04

## 2023-08-04 VITALS
HEART RATE: 105 BPM | OXYGEN SATURATION: 98 % | RESPIRATION RATE: 16 BRPM | HEIGHT: 65 IN | BODY MASS INDEX: 20.93 KG/M2 | SYSTOLIC BLOOD PRESSURE: 113 MMHG | TEMPERATURE: 97.2 F | WEIGHT: 125.64 LBS | DIASTOLIC BLOOD PRESSURE: 76 MMHG

## 2023-08-04 DIAGNOSIS — C18.9 MALIGNANT NEOPLASM OF COLON, UNSPECIFIED: ICD-10-CM

## 2023-08-04 PROCEDURE — 99214 OFFICE O/P EST MOD 30 MIN: CPT

## 2023-08-04 NOTE — HISTORY OF PRESENT ILLNESS
[Disease: _____________________] : Disease: [unfilled] [AJCC Stage: ____] : AJCC Stage: [unfilled] [de-identified] : 56 y/o M with PMH of vitiligo who developed abdominal discomfort which started 12/2020 and he was admitted on 1/4/2021 @ [facility] where he was found to have mass in distal TC with hepatic and RLL metastatic disease seen in CT scan.  He was discharged home with outpatient follow up.  He had syncope at home on 1/18/2021 and then saw PMD on 1/19/2021 where he was sent to hospital and admitted to [facility] from 1/19/2021 to 1/19/2021.   He underwent liver biopsy on 1/25/2021 which confirmed metastatic colon cancer.  His CEA was elevated 2882 from 1/21/2021.  His CT c/a/p from 1/28/2021 showed findings consistent with metastatic colon cancer evidenced by annular colonic mass and numerous hepatic metastases, groundglass pulmonary opacity consistent with infectious etiology, including Covid 19, improved compared to prior exam.    He presents to us on 2/5/2021 and states he feels stronger since leaving hospital but has lost about 22 pounds from December 2020 to Feb 2021 Given Stage IV disease, offered FOLFOX and reviewed palliative intent of treatment      Late Feb 2021 started FOLFOX  March 2021 admitted for LBO and underwent resection with ostomy   April 2021 restarted FOLFOX --> stopped oxali July 2021 April 2022 POD switched to FOLFIRI Day ( 5FU held 12/27/22 for possible coronary vasospasm then bolus 5FU for 1 month and after cardiac eval , infusional 5fu restart late Feb 2023 after cardiology optimization  July 2023 POD switched to Irino + vectibix (held FU due to coronary vasospasm) [de-identified] : pan andrea wt, transverse colon primary (right side) [de-identified] : IFTIKHAR, TMB 5, APC R232*APC *\par  GRM3 V271I, MAP2K1 (MEK1) K57E\par  TP53 S127P [FreeTextEntry1] : irino vectibix  [de-identified] : tolerated irino vectibix first treatment  no diarrhea  grade 1 rash taking abx and using skin care

## 2023-08-04 NOTE — REVIEW OF SYSTEMS
[Dysphagia] : no dysphagia [Hoarseness] : no hoarseness [Odynophagia] : no odynophagia [Mucosal Pain] : no mucosal pain [Abdominal Pain] : no abdominal pain [Vomiting] : no vomiting [Constipation] : no constipation [Dizziness] : no dizziness [Fainting] : no fainting [Difficulty Walking] : no difficulty walking [Negative] : Allergic/Immunologic [de-identified] : vitiligo

## 2023-08-04 NOTE — PHYSICAL EXAM
[Fully active, able to carry on all pre-disease performance without restriction] : Status 0 - Fully active, able to carry on all pre-disease performance without restriction [Thin] : thin [Normal] : affect appropriate [de-identified] : anicteric  [de-identified] : normal respiratory effort, no audible wheeze [de-identified] : reg rate  [de-identified] : no LE edema [de-identified] : + ostomy, soft, non-tender [de-identified] : vitiligo, grade1 maculopapular rash

## 2023-08-07 ENCOUNTER — RESULT REVIEW (OUTPATIENT)
Age: 58
End: 2023-08-07

## 2023-08-07 ENCOUNTER — APPOINTMENT (OUTPATIENT)
Dept: INFUSION THERAPY | Facility: HOSPITAL | Age: 58
End: 2023-08-07

## 2023-08-07 ENCOUNTER — APPOINTMENT (OUTPATIENT)
Dept: HEMATOLOGY ONCOLOGY | Facility: CLINIC | Age: 58
End: 2023-08-07

## 2023-08-07 LAB
ALBUMIN SERPL ELPH-MCNC: 3.7 G/DL — SIGNIFICANT CHANGE UP (ref 3.3–5)
ALP SERPL-CCNC: 168 U/L — HIGH (ref 40–120)
ALT FLD-CCNC: 41 U/L — SIGNIFICANT CHANGE UP (ref 10–45)
ANION GAP SERPL CALC-SCNC: 12 MMOL/L — SIGNIFICANT CHANGE UP (ref 5–17)
AST SERPL-CCNC: 63 U/L — HIGH (ref 10–40)
BASOPHILS # BLD AUTO: 0.06 K/UL — SIGNIFICANT CHANGE UP (ref 0–0.2)
BASOPHILS NFR BLD AUTO: 0.9 % — SIGNIFICANT CHANGE UP (ref 0–2)
BILIRUB SERPL-MCNC: 0.3 MG/DL — SIGNIFICANT CHANGE UP (ref 0.2–1.2)
BUN SERPL-MCNC: 15 MG/DL — SIGNIFICANT CHANGE UP (ref 7–23)
CALCIUM SERPL-MCNC: 9.2 MG/DL — SIGNIFICANT CHANGE UP (ref 8.4–10.5)
CHLORIDE SERPL-SCNC: 105 MMOL/L — SIGNIFICANT CHANGE UP (ref 96–108)
CO2 SERPL-SCNC: 22 MMOL/L — SIGNIFICANT CHANGE UP (ref 22–31)
CREAT SERPL-MCNC: 0.74 MG/DL — SIGNIFICANT CHANGE UP (ref 0.5–1.3)
EGFR: 104 ML/MIN/1.73M2 — SIGNIFICANT CHANGE UP
EOSINOPHIL # BLD AUTO: 0.28 K/UL — SIGNIFICANT CHANGE UP (ref 0–0.5)
EOSINOPHIL NFR BLD AUTO: 4.3 % — SIGNIFICANT CHANGE UP (ref 0–6)
GLUCOSE SERPL-MCNC: 184 MG/DL — HIGH (ref 70–99)
HCT VFR BLD CALC: 38.6 % — LOW (ref 39–50)
HGB BLD-MCNC: 12.1 G/DL — LOW (ref 13–17)
IMM GRANULOCYTES NFR BLD AUTO: 0.3 % — SIGNIFICANT CHANGE UP (ref 0–0.9)
LYMPHOCYTES # BLD AUTO: 1.87 K/UL — SIGNIFICANT CHANGE UP (ref 1–3.3)
LYMPHOCYTES # BLD AUTO: 28.5 % — SIGNIFICANT CHANGE UP (ref 13–44)
MAGNESIUM SERPL-MCNC: 1.9 MG/DL — SIGNIFICANT CHANGE UP (ref 1.6–2.6)
MCHC RBC-ENTMCNC: 23.4 PG — LOW (ref 27–34)
MCHC RBC-ENTMCNC: 31.3 G/DL — LOW (ref 32–36)
MCV RBC AUTO: 74.8 FL — LOW (ref 80–100)
MONOCYTES # BLD AUTO: 0.51 K/UL — SIGNIFICANT CHANGE UP (ref 0–0.9)
MONOCYTES NFR BLD AUTO: 7.8 % — SIGNIFICANT CHANGE UP (ref 2–14)
NEUTROPHILS # BLD AUTO: 3.82 K/UL — SIGNIFICANT CHANGE UP (ref 1.8–7.4)
NEUTROPHILS NFR BLD AUTO: 58.2 % — SIGNIFICANT CHANGE UP (ref 43–77)
NRBC # BLD: 0 /100 WBCS — SIGNIFICANT CHANGE UP (ref 0–0)
PLATELET # BLD AUTO: 229 K/UL — SIGNIFICANT CHANGE UP (ref 150–400)
POTASSIUM SERPL-MCNC: 3.6 MMOL/L — SIGNIFICANT CHANGE UP (ref 3.5–5.3)
POTASSIUM SERPL-SCNC: 3.6 MMOL/L — SIGNIFICANT CHANGE UP (ref 3.5–5.3)
PROT SERPL-MCNC: 6.9 G/DL — SIGNIFICANT CHANGE UP (ref 6–8.3)
RBC # BLD: 5.16 M/UL — SIGNIFICANT CHANGE UP (ref 4.2–5.8)
RBC # FLD: 16.8 % — HIGH (ref 10.3–14.5)
SODIUM SERPL-SCNC: 140 MMOL/L — SIGNIFICANT CHANGE UP (ref 135–145)
WBC # BLD: 6.56 K/UL — SIGNIFICANT CHANGE UP (ref 3.8–10.5)
WBC # FLD AUTO: 6.56 K/UL — SIGNIFICANT CHANGE UP (ref 3.8–10.5)

## 2023-08-08 DIAGNOSIS — R11.2 NAUSEA WITH VOMITING, UNSPECIFIED: ICD-10-CM

## 2023-08-08 DIAGNOSIS — Z51.11 ENCOUNTER FOR ANTINEOPLASTIC CHEMOTHERAPY: ICD-10-CM

## 2023-08-09 ENCOUNTER — APPOINTMENT (OUTPATIENT)
Dept: INFUSION THERAPY | Facility: HOSPITAL | Age: 58
End: 2023-08-09

## 2023-08-18 ENCOUNTER — RESULT REVIEW (OUTPATIENT)
Age: 58
End: 2023-08-18

## 2023-08-18 ENCOUNTER — APPOINTMENT (OUTPATIENT)
Dept: HEMATOLOGY ONCOLOGY | Facility: CLINIC | Age: 58
End: 2023-08-18

## 2023-08-18 ENCOUNTER — APPOINTMENT (OUTPATIENT)
Dept: INFUSION THERAPY | Facility: HOSPITAL | Age: 58
End: 2023-08-18

## 2023-08-18 ENCOUNTER — NON-APPOINTMENT (OUTPATIENT)
Age: 58
End: 2023-08-18

## 2023-08-18 LAB
BASOPHILS # BLD AUTO: 0.04 K/UL — SIGNIFICANT CHANGE UP (ref 0–0.2)
BASOPHILS NFR BLD AUTO: 0.5 % — SIGNIFICANT CHANGE UP (ref 0–2)
EOSINOPHIL # BLD AUTO: 0.23 K/UL — SIGNIFICANT CHANGE UP (ref 0–0.5)
EOSINOPHIL NFR BLD AUTO: 2.9 % — SIGNIFICANT CHANGE UP (ref 0–6)
HCT VFR BLD CALC: 39.3 % — SIGNIFICANT CHANGE UP (ref 39–50)
HGB BLD-MCNC: 12.3 G/DL — LOW (ref 13–17)
IMM GRANULOCYTES NFR BLD AUTO: 0.3 % — SIGNIFICANT CHANGE UP (ref 0–0.9)
LYMPHOCYTES # BLD AUTO: 2.66 K/UL — SIGNIFICANT CHANGE UP (ref 1–3.3)
LYMPHOCYTES # BLD AUTO: 33.8 % — SIGNIFICANT CHANGE UP (ref 13–44)
MCHC RBC-ENTMCNC: 23.3 PG — LOW (ref 27–34)
MCHC RBC-ENTMCNC: 31.3 G/DL — LOW (ref 32–36)
MCV RBC AUTO: 74.3 FL — LOW (ref 80–100)
MONOCYTES # BLD AUTO: 0.65 K/UL — SIGNIFICANT CHANGE UP (ref 0–0.9)
MONOCYTES NFR BLD AUTO: 8.3 % — SIGNIFICANT CHANGE UP (ref 2–14)
NEUTROPHILS # BLD AUTO: 4.26 K/UL — SIGNIFICANT CHANGE UP (ref 1.8–7.4)
NEUTROPHILS NFR BLD AUTO: 54.2 % — SIGNIFICANT CHANGE UP (ref 43–77)
NRBC # BLD: 0 /100 WBCS — SIGNIFICANT CHANGE UP (ref 0–0)
PLATELET # BLD AUTO: 293 K/UL — SIGNIFICANT CHANGE UP (ref 150–400)
RBC # BLD: 5.29 M/UL — SIGNIFICANT CHANGE UP (ref 4.2–5.8)
RBC # FLD: 17.1 % — HIGH (ref 10.3–14.5)
WBC # BLD: 7.86 K/UL — SIGNIFICANT CHANGE UP (ref 3.8–10.5)
WBC # FLD AUTO: 7.86 K/UL — SIGNIFICANT CHANGE UP (ref 3.8–10.5)

## 2023-08-19 LAB
ALBUMIN SERPL ELPH-MCNC: 3.4 G/DL — SIGNIFICANT CHANGE UP (ref 3.3–5)
ALP SERPL-CCNC: 168 U/L — HIGH (ref 40–120)
ALT FLD-CCNC: 38 U/L — SIGNIFICANT CHANGE UP (ref 10–45)
ANION GAP SERPL CALC-SCNC: 17 MMOL/L — SIGNIFICANT CHANGE UP (ref 5–17)
AST SERPL-CCNC: 48 U/L — HIGH (ref 10–40)
BILIRUB SERPL-MCNC: 0.3 MG/DL — SIGNIFICANT CHANGE UP (ref 0.2–1.2)
BUN SERPL-MCNC: 10 MG/DL — SIGNIFICANT CHANGE UP (ref 7–23)
CALCIUM SERPL-MCNC: 9 MG/DL — SIGNIFICANT CHANGE UP (ref 8.4–10.5)
CEA SERPL-MCNC: 465 NG/ML — HIGH (ref 0–3.8)
CHLORIDE SERPL-SCNC: 105 MMOL/L — SIGNIFICANT CHANGE UP (ref 96–108)
CO2 SERPL-SCNC: 20 MMOL/L — LOW (ref 22–31)
CREAT SERPL-MCNC: 0.74 MG/DL — SIGNIFICANT CHANGE UP (ref 0.5–1.3)
EGFR: 104 ML/MIN/1.73M2 — SIGNIFICANT CHANGE UP
GLUCOSE SERPL-MCNC: 85 MG/DL — SIGNIFICANT CHANGE UP (ref 70–99)
MAGNESIUM SERPL-MCNC: 1.7 MG/DL — SIGNIFICANT CHANGE UP (ref 1.6–2.6)
POTASSIUM SERPL-MCNC: 3.7 MMOL/L — SIGNIFICANT CHANGE UP (ref 3.5–5.3)
POTASSIUM SERPL-SCNC: 3.7 MMOL/L — SIGNIFICANT CHANGE UP (ref 3.5–5.3)
PROT SERPL-MCNC: 6.4 G/DL — SIGNIFICANT CHANGE UP (ref 6–8.3)
SODIUM SERPL-SCNC: 142 MMOL/L — SIGNIFICANT CHANGE UP (ref 135–145)

## 2023-08-23 ENCOUNTER — APPOINTMENT (OUTPATIENT)
Dept: INFUSION THERAPY | Facility: HOSPITAL | Age: 58
End: 2023-08-23

## 2023-09-01 ENCOUNTER — APPOINTMENT (OUTPATIENT)
Dept: INFUSION THERAPY | Facility: HOSPITAL | Age: 58
End: 2023-09-01

## 2023-09-01 ENCOUNTER — RESULT REVIEW (OUTPATIENT)
Age: 58
End: 2023-09-01

## 2023-09-01 ENCOUNTER — APPOINTMENT (OUTPATIENT)
Dept: HEMATOLOGY ONCOLOGY | Facility: CLINIC | Age: 58
End: 2023-09-01
Payer: MEDICARE

## 2023-09-01 VITALS
HEART RATE: 104 BPM | BODY MASS INDEX: 20.91 KG/M2 | TEMPERATURE: 97.5 F | SYSTOLIC BLOOD PRESSURE: 119 MMHG | OXYGEN SATURATION: 99 % | RESPIRATION RATE: 16 BRPM | WEIGHT: 125.66 LBS | DIASTOLIC BLOOD PRESSURE: 86 MMHG

## 2023-09-01 DIAGNOSIS — K29.70 GASTRITIS, UNSPECIFIED, W/OUT BLEEDING: ICD-10-CM

## 2023-09-01 DIAGNOSIS — C18.9 MALIGNANT NEOPLASM OF COLON, UNSPECIFIED: ICD-10-CM

## 2023-09-01 DIAGNOSIS — C78.7 MALIGNANT NEOPLASM OF COLON, UNSPECIFIED: ICD-10-CM

## 2023-09-01 LAB
ALBUMIN SERPL ELPH-MCNC: 3.1 G/DL — LOW (ref 3.3–5)
ALP SERPL-CCNC: 356 U/L — HIGH (ref 40–120)
ALT FLD-CCNC: 92 U/L — HIGH (ref 10–45)
ANION GAP SERPL CALC-SCNC: 10 MMOL/L — SIGNIFICANT CHANGE UP (ref 5–17)
AST SERPL-CCNC: 120 U/L — HIGH (ref 10–40)
BASOPHILS # BLD AUTO: 0.04 K/UL — SIGNIFICANT CHANGE UP (ref 0–0.2)
BASOPHILS NFR BLD AUTO: 0.6 % — SIGNIFICANT CHANGE UP (ref 0–2)
BILIRUB SERPL-MCNC: 0.7 MG/DL — SIGNIFICANT CHANGE UP (ref 0.2–1.2)
BUN SERPL-MCNC: 9 MG/DL — SIGNIFICANT CHANGE UP (ref 7–23)
CALCIUM SERPL-MCNC: 8.7 MG/DL — SIGNIFICANT CHANGE UP (ref 8.4–10.5)
CHLORIDE SERPL-SCNC: 106 MMOL/L — SIGNIFICANT CHANGE UP (ref 96–108)
CO2 SERPL-SCNC: 25 MMOL/L — SIGNIFICANT CHANGE UP (ref 22–31)
CREAT SERPL-MCNC: 0.7 MG/DL — SIGNIFICANT CHANGE UP (ref 0.5–1.3)
EGFR: 105 ML/MIN/1.73M2 — SIGNIFICANT CHANGE UP
EOSINOPHIL # BLD AUTO: 0.31 K/UL — SIGNIFICANT CHANGE UP (ref 0–0.5)
EOSINOPHIL NFR BLD AUTO: 4.6 % — SIGNIFICANT CHANGE UP (ref 0–6)
GLUCOSE SERPL-MCNC: 136 MG/DL — HIGH (ref 70–99)
HCT VFR BLD CALC: 36.6 % — LOW (ref 39–50)
HGB BLD-MCNC: 11.8 G/DL — LOW (ref 13–17)
IMM GRANULOCYTES NFR BLD AUTO: 0.4 % — SIGNIFICANT CHANGE UP (ref 0–0.9)
LYMPHOCYTES # BLD AUTO: 1.68 K/UL — SIGNIFICANT CHANGE UP (ref 1–3.3)
LYMPHOCYTES # BLD AUTO: 24.9 % — SIGNIFICANT CHANGE UP (ref 13–44)
MAGNESIUM SERPL-MCNC: 1.7 MG/DL — SIGNIFICANT CHANGE UP (ref 1.6–2.6)
MCHC RBC-ENTMCNC: 23.3 PG — LOW (ref 27–34)
MCHC RBC-ENTMCNC: 32.2 G/DL — SIGNIFICANT CHANGE UP (ref 32–36)
MCV RBC AUTO: 72.3 FL — LOW (ref 80–100)
MONOCYTES # BLD AUTO: 0.72 K/UL — SIGNIFICANT CHANGE UP (ref 0–0.9)
MONOCYTES NFR BLD AUTO: 10.7 % — SIGNIFICANT CHANGE UP (ref 2–14)
NEUTROPHILS # BLD AUTO: 3.96 K/UL — SIGNIFICANT CHANGE UP (ref 1.8–7.4)
NEUTROPHILS NFR BLD AUTO: 58.8 % — SIGNIFICANT CHANGE UP (ref 43–77)
NRBC # BLD: 0 /100 WBCS — SIGNIFICANT CHANGE UP (ref 0–0)
PLATELET # BLD AUTO: 272 K/UL — SIGNIFICANT CHANGE UP (ref 150–400)
POTASSIUM SERPL-MCNC: 3.4 MMOL/L — LOW (ref 3.5–5.3)
POTASSIUM SERPL-SCNC: 3.4 MMOL/L — LOW (ref 3.5–5.3)
PROT SERPL-MCNC: 6.3 G/DL — SIGNIFICANT CHANGE UP (ref 6–8.3)
RBC # BLD: 5.06 M/UL — SIGNIFICANT CHANGE UP (ref 4.2–5.8)
RBC # FLD: 17.4 % — HIGH (ref 10.3–14.5)
SODIUM SERPL-SCNC: 141 MMOL/L — SIGNIFICANT CHANGE UP (ref 135–145)
WBC # BLD: 6.74 K/UL — SIGNIFICANT CHANGE UP (ref 3.8–10.5)
WBC # FLD AUTO: 6.74 K/UL — SIGNIFICANT CHANGE UP (ref 3.8–10.5)

## 2023-09-01 PROCEDURE — 99214 OFFICE O/P EST MOD 30 MIN: CPT

## 2023-09-01 RX ORDER — OMEPRAZOLE 40 MG/1
40 CAPSULE, DELAYED RELEASE ORAL
Qty: 90 | Refills: 0 | Status: ACTIVE | COMMUNITY
Start: 2023-09-01 | End: 1900-01-01

## 2023-09-05 PROBLEM — C18.9 ADENOCARCINOMA OF COLON METASTATIC TO LIVER: Status: ACTIVE | Noted: 2021-02-05

## 2023-09-05 NOTE — REVIEW OF SYSTEMS
[Negative] : Allergic/Immunologic [Fatigue] : fatigue [Skin Rash] : skin rash [Dysphagia] : no dysphagia [Hoarseness] : no hoarseness [Odynophagia] : no odynophagia [Mucosal Pain] : no mucosal pain [Abdominal Pain] : no abdominal pain [Vomiting] : no vomiting [Constipation] : no constipation [Dizziness] : no dizziness [Fainting] : no fainting [Difficulty Walking] : no difficulty walking [de-identified] : vitiligo; acne-like rash to face, chest and upper back

## 2023-09-05 NOTE — PHYSICAL EXAM
[Fully active, able to carry on all pre-disease performance without restriction] : Status 0 - Fully active, able to carry on all pre-disease performance without restriction [Thin] : thin [Normal] : affect appropriate [de-identified] : anicteric  [de-identified] : normal respiratory effort, no audible wheeze [de-identified] : reg rate  [de-identified] : no LE edema [de-identified] : + ostomy, soft, non-tender [de-identified] : vitiligo, grade1 maculopapular rash

## 2023-09-05 NOTE — HISTORY OF PRESENT ILLNESS
[Disease: _____________________] : Disease: [unfilled] [AJCC Stage: ____] : AJCC Stage: [unfilled] [de-identified] : 56 y/o M with PMH of vitiligo who developed abdominal discomfort which started 12/2020 and he was admitted on 1/4/2021 @ [facility] where he was found to have mass in distal TC with hepatic and RLL metastatic disease seen in CT scan.  He was discharged home with outpatient follow up.  He had syncope at home on 1/18/2021 and then saw PMD on 1/19/2021 where he was sent to hospital and admitted to [facility] from 1/19/2021 to 1/19/2021.   He underwent liver biopsy on 1/25/2021 which confirmed metastatic colon cancer.  His CEA was elevated 2882 from 1/21/2021.  His CT c/a/p from 1/28/2021 showed findings consistent with metastatic colon cancer evidenced by annular colonic mass and numerous hepatic metastases, groundglass pulmonary opacity consistent with infectious etiology, including Covid 19, improved compared to prior exam.    He presents to us on 2/5/2021 and states he feels stronger since leaving hospital but has lost about 22 pounds from December 2020 to Feb 2021 Given Stage IV disease, offered FOLFOX and reviewed palliative intent of treatment      Late Feb 2021 started FOLFOX  March 2021 admitted for LBO and underwent resection with ostomy   April 2021 restarted FOLFOX --> stopped oxali July 2021 April 2022 POD switched to FOLFIRI Day ( 5FU held 12/27/22 for possible coronary vasospasm then bolus 5FU for 1 month and after cardiac eval , infusional 5fu restart late Feb 2023 after cardiology optimization  July 2023 POD switched to Irino + vectibix (held FU due to coronary vasospasm) [de-identified] : pan andrea wt, transverse colon primary (right side) [de-identified] : IFTIKHAR, TMB 5, APC R232*APC *\par  GRM3 V271I, MAP2K1 (MEK1) K57E\par  TP53 S127P [FreeTextEntry1] : irino vectibix  [de-identified] : Patient presents for follow up and is scheduled for treatment today.  [place] recently [place] from [place].  He reports increased fatigue with this new treatment but is still able to perform his regular activities.  He admits to some rash to his face, chest and upper back, but denies pain or pruritis.  He denies fever, vomiting, diarrhea, constipation, anorexia or weight loss.

## 2023-09-13 ENCOUNTER — RESULT REVIEW (OUTPATIENT)
Age: 58
End: 2023-09-13

## 2023-09-13 ENCOUNTER — APPOINTMENT (OUTPATIENT)
Dept: HEMATOLOGY ONCOLOGY | Facility: CLINIC | Age: 58
End: 2023-09-13

## 2023-09-13 LAB
ANISOCYTOSIS BLD QL: SLIGHT — SIGNIFICANT CHANGE UP
BASOPHILS # BLD AUTO: 0 K/UL — SIGNIFICANT CHANGE UP (ref 0–0.2)
BASOPHILS NFR BLD AUTO: 0 % — SIGNIFICANT CHANGE UP (ref 0–2)
EOSINOPHIL # BLD AUTO: 0.07 K/UL — SIGNIFICANT CHANGE UP (ref 0–0.5)
EOSINOPHIL NFR BLD AUTO: 1 % — SIGNIFICANT CHANGE UP (ref 0–6)
HCT VFR BLD CALC: 37.4 % — LOW (ref 39–50)
HGB BLD-MCNC: 11.8 G/DL — LOW (ref 13–17)
LYMPHOCYTES # BLD AUTO: 1.63 K/UL — SIGNIFICANT CHANGE UP (ref 1–3.3)
LYMPHOCYTES # BLD AUTO: 23 % — SIGNIFICANT CHANGE UP (ref 13–44)
MCHC RBC-ENTMCNC: 23.3 PG — LOW (ref 27–34)
MCHC RBC-ENTMCNC: 31.6 G/DL — LOW (ref 32–36)
MCV RBC AUTO: 73.9 FL — LOW (ref 80–100)
MONOCYTES # BLD AUTO: 0.5 K/UL — SIGNIFICANT CHANGE UP (ref 0–0.9)
MONOCYTES NFR BLD AUTO: 7 % — SIGNIFICANT CHANGE UP (ref 2–14)
NEUTROPHILS # BLD AUTO: 4.89 K/UL — SIGNIFICANT CHANGE UP (ref 1.8–7.4)
NEUTROPHILS NFR BLD AUTO: 69 % — SIGNIFICANT CHANGE UP (ref 43–77)
NRBC # BLD: 0 /100 — SIGNIFICANT CHANGE UP (ref 0–0)
NRBC # BLD: SIGNIFICANT CHANGE UP /100 WBCS (ref 0–0)
PLAT MORPH BLD: NORMAL — SIGNIFICANT CHANGE UP
PLATELET # BLD AUTO: 412 K/UL — HIGH (ref 150–400)
POIKILOCYTOSIS BLD QL AUTO: SLIGHT — SIGNIFICANT CHANGE UP
POLYCHROMASIA BLD QL SMEAR: SLIGHT — SIGNIFICANT CHANGE UP
RBC # BLD: 5.06 M/UL — SIGNIFICANT CHANGE UP (ref 4.2–5.8)
RBC # FLD: 19.1 % — HIGH (ref 10.3–14.5)
RBC BLD AUTO: ABNORMAL
TARGETS BLD QL SMEAR: SLIGHT — SIGNIFICANT CHANGE UP
WBC # BLD: 7.09 K/UL — SIGNIFICANT CHANGE UP (ref 3.8–10.5)
WBC # FLD AUTO: 7.09 K/UL — SIGNIFICANT CHANGE UP (ref 3.8–10.5)

## 2023-09-14 LAB
ALBUMIN SERPL ELPH-MCNC: 3.4 G/DL
ALP BLD-CCNC: 559 U/L
ALT SERPL-CCNC: 60 U/L
ANION GAP SERPL CALC-SCNC: 12 MMOL/L
AST SERPL-CCNC: 69 U/L
BILIRUB SERPL-MCNC: 1.4 MG/DL
BUN SERPL-MCNC: 7 MG/DL
CALCIUM SERPL-MCNC: 8.9 MG/DL
CHLORIDE SERPL-SCNC: 105 MMOL/L
CO2 SERPL-SCNC: 23 MMOL/L
CREAT SERPL-MCNC: 0.63 MG/DL
EGFR: 109 ML/MIN/1.73M2
GLUCOSE SERPL-MCNC: 141 MG/DL
POTASSIUM SERPL-SCNC: 4.2 MMOL/L
PROT SERPL-MCNC: 6.5 G/DL
SODIUM SERPL-SCNC: 140 MMOL/L

## 2023-09-15 ENCOUNTER — RESULT REVIEW (OUTPATIENT)
Age: 58
End: 2023-09-15

## 2023-09-15 ENCOUNTER — APPOINTMENT (OUTPATIENT)
Dept: HEMATOLOGY ONCOLOGY | Facility: CLINIC | Age: 58
End: 2023-09-15

## 2023-09-15 ENCOUNTER — APPOINTMENT (OUTPATIENT)
Dept: INFUSION THERAPY | Facility: HOSPITAL | Age: 58
End: 2023-09-15

## 2023-09-15 LAB
ALBUMIN SERPL ELPH-MCNC: 3.1 G/DL — LOW (ref 3.3–5)
ALP SERPL-CCNC: 500 U/L — HIGH (ref 40–120)
ALT FLD-CCNC: 62 U/L — HIGH (ref 10–45)
ANION GAP SERPL CALC-SCNC: 14 MMOL/L — SIGNIFICANT CHANGE UP (ref 5–17)
ANISOCYTOSIS BLD QL: SLIGHT — SIGNIFICANT CHANGE UP
AST SERPL-CCNC: 93 U/L — HIGH (ref 10–40)
BASOPHILS # BLD AUTO: 0.2 K/UL — SIGNIFICANT CHANGE UP (ref 0–0.2)
BASOPHILS NFR BLD AUTO: 3 % — HIGH (ref 0–2)
BILIRUB SERPL-MCNC: 1.2 MG/DL — SIGNIFICANT CHANGE UP (ref 0.2–1.2)
BUN SERPL-MCNC: 10 MG/DL — SIGNIFICANT CHANGE UP (ref 7–23)
CALCIUM SERPL-MCNC: 9.1 MG/DL — SIGNIFICANT CHANGE UP (ref 8.4–10.5)
CEA SERPL-MCNC: 530 NG/ML — HIGH (ref 0–3.8)
CHLORIDE SERPL-SCNC: 104 MMOL/L — SIGNIFICANT CHANGE UP (ref 96–108)
CO2 SERPL-SCNC: 23 MMOL/L — SIGNIFICANT CHANGE UP (ref 22–31)
CREAT SERPL-MCNC: 0.57 MG/DL — SIGNIFICANT CHANGE UP (ref 0.5–1.3)
EGFR: 112 ML/MIN/1.73M2 — SIGNIFICANT CHANGE UP
EOSINOPHIL # BLD AUTO: 0.34 K/UL — SIGNIFICANT CHANGE UP (ref 0–0.5)
EOSINOPHIL NFR BLD AUTO: 5 % — SIGNIFICANT CHANGE UP (ref 0–6)
GIANT PLATELETS BLD QL SMEAR: PRESENT — SIGNIFICANT CHANGE UP
GLUCOSE SERPL-MCNC: 176 MG/DL — HIGH (ref 70–99)
HCT VFR BLD CALC: 35.5 % — LOW (ref 39–50)
HGB BLD-MCNC: 11.5 G/DL — LOW (ref 13–17)
LG PLATELETS BLD QL AUTO: SLIGHT — SIGNIFICANT CHANGE UP
LYMPHOCYTES # BLD AUTO: 1.22 K/UL — SIGNIFICANT CHANGE UP (ref 1–3.3)
LYMPHOCYTES # BLD AUTO: 18 % — SIGNIFICANT CHANGE UP (ref 13–44)
MAGNESIUM SERPL-MCNC: 1.6 MG/DL — SIGNIFICANT CHANGE UP (ref 1.6–2.6)
MCHC RBC-ENTMCNC: 23.6 PG — LOW (ref 27–34)
MCHC RBC-ENTMCNC: 32.4 G/DL — SIGNIFICANT CHANGE UP (ref 32–36)
MCV RBC AUTO: 72.9 FL — LOW (ref 80–100)
MICROCYTES BLD QL: SLIGHT — SIGNIFICANT CHANGE UP
MONOCYTES # BLD AUTO: 0.61 K/UL — SIGNIFICANT CHANGE UP (ref 0–0.9)
MONOCYTES NFR BLD AUTO: 9 % — SIGNIFICANT CHANGE UP (ref 2–14)
NEUTROPHILS # BLD AUTO: 4.39 K/UL — SIGNIFICANT CHANGE UP (ref 1.8–7.4)
NEUTROPHILS NFR BLD AUTO: 65 % — SIGNIFICANT CHANGE UP (ref 43–77)
NRBC # BLD: 0 /100 — SIGNIFICANT CHANGE UP (ref 0–0)
NRBC # BLD: SIGNIFICANT CHANGE UP /100 WBCS (ref 0–0)
PLAT MORPH BLD: ABNORMAL
PLATELET # BLD AUTO: 377 K/UL — SIGNIFICANT CHANGE UP (ref 150–400)
POIKILOCYTOSIS BLD QL AUTO: SLIGHT — SIGNIFICANT CHANGE UP
POLYCHROMASIA BLD QL SMEAR: SLIGHT — SIGNIFICANT CHANGE UP
POTASSIUM SERPL-MCNC: 4.2 MMOL/L — SIGNIFICANT CHANGE UP (ref 3.5–5.3)
POTASSIUM SERPL-SCNC: 4.2 MMOL/L — SIGNIFICANT CHANGE UP (ref 3.5–5.3)
PROT SERPL-MCNC: 6.8 G/DL — SIGNIFICANT CHANGE UP (ref 6–8.3)
RBC # BLD: 4.87 M/UL — SIGNIFICANT CHANGE UP (ref 4.2–5.8)
RBC # FLD: 19.1 % — HIGH (ref 10.3–14.5)
RBC BLD AUTO: ABNORMAL
SODIUM SERPL-SCNC: 141 MMOL/L — SIGNIFICANT CHANGE UP (ref 135–145)
TARGETS BLD QL SMEAR: SLIGHT — SIGNIFICANT CHANGE UP
WBC # BLD: 6.76 K/UL — SIGNIFICANT CHANGE UP (ref 3.8–10.5)
WBC # FLD AUTO: 6.76 K/UL — SIGNIFICANT CHANGE UP (ref 3.8–10.5)

## 2023-09-18 DIAGNOSIS — E86.0 DEHYDRATION: ICD-10-CM

## 2023-09-19 ENCOUNTER — APPOINTMENT (OUTPATIENT)
Dept: CT IMAGING | Facility: IMAGING CENTER | Age: 58
End: 2023-09-19
Payer: MEDICARE

## 2023-09-19 ENCOUNTER — OUTPATIENT (OUTPATIENT)
Dept: OUTPATIENT SERVICES | Facility: HOSPITAL | Age: 58
LOS: 1 days | End: 2023-09-19
Payer: MEDICARE

## 2023-09-19 ENCOUNTER — APPOINTMENT (OUTPATIENT)
Dept: MRI IMAGING | Facility: IMAGING CENTER | Age: 58
End: 2023-09-19
Payer: MEDICARE

## 2023-09-19 DIAGNOSIS — C18.9 MALIGNANT NEOPLASM OF COLON, UNSPECIFIED: ICD-10-CM

## 2023-09-19 PROCEDURE — A9585: CPT

## 2023-09-19 PROCEDURE — 74183 MRI ABD W/O CNTR FLWD CNTR: CPT | Mod: 26,MH

## 2023-09-19 PROCEDURE — 74183 MRI ABD W/O CNTR FLWD CNTR: CPT

## 2023-09-19 PROCEDURE — 71260 CT THORAX DX C+: CPT | Mod: 26,MH

## 2023-09-19 PROCEDURE — 71260 CT THORAX DX C+: CPT

## 2023-09-25 ENCOUNTER — INPATIENT (INPATIENT)
Facility: HOSPITAL | Age: 58
LOS: 4 days | Discharge: ANOTHER HOSPITAL | End: 2023-09-30
Attending: INTERNAL MEDICINE | Admitting: INTERNAL MEDICINE
Payer: MEDICARE

## 2023-09-25 VITALS
SYSTOLIC BLOOD PRESSURE: 113 MMHG | RESPIRATION RATE: 16 BRPM | OXYGEN SATURATION: 98 % | HEART RATE: 141 BPM | DIASTOLIC BLOOD PRESSURE: 78 MMHG | HEIGHT: 67 IN | TEMPERATURE: 98 F | WEIGHT: 119.93 LBS

## 2023-09-25 PROCEDURE — 99291 CRITICAL CARE FIRST HOUR: CPT

## 2023-09-25 PROCEDURE — 71045 X-RAY EXAM CHEST 1 VIEW: CPT | Mod: 26

## 2023-09-25 PROCEDURE — 93010 ELECTROCARDIOGRAM REPORT: CPT

## 2023-09-25 RX ORDER — IOHEXOL 300 MG/ML
30 INJECTION, SOLUTION INTRAVENOUS ONCE
Refills: 0 | Status: COMPLETED | OUTPATIENT
Start: 2023-09-25 | End: 2023-09-25

## 2023-09-25 RX ORDER — ACETAMINOPHEN 500 MG
975 TABLET ORAL ONCE
Refills: 0 | Status: COMPLETED | OUTPATIENT
Start: 2023-09-25 | End: 2023-09-25

## 2023-09-25 RX ORDER — SODIUM CHLORIDE 9 MG/ML
1700 INJECTION INTRAMUSCULAR; INTRAVENOUS; SUBCUTANEOUS ONCE
Refills: 0 | Status: COMPLETED | OUTPATIENT
Start: 2023-09-25 | End: 2023-09-25

## 2023-09-25 RX ORDER — PIPERACILLIN AND TAZOBACTAM 4; .5 G/20ML; G/20ML
3.38 INJECTION, POWDER, LYOPHILIZED, FOR SOLUTION INTRAVENOUS ONCE
Refills: 0 | Status: COMPLETED | OUTPATIENT
Start: 2023-09-25 | End: 2023-09-25

## 2023-09-25 RX ADMIN — PIPERACILLIN AND TAZOBACTAM 200 GRAM(S): 4; .5 INJECTION, POWDER, LYOPHILIZED, FOR SOLUTION INTRAVENOUS at 23:32

## 2023-09-25 RX ADMIN — IOHEXOL 30 MILLILITER(S): 300 INJECTION, SOLUTION INTRAVENOUS at 23:32

## 2023-09-25 RX ADMIN — SODIUM CHLORIDE 1700 MILLILITER(S): 9 INJECTION INTRAMUSCULAR; INTRAVENOUS; SUBCUTANEOUS at 23:33

## 2023-09-25 RX ADMIN — Medication 975 MILLIGRAM(S): at 23:32

## 2023-09-25 NOTE — ED PROVIDER NOTE - OBJECTIVE STATEMENT
57 yo M with fever, general weakness, decreased appetite for 2 days, wife noticed jaundice start today.  Pt. denies inciting event, although he notes history of stage IV colon CA, on chemo.  Pt. denies infectious symptoms, but also admits to sepsis in past.  No other complaints or focal symptoms.    ROS: negative for cough, headache, chest pain, shortness of breath, abd pain, nausea, vomiting, diarrhea, rash, paresthesia, and focal weakness--all other systems reviewed are negative.   PMH: metastatic colon cancer on chemo, LBO s/p stenting by GI, laparoscopic partial colectomy, colostomy creation; Meds: Denies; SH: Denies smoking/drinking/drug use  heme/onc: Dr. Tu Walker [facility] 59 yo M with fever, general weakness, decreased appetite for 2 days, wife noticed jaundice start today.  Pt. denies inciting event, although he notes history of stage IV colon CA, on chemo.  Pt. denies infectious symptoms, but also admits to sepsis in past.  No other complaints or focal symptoms.  Temp measured at 99.9 at home for last couple of days, pt. has been taking tylenol prn.    ROS: negative for cough, headache, chest pain, shortness of breath, abd pain, nausea, vomiting, diarrhea, rash, paresthesia, and focal weakness--all other systems reviewed are negative.   PMH: metastatic colon cancer on chemo (Avastin, Irinotecan, Leucovorin, and 5FU CADD), LBO s/p stenting by GI, laparoscopic partial colectomy, colostomy creation; Meds: Denies; SH: Denies smoking/drinking/drug use  heme/onc: Dr. Tu ELAINE [facility]

## 2023-09-25 NOTE — ED PROVIDER NOTE - PROGRESS NOTE DETAILS
WBCs and lactate elevated, CT shows pancreatitis and worsening metastatic dz  will admit for further care, ?sepsis without source, unremarkable XR chest, no source of infection elucidated on CT thus far; endorsed to Dr. Weiss for admission

## 2023-09-25 NOTE — ED ADULT NURSE NOTE - OBJECTIVE STATEMENT
Pt  A&O x4 presents to ED c/o  fever, chills, runny nose, weakness and cough x 3 day. Pt states he noticed his eyes and skin were yellow and his weakness got worst today. Pt skin and eyes observed to be yellow  Pt also states that he has stage 4 colon cancer  last chemo  was on 9/22. Chemo access port to R-chest wall and ostomy bag in place. . Pt denies N/V, diarrhea or urinary symptoms, speaking in clear complete sentences.

## 2023-09-25 NOTE — ED PROVIDER NOTE - CLINICAL SUMMARY MEDICAL DECISION MAKING FREE TEXT BOX
59 yo M with fever, tachycardia, jaundice on chemo, concerning for sepsis, biliary obstruction, bacteremia, pna, uti, doubt obstruction  -cbc, cmp, cpk, blood cx, ua, cx, lactate, ldh, phos, rvp, coags, retic count, trop, CT ab/pel, US RUQ, CXR, EKG, iv hydration, zosyn coverage for sepsis, hydration bolus for sepsis, tylenol for fever, monitor  -f/u results, reeval 59 yo M with fever, tachycardia, jaundice on chemo, concerning for sepsis, biliary obstruction, bacteremia, pna, uti, doubt obstruction  -cbc, cmp, cpk, blood cx, ua, cx, lactate, ldh, phos, rvp, coags, retic count, trop, CT ab/pel, US RUQ, CXR, EKG, iv hydration, zosyn coverage for sepsis, hydration bolus for sepsis, tylenol for fever, monitor  -chart review shows sept 23 MR w/wo performed as outpt. to check mets:  "Progression of disease in the liver and lungs compared with prior imaging   7/8/2023.  New mild to moderate biliary ductal dilatation in the periphery of the   right hepatic lobe and mild intrahepatic biliary ductal dilatation in the   periphery of the left hepatic lobe lateral segment, new from prior   imaging"  -f/u results, reeval

## 2023-09-25 NOTE — ED ADULT NURSE NOTE - NSFALLUNIVINTERV_ED_ALL_ED
Bed/Stretcher in lowest position, wheels locked, appropriate side rails in place/Call bell, personal items and telephone in reach/Instruct patient to call for assistance before getting out of bed/chair/stretcher/Non-slip footwear applied when patient is off stretcher/[place] to call system/Physically safe environment - no spills, clutter or unnecessary equipment/Purposeful proactive rounding/Room/bathroom lighting operational, light cord in reach

## 2023-09-25 NOTE — ED ADULT NURSE NOTE - ED STAT RN HANDOFF DETAILS
Handoff report given to JAMIR Sy. Pt AOx4, respirations appear equal and unlabored, NAD noted. Safety measures maintained.

## 2023-09-25 NOTE — ED PROVIDER NOTE - CARE PLAN
1 Principal Discharge DX:	Sepsis  Secondary Diagnosis:	Jaundice   Principal Discharge DX:	Sepsis  Secondary Diagnosis:	Jaundice  Secondary Diagnosis:	Metastatic colon cancer to liver  Secondary Diagnosis:	Pancreatitis

## 2023-09-25 NOTE — ED ADULT TRIAGE NOTE - MEANS OF ARRIVAL
Secure and In person apt with Marianela Mendez as long as all covid screening questions are negative    ambulatory

## 2023-09-25 NOTE — ED ADULT TRIAGE NOTE - CHIEF COMPLAINT QUOTE
BIBA,  fever, chills, cough x 3 days", pt noticed his eye are yellow, today.  +jaundice noted in eyes  "I have stage 4 colon Ca".  pt took tylenol 500mg.  last chemo  9/22,  port R-chest wall

## 2023-09-26 DIAGNOSIS — R17 UNSPECIFIED JAUNDICE: ICD-10-CM

## 2023-09-26 DIAGNOSIS — A41.9 SEPSIS, UNSPECIFIED ORGANISM: ICD-10-CM

## 2023-09-26 DIAGNOSIS — K85.90 ACUTE PANCREATITIS WITHOUT NECROSIS OR INFECTION, UNSPECIFIED: ICD-10-CM

## 2023-09-26 LAB
ALBUMIN SERPL ELPH-MCNC: 2 G/DL — LOW (ref 3.3–5)
ALP SERPL-CCNC: 706 U/L — HIGH (ref 40–120)
ALT FLD-CCNC: 136 U/L — HIGH (ref 12–78)
AMMONIA BLD-MCNC: 37 UMOL/L — HIGH (ref 11–32)
ANION GAP SERPL CALC-SCNC: 8 MMOL/L — SIGNIFICANT CHANGE UP (ref 5–17)
ANISOCYTOSIS BLD QL: SIGNIFICANT CHANGE UP
APPEARANCE UR: CLEAR — SIGNIFICANT CHANGE UP
APTT BLD: 36.6 SEC — HIGH (ref 24.5–35.6)
AST SERPL-CCNC: 227 U/L — HIGH (ref 15–37)
BACTERIA # UR AUTO: ABNORMAL
BASOPHILS # BLD AUTO: 0 K/UL — SIGNIFICANT CHANGE UP (ref 0–0.2)
BASOPHILS NFR BLD AUTO: 0 % — SIGNIFICANT CHANGE UP (ref 0–2)
BILIRUB DIRECT SERPL-MCNC: 11.7 MG/DL — HIGH (ref 0–0.3)
BILIRUB INDIRECT FLD-MCNC: 2.1 MG/DL — HIGH (ref 0.2–1)
BILIRUB SERPL-MCNC: 13.5 MG/DL — HIGH (ref 0.2–1.2)
BILIRUB SERPL-MCNC: 13.8 MG/DL — HIGH (ref 0.2–1.2)
BILIRUB UR-MCNC: ABNORMAL
BUN SERPL-MCNC: 14 MG/DL — SIGNIFICANT CHANGE UP (ref 7–23)
CALCIUM SERPL-MCNC: 8.4 MG/DL — LOW (ref 8.5–10.1)
CHLORIDE SERPL-SCNC: 102 MMOL/L — SIGNIFICANT CHANGE UP (ref 96–108)
CK SERPL-CCNC: 144 U/L — SIGNIFICANT CHANGE UP (ref 26–308)
CO2 SERPL-SCNC: 26 MMOL/L — SIGNIFICANT CHANGE UP (ref 22–31)
COLOR SPEC: YELLOW — SIGNIFICANT CHANGE UP
CREAT SERPL-MCNC: 0.91 MG/DL — SIGNIFICANT CHANGE UP (ref 0.5–1.3)
DACRYOCYTES BLD QL SMEAR: SLIGHT — SIGNIFICANT CHANGE UP
DIFF PNL FLD: ABNORMAL
EGFR: 96 ML/MIN/1.73M2 — SIGNIFICANT CHANGE UP
EOSINOPHIL # BLD AUTO: 0.29 K/UL — SIGNIFICANT CHANGE UP (ref 0–0.5)
EOSINOPHIL NFR BLD AUTO: 2 % — SIGNIFICANT CHANGE UP (ref 0–6)
EPI CELLS # UR: SIGNIFICANT CHANGE UP
GLUCOSE SERPL-MCNC: 152 MG/DL — HIGH (ref 70–99)
GLUCOSE UR QL: NEGATIVE MG/DL — SIGNIFICANT CHANGE UP
HCT VFR BLD CALC: 33 % — LOW (ref 39–50)
HGB BLD-MCNC: 10.5 G/DL — LOW (ref 13–17)
HYPOCHROMIA BLD QL: SLIGHT — SIGNIFICANT CHANGE UP
INR BLD: 1.64 RATIO — HIGH (ref 0.85–1.18)
KETONES UR-MCNC: NEGATIVE — SIGNIFICANT CHANGE UP
LACTATE SERPL-SCNC: 2.3 MMOL/L — HIGH (ref 0.7–2)
LACTATE SERPL-SCNC: 3.5 MMOL/L — HIGH (ref 0.7–2)
LDH SERPL L TO P-CCNC: 966 U/L — HIGH (ref 50–242)
LEUKOCYTE ESTERASE UR-ACNC: NEGATIVE — SIGNIFICANT CHANGE UP
LYMPHOCYTES # BLD AUTO: 1.6 K/UL — SIGNIFICANT CHANGE UP (ref 1–3.3)
LYMPHOCYTES # BLD AUTO: 11 % — LOW (ref 13–44)
MANUAL SMEAR VERIFICATION: SIGNIFICANT CHANGE UP
MCHC RBC-ENTMCNC: 23.4 PG — LOW (ref 27–34)
MCHC RBC-ENTMCNC: 31.8 G/DL — LOW (ref 32–36)
MCV RBC AUTO: 73.5 FL — LOW (ref 80–100)
MICROCYTES BLD QL: SIGNIFICANT CHANGE UP
MONOCYTES # BLD AUTO: 0.87 K/UL — SIGNIFICANT CHANGE UP (ref 0–0.9)
MONOCYTES NFR BLD AUTO: 6 % — SIGNIFICANT CHANGE UP (ref 2–14)
NEUTROPHILS # BLD AUTO: 11.75 K/UL — HIGH (ref 1.8–7.4)
NEUTROPHILS NFR BLD AUTO: 78 % — HIGH (ref 43–77)
NEUTS BAND # BLD: 3 % — SIGNIFICANT CHANGE UP (ref 0–8)
NITRITE UR-MCNC: NEGATIVE — SIGNIFICANT CHANGE UP
NRBC # BLD: 0 /100 — SIGNIFICANT CHANGE UP (ref 0–0)
NRBC # BLD: SIGNIFICANT CHANGE UP /100 WBCS (ref 0–0)
PH UR: 7 — SIGNIFICANT CHANGE UP (ref 5–8)
PHOSPHATE SERPL-MCNC: 2.3 MG/DL — LOW (ref 2.5–4.5)
PLAT MORPH BLD: NORMAL — SIGNIFICANT CHANGE UP
PLATELET # BLD AUTO: 304 K/UL — SIGNIFICANT CHANGE UP (ref 150–400)
POIKILOCYTOSIS BLD QL AUTO: SIGNIFICANT CHANGE UP
POTASSIUM SERPL-MCNC: 3.6 MMOL/L — SIGNIFICANT CHANGE UP (ref 3.5–5.3)
POTASSIUM SERPL-SCNC: 3.6 MMOL/L — SIGNIFICANT CHANGE UP (ref 3.5–5.3)
PROT SERPL-MCNC: 7.9 GM/DL — SIGNIFICANT CHANGE UP (ref 6–8.3)
PROT UR-MCNC: 30 MG/DL
PROTHROM AB SERPL-ACNC: 19.4 SEC — HIGH (ref 9.5–13)
RAPID RVP RESULT: SIGNIFICANT CHANGE UP
RBC # BLD: 4.49 M/UL — SIGNIFICANT CHANGE UP (ref 4.2–5.8)
RBC # BLD: 4.95 M/UL — SIGNIFICANT CHANGE UP (ref 4.2–5.8)
RBC # FLD: 22 % — HIGH (ref 10.3–14.5)
RBC BLD AUTO: ABNORMAL
RBC CASTS # UR COMP ASSIST: ABNORMAL /HPF (ref 0–4)
RETICS #: 139.1 K/UL — HIGH (ref 25–125)
RETICS/RBC NFR: 2.8 % — HIGH (ref 0.5–2.5)
SARS-COV-2 RNA SPEC QL NAA+PROBE: SIGNIFICANT CHANGE UP
SODIUM SERPL-SCNC: 136 MMOL/L — SIGNIFICANT CHANGE UP (ref 135–145)
SP GR SPEC: 1 — LOW (ref 1.01–1.02)
TARGETS BLD QL SMEAR: SIGNIFICANT CHANGE UP
TROPONIN I, HIGH SENSITIVITY RESULT: 5 NG/L — SIGNIFICANT CHANGE UP
UROBILINOGEN FLD QL: 4 MG/DL
WBC # BLD: 14.5 K/UL — HIGH (ref 3.8–10.5)
WBC # FLD AUTO: 14.5 K/UL — HIGH (ref 3.8–10.5)
WBC UR QL: SIGNIFICANT CHANGE UP

## 2023-09-26 PROCEDURE — 76705 ECHO EXAM OF ABDOMEN: CPT | Mod: 26

## 2023-09-26 PROCEDURE — 74177 CT ABD & PELVIS W/CONTRAST: CPT | Mod: 26,MA

## 2023-09-26 PROCEDURE — 99233 SBSQ HOSP IP/OBS HIGH 50: CPT

## 2023-09-26 RX ORDER — ACETAMINOPHEN 500 MG
650 TABLET ORAL EVERY 6 HOURS
Refills: 0 | Status: DISCONTINUED | OUTPATIENT
Start: 2023-09-26 | End: 2023-09-30

## 2023-09-26 RX ORDER — INFLUENZA VIRUS VACCINE 15; 15; 15; 15 UG/.5ML; UG/.5ML; UG/.5ML; UG/.5ML
0.5 SUSPENSION INTRAMUSCULAR ONCE
Refills: 0 | Status: DISCONTINUED | OUTPATIENT
Start: 2023-09-26 | End: 2023-09-30

## 2023-09-26 RX ORDER — ISOSORBIDE MONONITRATE 60 MG/1
30 TABLET, EXTENDED RELEASE ORAL DAILY
Refills: 0 | Status: DISCONTINUED | OUTPATIENT
Start: 2023-09-26 | End: 2023-09-30

## 2023-09-26 RX ORDER — ONDANSETRON 8 MG/1
4 TABLET, FILM COATED ORAL EVERY 8 HOURS
Refills: 0 | Status: DISCONTINUED | OUTPATIENT
Start: 2023-09-26 | End: 2023-09-30

## 2023-09-26 RX ORDER — LANOLIN ALCOHOL/MO/W.PET/CERES
3 CREAM (GRAM) TOPICAL AT BEDTIME
Refills: 0 | Status: DISCONTINUED | OUTPATIENT
Start: 2023-09-26 | End: 2023-09-30

## 2023-09-26 RX ORDER — NIFEDIPINE 30 MG
30 TABLET, EXTENDED RELEASE 24 HR ORAL
Refills: 0 | Status: DISCONTINUED | OUTPATIENT
Start: 2023-09-26 | End: 2023-09-30

## 2023-09-26 RX ORDER — HEPARIN SODIUM 5000 [USP'U]/ML
5000 INJECTION INTRAVENOUS; SUBCUTANEOUS EVERY 12 HOURS
Refills: 0 | Status: DISCONTINUED | OUTPATIENT
Start: 2023-09-26 | End: 2023-09-30

## 2023-09-26 RX ORDER — SODIUM CHLORIDE 9 MG/ML
1000 INJECTION INTRAMUSCULAR; INTRAVENOUS; SUBCUTANEOUS
Refills: 0 | Status: DISCONTINUED | OUTPATIENT
Start: 2023-09-26 | End: 2023-09-28

## 2023-09-26 RX ADMIN — SODIUM CHLORIDE 150 MILLILITER(S): 9 INJECTION INTRAMUSCULAR; INTRAVENOUS; SUBCUTANEOUS at 10:57

## 2023-09-26 RX ADMIN — HEPARIN SODIUM 5000 UNIT(S): 5000 INJECTION INTRAVENOUS; SUBCUTANEOUS at 17:01

## 2023-09-26 RX ADMIN — ISOSORBIDE MONONITRATE 30 MILLIGRAM(S): 60 TABLET, EXTENDED RELEASE ORAL at 10:55

## 2023-09-26 RX ADMIN — Medication 650 MILLIGRAM(S): at 22:06

## 2023-09-26 RX ADMIN — Medication 650 MILLIGRAM(S): at 21:06

## 2023-09-26 RX ADMIN — SODIUM CHLORIDE 150 MILLILITER(S): 9 INJECTION INTRAMUSCULAR; INTRAVENOUS; SUBCUTANEOUS at 21:07

## 2023-09-26 RX ADMIN — Medication 975 MILLIGRAM(S): at 00:32

## 2023-09-26 RX ADMIN — HEPARIN SODIUM 5000 UNIT(S): 5000 INJECTION INTRAVENOUS; SUBCUTANEOUS at 05:57

## 2023-09-26 NOTE — PROGRESS NOTE ADULT - ASSESSMENT
59 yo Male with a PMH of metastatic colon cancer on chemo (Avastin, Irinotecan, Leucovorin, and 5FU CADD), LBO s/p stenting by GI, laparoscopic partial colectomy, colostomy presents to the ED with a 3 days history of  fever (T-max 99.9), general weakness, decreased appetite with new onset of jaundice started today.   < from: CT Abdomen and Pelvis w/ Oral Cont and w/ IV Cont (09.26.23 @ 01:31) >  Peripancreatic edema suspicious for acute pancreatitis, new compared to  prior. While less likely this could also represent noninflammatory edema/third spacing of fluid.  Small amount of ascites is new. Otherwise similar to prior with a large number of hepatic metastases,  intrahepatic peripheral biliary ductal dilation in the right greater than  left lobes, partially visible lung metastases, and likely metastatic mild  upper abdominal and partially visible mediastinal adenopathy. --- End of Report ---    < end of copied text >    # Metastatic Colon Cancer - worsening jaundice, TB now 13.  CT showing intrahepatic peripheral biliary ductal dilation.  I discussed with Zee MARQUEZ with Dr. Tu Byers, pt's oncologist.  Transfer to [facility] for further evaluation by GI / advanced endoscopy.  Pt in agreement.  Monitor LFTs.  He is aware of his diagnosis.    # ? Acute Pancreatitis - Clinically not symptomatic.  Monitor LFTs.  Advance diet.    # Essential HTN - Monitor BP.  Continue antihypertensives.  # Inpatient DVT prophylaxis - subcutaneous Heparin     Medically stable for transfer to [facility] for evaluation by Oncology Dr. Tu Byers and GI / advanced endoscopy.    d/w Transfer center.

## 2023-09-26 NOTE — H&P ADULT - NSHPPHYSICALEXAM_GEN_ALL_CORE
Gen: AAOx3, NAD,  +jaundiced skin and conjunctiva  Head: ncat, perrla, eomi b/l  Neck: supple, no lymphadenopathy, no midline deviation  Heart: rrr, no m/r/g  Lungs: CTA b/l, no rales/ronchi/wheezes  Abd: soft, nontender, non-distended, no rebound or guarding, LLQ ostomy intact, produced stool   Ext: no clubbing/cyanosis/edema  Neuro: sensation and muscle strength intact b/l

## 2023-09-26 NOTE — H&P ADULT - NSHPLABSRESULTS_GEN_ALL_CORE
10.5   14.50 )-----------( 304      ( 25 Sep 2023 23:55 )             33.0         136  |  102  |  14  ----------------------------<  152<H>  3.6   |  26  |  0.91    Ca    8.4<L>      25 Sep 2023 23:55  Phos  2.3         TPro  7.9  /  Alb  2.0<L>  /  TBili  13.5<H>  /  DBili  11.7<H>  /  AST  227<H>  /  ALT  136<H>  /  AlkPhos  706<H>      PT/INR - ( 25 Sep 2023 23:55 )   PT: 19.4 sec;   INR: 1.64 ratio         PTT - ( 25 Sep 2023 23:55 )  PTT:36.6 sec  Urinalysis Basic - ( 26 Sep 2023 03:54 )    Color: Yellow / Appearance: Clear / S.005 / pH: x  Gluc: x / Ketone: Negative  / Bili: Large / Urobili: 4 mg/dL   Blood: x / Protein: 30 mg/dL / Nitrite: Negative   Leuk Esterase: Negative / RBC: 3-5 /HPF / WBC 0-2   Sq Epi: x / Non Sq Epi: x / Bacteria: Few    Ct-Abd   Peripancreatic edema suspicious for acute pancreatitis, new compared to   prior. While less likely this could also represent noninflammatory   edema/third spacing of fluid.    Small amount of ascites is new.    Otherwise similar to prior with a large number of hepatic metastases,   intrahepatic peripheral biliary ductal dilation in the right greater than   left lobes, partially visible lung metastases, and likely metastatic mild   upper abdominal and partially visible mediastinal adenopathy.

## 2023-09-26 NOTE — H&P ADULT - PROBLEM SELECTOR PLAN 1
Asymptomatic - Incidental finding   CT abd. Peripancreatic edema suspicious for acute pancreatitis, new compared to prior.   Received Zosyn in the ED   - IVF   - Monitor

## 2023-09-26 NOTE — ED ADULT NURSE REASSESSMENT NOTE - NS ED NURSE REASSESS COMMENT FT1
Pt awake in stretcher, A&O x4 connected to cardiac monitor. No acute distress noted and no complaints voiced at this time. Safety maintained. Assessment ongoing.

## 2023-09-26 NOTE — PATIENT PROFILE ADULT - FALL HARM RISK - HARM RISK INTERVENTIONS

## 2023-09-26 NOTE — H&P ADULT - PROBLEM SELECTOR PLAN 2
CT-abd: Small amount of ascites is new.  Otherwise similar to prior with a large number of hepatic metastases, intrahepatic peripheral biliary ductal dilation in the right greater than left lobes, partially visible lung metastases, and likely metastatic mild upper abdominal and partially visible mediastinal adenopathy.    New onset   - Hepatitis panel   - GI- Consult ( needs to be initiated)

## 2023-09-26 NOTE — ED ADULT NURSE REASSESSMENT NOTE - NS ED NURSE REASSESS COMMENT FT1
Pt asleep in stretcher equal rise and fall of chest wall noted breathing unlabored. Pt connected to cardiac monitor no complaints voiced at this time. Safety maintained. Assessment ongoing.

## 2023-09-26 NOTE — H&P ADULT - ASSESSMENT
57 yo Male with a PMH of metastatic colon cancer on chemo (Avastin, Irinotecan, Leucovorin, and 5FU CADD), LBO s/p stenting by GI, laparoscopic partial colectomy, colostomy presents to the ED with a 3 days history of  fever (T-max 99.9), general weakness, decreased appetite with new onset of jaundice started today. Upon evaluation patient is AAOx3, no acute distress, denies abd pain, N/V/D, chest pain, palpitation, focal weaknesses or any other complaints. In the Ed patient met Sepsis criteria - WBC 14.5, lactate 2.3, tachycardiac, +source. CT abd. Peripancreatic edema suspicious for acute pancreatitis, new compared to prior.

## 2023-09-26 NOTE — H&P ADULT - HISTORY OF PRESENT ILLNESS
59 yo Male with a PMH of metastatic colon cancer on chemo (Avastin, Irinotecan, Leucovorin, and 5FU CADD), LBO s/p stenting by GI, laparoscopic partial colectomy, colostomy presents to the ED with a 3 days history of  fever (T-max 99.9), general weakness, decreased appetite with new onset of jaundice started today. Upon evaluation patient is AAOx3, no acute distress, denies abd pain, N/V/D, chest pain, palpitation, focal weaknesses or any other complaints. In the Ed patient met Sepsis criteria - WBC 14.5, lactate 2.3, tachycardiac, +source. CT abd. Peripancreatic edema suspicious for acute pancreatitis, new compared to prior.     Heme/Onc: Dr. Tu [facility]

## 2023-09-27 ENCOUNTER — APPOINTMENT (OUTPATIENT)
Dept: HEMATOLOGY ONCOLOGY | Facility: CLINIC | Age: 58
End: 2023-09-27

## 2023-09-27 LAB
A1C WITH ESTIMATED AVERAGE GLUCOSE RESULT: 5.6 % — SIGNIFICANT CHANGE UP (ref 4–5.6)
ALBUMIN SERPL ELPH-MCNC: 1.4 G/DL — LOW (ref 3.3–5)
ALP SERPL-CCNC: 480 U/L — HIGH (ref 40–120)
ALT FLD-CCNC: 95 U/L — HIGH (ref 12–78)
AMYLASE P1 CFR SERPL: 80 U/L — SIGNIFICANT CHANGE UP (ref 25–115)
ANION GAP SERPL CALC-SCNC: 6 MMOL/L — SIGNIFICANT CHANGE UP (ref 5–17)
AST SERPL-CCNC: 226 U/L — HIGH (ref 15–37)
BILIRUB SERPL-MCNC: 11.7 MG/DL — HIGH (ref 0.2–1.2)
BUN SERPL-MCNC: 8 MG/DL — SIGNIFICANT CHANGE UP (ref 7–23)
CALCIUM SERPL-MCNC: 8.1 MG/DL — LOW (ref 8.5–10.1)
CHLORIDE SERPL-SCNC: 113 MMOL/L — HIGH (ref 96–108)
CHOLEST SERPL-MCNC: 180 MG/DL — SIGNIFICANT CHANGE UP
CO2 SERPL-SCNC: 22 MMOL/L — SIGNIFICANT CHANGE UP (ref 22–31)
CREAT SERPL-MCNC: 0.56 MG/DL — SIGNIFICANT CHANGE UP (ref 0.5–1.3)
CULTURE RESULTS: NO GROWTH — SIGNIFICANT CHANGE UP
EGFR: 113 ML/MIN/1.73M2 — SIGNIFICANT CHANGE UP
ESTIMATED AVERAGE GLUCOSE: 114 MG/DL — SIGNIFICANT CHANGE UP (ref 68–114)
GLUCOSE SERPL-MCNC: 78 MG/DL — SIGNIFICANT CHANGE UP (ref 70–99)
HCT VFR BLD CALC: 27.7 % — LOW (ref 39–50)
HDLC SERPL-MCNC: 14 MG/DL — LOW
HGB BLD-MCNC: 9.2 G/DL — LOW (ref 13–17)
LACTATE SERPL-SCNC: 1.4 MMOL/L — SIGNIFICANT CHANGE UP (ref 0.7–2)
LIDOCAIN IGE QN: 32 U/L — SIGNIFICANT CHANGE UP (ref 13–75)
LIPID PNL WITH DIRECT LDL SERPL: 140 MG/DL — HIGH
MCHC RBC-ENTMCNC: 23.9 PG — LOW (ref 27–34)
MCHC RBC-ENTMCNC: 33.2 G/DL — SIGNIFICANT CHANGE UP (ref 32–36)
MCV RBC AUTO: 71.9 FL — LOW (ref 80–100)
NON HDL CHOLESTEROL: 166 MG/DL — HIGH
NRBC # BLD: 0 /100 WBCS — SIGNIFICANT CHANGE UP (ref 0–0)
PLATELET # BLD AUTO: 266 K/UL — SIGNIFICANT CHANGE UP (ref 150–400)
POTASSIUM SERPL-MCNC: 4 MMOL/L — SIGNIFICANT CHANGE UP (ref 3.5–5.3)
POTASSIUM SERPL-SCNC: 4 MMOL/L — SIGNIFICANT CHANGE UP (ref 3.5–5.3)
PROCALCITONIN SERPL-MCNC: 0.64 NG/ML — HIGH (ref 0.02–0.1)
PROT SERPL-MCNC: 5.8 GM/DL — LOW (ref 6–8.3)
RBC # BLD: 3.85 M/UL — LOW (ref 4.2–5.8)
RBC # FLD: 21.9 % — HIGH (ref 10.3–14.5)
SODIUM SERPL-SCNC: 141 MMOL/L — SIGNIFICANT CHANGE UP (ref 135–145)
SPECIMEN SOURCE: SIGNIFICANT CHANGE UP
TRIGL SERPL-MCNC: 141 MG/DL — SIGNIFICANT CHANGE UP
WBC # BLD: 13.5 K/UL — HIGH (ref 3.8–10.5)
WBC # FLD AUTO: 13.5 K/UL — HIGH (ref 3.8–10.5)

## 2023-09-27 PROCEDURE — 99232 SBSQ HOSP IP/OBS MODERATE 35: CPT

## 2023-09-27 RX ADMIN — Medication 650 MILLIGRAM(S): at 12:26

## 2023-09-27 RX ADMIN — ISOSORBIDE MONONITRATE 30 MILLIGRAM(S): 60 TABLET, EXTENDED RELEASE ORAL at 12:26

## 2023-09-27 RX ADMIN — HEPARIN SODIUM 5000 UNIT(S): 5000 INJECTION INTRAVENOUS; SUBCUTANEOUS at 06:35

## 2023-09-27 RX ADMIN — SODIUM CHLORIDE 150 MILLILITER(S): 9 INJECTION INTRAMUSCULAR; INTRAVENOUS; SUBCUTANEOUS at 12:32

## 2023-09-27 RX ADMIN — HEPARIN SODIUM 5000 UNIT(S): 5000 INJECTION INTRAVENOUS; SUBCUTANEOUS at 17:22

## 2023-09-27 RX ADMIN — SODIUM CHLORIDE 150 MILLILITER(S): 9 INJECTION INTRAMUSCULAR; INTRAVENOUS; SUBCUTANEOUS at 17:26

## 2023-09-27 NOTE — PROGRESS NOTE ADULT - ASSESSMENT
59 yo Male with a PMH of metastatic colon cancer on chemo (Avastin, Irinotecan, Leucovorin, and 5FU CADD), LBO s/p stenting by GI, laparoscopic partial colectomy, colostomy presents to the ED with a 3 days history of  fever (T-max 99.9), general weakness, decreased appetite with new onset of jaundice started today.   < from: CT Abdomen and Pelvis w/ Oral Cont and w/ IV Cont (09.26.23 @ 01:31) >  Peripancreatic edema suspicious for acute pancreatitis, new compared to  prior. While less likely this could also represent noninflammatory edema/third spacing of fluid.  Small amount of ascites is new. Otherwise similar to prior with a large number of hepatic metastases,  intrahepatic peripheral biliary ductal dilation in the right greater than  left lobes, partially visible lung metastases, and likely metastatic mild  upper abdominal and partially visible mediastinal adenopathy. --- End of Report ---    < end of copied text >    # Metastatic Colon Cancer - worsening jaundice, TB now 11-13, had been 1.2 on 9/15/23.  CT showing intrahepatic peripheral biliary ductal dilation.  I discussed with Zee NP with Dr. Tu Byers, pt's oncologist on 9/26.  Accepted for transfer to [facility] for further evaluation by GI / advanced endoscopy.  Pt in agreement.  Monitor LFTs.  He is aware of his diagnosis.    # Acute Pancreatitis - Clinically not symptomatic.  Monitor LFTs.  Advance diet.    # Essential HTN - Monitor BP.  Continue antihypertensives.  # Inpatient DVT prophylaxis - subcutaneous Heparin     Medically stable for transfer to [facility] for evaluation by Oncology Dr. Tu Byers and GI / advanced endoscopy.    Transfer center contacted.  Awaiting bed.

## 2023-09-28 LAB
AMYLASE P1 CFR SERPL: 59 U/L — SIGNIFICANT CHANGE UP (ref 25–115)
LIDOCAIN IGE QN: 31 U/L — SIGNIFICANT CHANGE UP (ref 13–75)

## 2023-09-28 PROCEDURE — 99232 SBSQ HOSP IP/OBS MODERATE 35: CPT

## 2023-09-28 RX ADMIN — HEPARIN SODIUM 5000 UNIT(S): 5000 INJECTION INTRAVENOUS; SUBCUTANEOUS at 17:04

## 2023-09-28 RX ADMIN — ISOSORBIDE MONONITRATE 30 MILLIGRAM(S): 60 TABLET, EXTENDED RELEASE ORAL at 12:19

## 2023-09-28 RX ADMIN — Medication 30 MILLIGRAM(S): at 17:15

## 2023-09-28 RX ADMIN — Medication 650 MILLIGRAM(S): at 18:34

## 2023-09-28 RX ADMIN — HEPARIN SODIUM 5000 UNIT(S): 5000 INJECTION INTRAVENOUS; SUBCUTANEOUS at 06:34

## 2023-09-28 RX ADMIN — Medication 650 MILLIGRAM(S): at 16:45

## 2023-09-28 NOTE — PROGRESS NOTE ADULT - ASSESSMENT
57 yo Male with a PMH of metastatic colon cancer on chemo (Avastin, Irinotecan, Leucovorin, and 5FU CADD), LBO s/p stenting by GI, laparoscopic partial colectomy, colostomy presents to the ED with a 3 days history of  fever (T-max 99.9), general weakness, decreased appetite with new onset of jaundice started today.   < from: CT Abdomen and Pelvis w/ Oral Cont and w/ IV Cont (09.26.23 @ 01:31) >  Peripancreatic edema suspicious for acute pancreatitis, new compared to  prior. While less likely this could also represent noninflammatory edema/third spacing of fluid.  Small amount of ascites is new. Otherwise similar to prior with a large number of hepatic metastases,  intrahepatic peripheral biliary ductal dilation in the right greater than  left lobes, partially visible lung metastases, and likely metastatic mild  upper abdominal and partially visible mediastinal adenopathy. --- End of Report ---    < end of copied text >    # Metastatic Colon Cancer - worsening jaundice, TB now 11-13, had been 1.2 on 9/15/23.  CT showing intrahepatic peripheral biliary ductal dilation.  I discussed with Zee NP with Dr. Tu Byers, pt's oncologist on 9/26.  Accepted for transfer to [facility] for further evaluation by GI / advanced endoscopy.  Pt in agreement.  Monitor LFTs.  He is aware of his diagnosis.    # Acute Pancreatitis - Clinically not symptomatic.  Monitor LFTs.  Advance diet.    # Essential HTN - Monitor BP.  Continue antihypertensives.  # Inpatient DVT prophylaxis - subcutaneous Heparin     Medically stable for transfer to [facility] for evaluation by Oncology Dr. Tu Byers and GI / advanced endoscopy.    Transfer center recontacted - still awaiting bed.   D/w Dr. Byers.

## 2023-09-29 ENCOUNTER — APPOINTMENT (OUTPATIENT)
Dept: HEMATOLOGY ONCOLOGY | Facility: CLINIC | Age: 58
End: 2023-09-29

## 2023-09-29 ENCOUNTER — APPOINTMENT (OUTPATIENT)
Dept: INFUSION THERAPY | Facility: HOSPITAL | Age: 58
End: 2023-09-29

## 2023-09-29 LAB
ALBUMIN SERPL ELPH-MCNC: 1.4 G/DL — LOW (ref 3.3–5)
ALP SERPL-CCNC: 441 U/L — HIGH (ref 40–120)
ALT FLD-CCNC: 82 U/L — HIGH (ref 12–78)
AMYLASE P1 CFR SERPL: 50 U/L — SIGNIFICANT CHANGE UP (ref 25–115)
ANION GAP SERPL CALC-SCNC: 7 MMOL/L — SIGNIFICANT CHANGE UP (ref 5–17)
APPEARANCE UR: ABNORMAL
AST SERPL-CCNC: 168 U/L — HIGH (ref 15–37)
BACTERIA # UR AUTO: ABNORMAL
BILIRUB SERPL-MCNC: 14.1 MG/DL — HIGH (ref 0.2–1.2)
BILIRUB UR-MCNC: ABNORMAL
BUN SERPL-MCNC: 11 MG/DL — SIGNIFICANT CHANGE UP (ref 7–23)
CALCIUM SERPL-MCNC: 7.7 MG/DL — LOW (ref 8.5–10.1)
CHLORIDE SERPL-SCNC: 109 MMOL/L — HIGH (ref 96–108)
CO2 SERPL-SCNC: 22 MMOL/L — SIGNIFICANT CHANGE UP (ref 22–31)
COLOR SPEC: ABNORMAL
COMMENT - URINE: SIGNIFICANT CHANGE UP
CREAT SERPL-MCNC: 0.62 MG/DL — SIGNIFICANT CHANGE UP (ref 0.5–1.3)
DIFF PNL FLD: ABNORMAL
EGFR: 109 ML/MIN/1.73M2 — SIGNIFICANT CHANGE UP
EPI CELLS # UR: SIGNIFICANT CHANGE UP
GLUCOSE SERPL-MCNC: 118 MG/DL — HIGH (ref 70–99)
GLUCOSE UR QL: NEGATIVE MG/DL — SIGNIFICANT CHANGE UP
HCT VFR BLD CALC: 30.3 % — LOW (ref 39–50)
HGB BLD-MCNC: 9.8 G/DL — LOW (ref 13–17)
KETONES UR-MCNC: ABNORMAL
LACTATE SERPL-SCNC: 2 MMOL/L — SIGNIFICANT CHANGE UP (ref 0.7–2)
LEUKOCYTE ESTERASE UR-ACNC: ABNORMAL
LIDOCAIN IGE QN: 39 U/L — SIGNIFICANT CHANGE UP (ref 13–75)
MAGNESIUM SERPL-MCNC: 1.4 MG/DL — LOW (ref 1.6–2.6)
MCHC RBC-ENTMCNC: 23.8 PG — LOW (ref 27–34)
MCHC RBC-ENTMCNC: 32.3 G/DL — SIGNIFICANT CHANGE UP (ref 32–36)
MCV RBC AUTO: 73.5 FL — LOW (ref 80–100)
NITRITE UR-MCNC: POSITIVE
NRBC # BLD: 0 /100 WBCS — SIGNIFICANT CHANGE UP (ref 0–0)
PH UR: 5 — SIGNIFICANT CHANGE UP (ref 5–8)
PHOSPHATE SERPL-MCNC: 1.5 MG/DL — LOW (ref 2.5–4.5)
PLATELET # BLD AUTO: 298 K/UL — SIGNIFICANT CHANGE UP (ref 150–400)
POTASSIUM SERPL-MCNC: 3.5 MMOL/L — SIGNIFICANT CHANGE UP (ref 3.5–5.3)
POTASSIUM SERPL-SCNC: 3.5 MMOL/L — SIGNIFICANT CHANGE UP (ref 3.5–5.3)
PROCALCITONIN SERPL-MCNC: 0.91 NG/ML — HIGH (ref 0.02–0.1)
PROT SERPL-MCNC: 6.2 GM/DL — SIGNIFICANT CHANGE UP (ref 6–8.3)
PROT UR-MCNC: 30 MG/DL
RBC # BLD: 4.12 M/UL — LOW (ref 4.2–5.8)
RBC # FLD: 23.9 % — HIGH (ref 10.3–14.5)
RBC CASTS # UR COMP ASSIST: SIGNIFICANT CHANGE UP /HPF (ref 0–4)
SODIUM SERPL-SCNC: 138 MMOL/L — SIGNIFICANT CHANGE UP (ref 135–145)
SP GR SPEC: 1.02 — SIGNIFICANT CHANGE UP (ref 1.01–1.02)
UROBILINOGEN FLD QL: 8 MG/DL
WBC # BLD: 14.98 K/UL — HIGH (ref 3.8–10.5)
WBC # FLD AUTO: 14.98 K/UL — HIGH (ref 3.8–10.5)
WBC UR QL: SIGNIFICANT CHANGE UP

## 2023-09-29 PROCEDURE — 99232 SBSQ HOSP IP/OBS MODERATE 35: CPT

## 2023-09-29 PROCEDURE — 71045 X-RAY EXAM CHEST 1 VIEW: CPT | Mod: 26

## 2023-09-29 RX ORDER — PIPERACILLIN AND TAZOBACTAM 4; .5 G/20ML; G/20ML
3.38 INJECTION, POWDER, LYOPHILIZED, FOR SOLUTION INTRAVENOUS EVERY 8 HOURS
Refills: 0 | Status: DISCONTINUED | OUTPATIENT
Start: 2023-09-30 | End: 2023-09-30

## 2023-09-29 RX ORDER — PIPERACILLIN AND TAZOBACTAM 4; .5 G/20ML; G/20ML
3.38 INJECTION, POWDER, LYOPHILIZED, FOR SOLUTION INTRAVENOUS ONCE
Refills: 0 | Status: COMPLETED | OUTPATIENT
Start: 2023-09-29 | End: 2023-09-29

## 2023-09-29 RX ORDER — MAGNESIUM OXIDE 400 MG ORAL TABLET 241.3 MG
400 TABLET ORAL
Refills: 0 | Status: DISCONTINUED | OUTPATIENT
Start: 2023-09-29 | End: 2023-09-30

## 2023-09-29 RX ORDER — MAGNESIUM SULFATE 500 MG/ML
1 VIAL (ML) INJECTION ONCE
Refills: 0 | Status: COMPLETED | OUTPATIENT
Start: 2023-09-29 | End: 2023-09-29

## 2023-09-29 RX ORDER — POTASSIUM PHOSPHATE, MONOBASIC POTASSIUM PHOSPHATE, DIBASIC 236; 224 MG/ML; MG/ML
30 INJECTION, SOLUTION INTRAVENOUS ONCE
Refills: 0 | Status: COMPLETED | OUTPATIENT
Start: 2023-09-29 | End: 2023-09-29

## 2023-09-29 RX ORDER — SODIUM CHLORIDE 9 MG/ML
1500 INJECTION INTRAMUSCULAR; INTRAVENOUS; SUBCUTANEOUS ONCE
Refills: 0 | Status: COMPLETED | OUTPATIENT
Start: 2023-09-29 | End: 2023-09-29

## 2023-09-29 RX ORDER — PIPERACILLIN AND TAZOBACTAM 4; .5 G/20ML; G/20ML
3.38 INJECTION, POWDER, LYOPHILIZED, FOR SOLUTION INTRAVENOUS ONCE
Refills: 0 | Status: COMPLETED | OUTPATIENT
Start: 2023-09-30 | End: 2023-09-30

## 2023-09-29 RX ORDER — SODIUM,POTASSIUM PHOSPHATES 278-250MG
1 POWDER IN PACKET (EA) ORAL
Refills: 0 | Status: DISCONTINUED | OUTPATIENT
Start: 2023-09-29 | End: 2023-09-29

## 2023-09-29 RX ADMIN — Medication 100 GRAM(S): at 15:45

## 2023-09-29 RX ADMIN — Medication 650 MILLIGRAM(S): at 06:47

## 2023-09-29 RX ADMIN — MAGNESIUM OXIDE 400 MG ORAL TABLET 400 MILLIGRAM(S): 241.3 TABLET ORAL at 16:56

## 2023-09-29 RX ADMIN — PIPERACILLIN AND TAZOBACTAM 25 GRAM(S): 4; .5 INJECTION, POWDER, LYOPHILIZED, FOR SOLUTION INTRAVENOUS at 18:05

## 2023-09-29 RX ADMIN — PIPERACILLIN AND TAZOBACTAM 25 GRAM(S): 4; .5 INJECTION, POWDER, LYOPHILIZED, FOR SOLUTION INTRAVENOUS at 11:27

## 2023-09-29 RX ADMIN — Medication 30 MILLIGRAM(S): at 17:43

## 2023-09-29 RX ADMIN — HEPARIN SODIUM 5000 UNIT(S): 5000 INJECTION INTRAVENOUS; SUBCUTANEOUS at 05:58

## 2023-09-29 RX ADMIN — SODIUM CHLORIDE 750 MILLILITER(S): 9 INJECTION INTRAMUSCULAR; INTRAVENOUS; SUBCUTANEOUS at 13:23

## 2023-09-29 RX ADMIN — ISOSORBIDE MONONITRATE 30 MILLIGRAM(S): 60 TABLET, EXTENDED RELEASE ORAL at 12:59

## 2023-09-29 RX ADMIN — HEPARIN SODIUM 5000 UNIT(S): 5000 INJECTION INTRAVENOUS; SUBCUTANEOUS at 17:43

## 2023-09-29 RX ADMIN — PIPERACILLIN AND TAZOBACTAM 200 GRAM(S): 4; .5 INJECTION, POWDER, LYOPHILIZED, FOR SOLUTION INTRAVENOUS at 08:49

## 2023-09-29 RX ADMIN — Medication 650 MILLIGRAM(S): at 05:58

## 2023-09-29 RX ADMIN — POTASSIUM PHOSPHATE, MONOBASIC POTASSIUM PHOSPHATE, DIBASIC 83.33 MILLIMOLE(S): 236; 224 INJECTION, SOLUTION INTRAVENOUS at 16:54

## 2023-09-29 NOTE — PROGRESS NOTE ADULT - ASSESSMENT
57 yo Male with a PMH of metastatic colon cancer on chemo (Avastin, Irinotecan, Leucovorin, and 5FU CADD), LBO s/p stenting by GI, laparoscopic partial colectomy, colostomy presents to the ED with a 3 days history of  fever (T-max 99.9), general weakness, decreased appetite with new onset of jaundice.   < from: CT Abdomen and Pelvis w/ Oral Cont and w/ IV Cont (09.26.23 @ 01:31) >  Peripancreatic edema suspicious for acute pancreatitis, new compared to  prior. While less likely this could also represent noninflammatory edema/third spacing of fluid.  Small amount of ascites is new. Otherwise similar to prior with a large number of hepatic metastases,  intrahepatic peripheral biliary ductal dilation in the right greater than  left lobes, partially visible lung metastases, and likely metastatic mild  upper abdominal and partially visible mediastinal adenopathy. --- End of Report ---    < end of copied text >      # Metastatic Colon Cancer - worsening jaundice, TB now 14.  had been 1.2 on 9/15/23.  CT showing intrahepatic peripheral biliary ductal dilation.  I discussed with Zee MARQUEZ with Dr. Tu Byers, pt's oncologist on 9/26.  Accepted for transfer to [facility] for further evaluation by GI / advanced endoscopy.  Pt in agreement.  Monitor LFTs.  He is aware of his diagnosis.    # Fever - in setting of   # Acute Pancreatitis - Clinically not symptomatic.  Monitor LFTs.  Advance diet.    # Essential HTN - Monitor BP.  Continue antihypertensives.  # Inpatient DVT prophylaxis - subcutaneous Heparin     Medically stable for transfer to [facility] for evaluation by Oncology Dr. Tu Byers and GI / advanced endoscopy.    Transfer center recontacted - still awaiting bed.   D/w Dr. Byers.   57 yo Male with a PMH of metastatic colon cancer on chemo (Avastin, Irinotecan, Leucovorin, and 5FU CADD), LBO s/p stenting by GI, laparoscopic partial colectomy, colostomy presents to the ED with a 3 days history of  fever (T-max 99.9), general weakness, decreased appetite with new onset of jaundice.   < from: CT Abdomen and Pelvis w/ Oral Cont and w/ IV Cont (09.26.23 @ 01:31) >  Peripancreatic edema suspicious for acute pancreatitis, new compared to  prior. While less likely this could also represent noninflammatory edema/third spacing of fluid.  Small amount of ascites is new. Otherwise similar to prior with a large number of hepatic metastases,  intrahepatic peripheral biliary ductal dilation in the right greater than  left lobes, partially visible lung metastases, and likely metastatic mild  upper abdominal and partially visible mediastinal adenopathy. --- End of Report ---    < end of copied text >    # Fever - noted acute pancreatitis on admission, however, pt remains asymptomatic.  Will empirically start Zosyn.  Check cultures, UA, CXR.  Abdominal sono for f/u of pancreatitis.  Escalated communication with [facility] for transfer.    # Metastatic Colon Cancer - worsening jaundice, TB now 14.  had been 1.2 on 9/15/23.  CT showing intrahepatic peripheral biliary ductal dilation.  I discussed with Zee MARQUEZ with Dr. Tu Byers, pt's oncologist on 9/26.  Accepted for transfer to [facility] for further evaluation by GI / advanced endoscopy.  Pt in agreement.  Monitor LFTs.  He is aware of his diagnosis.    # Fever - in setting of   # Acute Pancreatitis - Clinically not symptomatic.  Monitor LFTs.  Advance diet.    # Essential HTN - Monitor BP.  Continue antihypertensives.  # Inpatient DVT prophylaxis - subcutaneous Heparin     Medically stable for transfer to [facility] for evaluation by Oncology Dr. Tu Byers and GI / advanced endoscopy.    Transfer center recontacted - still awaiting bed - I escalated pt's priority in setting of fever.

## 2023-09-29 NOTE — DIETITIAN NUTRITION RISK NOTIFICATION - TREATMENT: THE FOLLOWING DIET HAS BEEN RECOMMENDED
Diet, Regular:   Supplement Feeding Modality:  Oral  Ensure Plus High Protein Cans or Servings Per Day:  1       Frequency:  Two Times a day (09-29-23 @ 11:45) [Pending Verification By Attending]  Diet, Regular (09-27-23 @ 19:49) [Active]

## 2023-09-29 NOTE — DIETITIAN INITIAL EVALUATION ADULT - PERTINENT LABORATORY DATA
09-29    138  |  109<H>  |  11  ----------------------------<  118<H>  3.5   |  22  |  0.62    Ca    7.7<L>      29 Sep 2023 07:45  Phos  1.5     09-29  Mg     1.4     09-29    TPro  6.2  /  Alb  1.4<L>  /  TBili  14.1<H>  /  DBili  x   /  AST  168<H>  /  ALT  82<H>  /  AlkPhos  441<H>  09-29  A1C with Estimated Average Glucose Result: 5.6 % (09-27-23 @ 08:15)

## 2023-09-29 NOTE — DIETITIAN INITIAL EVALUATION ADULT - PERTINENT MEDS FT
MEDICATIONS  (STANDING):  heparin   Injectable 5000 Unit(s) SubCutaneous every 12 hours  influenza   Vaccine 0.5 milliLiter(s) IntraMuscular once  isosorbide   mononitrate ER Tablet (IMDUR) 30 milliGRAM(s) Oral daily  NIFEdipine XL 30 milliGRAM(s) Oral two times a day  piperacillin/tazobactam IVPB.- 3.375 Gram(s) IV Intermittent once  piperacillin/tazobactam IVPB.- 3.375 Gram(s) IV Intermittent once  sodium chloride 0.9% Bolus 1500 milliLiter(s) IV Bolus once    MEDICATIONS  (PRN):  acetaminophen     Tablet .. 650 milliGRAM(s) Oral every 6 hours PRN Temp greater or equal to 38C (100.4F), Mild Pain (1 - 3)  aluminum hydroxide/magnesium hydroxide/simethicone Suspension 30 milliLiter(s) Oral every 4 hours PRN Dyspepsia  melatonin 3 milliGRAM(s) Oral at bedtime PRN Insomnia  ondansetron Injectable 4 milliGRAM(s) IV Push every 8 hours PRN Nausea and/or Vomiting

## 2023-09-29 NOTE — DIETITIAN INITIAL EVALUATION ADULT - OTHER INFO
Pt seen on medical floor w/ Acute pancreatitis ; Metastatic colon cancer ; Essential HTN ; ( on chem) ; LBO stenting by GI, laparoscopic Partial colectomy , colostomy. Pt reports wt loss x 3 years since dx of cancer. Denies N/V/D/C/Chewing/Swallowing issues, No food allergies. Receptive to a nutritional supplement. Pt lives w/ his wife whom does the food shopping / cooking. BMI = 18.8. Receptive to a nutritional supplement. Encouraged small frequent meals and in between meal snacks to prevent wt loss during Length Of Stay. Pt seen on medical floor w/ Acute pancreatitis ; Metastatic colon cancer ; Essential HTN ; ( on chem) ; LBO stenting by GI, laparoscopic Partial colectomy , colostomy. Pt reports wt loss x 3 years since dx of cancer. Denies N/V/D/C/Chewing/Swallowing issues, No food allergies. Receptive to a nutritional supplement. Pt lives w/ his wife whom does the food shopping / cooking. BMI = 18.8. Receptive to a nutritional supplement. Encouraged small frequent meals and in between meal snacks to prevent wt loss during Length Of Stay.  Awaiting bed to transfer to [facility] for evaluation by Oncology.

## 2023-09-29 NOTE — PROGRESS NOTE ADULT - NUTRITIONAL ASSESSMENT
This patient has been assessed with a concern for Malnutrition and has been determined to have a diagnosis/diagnoses of Severe protein-calorie malnutrition and Underweight (BMI < 19).    This patient is being managed with:   Diet Regular-  Supplement Feeding Modality:  Oral  Ensure Plus High Protein Cans or Servings Per Day:  1       Frequency:  Two Times a day  Entered: Sep 29 2023 11:45AM    Diet Regular-  Entered: Sep 27 2023  7:49PM    The following pending diet order is being considered for treatment of Severe protein-calorie malnutrition and Underweight (BMI < 19):null

## 2023-09-29 NOTE — ACUTE INTERFACILITY TRANSFER NOTE - PLAN OF CARE
Patient is admitted for Acute Pancreatitis. Patient remains inpatient  and is awaiting transfer to [facility] for evaluation by Oncology Dr. Tu Byers  and GI / advanced endoscopy. CM will continue to follow up with plans.

## 2023-09-29 NOTE — ACUTE INTERFACILITY TRANSFER NOTE - HOSPITAL COURSE
59 yo Male with a PMH of metastatic colon cancer on chemo (Avastin, Irinotecan, Leucovorin, and 5FU CADD), LBO s/p stenting by GI, laparoscopic partial colectomy, colostomy presents to the ED with a 3 days history of  fever (T-max 99.9), general weakness, decreased appetite with new onset of jaundice started today.   < from: CT Abdomen and Pelvis w/ Oral Cont and w/ IV Cont (09.26.23 @ 01:31) >  Peripancreatic edema suspicious for acute pancreatitis, new compared to  prior. While less likely this could also represent noninflammatory edema/third spacing of fluid.  Small amount of ascites is new. Otherwise similar to prior with a large number of hepatic metastases,  intrahepatic peripheral biliary ductal dilation in the right greater than  left lobes, partially visible lung metastases, and likely metastatic mild  upper abdominal and partially visible mediastinal adenopathy  Pt is experiencing worsening jaundice, TB now 11-13, had been 1.2 on 9/15/23.  CT showing intrahepatic peripheral biliary ductal dilation.  Findings discussed with Zee MARQUEZ with Dr. Tu Byers, pt's oncologist on 9/26.  Accepted for transfer to [facility] for further evaluation by GI / advanced endoscopy.  Pt in agreement.  Monitor LFTs.  He is aware of his diagnosis.    Pt is Medically stable for transfer to [facility] for evaluation by Oncology Dr. Tu Byers and GI / advanced endoscopy.    Transfer center recontacted

## 2023-09-29 NOTE — PROGRESS NOTE ADULT - SUBJECTIVE AND OBJECTIVE BOX
Patient: YAKELIN GERARDO [id number] 58y Male                            Hospitalist Attending Note    No complaints.  No Abdominal pain, nausea, vomiting, diarrhea, nor constipation.     ____________________PHYSICAL EXAM:  GENERAL:  NAD Alert and Oriented x 3   SKIN: jaundiced  HEENT: NCAT  CARDIOVASCULAR:  S1, S2  LUNGS: CTAB  ABDOMEN:  soft, (-) tenderness, (-) distension, (+) bowel sounds, (-) guarding, (-) rebound (-) rigidity.  Colostomy intact.   EXTREMITIES:  no cyanosis / clubbing / edema.   ____________________    VITALS:  Vital Signs Last 24 Hrs  T(C): 37.4 (27 Sep 2023 05:22), Max: 37.4 (27 Sep 2023 05:22)  T(F): 99.4 (27 Sep 2023 05:22), Max: 99.4 (27 Sep 2023 05:22)  HR: 72 (27 Sep 2023 05:22) (72 - 90)  BP: 111/72 (27 Sep 2023 05:22) (100/63 - 117/84)  BP(mean): --  RR: 17 (27 Sep 2023 05:22) (16 - 18)  SpO2: 94% (27 Sep 2023 05:22) (94% - 100%)    Parameters below as of 27 Sep 2023 05:22  Patient On (Oxygen Delivery Method): room air     Daily     Daily Weight in k.7 (27 Sep 2023 05:22)  CAPILLARY BLOOD GLUCOSE        I&O's Summary    26 Sep 2023 07:01  -  27 Sep 2023 07:00  --------------------------------------------------------  IN: 650 mL / OUT: 0 mL / NET: 650 mL        LABS:                        9.2    13.50 )-----------( 266      ( 27 Sep 2023 08:15 )             27.7     09-    141  |  113<H>  |  8   ----------------------------<  78  4.0   |  22  |  0.56    Ca    8.1<L>      27 Sep 2023 08:15  Phos  2.3         TPro  5.8<L>  /  Alb  1.4<L>  /  TBili  11.7<H>  /  DBili  x   /  AST  226<H>  /  ALT  95<H>  /  AlkPhos  480<H>      PT/INR - ( 25 Sep 2023 23:55 )   PT: 19.4 sec;   INR: 1.64 ratio         PTT - ( 25 Sep 2023 23:55 )  PTT:36.6 sec  LIVER FUNCTIONS - ( 27 Sep 2023 08:15 )  Alb: 1.4 g/dL / Pro: 5.8 gm/dL / ALK PHOS: 480 U/L / ALT: 95 U/L / AST: 226 U/L / GGT: x           Urinalysis Basic - ( 27 Sep 2023 08:15 )    Color: x / Appearance: x / SG: x / pH: x  Gluc: 78 mg/dL / Ketone: x  / Bili: x / Urobili: x   Blood: x / Protein: x / Nitrite: x   Leuk Esterase: x / RBC: x / WBC x   Sq Epi: x / Non Sq Epi: x / Bacteria: x      CARDIAC MARKERS ( 25 Sep 2023 23:55 )  x     / x     / 144 U/L / x     / x            Culture - Urine (collected 26 Sep 2023 03:54)  Source: Clean Catch Clean Catch (Midstream)  Final Report (27 Sep 2023 06:48):    No growth    Culture - Blood (collected 25 Sep 2023 23:55)  Source: .Blood Blood-Peripheral  Preliminary Report (27 Sep 2023 09:02):    No growth at 24 hours    Culture - Blood (collected 25 Sep 2023 23:40)  Source: .Blood Blood-Peripheral  Preliminary Report (27 Sep 2023 09:02):    No growth at 24 hours        MEDICATIONS:  acetaminophen     Tablet .. 650 milliGRAM(s) Oral every 6 hours PRN  aluminum hydroxide/magnesium hydroxide/simethicone Suspension 30 milliLiter(s) Oral every 4 hours PRN  heparin   Injectable 5000 Unit(s) SubCutaneous every 12 hours  influenza   Vaccine 0.5 milliLiter(s) IntraMuscular once  isosorbide   mononitrate ER Tablet (IMDUR) 30 milliGRAM(s) Oral daily  melatonin 3 milliGRAM(s) Oral at bedtime PRN  NIFEdipine XL 30 milliGRAM(s) Oral two times a day  ondansetron Injectable 4 milliGRAM(s) IV Push every 8 hours PRN  sodium chloride 0.9%. 1000 milliLiter(s) IV Continuous <Continuous>    
                          Patient: YAKELIN GERARDO [id number] 58y Male                            Hospitalist Attending Note    No complaints.  No Abdominal pain, nausea, vomiting, diarrhea, nor constipation.     ____________________PHYSICAL EXAM:  GENERAL:  NAD Alert and Oriented x 3   HEENT: NCAT  CARDIOVASCULAR:  S1, S2  LUNGS: CTAB  ABDOMEN:  soft, (-) tenderness, (-) distension, (+) bowel sounds, (-) guarding, (-) rebound (-) rigidity.  Colostomy intact.   EXTREMITIES:  no cyanosis / clubbing / edema.   ____________________     VITALS:  Vital Signs Last 24 Hrs  T(C): 36.6 (26 Sep 2023 10:05), Max: 36.8 (25 Sep 2023 22:45)  T(F): 97.9 (26 Sep 2023 10:05), Max: 98.2 (25 Sep 2023 22:45)  HR: 90 (26 Sep 2023 11:23) (64 - 141)  BP: 117/84 (26 Sep 2023 11:23) (61/50 - 129/87)  BP(mean): 103 (26 Sep 2023 04:01) (101 - 103)  RR: 16 (26 Sep 2023 11:23) (11 - 21)  SpO2: 100% (26 Sep 2023 11:23) (97% - 100%)    Parameters below as of 26 Sep 2023 11:23  Patient On (Oxygen Delivery Method): room air     Daily Height in cm: 170.18 (25 Sep 2023 22:45)    Daily   CAPILLARY BLOOD GLUCOSE        I&O's Summary      HISTORY:  PAST MEDICAL & SURGICAL HISTORY:  Colon cancer  metastatic to liver, lungs      Vitiligo      No significant past surgical history      Allergies    No Known Allergies    Intolerances       LABS:                        10.5   14.50 )-----------( 304      ( 25 Sep 2023 23:55 )             33.0     09-25    136  |  102  |  14  ----------------------------<  152<H>  3.6   |  26  |  0.91    Ca    8.4<L>      25 Sep 2023 23:55  Phos  2.3     09-25    TPro  7.9  /  Alb  2.0<L>  /  TBili  13.5<H>  /  DBili  11.7<H>  /  AST  227<H>  /  ALT  136<H>  /  AlkPhos  706<H>      PT/INR - ( 25 Sep 2023 23:55 )   PT: 19.4 sec;   INR: 1.64 ratio         PTT - ( 25 Sep 2023 23:55 )  PTT:36.6 sec  LIVER FUNCTIONS - ( 25 Sep 2023 23:55 )  Alb: 2.0 g/dL / Pro: 7.9 gm/dL / ALK PHOS: 706 U/L / ALT: 136 U/L / AST: 227 U/L / GGT: x           Urinalysis Basic - ( 26 Sep 2023 03:54 )    Color: Yellow / Appearance: Clear / S.005 / pH: x  Gluc: x / Ketone: Negative  / Bili: Large / Urobili: 4 mg/dL   Blood: x / Protein: 30 mg/dL / Nitrite: Negative   Leuk Esterase: Negative / RBC: 3-5 /HPF / WBC 0-2   Sq Epi: x / Non Sq Epi: x / Bacteria: Few      CARDIAC MARKERS ( 25 Sep 2023 23:55 )  x     / x     / 144 U/L / x     / x              MEDICATIONS:  MEDICATIONS  (STANDING):  heparin   Injectable 5000 Unit(s) SubCutaneous every 12 hours  influenza   Vaccine 0.5 milliLiter(s) IntraMuscular once  isosorbide   mononitrate ER Tablet (IMDUR) 30 milliGRAM(s) Oral daily  NIFEdipine XL 30 milliGRAM(s) Oral two times a day  sodium chloride 0.9%. 1000 milliLiter(s) (150 mL/Hr) IV Continuous <Continuous>    MEDICATIONS  (PRN):  acetaminophen     Tablet .. 650 milliGRAM(s) Oral every 6 hours PRN Temp greater or equal to 38C (100.4F), Mild Pain (1 - 3)  aluminum hydroxide/magnesium hydroxide/simethicone Suspension 30 milliLiter(s) Oral every 4 hours PRN Dyspepsia  melatonin 3 milliGRAM(s) Oral at bedtime PRN Insomnia  ondansetron Injectable 4 milliGRAM(s) IV Push every 8 hours PRN Nausea and/or Vomiting  
                          Patient: YAKELIN GERARDO [id number] 58y Male                            Hospitalist Attending Note    No complaints.  No Abdominal pain, nausea, vomiting, diarrhea, nor constipation.     ____________________PHYSICAL EXAM:  GENERAL:  NAD Alert and Oriented x 3   SKIN: jaundiced  HEENT: NCAT  CARDIOVASCULAR:  S1, S2  LUNGS: CTAB  ABDOMEN:  soft, (-) tenderness, (-) distension, (+) bowel sounds, (-) guarding, (-) rebound (-) rigidity.  Colostomy intact.   EXTREMITIES:  no cyanosis / clubbing / edema.   ____________________    VITALS:  Vital Signs Last 24 Hrs  T(C): 38 (28 Sep 2023 11:57), Max: 38 (27 Sep 2023 16:56)  T(F): 100.4 (28 Sep 2023 11:57), Max: 100.4 (27 Sep 2023 16:56)  HR: 103 (28 Sep 2023 11:57) (87 - 113)  BP: 121/86 (28 Sep 2023 11:57) (114/80 - 128/82)  BP(mean): --  RR: 17 (28 Sep 2023 11:57) (17 - 18)  SpO2: 96% (28 Sep 2023 11:57) (95% - 98%)    Parameters below as of 28 Sep 2023 11:57  Patient On (Oxygen Delivery Method): room air     Daily     Daily Weight in k.3 (28 Sep 2023 05:36)  CAPILLARY BLOOD GLUCOSE        I&O's Summary    27 Sep 2023 07:01  -  28 Sep 2023 07:00  --------------------------------------------------------  IN: 1620 mL / OUT: 0 mL / NET: 1620 mL        LABS:                        9.2    13.50 )-----------( 266      ( 27 Sep 2023 08:15 )             27.7     09-27    141  |  113<H>  |  8   ----------------------------<  78  4.0   |  22  |  0.56    Ca    8.1<L>      27 Sep 2023 08:15    TPro  5.8<L>  /  Alb  1.4<L>  /  TBili  11.7<H>  /  DBili  x   /  AST  226<H>  /  ALT  95<H>  /  AlkPhos  480<H>        LIVER FUNCTIONS - ( 27 Sep 2023 08:15 )  Alb: 1.4 g/dL / Pro: 5.8 gm/dL / ALK PHOS: 480 U/L / ALT: 95 U/L / AST: 226 U/L / GGT: x           Urinalysis Basic - ( 27 Sep 2023 08:15 )    Color: x / Appearance: x / SG: x / pH: x  Gluc: 78 mg/dL / Ketone: x  / Bili: x / Urobili: x   Blood: x / Protein: x / Nitrite: x   Leuk Esterase: x / RBC: x / WBC x   Sq Epi: x / Non Sq Epi: x / Bacteria: x            Culture - Urine (collected 26 Sep 2023 03:54)  Source: Clean Catch Clean Catch (Midstream)  Final Report (27 Sep 2023 06:48):    No growth    Culture - Blood (collected 25 Sep 2023 23:55)  Source: .Blood Blood-Peripheral  Preliminary Report (28 Sep 2023 09:01):    No growth at 48 Hours    Culture - Blood (collected 25 Sep 2023 23:40)  Source: .Blood Blood-Peripheral  Preliminary Report (28 Sep 2023 09:01):    No growth at 48 Hours        MEDICATIONS:  acetaminophen     Tablet .. 650 milliGRAM(s) Oral every 6 hours PRN  aluminum hydroxide/magnesium hydroxide/simethicone Suspension 30 milliLiter(s) Oral every 4 hours PRN  heparin   Injectable 5000 Unit(s) SubCutaneous every 12 hours  influenza   Vaccine 0.5 milliLiter(s) IntraMuscular once  isosorbide   mononitrate ER Tablet (IMDUR) 30 milliGRAM(s) Oral daily  melatonin 3 milliGRAM(s) Oral at bedtime PRN  NIFEdipine XL 30 milliGRAM(s) Oral two times a day  ondansetron Injectable 4 milliGRAM(s) IV Push every 8 hours PRN    
                          Patient: YAKELIN GERARDO [id number] 58y Male                            Hospitalist Attending Note    No complaints.  No Abdominal pain, nausea, vomiting, diarrhea, nor constipation.   no sob / cough / urinary complaints.    Fever noted.      ____________________PHYSICAL EXAM:  GENERAL:  NAD Alert and Oriented x 3   SKIN: jaundiced  HEENT: NCAT  CARDIOVASCULAR:  S1, S2  LUNGS: CTAB  ABDOMEN:  soft, (-) tenderness, (-) distension, (+) bowel sounds, (-) guarding, (-) rebound (-) rigidity.  Colostomy intact.   EXTREMITIES:  no cyanosis / clubbing / edema.   ____________________    VITALS:  Vital Signs Last 24 Hrs  T(C): 37 (29 Sep 2023 12:09), Max: 38.4 (29 Sep 2023 05:34)  T(F): 98.6 (29 Sep 2023 12:09), Max: 101.2 (29 Sep 2023 05:34)  HR: 82 (29 Sep 2023 12:09) (82 - 120)  BP: 101/67 (29 Sep 2023 12:09) (91/62 - 109/80)  BP(mean): --  RR: 17 (29 Sep 2023 12:09) (17 - 18)  SpO2: 98% (29 Sep 2023 12:09) (92% - 98%)    Parameters below as of 29 Sep 2023 12:09  Patient On (Oxygen Delivery Method): room air     Daily     Daily Weight in k (29 Sep 2023 05:34)  CAPILLARY BLOOD GLUCOSE        I&O's Summary    28 Sep 2023 07:01  -  29 Sep 2023 07:00  --------------------------------------------------------  IN: 120 mL / OUT: 600 mL / NET: -480 mL        LABS:        138  |  109<H>  |  11  ----------------------------<  118<H>  3.5   |  22  |  0.62    Ca    7.7<L>      29 Sep 2023 07:45  Phos  1.5       Mg     1.4         TPro  6.2  /  Alb  1.4<L>  /  TBili  14.1<H>  /  DBili  x   /  AST  168<H>  /  ALT  82<H>  /  AlkPhos  441<H>        LIVER FUNCTIONS - ( 29 Sep 2023 07:45 )  Alb: 1.4 g/dL / Pro: 6.2 gm/dL / ALK PHOS: 441 U/L / ALT: 82 U/L / AST: 168 U/L / GGT: x           Urinalysis Basic - ( 29 Sep 2023 07:45 )    Color: x / Appearance: x / SG: x / pH: x  Gluc: 118 mg/dL / Ketone: x  / Bili: x / Urobili: x   Blood: x / Protein: x / Nitrite: x   Leuk Esterase: x / RBC: x / WBC x   Sq Epi: x / Non Sq Epi: x / Bacteria: x              MEDICATIONS:  acetaminophen     Tablet .. 650 milliGRAM(s) Oral every 6 hours PRN  aluminum hydroxide/magnesium hydroxide/simethicone Suspension 30 milliLiter(s) Oral every 4 hours PRN  heparin   Injectable 5000 Unit(s) SubCutaneous every 12 hours  influenza   Vaccine 0.5 milliLiter(s) IntraMuscular once  isosorbide   mononitrate ER Tablet (IMDUR) 30 milliGRAM(s) Oral daily  melatonin 3 milliGRAM(s) Oral at bedtime PRN  NIFEdipine XL 30 milliGRAM(s) Oral two times a day  ondansetron Injectable 4 milliGRAM(s) IV Push every 8 hours PRN  piperacillin/tazobactam IVPB.- 3.375 Gram(s) IV Intermittent once

## 2023-09-30 ENCOUNTER — INPATIENT (INPATIENT)
Facility: HOSPITAL | Age: 58
LOS: 17 days | Discharge: HOSPICE | End: 2023-10-18
Attending: HOSPITALIST | Admitting: HOSPITALIST
Payer: MEDICARE

## 2023-09-30 VITALS
HEART RATE: 109 BPM | HEIGHT: 67 IN | WEIGHT: 134.92 LBS | OXYGEN SATURATION: 100 % | DIASTOLIC BLOOD PRESSURE: 73 MMHG | TEMPERATURE: 101 F | RESPIRATION RATE: 18 BRPM | SYSTOLIC BLOOD PRESSURE: 108 MMHG

## 2023-09-30 VITALS
HEART RATE: 74 BPM | OXYGEN SATURATION: 99 % | DIASTOLIC BLOOD PRESSURE: 73 MMHG | TEMPERATURE: 98 F | RESPIRATION RATE: 18 BRPM | SYSTOLIC BLOOD PRESSURE: 113 MMHG

## 2023-09-30 DIAGNOSIS — K56.609 UNSPECIFIED INTESTINAL OBSTRUCTION, UNSPECIFIED AS TO PARTIAL VERSUS COMPLETE OBSTRUCTION: Chronic | ICD-10-CM

## 2023-09-30 DIAGNOSIS — C80.1 MALIGNANT (PRIMARY) NEOPLASM, UNSPECIFIED: ICD-10-CM

## 2023-09-30 DIAGNOSIS — E87.8 OTHER DISORDERS OF ELECTROLYTE AND FLUID BALANCE, NOT ELSEWHERE CLASSIFIED: ICD-10-CM

## 2023-09-30 DIAGNOSIS — Z29.9 ENCOUNTER FOR PROPHYLACTIC MEASURES, UNSPECIFIED: ICD-10-CM

## 2023-09-30 DIAGNOSIS — C18.9 MALIGNANT NEOPLASM OF COLON, UNSPECIFIED: ICD-10-CM

## 2023-09-30 DIAGNOSIS — E80.6 OTHER DISORDERS OF BILIRUBIN METABOLISM: ICD-10-CM

## 2023-09-30 DIAGNOSIS — Z90.49 ACQUIRED ABSENCE OF OTHER SPECIFIED PARTS OF DIGESTIVE TRACT: Chronic | ICD-10-CM

## 2023-09-30 DIAGNOSIS — D64.9 ANEMIA, UNSPECIFIED: ICD-10-CM

## 2023-09-30 DIAGNOSIS — Z93.3 COLOSTOMY STATUS: Chronic | ICD-10-CM

## 2023-09-30 DIAGNOSIS — A41.9 SEPSIS, UNSPECIFIED ORGANISM: ICD-10-CM

## 2023-09-30 LAB
ALBUMIN SERPL ELPH-MCNC: 2.1 G/DL — LOW (ref 3.3–5)
ALP SERPL-CCNC: 331 U/L — HIGH (ref 40–120)
ALT FLD-CCNC: 65 U/L — HIGH (ref 4–41)
ANION GAP SERPL CALC-SCNC: 9 MMOL/L — SIGNIFICANT CHANGE UP (ref 7–14)
AST SERPL-CCNC: 130 U/L — HIGH (ref 4–40)
BILIRUB SERPL-MCNC: 11.8 MG/DL — HIGH (ref 0.2–1.2)
BUN SERPL-MCNC: 12 MG/DL — SIGNIFICANT CHANGE UP (ref 7–23)
CALCIUM SERPL-MCNC: 7.6 MG/DL — LOW (ref 8.4–10.5)
CHLORIDE SERPL-SCNC: 107 MMOL/L — SIGNIFICANT CHANGE UP (ref 98–107)
CO2 SERPL-SCNC: 21 MMOL/L — LOW (ref 22–31)
CREAT SERPL-MCNC: 0.51 MG/DL — SIGNIFICANT CHANGE UP (ref 0.5–1.3)
EGFR: 116 ML/MIN/1.73M2 — SIGNIFICANT CHANGE UP
GLUCOSE SERPL-MCNC: 96 MG/DL — SIGNIFICANT CHANGE UP (ref 70–99)
HCT VFR BLD CALC: 26.8 % — LOW (ref 39–50)
HGB BLD-MCNC: 8.9 G/DL — LOW (ref 13–17)
MAGNESIUM SERPL-MCNC: 1.5 MG/DL — LOW (ref 1.6–2.6)
MCHC RBC-ENTMCNC: 23.6 PG — LOW (ref 27–34)
MCHC RBC-ENTMCNC: 33.2 GM/DL — SIGNIFICANT CHANGE UP (ref 32–36)
MCV RBC AUTO: 71.1 FL — LOW (ref 80–100)
NRBC # BLD: 1 /100 WBCS — HIGH (ref 0–0)
NRBC # FLD: 0.14 K/UL — HIGH (ref 0–0)
PHOSPHATE SERPL-MCNC: 2.3 MG/DL — LOW (ref 2.5–4.5)
PLATELET # BLD AUTO: 304 K/UL — SIGNIFICANT CHANGE UP (ref 150–400)
POTASSIUM SERPL-MCNC: 3.8 MMOL/L — SIGNIFICANT CHANGE UP (ref 3.5–5.3)
POTASSIUM SERPL-SCNC: 3.8 MMOL/L — SIGNIFICANT CHANGE UP (ref 3.5–5.3)
PROT SERPL-MCNC: 5.7 G/DL — LOW (ref 6–8.3)
RBC # BLD: 3.77 M/UL — LOW (ref 4.2–5.8)
RBC # FLD: 23.5 % — HIGH (ref 10.3–14.5)
SODIUM SERPL-SCNC: 137 MMOL/L — SIGNIFICANT CHANGE UP (ref 135–145)
WBC # BLD: 14.27 K/UL — HIGH (ref 3.8–10.5)
WBC # FLD AUTO: 14.27 K/UL — HIGH (ref 3.8–10.5)

## 2023-09-30 PROCEDURE — 99223 1ST HOSP IP/OBS HIGH 75: CPT

## 2023-09-30 PROCEDURE — 93010 ELECTROCARDIOGRAM REPORT: CPT

## 2023-09-30 RX ORDER — PIPERACILLIN AND TAZOBACTAM 4; .5 G/20ML; G/20ML
3.38 INJECTION, POWDER, LYOPHILIZED, FOR SOLUTION INTRAVENOUS ONCE
Refills: 0 | Status: COMPLETED | OUTPATIENT
Start: 2023-09-30 | End: 2023-09-30

## 2023-09-30 RX ORDER — ENOXAPARIN SODIUM 100 MG/ML
40 INJECTION SUBCUTANEOUS EVERY 24 HOURS
Refills: 0 | Status: DISCONTINUED | OUTPATIENT
Start: 2023-09-30 | End: 2023-10-05

## 2023-09-30 RX ORDER — SODIUM,POTASSIUM PHOSPHATES 278-250MG
1 POWDER IN PACKET (EA) ORAL ONCE
Refills: 0 | Status: COMPLETED | OUTPATIENT
Start: 2023-09-30 | End: 2023-09-30

## 2023-09-30 RX ORDER — CHLORHEXIDINE GLUCONATE 213 G/1000ML
1 SOLUTION TOPICAL DAILY
Refills: 0 | Status: DISCONTINUED | OUTPATIENT
Start: 2023-09-30 | End: 2023-10-18

## 2023-09-30 RX ORDER — LANOLIN ALCOHOL/MO/W.PET/CERES
3 CREAM (GRAM) TOPICAL AT BEDTIME
Refills: 0 | Status: DISCONTINUED | OUTPATIENT
Start: 2023-09-30 | End: 2023-10-18

## 2023-09-30 RX ORDER — ACETAMINOPHEN 500 MG
975 TABLET ORAL EVERY 6 HOURS
Refills: 0 | Status: DISCONTINUED | OUTPATIENT
Start: 2023-09-30 | End: 2023-10-02

## 2023-09-30 RX ORDER — ONDANSETRON 8 MG/1
4 TABLET, FILM COATED ORAL EVERY 8 HOURS
Refills: 0 | Status: DISCONTINUED | OUTPATIENT
Start: 2023-09-30 | End: 2023-10-18

## 2023-09-30 RX ORDER — CALCIUM GLUCONATE 100 MG/ML
1 VIAL (ML) INTRAVENOUS ONCE
Refills: 0 | Status: DISCONTINUED | OUTPATIENT
Start: 2023-09-30 | End: 2023-09-30

## 2023-09-30 RX ORDER — IBUPROFEN 200 MG
400 TABLET ORAL ONCE
Refills: 0 | Status: COMPLETED | OUTPATIENT
Start: 2023-09-30 | End: 2023-09-30

## 2023-09-30 RX ORDER — PIPERACILLIN AND TAZOBACTAM 4; .5 G/20ML; G/20ML
3.38 INJECTION, POWDER, LYOPHILIZED, FOR SOLUTION INTRAVENOUS EVERY 8 HOURS
Refills: 0 | Status: DISCONTINUED | OUTPATIENT
Start: 2023-09-30 | End: 2023-10-06

## 2023-09-30 RX ORDER — MAGNESIUM SULFATE 500 MG/ML
1 VIAL (ML) INJECTION ONCE
Refills: 0 | Status: COMPLETED | OUTPATIENT
Start: 2023-09-30 | End: 2023-09-30

## 2023-09-30 RX ORDER — ACETAMINOPHEN 500 MG
650 TABLET ORAL ONCE
Refills: 0 | Status: DISCONTINUED | OUTPATIENT
Start: 2023-09-30 | End: 2023-09-30

## 2023-09-30 RX ORDER — CALCIUM GLUCONATE 100 MG/ML
1 VIAL (ML) INTRAVENOUS ONCE
Refills: 0 | Status: COMPLETED | OUTPATIENT
Start: 2023-09-30 | End: 2023-09-30

## 2023-09-30 RX ORDER — MAGNESIUM SULFATE 500 MG/ML
2 VIAL (ML) INJECTION ONCE
Refills: 0 | Status: DISCONTINUED | OUTPATIENT
Start: 2023-09-30 | End: 2023-09-30

## 2023-09-30 RX ADMIN — Medication 100 GRAM(S): at 13:55

## 2023-09-30 RX ADMIN — Medication 100 GRAM(S): at 10:48

## 2023-09-30 RX ADMIN — Medication 400 MILLIGRAM(S): at 17:35

## 2023-09-30 RX ADMIN — ENOXAPARIN SODIUM 40 MILLIGRAM(S): 100 INJECTION SUBCUTANEOUS at 10:48

## 2023-09-30 RX ADMIN — Medication 63.75 MILLIMOLE(S): at 19:13

## 2023-09-30 RX ADMIN — Medication 50 GRAM(S): at 16:16

## 2023-09-30 RX ADMIN — PIPERACILLIN AND TAZOBACTAM 25 GRAM(S): 4; .5 INJECTION, POWDER, LYOPHILIZED, FOR SOLUTION INTRAVENOUS at 22:11

## 2023-09-30 RX ADMIN — PIPERACILLIN AND TAZOBACTAM 200 GRAM(S): 4; .5 INJECTION, POWDER, LYOPHILIZED, FOR SOLUTION INTRAVENOUS at 09:42

## 2023-09-30 RX ADMIN — Medication 400 MILLIGRAM(S): at 16:35

## 2023-09-30 RX ADMIN — PIPERACILLIN AND TAZOBACTAM 25 GRAM(S): 4; .5 INJECTION, POWDER, LYOPHILIZED, FOR SOLUTION INTRAVENOUS at 13:55

## 2023-09-30 RX ADMIN — Medication 1 PACKET(S): at 10:48

## 2023-09-30 RX ADMIN — Medication 975 MILLIGRAM(S): at 07:17

## 2023-09-30 RX ADMIN — Medication 975 MILLIGRAM(S): at 07:13

## 2023-09-30 NOTE — H&P ADULT - NSHPLABSRESULTS_GEN_ALL_CORE
09-30    137  |  107  |  12  ----------------------------<  96  3.8   |  21<L>  |  0.51  09-29    138  |  109<H>  |  11  ----------------------------<  118<H>  3.5   |  22  |  0.62    Ca    7.6<L>      30 Sep 2023 09:44  Ca    7.7<L>      29 Sep 2023 07:45  Phos  2.3     09-30  Mg     1.50     09-30    TPro  5.7<L>  /  Alb  2.1<L>  /  TBili  11.8<H>  /  DBili  x   /  AST  130<H>  /  ALT  65<H>  /  AlkPhos  331<H>  09-30  TPro  6.2  /  Alb  1.4<L>  /  TBili  14.1<H>  /  DBili  x   /  AST  168<H>  /  ALT  82<H>  /  AlkPhos  441<H>  09-29                    Urinalysis Basic - ( 30 Sep 2023 09:44 )    Color: x / Appearance: x / SG: x / pH: x  Gluc: 96 mg/dL / Ketone: x  / Bili: x / Urobili: x   Blood: x / Protein: x / Nitrite: x   Leuk Esterase: x / RBC: x / WBC x   Sq Epi: x / Non Sq Epi: x / Bacteria: x                              8.9    14.27 )-----------( 304      ( 30 Sep 2023 09:44 )             26.8                         9.8    14.98 )-----------( 298      ( 29 Sep 2023 17:00 )             30.3     CAPILLARY BLOOD GLUCOSE    < from: US Abdomen Limited (09.26.23 @ 01:51) >    FINDINGS:    Liver: Diffusely heterogeneous liver.  Multiple echogenic liver masses   measure up to at least 4.5 cm.  Bile ducts: Normal caliber. Common bile duct measures 2.6mm.  Gallbladder: Partially contracted.  Gallbladder wall thickening to 7 mm.  Pancreas: Visualized portions are within normal limits.  Right kidney: 10.5 cm.  No hydronephrosis.  Cysts measure 1.4 x 0.9 x 1.2   cm and 1.3 x 0.9 cm and 1.3 x 0.9 cm.Ascites: None.  IVC: Visualized portions are within normal limits.    IMPRESSION:    Multiple echogenic liver masses measuring up to at least 4.5 cm.    Partially contracted gallbladder with nonspecific wall thickening 7 mm.    No gross sonographic evidence of cholelithiasis or acute cholecystitis.    < end of copied text >  < from: CT Abdomen and Pelvis w/ Oral Cont and w/ IV Cont (09.26.23 @ 01:31) >    IMPRESSION:  Peripancreatic edema suspicious for acute pancreatitis, new compared to   prior. While less likely this could also represent noninflammatory   edema/third spacing of fluid.    Small amount of ascites is new.    Otherwise similar to prior with a large number of hepatic metastases,   intrahepatic peripheral biliary ductal dilation in the right greater than   left lobes, partially visible lung metastases, and likely metastatic mild   upper abdominal and partially visible mediastinal adenopathy.      --- End of Report ---        < end of copied text >

## 2023-09-30 NOTE — H&P ADULT - PROBLEM SELECTOR PLAN 4
- Mg, Phos, Ca low, likely in setting if decreased PO intake  - will supplement  - monitor daily CMP

## 2023-09-30 NOTE — H&P ADULT - PROBLEM SELECTOR PLAN 2
- bilirubin 11.7-> 14.1, CT A/P with intrahepatic peripheral bilary ductal dilation right greater than left  - , AST//65, improved from previous   - GI emailed to evaluate for advanced procedures   - pending MRCP to further evaluate bilary ducts

## 2023-09-30 NOTE — H&P ADULT - PROBLEM SELECTOR PLAN 1
- [facility]   - here has Tmax 101.6, [id number] with WBC 14.27, source unknown at this time likely intraabdominal  -procal 0.91    - Bcx 9/25 NGTD, Ucx NGTD, repeat BCx pending   - was started on zosyn at OSH, will continue for now   - monitor CBC

## 2023-09-30 NOTE — H&P ADULT - PROBLEM SELECTOR PLAN 5
- diagnosed with colon cancer in 2021, receives biweekly chemotherapy (Avastin, Irinotecan, Leucovorin, and 5FU CADD)  - follows with Dr. Tu De La Fuente at [facility]

## 2023-09-30 NOTE — PATIENT PROFILE ADULT - FALL HARM RISK - HARM RISK INTERVENTIONS

## 2023-09-30 NOTE — H&P ADULT - HISTORY OF PRESENT ILLNESS
57 y/o M with pmhx of metastatic colon cancer on active chemotherapy(Avastin, Irinotecan, Leucovorin, and 5FU CADD) biweekly who presented to [facility] for yellowing skin and fatigue. Patient states that he noticed yellowing of his skin and eyes on Monday. He also endorses decreased appetite for the last 2 weeks with associated weight loss. Patient states that he has lost 8-9 lbs in the last 1 week. He denies any nausea or vomiting. He has noticed increased abdominal distension which he found odd given his lack of appetite. Noted that his urine has been darker than usual but denies any hematuria. He also endorses R flank pain that radiates to his back.     At [facility], CT A/P showed peripancreatic edema suspicious for acute pancreatitis new compared to prior; itrahepatic mets with intrahepatic peripheral biliary ductal dilatation R >L with partially visible lung mets with new small ascites. ptient was started on zosyn. Patient was transferred  to [facility] for Advanced GI assessment.

## 2023-09-30 NOTE — H&P ADULT - PROBLEM SELECTOR PLAN 3
- microcytic anemia, Hgb 8.9, was previously in 11-12 earlier this month, hemodynamically stable   - iron studies pending, B12, folate

## 2023-09-30 NOTE — H&P ADULT - ASSESSMENT
59 y/o M with pmhx of metastatic colon cancer on active chemotherapy(Avastin, Irinotecan, Leucovorin, and 5FU CADD) biweekly who presented to [facility] for yellowing skin and fatigue. Found to have hyperbilirubinemia with elevated temp concerning for sepsis. Admitted to medicine for further assessment.

## 2023-09-30 NOTE — H&P ADULT - NSHPREVIEWOFSYSTEMS_GEN_ALL_CORE
CONSTITUTIONAL: + fever, + chills, + fatigue and +weight loss   EYES: No eye pain, no acute blindness  Mouth: no pain in mouth, no cuts  RESPIRATORY: No cough, No sob  CARDIOVASCULAR: No CP, no palpitations  GASTROINTESTINAL + abdominal pain, no n/v/d  GENITOURINARY: No dysuria, no hematuria  Heme: No easy bruising, no swelling of neck  NEUROLOGICAL: No seizure, No acute paralysis  SKIN: + jaundice, No itching, no rashes  MUSCULOSKELETAL: No acute joint pain, no joint swelling

## 2023-09-30 NOTE — H&P ADULT - NSHPPHYSICALEXAM_GEN_ALL_CORE
T(C): 38.4 (09-30-23 @ 06:43), Max: 38.4 (09-30-23 @ 06:43)  HR: 109 (09-30-23 @ 06:43) (71 - 121)  BP: 108/73 (09-30-23 @ 06:43) (94/61 - 113/73)  RR: 18 (09-30-23 @ 06:43) (17 - 18)  SpO2: 100% (09-30-23 @ 06:43) (95% - 100%)    CONSTITUTIONAL: Well groomed, no apparent distress  EYES:   scleral icterus, non-icteric  ENMT: Oral mucosa with moist membranes. Normal dentition;  yellowing tongue  RESP: No respiratory distress, no use of accessory muscles; CTA b/l, no WRR  CV: RRR, +S1S2, no MRG; no JVD; no peripheral edema  GI: Soft, NT, slightly distended, no rebound, no guarding; no palpable masses; no hepatosplenomegaly; no hernia palpated  MSK: Normal gait; No digital clubbing or cyanosis; Normal ROM without pain, no spinal tenderness, normal muscle strength/tone  SKIN: +vitiligo, +jaundice   NEURO: CN II-XII intact; normal reflexes in upper and lower extremities, sensation intact in upper and lower extremities b/l to light touch   PSYCH: Appropriate insight/judgment; A+O x 3, mood and affect appropriate, recent/remote memory intact

## 2023-10-01 LAB
ALBUMIN SERPL ELPH-MCNC: 2.1 G/DL — LOW (ref 3.3–5)
ALP SERPL-CCNC: 336 U/L — HIGH (ref 40–120)
ALT FLD-CCNC: 66 U/L — HIGH (ref 4–41)
ANION GAP SERPL CALC-SCNC: 12 MMOL/L — SIGNIFICANT CHANGE UP (ref 7–14)
APTT BLD: 40.1 SEC — HIGH (ref 24.5–35.6)
AST SERPL-CCNC: 128 U/L — HIGH (ref 4–40)
BASOPHILS # BLD AUTO: 0.07 K/UL — SIGNIFICANT CHANGE UP (ref 0–0.2)
BASOPHILS NFR BLD AUTO: 0.6 % — SIGNIFICANT CHANGE UP (ref 0–2)
BILIRUB SERPL-MCNC: 11.5 MG/DL — HIGH (ref 0.2–1.2)
BUN SERPL-MCNC: 14 MG/DL — SIGNIFICANT CHANGE UP (ref 7–23)
CALCIUM SERPL-MCNC: 8.3 MG/DL — LOW (ref 8.4–10.5)
CHLORIDE SERPL-SCNC: 104 MMOL/L — SIGNIFICANT CHANGE UP (ref 98–107)
CO2 SERPL-SCNC: 22 MMOL/L — SIGNIFICANT CHANGE UP (ref 22–31)
CREAT SERPL-MCNC: 0.4 MG/DL — LOW (ref 0.5–1.3)
CULTURE RESULTS: SIGNIFICANT CHANGE UP
CULTURE RESULTS: SIGNIFICANT CHANGE UP
EGFR: 125 ML/MIN/1.73M2 — SIGNIFICANT CHANGE UP
EOSINOPHIL # BLD AUTO: 0.36 K/UL — SIGNIFICANT CHANGE UP (ref 0–0.5)
EOSINOPHIL NFR BLD AUTO: 3.2 % — SIGNIFICANT CHANGE UP (ref 0–6)
FERRITIN SERPL-MCNC: 3086 NG/ML — HIGH (ref 30–400)
GLUCOSE SERPL-MCNC: 90 MG/DL — SIGNIFICANT CHANGE UP (ref 70–99)
HAV IGM SER-ACNC: SIGNIFICANT CHANGE UP
HBV CORE IGM SER-ACNC: SIGNIFICANT CHANGE UP
HBV SURFACE AG SER-ACNC: SIGNIFICANT CHANGE UP
HCT VFR BLD CALC: 30.6 % — LOW (ref 39–50)
HCV AB S/CO SERPL IA: 0.1 S/CO — SIGNIFICANT CHANGE UP (ref 0–0.99)
HCV AB SERPL-IMP: SIGNIFICANT CHANGE UP
HGB BLD-MCNC: 10.1 G/DL — LOW (ref 13–17)
IANC: 8.71 K/UL — HIGH (ref 1.8–7.4)
IMM GRANULOCYTES NFR BLD AUTO: 1.3 % — HIGH (ref 0–0.9)
INR BLD: 1.79 RATIO — HIGH (ref 0.85–1.18)
IRON SATN MFR SERPL: 30 % — SIGNIFICANT CHANGE UP (ref 14–50)
IRON SATN MFR SERPL: 34 UG/DL — LOW (ref 45–165)
LYMPHOCYTES # BLD AUTO: 1.48 K/UL — SIGNIFICANT CHANGE UP (ref 1–3.3)
LYMPHOCYTES # BLD AUTO: 13 % — SIGNIFICANT CHANGE UP (ref 13–44)
MCHC RBC-ENTMCNC: 23.6 PG — LOW (ref 27–34)
MCHC RBC-ENTMCNC: 33 GM/DL — SIGNIFICANT CHANGE UP (ref 32–36)
MCV RBC AUTO: 71.5 FL — LOW (ref 80–100)
MONOCYTES # BLD AUTO: 0.59 K/UL — SIGNIFICANT CHANGE UP (ref 0–0.9)
MONOCYTES NFR BLD AUTO: 5.2 % — SIGNIFICANT CHANGE UP (ref 2–14)
MRSA PCR RESULT.: SIGNIFICANT CHANGE UP
NEUTROPHILS # BLD AUTO: 8.71 K/UL — HIGH (ref 1.8–7.4)
NEUTROPHILS NFR BLD AUTO: 76.7 % — SIGNIFICANT CHANGE UP (ref 43–77)
NRBC # BLD: 1 /100 WBCS — HIGH (ref 0–0)
NRBC # FLD: 0.11 K/UL — HIGH (ref 0–0)
PLATELET # BLD AUTO: 355 K/UL — SIGNIFICANT CHANGE UP (ref 150–400)
POTASSIUM SERPL-MCNC: 3.6 MMOL/L — SIGNIFICANT CHANGE UP (ref 3.5–5.3)
POTASSIUM SERPL-SCNC: 3.6 MMOL/L — SIGNIFICANT CHANGE UP (ref 3.5–5.3)
PROT SERPL-MCNC: 6.3 G/DL — SIGNIFICANT CHANGE UP (ref 6–8.3)
PROTHROM AB SERPL-ACNC: 19.9 SEC — HIGH (ref 9.5–13)
RBC # BLD: 4.28 M/UL — SIGNIFICANT CHANGE UP (ref 4.2–5.8)
RBC # FLD: 23.6 % — HIGH (ref 10.3–14.5)
S AUREUS DNA NOSE QL NAA+PROBE: SIGNIFICANT CHANGE UP
SODIUM SERPL-SCNC: 138 MMOL/L — SIGNIFICANT CHANGE UP (ref 135–145)
SPECIMEN SOURCE: SIGNIFICANT CHANGE UP
SPECIMEN SOURCE: SIGNIFICANT CHANGE UP
TIBC SERPL-MCNC: 113 UG/DL — LOW (ref 220–430)
TRANSFERRIN SERPL-MCNC: 91 MG/DL — LOW (ref 200–360)
UIBC SERPL-MCNC: 79 UG/DL — LOW (ref 110–370)
WBC # BLD: 11.36 K/UL — HIGH (ref 3.8–10.5)
WBC # FLD AUTO: 11.36 K/UL — HIGH (ref 3.8–10.5)

## 2023-10-01 PROCEDURE — 99223 1ST HOSP IP/OBS HIGH 75: CPT | Mod: GC

## 2023-10-01 PROCEDURE — 99232 SBSQ HOSP IP/OBS MODERATE 35: CPT

## 2023-10-01 RX ORDER — SIMETHICONE 80 MG/1
80 TABLET, CHEWABLE ORAL ONCE
Refills: 0 | Status: COMPLETED | OUTPATIENT
Start: 2023-10-01 | End: 2023-10-01

## 2023-10-01 RX ADMIN — PIPERACILLIN AND TAZOBACTAM 25 GRAM(S): 4; .5 INJECTION, POWDER, LYOPHILIZED, FOR SOLUTION INTRAVENOUS at 13:38

## 2023-10-01 RX ADMIN — CHLORHEXIDINE GLUCONATE 1 APPLICATION(S): 213 SOLUTION TOPICAL at 11:55

## 2023-10-01 RX ADMIN — SIMETHICONE 80 MILLIGRAM(S): 80 TABLET, CHEWABLE ORAL at 18:58

## 2023-10-01 RX ADMIN — PIPERACILLIN AND TAZOBACTAM 25 GRAM(S): 4; .5 INJECTION, POWDER, LYOPHILIZED, FOR SOLUTION INTRAVENOUS at 22:18

## 2023-10-01 RX ADMIN — ENOXAPARIN SODIUM 40 MILLIGRAM(S): 100 INJECTION SUBCUTANEOUS at 11:56

## 2023-10-01 RX ADMIN — PIPERACILLIN AND TAZOBACTAM 25 GRAM(S): 4; .5 INJECTION, POWDER, LYOPHILIZED, FOR SOLUTION INTRAVENOUS at 05:49

## 2023-10-01 NOTE — PROGRESS NOTE ADULT - PROBLEM SELECTOR PLAN 1
[facility]   - here has Tmax 101.6, [id number] with WBC 14.27, source unknown at this time, no focal infectious symptoms, possibly intraabdominal source  - procal 0.91    - Bcx 9/25 NGTD, Ucx NGTD, repeat BCx ngtd  - was started on zosyn at OSH, will continue for now   - last fever 9/30/23. HR improving, now 80s

## 2023-10-01 NOTE — PROGRESS NOTE ADULT - SUBJECTIVE AND OBJECTIVE BOX
Patient is a 58y old  Male who presents with a chief complaint of jaundice, fatigue    SUBJECTIVE / OVERNIGHT EVENTS: No acute events. Comfortable, no itching or pain at this time. Awaiting MRCP.     MEDICATIONS  (STANDING):  chlorhexidine 2% Cloths 1 Application(s) Topical daily  enoxaparin Injectable 40 milliGRAM(s) SubCutaneous every 24 hours  piperacillin/tazobactam IVPB.. 3.375 Gram(s) IV Intermittent every 8 hours    MEDICATIONS  (PRN):  acetaminophen     Tablet .. 975 milliGRAM(s) Oral every 6 hours PRN Temp greater or equal to 38C (100.4F)  melatonin 3 milliGRAM(s) Oral at bedtime PRN Insomnia  ondansetron Injectable 4 milliGRAM(s) IV Push every 8 hours PRN Nausea and/or Vomiting    CAPILLARY BLOOD GLUCOSE    I&O's Summary    30 Sep 2023 07:01  -  01 Oct 2023 07:00  --------------------------------------------------------  IN: 500 mL / OUT: 0 mL / NET: 500 mL    PHYSICAL EXAM:  Vital Signs Last 24 Hrs  T(C): 36.8 (01 Oct 2023 05:53), Max: 36.8 (30 Sep 2023 14:42)  T(F): 98.2 (01 Oct 2023 05:53), Max: 98.3 (30 Sep 2023 14:42)  HR: 86 (01 Oct 2023 05:53) (86 - 92)  BP: 108/74 (01 Oct 2023 05:53) (100/73 - 108/75)  BP(mean): --  RR: 17 (01 Oct 2023 05:53) (17 - 18)  SpO2: 99% (01 Oct 2023 05:53) (96% - 100%)    Parameters below as of 01 Oct 2023 05:53  Patient On (Oxygen Delivery Method): room air    CONSTITUTIONAL: NAD, laying in bed  EYES: scleral icterus  ENMT: Moist oral mucosa  NECK: Supple  RESPIRATORY: Normal respiratory effort; lungs are clear to auscultation bilaterally  CARDIOVASCULAR: Regular rate and rhythm, normal S1 and S2; No lower extremity edema; Peripheral pulses are 2+ bilaterally  ABDOMEN: soft, distended, NT  PSYCH: calm, affect appropriate  NEUROLOGY: moving all extremities  SKIN: No rashes; no palpable lesions    LABS:                        10.1   11.36 )-----------( 355      ( 01 Oct 2023 06:00 )             30.6     10-01    138  |  104  |  14  ----------------------------<  90  3.6   |  22  |  0.40<L>    Ca    8.3<L>      01 Oct 2023 06:00  Phos  2.3     09-30  Mg     1.50     09-30    TPro  6.3  /  Alb  2.1<L>  /  TBili  11.5<H>  /  DBili  x   /  AST  128<H>  /  ALT  66<H>  /  AlkPhos  336<H>  10-01    PT/INR - ( 01 Oct 2023 06:00 )   PT: 19.9 sec;   INR: 1.79 ratio         PTT - ( 01 Oct 2023 06:00 )  PTT:40.1 sec      Urinalysis Basic - ( 01 Oct 2023 06:00 )    Color: x / Appearance: x / SG: x / pH: x  Gluc: 90 mg/dL / Ketone: x  / Bili: x / Urobili: x   Blood: x / Protein: x / Nitrite: x   Leuk Esterase: x / RBC: x / WBC x   Sq Epi: x / Non Sq Epi: x / Bacteria: x    Culture - Blood (collected 29 Sep 2023 07:45)  Source: .Blood Blood-Peripheral  Preliminary Report (30 Sep 2023 14:02):    No growth at 24 hours    Culture - Blood (collected 29 Sep 2023 07:45)  Source: .Blood Blood-Peripheral  Preliminary Report (30 Sep 2023 14:02):    No growth at 24 hours    RADIOLOGY & ADDITIONAL TESTS: Reviewed    COORDINATION OF CARE:  Care Discussed with Consultants/Other Providers [Y- Medicine ACP]

## 2023-10-01 NOTE — PROGRESS NOTE ADULT - ASSESSMENT
57 y/o M with pmhx of metastatic colon cancer on active chemotherapy(Avastin, Irinotecan, Leucovorin, and 5FU CADD) biweekly who presented to [facility] for yellowing skin and fatigue. Found to have hyperbilirubinemia along with fever.

## 2023-10-01 NOTE — CONSULT NOTE ADULT - SUBJECTIVE AND OBJECTIVE BOX
Chief Complaint:  Patient is a 58y old  Male who presents with a chief complaint of Jaundice (01 Oct 2023 11:44)      HPI:    Allergies:  No Known Allergies      Home Medications:    Hospital Medications:  acetaminophen     Tablet .. 975 milliGRAM(s) Oral every 6 hours PRN  chlorhexidine 2% Cloths 1 Application(s) Topical daily  enoxaparin Injectable 40 milliGRAM(s) SubCutaneous every 24 hours  melatonin 3 milliGRAM(s) Oral at bedtime PRN  ondansetron Injectable 4 milliGRAM(s) IV Push every 8 hours PRN  piperacillin/tazobactam IVPB.. 3.375 Gram(s) IV Intermittent every 8 hours      PMHX/PSHX:  Colon cancer    Vitiligo    Metastatic colon cancer to liver    No significant past surgical history    S/P colectomy    S/P colostomy    Large bowel obstruction        Family history:  Family history of colon cancer (Father)    Family history of hypertension (Mother)    Family history of type 2 diabetes mellitus (Father)        Social History:     ROS:     General:  No wt loss, fevers, chills, night sweats, fatigue,   Eyes:  Good vision, no reported pain  ENT:  No sore throat, pain, runny nose, dysphagia  CV:  No pain, palpitations, hypo/hypertension  Resp:  No dyspnea, cough, tachypnea, wheezing  GI:  See HPI  :  No pain, bleeding, incontinence, nocturia  Muscle:  No pain, weakness  Neuro:  No weakness, tingling, memory problems  Psych:  No fatigue, insomnia, mood problems, depression  Endocrine:  No polyuria, polydipsia, cold/heat intolerance  Heme:  No petechiae, ecchymosis, easy bruisability  Skin:  No rash, edema      PHYSICAL EXAM:     GENERAL:  Appears stated age, well-groomed, well-nourished, no distress  HEENT:  NC/AT,  conjunctivae clear and pink,  no JVD  CHEST:  Full & symmetric excursion, no increased effort, breath sounds clear  HEART:  Regular rhythm, S1, S2, no murmur/rub/S3/S4, no abdominal bruit, no edema  ABDOMEN:  Soft, non-tender, non-distended, normoactive bowel sounds,  no masses ,  EXTREMITIES:  no cyanosis,clubbing or edema  SKIN:  No rash/erythema/ecchymoses/petechiae/wounds/abscess/warm/dry  NEURO:  Alert, oriented    Vital Signs:  Vital Signs Last 24 Hrs  T(C): 36.8 (01 Oct 2023 12:45), Max: 36.8 (30 Sep 2023 14:42)  T(F): 98.2 (01 Oct 2023 12:45), Max: 98.3 (30 Sep 2023 14:42)  HR: 100 (01 Oct 2023 12:45) (86 - 100)  BP: 104/77 (01 Oct 2023 12:45) (100/73 - 108/75)  BP(mean): --  RR: 18 (01 Oct 2023 12:45) (17 - 18)  SpO2: 100% (01 Oct 2023 12:45) (96% - 100%)    Parameters below as of 01 Oct 2023 12:45  Patient On (Oxygen Delivery Method): room air      Daily     Daily     LABS:                        10.1   11.36 )-----------( 355      ( 01 Oct 2023 06:00 )             30.6     10-01    138  |  104  |  14  ----------------------------<  90  3.6   |  22  |  0.40<L>    Ca    8.3<L>      01 Oct 2023 06:00  Phos  2.3     09-30  Mg     1.50     09-30    TPro  6.3  /  Alb  2.1<L>  /  TBili  11.5<H>  /  DBili  x   /  AST  128<H>  /  ALT  66<H>  /  AlkPhos  336<H>  10-01    LIVER FUNCTIONS - ( 01 Oct 2023 06:00 )  Alb: 2.1 g/dL / Pro: 6.3 g/dL / ALK PHOS: 336 U/L / ALT: 66 U/L / AST: 128 U/L / GGT: x           PT/INR - ( 01 Oct 2023 06:00 )   PT: 19.9 sec;   INR: 1.79 ratio         PTT - ( 01 Oct 2023 06:00 )  PTT:40.1 sec  Urinalysis Basic - ( 01 Oct 2023 06:00 )    Color: x / Appearance: x / SG: x / pH: x  Gluc: 90 mg/dL / Ketone: x  / Bili: x / Urobili: x   Blood: x / Protein: x / Nitrite: x   Leuk Esterase: x / RBC: x / WBC x   Sq Epi: x / Non Sq Epi: x / Bacteria: x          Imaging:             Chief Complaint:  Patient is a 58y old  Male who presents with a chief complaint of Jaundice (01 Oct 2023 11:44)      HPI:  57 y/o M with PMHx of metastatic colon cancer on active chemotherapy (Avastin, Irinotecan, Leucovorin, and 5FU CADD) biweekly who presented to [facility] for jaundice, abdo distension, and fatigue since Monday 9/25. This is associated with poor appetite and poor PO intake x1 week. Denies n/v. CT A/P shows intrahepatic mets with intrahepatic peripheral biliary ductal dilatation R >L with partially visible lung mets with new small ascites. Pt was started on zosyn. Patient was transferred  to [facility] for Advanced GI assessment. LFT notable for TBili 11.5 today. Patient reports he was aware of liver and lung mets.     Allergies:  No Known Allergies      Hospital Medications:  acetaminophen     Tablet .. 975 milliGRAM(s) Oral every 6 hours PRN  chlorhexidine 2% Cloths 1 Application(s) Topical daily  enoxaparin Injectable 40 milliGRAM(s) SubCutaneous every 24 hours  melatonin 3 milliGRAM(s) Oral at bedtime PRN  ondansetron Injectable 4 milliGRAM(s) IV Push every 8 hours PRN  piperacillin/tazobactam IVPB.. 3.375 Gram(s) IV Intermittent every 8 hours      PMHX/PSHX:  Colon cancer    Vitiligo    Metastatic colon cancer to liver    No significant past surgical history    S/P colectomy    S/P colostomy    Large bowel obstruction        Family history:  Family history of colon cancer (Father)    Family history of hypertension (Mother)    Family history of type 2 diabetes mellitus (Father)        Social History:     ROS:     General:  No wt loss, fevers, chills, night sweats, fatigue,   Eyes:  Good vision, no reported pain  ENT:  No sore throat, pain, runny nose, dysphagia  CV:  No pain, palpitations, hypo/hypertension  Resp:  No dyspnea, cough, tachypnea, wheezing  GI:  See HPI  :  No pain, bleeding, incontinence, nocturia  Muscle:  No pain, weakness  Neuro:  No weakness, tingling, memory problems  Psych:  No fatigue, insomnia, mood problems, depression  Endocrine:  No polyuria, polydipsia, cold/heat intolerance  Heme:  No petechiae, ecchymosis, easy bruisability  Skin:  No rash, edema      PHYSICAL EXAM:     GENERAL:  chronically ill appearing  HEENT:  NC/AT  CHEST:  Full & symmetric excursion, no increased effort, breath sounds clear  HEART:  Regular rhythm, S1, S2  ABDOMEN:  Soft, non-tender, non-distended, normoactive bowel sounds  EXTREMITIES:  no LE edema  SKIN:  diffuse hypopigmented rash  NEURO:  Alert, oriented x3    Vital Signs:  Vital Signs Last 24 Hrs  T(C): 36.8 (01 Oct 2023 12:45), Max: 36.8 (30 Sep 2023 14:42)  T(F): 98.2 (01 Oct 2023 12:45), Max: 98.3 (30 Sep 2023 14:42)  HR: 100 (01 Oct 2023 12:45) (86 - 100)  BP: 104/77 (01 Oct 2023 12:45) (100/73 - 108/75)  BP(mean): --  RR: 18 (01 Oct 2023 12:45) (17 - 18)  SpO2: 100% (01 Oct 2023 12:45) (96% - 100%)    Parameters below as of 01 Oct 2023 12:45  Patient On (Oxygen Delivery Method): room air      Daily     Daily     LABS:                        10.1   11.36 )-----------( 355      ( 01 Oct 2023 06:00 )             30.6     10-01    138  |  104  |  14  ----------------------------<  90  3.6   |  22  |  0.40<L>    Ca    8.3<L>      01 Oct 2023 06:00  Phos  2.3     09-30  Mg     1.50     09-30    TPro  6.3  /  Alb  2.1<L>  /  TBili  11.5<H>  /  DBili  x   /  AST  128<H>  /  ALT  66<H>  /  AlkPhos  336<H>  10-01    LIVER FUNCTIONS - ( 01 Oct 2023 06:00 )  Alb: 2.1 g/dL / Pro: 6.3 g/dL / ALK PHOS: 336 U/L / ALT: 66 U/L / AST: 128 U/L / GGT: x           PT/INR - ( 01 Oct 2023 06:00 )   PT: 19.9 sec;   INR: 1.79 ratio         PTT - ( 01 Oct 2023 06:00 )  PTT:40.1 sec  Urinalysis Basic - ( 01 Oct 2023 06:00 )    Color: x / Appearance: x / SG: x / pH: x  Gluc: 90 mg/dL / Ketone: x  / Bili: x / Urobili: x   Blood: x / Protein: x / Nitrite: x   Leuk Esterase: x / RBC: x / WBC x   Sq Epi: x / Non Sq Epi: x / Bacteria: x          Imaging:  < from: CT Abdomen and Pelvis w/ Oral Cont and w/ IV Cont (09.26.23 @ 01:31) >  ACC: [id number] EXAM:  CT ABDOMEN AND PELVIS OC IC   ORDERED BY: HEAVENLY BOSS     PROCEDURE DATE:  09/26/2023          INTERPRETATION:  CLINICAL INFORMATION: Flulike symptoms and jaundice.   Tachycardic. Stage IV colon cancer on chemotherapy.    COMPARISON: 9/19/2023 CT chest and MRI abdomen.    CONTRAST/COMPLICATIONS:  IV Contrast: Omnipaque 350  90 cc administered   10 cc discarded  Oral Contrast: NONE  Complications: None reported at time of study completion    PROCEDURE:  CT of the Abdomen and Pelvis was performed.  Sagittal and coronal reformats were performed.    FINDINGS:  LOWER CHEST: Several pulmonary metastases partially visible similar to   prior.    LIVER: As on the previous studies, a large number of metastatic lesions   replaces the majority of the hepatic parenchyma. Dilated ducts are seen   within the periphery of the right greater than left lobe, likely due to   compression of the more central ducts by metastases. This is similar to   prior as well.  BILE DUCTS: Dilated intrahepatic ducts as described in the liver section.   Common bile duct not dilated.  GALLBLADDER: Edematous wall thickening of the gallbladder is similar to   prior.  SPLEEN: Within normal limits.  PANCREAS: Peripancreatic edema is new. No drainable collection or   evidence of pancreatic necrosis.  ADRENALS: Within normal limits.  KIDNEYS/URETERS: No concerning mass or hydronephrosis. Small right-sided   cysts. No stones.    BLADDER: Thick-walled.  REPRODUCTIVE ORGANS: Enlarged prostate.    BOWEL: Status post partial left colectomy with left lower quadrant   colostomy. No obstruction or inflammation of the bowel. Appendix is   normal.  PERITONEUM: Small amount of ascites. No free air, no extraluminal air, no   abscess.  VESSELS: Within normal limits.  RETROPERITONEUM/LYMPH NODES: Mild upper abdominal and partially visible   mediastinal lymphadenopathy similar to prior.  ABDOMINAL WALL: No concerning findings. Left lower quadrant ostomy.  BONES: No fracture or aggressive osseous lesions.    IMPRESSION:  Peripancreatic edema suspicious for acute pancreatitis, new compared to   prior. While less likely this could also represent noninflammatory   edema/third spacing of fluid.    Small amount of ascites is new.    Otherwise similar to prior with a large number of hepatic metastases,   intrahepatic peripheral biliary ductal dilation in the right greater than   left lobes, partially visible lung metastases, and likely metastatic mild   upper abdominal and partially visible mediastinal adenopathy.      --- End of Report ---            MIGEL COUCH MD; Attending Radiologist  This document has been electronically signed. Sep 26 2023  2:50AM    < end of copied text >

## 2023-10-01 NOTE — CONSULT NOTE ADULT - ASSESSMENT
59 y/o M with PMHx of metastatic colon cancer on active chemotherapy (Avastin, Irinotecan, Leucovorin, and 5FU CADD) biweekly who presented to [facility] for jaundice, abdo distension, and fatigue. Found with extensive liver mets on CTAP with T Bili 11.5.    #Metastatic Colon Ca  #Cholestatic Liver Injury  #Jaundice    DDx: obstructive jaundice vs extensive liver parenchymal disease iso progression of metastatic colon ca    Recommendations:  -obtain MRI/MRCP w/ w/o IVC  -Med Onc consult  -palliative care consult  -advance diet as tolerated  -c/w ABx  -f/u infectious w/u      All recommendations preliminary until note signed by service attending.    Thank you for involving us in the care of this patient. Please contact should any concern or questions arise.    Elie Nettles MD   Gastroenterology/Hepatology Fellow PGY-5  Available on [employer] Teams 7am - 5pm  [email or web address]    NON-URGENT CONSULTS:  Please email:  [email or web address]   OR  [email or web address]    After 5pm, please contact the on-call GI fellow. [phone]    AT NIGHT AND ON WEEKENDS:  Contact on-call GI fellow via answering service ([phone]) from 5pm-8am and on weekends/holidays

## 2023-10-02 DIAGNOSIS — A41.9 SEPSIS, UNSPECIFIED ORGANISM: ICD-10-CM

## 2023-10-02 DIAGNOSIS — Z93.3 COLOSTOMY STATUS: ICD-10-CM

## 2023-10-02 DIAGNOSIS — C18.9 MALIGNANT NEOPLASM OF COLON, UNSPECIFIED: ICD-10-CM

## 2023-10-02 DIAGNOSIS — M54.9 DORSALGIA, UNSPECIFIED: ICD-10-CM

## 2023-10-02 DIAGNOSIS — R17 UNSPECIFIED JAUNDICE: ICD-10-CM

## 2023-10-02 DIAGNOSIS — R18.8 OTHER ASCITES: ICD-10-CM

## 2023-10-02 DIAGNOSIS — K85.90 ACUTE PANCREATITIS WITHOUT NECROSIS OR INFECTION, UNSPECIFIED: ICD-10-CM

## 2023-10-02 DIAGNOSIS — I10 ESSENTIAL (PRIMARY) HYPERTENSION: ICD-10-CM

## 2023-10-02 DIAGNOSIS — C78.7 SECONDARY MALIGNANT NEOPLASM OF LIVER AND INTRAHEPATIC BILE DUCT: ICD-10-CM

## 2023-10-02 DIAGNOSIS — E43 UNSPECIFIED SEVERE PROTEIN-CALORIE MALNUTRITION: ICD-10-CM

## 2023-10-02 DIAGNOSIS — Z51.5 ENCOUNTER FOR PALLIATIVE CARE: ICD-10-CM

## 2023-10-02 DIAGNOSIS — Z90.49 ACQUIRED ABSENCE OF OTHER SPECIFIED PARTS OF DIGESTIVE TRACT: ICD-10-CM

## 2023-10-02 DIAGNOSIS — E83.42 HYPOMAGNESEMIA: ICD-10-CM

## 2023-10-02 LAB
ALBUMIN SERPL ELPH-MCNC: 1.9 G/DL — LOW (ref 3.3–5)
ALP SERPL-CCNC: 344 U/L — HIGH (ref 40–120)
ALT FLD-CCNC: 71 U/L — HIGH (ref 4–41)
ANION GAP SERPL CALC-SCNC: 10 MMOL/L — SIGNIFICANT CHANGE UP (ref 7–14)
AST SERPL-CCNC: 156 U/L — HIGH (ref 4–40)
BASOPHILS # BLD AUTO: 0.05 K/UL — SIGNIFICANT CHANGE UP (ref 0–0.2)
BASOPHILS NFR BLD AUTO: 0.4 % — SIGNIFICANT CHANGE UP (ref 0–2)
BILIRUB SERPL-MCNC: 10.5 MG/DL — HIGH (ref 0.2–1.2)
BUN SERPL-MCNC: 14 MG/DL — SIGNIFICANT CHANGE UP (ref 7–23)
CALCIUM SERPL-MCNC: 8 MG/DL — LOW (ref 8.4–10.5)
CHLORIDE SERPL-SCNC: 103 MMOL/L — SIGNIFICANT CHANGE UP (ref 98–107)
CO2 SERPL-SCNC: 22 MMOL/L — SIGNIFICANT CHANGE UP (ref 22–31)
CREAT SERPL-MCNC: 0.46 MG/DL — LOW (ref 0.5–1.3)
EGFR: 120 ML/MIN/1.73M2 — SIGNIFICANT CHANGE UP
EOSINOPHIL # BLD AUTO: 0.27 K/UL — SIGNIFICANT CHANGE UP (ref 0–0.5)
EOSINOPHIL NFR BLD AUTO: 2.3 % — SIGNIFICANT CHANGE UP (ref 0–6)
GLUCOSE SERPL-MCNC: 87 MG/DL — SIGNIFICANT CHANGE UP (ref 70–99)
HCT VFR BLD CALC: 28.7 % — LOW (ref 39–50)
HGB BLD-MCNC: 9.6 G/DL — LOW (ref 13–17)
IANC: 8.62 K/UL — HIGH (ref 1.8–7.4)
IMM GRANULOCYTES NFR BLD AUTO: 1.3 % — HIGH (ref 0–0.9)
LACTATE SERPL-SCNC: 2.4 MMOL/L — HIGH (ref 0.5–2)
LYMPHOCYTES # BLD AUTO: 1.89 K/UL — SIGNIFICANT CHANGE UP (ref 1–3.3)
LYMPHOCYTES # BLD AUTO: 16.3 % — SIGNIFICANT CHANGE UP (ref 13–44)
MAGNESIUM SERPL-MCNC: 1.6 MG/DL — SIGNIFICANT CHANGE UP (ref 1.6–2.6)
MCHC RBC-ENTMCNC: 23.9 PG — LOW (ref 27–34)
MCHC RBC-ENTMCNC: 33.4 GM/DL — SIGNIFICANT CHANGE UP (ref 32–36)
MCV RBC AUTO: 71.6 FL — LOW (ref 80–100)
MONOCYTES # BLD AUTO: 0.65 K/UL — SIGNIFICANT CHANGE UP (ref 0–0.9)
MONOCYTES NFR BLD AUTO: 5.6 % — SIGNIFICANT CHANGE UP (ref 2–14)
NEUTROPHILS # BLD AUTO: 8.62 K/UL — HIGH (ref 1.8–7.4)
NEUTROPHILS NFR BLD AUTO: 74.1 % — SIGNIFICANT CHANGE UP (ref 43–77)
NRBC # BLD: 0 /100 WBCS — SIGNIFICANT CHANGE UP (ref 0–0)
NRBC # FLD: 0.09 K/UL — HIGH (ref 0–0)
PHOSPHATE SERPL-MCNC: 2.6 MG/DL — SIGNIFICANT CHANGE UP (ref 2.5–4.5)
PLATELET # BLD AUTO: 380 K/UL — SIGNIFICANT CHANGE UP (ref 150–400)
POTASSIUM SERPL-MCNC: 3.3 MMOL/L — LOW (ref 3.5–5.3)
POTASSIUM SERPL-SCNC: 3.3 MMOL/L — LOW (ref 3.5–5.3)
PROT SERPL-MCNC: 6.3 G/DL — SIGNIFICANT CHANGE UP (ref 6–8.3)
RBC # BLD: 4.01 M/UL — LOW (ref 4.2–5.8)
RBC # FLD: 23.6 % — HIGH (ref 10.3–14.5)
SODIUM SERPL-SCNC: 135 MMOL/L — SIGNIFICANT CHANGE UP (ref 135–145)
WBC # BLD: 11.63 K/UL — HIGH (ref 3.8–10.5)
WBC # FLD AUTO: 11.63 K/UL — HIGH (ref 3.8–10.5)

## 2023-10-02 PROCEDURE — 93010 ELECTROCARDIOGRAM REPORT: CPT

## 2023-10-02 PROCEDURE — 99232 SBSQ HOSP IP/OBS MODERATE 35: CPT

## 2023-10-02 PROCEDURE — 99223 1ST HOSP IP/OBS HIGH 75: CPT | Mod: GC

## 2023-10-02 PROCEDURE — 99223 1ST HOSP IP/OBS HIGH 75: CPT | Mod: FS

## 2023-10-02 RX ORDER — LIDOCAINE 4 G/100G
1 CREAM TOPICAL ONCE
Refills: 0 | Status: COMPLETED | OUTPATIENT
Start: 2023-10-02 | End: 2023-10-02

## 2023-10-02 RX ORDER — ACETAMINOPHEN 500 MG
975 TABLET ORAL ONCE
Refills: 0 | Status: COMPLETED | OUTPATIENT
Start: 2023-10-02 | End: 2023-10-02

## 2023-10-02 RX ORDER — LIDOCAINE 4 G/100G
1 CREAM TOPICAL DAILY
Refills: 0 | Status: DISCONTINUED | OUTPATIENT
Start: 2023-10-02 | End: 2023-10-18

## 2023-10-02 RX ORDER — ACETAMINOPHEN 500 MG
975 TABLET ORAL EVERY 6 HOURS
Refills: 0 | Status: DISCONTINUED | OUTPATIENT
Start: 2023-10-02 | End: 2023-10-04

## 2023-10-02 RX ORDER — POTASSIUM CHLORIDE 20 MEQ
10 PACKET (EA) ORAL
Refills: 0 | Status: COMPLETED | OUTPATIENT
Start: 2023-10-02 | End: 2023-10-02

## 2023-10-02 RX ORDER — SODIUM CHLORIDE 9 MG/ML
1000 INJECTION, SOLUTION INTRAVENOUS
Refills: 0 | Status: DISCONTINUED | OUTPATIENT
Start: 2023-10-02 | End: 2023-10-03

## 2023-10-02 RX ADMIN — Medication 100 MILLIEQUIVALENT(S): at 12:15

## 2023-10-02 RX ADMIN — SODIUM CHLORIDE 100 MILLILITER(S): 9 INJECTION, SOLUTION INTRAVENOUS at 09:31

## 2023-10-02 RX ADMIN — LIDOCAINE 1 PATCH: 4 CREAM TOPICAL at 12:13

## 2023-10-02 RX ADMIN — Medication 975 MILLIGRAM(S): at 03:30

## 2023-10-02 RX ADMIN — Medication 975 MILLIGRAM(S): at 12:40

## 2023-10-02 RX ADMIN — Medication 100 MILLIEQUIVALENT(S): at 11:29

## 2023-10-02 RX ADMIN — CHLORHEXIDINE GLUCONATE 1 APPLICATION(S): 213 SOLUTION TOPICAL at 13:58

## 2023-10-02 RX ADMIN — Medication 975 MILLIGRAM(S): at 22:54

## 2023-10-02 RX ADMIN — Medication 100 MILLIEQUIVALENT(S): at 09:31

## 2023-10-02 RX ADMIN — Medication 975 MILLIGRAM(S): at 13:57

## 2023-10-02 RX ADMIN — PIPERACILLIN AND TAZOBACTAM 25 GRAM(S): 4; .5 INJECTION, POWDER, LYOPHILIZED, FOR SOLUTION INTRAVENOUS at 13:57

## 2023-10-02 RX ADMIN — LIDOCAINE 1 PATCH: 4 CREAM TOPICAL at 20:18

## 2023-10-02 RX ADMIN — SODIUM CHLORIDE 100 MILLILITER(S): 9 INJECTION, SOLUTION INTRAVENOUS at 19:25

## 2023-10-02 RX ADMIN — PIPERACILLIN AND TAZOBACTAM 25 GRAM(S): 4; .5 INJECTION, POWDER, LYOPHILIZED, FOR SOLUTION INTRAVENOUS at 06:06

## 2023-10-02 RX ADMIN — ENOXAPARIN SODIUM 40 MILLIGRAM(S): 100 INJECTION SUBCUTANEOUS at 14:00

## 2023-10-02 RX ADMIN — Medication 975 MILLIGRAM(S): at 21:54

## 2023-10-02 RX ADMIN — PIPERACILLIN AND TAZOBACTAM 25 GRAM(S): 4; .5 INJECTION, POWDER, LYOPHILIZED, FOR SOLUTION INTRAVENOUS at 21:39

## 2023-10-02 RX ADMIN — Medication 975 MILLIGRAM(S): at 02:30

## 2023-10-02 NOTE — CONSULT NOTE ADULT - ASSESSMENT
58 year old male with metastatic colon cancer on active chemotherapy (Avastin, Irinotecan, Leucovorin, and 5FU CADD) presenting initially to [facility] with jaundice with CT showing metastatic lesions in the liver and acute pancreatitis. Transferred to [facility] for advance GI evaluation of possible obstructing pathology in the setting of hyperbilirubinemia and fever. Palliative team consulted for symptom management.

## 2023-10-02 NOTE — CONSULT NOTE ADULT - PROBLEM SELECTOR RECOMMENDATION 2
CT (9/26): Peripancreatic edema suspicious for acute pancreatitis, new compared to prior. While less likely this could also represent noninflammatory edema/third spacing of fluid. Small amount of ascites is new. Otherwise similar to prior with a large number of hepatic metastases, intrahepatic peripheral biliary ductal dilation in the right greater than left lobes, partially visible lung metastases, and likely metastatic mild upper abdominal and partially visible mediastinal adenopathy.  > Pending MRCP to evaluate for possible obstructive pathology. Although, per GI, this may also be from extensive liver parenchymal disease itself.   > Appreciate GI recs.

## 2023-10-02 NOTE — CONSULT NOTE ADULT - ASSESSMENT
58y male with known metastatic colon cancer with mets to liver and lungs most recently on Irinotecan/Vectibix, last treated on on 9/15/23.  He was noted to have rising LFTs this month with T. Bili up to 1.4.  He underwent a MRCP and Chest CT last week that showed POD on the liver mets.  He was admitted to [facility] with low grade fever, fatigue and new jaundice (T. bili up to 13) and questionable pancreatitis on CT imaging (no abdominal pain or vomiting). Oncology consulted for further evaluation.      -Hepatology consult  -No plans for inpatient chemotherapy  -C/w Supportive care, pain control, Nutrition, PT, DVT ppx  -Rest of care as per primary team  -Patient to followup with  ([facility]) upon discharge  -Oncology will continue to follow with you    Case d/w oncology attending      Anna MAXWELL  Oncology Physician Assistant  [facility]    Pager [phone] also available on TEAMS as Anna MAXWELL    If before 8am/after 5pm or on weekends please page On-call Oncology Fellow    58y male with known metastatic colon cancer with mets to liver and lungs most recently on Irinotecan/Vectibix, last treated with cycle 5 on 9/15/23.  He was noted to have rising LFTs this month with T. Bili up to 1.4.  He underwent a MRCP and Chest CT last week that showed POD on the liver mets.  He was admitted to [facility] with low grade fever, fatigue and new jaundice (T. bili up to 13) and questionable pancreatitis on CT imaging (no abdominal pain or vomiting). Oncology consulted for further evaluation.    MRCP 9/19  Progression of disease in the liver and lungs compared with prior imaging   7/8/2023.  New mild to moderate biliary ductal dilatation in the periphery of the   right hepatic lobe and mild intrahepatic biliary ductal dilatation in the   periphery of the left hepatic lobe lateral segment, new from prior   imaging.    CT AP 9/26  Peripancreatic edema suspicious for acute pancreatitis, new compared to   prior. While less likely this could also represent noninflammatory   edema/third spacing of fluid.  Small amount of ascites is new.  Otherwise similar to prior with a large number of hepatic metastases,   intrahepatic peripheral biliary ductal dilation in the right greater than   left lobes, partially visible lung metastases, and likely metastatic mild   upper abdominal and partially visible mediastinal adenopathy.    _infectious workup ongoing   -Hepatology consult, rec repeat MRI/MRCP w/ w/o IVC  -Palliative Care for symptom management  -No plans for inpatient chemotherapy  -C/w Supportive care, pain control, Nutrition, PT, DVT ppx  -Rest of care as per primary team  -Patient to followup with Dr. Tu Byers ([facility]) upon discharge  -Oncology will continue to follow with you    Case d/w oncology attending      Anna MAXWELL  Oncology Physician Assistant  [facility]    Pager [phone] also available on TEAMS as Anna MAXWELL    If before 8am/after 5pm or on weekends please page On-call Oncology Fellow    58y male with known metastatic colon cancer with mets to liver and lungs most recently on Irinotecan/Vectibix, last treated with cycle 5 on 9/15/23.  He was noted to have rising LFTs this month with T. Bili up to 1.4.  He underwent a MRCP and Chest CT last week that showed POD on the liver mets.  He was admitted to [facility] with low grade fever, fatigue and new jaundice (T. bili up to 13) and questionable pancreatitis on CT imaging (no abdominal pain or vomiting). Oncology consulted for further evaluation.    MRCP 9/19  Progression of disease in the liver and lungs compared with prior imaging   7/8/2023.  New mild to moderate biliary ductal dilatation in the periphery of the   right hepatic lobe and mild intrahepatic biliary ductal dilatation in the   periphery of the left hepatic lobe lateral segment, new from prior   imaging.    CT AP 9/26  Peripancreatic edema suspicious for acute pancreatitis, new compared to   prior. While less likely this could also represent noninflammatory   edema/third spacing of fluid.  Small amount of ascites is new.  Otherwise similar to prior with a large number of hepatic metastases,   intrahepatic peripheral biliary ductal dilation in the right greater than   left lobes, partially visible lung metastases, and likely metastatic mild   upper abdominal and partially visible mediastinal adenopathy.    _infectious workup ongoing , had 100.6 F last night  -Hepatology consult, rec repeat MRI/MRCP w/ w/o IVC  -Palliative Care for symptom management  -No plans for inpatient chemotherapy  -C/w Supportive care, pain control, Nutrition, PT, DVT ppx  -Rest of care as per primary team  -Patient to followup with Dr. Tu Byers ([facility]) upon discharge  -Oncology will continue to follow with you    Case d/w oncology attending      Anna MAXWELL  Oncology Physician Assistant  [facility]    Pager [phone] also available on TEAMS as Anna MAXWELL    If before 8am/after 5pm or on weekends please page On-call Oncology Fellow

## 2023-10-02 NOTE — PROGRESS NOTE ADULT - PROBLEM SELECTOR PLAN 1
Sepsis now resolved  - patient presented at [facility] jaundice,   - here had Tmax 101.6, [id number] with WBC 14.27, source unknown at this time, no focal infectious symptoms, possibly intraabdominal source  - procal 0.91    - Bcx 9/25 NGTD, Ucx NGTD, repeat BCx ngtd  - completed zosyn  - last fever 9/30/23. HR improving, now 80s

## 2023-10-02 NOTE — PROGRESS NOTE ADULT - ASSESSMENT
57 y/o M with pmhx of metastatic colon cancer on active chemotherapy(Avastin, Irinotecan, Leucovorin, and 5FU CADD) biweekly who presented to [facility] for yellowing skin and fatigue. Found to have hyperbilirubinemia along with fever.

## 2023-10-02 NOTE — PROGRESS NOTE ADULT - PROBLEM SELECTOR PLAN 3
- microcytic anemia, Hgb 8.9, was previously in 11-12 earlier this month, hemodynamically stable   likely 2/2 malignancy

## 2023-10-02 NOTE — CONSULT NOTE ADULT - PROBLEM SELECTOR RECOMMENDATION 9
Patient with ongoing outpatient treatment with Avastin, Irinotecan, Leucovorin, and 5FU CADD   > Oncologist: Dr. Tu Byers ([facility])  > Appreciate heme/onc recs.

## 2023-10-02 NOTE — CONSULT NOTE ADULT - PROBLEM SELECTOR RECOMMENDATION 4
Thank you for allowing us to participate in your patient's care. We will continue to follow with you. Please page [phone] for any q's or c's. The Geriatric and Palliative Medicine service has coverage 24 hours a day/ 7 days a week to provide medical recommendations regarding symptom management needs via telephone. Pt's wife, Jolie Morales, is his [phone]    Thank you for allowing us to participate in your patient's care. We will continue to follow with you. Please page [phone] for any q's or c's. The Geriatric and Palliative Medicine service has coverage 24 hours a day/ 7 days a week to provide medical recommendations regarding symptom management needs via telephone.

## 2023-10-02 NOTE — PROGRESS NOTE ADULT - SUBJECTIVE AND OBJECTIVE BOX
Interval Events:   -denies n/v/AP  -jaundiced  -awaiting MR/MRCP    ROS:   12 point review of systems performed and negative except otherwise noted in HPI.    Hospital Medications:  acetaminophen     Tablet .. 975 milliGRAM(s) Oral every 6 hours PRN  chlorhexidine 2% Cloths 1 Application(s) Topical daily  enoxaparin Injectable 40 milliGRAM(s) SubCutaneous every 24 hours  lactated ringers. 1000 milliLiter(s) IV Continuous <Continuous>  lidocaine   4% Patch 1 Patch Transdermal once  melatonin 3 milliGRAM(s) Oral at bedtime PRN  ondansetron Injectable 4 milliGRAM(s) IV Push every 8 hours PRN  piperacillin/tazobactam IVPB.. 3.375 Gram(s) IV Intermittent every 8 hours  potassium chloride  10 mEq/100 mL IVPB 10 milliEquivalent(s) IV Intermittent every 1 hour      PHYSICAL EXAM:   Vital Signs:  Vital Signs Last 24 Hrs  T(C): 37.2 (02 Oct 2023 02:19), Max: 38.1 (01 Oct 2023 22:00)  T(F): 99 (02 Oct 2023 02:19), Max: 100.6 (01 Oct 2023 22:00)  HR: 79 (02 Oct 2023 02:19) (79 - 105)  BP: 108/67 (02 Oct 2023 02:19) (104/77 - 114/77)  BP(mean): --  RR: 17 (02 Oct 2023 02:19) (17 - 18)  SpO2: 98% (02 Oct 2023 02:19) (98% - 100%)    Parameters below as of 02 Oct 2023 02:19  Patient On (Oxygen Delivery Method): room air      Daily     Daily       GENERAL:  chronically ill appearing  HEENT:  NC/AT  CHEST:  Full & symmetric excursion, no increased effort, breath sounds clear  HEART:  Regular rhythm, S1, S2  ABDOMEN:  Soft, non-tender, non-distended, normoactive bowel sounds  EXTREMITIES:  no LE edema  SKIN:  diffuse hypopigmented rash  NEURO:  Alert, oriented x3    LABS: reviewed                        9.6    11.63 )-----------( 380      ( 02 Oct 2023 06:30 )             28.7     10-02    135  |  103  |  14  ----------------------------<  87  3.3<L>   |  22  |  0.46<L>    Ca    8.0<L>      02 Oct 2023 06:30  Phos  2.6     10-02  Mg     1.60     10-02    TPro  6.3  /  Alb  1.9<L>  /  TBili  10.5<H>  /  DBili  x   /  AST  156<H>  /  ALT  71<H>  /  AlkPhos  344<H>  10-02    LIVER FUNCTIONS - ( 02 Oct 2023 06:30 )  Alb: 1.9 g/dL / Pro: 6.3 g/dL / ALK PHOS: 344 U/L / ALT: 71 U/L / AST: 156 U/L / GGT: x             Interval Diagnostic Studies: see sunrise for full report

## 2023-10-02 NOTE — PROGRESS NOTE ADULT - ASSESSMENT
59 y/o M with PMHx of metastatic colon cancer on active chemotherapy (Avastin, Irinotecan, Leucovorin, and 5FU CADD) biweekly who presented to [facility] for jaundice, abdo distension, and fatigue. Found with extensive liver mets on CTAP with T Bili 11.5.    #Metastatic Colon Ca  #Cholestatic Liver Injury  #Jaundice    DDx: obstructive jaundice vs extensive liver parenchymal disease iso progression of metastatic colon ca    Recommendations:  -obtain MRI/MRCP w/ w/o IVC  -palliative care consult  -advance diet as tolerated  -c/w ABx  -f/u infectious w/u      All recommendations preliminary until note signed by service attending.    Thank you for involving us in the care of this patient. Please contact should any concern or questions arise.    Elie Nettles MD   Gastroenterology/Hepatology Fellow PGY-5  Available on [employer] Teams 7am - 5pm  [email or web address]    NON-URGENT CONSULTS:  Please email:  [email or web address]   OR  [email or web address]    After 5pm, please contact the on-call GI fellow. [phone]    AT NIGHT AND ON WEEKENDS:  Contact on-call GI fellow via answering service ([phone]) from 5pm-8am and on weekends/holidays

## 2023-10-02 NOTE — CONSULT NOTE ADULT - NS ATTEND AMEND GEN_ALL_CORE FT
Pt seen examined an dd/w PA. History per review of chart and by patient at bedside. H/O met colon ca with prior treatment course as noted above. Recently p/w increasing jaundice, decreased appetite / wt loss and abdominal distension. Work up and results as noted above. Pt started on abx and reports feeling unchanged overnight. PE icteric, slender to mildly cachectic male Sclerae icteric Lungs clear Heart RRR S1s2 Abd moderately distended NABS soft / NT, no HSM / masses.  A/P Pancreatitis radiologically as well as biliary obstruction as noted above. Agree with abx course; need GI for poss MRCP for eval and pot stenting. Has increasing abd girth and scan with suggestion of ascites - would benefit from therapeutic and diagnostic paracentesis after endoscopy which should be prioritized. No inpt chemo planned

## 2023-10-02 NOTE — CONSULT NOTE ADULT - SUBJECTIVE AND OBJECTIVE BOX
Danelle Medellin,   Fellow, Hospice & Palliative Medicine    Date of [id number] @ 13:27  HPI:  57 y/o M with pmhx of metastatic colon cancer on active chemotherapy(Avastin, Irinotecan, Leucovorin, and 5FU CADD) biweekly who presented to [facility] for yellowing skin and fatigue. Patient states that he noticed yellowing of his skin and eyes on Monday. He also endorses decreased appetite for the last 2 weeks with associated weight loss. Patient states that he has lost 8-9 lbs in the last 1 week. He denies any nausea or vomiting. He has noticed increased abdominal distension which he found odd given his lack of appetite. Noted that his urine has been darker than usual but denies any hematuria. He also endorses R flank pain that radiates to his back.     At [facility], CT A/P showed peripancreatic edema suspicious for acute pancreatitis new compared to prior; itrahepatic mets with intrahepatic peripheral biliary ductal dilatation R >L with partially visible lung mets with new small ascites. ptient was started on zosyn. Patient was transferred  to [facility] for Advanced GI assessment.  (30 Sep 2023 10:47)    PERTINENT PM/SXH:   Colon cancer    Vitiligo    Metastatic colon cancer to liver      No significant past surgical history    S/P colectomy    S/P colostomy    Large bowel obstruction      FAMILY HISTORY:  Family history of colon cancer (Father)    Family history of hypertension (Mother)    Family history of type 2 diabetes mellitus (Father)      Family Hx substance abuse [ ]yes [ ]no  ITEMS NOT CHECKED ARE NOT PRESENT    SOCIAL HISTORY:   Significant other/partner[x ] 2 Children[ ]  Episcopalian/Spirituality:  Substance hx:  [ ]   Tobacco hx:  [ ]   Alcohol hx: [ ]   Home Opioid hx:  [ ] I-Stop Reference No:  Living Situation: [x ]Home  [ ]Long term care  [ ]Rehab [ ]Other    ADVANCE DIRECTIVES:    DNR/MOLST  [ ]  Living Will  [ ]   DECISION MAKER(s):  [ ] Health Care Proxy(s)  [ ] Surrogate(s)  [ ] Guardian           Name(s): Phone Number(s):    BASELINE (I)ADL(s) (prior to admission):  [place]: [x ]Total  [ ] Moderate [ ]Dependent    Allergies    No Known Allergies    Intolerances    MEDICATIONS  (STANDING):  chlorhexidine 2% Cloths 1 Application(s) Topical daily  enoxaparin Injectable 40 milliGRAM(s) SubCutaneous every 24 hours  lactated ringers. 1000 milliLiter(s) (100 mL/Hr) IV Continuous <Continuous>  piperacillin/tazobactam IVPB.. 3.375 Gram(s) IV Intermittent every 8 hours    MEDICATIONS  (PRN):  acetaminophen     Tablet .. 975 milliGRAM(s) Oral every 6 hours PRN Temp greater or equal to 38C (100.4F), Moderate Pain (4 - 6)  melatonin 3 milliGRAM(s) Oral at bedtime PRN Insomnia  ondansetron Injectable 4 milliGRAM(s) IV Push every 8 hours PRN Nausea and/or Vomiting    PRESENT SYMPTOMS: [ ]Unable to self-report  [ ] CPOT [ ] PAINADs [ ] RDOS  Source if other than patient:  [ ]Family   [ ]Team     Pain: [x ]yes [ ]no  QOL impact - mild  Location -  R flank                  Aggravating factors - lying still  Quality - sharp  Radiation - lower back  Timing- months  Severity (0-10 scale): 6/10  Minimal acceptable level/pain goal (0-10 scale): 2/10    CPOT:    https://www.sccm.org/getattachment/jwp33m98-1e2w-9w6w-0e5x-9619j9092v6n/Critical-Care-Pain-Observation-Tool-(CPOT)    PAIN AD Score:   http://geriatrictoolkit.[email or web address]/cog/painad.pdf (press ctrl +  left click to view)    Dyspnea:                           [ ]Mild [ ]Moderate [ ]Severe      RDOS:  0 to 2  minimal or no respiratory distress   3  mild distress  4 to 6 moderate distress  >7 severe distress  [email or web address]://[email or web address]/hen/Respiratory_Distress_Observation_Scale.pdf (Ctrl +  left click to view)     Anxiety:                             [ ]Mild [ ]Moderate [ ]Severe  Fatigue:                             [ ]Mild [ ]Moderate [ ]Severe  Nausea:                             [ ]Mild [ ]Moderate [ ]Severe  Loss of appetite:              [x ]Mild [ ]Moderate [ ]Severe  Constipation:                    [ ]Mild [ ]Moderate [ ]Severe    PCSSQ[Palliative Care Spiritual Screening Question]   Severity (0-10):  Score of 4 or > indicate consideration of Chaplaincy referral.  Chaplaincy Referral: [ ] yes [ ] refused [ ] following [ ] Deferred     Caregiver [place]? : [ ] yes [ ] no [ ] Deferred [ ] Declined             Social work referral [ ] Patient & Family Centered Care Referral [ ]     Anticipatory Grief present?:  [ ] yes [ ] no  [ ] Deferred                  Social work referral [ ] Chaplaincy Referral[ ]      Other Symptoms:  [ ]All other review of systems negative     Palliative Performance Status Version 2:         %    [email or web address]  PHYSICAL EXAM:  Vital Signs Last 24 Hrs  T(C): 37.2 (02 Oct 2023 02:19), Max: 38.1 (01 Oct 2023 22:00)  T(F): 99 (02 Oct 2023 02:19), Max: 100.6 (01 Oct 2023 22:00)  HR: 79 (02 Oct 2023 02:19) (79 - 105)  BP: 108/67 (02 Oct 2023 02:19) (108/67 - 114/77)  BP(mean): --  RR: 17 (02 Oct 2023 02:19) (17 - 18)  SpO2: 98% (02 Oct 2023 02:19) (98% - 98%)    Parameters below as of 02 Oct 2023 02:19  Patient On (Oxygen Delivery Method): room air     I&O's Summary    GENERAL: [ ]Cachexia    [x ]Alert  [x ]Oriented x3   [ ]Lethargic  [ ]Unarousable  [ ]Verbal  [ ]Non-Verbal  Behavioral:   [ ] Anxiety  [ ] Delirium [ ] Agitation [ ] Other  HEENT:  [ ]Normal   [ ]Dry mouth   [ ]ET Tube/Trach  [ ]Oral lesions   PULMONARY:   [ ]Clear [ ]Tachypnea  [ ]Audible excessive secretions   [ ]Rhonchi        [ ]Right [ ]Left [ ]Bilateral  [ ]Crackles        [ ]Right [ ]Left [ ]Bilateral  [ ]Wheezing     [ ]Right [ ]Left [ ]Bilateral  [ ]Diminished breath sounds [ ]right [ ]left [ ]bilateral  CARDIOVASCULAR:    [x ]Regular [ ]Irregular [ ]Tachy  [ ]Mikey [ ]Murmur [ ]Other  GASTROINTESTINAL:  [x ]Soft  [ ]Distended   [ ]+BS  [x ]Non tender [ ]Tender  [ ]Other [ ]PEG [ ]OGT/ NGT  Last BM:  GENITOURINARY:  [x ]Continent- dark urine [ ] Incontinent   [ ]Oliguria/Anuria   [ ]Judd   MUSCULOSKELETAL:   [x ]Normal   [ ]Weakness  [ ]Bed/Wheelchair bound [ ]Edema  NEUROLOGIC:   [x ]No focal deficits  [ ]Cognitive impairment  [ ]Dysphagia [ ]Dysarthria [ ]Paresis [ ]Other   SKIN:   [ ]Normal  [ ]Rash  [x ]Other- jaundice  [ ]Pressure ulcer(s)       Present on admission [ ]y [ ]n    CRITICAL CARE:  [ ] Shock Present  [ ]Septic [ ]Cardiogenic [ ]Neurologic [ ]Hypovolemic  [ ]  Vasopressors [ ]  Inotropes   [ ]Respiratory failure present [ ]Mechanical ventilation [ ]Non-invasive ventilatory support [ ]High flow    [ ]Acute  [ ]Chronic [ ]Hypoxic  [ ]Hypercarbic [ ]Other  [ ]Other organ failure     LABS:                        9.6    11.63 )-----------( 380      ( 02 Oct 2023 06:30 )             28.7   10-02    135  |  103  |  14  ----------------------------<  87  3.3<L>   |  22  |  0.46<L>    Ca    8.0<L>      02 Oct 2023 06:30  Phos  2.6     10-02  Mg     1.60     10-02    TPro  6.3  /  Alb  1.9<L>  /  TBili  10.5<H>  /  DBili  x   /  AST  156<H>  /  ALT  71<H>  /  AlkPhos  344<H>  10-02  PT/INR - ( 01 Oct 2023 06:00 )   PT: 19.9 sec;   INR: 1.79 ratio         PTT - ( 01 Oct 2023 06:00 )  PTT:40.1 sec    Urinalysis Basic - ( 02 Oct 2023 06:30 )    Color: x / Appearance: x / SG: x / pH: x  Gluc: 87 mg/dL / Ketone: x  / Bili: x / Urobili: x   Blood: x / Protein: x / Nitrite: x   Leuk Esterase: x / RBC: x / WBC x   Sq Epi: x / Non Sq Epi: x / Bacteria: x      RADIOLOGY & ADDITIONAL STUDIES:    PROTEIN CALORIE MALNUTRITION PRESENT: [ ]mild [ ]moderate [ ]severe [ ]underweight [ ]morbid obesity  https://www.andeal.org/vault/3441/web/files/ONC/Table_Clinical%20Characteristics%20to%20Document%20Malnutrition-White%20JV%20et%20al%204378.pdf    Height (cm): 170.2 (09-30-23 @ 06:43), 170.2 (09-25-23 @ 22:45), 162 (07-10-23 @ 11:07)  Weight (kg): 61.2 (09-30-23 @ 06:43), 54.4 (09-25-23 @ 22:45), [id number] (09-01-23 @ 15:00)  BMI (kg/m2): 21.1 (09-30-23 @ 06:43), 18.8 (09-25-23 @ 22:45), 21.7 (09-01-23 @ 15:00)    [ ]PPSV2 < or = to 30% [ ]significant weight loss  [ ]poor nutritional intake  [ ]anasarca[ ]Artificial Nutrition      Other REFERRALS:  [ ]Hospice  [ ]Child Life  [ ]Social Work  [ ]Case management [ ]Holistic Therapy     Goals of Care Document:  Danelle Medellin,   Fellow, Hospice & Palliative Medicine    Date of [id number] @ 13:27  HPI:  57 y/o M with pmhx of metastatic colon cancer on active chemotherapy(Avastin, Irinotecan, Leucovorin, and 5FU CADD) biweekly who presented to [facility] for yellowing skin and fatigue. Patient states that he noticed yellowing of his skin and eyes on Monday. He also endorses decreased appetite for the last 2 weeks with associated weight loss. Patient states that he has lost 8-9 lbs in the last 1 week. He denies any nausea or vomiting. He has noticed increased abdominal distension which he found odd given his lack of appetite. Noted that his urine has been darker than usual but denies any hematuria. He also endorses R flank pain that radiates to his back.     At [facility], CT A/P showed peripancreatic edema suspicious for acute pancreatitis new compared to prior; itrahepatic mets with intrahepatic peripheral biliary ductal dilatation R >L with partially visible lung mets with new small ascites. ptient was started on zosyn. Patient was transferred  to [facility] for Advanced GI assessment.  (30 Sep 2023 10:47)    PERTINENT PM/SXH:   Colon cancer  Vitiligo  Metastatic colon cancer to liver  S/P colectomy  S/P colostomy  Large bowel obstruction    FAMILY HISTORY:  Family history of colon cancer (Father)  Family history of hypertension (Mother)  Family history of type 2 diabetes mellitus (Father)    Family Hx substance abuse [ ]yes [ ]no  ITEMS NOT CHECKED ARE NOT PRESENT    SOCIAL HISTORY: Patient has a spouse. Per chart review, patient has a 30 year old daughter as well. Patient states that his 9 y/o daughter has a therapist at school that she talks to.   Significant other/partner[x ] 2 Children[Nicolasa 13y/o son, Popeye 8/o daughter]  Shinto/Spirituality:  Substance hx:  [ ]   Tobacco hx:  [ ]   Alcohol hx: [ ]   Home Opioid hx: NONE [ ] I-Stop Reference No: [id number]  Living Situation: [x ]Home  [ ]Long term care  [ ]Rehab [ ]Other    ADVANCE DIRECTIVES:    DNR/MOLST  [ ]  Living Will  [ ]   DECISION MAKER(s): Patient   [ ] Health Care Proxy(s)  [ ] Surrogate(s)  [ ] Guardian           Name(s): Phone Number(s):    BASELINE (I)ADL(s) (prior to admission):  [place]: [x ]Total  [ ] Moderate [ ]Dependent    Allergies    No Known Allergies    Intolerances    MEDICATIONS  (STANDING):  chlorhexidine 2% Cloths 1 Application(s) Topical daily  enoxaparin Injectable 40 milliGRAM(s) SubCutaneous every 24 hours  lactated ringers. 1000 milliLiter(s) (100 mL/Hr) IV Continuous <Continuous>  piperacillin/tazobactam IVPB.. 3.375 Gram(s) IV Intermittent every 8 hours    MEDICATIONS  (PRN):  acetaminophen     Tablet .. 975 milliGRAM(s) Oral every 6 hours PRN Temp greater or equal to 38C (100.4F), Moderate Pain (4 - 6)  melatonin 3 milliGRAM(s) Oral at bedtime PRN Insomnia  ondansetron Injectable 4 milliGRAM(s) IV Push every 8 hours PRN Nausea and/or Vomiting    PRESENT SYMPTOMS: [ ]Unable to self-report  [ ] CPOT [ ] PAINADs [ ] RDOS  Source if other than patient:  [ ]Family   [ ]Team     Pain: [x ]yes [ ]no  QOL impact - mild  Location -  R flank                  Aggravating factors - lying still  Quality - sharp  Radiation - lower back  Timing- months  Severity (0-10 scale): 6/10  Minimal acceptable level/pain goal (0-10 scale): 2/10    CPOT:    https://www.sccm.org/getattachment/lac79o30-2s7t-9d9k-0b6m-5471x3497f9o/Critical-Care-Pain-Observation-Tool-(CPOT)    PAIN AD Score:   http://geriatrictoolkit.[place].[facility]/cog/painad.pdf (press ctrl +  left click to view)    Dyspnea:                           [ ]Mild [ ]Moderate [ ]Severe      RDOS:  0 to 2  minimal or no respiratory distress   3  mild distress  4 to 6 moderate distress  >7 severe distress  https://[email or web address]/handouts/hen/Respiratory_Distress_Observation_Scale.pdf (Ctrl +  left click to view)     Anxiety:                             [ ]Mild [ ]Moderate [ ]Severe  Fatigue:                             [ ]Mild [ ]Moderate [ ]Severe  Nausea:                             [ ]Mild [ ]Moderate [ ]Severe  Loss of appetite:              [x ]Mild [ ]Moderate [ ]Severe  Constipation:                    [ ]Mild [ ]Moderate [ ]Severe    PCSSQ[Palliative Care Spiritual Screening Question]   Severity (0-10):  Score of 4 or > indicate consideration of Chaplaincy referral.  Chaplaincy Referral: [ ] yes [ ] refused [ ] following [x ] Deferred     Caregiver [place]? : [ ] yes [ ] no [ ] Deferred [x ] Declined             Social work referral [ ] Patient & Family Centered Care Referral [ ]     Anticipatory Grief present?:  [ ] yes [ ] no  [x ] Deferred                  Social work referral [ ] Chaplaincy Referral[ ]      Other Symptoms:  [x ]All other review of systems negative     Palliative Performance Status Version 2:      90   %    [email or web address]  PHYSICAL EXAM:  Vital Signs Last 24 Hrs  T(C): 37.2 (02 Oct 2023 02:19), Max: 38.1 (01 Oct 2023 22:00)  T(F): 99 (02 Oct 2023 02:19), Max: 100.6 (01 Oct 2023 22:00)  HR: 79 (02 Oct 2023 02:19) (79 - 105)  BP: 108/67 (02 Oct 2023 02:19) (108/67 - 114/77)  BP(mean): --  RR: 17 (02 Oct 2023 02:19) (17 - 18)  SpO2: 98% (02 Oct 2023 02:19) (98% - 98%)    Parameters below as of 02 Oct 2023 02:19  Patient On (Oxygen Delivery Method): room air     I&O's Summary    GENERAL: [ ]Cachexia    [x ]Alert  [x ]Oriented x3   [ ]Lethargic  [ ]Unarousable  [ ]Verbal  [ ]Non-Verbal  Behavioral:   [ ] Anxiety  [ ] Delirium [ ] Agitation [ x] Other  HEENT:  [x ]Normal   [ ]Dry mouth   [ ]ET Tube/Trach  [ ]Oral lesions   PULMONARY:   [ ]Clear [ ]Tachypnea  [ ]Audible excessive secretions   [ ]Rhonchi        [ ]Right [ ]Left [ ]Bilateral  [ ]Crackles        [ ]Right [ ]Left [ ]Bilateral  [ ]Wheezing     [ ]Right [ ]Left [ ]Bilateral  [ ]Diminished breath sounds [ ]right [ ]left [ ]bilateral  CARDIOVASCULAR:    [x ]Regular [ ]Irregular [ ]Tachy  [ ]Mikey [ ]Murmur [ ]Other  GASTROINTESTINAL:  [x ]Soft  [ ]Distended   [ ]+BS  [x ]Non tender [ ]Tender  [ ]Other [ ]PEG [ ]OGT/ NGT  Last BM: +colostomy bag   GENITOURINARY:  [x ]Continent- dark urine [ ] Incontinent   [ ]Oliguria/Anuria   [ ]Judd   MUSCULOSKELETAL:   [x ]Normal   [ ]Weakness  [ ]Bed/Wheelchair bound [ ]Edema  NEUROLOGIC:   [x ]No focal deficits  [ ]Cognitive impairment  [ ]Dysphagia [ ]Dysarthria [ ]Paresis [ ]Other   SKIN:   [ ]Normal  [ ]Rash  [x ]Other- jaundice  [ ]Pressure ulcer(s)       Present on admission [ ]y [ ]n    CRITICAL CARE:  [ ] Shock Present  [ ]Septic [ ]Cardiogenic [ ]Neurologic [ ]Hypovolemic  [ ]  Vasopressors [ ]  Inotropes   [ ]Respiratory failure present [ ]Mechanical ventilation [ ]Non-invasive ventilatory support [ ]High flow    [ ]Acute  [ ]Chronic [ ]Hypoxic  [ ]Hypercarbic [ ]Other  [ ]Other organ failure     LABS:                        9.6    11.63 )-----------( 380      ( 02 Oct 2023 06:30 )             28.7   10-02    135  |  103  |  14  ----------------------------<  87  3.3<L>   |  22  |  0.46<L>    Ca    8.0<L>      02 Oct 2023 06:30  Phos  2.6     10-02  Mg     1.60     10-02    TPro  6.3  /  Alb  1.9<L>  /  TBili  10.5<H>  /  DBili  x   /  AST  156<H>  /  ALT  71<H>  /  AlkPhos  344<H>  10-02  PT/INR - ( 01 Oct 2023 06:00 )   PT: 19.9 sec;   INR: 1.79 ratio         PTT - ( 01 Oct 2023 06:00 )  PTT:40.1 sec    Urinalysis Basic - ( 02 Oct 2023 06:30 )    Color: x / Appearance: x / SG: x / pH: x  Gluc: 87 mg/dL / Ketone: x  / Bili: x / Urobili: x   Blood: x / Protein: x / Nitrite: x   Leuk Esterase: x / RBC: x / WBC x   Sq Epi: x / Non Sq Epi: x / Bacteria: x      RADIOLOGY & ADDITIONAL STUDIES: < from: CT Abdomen and Pelvis w/ Oral Cont and w/ IV Cont (09.26.23 @ 01:31) >  IMPRESSION:  Peripancreatic edema suspicious for acute pancreatitis, new compared to   prior. While less likely this could also represent noninflammatory   edema/third spacing of fluid.    Small amount of ascites is new.    Otherwise similar to prior with a large number of hepatic metastases,   intrahepatic peripheral biliary ductal dilation in the right greater than   left lobes, partially visible lung metastases, and likely metastatic mild   upper abdominal and partially visible mediastinal adenopathy.    < end of copied text >      PROTEIN CALORIE MALNUTRITION PRESENT: [ ]mild [ ]moderate [ ]severe [ ]underweight [ ]morbid obesity  https://www.andeal.org/vault/2440/web/files/ONC/Table_Clinical%20Characteristics%20to%20Document%20Malnutrition-White%20JV%20et%20al%370731.pdf    Height (cm): 170.2 (09-30-23 @ 06:43), 170.2 (09-25-23 @ 22:45), 162 (07-10-23 @ 11:07)  Weight (kg): 61.2 (09-30-23 @ 06:43), 54.4 (09-25-23 @ 22:45), [id number] (09-01-23 @ 15:00)  BMI (kg/m2): 21.1 (09-30-23 @ 06:43), 18.8 (09-25-23 @ 22:45), 21.7 (09-01-23 @ 15:00)    [ ]PPSV2 < or = to 30% [ ]significant weight loss  [ ]poor nutritional intake  [ ]anasarca[ ]Artificial Nutrition      Other REFERRALS:  [ ]Hospice  [ ]Child Life  [ ]Social Work  [ ]Case management [ ]Holistic Therapy

## 2023-10-02 NOTE — CONSULT NOTE ADULT - PROBLEM SELECTOR RECOMMENDATION 3
Pain is chronic and well controlled at home with as needed Tylenol.   > Continue Tylenol 975 mg q6 PRN.  > Continue Lidocaine patch PRN.   > Discussed opiates for further pain control if he has breakthrough pain. Declined for now.  > Pain is worse with lying down and remaining still, so encouraged patient to ambulate as tolerated.

## 2023-10-02 NOTE — CONSULT NOTE ADULT - SUBJECTIVE AND OBJECTIVE BOX
Patient is a 58y old  Male who presents with a chief complaint of Jaundice (01 Oct 2023 11:44)      HPI:  57 y/o M with pmhx of metastatic colon cancer on active chemotherapy(Avastin, Irinotecan, Leucovorin, and 5FU CADD) biweekly who presented to [facility] for yellowing skin and fatigue. Patient states that he noticed yellowing of his skin and eyes on Monday. He also endorses decreased appetite for the last 2 weeks with associated weight loss. Patient states that he has lost 8-9 lbs in the last 1 week. He denies any nausea or vomiting. He has noticed increased abdominal distension which he found odd given his lack of appetite. Noted that his urine has been darker than usual but denies any hematuria. He also endorses R flank pain that radiates to his back.     At [facility], CT A/P showed peripancreatic edema suspicious for acute pancreatitis new compared to prior; itrahepatic mets with intrahepatic peripheral biliary ductal dilatation R >L with partially visible lung mets with new small ascites. ptient was started on zosyn. Patient was transferred  to [facility] for Advanced GI assessment.  (30 Sep 2023 10:47)       Oncologic History:      ROS: as above     PAST MEDICAL & SURGICAL HISTORY:  Vitiligo      Metastatic colon cancer to liver      S/P colectomy      S/P colostomy      Large bowel obstruction          SOCIAL HISTORY:    FAMILY HISTORY:  Family history of colon cancer (Father)    Family history of hypertension (Mother)    Family history of type 2 diabetes mellitus (Father)        MEDICATIONS  (STANDING):  chlorhexidine 2% Cloths 1 Application(s) Topical daily  enoxaparin Injectable 40 milliGRAM(s) SubCutaneous every 24 hours  lactated ringers. 1000 milliLiter(s) (100 mL/Hr) IV Continuous <Continuous>  piperacillin/tazobactam IVPB.. 3.375 Gram(s) IV Intermittent every 8 hours  potassium chloride  10 mEq/100 mL IVPB 10 milliEquivalent(s) IV Intermittent every 1 hour    MEDICATIONS  (PRN):  acetaminophen     Tablet .. 975 milliGRAM(s) Oral every 6 hours PRN Temp greater or equal to 38C (100.4F)  melatonin 3 milliGRAM(s) Oral at bedtime PRN Insomnia  ondansetron Injectable 4 milliGRAM(s) IV Push every 8 hours PRN Nausea and/or Vomiting      Allergies    No Known Allergies    Intolerances        Vital Signs Last 24 Hrs  T(C): 37.2 (02 Oct 2023 02:19), Max: 38.1 (01 Oct 2023 22:00)  T(F): 99 (02 Oct 2023 02:19), Max: 100.6 (01 Oct 2023 22:00)  HR: 79 (02 Oct 2023 02:19) (79 - 105)  BP: 108/67 (02 Oct 2023 02:19) (104/77 - 114/77)  BP(mean): --  RR: 17 (02 Oct 2023 02:19) (17 - 18)  SpO2: 98% (02 Oct 2023 02:19) (98% - 100%)    Parameters below as of 02 Oct 2023 02:19  Patient On (Oxygen Delivery Method): room air        PHYSICAL EXAM  General: adult in NAD  HEENT: clear oropharynx, anicteric sclera, pink conjunctiva  Neck: supple  CV: normal S1/S2 with no murmur rubs or gallops  Lungs: positive air movement b/l ant lungs, clear to auscultation, no wheezes, no rales  Abdomen: soft non-tender non-distended, no hepatosplenomegaly  Ext: no clubbing cyanosis or edema  Skin: no rashes and no petechiae  Neuro: alert and oriented X 3, none focal    LABS:                          9.6    11.63 )-----------( 380      ( 02 Oct 2023 06:30 )             28.7         Mean Cell Volume : 71.6 fL  Mean Cell Hemoglobin : 23.9 pg  Mean Cell Hemoglobin Concentration : 33.4 gm/dL  Auto Neutrophil # : 8.62 K/uL  Auto Lymphocyte # : 1.89 K/uL  Auto Monocyte # : 0.65 K/uL  Auto Eosinophil # : 0.27 K/uL  Auto Basophil # : 0.05 K/uL  Auto Neutrophil % : 74.1 %  Auto Lymphocyte % : 16.3 %  Auto Monocyte % : 5.6 %  Auto Eosinophil % : 2.3 %  Auto Basophil % : 0.4 %      Serial CBC's  10-02 @ 06:30  Hct-28.7 / Hgb-9.6 / Plat-380 / RBC-4.01 / WBC-11.63  Serial CBC's  10-01 @ 06:00  Hct-30.6 / Hgb-10.1 / Plat-355 / RBC-4.28 / WBC-11.36  Serial CBC's  09-30 @ 09:44  Hct-26.8 / Hgb-8.9 / Plat-304 / RBC-3.77 / WBC-14.27  Serial CBC's  09-29 @ 17:00  Hct-30.3 / Hgb-9.8 / Plat-298 / RBC-4.12 / WBC-14.98      10-02    135  |  103  |  14  ----------------------------<  87  3.3<L>   |  22  |  0.46<L>    Ca    8.0<L>      02 Oct 2023 06:30  Phos  2.6     10-02  Mg     1.60     10-02    TPro  6.3  /  Alb  1.9<L>  /  TBili  10.5<H>  /  DBili  x   /  AST  156<H>  /  ALT  71<H>  /  AlkPhos  344<H>  10-02      PT/INR - ( 01 Oct 2023 06:00 )   PT: 19.9 sec;   INR: 1.79 ratio         PTT - ( 01 Oct 2023 06:00 )  PTT:40.1 sec    Iron - Total Binding Capacity.: 113 ug/dL (10-01 @ 06:00)  Ferritin: 3086 ng/mL (10-01 @ 06:00)  Reticulocyte Percent: 2.8 % (09-25 @ 23:55)              RADIOLOGY & ADDITIONAL STUDIES:     Patient is a 58y old  Male who presents with a chief complaint of Jaundice (01 Oct 2023 11:44)      HPI:  59 y/o M with pmhx of metastatic colon cancer on active chemotherapy(Avastin, Irinotecan, Leucovorin, and 5FU CADD) biweekly who presented to [facility] for yellowing skin and fatigue. Patient states that he noticed yellowing of his skin and eyes on Monday. He also endorses decreased appetite for the last 2 weeks with associated weight loss. Patient states that he has lost 8-9 lbs in the last 1 week. He denies any nausea or vomiting. He has noticed increased abdominal distension which he found odd given his lack of appetite. Noted that his urine has been darker than usual but denies any hematuria. He also endorses R flank pain that radiates to his back.     At , CT A/P showed peripancreatic edema suspicious for acute pancreatitis new compared to prior; itrahepatic mets with intrahepatic peripheral biliary ductal dilatation R >L with partially visible lung mets with new small ascites. ptient was started on zosyn. Patient was transferred  to [facility] for Advanced GI assessment.  (30 Sep 2023 10:47)       Oncologic History:  59 y/o M with PMH of vitiligo who developed abdominal discomfort which started 12/2020 and he was admitted on 1/4/2021 @ [facility] where he was found to have mass in distal TC with hepatic and RLL metastatic disease seen in CT scan. He was discharged home with outpatient follow up. He had syncope at home on 1/18/2021 and then saw PMD on 1/19/2021 where he was sent to hospital and admitted to [facility] from 1/19/2021 to 1/19/2021. He underwent liver biopsy on 1/25/2021 which confirmed metastatic colon cancer. His CEA was elevated 2882 from 1/21/2021.     His CT c/a/p from 1/28/2021 showed findings consistent with metastatic colon cancer evidenced by annular colonic mass and numerous hepatic metastases, groundglass pulmonary opacity consistent with infectious etiology, including Covid 19, improved compared to prior exam.  ?  He presents to us on 2/5/2021 and states he feels stronger since leaving hospital but has lost about 22 pounds from December 2020 to Feb 2021  Given Stage IV disease, offered FOLFOX and reviewed palliative intent of treatment  ?  Late Feb 2021 started FOLFOX  March 2021 admitted for LBO and underwent resection with ostomy  ?  April 2021 restarted FOLFOX --> stopped oxali July 2021 April 2022 POD switched to FOLFIRI Day ( 5FU held 12/27/22 for possible coronary vasospasm then bolus 5FU for 1 month and after cardiac eval , infusional 5fu restart late Feb 2023 after cardiology optimization  July 2023 POD switched to Irino + vectibix (held FU due to coronary vasospasm).     Disease: Colon Adenocarcinoma    AJCC Stage: IV    Tumor Markers: pan andrea wt, transverse colon primary (right side)     IFTIKHAR, TMB 5, APC R232*APC *  GRM3 V271I, MAP2K1 (MEK1) K57E  TP53 S127P     Current Treatment Status:. irino vectibix.    ROS: as above     PAST MEDICAL & SURGICAL HISTORY:  Vitiligo      Metastatic colon cancer to liver      S/P colectomy      S/P colostomy      Large bowel obstruction          SOCIAL HISTORY:    FAMILY HISTORY:  Family history of colon cancer (Father)    Family history of hypertension (Mother)    Family history of type 2 diabetes mellitus (Father)        MEDICATIONS  (STANDING):  chlorhexidine 2% Cloths 1 Application(s) Topical daily  enoxaparin Injectable 40 milliGRAM(s) SubCutaneous every 24 hours  lactated ringers. 1000 milliLiter(s) (100 mL/Hr) IV Continuous <Continuous>  piperacillin/tazobactam IVPB.. 3.375 Gram(s) IV Intermittent every 8 hours  potassium chloride  10 mEq/100 mL IVPB 10 milliEquivalent(s) IV Intermittent every 1 hour    MEDICATIONS  (PRN):  acetaminophen     Tablet .. 975 milliGRAM(s) Oral every 6 hours PRN Temp greater or equal to 38C (100.4F)  melatonin 3 milliGRAM(s) Oral at bedtime PRN Insomnia  ondansetron Injectable 4 milliGRAM(s) IV Push every 8 hours PRN Nausea and/or Vomiting      Allergies    No Known Allergies    Intolerances        Vital Signs Last 24 Hrs  T(C): 37.2 (02 Oct 2023 02:19), Max: 38.1 (01 Oct 2023 22:00)  T(F): 99 (02 Oct 2023 02:19), Max: 100.6 (01 Oct 2023 22:00)  HR: 79 (02 Oct 2023 02:19) (79 - 105)  BP: 108/67 (02 Oct 2023 02:19) (104/77 - 114/77)  BP(mean): --  RR: 17 (02 Oct 2023 02:19) (17 - 18)  SpO2: 98% (02 Oct 2023 02:19) (98% - 100%)    Parameters below as of 02 Oct 2023 02:19  Patient On (Oxygen Delivery Method): room air        PHYSICAL EXAM  GENERAL:  chronically ill appearing  HEENT:  NC/AT  CHEST:  Full & symmetric excursion, no increased effort, breath sounds clear  HEART:  Regular rhythm, S1, S2  ABDOMEN:  Soft, non-tender, non-distended, normoactive bowel sounds  EXTREMITIES:  no LE edema  SKIN:  diffuse hypopigmented rash  NEURO:  Alert, oriented x3    LABS:                          9.6    11.63 )-----------( 380      ( 02 Oct 2023 06:30 )             28.7         Mean Cell Volume : 71.6 fL  Mean Cell Hemoglobin : 23.9 pg  Mean Cell Hemoglobin Concentration : 33.4 gm/dL  Auto Neutrophil # : 8.62 K/uL  Auto Lymphocyte # : 1.89 K/uL  Auto Monocyte # : 0.65 K/uL  Auto Eosinophil # : 0.27 K/uL  Auto Basophil # : 0.05 K/uL  Auto Neutrophil % : 74.1 %  Auto Lymphocyte % : 16.3 %  Auto Monocyte % : 5.6 %  Auto Eosinophil % : 2.3 %  Auto Basophil % : 0.4 %      Serial CBC's  10-02 @ 06:30  Hct-28.7 / Hgb-9.6 / Plat-380 / RBC-4.01 / WBC-11.63  Serial CBC's  10-01 @ 06:00  Hct-30.6 / Hgb-10.1 / Plat-355 / RBC-4.28 / WBC-11.36  Serial CBC's  09-30 @ 09:44  Hct-26.8 / Hgb-8.9 / Plat-304 / RBC-3.77 / WBC-14.27  Serial CBC's  09-29 @ 17:00  Hct-30.3 / Hgb-9.8 / Plat-298 / RBC-4.12 / WBC-14.98      10-02    135  |  103  |  14  ----------------------------<  87  3.3<L>   |  22  |  0.46<L>    Ca    8.0<L>      02 Oct 2023 06:30  Phos  2.6     10-02  Mg     1.60     10-02    TPro  6.3  /  Alb  1.9<L>  /  TBili  10.5<H>  /  DBili  x   /  AST  156<H>  /  ALT  71<H>  /  AlkPhos  344<H>  10-02      PT/INR - ( 01 Oct 2023 06:00 )   PT: 19.9 sec;   INR: 1.79 ratio         PTT - ( 01 Oct 2023 06:00 )  PTT:40.1 sec    Iron - Total Binding Capacity.: 113 ug/dL (10-01 @ 06:00)  Ferritin: 3086 ng/mL (10-01 @ 06:00)  Reticulocyte Percent: 2.8 % (09-25 @ 23:55)              RADIOLOGY & ADDITIONAL STUDIES:     Patient is a 58y old  Male who presents with a chief complaint of Jaundice (01 Oct 2023 11:44)      HPI:  57 y/o M with pmhx of metastatic colon cancer on active chemotherapy(Avastin, Irinotecan, Leucovorin, and 5FU CADD) biweekly who presented to [facility] for yellowing skin and fatigue. Patient states that he noticed yellowing of his skin and eyes on Monday. He also endorses decreased appetite for the last 2 weeks with associated weight loss. Patient states that he has lost 8-9 lbs in the last 1 week. He denies any nausea or vomiting. He has noticed increased abdominal distension which he found odd given his lack of appetite. Noted that his urine has been darker than usual but denies any hematuria. He also endorses R flank pain that radiates to his back.     At , CT A/P showed peripancreatic edema suspicious for acute pancreatitis new compared to prior; itrahepatic mets with intrahepatic peripheral biliary ductal dilatation R >L with partially visible lung mets with new small ascites. ptient was started on zosyn. Patient was transferred  to [facility] for Advanced GI assessment.  (30 Sep 2023 10:47)       Seen by bedside on 10/2  patient complaining of fever no chills  + abdominal pain, 5/10, relieved with Tylenol  no diarrhea or constipation  denies pruritis   dark urine and light stool    Oncologic History:  57 y/o M with PMH of vitiligo who developed abdominal discomfort which started 12/2020 and he was admitted on 1/4/2021 @ [facility] where he was found to have mass in distal TC with hepatic and RLL metastatic disease seen in CT scan. He was discharged home with outpatient follow up. He had syncope at home on 1/18/2021 and then saw PMD on 1/19/2021 where he was sent to hospital and admitted to [facility] from 1/19/2021 to 1/19/2021. He underwent liver biopsy on 1/25/2021 which confirmed metastatic colon cancer. His CEA was elevated 2882 from 1/21/2021.     His CT c/a/p from 1/28/2021 showed findings consistent with metastatic colon cancer evidenced by annular colonic mass and numerous hepatic metastases, groundglass pulmonary opacity consistent with infectious etiology, including Covid 19, improved compared to prior exam.  ?  He presents to us on 2/5/2021 and states he feels stronger since leaving hospital but has lost about 22 pounds from December 2020 to Feb 2021  Given Stage IV disease, offered FOLFOX and reviewed palliative intent of treatment  ?  Late Feb 2021 started FOLFOX  March 2021 admitted for LBO and underwent resection with ostomy  ?  April 2021 restarted FOLFOX --> stopped oxali July 2021 April 2022 POD switched to FOLFIRI Day ( 5FU held 12/27/22 for possible coronary vasospasm then bolus 5FU for 1 month and after cardiac eval , infusional 5fu restart late Feb 2023 after cardiology optimization  July 2023 POD switched to Irino + vectibix (held FU due to coronary vasospasm).     Disease: Colon Adenocarcinoma    AJCC Stage: IV    Tumor Markers: pan andrea wt, transverse colon primary (right side)     IFTIKHAR, TMB 5, APC R232*APC *  GRM3 V271I, MAP2K1 (MEK1) K57E  TP53 S127P     Current Treatment Status:. irino vectibix.    ROS: as above     PAST MEDICAL & SURGICAL HISTORY:  Vitiligo      Metastatic colon cancer to liver      S/P colectomy      S/P colostomy      Large bowel obstruction          SOCIAL HISTORY:    FAMILY HISTORY:  Family history of colon cancer (Father)    Family history of hypertension (Mother)    Family history of type 2 diabetes mellitus (Father)        MEDICATIONS  (STANDING):  chlorhexidine 2% Cloths 1 Application(s) Topical daily  enoxaparin Injectable 40 milliGRAM(s) SubCutaneous every 24 hours  lactated ringers. 1000 milliLiter(s) (100 mL/Hr) IV Continuous <Continuous>  piperacillin/tazobactam IVPB.. 3.375 Gram(s) IV Intermittent every 8 hours  potassium chloride  10 mEq/100 mL IVPB 10 milliEquivalent(s) IV Intermittent every 1 hour    MEDICATIONS  (PRN):  acetaminophen     Tablet .. 975 milliGRAM(s) Oral every 6 hours PRN Temp greater or equal to 38C (100.4F)  melatonin 3 milliGRAM(s) Oral at bedtime PRN Insomnia  ondansetron Injectable 4 milliGRAM(s) IV Push every 8 hours PRN Nausea and/or Vomiting      Allergies    No Known Allergies    Intolerances        Vital Signs Last 24 Hrs  T(C): 37.2 (02 Oct 2023 02:19), Max: 38.1 (01 Oct 2023 22:00)  T(F): 99 (02 Oct 2023 02:19), Max: 100.6 (01 Oct 2023 22:00)  HR: 79 (02 Oct 2023 02:19) (79 - 105)  BP: 108/67 (02 Oct 2023 02:19) (104/77 - 114/77)  BP(mean): --  RR: 17 (02 Oct 2023 02:19) (17 - 18)  SpO2: 98% (02 Oct 2023 02:19) (98% - 100%)    Parameters below as of 02 Oct 2023 02:19  Patient On (Oxygen Delivery Method): room air        PHYSICAL EXAM  GENERAL:  chronically ill appearing  HEENT:  NC/AT  CHEST:  Full & symmetric excursion, no increased effort, breath sounds clear  HEART:  Regular rhythm, S1, S2  ABDOMEN:  Soft, non-tender, non-distended, normoactive bowel sounds  EXTREMITIES:  no LE edema  SKIN:  diffuse hypopigmented rash, +juandice  NEURO:  Alert, oriented x3    LABS:                          9.6    11.63 )-----------( 380      ( 02 Oct 2023 06:30 )             28.7         Mean Cell Volume : 71.6 fL  Mean Cell Hemoglobin : 23.9 pg  Mean Cell Hemoglobin Concentration : 33.4 gm/dL  Auto Neutrophil # : 8.62 K/uL  Auto Lymphocyte # : 1.89 K/uL  Auto Monocyte # : 0.65 K/uL  Auto Eosinophil # : 0.27 K/uL  Auto Basophil # : 0.05 K/uL  Auto Neutrophil % : 74.1 %  Auto Lymphocyte % : 16.3 %  Auto Monocyte % : 5.6 %  Auto Eosinophil % : 2.3 %  Auto Basophil % : 0.4 %      Serial CBC's  10-02 @ 06:30  Hct-28.7 / Hgb-9.6 / Plat-380 / RBC-4.01 / WBC-11.63  Serial CBC's  10-01 @ 06:00  Hct-30.6 / Hgb-10.1 / Plat-355 / RBC-4.28 / WBC-11.36  Serial CBC's  09-30 @ 09:44  Hct-26.8 / Hgb-8.9 / Plat-304 / RBC-3.77 / WBC-14.27  Serial CBC's  09-29 @ 17:00  Hct-30.3 / Hgb-9.8 / Plat-298 / RBC-4.12 / WBC-14.98      10-02    135  |  103  |  14  ----------------------------<  87  3.3<L>   |  22  |  0.46<L>    Ca    8.0<L>      02 Oct 2023 06:30  Phos  2.6     10-02  Mg     1.60     10-02    TPro  6.3  /  Alb  1.9<L>  /  TBili  10.5<H>  /  DBili  x   /  AST  156<H>  /  ALT  71<H>  /  AlkPhos  344<H>  10-02      PT/INR - ( 01 Oct 2023 06:00 )   PT: 19.9 sec;   INR: 1.79 ratio         PTT - ( 01 Oct 2023 06:00 )  PTT:40.1 sec    Iron - Total Binding Capacity.: 113 ug/dL (10-01 @ 06:00)  Ferritin: 3086 ng/mL (10-01 @ 06:00)  Reticulocyte Percent: 2.8 % (09-25 @ 23:55)              RADIOLOGY & ADDITIONAL STUDIES:

## 2023-10-02 NOTE — PROGRESS NOTE ADULT - SUBJECTIVE AND OBJECTIVE BOX
Aditya Dale MD  [employer]  Pager [phone]  Email: [email or web address]  Available on Microsoft Teams        PROGRESS NOTE:     Patient is a 58y old  Male who presents with a chief complaint of hyperbilirubinemia (02 Oct 2023 13:26)      SUBJECTIVE / OVERNIGHT EVENTS:  Patient seen and examined this morning. Abdominal pain improved, awaiting repeat MRCP.   ADDITIONAL REVIEW OF SYSTEMS:  No f/c/n/v      MEDICATIONS  (STANDING):  chlorhexidine 2% Cloths 1 Application(s) Topical daily  enoxaparin Injectable 40 milliGRAM(s) SubCutaneous every 24 hours  lactated ringers. 1000 milliLiter(s) (100 mL/Hr) IV Continuous <Continuous>  piperacillin/tazobactam IVPB.. 3.375 Gram(s) IV Intermittent every 8 hours    MEDICATIONS  (PRN):  acetaminophen     Tablet .. 975 milliGRAM(s) Oral every 6 hours PRN Temp greater or equal to 38C (100.4F), Moderate Pain (4 - 6)  melatonin 3 milliGRAM(s) Oral at bedtime PRN Insomnia  ondansetron Injectable 4 milliGRAM(s) IV Push every 8 hours PRN Nausea and/or Vomiting      CAPILLARY BLOOD GLUCOSE        I&O's Summary      PHYSICAL EXAM:  Vital Signs Last 24 Hrs  T(C): 36.8 (02 Oct 2023 13:28), Max: 38.1 (01 Oct 2023 22:00)  T(F): 98.3 (02 Oct 2023 13:28), Max: 100.6 (01 Oct 2023 22:00)  HR: 90 (02 Oct 2023 13:28) (79 - 105)  BP: 102/72 (02 Oct 2023 13:28) (102/72 - 114/77)  BP(mean): --  RR: 18 (02 Oct 2023 13:28) (17 - 18)  SpO2: 98% (02 Oct 2023 13:28) (98% - 98%)    Parameters below as of 02 Oct 2023 02:19  Patient On (Oxygen Delivery Method): room air        CONSTITUTIONAL: NAD, Jaundiced.  RESPIRATORY: Normal respiratory effort; lungs are clear to auscultation bilaterally  CARDIOVASCULAR: Regular rate and rhythm, normal S1 and S2, no murmur/rub/gallop; No lower extremity edema; Peripheral pulses are 2+ bilaterally  ABDOMEN: Nontender to palpation, normoactive bowel sounds, no rebound/guarding;   MUSCLOSKELETAL: no clubbing or cyanosis of digits; no joint swelling or tenderness to palpation  PSYCH: A+O to person, place, and time; affect appropriate    LABS:                        9.6    11.63 )-----------( 380      ( 02 Oct 2023 06:30 )             28.7     10-02    135  |  103  |  14  ----------------------------<  87  3.3<L>   |  22  |  0.46<L>    Ca    8.0<L>      02 Oct 2023 06:30  Phos  2.6     10-02  Mg     1.60     10-02    TPro  6.3  /  Alb  1.9<L>  /  TBili  10.5<H>  /  DBili  x   /  AST  156<H>  /  ALT  71<H>  /  AlkPhos  344<H>  10-02    PT/INR - ( 01 Oct 2023 06:00 )   PT: 19.9 sec;   INR: 1.79 ratio         PTT - ( 01 Oct 2023 06:00 )  PTT:40.1 sec      Urinalysis Basic - ( 02 Oct 2023 06:30 )    Color: x / Appearance: x / SG: x / pH: x  Gluc: 87 mg/dL / Ketone: x  / Bili: x / Urobili: x   Blood: x / Protein: x / Nitrite: x   Leuk Esterase: x / RBC: x / WBC x   Sq Epi: x / Non Sq Epi: x / Bacteria: x        Culture - Blood (collected 30 Sep 2023 11:10)  Source: .Blood Blood-Peripheral  Preliminary Report (02 Oct 2023 14:02):    No growth at 48 Hours    Culture - Blood (collected 30 Sep 2023 11:03)  Source: .Blood Blood-Peripheral  Preliminary Report (02 Oct 2023 14:02):    No growth at 48 Hours        RADIOLOGY & ADDITIONAL TESTS:  Results Reviewed:   Imaging Personally Reviewed:  Electrocardiogram Personally Reviewed:    COORDINATION OF CARE:  Care Discussed with Consultants/Other Providers [Y/N]:  Prior or Outpatient Records Reviewed [Y/N]:

## 2023-10-02 NOTE — CHART NOTE - NSCHARTNOTEFT_GEN_A_CORE
Oncology paged for notification of admission of patient who follows with Dr. Byers at [facility]. Pending call back.

## 2023-10-02 NOTE — PROGRESS NOTE ADULT - PROBLEM SELECTOR PLAN 2
- bilirubin 11.7-> 14.1, CT A/P with intrahepatic peripheral bilary ductal dilation right greater than left  - , AST//65, improved from previous   - GI consulted  - pending MRCP to further evaluate bilary ducts

## 2023-10-03 LAB
ALBUMIN SERPL ELPH-MCNC: 2 G/DL — LOW (ref 3.3–5)
ALP SERPL-CCNC: 335 U/L — HIGH (ref 40–120)
ALT FLD-CCNC: 68 U/L — HIGH (ref 4–41)
ANION GAP SERPL CALC-SCNC: 10 MMOL/L — SIGNIFICANT CHANGE UP (ref 7–14)
AST SERPL-CCNC: 147 U/L — HIGH (ref 4–40)
BASOPHILS # BLD AUTO: 0.06 K/UL — SIGNIFICANT CHANGE UP (ref 0–0.2)
BASOPHILS NFR BLD AUTO: 0.5 % — SIGNIFICANT CHANGE UP (ref 0–2)
BILIRUB SERPL-MCNC: 9.9 MG/DL — HIGH (ref 0.2–1.2)
BUN SERPL-MCNC: 12 MG/DL — SIGNIFICANT CHANGE UP (ref 7–23)
CALCIUM SERPL-MCNC: 8 MG/DL — LOW (ref 8.4–10.5)
CHLORIDE SERPL-SCNC: 106 MMOL/L — SIGNIFICANT CHANGE UP (ref 98–107)
CO2 SERPL-SCNC: 21 MMOL/L — LOW (ref 22–31)
CREAT SERPL-MCNC: 0.46 MG/DL — LOW (ref 0.5–1.3)
EGFR: 120 ML/MIN/1.73M2 — SIGNIFICANT CHANGE UP
EOSINOPHIL # BLD AUTO: 0.32 K/UL — SIGNIFICANT CHANGE UP (ref 0–0.5)
EOSINOPHIL NFR BLD AUTO: 2.7 % — SIGNIFICANT CHANGE UP (ref 0–6)
FOLATE SERPL-MCNC: 8 NG/ML — SIGNIFICANT CHANGE UP (ref 3.1–17.5)
GLUCOSE BLDC GLUCOMTR-MCNC: 101 MG/DL — HIGH (ref 70–99)
GLUCOSE BLDC GLUCOMTR-MCNC: 108 MG/DL — HIGH (ref 70–99)
GLUCOSE BLDC GLUCOMTR-MCNC: 72 MG/DL — SIGNIFICANT CHANGE UP (ref 70–99)
GLUCOSE BLDC GLUCOMTR-MCNC: 81 MG/DL — SIGNIFICANT CHANGE UP (ref 70–99)
GLUCOSE SERPL-MCNC: 69 MG/DL — LOW (ref 70–99)
HCT VFR BLD CALC: 29.9 % — LOW (ref 39–50)
HGB BLD-MCNC: 9.9 G/DL — LOW (ref 13–17)
IANC: 8.98 K/UL — HIGH (ref 1.8–7.4)
IMM GRANULOCYTES NFR BLD AUTO: 1.4 % — HIGH (ref 0–0.9)
LACTATE SERPL-SCNC: 2 MMOL/L — SIGNIFICANT CHANGE UP (ref 0.5–2)
LYMPHOCYTES # BLD AUTO: 1.58 K/UL — SIGNIFICANT CHANGE UP (ref 1–3.3)
LYMPHOCYTES # BLD AUTO: 13.4 % — SIGNIFICANT CHANGE UP (ref 13–44)
MAGNESIUM SERPL-MCNC: 1.3 MG/DL — LOW (ref 1.6–2.6)
MCHC RBC-ENTMCNC: 23.9 PG — LOW (ref 27–34)
MCHC RBC-ENTMCNC: 33.1 GM/DL — SIGNIFICANT CHANGE UP (ref 32–36)
MCV RBC AUTO: 72 FL — LOW (ref 80–100)
MONOCYTES # BLD AUTO: 0.66 K/UL — SIGNIFICANT CHANGE UP (ref 0–0.9)
MONOCYTES NFR BLD AUTO: 5.6 % — SIGNIFICANT CHANGE UP (ref 2–14)
NEUTROPHILS # BLD AUTO: 8.98 K/UL — HIGH (ref 1.8–7.4)
NEUTROPHILS NFR BLD AUTO: 76.4 % — SIGNIFICANT CHANGE UP (ref 43–77)
NRBC # BLD: 0 /100 WBCS — SIGNIFICANT CHANGE UP (ref 0–0)
NRBC # FLD: 0.06 K/UL — HIGH (ref 0–0)
PHOSPHATE SERPL-MCNC: 2.2 MG/DL — LOW (ref 2.5–4.5)
PLATELET # BLD AUTO: 401 K/UL — HIGH (ref 150–400)
POTASSIUM SERPL-MCNC: 3.7 MMOL/L — SIGNIFICANT CHANGE UP (ref 3.5–5.3)
POTASSIUM SERPL-SCNC: 3.7 MMOL/L — SIGNIFICANT CHANGE UP (ref 3.5–5.3)
PROT SERPL-MCNC: 5.9 G/DL — LOW (ref 6–8.3)
RBC # BLD: 4.15 M/UL — LOW (ref 4.2–5.8)
RBC # FLD: 23.8 % — HIGH (ref 10.3–14.5)
SODIUM SERPL-SCNC: 137 MMOL/L — SIGNIFICANT CHANGE UP (ref 135–145)
VIT B12 SERPL-MCNC: >2000 PG/ML — HIGH (ref 200–900)
WBC # BLD: 11.77 K/UL — HIGH (ref 3.8–10.5)
WBC # FLD AUTO: 11.77 K/UL — HIGH (ref 3.8–10.5)

## 2023-10-03 PROCEDURE — 74183 MRI ABD W/O CNTR FLWD CNTR: CPT | Mod: 26

## 2023-10-03 PROCEDURE — 99232 SBSQ HOSP IP/OBS MODERATE 35: CPT

## 2023-10-03 RX ORDER — MAGNESIUM SULFATE 500 MG/ML
2 VIAL (ML) INJECTION ONCE
Refills: 0 | Status: COMPLETED | OUTPATIENT
Start: 2023-10-03 | End: 2023-10-03

## 2023-10-03 RX ORDER — SODIUM CHLORIDE 9 MG/ML
1000 INJECTION, SOLUTION INTRAVENOUS
Refills: 0 | Status: DISCONTINUED | OUTPATIENT
Start: 2023-10-03 | End: 2023-10-04

## 2023-10-03 RX ORDER — POTASSIUM PHOSPHATE, MONOBASIC POTASSIUM PHOSPHATE, DIBASIC 236; 224 MG/ML; MG/ML
15 INJECTION, SOLUTION INTRAVENOUS ONCE
Refills: 0 | Status: COMPLETED | OUTPATIENT
Start: 2023-10-03 | End: 2023-10-03

## 2023-10-03 RX ADMIN — Medication 25 GRAM(S): at 10:07

## 2023-10-03 RX ADMIN — PIPERACILLIN AND TAZOBACTAM 25 GRAM(S): 4; .5 INJECTION, POWDER, LYOPHILIZED, FOR SOLUTION INTRAVENOUS at 06:30

## 2023-10-03 RX ADMIN — Medication 975 MILLIGRAM(S): at 15:57

## 2023-10-03 RX ADMIN — Medication 975 MILLIGRAM(S): at 16:57

## 2023-10-03 RX ADMIN — LIDOCAINE 1 PATCH: 4 CREAM TOPICAL at 00:53

## 2023-10-03 RX ADMIN — POTASSIUM PHOSPHATE, MONOBASIC POTASSIUM PHOSPHATE, DIBASIC 62.5 MILLIMOLE(S): 236; 224 INJECTION, SOLUTION INTRAVENOUS at 12:05

## 2023-10-03 RX ADMIN — PIPERACILLIN AND TAZOBACTAM 25 GRAM(S): 4; .5 INJECTION, POWDER, LYOPHILIZED, FOR SOLUTION INTRAVENOUS at 15:57

## 2023-10-03 RX ADMIN — SODIUM CHLORIDE 75 MILLILITER(S): 9 INJECTION, SOLUTION INTRAVENOUS at 19:18

## 2023-10-03 RX ADMIN — PIPERACILLIN AND TAZOBACTAM 25 GRAM(S): 4; .5 INJECTION, POWDER, LYOPHILIZED, FOR SOLUTION INTRAVENOUS at 21:35

## 2023-10-03 RX ADMIN — CHLORHEXIDINE GLUCONATE 1 APPLICATION(S): 213 SOLUTION TOPICAL at 12:03

## 2023-10-03 RX ADMIN — ENOXAPARIN SODIUM 40 MILLIGRAM(S): 100 INJECTION SUBCUTANEOUS at 10:07

## 2023-10-03 NOTE — PROGRESS NOTE ADULT - PROBLEM SELECTOR PLAN 1
Sepsis now resolved  - patient presented at [facility] jaundice,   - here had Tmax 101.6, [id number] with WBC 14.27, source unknown at this time, no focal infectious symptoms, possibly intraabdominal source  - procal 0.91    - Bcx 9/25 NGTD, Ucx NGTD, repeat BCx ngtd  - completed zosyn  - last fever 9/30/23. HR improving, now 80s

## 2023-10-03 NOTE — PROGRESS NOTE ADULT - SUBJECTIVE AND OBJECTIVE BOX
Aditya Dale MD  Academic Hospitalist  Pager [phone]  Email: [email or web address]  Available on [employer] Teams        PROGRESS NOTE:     Patient is a 58y old  Male who presents with a chief complaint of jaundice (02 Oct 2023 15:51)    SUBJECTIVE / OVERNIGHT EVENTS:  Patient seen and examined this morning. Abdominal pain improved, awaiting repeat MRCP.   ADDITIONAL REVIEW OF SYSTEMS:  No f/c/n/v    MEDICATIONS  (STANDING):  chlorhexidine 2% Cloths 1 Application(s) Topical daily  enoxaparin Injectable 40 milliGRAM(s) SubCutaneous every 24 hours  lactated ringers. 1000 milliLiter(s) (75 mL/Hr) IV Continuous <Continuous>  lidocaine   4% Patch 1 Patch Transdermal daily  piperacillin/tazobactam IVPB.. 3.375 Gram(s) IV Intermittent every 8 hours    MEDICATIONS  (PRN):  acetaminophen     Tablet .. 975 milliGRAM(s) Oral every 6 hours PRN Temp greater or equal to 38C (100.4F), Moderate Pain (4 - 6)  melatonin 3 milliGRAM(s) Oral at bedtime PRN Insomnia  ondansetron Injectable 4 milliGRAM(s) IV Push every 8 hours PRN Nausea and/or Vomiting      CAPILLARY BLOOD GLUCOSE      POCT Blood Glucose.: 108 mg/dL (03 Oct 2023 09:27)  POCT Blood Glucose.: 81 mg/dL (03 Oct 2023 08:36)  POCT Blood Glucose.: 72 mg/dL (03 Oct 2023 08:03)    I&O's Summary      PHYSICAL EXAM:  Vital Signs Last 24 Hrs  T(C): 36.8 (03 Oct 2023 11:30), Max: 37.1 (03 Oct 2023 05:51)  T(F): 98.2 (03 Oct 2023 11:30), Max: 98.7 (03 Oct 2023 05:51)  HR: 91 (03 Oct 2023 11:30) (79 - 91)  BP: 116/80 (03 Oct 2023 11:30) (111/77 - 124/84)  BP(mean): --  RR: 18 (03 Oct 2023 11:30) (16 - 18)  SpO2: 100% (03 Oct 2023 11:30) (99% - 100%)    Parameters below as of 03 Oct 2023 11:30  Patient On (Oxygen Delivery Method): room air          CONSTITUTIONAL: NAD, Jaundiced.  RESPIRATORY: Normal respiratory effort; lungs are clear to auscultation bilaterally  CARDIOVASCULAR: Regular rate and rhythm, normal S1 and S2, no murmur/rub/gallop; No lower extremity edema; Peripheral pulses are 2+ bilaterally  ABDOMEN: Nontender to palpation, normoactive bowel sounds, no rebound/guarding;   MUSCLOSKELETAL: no clubbing or cyanosis of digits; no joint swelling or tenderness to palpation  PSYCH: A+O to person, place, and time; affect appropriate    LABS:                        9.9    11.77 )-----------( 401      ( 03 Oct 2023 05:46 )             29.9     10-03    137  |  106  |  12  ----------------------------<  69<L>  3.7   |  21<L>  |  0.46<L>    Ca    8.0<L>      03 Oct 2023 05:46  Phos  2.2     10-03  Mg     1.30     10-03    TPro  5.9<L>  /  Alb  2.0<L>  /  TBili  9.9<H>  /  DBili  x   /  AST  147<H>  /  ALT  68<H>  /  AlkPhos  335<H>  10-03          Urinalysis Basic - ( 03 Oct 2023 05:46 )    Color: x / Appearance: x / SG: x / pH: x  Gluc: 69 mg/dL / Ketone: x  / Bili: x / Urobili: x   Blood: x / Protein: x / Nitrite: x   Leuk Esterase: x / RBC: x / WBC x   Sq Epi: x / Non Sq Epi: x / Bacteria: x        Culture - Blood (collected 02 Oct 2023 06:30)  Source: .Blood Blood-Venous  Preliminary Report (03 Oct 2023 09:01):    No growth at 24 hours    Culture - Blood (collected 02 Oct 2023 06:30)  Source: .Blood Blood-Peripheral  Preliminary Report (03 Oct 2023 09:01):    No growth at 24 hours        RADIOLOGY & ADDITIONAL TESTS:  Results Reviewed:   Imaging Personally Reviewed:  Electrocardiogram Personally Reviewed:    COORDINATION OF CARE:  Care Discussed with Consultants/Other Providers [Y/N]: Case discussed during interdisciplinary rounds with social work and case management  Prior or Outpatient Records Reviewed [Y/N]:

## 2023-10-03 NOTE — PROGRESS NOTE ADULT - PROBLEM SELECTOR PROBLEM 1
Called to inform patient of negative fecal occult test, will need this done in one year.  Also discussed worsening A1C of 7.1% up from 6.4%.  He did admit to eating a lot of simple carbs lately, sodas and juice.  He will work on his diet, as he is not on any medications.    He was also requesting that his blood pressure medications be sent to the pharmacy, as they were not available when he went to go  the other meds.  I sent refills for chlorthalidone, amlodipine and losartan.    Tami Villeda MD  PGY-II    2/1/2017 4:54 PM  Pager:  [phone]    Sepsis

## 2023-10-03 NOTE — PROGRESS NOTE ADULT - ASSESSMENT
57 y/o M with pmhx of metastatic colon cancer on active chemotherapy(Avastin, Irinotecan, Leucovorin, and 5FU CADD) biweekly who presented to [facility] for yellowing skin and fatigue. Found to have hyperbilirubinemia along with fever.

## 2023-10-03 NOTE — CHART NOTE - NSCHARTNOTEFT_GEN_A_CORE
Patient evaluated at bedside this morning. Pain is well controlled with acetaminophen as needed. Offered patient opiate medications for further symptom control as needed, but declined. Patient is pending MRCP for further evaluation of hyperbilirubinemia. Team to sign off for now.    Thank you for allowing us to participate in your patient's care. Please page [phone] for reconsult if needed. The Geriatric and Palliative Medicine service has coverage 24 hours a day/ 7 days a week to provide medical recommendations regarding symptom management needs via telephone.

## 2023-10-04 DIAGNOSIS — R79.89 OTHER SPECIFIED ABNORMAL FINDINGS OF BLOOD CHEMISTRY: ICD-10-CM

## 2023-10-04 DIAGNOSIS — G89.3 NEOPLASM RELATED PAIN (ACUTE) (CHRONIC): ICD-10-CM

## 2023-10-04 LAB
ALBUMIN SERPL ELPH-MCNC: 2 G/DL — LOW (ref 3.3–5)
ALP SERPL-CCNC: 436 U/L — HIGH (ref 40–120)
ALT FLD-CCNC: 74 U/L — HIGH (ref 4–41)
ANION GAP SERPL CALC-SCNC: 12 MMOL/L — SIGNIFICANT CHANGE UP (ref 7–14)
AST SERPL-CCNC: 185 U/L — HIGH (ref 4–40)
BASOPHILS # BLD AUTO: 0.11 K/UL — SIGNIFICANT CHANGE UP (ref 0–0.2)
BASOPHILS NFR BLD AUTO: 0.7 % — SIGNIFICANT CHANGE UP (ref 0–2)
BILIRUB SERPL-MCNC: 10.2 MG/DL — HIGH (ref 0.2–1.2)
BUN SERPL-MCNC: 9 MG/DL — SIGNIFICANT CHANGE UP (ref 7–23)
CALCIUM SERPL-MCNC: 7.9 MG/DL — LOW (ref 8.4–10.5)
CHLORIDE SERPL-SCNC: 101 MMOL/L — SIGNIFICANT CHANGE UP (ref 98–107)
CO2 SERPL-SCNC: 23 MMOL/L — SIGNIFICANT CHANGE UP (ref 22–31)
CREAT SERPL-MCNC: 0.51 MG/DL — SIGNIFICANT CHANGE UP (ref 0.5–1.3)
CULTURE RESULTS: SIGNIFICANT CHANGE UP
CULTURE RESULTS: SIGNIFICANT CHANGE UP
EGFR: 116 ML/MIN/1.73M2 — SIGNIFICANT CHANGE UP
EOSINOPHIL # BLD AUTO: 0.49 K/UL — SIGNIFICANT CHANGE UP (ref 0–0.5)
EOSINOPHIL NFR BLD AUTO: 3.2 % — SIGNIFICANT CHANGE UP (ref 0–6)
GLUCOSE BLDC GLUCOMTR-MCNC: 158 MG/DL — HIGH (ref 70–99)
GLUCOSE BLDC GLUCOMTR-MCNC: 57 MG/DL — LOW (ref 70–99)
GLUCOSE BLDC GLUCOMTR-MCNC: 80 MG/DL — SIGNIFICANT CHANGE UP (ref 70–99)
GLUCOSE SERPL-MCNC: 67 MG/DL — LOW (ref 70–99)
HCT VFR BLD CALC: 25.3 % — LOW (ref 39–50)
HCT VFR BLD CALC: 27.3 % — LOW (ref 39–50)
HGB BLD-MCNC: 8.3 G/DL — LOW (ref 13–17)
HGB BLD-MCNC: 9 G/DL — LOW (ref 13–17)
IANC: 11.11 K/UL — HIGH (ref 1.8–7.4)
IMM GRANULOCYTES NFR BLD AUTO: 1.1 % — HIGH (ref 0–0.9)
LACTATE SERPL-SCNC: 2 MMOL/L — SIGNIFICANT CHANGE UP (ref 0.5–2)
LYMPHOCYTES # BLD AUTO: 15.1 % — SIGNIFICANT CHANGE UP (ref 13–44)
LYMPHOCYTES # BLD AUTO: 2.28 K/UL — SIGNIFICANT CHANGE UP (ref 1–3.3)
MAGNESIUM SERPL-MCNC: 1.5 MG/DL — LOW (ref 1.6–2.6)
MCHC RBC-ENTMCNC: 23.9 PG — LOW (ref 27–34)
MCHC RBC-ENTMCNC: 24 PG — LOW (ref 27–34)
MCHC RBC-ENTMCNC: 32.8 GM/DL — SIGNIFICANT CHANGE UP (ref 32–36)
MCHC RBC-ENTMCNC: 33 GM/DL — SIGNIFICANT CHANGE UP (ref 32–36)
MCV RBC AUTO: 72.8 FL — LOW (ref 80–100)
MCV RBC AUTO: 72.9 FL — LOW (ref 80–100)
MONOCYTES # BLD AUTO: 0.95 K/UL — HIGH (ref 0–0.9)
MONOCYTES NFR BLD AUTO: 6.3 % — SIGNIFICANT CHANGE UP (ref 2–14)
MRSA PCR RESULT.: SIGNIFICANT CHANGE UP
NEUTROPHILS # BLD AUTO: 11.11 K/UL — HIGH (ref 1.8–7.4)
NEUTROPHILS NFR BLD AUTO: 73.6 % — SIGNIFICANT CHANGE UP (ref 43–77)
NRBC # BLD: 0 /100 WBCS — SIGNIFICANT CHANGE UP (ref 0–0)
NRBC # BLD: 0 /100 WBCS — SIGNIFICANT CHANGE UP (ref 0–0)
NRBC # FLD: 0.06 K/UL — HIGH (ref 0–0)
NRBC # FLD: 0.06 K/UL — HIGH (ref 0–0)
PHOSPHATE SERPL-MCNC: 2 MG/DL — LOW (ref 2.5–4.5)
PLATELET # BLD AUTO: 380 K/UL — SIGNIFICANT CHANGE UP (ref 150–400)
PLATELET # BLD AUTO: 481 K/UL — HIGH (ref 150–400)
POTASSIUM SERPL-MCNC: 4.5 MMOL/L — SIGNIFICANT CHANGE UP (ref 3.5–5.3)
POTASSIUM SERPL-SCNC: 4.5 MMOL/L — SIGNIFICANT CHANGE UP (ref 3.5–5.3)
PROCALCITONIN SERPL-MCNC: 0.47 NG/ML — HIGH (ref 0.02–0.1)
PROT SERPL-MCNC: 6.7 G/DL — SIGNIFICANT CHANGE UP (ref 6–8.3)
RBC # BLD: 3.47 M/UL — LOW (ref 4.2–5.8)
RBC # BLD: 3.75 M/UL — LOW (ref 4.2–5.8)
RBC # FLD: 24.2 % — HIGH (ref 10.3–14.5)
RBC # FLD: 24.4 % — HIGH (ref 10.3–14.5)
S AUREUS DNA NOSE QL NAA+PROBE: SIGNIFICANT CHANGE UP
SODIUM SERPL-SCNC: 136 MMOL/L — SIGNIFICANT CHANGE UP (ref 135–145)
SPECIMEN SOURCE: SIGNIFICANT CHANGE UP
SPECIMEN SOURCE: SIGNIFICANT CHANGE UP
WBC # BLD: 11.88 K/UL — HIGH (ref 3.8–10.5)
WBC # BLD: 15.1 K/UL — HIGH (ref 3.8–10.5)
WBC # FLD AUTO: 11.88 K/UL — HIGH (ref 3.8–10.5)
WBC # FLD AUTO: 15.1 K/UL — HIGH (ref 3.8–10.5)

## 2023-10-04 PROCEDURE — 99233 SBSQ HOSP IP/OBS HIGH 50: CPT

## 2023-10-04 RX ORDER — SENNA PLUS 8.6 MG/1
2 TABLET ORAL AT BEDTIME
Refills: 0 | Status: DISCONTINUED | OUTPATIENT
Start: 2023-10-04 | End: 2023-10-18

## 2023-10-04 RX ORDER — POLYETHYLENE GLYCOL 3350 17 G/17G
17 POWDER, FOR SOLUTION ORAL DAILY
Refills: 0 | Status: DISCONTINUED | OUTPATIENT
Start: 2023-10-04 | End: 2023-10-18

## 2023-10-04 RX ORDER — OXYCODONE HYDROCHLORIDE 5 MG/1
2.5 TABLET ORAL EVERY 6 HOURS
Refills: 0 | Status: DISCONTINUED | OUTPATIENT
Start: 2023-10-04 | End: 2023-10-05

## 2023-10-04 RX ORDER — OXYCODONE HYDROCHLORIDE 5 MG/1
5 TABLET ORAL EVERY 6 HOURS
Refills: 0 | Status: DISCONTINUED | OUTPATIENT
Start: 2023-10-04 | End: 2023-10-05

## 2023-10-04 RX ORDER — MAGNESIUM SULFATE 500 MG/ML
1 VIAL (ML) INJECTION ONCE
Refills: 0 | Status: COMPLETED | OUTPATIENT
Start: 2023-10-04 | End: 2023-10-04

## 2023-10-04 RX ORDER — SODIUM CHLORIDE 9 MG/ML
1000 INJECTION, SOLUTION INTRAVENOUS
Refills: 0 | Status: DISCONTINUED | OUTPATIENT
Start: 2023-10-04 | End: 2023-10-09

## 2023-10-04 RX ADMIN — PIPERACILLIN AND TAZOBACTAM 25 GRAM(S): 4; .5 INJECTION, POWDER, LYOPHILIZED, FOR SOLUTION INTRAVENOUS at 13:40

## 2023-10-04 RX ADMIN — OXYCODONE HYDROCHLORIDE 2.5 MILLIGRAM(S): 5 TABLET ORAL at 12:01

## 2023-10-04 RX ADMIN — SODIUM CHLORIDE 60 MILLILITER(S): 9 INJECTION, SOLUTION INTRAVENOUS at 09:10

## 2023-10-04 RX ADMIN — OXYCODONE HYDROCHLORIDE 2.5 MILLIGRAM(S): 5 TABLET ORAL at 13:01

## 2023-10-04 RX ADMIN — Medication 63.75 MILLIMOLE(S): at 16:39

## 2023-10-04 RX ADMIN — CHLORHEXIDINE GLUCONATE 1 APPLICATION(S): 213 SOLUTION TOPICAL at 12:07

## 2023-10-04 RX ADMIN — PIPERACILLIN AND TAZOBACTAM 25 GRAM(S): 4; .5 INJECTION, POWDER, LYOPHILIZED, FOR SOLUTION INTRAVENOUS at 05:01

## 2023-10-04 RX ADMIN — PIPERACILLIN AND TAZOBACTAM 25 GRAM(S): 4; .5 INJECTION, POWDER, LYOPHILIZED, FOR SOLUTION INTRAVENOUS at 21:56

## 2023-10-04 RX ADMIN — Medication 100 GRAM(S): at 12:01

## 2023-10-04 NOTE — PROGRESS NOTE ADULT - PROBLEM SELECTOR PLAN 4
- 2/2 chronic/neoplastic disease  - hgb stable and pt remains without clinical s/s of active bleeding  - Trending daily cbcs; will transfuse supportively prn (goal hgb > 7)

## 2023-10-04 NOTE — PROGRESS NOTE ADULT - PROBLEM SELECTOR PLAN 3
- Suspect biliary sepsis due to malignancy-related biliary obstruction  - Pt now afebrile > 48h and remains normotensive, although WBCs uptrended today  - Repeat procal and blood/urine cxs ordered (pt with h/o Klebs and B cereus bacteremia)  - Continuing empiric Zosyn with low threshold to broaden abx coverage if pt become HD unstable  - Continuing IV hydration

## 2023-10-04 NOTE — ADVANCED PRACTICE NURSE CONSULT - ASSESSMENT
General: A&Ox4, ambulatory, continent of urine. Skin warm, dry with increased moisture in intertriginous folds, adequate skin turgor, jaundiced with scattered areas of hypopigmentation vitiligo, abdomen firm and distended.    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 Stoma pink, moist, edematous and prolapse (entire protrusion 6cm). Patient reports stoma has swollen up in the past but not to this extend and usually reduces after chemotherapy treatment. Denies any abdominal pain, reports daily stool production. Loose stool in pouch. Pouch changed to larger size to accommodate for prolapsed. Educated patient on use of application of table sugar onto stoma to assist with reduction of edema/prolapse. Stoma minimally retracted with peristalsis during assessment. Patient did not want to do that at this time but will consider trying at home. Encouraged patient to also try cold compress application over ostomy bag and soft manual pressure for reduction. Reviewed s/s of obstruction, ischemia and other s/s to report to MD. Patient verbalized understanding.    General: A&Ox4, ambulatory, continent of urine. Skin warm, dry with increased moisture in intertriginous folds, adequate skin turgor, jaundiced with scattered areas of hypopigmentation vitiligo, abdomen firm and distended.    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 Stoma pink, moist, edematous and prolapse (entire protrusion 6cm). Patient reports stoma has swollen up in the past but not to this extend and usually reduces after chemotherapy treatment. Denies any abdominal pain, reports daily stool production. Loose stool in pouch. Pouch changed to larger size to accommodate for prolapsed. Educated patient on use of application of table sugar onto stoma to assist with reduction of edema/prolapse. Patient did not want to do that at this time but will consider trying at home. Encouraged patient to also try cold compress application over ostomy bag and soft manual pressure for reduction, patient agreeable, cold compress and light pressure applied, stoma minimally retracted with peristalsis during assessment. Reviewed s/s of obstruction, ischemia and other s/s to report to MD. Patient verbalized understanding.       Stoma size: 2 1/4", protruding 6cm, entire edematous stoma measuring approximately [id number]. Pink, moist, viable. See A&I flowsheet for full assessment details.  General: A&Ox4, ambulatory, continent of urine. Skin warm, dry with increased moisture in intertriginous folds, adequate skin turgor, jaundiced with scattered areas of hypopigmentation vitiligo, abdomen firm and distended.    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 Stoma pink, moist, edematous and prolapse (entire protrusion 6cm). Patient reports stoma has swollen up in the past but not to this extent and usually reduces after chemotherapy treatment. Denies any abdominal pain, reports daily stool production. Loose stool in pouch. Pouch changed to larger size to accommodate for prolapsed. Educated patient on use of application of table sugar onto stoma to assist with reduction of edema/prolapse. Patient did not want to do that at this time but will consider trying at home. Encouraged patient to also try cold compress application over ostomy bag and soft manual pressure for reduction, patient agreeable, cold compress and light pressure applied, stoma minimally retracted with peristalsis during assessment. Reviewed s/s of obstruction, ischemia and other s/s to report to MD. Patient verbalized understanding.       Stoma size: 2 1/4", protruding 6cm, entire edematous stoma measuring approximately [id number]. Pink, moist, viable. See A&I flowsheet for full assessment details.

## 2023-10-04 NOTE — CHART NOTE - NSCHARTNOTEFT_GEN_A_CORE
Per discussion with GI fellow, no plan for ERCP for biliary decompression as there is nothing amenable to stenting. Case discussed with attending; per discussion, IR consult placed for possible percutaneous biliary drain placement. Appreciate recommendations.

## 2023-10-04 NOTE — PROGRESS NOTE ADULT - PROBLEM SELECTOR PLAN 2
- Tbil/transaminases are stably elevated; likely disease related based on MRCP findings  - Viral hep serologies negative  - Case d/w iGI; no role for ERCP at this time  - IR consulted for possible perc drain placement  - Avoiding hepatotoxins

## 2023-10-04 NOTE — PROGRESS NOTE ADULT - SUBJECTIVE AND OBJECTIVE BOX
SOLID TUMOR ONCOLOGY HOSPITALIST PROGRESS NOTE    S: Patient seen and examined. No acute events overnight. Pt complained of worsened R sided back pain since yesterday evening- he was amenable to trial low-dose opioid.    CURRENT MEDICATIONS  MEDICATIONS  (STANDING):  chlorhexidine 2% Cloths 1 Application(s) Topical daily  dextrose 5% + lactated ringers. 1000 milliLiter(s) (60 mL/Hr) IV Continuous <Continuous>  enoxaparin Injectable 40 milliGRAM(s) SubCutaneous every 24 hours  lidocaine   4% Patch 1 Patch Transdermal daily  piperacillin/tazobactam IVPB.. 3.375 Gram(s) IV Intermittent every 8 hours  sodium phosphate 15 milliMole(s)/250 mL IVPB 15 milliMole(s) IV Intermittent once    MEDICATIONS  (PRN):  melatonin 3 milliGRAM(s) Oral at bedtime PRN Insomnia  ondansetron Injectable 4 milliGRAM(s) IV Push every 8 hours PRN Nausea and/or Vomiting  oxyCODONE    IR 2.5 milliGRAM(s) Oral every 6 hours PRN Moderate Pain (4 - 6)  oxyCODONE    IR 5 milliGRAM(s) Oral every 6 hours PRN Severe Pain (7 - 10)      PHYSICAL EXAM  Vital Signs Last 24 Hrs  T(C): 37.2 (04 Oct 2023 15:08), Max: 37.2 (04 Oct 2023 05:56)  T(F): 98.9 (04 Oct 2023 15:08), Max: 99 (04 Oct 2023 05:56)  HR: 87 (04 Oct 2023 15:08) (77 - 87)  BP: 114/83 (04 Oct 2023 15:08) (99/68 - 121/85)  BP(mean): --  RR: 16 (04 Oct 2023 15:08) (16 - 18)  SpO2: 99% (04 Oct 2023 15:08) (98% - 99%)    Parameters below as of 04 Oct 2023 15:08  Patient On (Oxygen Delivery Method): room air      I&O's Detail    GENERAL: middle-aged male, appears comfortable laying in bed, nad  EYES: PERRLA; jaundiced, icteric  ENMT: Moist oral mucosa, no pharyngeal injection or exudates; normal dentition  NECK: Supple, no palpable masses; no thyromegaly  RESPIRATORY: Normal respiratory effort; lungs are clear to auscultation bilaterally  CARDIOVASCULAR: Regular rate and rhythm, normal S1 and S2, no murmur/rub/gallop; No lower extremity edema; Peripheral pulses are 2+ bilaterally  ABDOMEN: distended, dull to percussion; Nontender to palpation, normoactive bowel sounds, no rebound/guarding; No hepatosplenomegaly  PSYCH: A+O to person, place, and time; affect appropriate  NEUROLOGY: CN 2-12 are intact and symmetric; no gross sensory deficits   SKIN: vitaligo present, jaundice present, no rashes    LABS                        8.3    15.10 )-----------( 481      ( 04 Oct 2023 05:15 )             25.3     10-04    136  |  101  |  9   ----------------------------<  67<L>  4.5   |  23  |  0.51    Ca    7.9<L>      04 Oct 2023 05:15  Phos  2.0     10-04  Mg     1.50     10-04    TPro  6.7  /  Alb  2.0<L>  /  TBili  10.2<H>  /  DBili  x   /  AST  185<H>  /  ALT  74<H>  /  AlkPhos  436<H>  10-04      CAPILLARY BLOOD GLUCOSE  POCT Blood Glucose.: 80 mg/dL (04 Oct 2023 08:51)  POCT Blood Glucose.: 57 mg/dL (04 Oct 2023 08:48)  POCT Blood Glucose.: 101 mg/dL (03 Oct 2023 22:14)    MICROBIOLOGY  Urinalysis Basic - ( 04 Oct 2023 05:15 )    Color: x / Appearance: x / SG: x / pH: x  Gluc: 67 mg/dL / Ketone: x  / Bili: x / Urobili: x   Blood: x / Protein: x / Nitrite: x   Leuk Esterase: x / RBC: x / WBC x   Sq Epi: x / Non Sq Epi: x / Bacteria: x        Culture - Blood (collected 02 Oct 2023 06:30)  Source: .Blood Blood-Venous  Preliminary Report (04 Oct 2023 09:02):    No growth at 48 Hours    Culture - Blood (collected 02 Oct 2023 06:30)  Source: .Blood Blood-Peripheral  Preliminary Report (04 Oct 2023 09:01):    No growth at 48 Hours    SARS-CoV-2: NotDetec (25 Sep 2023 23:55)      PERTINENT RADIOLOGY  10/3 MRCP  1.  New moderate right pleural effusion and interval increase of moderate   ascites.  2.  Grossly stable pulmonary and hepatic metastatic lesions.  3.  Stable mild intrahepatic biliary ductal dilatation secondary to   compression of more central duct by metastatic lesions in the hilum.   Unremarkable CBD.    9/29 CXR: New atelectasis or infiltrate right lung base    9/26 CT a/p: Peripancreatic edema suspicious for acute pancreatitis, new compared to   prior. While less likely this could also represent noninflammatory   edema/third spacing of fluid. Small amount of ascites is new. Otherwise similar to prior with a large number of hepatic metastases,   intrahepatic peripheral biliary ductal dilation in the right greater than   left lobes, partially visible lung metastases, and likely metastatic mild   upper abdominal and partially visible mediastinal adenopathy.         SOLID TUMOR ONCOLOGY HOSPITALIST PROGRESS NOTE    S: Patient seen and examined. No acute events overnight. Pt complained of worsened R sided back pain since yesterday evening- he was amenable to trial low-dose opioid.    CURRENT MEDICATIONS  MEDICATIONS  (STANDING):  chlorhexidine 2% Cloths 1 Application(s) Topical daily  dextrose 5% + lactated ringers. 1000 milliLiter(s) (60 mL/Hr) IV Continuous <Continuous>  enoxaparin Injectable 40 milliGRAM(s) SubCutaneous every 24 hours  lidocaine   4% Patch 1 Patch Transdermal daily  piperacillin/tazobactam IVPB.. 3.375 Gram(s) IV Intermittent every 8 hours  sodium phosphate 15 milliMole(s)/250 mL IVPB 15 milliMole(s) IV Intermittent once    MEDICATIONS  (PRN):  melatonin 3 milliGRAM(s) Oral at bedtime PRN Insomnia  ondansetron Injectable 4 milliGRAM(s) IV Push every 8 hours PRN Nausea and/or Vomiting  oxyCODONE    IR 2.5 milliGRAM(s) Oral every 6 hours PRN Moderate Pain (4 - 6)  oxyCODONE    IR 5 milliGRAM(s) Oral every 6 hours PRN Severe Pain (7 - 10)      PHYSICAL EXAM  Vital Signs Last 24 Hrs  T(C): 37.2 (04 Oct 2023 15:08), Max: 37.2 (04 Oct 2023 05:56)  T(F): 98.9 (04 Oct 2023 15:08), Max: 99 (04 Oct 2023 05:56)  HR: 87 (04 Oct 2023 15:08) (77 - 87)  BP: 114/83 (04 Oct 2023 15:08) (99/68 - 121/85)  BP(mean): --  RR: 16 (04 Oct 2023 15:08) (16 - 18)  SpO2: 99% (04 Oct 2023 15:08) (98% - 99%)    Parameters below as of 04 Oct 2023 15:08  Patient On (Oxygen Delivery Method): room air      I&O's Detail    GENERAL: middle-aged male, appears comfortable laying in bed, nad  EYES: PERRLA; jaundiced, icteric  ENMT: Moist oral mucosa, no pharyngeal injection or exudates; normal dentition  NECK: Supple, no palpable masses; no thyromegaly  RESPIRATORY: Normal respiratory effort; lungs are clear to auscultation bilaterally  CARDIOVASCULAR: Regular rate and rhythm, normal S1 and S2, no murmur/rub/gallop; No lower extremity edema; Peripheral pulses are 2+ bilaterally  ABDOMEN: distended, dull to percussion; Nontender to palpation, normoactive bowel sounds, no rebound/guarding; rlq ostomy in tact  PSYCH: A+O to person, place, and time; affect appropriate  NEUROLOGY: CN 2-12 are intact and symmetric; no gross sensory deficits   SKIN: vitaligo present, jaundice present, no rashes    LABS                        8.3    15.10 )-----------( 481      ( 04 Oct 2023 05:15 )             25.3     10-04    136  |  101  |  9   ----------------------------<  67<L>  4.5   |  23  |  0.51    Ca    7.9<L>      04 Oct 2023 05:15  Phos  2.0     10-04  Mg     1.50     10-04    TPro  6.7  /  Alb  2.0<L>  /  TBili  10.2<H>  /  DBili  x   /  AST  185<H>  /  ALT  74<H>  /  AlkPhos  436<H>  10-04    Ferritin: 3086 ng/mL (10.01.23 @ 06:00)   Transferrin, Serum: 91 mg/dL (10.01.23 @ 06:00)   Iron - Total Binding Capacity.: 113 ug/dL (10.01.23 @ 06:00)   Iron Total: 34 ug/dL (10.01.23 @ 06:00)   % Saturation, Iron: 30 % (10.01.23 @ 06:00)   Unsaturated Iron Binding Capacity: 79 ug/dL (10.01.23 @ 06:00)     CAPILLARY BLOOD GLUCOSE  POCT Blood Glucose.: 80 mg/dL (04 Oct 2023 08:51)  POCT Blood Glucose.: 57 mg/dL (04 Oct 2023 08:48)  POCT Blood Glucose.: 101 mg/dL (03 Oct 2023 22:14)    MICROBIOLOGY  Urinalysis Basic - ( 04 Oct 2023 05:15 )    Color: x / Appearance: x / SG: x / pH: x  Gluc: 67 mg/dL / Ketone: x  / Bili: x / Urobili: x   Blood: x / Protein: x / Nitrite: x   Leuk Esterase: x / RBC: x / WBC x   Sq Epi: x / Non Sq Epi: x / Bacteria: x    Culture - Blood (collected 02 Oct 2023 06:30)  Source: .Blood Blood-Venous  Preliminary Report (04 Oct 2023 09:02):    No growth at 48 Hours    Culture - Blood (collected 02 Oct 2023 06:30)  Source: .Blood Blood-Peripheral  Preliminary Report (04 Oct 2023 09:01):    No growth at 48 Hours    SARS-CoV-2: NotDetec (25 Sep 2023 23:55)    Lactate, Blood: 2.0 mmol/L (10.04.23 @ 05:15)   Lactate, Blood: 2.0 mmol/L (10.03.23 @ 05:46)   Lactate, Blood: 2.4 mmol/L (10.02.23 @ 06:30)   Lactate, Blood: 2.0 mmol/L (09.29.23 @ 07:45)   Lactate, Blood: 1.4 mmol/L (09.27.23 @ 00:45)    Acute Hepatitis Panel (10.01.23 @ 06:00)   Hepatitis C Virus Interpretation: Nonreact:Hepatitis C Virus S/CO Ratio: 0.10 S/CO  Hepatitis B Core IgM Antibody: Nonreact  Hepatitis B Surface Antigen: Nonreact  Hepatitis A IgM Antibody: Nonreact    TUMOR MARKERS  Carcinoembryonic Antigen: 530.0  Carcinoembryonic Antigen: 465.0  Carcinoembryonic Antigen: 429.0  Carcinoembryonic Antigen: 381.0  Carcinoembryonic Antigen: 348.0    PERTINENT RADIOLOGY  10/3 MRCP  1.  New moderate right pleural effusion and interval increase of moderate   ascites.  2.  Grossly stable pulmonary and hepatic metastatic lesions.  3.  Stable mild intrahepatic biliary ductal dilatation secondary to   compression of more central duct by metastatic lesions in the hilum.   Unremarkable CBD.    9/29 CXR: New atelectasis or infiltrate right lung base    9/26 CT a/p: Peripancreatic edema suspicious for acute pancreatitis, new compared to   prior. While less likely this could also represent noninflammatory   edema/third spacing of fluid. Small amount of ascites is new. Otherwise similar to prior with a large number of hepatic metastases,   intrahepatic peripheral biliary ductal dilation in the right greater than   left lobes, partially visible lung metastases, and likely metastatic mild   upper abdominal and partially visible mediastinal adenopathy.

## 2023-10-04 NOTE — CONSULT NOTE ADULT - ASSESSMENT
Interventional Radiology    Evaluate for Procedure: percutaneous biliary drain    HPI: 58y Male with metastatic colon cancer on active chemotherapy (Avastin, Irinotecan, Leucovorin, and 5FU CADD) biweekly who presented to [facility] for yellowing skin and fatigue. Found to have hyperbilirubinemia along with fever. MRCP with moderate ascites, grossly stable pulmonary and hepatic metastatic lesions, and stable mild intrahepatic biliary ductal dilatation secondary to compression of more central duct by metastatic lesions in the hilum.    Allergies: No Known Allergies    Medications (Abx/Cardiac/Anticoagulation/Blood Products)    enoxaparin Injectable: 40 milliGRAM(s) SubCutaneous (10-03 @ 10:07)  piperacillin/tazobactam IVPB..: 25 mL/Hr IV Intermittent (10-04 @ 13:40)    Data:    60.8  T(C): 37.2  HR: 87  BP: 114/83  RR: 16  SpO2: 99%    -WBC 15.10 / HgB 8.3 / Hct 25.3 / Plt 481  -Na 136 / Cl 101 / BUN 9 / Glucose 67  -K 4.5 / CO2 23 / Cr 0.51  -ALT 74 / Alk Phos 436 / T.Bili 10.2  -INR 1.79 / PTT 40.1      Radiology:     < from: MR MRCP w/wo IV Cont (10.03.23 @ 14:50) >  IMPRESSION:  1.  New moderate right pleural effusion and interval increase of moderate   ascites.  2.  Grossly stable pulmonary and hepatic metastatic lesions.  3.  Stable mild intrahepatic biliary ductal dilatation secondary to   compression of more central duct by metastatic lesions in the hilum.   Unremarkable CBD.    < end of copied text >      Assessment/Plan: 58y Male with metastatic colon cancer on active chemotherapy found to have moderate ascites, stable pulmonary and hepatic metastatic lesions, and stable mild intrahepatic biliary ductal dilatation 2/2 compression of more central duct by metastatic lesions in the hilum. IR consulted for percutaneous biliary drain.    -- Case discussed and relevant imaging reviewed with IR attending Dr. Barrera. Given diffuse hepatic disease and subsequent multifocal isolated biliary ducts which are not easily accessible and would also require placement of numerous drains, disease process is not amenable to percutaneous biliary drainage. Additionally, patient with moderate ascites increasing risk of bile leak during procedure.  -- No planned IR intervention at this time  -- Please reach out with any questions      - Non-emergent consults: Place IR consult order in [place]  - Emergent issues (pager): [facility] [phone]; [facility] [phone]; [phone]  - Scheduling questions: [facility] [phone]; [facility] [phone]  - Clinic/outpatient booking: [facility] [phone]; [facility] [phone]

## 2023-10-04 NOTE — PROGRESS NOTE ADULT - ASSESSMENT
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  Pt was admitted to [facility] from Onc clinic on 9/30 with new-onset jaundice and fatigue, with sepsis in ED and abnormal LFTs noted on admission labs.

## 2023-10-04 NOTE — PROGRESS NOTE ADULT - PROBLEM SELECTOR PLAN 1
- Presentation c/f POD based on rising TM and increasing ascites, although no new lesions present on MRCP; IR consulted for palliative LVP (with cytology)  - Pt is a suboptimal candidate for cancer-directed therapy given his liver dysfunction   - Irreversible liver failure will preclude continuation of systemic cancer treatment, in which case best supportive/hospice would be warranted  - Will d/w Dr. Ospina  - Pt is currently full code; planning to address GOC pending outcome of liver ely/mgmt

## 2023-10-04 NOTE — CHART NOTE - NSCHARTNOTEFT_GEN_A_CORE
MRCP reviewed with radiology. Patient with dilation in multiple isolated biliary duct branches 2/2 diffuse metastatic disease. They are not amenable to stenting via ERCP. CBD normal in size. No plan for ERCP at this time.     Adriana Beth, PGY6  Gastroenterology/Hepatology Fellow  Available on Microsoft Teams  [phone] ([place] Short Range Pager)  [phone] (Long Range Pager)    After 5pm, please contact the on-call GI fellow. [phone]

## 2023-10-04 NOTE — ADVANCED PRACTICE NURSE CONSULT - RECOMMEDATIONS
-Follow up with heme/onc    Recommended supplies, also provided at bedside:  Stoma powder- item #7906    Liquid barrier film    Skin barrier ring- item # [id number]  Flat wafer (4")- item # 13390  Drainable pouch (4")- item # [id number]    Plan discussed with patient and primary RN Susy Rivera.  Please contact Wound/Ostomy Care Service Line if we can be of further assistance (ext [phone]). -Follow up with heme/onc    Recommended supplies, also provided at bedside:  Stoma powder- item #7906    Liquid barrier film    Skin barrier ring- item # [id number]  Flat wafer (4")- item # 07893  Drainable pouch (4")- item # 15740    Plan discussed with patient, primary RN Susy Rivera, Maxine Salazar.  Please contact Wound/Ostomy Care Service Line if we can be of further assistance (ext [phone]).

## 2023-10-05 DIAGNOSIS — R18.8 OTHER ASCITES: ICD-10-CM

## 2023-10-05 LAB
ALBUMIN SERPL ELPH-MCNC: 2 G/DL — LOW (ref 3.3–5)
ALP SERPL-CCNC: 499 U/L — HIGH (ref 40–120)
ALT FLD-CCNC: 73 U/L — HIGH (ref 4–41)
ANION GAP SERPL CALC-SCNC: 11 MMOL/L — SIGNIFICANT CHANGE UP (ref 7–14)
ANISOCYTOSIS BLD QL: SLIGHT — SIGNIFICANT CHANGE UP
AST SERPL-CCNC: 189 U/L — HIGH (ref 4–40)
BASOPHILS # BLD AUTO: 0.11 K/UL — SIGNIFICANT CHANGE UP (ref 0–0.2)
BASOPHILS NFR BLD AUTO: 0.9 % — SIGNIFICANT CHANGE UP (ref 0–2)
BILIRUB SERPL-MCNC: 10.3 MG/DL — HIGH (ref 0.2–1.2)
BUN SERPL-MCNC: 7 MG/DL — SIGNIFICANT CHANGE UP (ref 7–23)
CALCIUM SERPL-MCNC: 7.6 MG/DL — LOW (ref 8.4–10.5)
CHLORIDE SERPL-SCNC: 102 MMOL/L — SIGNIFICANT CHANGE UP (ref 98–107)
CO2 SERPL-SCNC: 22 MMOL/L — SIGNIFICANT CHANGE UP (ref 22–31)
CREAT SERPL-MCNC: 0.48 MG/DL — LOW (ref 0.5–1.3)
CULTURE RESULTS: NO GROWTH — SIGNIFICANT CHANGE UP
CULTURE RESULTS: SIGNIFICANT CHANGE UP
CULTURE RESULTS: SIGNIFICANT CHANGE UP
EGFR: 118 ML/MIN/1.73M2 — SIGNIFICANT CHANGE UP
EOSINOPHIL # BLD AUTO: 0.11 K/UL — SIGNIFICANT CHANGE UP (ref 0–0.5)
EOSINOPHIL NFR BLD AUTO: 0.9 % — SIGNIFICANT CHANGE UP (ref 0–6)
GIANT PLATELETS BLD QL SMEAR: PRESENT — SIGNIFICANT CHANGE UP
GLUCOSE BLDC GLUCOMTR-MCNC: 100 MG/DL — HIGH (ref 70–99)
GLUCOSE BLDC GLUCOMTR-MCNC: 100 MG/DL — HIGH (ref 70–99)
GLUCOSE SERPL-MCNC: 90 MG/DL — SIGNIFICANT CHANGE UP (ref 70–99)
HCT VFR BLD CALC: 28.2 % — LOW (ref 39–50)
HGB BLD-MCNC: 9.2 G/DL — LOW (ref 13–17)
IANC: 9.08 K/UL — HIGH (ref 1.8–7.4)
LACTATE SERPL-SCNC: 2.2 MMOL/L — HIGH (ref 0.5–2)
LYMPHOCYTES # BLD AUTO: 0.68 K/UL — LOW (ref 1–3.3)
LYMPHOCYTES # BLD AUTO: 5.5 % — LOW (ref 13–44)
MAGNESIUM SERPL-MCNC: 1.3 MG/DL — LOW (ref 1.6–2.6)
MANUAL SMEAR VERIFICATION: SIGNIFICANT CHANGE UP
MCHC RBC-ENTMCNC: 24.2 PG — LOW (ref 27–34)
MCHC RBC-ENTMCNC: 32.6 GM/DL — SIGNIFICANT CHANGE UP (ref 32–36)
MCV RBC AUTO: 74.2 FL — LOW (ref 80–100)
MICROCYTES BLD QL: SLIGHT — SIGNIFICANT CHANGE UP
MONOCYTES # BLD AUTO: 0.11 K/UL — SIGNIFICANT CHANGE UP (ref 0–0.9)
MONOCYTES NFR BLD AUTO: 0.9 % — LOW (ref 2–14)
NEUTROPHILS # BLD AUTO: 11.35 K/UL — HIGH (ref 1.8–7.4)
NEUTROPHILS NFR BLD AUTO: 91.8 % — HIGH (ref 43–77)
NRBC # BLD: 1 /100 — HIGH (ref 0–0)
PHOSPHATE SERPL-MCNC: 1.6 MG/DL — LOW (ref 2.5–4.5)
PLAT MORPH BLD: NORMAL — SIGNIFICANT CHANGE UP
PLATELET # BLD AUTO: 396 K/UL — SIGNIFICANT CHANGE UP (ref 150–400)
PLATELET COUNT - ESTIMATE: NORMAL — SIGNIFICANT CHANGE UP
POIKILOCYTOSIS BLD QL AUTO: SIGNIFICANT CHANGE UP
POLYCHROMASIA BLD QL SMEAR: SLIGHT — SIGNIFICANT CHANGE UP
POTASSIUM SERPL-MCNC: 3.9 MMOL/L — SIGNIFICANT CHANGE UP (ref 3.5–5.3)
POTASSIUM SERPL-SCNC: 3.9 MMOL/L — SIGNIFICANT CHANGE UP (ref 3.5–5.3)
PROT SERPL-MCNC: 6.3 G/DL — SIGNIFICANT CHANGE UP (ref 6–8.3)
RBC # BLD: 3.8 M/UL — LOW (ref 4.2–5.8)
RBC # FLD: 24.5 % — HIGH (ref 10.3–14.5)
RBC BLD AUTO: ABNORMAL
SCHISTOCYTES BLD QL AUTO: SLIGHT — SIGNIFICANT CHANGE UP
SMUDGE CELLS # BLD: PRESENT — SIGNIFICANT CHANGE UP
SODIUM SERPL-SCNC: 135 MMOL/L — SIGNIFICANT CHANGE UP (ref 135–145)
SPECIMEN SOURCE: SIGNIFICANT CHANGE UP
TARGETS BLD QL SMEAR: SIGNIFICANT CHANGE UP
WBC # BLD: 12.36 K/UL — HIGH (ref 3.8–10.5)
WBC # FLD AUTO: 12.36 K/UL — HIGH (ref 3.8–10.5)

## 2023-10-05 PROCEDURE — 99233 SBSQ HOSP IP/OBS HIGH 50: CPT

## 2023-10-05 RX ORDER — OXYCODONE HYDROCHLORIDE 5 MG/1
2.5 TABLET ORAL EVERY 6 HOURS
Refills: 0 | Status: DISCONTINUED | OUTPATIENT
Start: 2023-10-05 | End: 2023-10-05

## 2023-10-05 RX ORDER — ACETAMINOPHEN 500 MG
500 TABLET ORAL ONCE
Refills: 0 | Status: COMPLETED | OUTPATIENT
Start: 2023-10-05 | End: 2023-10-05

## 2023-10-05 RX ORDER — OXYCODONE HYDROCHLORIDE 5 MG/1
5 TABLET ORAL EVERY 6 HOURS
Refills: 0 | Status: DISCONTINUED | OUTPATIENT
Start: 2023-10-05 | End: 2023-10-12

## 2023-10-05 RX ORDER — OXYCODONE HYDROCHLORIDE 5 MG/1
5 TABLET ORAL EVERY 6 HOURS
Refills: 0 | Status: DISCONTINUED | OUTPATIENT
Start: 2023-10-05 | End: 2023-10-05

## 2023-10-05 RX ORDER — MAGNESIUM SULFATE 500 MG/ML
1 VIAL (ML) INJECTION ONCE
Refills: 0 | Status: COMPLETED | OUTPATIENT
Start: 2023-10-05 | End: 2023-10-05

## 2023-10-05 RX ADMIN — OXYCODONE HYDROCHLORIDE 5 MILLIGRAM(S): 5 TABLET ORAL at 23:46

## 2023-10-05 RX ADMIN — OXYCODONE HYDROCHLORIDE 5 MILLIGRAM(S): 5 TABLET ORAL at 19:27

## 2023-10-05 RX ADMIN — Medication 100 GRAM(S): at 10:28

## 2023-10-05 RX ADMIN — PIPERACILLIN AND TAZOBACTAM 25 GRAM(S): 4; .5 INJECTION, POWDER, LYOPHILIZED, FOR SOLUTION INTRAVENOUS at 06:30

## 2023-10-05 RX ADMIN — CHLORHEXIDINE GLUCONATE 1 APPLICATION(S): 213 SOLUTION TOPICAL at 11:45

## 2023-10-05 RX ADMIN — Medication 63.75 MILLIMOLE(S): at 11:46

## 2023-10-05 RX ADMIN — OXYCODONE HYDROCHLORIDE 5 MILLIGRAM(S): 5 TABLET ORAL at 18:27

## 2023-10-05 RX ADMIN — PIPERACILLIN AND TAZOBACTAM 25 GRAM(S): 4; .5 INJECTION, POWDER, LYOPHILIZED, FOR SOLUTION INTRAVENOUS at 13:05

## 2023-10-05 RX ADMIN — PIPERACILLIN AND TAZOBACTAM 25 GRAM(S): 4; .5 INJECTION, POWDER, LYOPHILIZED, FOR SOLUTION INTRAVENOUS at 22:25

## 2023-10-05 RX ADMIN — SENNA PLUS 2 TABLET(S): 8.6 TABLET ORAL at 22:25

## 2023-10-05 RX ADMIN — ENOXAPARIN SODIUM 40 MILLIGRAM(S): 100 INJECTION SUBCUTANEOUS at 11:45

## 2023-10-05 NOTE — PROGRESS NOTE ADULT - ASSESSMENT
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  Pt [facility] On[facility] clinic on 9/30 with new-onset jaundice and fatigue, with sepsis in ED and abnormal LFTs noted on admission labs. His hospital course has also been c/b ascites and cancer-related abd pain.

## 2023-10-05 NOTE — CONSULT NOTE ADULT - ASSESSMENT
Interventional Radiology    Evaluate for Procedure: Paracentesis    HPI: 58y Male with metastatic colon cancer on active chemotherapy (Avastin, Irinotecan, Leucovorin, and 5FU CADD) biweekly who presented to [facility] for yellowing skin and fatigue. Found to have hyperbilirubinemia along with fever. MRCP with moderate ascites, grossly stable pulmonary and hepatic metastatic lesions, and stable mild intrahepatic biliary ductal dilatation secondary to compression of more central duct by metastatic lesions in the hilum.    Allergies: No Known Allergies    Medications (Abx/Cardiac/Anticoagulation/Blood Products)    enoxaparin Injectable: 40 milliGRAM(s) SubCutaneous (10-05 @ 11:45)  piperacillin/tazobactam IVPB..: 25 mL/Hr IV Intermittent (10-05 @ 13:05)    Data:    T(C): 37.2  HR: 96  BP: 112/82  RR: 18  SpO2: 98%    -WBC 12.36 / HgB 9.2 / Hct 28.2 / Plt 396  -Na 135 / Cl 102 / BUN 7 / Glucose 90  -K 3.9 / CO2 22 / Cr 0.48  -ALT 73 / Alk Phos 499 / T.Bili 10.3  -INR 1.79 / PTT 40.1      Radiology:     < from: MR MRCP w/wo IV Cont (10.03.23 @ 14:50) >  IMPRESSION:  1.  New moderate right pleural effusion and interval increase of moderate   ascites.  2.  Grossly stable pulmonary and hepatic metastatic lesions.  3.  Stable mild intrahepatic biliary ductal dilatation secondary to   compression of more central duct by metastatic lesions in the hilum.   Unremarkable CBD.    < end of copied text >      Assessment/Plan: 58y Male with metastatic colon cancer on active chemotherapy found to have hyperbilirubinemia, fever, MRCP with moderate ascites, grossly stable pulmonary and hepatic metastatic lesions, and stable mild intrahepatic biliary ductal dilatation. IR consulted for paracentesis, no window per procedure team.    -- IR will plan to perform diagnostic and therapeutic paracentesis on Friday 10/6  -- Routine AM labs plus coags (PT/PTT/INR) on 10/6  -- hold a.m. anticoagulation on 10/6  -- please place IR procedure request order under Dr. Barrera  -- Please write IR pre procedure note      - Non-emergent consults: Place IR consult order in [place]  - Emergent issues (pager): [facility] [phone]; [facility] [phone]; [phone]  - Scheduling questions: [facility] [phone]; [facility] [phone]  - Clinic/outpatient booking: [facility] [phone]; [facility] [phone]

## 2023-10-05 NOTE — PROGRESS NOTE ADULT - PROBLEM SELECTOR PLAN 6
- Suboptimally controlled today  - Increasing Oxy IR 5mg to q6 standing (ok for pt to refuse) with bowel regimen to prevent OIC  - Continuing Lidocaine patch prn

## 2023-10-05 NOTE — PROGRESS NOTE ADULT - PROBLEM SELECTOR PLAN 3
- Suspect biliary sepsis due to malignancy-related biliary obstruction  - Pt now afebrile > 72h and remains normotensive; wbcs variably elevated, procal also significantly abnormal; other cx data remains negative to date (pt with h/o Klebs and B cereus bacteremia)  - Continuing empiric Zosyn with low threshold to broaden abx coverage if pt become HD unstable  - Continuing IV hydration

## 2023-10-05 NOTE — CHART NOTE - NSCHARTNOTEFT_GEN_A_CORE
Invasive Procedure Team (IPT) consulted for diagnostic/therapeutic paracentesis.    Indications for Diagnostic Paracentesis:  - Consistent with specialty society guidelines, ruling out Bacterial Peritonitis (in particular, SBP) in any admitted patient with ascites (even if asymptomatic), particularly in those with c/f sepsis  - Identifying etiology for new-onset ascites    Indications for Therapeutic Paracentesis:  - Tense ascites associated with abdominal discomfort, urinary retention, respiratory distress, or other evidence of end-organ compromise that is likely 2/2 the ascites itself (and not another etiology)  - Large ascites refractory to medical management (i.e. diuretics)    Absolute Contraindications:  - DIC  - Acute Abdomen  - Cellulitis    Relative Contraindications:  - Coagulopathy (INR > 2.0 per [facility] IPT policy, but specific to performing proceduralist; refer to final recommendations below)  - Severe Thrombocytopenia (Platelet < 20-50, discuss with team/refer to final recommendations below)  - Pregnancy (discuss with team/refer to final recommendations below)  - Cellulitis/Surgical Scarring/Vasculature or Hematoma overlying only safe bedside access site  - GONZÁLEZ (specifically for therapeutic paracteneses; related to already poor intravascular perfusion. Risk of GONZÁLEZ increases by 1.5x per 1L of ascites fluid removed).    ASSESSMENT/RECOMMENDATIONS  58yoM with hx of metastatic colon CA receiving active chemotherapy (avastin, inronotecan, leucovorin, and 5FU CADD, presenting with jaundice and hyperbilirubinemia, found to have moderate ascites for which IPT was consulted.     - Please obtain CBC/PTT/PT/INR/BMP for Cr on morning of procedure  - Please order any requested diagnostic studies on morning of procedure & notify procedure team if cytology requested.  - Primary team will have to physically deliver samples to lab; cannot be sent through pneumatic tube  - Can generally continue DVT/VTE PPx (Policy #: [id number])  - Pt does NOT need to be NPO  - Plan for procedure tentatively on 10/9  - If repeat therapeutic paracentesis requested for severe / refractory ascites in < 7 day intervals, should consider IR consultation for indwelling catheter placement.    Case d/w Dr. Roca Invasive Procedure Team (IPT) consulted for diagnostic/therapeutic paracentesis.    Indications for Diagnostic Paracentesis:  - Consistent with specialty society guidelines, ruling out Bacterial Peritonitis (in particular, SBP) in any admitted patient with ascites (even if asymptomatic), particularly in those with c/f sepsis  - Identifying etiology for new-onset ascites    Indications for Therapeutic Paracentesis:  - Tense ascites associated with abdominal discomfort, urinary retention, respiratory distress, or other evidence of end-organ compromise that is likely 2/2 the ascites itself (and not another etiology)  - Large ascites refractory to medical management (i.e. diuretics)    Absolute Contraindications:  - DIC  - Acute Abdomen  - Cellulitis    Relative Contraindications:  - Coagulopathy (INR > 2.0 per [facility] IPT policy, but specific to performing proceduralist; refer to final recommendations below)  - Severe Thrombocytopenia (Platelet < 20-50, discuss with team/refer to final recommendations below)  - Pregnancy (discuss with team/refer to final recommendations below)  - Cellulitis/Surgical Scarring/Vasculature or Hematoma overlying only safe bedside access site  - GONZÁLEZ (specifically for therapeutic paracteneses; related to already poor intravascular perfusion. Risk of GONZÁLEZ increases by 1.5x per 1L of ascites fluid removed).    ASSESSMENT/RECOMMENDATIONS  58yoM with hx of metastatic colon CA receiving active chemotherapy (avastin, inronotecan, leucovorin, and 5FU CADD, presenting with jaundice and hyperbilirubinemia, found to have moderate ascites for which IPT was consulted. Pt screened via bedside POCUS this morning w/o pocket > 2cm identified in b/l lower abdominal quadrants & flanks. Larger pocket meir-hepatically, but not much larger.   - Consider IR consultation for Dx/Rx paracentesis if diagnostic studies indicated based on clinical picture or pt meeting criteria for above indications for therapeutic paracentesis  - Reconsult IPT if suspicion for larger pocket developing    Simon Pinzon MD PGY-3  Case d/w Dr. Roca

## 2023-10-05 NOTE — PROGRESS NOTE ADULT - PROBLEM SELECTOR PLAN 2
- Tbil/transaminases are stably elevated; likely disease related based on MRCP findings  - Viral hep serologies negative  - Case d/w iGI; no role for ERCP at this time  - PTC deferred by IR given increased infectious risk i/s/o ascites  - Avoiding hepatotoxins

## 2023-10-05 NOTE — CHART NOTE - NSCHARTNOTEFT_GEN_A_CORE
PRE-INTERVENTIONAL RADIOLOGY PROCEDURE NOTE      Patient Age: 58    Patient Gender: male    Procedure: paracentesis    Diagnosis/Indication: metastatic colon cancer    Interventional Radiology Attending Physician: Holly    Ordering Attending Physician: Azalia    Pertinent Medical History: colon cancer    Pertinent labs:                      9.2    12.36 )-----------( 396      ( 05 Oct 2023 05:38 )             28.2       10-05    135  |  102  |  7   ----------------------------<  90  3.9   |  22  |  0.48<L>    Ca    7.6<L>      05 Oct 2023 05:38  Phos  1.6     10-05  Mg     1.30     10-05    TPro  6.3  /  Alb  2.0<L>  /  TBili  10.3<H>  /  DBili  x   /  AST  189<H>  /  ALT  73<H>  /  AlkPhos  499<H>  10-05              Patient and Family Aware ? Yes

## 2023-10-05 NOTE — PROGRESS NOTE ADULT - SUBJECTIVE AND OBJECTIVE BOX
SOLID TUMOR ONCOLOGY HOSPITALIST PROGRESS NOTE    S: Patient seen and examined. No acute events overnight. Pain well controlled and patient without any complaints.    CURRENT MEDICATIONS  MEDICATIONS  (STANDING):  chlorhexidine 2% Cloths 1 Application(s) Topical daily  dextrose 5% + lactated ringers. 1000 milliLiter(s) (60 mL/Hr) IV Continuous <Continuous>  enoxaparin Injectable 40 milliGRAM(s) SubCutaneous every 24 hours  lidocaine   4% Patch 1 Patch Transdermal daily  oxyCODONE    IR 5 milliGRAM(s) Oral every 6 hours  piperacillin/tazobactam IVPB.. 3.375 Gram(s) IV Intermittent every 8 hours  polyethylene glycol 3350 17 Gram(s) Oral daily  senna 2 Tablet(s) Oral at bedtime  MEDICATIONS  (PRN):  melatonin 3 milliGRAM(s) Oral at bedtime PRN Insomnia  ondansetron Injectable 4 milliGRAM(s) IV Push every 8 hours PRN Nausea and/or Vomiting      PHYSICAL EXAM  Vital Signs Last 24 Hrs  T(C): 37.4 (05 Oct 2023 13:12), Max: 37.8 (04 Oct 2023 21:23)  T(F): 99.3 (05 Oct 2023 13:12), Max: 100.1 (04 Oct 2023 21:23)  HR: 92 (05 Oct 2023 13:12) (90 - 96)  BP: 109/74 (05 Oct 2023 13:12) (109/74 - 112/82)  BP(mean): --  RR: 18 (05 Oct 2023 06:24) (18 - 18)  SpO2: 97% (05 Oct 2023 13:12) (97% - 98%)    Parameters below as of 05 Oct 2023 13:12  Patient On (Oxygen Delivery Method): room air      I&O's Detail    04 Oct 2023 07:01  -  05 Oct 2023 07:00  --------------------------------------------------------  IN:    Oral Fluid: 500 mL  Total IN: 500 mL    OUT:  Total OUT: 0 mL    Total NET: 500 mL      05 Oct 2023 07:01  -  05 Oct 2023 17:02  --------------------------------------------------------  IN:    Oral Fluid: 250 mL  Total IN: 250 mL    OUT:  Total OUT: 0 mL    Total NET: 250 mL      GENERAL: NAD, well-developed, well-groomed  EYES: PERRLA; conjunctiva and sclera clear  ENMT: Moist oral mucosa, no pharyngeal injection or exudates; normal dentition  NECK: Supple, no palpable masses; no thyromegaly  RESPIRATORY: Normal respiratory effort; lungs are clear to auscultation bilaterally  CARDIOVASCULAR: Regular rate and rhythm, normal S1 and S2, no murmur/rub/gallop; No lower extremity edema; Peripheral pulses are 2+ bilaterally  ABDOMEN: Nontender to palpation, normoactive bowel sounds, no rebound/guarding; No hepatosplenomegaly  EXTREMITIES/MSK: Normal gait; no clubbing or cyanosis of digits; no joint swelling or tenderness to palpation  PSYCH: A+O to person, place, and time; affect appropriate  NEUROLOGY: CN 2-12 are intact and symmetric; no gross sensory deficits   SKIN: No rashes or lesions    LABS                        9.2    12.36 )-----------( 396      ( 05 Oct 2023 05:38 )             28.2     10-05    135  |  102  |  7   ----------------------------<  90  3.9   |  22  |  0.48<L>    Ca    7.6<L>      05 Oct 2023 05:38  Phos  1.6     10-05  Mg     1.30     10-05    TPro  6.3  /  Alb  2.0<L>  /  TBili  10.3<H>  /  DBili  x   /  AST  189<H>  /  ALT  73<H>  /  AlkPhos  499<H>  10-05    POCT Blood Glucose.: 100 mg/dL (05 Oct 2023 09:05)  POCT Blood Glucose.: 158 mg/dL (04 Oct 2023 21:59)    Ferritin: 3086 ng/mL (10.01.23 @ 06:00)   Transferrin, Serum: 91 mg/dL (10.01.23 @ 06:00)   Iron - Total Binding Capacity.: 113 ug/dL (10.01.23 @ 06:00)   Iron Total: 34 ug/dL (10.01.23 @ 06:00)   % Saturation, Iron: 30 % (10.01.23 @ 06:00)   Unsaturated Iron Binding Capacity: 79 ug/dL (10.01.23 @ 06:00)     TUMOR MARKERS  Carcinoembryonic Antigen: 530.0  Carcinoembryonic Antigen: 465.0  Carcinoembryonic Antigen: 429.0  Carcinoembryonic Antigen: 381.0  Carcinoembryonic Antigen: 348.0    MICROBIOLOGY  Urinalysis Basic - ( 05 Oct 2023 05:38 )    Color: x / Appearance: x / SG: x / pH: x  Gluc: 90 mg/dL / Ketone: x  / Bili: x / Urobili: x   Blood: x / Protein: x / Nitrite: x   Leuk Esterase: x / RBC: x / WBC x   Sq Epi: x / Non Sq Epi: x / Bacteria: x    Culture - Urine (collected 04 Oct 2023 15:39)  Source: Clean Catch Clean Catch (Midstream)  Final Report (05 Oct 2023 13:52):    No growth    Color: x / Appearance: x / SG: x / pH: x  Gluc: 67 mg/dL / Ketone: x  / Bili: x / Urobili: x   Blood: x / Protein: x / Nitrite: x   Leuk Esterase: x / RBC: x / WBC x   Sq Epi: x / Non Sq Epi: x / Bacteria: x    Culture - Blood (collected 02 Oct 2023 06:30)  Source: .Blood Blood-Venous  Preliminary Report (04 Oct 2023 09:02):    No growth at 48 Hours    Culture - Blood (collected 02 Oct 2023 06:30)  Source: .Blood Blood-Peripheral  Preliminary Report (04 Oct 2023 09:01):    No growth at 48 Hours    SARS-CoV-2: NotDetec (25 Sep 2023 23:55)    Lactate, Blood: 2.0 mmol/L (10.04.23 @ 05:15)   Lactate, Blood: 2.0 mmol/L (10.03.23 @ 05:46)   Lactate, Blood: 2.4 mmol/L (10.02.23 @ 06:30)   Lactate, Blood: 2.0 mmol/L (09.29.23 @ 07:45)   Lactate, Blood: 1.4 mmol/L (09.27.23 @ 00:45)    Acute Hepatitis Panel (10.01.23 @ 06:00)   Hepatitis C Virus Interpretation: Nonreact:Hepatitis C Virus S/CO Ratio: 0.10 S/CO  Hepatitis B Core IgM Antibody: Nonreact  Hepatitis B Surface Antigen: Nonreact  Hepatitis A IgM Antibody: Nonreact    PERTINENT RADIOLOGY  10/3 MRCP  1.  New moderate right pleural effusion and interval increase of moderate   ascites.  2.  Grossly stable pulmonary and hepatic metastatic lesions.  3.  Stable mild intrahepatic biliary ductal dilatation secondary to   compression of more central duct by metastatic lesions in the hilum.   Unremarkable CBD.    9/29 CXR: New atelectasis or infiltrate right lung base    9/26 CT a/p: Peripancreatic edema suspicious for acute pancreatitis, new compared to   prior. While less likely this could also represent noninflammatory   edema/third spacing of fluid. Small amount of ascites is new. Otherwise similar to prior with a large number of hepatic metastases,   intrahepatic peripheral biliary ductal dilation in the right greater than   left lobes, partially visible lung metastases, and likely metastatic mild   upper abdominal and partially visible mediastinal adenopathy.

## 2023-10-05 NOTE — PROGRESS NOTE ADULT - PROBLEM SELECTOR PLAN 1
- Presentation c/f POD based on rising TM and increasing ascites, although no new lesions present on MRCP  - Pt is a suboptimal candidate for cancer-directed therapy given his liver dysfunction   - Irreversible liver failure will preclude continuation of systemic cancer treatment, in which case best supportive/hospice would be warranted  - Will d/w Dr. Ospina  - Pt is currently full code; planning to address GOC pending outcome of liver ely/mgmt

## 2023-10-06 ENCOUNTER — RESULT REVIEW (OUTPATIENT)
Age: 58
End: 2023-10-06

## 2023-10-06 DIAGNOSIS — D68.9 COAGULATION DEFECT, UNSPECIFIED: ICD-10-CM

## 2023-10-06 DIAGNOSIS — M79.89 OTHER SPECIFIED SOFT TISSUE DISORDERS: ICD-10-CM

## 2023-10-06 DIAGNOSIS — J90 PLEURAL EFFUSION, NOT ELSEWHERE CLASSIFIED: ICD-10-CM

## 2023-10-06 DIAGNOSIS — Z71.89 OTHER SPECIFIED COUNSELING: ICD-10-CM

## 2023-10-06 LAB
ALBUMIN FLD-MCNC: 0.6 G/DL — SIGNIFICANT CHANGE UP
ALBUMIN SERPL ELPH-MCNC: 2.2 G/DL — LOW (ref 3.3–5)
ALP SERPL-CCNC: 533 U/L — HIGH (ref 40–120)
ALT FLD-CCNC: 92 U/L — HIGH (ref 4–41)
ANION GAP SERPL CALC-SCNC: 13 MMOL/L — SIGNIFICANT CHANGE UP (ref 7–14)
APPEARANCE UR: ABNORMAL
APTT BLD: 36.8 SEC — HIGH (ref 24.5–35.6)
AST SERPL-CCNC: 249 U/L — HIGH (ref 4–40)
B PERT DNA SPEC QL NAA+PROBE: SIGNIFICANT CHANGE UP
B PERT IGG+IGM PNL SER: CLEAR — SIGNIFICANT CHANGE UP
B PERT+PARAPERT DNA PNL SPEC NAA+PROBE: SIGNIFICANT CHANGE UP
BILIRUB SERPL-MCNC: 13.5 MG/DL — HIGH (ref 0.2–1.2)
BILIRUB UR-MCNC: ABNORMAL
BORDETELLA PARAPERTUSSIS (RAPRVP): SIGNIFICANT CHANGE UP
BUN SERPL-MCNC: 8 MG/DL — SIGNIFICANT CHANGE UP (ref 7–23)
C PNEUM DNA SPEC QL NAA+PROBE: SIGNIFICANT CHANGE UP
CALCIUM SERPL-MCNC: 8.5 MG/DL — SIGNIFICANT CHANGE UP (ref 8.4–10.5)
CHLORIDE SERPL-SCNC: 103 MMOL/L — SIGNIFICANT CHANGE UP (ref 98–107)
CO2 SERPL-SCNC: 21 MMOL/L — LOW (ref 22–31)
COLOR FLD: YELLOW
COLOR SPEC: SIGNIFICANT CHANGE UP
CREAT SERPL-MCNC: 0.54 MG/DL — SIGNIFICANT CHANGE UP (ref 0.5–1.3)
DIFF PNL FLD: ABNORMAL
EGFR: 114 ML/MIN/1.73M2 — SIGNIFICANT CHANGE UP
FLUAV SUBTYP SPEC NAA+PROBE: SIGNIFICANT CHANGE UP
FLUBV RNA SPEC QL NAA+PROBE: SIGNIFICANT CHANGE UP
FLUID INTAKE SUBSTANCE CLASS: SIGNIFICANT CHANGE UP
GLUCOSE BLDC GLUCOMTR-MCNC: 105 MG/DL — HIGH (ref 70–99)
GLUCOSE BLDC GLUCOMTR-MCNC: 161 MG/DL — HIGH (ref 70–99)
GLUCOSE FLD-MCNC: 133 MG/DL — SIGNIFICANT CHANGE UP
GLUCOSE SERPL-MCNC: 83 MG/DL — SIGNIFICANT CHANGE UP (ref 70–99)
GLUCOSE UR QL: 100 MG/DL
GRAM STN FLD: SIGNIFICANT CHANGE UP
HADV DNA SPEC QL NAA+PROBE: SIGNIFICANT CHANGE UP
HCOV 229E RNA SPEC QL NAA+PROBE: SIGNIFICANT CHANGE UP
HCOV HKU1 RNA SPEC QL NAA+PROBE: SIGNIFICANT CHANGE UP
HCOV NL63 RNA SPEC QL NAA+PROBE: SIGNIFICANT CHANGE UP
HCOV OC43 RNA SPEC QL NAA+PROBE: SIGNIFICANT CHANGE UP
HCT VFR BLD CALC: 31.1 % — LOW (ref 39–50)
HGB BLD-MCNC: 10.1 G/DL — LOW (ref 13–17)
HMPV RNA SPEC QL NAA+PROBE: SIGNIFICANT CHANGE UP
HPIV1 RNA SPEC QL NAA+PROBE: SIGNIFICANT CHANGE UP
HPIV2 RNA SPEC QL NAA+PROBE: SIGNIFICANT CHANGE UP
HPIV3 RNA SPEC QL NAA+PROBE: SIGNIFICANT CHANGE UP
HPIV4 RNA SPEC QL NAA+PROBE: SIGNIFICANT CHANGE UP
INR BLD: 2 RATIO — HIGH (ref 0.85–1.18)
KETONES UR-MCNC: ABNORMAL MG/DL
LDH SERPL L TO P-CCNC: 243 U/L — SIGNIFICANT CHANGE UP
LEUKOCYTE ESTERASE UR-ACNC: NEGATIVE — SIGNIFICANT CHANGE UP
LYMPHOCYTES # FLD: 40 % — SIGNIFICANT CHANGE UP
M PNEUMO DNA SPEC QL NAA+PROBE: SIGNIFICANT CHANGE UP
MAGNESIUM SERPL-MCNC: 1.4 MG/DL — LOW (ref 1.6–2.6)
MCHC RBC-ENTMCNC: 24.4 PG — LOW (ref 27–34)
MCHC RBC-ENTMCNC: 32.5 GM/DL — SIGNIFICANT CHANGE UP (ref 32–36)
MCV RBC AUTO: 75.1 FL — LOW (ref 80–100)
MONOS+MACROS # FLD: 40 % — SIGNIFICANT CHANGE UP
NEUTROPHILS-BODY FLUID: 178 % — SIGNIFICANT CHANGE UP
NITRITE UR-MCNC: NEGATIVE — SIGNIFICANT CHANGE UP
NRBC # BLD: 0 /100 WBCS — SIGNIFICANT CHANGE UP (ref 0–0)
NRBC # FLD: 0.04 K/UL — HIGH (ref 0–0)
PH UR: 5.5 — SIGNIFICANT CHANGE UP (ref 5–8)
PHOSPHATE SERPL-MCNC: 1.8 MG/DL — LOW (ref 2.5–4.5)
PLATELET # BLD AUTO: 461 K/UL — HIGH (ref 150–400)
POTASSIUM SERPL-MCNC: 4 MMOL/L — SIGNIFICANT CHANGE UP (ref 3.5–5.3)
POTASSIUM SERPL-SCNC: 4 MMOL/L — SIGNIFICANT CHANGE UP (ref 3.5–5.3)
PROT FLD-MCNC: 1.6 G/DL — SIGNIFICANT CHANGE UP
PROT SERPL-MCNC: 6.7 G/DL — SIGNIFICANT CHANGE UP (ref 6–8.3)
PROT UR-MCNC: 100 MG/DL
PROTHROM AB SERPL-ACNC: 21.9 SEC — HIGH (ref 9.5–13)
RAPID RVP RESULT: SIGNIFICANT CHANGE UP
RBC # BLD: 4.14 M/UL — LOW (ref 4.2–5.8)
RBC # FLD: 25 % — HIGH (ref 10.3–14.5)
RCV VOL RI: <2000 CELLS/UL — HIGH (ref 0–5)
RSV RNA SPEC QL NAA+PROBE: SIGNIFICANT CHANGE UP
RV+EV RNA SPEC QL NAA+PROBE: SIGNIFICANT CHANGE UP
SARS-COV-2 RNA SPEC QL NAA+PROBE: SIGNIFICANT CHANGE UP
SODIUM SERPL-SCNC: 137 MMOL/L — SIGNIFICANT CHANGE UP (ref 135–145)
SP GR SPEC: 1.03 — HIGH (ref 1–1.03)
SPECIMEN SOURCE: SIGNIFICANT CHANGE UP
TOTAL CELLS COUNTED, BODY FLUID: 100 CELLS — SIGNIFICANT CHANGE UP
TOTAL NUCLEATED CELL COUNT, BODY FLUID: 178 CELLS/UL — HIGH (ref 0–5)
TUBE TYPE: SIGNIFICANT CHANGE UP
UROBILINOGEN FLD QL: 1 MG/DL — SIGNIFICANT CHANGE UP (ref 0.2–1)
WBC # BLD: 14.77 K/UL — HIGH (ref 3.8–10.5)
WBC # FLD AUTO: 14.77 K/UL — HIGH (ref 3.8–10.5)

## 2023-10-06 PROCEDURE — 88112 CYTOPATH CELL ENHANCE TECH: CPT | Mod: 26

## 2023-10-06 PROCEDURE — 88305 TISSUE EXAM BY PATHOLOGIST: CPT | Mod: 26

## 2023-10-06 PROCEDURE — 71045 X-RAY EXAM CHEST 1 VIEW: CPT | Mod: 26

## 2023-10-06 PROCEDURE — 49083 ABD PARACENTESIS W/IMAGING: CPT

## 2023-10-06 PROCEDURE — 99233 SBSQ HOSP IP/OBS HIGH 50: CPT

## 2023-10-06 RX ORDER — CEFEPIME 1 G/1
2000 INJECTION, POWDER, FOR SOLUTION INTRAMUSCULAR; INTRAVENOUS EVERY 8 HOURS
Refills: 0 | Status: COMPLETED | OUTPATIENT
Start: 2023-10-07 | End: 2023-10-11

## 2023-10-06 RX ORDER — CEFEPIME 1 G/1
2000 INJECTION, POWDER, FOR SOLUTION INTRAMUSCULAR; INTRAVENOUS ONCE
Refills: 0 | Status: COMPLETED | OUTPATIENT
Start: 2023-10-06 | End: 2023-10-06

## 2023-10-06 RX ORDER — METRONIDAZOLE 500 MG
500 TABLET ORAL EVERY 8 HOURS
Refills: 0 | Status: COMPLETED | OUTPATIENT
Start: 2023-10-06 | End: 2023-10-11

## 2023-10-06 RX ORDER — CEFEPIME 1 G/1
INJECTION, POWDER, FOR SOLUTION INTRAMUSCULAR; INTRAVENOUS
Refills: 0 | Status: COMPLETED | OUTPATIENT
Start: 2023-10-06 | End: 2023-10-11

## 2023-10-06 RX ORDER — MAGNESIUM SULFATE 500 MG/ML
1 VIAL (ML) INJECTION ONCE
Refills: 0 | Status: COMPLETED | OUTPATIENT
Start: 2023-10-06 | End: 2023-10-06

## 2023-10-06 RX ORDER — POTASSIUM PHOSPHATE, MONOBASIC POTASSIUM PHOSPHATE, DIBASIC 236; 224 MG/ML; MG/ML
15 INJECTION, SOLUTION INTRAVENOUS ONCE
Refills: 0 | Status: COMPLETED | OUTPATIENT
Start: 2023-10-06 | End: 2023-10-06

## 2023-10-06 RX ORDER — PHYTONADIONE (VIT K1) 5 MG
5 TABLET ORAL ONCE
Refills: 0 | Status: COMPLETED | OUTPATIENT
Start: 2023-10-06 | End: 2023-10-06

## 2023-10-06 RX ADMIN — OXYCODONE HYDROCHLORIDE 5 MILLIGRAM(S): 5 TABLET ORAL at 06:59

## 2023-10-06 RX ADMIN — OXYCODONE HYDROCHLORIDE 5 MILLIGRAM(S): 5 TABLET ORAL at 13:35

## 2023-10-06 RX ADMIN — CHLORHEXIDINE GLUCONATE 1 APPLICATION(S): 213 SOLUTION TOPICAL at 13:55

## 2023-10-06 RX ADMIN — Medication 100 GRAM(S): at 10:32

## 2023-10-06 RX ADMIN — OXYCODONE HYDROCHLORIDE 5 MILLIGRAM(S): 5 TABLET ORAL at 07:59

## 2023-10-06 RX ADMIN — PIPERACILLIN AND TAZOBACTAM 25 GRAM(S): 4; .5 INJECTION, POWDER, LYOPHILIZED, FOR SOLUTION INTRAVENOUS at 13:36

## 2023-10-06 RX ADMIN — Medication 100 MILLIGRAM(S): at 21:43

## 2023-10-06 RX ADMIN — PIPERACILLIN AND TAZOBACTAM 25 GRAM(S): 4; .5 INJECTION, POWDER, LYOPHILIZED, FOR SOLUTION INTRAVENOUS at 06:00

## 2023-10-06 RX ADMIN — OXYCODONE HYDROCHLORIDE 5 MILLIGRAM(S): 5 TABLET ORAL at 14:35

## 2023-10-06 RX ADMIN — Medication 5 MILLIGRAM(S): at 18:41

## 2023-10-06 RX ADMIN — POTASSIUM PHOSPHATE, MONOBASIC POTASSIUM PHOSPHATE, DIBASIC 62.5 MILLIMOLE(S): 236; 224 INJECTION, SOLUTION INTRAVENOUS at 18:04

## 2023-10-06 RX ADMIN — OXYCODONE HYDROCHLORIDE 5 MILLIGRAM(S): 5 TABLET ORAL at 19:07

## 2023-10-06 RX ADMIN — CEFEPIME 100 MILLIGRAM(S): 1 INJECTION, POWDER, FOR SOLUTION INTRAMUSCULAR; INTRAVENOUS at 21:07

## 2023-10-06 RX ADMIN — OXYCODONE HYDROCHLORIDE 5 MILLIGRAM(S): 5 TABLET ORAL at 00:46

## 2023-10-06 RX ADMIN — OXYCODONE HYDROCHLORIDE 5 MILLIGRAM(S): 5 TABLET ORAL at 18:07

## 2023-10-06 NOTE — PROGRESS NOTE ADULT - PROBLEM SELECTOR PLAN 1
- Presentation c/f POD based on rising TM and increasing ascites, although no new lesions present on MRCP  - Pt is a suboptimal candidate for cancer-directed therapy given his liver dysfunction   - Irreversible liver failure will preclude continuation of systemic cancer treatment, in which case best supportive/hospice would be warranted  - Will d/w Dr. Ospina  - Pt is currently full code; planning to address GOC pending outcome of liver ely/mgmt - Presentation most c/w occult POD based on rising TM and increasing ascites, although no new lesions present on MRCP  - Pt is not a candidate for continuation of cancer tx i/s/o irreversible liver failure  - Hospice is appropriate (d/w Dr. Ospina)  - Planning to address GOC/hospice placement with pt and his family on this admission

## 2023-10-06 NOTE — PROGRESS NOTE ADULT - PROBLEM SELECTOR PLAN 9
- Better controlled  - Continuing Oxy IR 5mg to q6 standing (ok for pt to refuse) with bowel regimen to prevent OIC  - Continuing Lidocaine patch prn

## 2023-10-06 NOTE — PROGRESS NOTE ADULT - SUBJECTIVE AND OBJECTIVE BOX
SOLID TUMOR ONCOLOGY HOSPITALIST PROGRESS NOTE    S: No acute events overnight    CURRENT MEDICATIONS  (STANDING):  chlorhexidine 2% Cloths 1 Application(s) Topical daily  dextrose 5% + lactated ringers. 1000 milliLiter(s) (60 mL/Hr) IV Continuous <Continuous>  lidocaine   4% Patch 1 Patch Transdermal daily  oxyCODONE    IR 5 milliGRAM(s) Oral every 6 hours  piperacillin/tazobactam IVPB.. 3.375 Gram(s) IV Intermittent every 8 hours  polyethylene glycol 3350 17 Gram(s) Oral daily  potassium phosphate IVPB 15 milliMole(s) IV Intermittent once  senna 2 Tablet(s) Oral at bedtime  (PRN):  melatonin 3 milliGRAM(s) Oral at bedtime PRN Insomnia  ondansetron Injectable 4 milliGRAM(s) IV Push every 8 hours PRN Nausea and/or Vomiting      PHYSICAL EXAM  Vital Signs Last 24 Hrs  T(C): 37.8 (06 Oct 2023 12:38), Max: 38.9 (05 Oct 2023 21:03)  T(F): 100 (06 Oct 2023 12:38), Max: 102 (05 Oct 2023 21:03)  HR: 96 (06 Oct 2023 12:38) (96 - 102)  BP: 123/91 (06 Oct 2023 12:38) (106/78 - 123/91)  BP(mean): --  RR: 18 (06 Oct 2023 12:38) (17 - 18)  SpO2: 97% (06 Oct 2023 12:38) (95% - 97%)    Parameters below as of 06 Oct 2023 12:38  Patient On (Oxygen Delivery Method): room air    I&O's Detail    05 Oct 2023 07:01  -  06 Oct 2023 07:00  --------------------------------------------------------  IN:    Oral Fluid: 750 mL  Total IN: 750 mL  OUT:  Total OUT: 0 mL  Total NET: 750 mL      GENERAL: NAD, well-developed, well-groomed  EYES: PERRLA; conjunctiva and sclera clear  ENMT: Moist oral mucosa, no pharyngeal injection or exudates; normal dentition  NECK: Supple, no palpable masses; no thyromegaly  RESPIRATORY: Normal respiratory effort; lungs are clear to auscultation bilaterally  CARDIOVASCULAR: Regular rate and rhythm, normal S1 and S2, no murmur/rub/gallop; No lower extremity edema; Peripheral pulses are 2+ bilaterally  ABDOMEN: Nontender to palpation, normoactive bowel sounds, no rebound/guarding; No hepatosplenomegaly  EXTREMITIES/MSK: Normal gait; no clubbing or cyanosis of digits; no joint swelling or tenderness to palpation  PSYCH: A+O to person, place, and time; affect appropriate  NEUROLOGY: CN 2-12 are intact and symmetric; no gross sensory deficits   SKIN: No rashes or lesions    LABS                        10.1   14.77 )-----------( 461      ( 06 Oct 2023 06:10 )             31.1     10-    137  |  103  |  8   ----------------------------<  83  4.0   |  21<L>  |  0.54    Ca    8.5      06 Oct 2023 06:10  Phos  1.8     10-  Mg     1.40     10-    TPro  6.7  /  Alb  2.2<L>  /  TBili  13.5<H>  /  DBili  x   /  AST  249<H>  /  ALT  92<H>  /  AlkPhos  533<H>  10-06    PT/INR - ( 06 Oct 2023 06:10 )   PT: 21.9 sec;   INR: 2.00 ratio    PTT - ( 06 Oct 2023 06:10 )  PTT:36.8 sec    CAPILLARY BLOOD GLUCOSE  POCT Blood Glucose.: 105 mg/dL (06 Oct 2023 12:56)  POCT Blood Glucose.: 100 mg/dL (05 Oct 2023 21:53)      MICROBIOLOGY  Urinalysis Basic - ( 06 Oct 2023 13:43 )    Color: MATTIE / Appearance: Slightly Cloudy / S.032 / pH: x  Gluc: x / Ketone: Trace mg/dL  / Bili: Large / Urobili: 1.0 mg/dL   Blood: x / Protein: 100 mg/dL / Nitrite: Negative   Leuk Esterase: Negative / RBC: 2 /HPF / WBC 2 /HPF   Sq Epi: x / Non Sq Epi: x / Bacteria: Few /HPF      Culture - Urine (collected 04 Oct 2023 15:39)  Source: Clean Catch Clean Catch (Midstream)  Final Report (05 Oct 2023 13:52):    No growth    Culture - Blood (collected 04 Oct 2023 11:45)  Source: .Blood Blood-Peripheral  Preliminary Report (05 Oct 2023 19:02):    No growth at 24 hours    Culture - Blood (collected 04 Oct 2023 11:42)  Source: .Blood Blood-Peripheral  Preliminary Report (05 Oct 2023 19:02):    No growth at 24 hours    SARS-CoV-2: NotDetec (06 Oct 2023 10:36)  SARS-CoV-2: NotDetec (25 Sep 2023 23:55)      PERTINENT RADIOLOGY         SOLID TUMOR ONCOLOGY HOSPITALIST PROGRESS NOTE    S: Pt febrile o/n Tmax 101.5- defervesced after Tylenol; pt was pancultured and repeat CXR ordered today. Abx escalated to Cef/Flagyl today.  Pt reported that his pain is better controlled. He noted that he emptied his ostomy this am and stool was loose.  RN also reported that she noticed pt had new R leg swelling this am; lower extremity dopplers ordered.    CURRENT MEDICATIONS  (STANDING):  chlorhexidine 2% Cloths 1 Application(s) Topical daily  dextrose 5% + lactated ringers. 1000 milliLiter(s) (60 mL/Hr) IV Continuous <Continuous>  lidocaine   4% Patch 1 Patch Transdermal daily  oxyCODONE    IR 5 milliGRAM(s) Oral every 6 hours  piperacillin/tazobactam IVPB.. 3.375 Gram(s) IV Intermittent every 8 hours  polyethylene glycol 3350 17 Gram(s) Oral daily  potassium phosphate IVPB 15 milliMole(s) IV Intermittent once  senna 2 Tablet(s) Oral at bedtime  (PRN):  melatonin 3 milliGRAM(s) Oral at bedtime PRN Insomnia  ondansetron Injectable 4 milliGRAM(s) IV Push every 8 hours PRN Nausea and/or Vomiting      PHYSICAL EXAM  Vital Signs Last 24 Hrs  T(C): 37.8 (06 Oct 2023 12:38), Max: 38.9 (05 Oct 2023 21:03)  T(F): 100 (06 Oct 2023 12:38), Max: 102 (05 Oct 2023 21:03)  HR: 96 (06 Oct 2023 12:38) (96 - 102)  BP: 123/91 (06 Oct 2023 12:38) (106/78 - 123/91)  BP(mean): --  RR: 18 (06 Oct 2023 12:38) (17 - 18)  SpO2: 97% (06 Oct 2023 12:38) (95% - 97%)    Parameters below as of 06 Oct 2023 12:38  Patient On (Oxygen Delivery Method): room air    I&O's Detail    05 Oct 2023 07:01  -  06 Oct 2023 07:00  --------------------------------------------------------  IN:    Oral Fluid: 750 mL  Total IN: 750 mL  OUT:  Total OUT: 0 mL  Total NET: 750 mL      GENERAL: NAD, well-developed, well-groomed  EYES: PERRLA; conjunctiva and sclera clear  ENMT: Moist oral mucosa, no pharyngeal injection or exudates; normal dentition  NECK: Supple, no palpable masses; no thyromegaly  RESPIRATORY: Normal respiratory effort; lungs are clear to auscultation bilaterally  CARDIOVASCULAR: Regular rate and rhythm, normal S1 and S2, no murmur/rub/gallop; No lower extremity edema; Peripheral pulses are 2+ bilaterally  ABDOMEN: Nontender to palpation, normoactive bowel sounds, no rebound/guarding; No hepatosplenomegaly  EXTREMITIES/MSK: Normal gait; no clubbing or cyanosis of digits; no joint swelling or tenderness to palpation  PSYCH: A+O to person, place, and time; affect appropriate  NEUROLOGY: CN 2-12 are intact and symmetric; no gross sensory deficits   SKIN: No rashes or lesions    LABS                        10.1   14.77 )-----------( 461      ( 06 Oct 2023 06:10 )             31.1     10-    137  |  103  |  8   ----------------------------<  83  4.0   |  21<L>  |  0.54    Ca    8.5      06 Oct 2023 06:10  Phos  1.8     10-  Mg     1.40     10-    TPro  6.7  /  Alb  2.2<L>  /  TBili  13.5<H>  /  DBili  x   /  AST  249<H>  /  ALT  92<H>  /  AlkPhos  533<H>  10-06    PT/INR - ( 06 Oct 2023 06:10 )   PT: 21.9 sec;   INR: 2.00 ratio    PTT - ( 06 Oct 2023 06:10 )  PTT:36.8 sec    CAPILLARY BLOOD GLUCOSE  POCT Blood Glucose.: 105 mg/dL (06 Oct 2023 12:56)  POCT Blood Glucose.: 100 mg/dL (05 Oct 2023 21:53)      MICROBIOLOGY  Urinalysis Basic - ( 06 Oct 2023 13:43 )    Color: MATTIE / Appearance: Slightly Cloudy / S.032 / pH: x  Gluc: x / Ketone: Trace mg/dL  / Bili: Large / Urobili: 1.0 mg/dL   Blood: x / Protein: 100 mg/dL / Nitrite: Negative   Leuk Esterase: Negative / RBC: 2 /HPF / WBC 2 /HPF   Sq Epi: x / Non Sq Epi: x / Bacteria: Few /HPF    Culture - Urine (collected 04 Oct 2023 15:39)  Source: Clean Catch Clean Catch (Midstream)  Final Report (05 Oct 2023 13:52):    No growth    Culture - Blood (collected 04 Oct 2023 11:45)  Source: .Blood Blood-Peripheral  Preliminary Report (05 Oct 2023 19:02):    No growth at 24 hours    Culture - Blood (collected 04 Oct 2023 11:42)  Source: .Blood Blood-Peripheral  Preliminary Report (05 Oct 2023 19:02):    No growth at 24 hours    Culture - Blood (collected 02 Oct 2023 06:30)  Source: .Blood Blood-Venous  Preliminary Report (04 Oct 2023 09:02):    No growth at 48 Hours    Culture - Blood (collected 02 Oct 2023 06:30)  Source: .Blood Blood-Peripheral  Preliminary Report (04 Oct 2023 09:01):    No growth at 48 Hours    SARS-CoV-2: NotDetec (06 Oct 2023 10:36)  SARS-CoV-2: NotDetec (25 Sep 2023 23:55)      PERTINENT RADIOLOGY  10/6 CXR:  Increasing right pleural effusion with pulmonary metastases.    10/3 MRCP  1.  New moderate right pleural effusion and interval increase of moderate   ascites.  2.  Grossly stable pulmonary and hepatic metastatic lesions.  3.  Stable mild intrahepatic biliary ductal dilatation secondary to   compression of more central duct by metastatic lesions in the hilum.   Unremarkable CBD.     CXR: New atelectasis or infiltrate right lung base     CT a/p: Peripancreatic edema suspicious for acute pancreatitis, new compared to   prior. While less likely this could also represent noninflammatory   edema/third spacing of fluid. Small amount of ascites is new. Otherwise similar to prior with a large number of hepatic metastases,   intrahepatic peripheral biliary ductal dilation in the right greater than   left lobes, partially visible lung metastases, and likely metastatic mild   upper abdominal and partially visible mediastinal adenopathy.

## 2023-10-06 NOTE — PROGRESS NOTE ADULT - PROBLEM SELECTOR PLAN 8
- Better controlled today  - Continuing Oxy IR 5mg to q6 standing (ok for pt to refuse) with bowel regimen to prevent OIC  - Continuing Lidocaine patch prn - 2/2 liver dysfunction  - Completing 3d course of Vit K, 10/6-8

## 2023-10-06 NOTE — PROGRESS NOTE ADULT - PROBLEM SELECTOR PLAN 3
- Tbil/transaminases are stably elevated; likely disease related based on MRCP findings  - Viral hep serologies negative  - Case d/w iGI; no role for ERCP at this time  - PTC deferred by IR given increased infectious risk i/s/o ascites  - Avoiding hepatotoxins - LFTs worse today, 2/2 extensive met disease burden in the liver  - Viral hep serologies negative  - MRCP reviewed by iGI and IR: ERCP, PTC unlikely to be effective in alleviating multifocal biliary obstruction  - Avoiding hepatotoxins

## 2023-10-06 NOTE — PROGRESS NOTE ADULT - PROBLEM SELECTOR PLAN 6
- 2/2 chronic/neoplastic disease  - hgb stable and pt remains without clinical s/s of active bleeding  - Trending daily cbcs; will transfuse supportively prn (goal hgb > 7) - B/L LE dopplers, r/o acute DVT

## 2023-10-06 NOTE — PROGRESS NOTE ADULT - ASSESSMENT
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  Pt was admitted to [facility] from Onc clinic on 9/30 with new-onset jaundice and fatigue, with sepsis in ED and abnormal LFTs noted on admission labs. His hospital course has also been c/b ascites, cancer-related abd pain, and recurrent fever (Temp 101.5 on 10/5).

## 2023-10-06 NOTE — CHART NOTE - NSCHARTNOTEFT_GEN_A_CORE
Patient noted to have fever to 102 overnight. Given Tylenol by covering night provider.  Abx escalated to cefepime and flagyl, zosyn dc'd.  RVP negative. CXR notable for pulmonary mets and increased right pleural effusion.   UA noted to have large amount of bilirubin but negative for infection. F/u rpt BCx.     INR also elevated to 2 today.   S/p Vitamin K x1 dose, f/u rpt INR in AM.   Discussed with Dr. Vieyra.

## 2023-10-06 NOTE — PROGRESS NOTE ADULT - PROBLEM SELECTOR PLAN 2
- Suspect biliary sepsis due to malignancy-related biliary obstruction  - Pt now afebrile > 72h and remains normotensive; wbcs variably elevated, procal also significantly abnormal; other cx data remains negative to date (pt with h/o Klebs and B cereus bacteremia)  - Continuing empiric Zosyn with low threshold to broaden abx coverage if pt become HD unstable  - Continuing IV hydration - Suspect biliary sepsis due to malignancy-related biliary obstruction; dvt ely also in process as below  - Zosyn escalated to Cef/Flagyl today  - Repeat infectious ely in process  - Maintaining low threshold to broaden abx coverage if pt become HD unstable

## 2023-10-07 LAB
ALBUMIN SERPL ELPH-MCNC: 2 G/DL — LOW (ref 3.3–5)
ALP SERPL-CCNC: 466 U/L — HIGH (ref 40–120)
ALT FLD-CCNC: 85 U/L — HIGH (ref 4–41)
ANION GAP SERPL CALC-SCNC: 14 MMOL/L — SIGNIFICANT CHANGE UP (ref 7–14)
APTT BLD: 37.3 SEC — HIGH (ref 24.5–35.6)
AST SERPL-CCNC: 220 U/L — HIGH (ref 4–40)
BILIRUB SERPL-MCNC: 14.9 MG/DL — HIGH (ref 0.2–1.2)
BUN SERPL-MCNC: 7 MG/DL — SIGNIFICANT CHANGE UP (ref 7–23)
CALCIUM SERPL-MCNC: 8.2 MG/DL — LOW (ref 8.4–10.5)
CHLORIDE SERPL-SCNC: 101 MMOL/L — SIGNIFICANT CHANGE UP (ref 98–107)
CO2 SERPL-SCNC: 21 MMOL/L — LOW (ref 22–31)
CREAT SERPL-MCNC: 0.55 MG/DL — SIGNIFICANT CHANGE UP (ref 0.5–1.3)
CULTURE RESULTS: SIGNIFICANT CHANGE UP
CULTURE RESULTS: SIGNIFICANT CHANGE UP
EGFR: 113 ML/MIN/1.73M2 — SIGNIFICANT CHANGE UP
GLUCOSE BLDC GLUCOMTR-MCNC: 146 MG/DL — HIGH (ref 70–99)
GLUCOSE BLDC GLUCOMTR-MCNC: 86 MG/DL — SIGNIFICANT CHANGE UP (ref 70–99)
GLUCOSE SERPL-MCNC: 107 MG/DL — HIGH (ref 70–99)
HCT VFR BLD CALC: 30.4 % — LOW (ref 39–50)
HGB BLD-MCNC: 9.9 G/DL — LOW (ref 13–17)
INR BLD: 2.2 RATIO — HIGH (ref 0.85–1.18)
MAGNESIUM SERPL-MCNC: 1.5 MG/DL — LOW (ref 1.6–2.6)
MCHC RBC-ENTMCNC: 24.5 PG — LOW (ref 27–34)
MCHC RBC-ENTMCNC: 32.6 GM/DL — SIGNIFICANT CHANGE UP (ref 32–36)
MCV RBC AUTO: 75.2 FL — LOW (ref 80–100)
NRBC # BLD: 0 /100 WBCS — SIGNIFICANT CHANGE UP (ref 0–0)
NRBC # FLD: 0.05 K/UL — HIGH (ref 0–0)
PH FLD: 7.6 — SIGNIFICANT CHANGE UP
PHOSPHATE SERPL-MCNC: 1.7 MG/DL — LOW (ref 2.5–4.5)
PLATELET # BLD AUTO: 416 K/UL — HIGH (ref 150–400)
POTASSIUM SERPL-MCNC: 4.8 MMOL/L — SIGNIFICANT CHANGE UP (ref 3.5–5.3)
POTASSIUM SERPL-SCNC: 4.8 MMOL/L — SIGNIFICANT CHANGE UP (ref 3.5–5.3)
PROT SERPL-MCNC: 6.7 G/DL — SIGNIFICANT CHANGE UP (ref 6–8.3)
PROTHROM AB SERPL-ACNC: 24.3 SEC — HIGH (ref 9.5–13)
RBC # BLD: 4.04 M/UL — LOW (ref 4.2–5.8)
RBC # FLD: 24.7 % — HIGH (ref 10.3–14.5)
SODIUM SERPL-SCNC: 136 MMOL/L — SIGNIFICANT CHANGE UP (ref 135–145)
SPECIMEN SOURCE FLD: SIGNIFICANT CHANGE UP
SPECIMEN SOURCE: SIGNIFICANT CHANGE UP
SPECIMEN SOURCE: SIGNIFICANT CHANGE UP
WBC # BLD: 13.85 K/UL — HIGH (ref 3.8–10.5)
WBC # FLD AUTO: 13.85 K/UL — HIGH (ref 3.8–10.5)

## 2023-10-07 PROCEDURE — 99233 SBSQ HOSP IP/OBS HIGH 50: CPT

## 2023-10-07 RX ORDER — POTASSIUM PHOSPHATE, MONOBASIC POTASSIUM PHOSPHATE, DIBASIC 236; 224 MG/ML; MG/ML
15 INJECTION, SOLUTION INTRAVENOUS ONCE
Refills: 0 | Status: COMPLETED | OUTPATIENT
Start: 2023-10-07 | End: 2023-10-07

## 2023-10-07 RX ORDER — MAGNESIUM SULFATE 500 MG/ML
2 VIAL (ML) INJECTION ONCE
Refills: 0 | Status: COMPLETED | OUTPATIENT
Start: 2023-10-07 | End: 2023-10-07

## 2023-10-07 RX ADMIN — CEFEPIME 100 MILLIGRAM(S): 1 INJECTION, POWDER, FOR SOLUTION INTRAMUSCULAR; INTRAVENOUS at 15:33

## 2023-10-07 RX ADMIN — OXYCODONE HYDROCHLORIDE 5 MILLIGRAM(S): 5 TABLET ORAL at 06:18

## 2023-10-07 RX ADMIN — OXYCODONE HYDROCHLORIDE 5 MILLIGRAM(S): 5 TABLET ORAL at 00:04

## 2023-10-07 RX ADMIN — OXYCODONE HYDROCHLORIDE 5 MILLIGRAM(S): 5 TABLET ORAL at 05:18

## 2023-10-07 RX ADMIN — POTASSIUM PHOSPHATE, MONOBASIC POTASSIUM PHOSPHATE, DIBASIC 62.5 MILLIMOLE(S): 236; 224 INJECTION, SOLUTION INTRAVENOUS at 11:24

## 2023-10-07 RX ADMIN — Medication 100 MILLIGRAM(S): at 05:18

## 2023-10-07 RX ADMIN — OXYCODONE HYDROCHLORIDE 5 MILLIGRAM(S): 5 TABLET ORAL at 12:23

## 2023-10-07 RX ADMIN — Medication 100 MILLIGRAM(S): at 21:43

## 2023-10-07 RX ADMIN — Medication 25 GRAM(S): at 09:41

## 2023-10-07 RX ADMIN — OXYCODONE HYDROCHLORIDE 5 MILLIGRAM(S): 5 TABLET ORAL at 11:23

## 2023-10-07 RX ADMIN — OXYCODONE HYDROCHLORIDE 5 MILLIGRAM(S): 5 TABLET ORAL at 00:53

## 2023-10-07 RX ADMIN — CHLORHEXIDINE GLUCONATE 1 APPLICATION(S): 213 SOLUTION TOPICAL at 11:23

## 2023-10-07 RX ADMIN — CEFEPIME 100 MILLIGRAM(S): 1 INJECTION, POWDER, FOR SOLUTION INTRAMUSCULAR; INTRAVENOUS at 05:17

## 2023-10-07 RX ADMIN — Medication 100 MILLIGRAM(S): at 14:34

## 2023-10-07 RX ADMIN — CEFEPIME 100 MILLIGRAM(S): 1 INJECTION, POWDER, FOR SOLUTION INTRAMUSCULAR; INTRAVENOUS at 21:43

## 2023-10-07 NOTE — PROGRESS NOTE ADULT - PROBLEM SELECTOR PLAN 3
- LFTs worse today, 2/2 extensive met disease burden in the liver  - Viral hep serologies negative  - MRCP reviewed by iGI and IR: ERCP, PTC unlikely to be effective in alleviating multifocal biliary obstruction  - Avoiding hepatotoxins

## 2023-10-07 NOTE — PROGRESS NOTE ADULT - ASSESSMENT
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  Pt was admitted to [facility] from Onc clinic on 9/30 with new-onset jaundice and fatigue, with sepsis in ED and abnormal LFTs noted on admission labs. His hospital course has also been c/b ascites, cancer-related abd pain, and recurrent fever (Temp 101.5 on 10/5). 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  Pt was admitted to [facility] from Onc clinic on 9/30 with new-onset jaundice and fatigue, with sepsis in ED and abnormal LFTs noted on admission labs. His hospital course has also been c/b ascites, cancer-related abd pain, and recurrent fever (Temp 101.5 on 10/5).    Asitics fluid positive GNR, f/u Species and sensitivity----> positive SBP

## 2023-10-07 NOTE — PROGRESS NOTE ADULT - PROBLEM SELECTOR PLAN 4
- Likely malignant  - IR consulted for LVP with cytology > procedure deferred for now for mgmt of recurrent fever/sepsis

## 2023-10-07 NOTE — PROGRESS NOTE ADULT - PROBLEM SELECTOR PLAN 2
- Suspect biliary sepsis due to malignancy-related biliary obstruction; dvt ely also in process as below  - Zosyn escalated to Cef/Flagyl today  - Repeat infectious ely in process  - Maintaining low threshold to broaden abx coverage if pt become HD unstable - Suspect biliary sepsis due to malignancy-related biliary obstruction; dvt ely also in process as below  - Zosyn escalated to Cef/Flagyl 10/6  - Repeat infectious ely in process  - Maintaining low threshold to broaden abx coverage if pt become HD unstable - Suspect biliary sepsis due to malignancy-related biliary obstruction; dvt ely also in process as below  - Zosyn escalated to Cef/Flagyl 10/6  - Repeat infectious ely in process  - Maintaining low threshold to broaden abx coverage if pt become HD unstable    Asitics fluid positive GNR, f/u Species and sensitivity--> positive SBP.  c/w cefepime and Flagyl - Suspect biliary sepsis due to malignancy-related biliary obstruction; dvt ely also in process as below  - Zosyn escalated to Cef/Flagyl 10/6  - Repeat infectious ely in process  - Maintaining low threshold to broaden abx coverage if pt become HD unstable    Asitics fluid positive GNR, f/u Species and sensitivity--> positive SBP.  c/w cefepime and Flagyl started 10/6

## 2023-10-07 NOTE — PROGRESS NOTE ADULT - SUBJECTIVE AND OBJECTIVE BOX
tmax 100.5 Patient is a 58y old  Male who presents with a chief complaint of trans from VS (07 Oct 2023 13:31)      SUBJECTIVE / OVERNIGHT EVENTS:  Resting in bed.    MEDICATIONS  (STANDING):  cefepime   IVPB      cefepime   IVPB 2000 milliGRAM(s) IV Intermittent every 8 hours  chlorhexidine 2% Cloths 1 Application(s) Topical daily  dextrose 5% + lactated ringers. 1000 milliLiter(s) (60 mL/Hr) IV Continuous <Continuous>  lidocaine   4% Patch 1 Patch Transdermal daily  metroNIDAZOLE  IVPB 500 milliGRAM(s) IV Intermittent every 8 hours  oxyCODONE    IR 5 milliGRAM(s) Oral every 6 hours  polyethylene glycol 3350 17 Gram(s) Oral daily  senna 2 Tablet(s) Oral at bedtime    MEDICATIONS  (PRN):  melatonin 3 milliGRAM(s) Oral at bedtime PRN Insomnia  ondansetron Injectable 4 milliGRAM(s) IV Push every 8 hours PRN Nausea and/or Vomiting        CAPILLARY BLOOD GLUCOSE  POCT Blood Glucose.: 86 mg/dL (07 Oct 2023 08:19)  POCT Blood Glucose.: 161 mg/dL (06 Oct 2023 22:04)    Vital Signs Last 24 Hrs    T(F): 100.5 (07 Oct 2023 13:11), Max: 100.5 (07 Oct 2023 13:11)  HR: 109 (07 Oct 2023 13:11) (96 - 111)  BP: 124/80 (07 Oct 2023 13:11) (116/77 - 125/85)  RR: 18 (07 Oct 2023 13:11) (18 - 18)  SpO2: 96% room air      PHYSICAL EXAM:  GENERAL: NAD, well-developed  HEAD:  Atraumatic, Normocephalic  EYES: EOMI, PERRLA, conjunctiva and sclera clear  NECK: Supple, No JVD  CHEST/LUNG: Clear to auscultation bilaterally; No wheeze  HEART: Regular rate and rhythm; No murmurs, rubs, or gallops  ABDOMEN: Soft, Nontender, Nondistended; Bowel sounds present  EXTREMITIES:  2+ Peripheral Pulses, No clubbing, cyanosis, or edema  PSYCH: AAOx3  NEUROLOGY: non-focal  SKIN: No rashes or lesions    LABS:                        9.9    13.85 )-----------( 416      ( 07 Oct 2023 06:37 )             30.4     10-07    136  |  101  |  7   ----------------------------<  107<H>  4.8   |  21<L>  |  0.55    Ca    8.2<L>      07 Oct 2023 06:37  Phos  1.7     10-07  Mg     1.50     10-07    TPro  6.7  /  Alb  2.0<L>  /  TBili  14.9<H>  /  DBili  x   /  AST  220<H>  /  ALT  85<H>  /  AlkPhos  466<H>  10-07    PT/INR - ( 07 Oct 2023 06:37 )   PT: 24.3 sec;   INR: 2.20 ratio         PTT - ( 07 Oct 2023 06:37 )  PTT:37.3 sec        Care Discussed with Consultants/Other Providers:    tmax 100.5 Patient is a 58y old  Male who presents with a chief complaint of trans from VS (07 Oct 2023 13:31)      SUBJECTIVE / OVERNIGHT EVENTS:  Resting in bed.    MEDICATIONS  (STANDING):  cefepime   IVPB      cefepime   IVPB 2000 milliGRAM(s) IV Intermittent every 8 hours  chlorhexidine 2% Cloths 1 Application(s) Topical daily  dextrose 5% + lactated ringers. 1000 milliLiter(s) (60 mL/Hr) IV Continuous <Continuous>  lidocaine   4% Patch 1 Patch Transdermal daily  metroNIDAZOLE  IVPB 500 milliGRAM(s) IV Intermittent every 8 hours  oxyCODONE    IR 5 milliGRAM(s) Oral every 6 hours  polyethylene glycol 3350 17 Gram(s) Oral daily  senna 2 Tablet(s) Oral at bedtime    MEDICATIONS  (PRN):  melatonin 3 milliGRAM(s) Oral at bedtime PRN Insomnia  ondansetron Injectable 4 milliGRAM(s) IV Push every 8 hours PRN Nausea and/or Vomiting        CAPILLARY BLOOD GLUCOSE  POCT Blood Glucose.: 86 mg/dL (07 Oct 2023 08:19)  POCT Blood Glucose.: 161 mg/dL (06 Oct 2023 22:04)    Vital Signs Last 24 Hrs    T(F): 100.5 (07 Oct 2023 13:11), Max: 100.5 (07 Oct 2023 13:11)  HR: 109 (07 Oct 2023 13:11) (96 - 111)  BP: 124/80 (07 Oct 2023 13:11) (116/77 - 125/85)  RR: 18 (07 Oct 2023 13:11) (18 - 18)  SpO2: 96% room air      PHYSICAL EXAM:  GENERAL: NAD, well-developed  HEAD:  Atraumatic, Normocephalic  EYES: EOMI, PERRLA, conjunctiva and sclera clear  NECK: Supple, No JVD  CHEST/LUNG: Clear to auscultation bilaterally; No wheeze  HEART: Regular rate and rhythm; No murmurs, rubs, or gallops  ABDOMEN: Soft, Nontender, Nondistended; Bowel sounds present  EXTREMITIES:  2+ Peripheral Pulses, No clubbing, cyanosis, or edema  PSYCH: AAOx3  NEUROLOGY: non-focal  SKIN: No rashes or lesions    LABS:                        9.9    13.85 )-----------( 416      ( 07 Oct 2023 06:37 )             30.4     10-07    136  |  101  |  7   ----------------------------<  107<H>  4.8   |  21<L>  |  0.55    Ca    8.2<L>      07 Oct 2023 06:37  Phos  1.7     10-07  Mg     1.50     10-07    TPro  6.7  /  Alb  2.0<L>  /  TBili  14.9<H>  /  DBili  x   /  AST  220<H>  /  ALT  85<H>  /  AlkPhos  466<H>  10-07    PT/INR - ( 07 Oct 2023 06:37 )   PT: 24.3 sec;   INR: 2.20 ratio    PTT - ( 07 Oct 2023 06:37 )  PTT:37.3 sec    Asitics fluid positive GNR, f/u Species and sensitivity    Care Discussed with Consultants/Other Providers:    tmax 100.5 Patient is a 58y old  Male who presents with a chief complaint of trans from VS (07 Oct 2023 13:31)      SUBJECTIVE / OVERNIGHT EVENTS:  Resting in bed.  Explained he had bacteria in his ascites fluid.   Notes his oncologist is Dr De La Fuente    MEDICATIONS  (STANDING):  cefepime   IVPB      cefepime   IVPB 2000 milliGRAM(s) IV Intermittent every 8 hours  chlorhexidine 2% Cloths 1 Application(s) Topical daily  dextrose 5% + lactated ringers. 1000 milliLiter(s) (60 mL/Hr) IV Continuous <Continuous>  lidocaine   4% Patch 1 Patch Transdermal daily  metroNIDAZOLE  IVPB 500 milliGRAM(s) IV Intermittent every 8 hours  oxyCODONE    IR 5 milliGRAM(s) Oral every 6 hours  polyethylene glycol 3350 17 Gram(s) Oral daily  senna 2 Tablet(s) Oral at bedtime    MEDICATIONS  (PRN):  melatonin 3 milliGRAM(s) Oral at bedtime PRN Insomnia  ondansetron Injectable 4 milliGRAM(s) IV Push every 8 hours PRN Nausea and/or Vomiting        CAPILLARY BLOOD GLUCOSE  POCT Blood Glucose.: 86 mg/dL (07 Oct 2023 08:19)  POCT Blood Glucose.: 161 mg/dL (06 Oct 2023 22:04)    Vital Signs Last 24 Hrs    T(F): 100.5 (07 Oct 2023 13:11), Max: 100.5 (07 Oct 2023 13:11)  HR: 109 (07 Oct 2023 13:11) (96 - 111)  BP: 124/80 (07 Oct 2023 13:11) (116/77 - 125/85)  RR: 18 (07 Oct 2023 13:11) (18 - 18)  SpO2: 96% room air      PHYSICAL EXAM:  GENERAL: NAD, well-developed  HEAD:  Atraumatic, Normocephalic  EYES: EOMI, PERRLA, conjunctiva and sclera clear  NECK: Supple, No JVD  CHEST/LUNG: Clear to auscultation bilaterally; No wheeze  HEART: Regular rate and rhythm; No murmurs, rubs, or gallops  ABDOMEN: Soft, Nontender,  Positive distended; Bowel sounds present  L colostomy bag  EXTREMITIES:  2+ Peripheral Pulses, No clubbing, cyanosis  PSYCH: AAOx3  NEUROLOGY: non-focal  SKIN: Vitalego    LABS:                        9.9    13.85 )-----------( 416      ( 07 Oct 2023 06:37 )             30.4     10-07    136  |  101  |  7   ----------------------------<  107<H>  4.8   |  21<L>  |  0.55    Ca    8.2<L>      07 Oct 2023 06:37  Phos  1.7     10-07  Mg     1.50     10-07    TPro  6.7  /  Alb  2.0<L>  /  TBili  14.9<H>  /  DBili  x   /  AST  220<H>  /  ALT  85<H>  /  AlkPhos  466<H>  10-07    PT/INR - ( 07 Oct 2023 06:37 )   PT: 24.3 sec;   INR: 2.20 ratio    PTT - ( 07 Oct 2023 06:37 )  PTT:37.3 sec    Asitics fluid positive GNR, f/u Species and sensitivity    Care Discussed with Consultants/Other Providers:  tmax 100.5 , c/w Cefepime and flagyl. d/w PA and patient

## 2023-10-07 NOTE — PROGRESS NOTE ADULT - PROBLEM SELECTOR PLAN 1
- Presentation most c/w occult POD based on rising TM and increasing ascites, although no new lesions present on MRCP  - Pt is not a candidate for continuation of cancer tx i/s/o irreversible liver failure  - Hospice is appropriate (d/w Dr. Ospina)  - Planning to address GOC/hospice placement with pt and his family on this admission

## 2023-10-08 LAB
ALBUMIN SERPL ELPH-MCNC: 2.2 G/DL — LOW (ref 3.3–5)
ALP SERPL-CCNC: 386 U/L — HIGH (ref 40–120)
ALT FLD-CCNC: 88 U/L — HIGH (ref 4–41)
ANION GAP SERPL CALC-SCNC: 13 MMOL/L — SIGNIFICANT CHANGE UP (ref 7–14)
APTT BLD: 35.2 SEC — SIGNIFICANT CHANGE UP (ref 24.5–35.6)
AST SERPL-CCNC: 272 U/L — HIGH (ref 4–40)
BILIRUB SERPL-MCNC: 15 MG/DL — HIGH (ref 0.2–1.2)
BLD GP AB SCN SERPL QL: NEGATIVE — SIGNIFICANT CHANGE UP
BUN SERPL-MCNC: 9 MG/DL — SIGNIFICANT CHANGE UP (ref 7–23)
CALCIUM SERPL-MCNC: 8.3 MG/DL — LOW (ref 8.4–10.5)
CHLORIDE SERPL-SCNC: 105 MMOL/L — SIGNIFICANT CHANGE UP (ref 98–107)
CO2 SERPL-SCNC: 19 MMOL/L — LOW (ref 22–31)
CREAT SERPL-MCNC: 0.52 MG/DL — SIGNIFICANT CHANGE UP (ref 0.5–1.3)
EGFR: 115 ML/MIN/1.73M2 — SIGNIFICANT CHANGE UP
GLUCOSE BLDC GLUCOMTR-MCNC: 126 MG/DL — HIGH (ref 70–99)
GLUCOSE BLDC GLUCOMTR-MCNC: 99 MG/DL — SIGNIFICANT CHANGE UP (ref 70–99)
GLUCOSE SERPL-MCNC: 86 MG/DL — SIGNIFICANT CHANGE UP (ref 70–99)
HCT VFR BLD CALC: 30.9 % — LOW (ref 39–50)
HGB BLD-MCNC: 9.9 G/DL — LOW (ref 13–17)
INR BLD: 2.27 RATIO — HIGH (ref 0.85–1.18)
MAGNESIUM SERPL-MCNC: 1.8 MG/DL — SIGNIFICANT CHANGE UP (ref 1.6–2.6)
MCHC RBC-ENTMCNC: 24.2 PG — LOW (ref 27–34)
MCHC RBC-ENTMCNC: 32 GM/DL — SIGNIFICANT CHANGE UP (ref 32–36)
MCV RBC AUTO: 75.6 FL — LOW (ref 80–100)
NRBC # BLD: 0 /100 WBCS — SIGNIFICANT CHANGE UP (ref 0–0)
NRBC # FLD: 0.06 K/UL — HIGH (ref 0–0)
PHOSPHATE SERPL-MCNC: 1.6 MG/DL — LOW (ref 2.5–4.5)
PLATELET # BLD AUTO: 416 K/UL — HIGH (ref 150–400)
POTASSIUM SERPL-MCNC: 4.9 MMOL/L — SIGNIFICANT CHANGE UP (ref 3.5–5.3)
POTASSIUM SERPL-SCNC: 4.9 MMOL/L — SIGNIFICANT CHANGE UP (ref 3.5–5.3)
PROT SERPL-MCNC: 6.7 G/DL — SIGNIFICANT CHANGE UP (ref 6–8.3)
PROTHROM AB SERPL-ACNC: 24.8 SEC — HIGH (ref 9.5–13)
RBC # BLD: 4.09 M/UL — LOW (ref 4.2–5.8)
RBC # FLD: 24.7 % — HIGH (ref 10.3–14.5)
RH IG SCN BLD-IMP: POSITIVE — SIGNIFICANT CHANGE UP
SODIUM SERPL-SCNC: 137 MMOL/L — SIGNIFICANT CHANGE UP (ref 135–145)
WBC # BLD: 14.33 K/UL — HIGH (ref 3.8–10.5)
WBC # FLD AUTO: 14.33 K/UL — HIGH (ref 3.8–10.5)

## 2023-10-08 PROCEDURE — 99233 SBSQ HOSP IP/OBS HIGH 50: CPT

## 2023-10-08 RX ADMIN — OXYCODONE HYDROCHLORIDE 5 MILLIGRAM(S): 5 TABLET ORAL at 13:35

## 2023-10-08 RX ADMIN — OXYCODONE HYDROCHLORIDE 5 MILLIGRAM(S): 5 TABLET ORAL at 19:13

## 2023-10-08 RX ADMIN — OXYCODONE HYDROCHLORIDE 5 MILLIGRAM(S): 5 TABLET ORAL at 06:08

## 2023-10-08 RX ADMIN — OXYCODONE HYDROCHLORIDE 5 MILLIGRAM(S): 5 TABLET ORAL at 12:35

## 2023-10-08 RX ADMIN — OXYCODONE HYDROCHLORIDE 5 MILLIGRAM(S): 5 TABLET ORAL at 06:53

## 2023-10-08 RX ADMIN — CHLORHEXIDINE GLUCONATE 1 APPLICATION(S): 213 SOLUTION TOPICAL at 12:36

## 2023-10-08 RX ADMIN — CEFEPIME 100 MILLIGRAM(S): 1 INJECTION, POWDER, FOR SOLUTION INTRAMUSCULAR; INTRAVENOUS at 13:39

## 2023-10-08 RX ADMIN — Medication 100 MILLIGRAM(S): at 21:47

## 2023-10-08 RX ADMIN — Medication 100 MILLIGRAM(S): at 06:09

## 2023-10-08 RX ADMIN — Medication 100 MILLIGRAM(S): at 13:39

## 2023-10-08 RX ADMIN — CEFEPIME 100 MILLIGRAM(S): 1 INJECTION, POWDER, FOR SOLUTION INTRAMUSCULAR; INTRAVENOUS at 06:09

## 2023-10-08 RX ADMIN — CEFEPIME 100 MILLIGRAM(S): 1 INJECTION, POWDER, FOR SOLUTION INTRAMUSCULAR; INTRAVENOUS at 21:14

## 2023-10-08 RX ADMIN — OXYCODONE HYDROCHLORIDE 5 MILLIGRAM(S): 5 TABLET ORAL at 18:13

## 2023-10-08 NOTE — PROGRESS NOTE ADULT - ASSESSMENT
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  Pt was admitted to [facility] from Onc clinic on 9/30 with new-onset jaundice and fatigue, with sepsis in ED and abnormal LFTs noted on admission labs. His hospital course has also been c/b ascites, cancer-related abd pain, and recurrent fever (Temp 101.5 on 10/5).    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  Pt was admitted to [facility] from Onc clinic on 9/30 with new-onset jaundice and fatigue, with sepsis in ED and abnormal LFTs noted on admission labs. His hospital course has also been c/b ascites, cancer-related abd pain, and recurrent fever (Temp 101.5 on 10/5).    Asitics fluid positive GNR, f/u Species and sensitivity----> positive SBP on Cefepime and Flagyl  T bili 15 sec to cancer mets to liver.

## 2023-10-08 NOTE — PROGRESS NOTE ADULT - PROBLEM SELECTOR PLAN 1
- Presentation most c/w occult POD based on rising TM and increasing ascites, although no new lesions present on MRCP  - Pt is not a candidate for continuation of cancer tx i/s/o irreversible liver failure  - Hospice is appropriate (d/w Dr. Ospina)  - Planning to address GOC/hospice placement with pt and his family on this admission - Suspect biliary sepsis due to malignancy-related biliary obstruction; dvt ely also in process as below  - Zosyn escalated to Cef/Flagyl 10/6  - Repeat infectious ely in process  - Maintaining low threshold to broaden abx coverage if pt become HD unstable    Asitics fluid positive GNR, f/u Species and sensitivity--> positive SBP.  f/u culture for GNR  c/w cefepime and Flagyl started 10/6

## 2023-10-08 NOTE — PROGRESS NOTE ADULT - PROBLEM SELECTOR PLAN 2
- Suspect biliary sepsis due to malignancy-related biliary obstruction; dvt ely also in process as below  - Zosyn escalated to Cef/Flagyl 10/6  - Repeat infectious ely in process  - Maintaining low threshold to broaden abx coverage if pt become HD unstable    Asitics fluid positive GNR, f/u Species and sensitivity--> positive SBP.  c/w cefepime and Flagyl started 10/6 - Presentation most c/w occult POD based on rising TM and increasing ascites, although no new lesions present on MRCP  - Pt is not a candidate for continuation of cancer tx i/s/o irreversible liver failure  - Hospice is appropriate (d/w Dr. Ospina)  - Planning to address GOC/hospice placement with pt and his family on this admission

## 2023-10-08 NOTE — PROGRESS NOTE ADULT - SUBJECTIVE AND OBJECTIVE BOX
Patient is a 58y old  Male who presents with a chief complaint of trans from VS (07 Oct 2023 13:31)      SUBJECTIVE / OVERNIGHT EVENTS:    MEDICATIONS  (STANDING):  cefepime   IVPB 2000 milliGRAM(s) IV Intermittent every 8 hours  cefepime   IVPB      chlorhexidine 2% Cloths 1 Application(s) Topical daily  dextrose 5% + lactated ringers. 1000 milliLiter(s) (60 mL/Hr) IV Continuous <Continuous>  lidocaine   4% Patch 1 Patch Transdermal daily  metroNIDAZOLE  IVPB 500 milliGRAM(s) IV Intermittent every 8 hours  oxyCODONE    IR 5 milliGRAM(s) Oral every 6 hours  polyethylene glycol 3350 17 Gram(s) Oral daily  senna 2 Tablet(s) Oral at bedtime    MEDICATIONS  (PRN):  melatonin 3 milliGRAM(s) Oral at bedtime PRN Insomnia  ondansetron Injectable 4 milliGRAM(s) IV Push every 8 hours PRN Nausea and/or Vomiting        CAPILLARY BLOOD GLUCOSE  POCT Blood Glucose.: 99 mg/dL (08 Oct 2023 08:15)  POCT Blood Glucose.: 146 mg/dL (07 Oct 2023 21:16)    I&O's Summary    07 Oct 2023 07:01  -  08 Oct 2023 07:00  --------------------------------------------------------  IN: 650 mL / OUT: 0 mL / NET: 650 mL    08 Oct 2023 07:01  -  08 Oct 2023 11:49  --------------------------------------------------------  IN: 250 mL / OUT: 0 mL / NET: 250 mL      Vital Signs Last 24 Hrs  T(F): 99.7 HR: 106 BP: 108/65 , RR: 18 , SpO2: 96%  room air      PHYSICAL EXAM:  GENERAL: NAD,   HEAD:  Atraumatic, Normocephalic  EYES: EOMI, PERRLA, conjunctiva and sclera clear  NECK: Supple, No JVD  CHEST/LUNG: Clear to auscultation bilaterally; No wheeze  HEART: Regular rate and rhythm; No murmurs, rubs, or gallops  ABDOMEN: Soft, Nontender, Nondistended; Bowel sounds present  EXTREMITIES:  2+ Peripheral Pulses, No clubbing, cyanosis, or edema  PSYCH: AAOx3  NEUROLOGY: non-focal  SKIN: No rashes or lesions    LABS:                        9.9    14.33 )-----------( 416      ( 08 Oct 2023 06:30 )             30.9     10-08    137  |  105  |  9   ----------------------------<  86  4.9   |  19<L>  |  0.52    Ca    8.3<L>      08 Oct 2023 06:30  Phos  1.6     10-08  Mg     1.80     10-08    TPro  6.7  /  Alb  2.2<L>  /  TBili  15.0<H>  /  DBili  x   /  AST  272<H>  /  ALT  88<H>  /  AlkPhos  386<H>  10-08    PT/INR - ( 08 Oct 2023 06:30 )   PT: 24.8 sec;   INR: 2.27 ratio    PTT - ( 08 Oct 2023 06:30 )  PTT:35.2 sec      Care Discussed with Consultants/Other Providers:   Patient is a 58y old  Male who presents with a chief complaint of trans from VS (07 Oct 2023 13:31)      SUBJECTIVE / OVERNIGHT EVENTS:  Resting in bed, worried about abd ascitis.  Explain we are still doing antibiotics for infection    MEDICATIONS  (STANDING):  cefepime   IVPB 2000 milliGRAM(s) IV Intermittent every 8 hours  cefepime   IVPB      chlorhexidine 2% Cloths 1 Application(s) Topical daily  dextrose 5% + lactated ringers. 1000 milliLiter(s) (60 mL/Hr) IV Continuous <Continuous>  lidocaine   4% Patch 1 Patch Transdermal daily  metroNIDAZOLE  IVPB 500 milliGRAM(s) IV Intermittent every 8 hours  oxyCODONE    IR 5 milliGRAM(s) Oral every 6 hours  polyethylene glycol 3350 17 Gram(s) Oral daily  senna 2 Tablet(s) Oral at bedtime    MEDICATIONS  (PRN):  melatonin 3 milliGRAM(s) Oral at bedtime PRN Insomnia  ondansetron Injectable 4 milliGRAM(s) IV Push every 8 hours PRN Nausea and/or Vomiting        CAPILLARY BLOOD GLUCOSE  POCT Blood Glucose.: 99 mg/dL (08 Oct 2023 08:15)  POCT Blood Glucose.: 146 mg/dL (07 Oct 2023 21:16)    I&O's Summary    07 Oct 2023 07:01  -  08 Oct 2023 07:00  --------------------------------------------------------  IN: 650 mL / OUT: 0 mL / NET: 650 mL    08 Oct 2023 07:01  -  08 Oct 2023 11:49  --------------------------------------------------------  IN: 250 mL / OUT: 0 mL / NET: 250 mL      Vital Signs Last 24 Hrs  T(F): 99.7 HR: 106 BP: 108/65 , RR: 18 , SpO2: 96%  room air      PHYSICAL EXAM:  GENERAL: NAD,   HEAD:  Atraumatic, Normocephalic  EYES: EOMI, PERRLA, conjunctiva and sclera clear  NECK: Supple, No JVD  CHEST/LUNG: Clear to auscultation bilaterally; No wheeze  HEART: Regular rate and rhythm; No murmurs, rubs, or gallops  ABDOMEN: Soft, Nontender,   distended from ascitics   Bowel sounds present  Colostomy bag on L  EXTREMITIES:  2+ Peripheral Pulses, No clubbing, cyanosis, or edema  PSYCH: Alert  NEUROLOGY: non-focal  SKIN: vitalego    LABS:                        9.9    14.33 )-----------( 416      ( 08 Oct 2023 06:30 )             30.9     10-08    137  |  105  |  9   ----------------------------<  86  4.9   |  19<L>  |  0.52    Ca    8.3<L>      08 Oct 2023 06:30  Phos  1.6     10-08  Mg     1.80     10-08    TPro  6.7  /  Alb  2.2<L>  /  TBili  15.0<H>  /  DBili  x   /  AST  272<H>  /  ALT  88<H>  /  AlkPhos  386<H>  10-08    PT/INR - ( 08 Oct 2023 06:30 )   PT: 24.8 sec;   INR: 2.27 ratio    PTT - ( 08 Oct 2023 06:30 )  PTT:35.2 sec      Care Discussed with Consultants/Other Providers:  Patient and NP

## 2023-10-09 DIAGNOSIS — E43 UNSPECIFIED SEVERE PROTEIN-CALORIE MALNUTRITION: ICD-10-CM

## 2023-10-09 LAB
ALBUMIN SERPL ELPH-MCNC: 2.2 G/DL — LOW (ref 3.3–5)
ALP SERPL-CCNC: 353 U/L — HIGH (ref 40–120)
ALT FLD-CCNC: 84 U/L — HIGH (ref 4–41)
ANION GAP SERPL CALC-SCNC: 11 MMOL/L — SIGNIFICANT CHANGE UP (ref 7–14)
AST SERPL-CCNC: 238 U/L — HIGH (ref 4–40)
BILIRUB SERPL-MCNC: 15.6 MG/DL — HIGH (ref 0.2–1.2)
BUN SERPL-MCNC: 11 MG/DL — SIGNIFICANT CHANGE UP (ref 7–23)
CALCIUM SERPL-MCNC: 8.3 MG/DL — LOW (ref 8.4–10.5)
CHLORIDE SERPL-SCNC: 105 MMOL/L — SIGNIFICANT CHANGE UP (ref 98–107)
CO2 SERPL-SCNC: 22 MMOL/L — SIGNIFICANT CHANGE UP (ref 22–31)
CREAT SERPL-MCNC: 0.51 MG/DL — SIGNIFICANT CHANGE UP (ref 0.5–1.3)
CULTURE RESULTS: SIGNIFICANT CHANGE UP
CULTURE RESULTS: SIGNIFICANT CHANGE UP
EGFR: 116 ML/MIN/1.73M2 — SIGNIFICANT CHANGE UP
GLUCOSE BLDC GLUCOMTR-MCNC: 108 MG/DL — HIGH (ref 70–99)
GLUCOSE BLDC GLUCOMTR-MCNC: 95 MG/DL — SIGNIFICANT CHANGE UP (ref 70–99)
GLUCOSE SERPL-MCNC: 88 MG/DL — SIGNIFICANT CHANGE UP (ref 70–99)
HCT VFR BLD CALC: 29.6 % — LOW (ref 39–50)
HGB BLD-MCNC: 9.5 G/DL — LOW (ref 13–17)
MAGNESIUM SERPL-MCNC: 1.8 MG/DL — SIGNIFICANT CHANGE UP (ref 1.6–2.6)
MCHC RBC-ENTMCNC: 24.5 PG — LOW (ref 27–34)
MCHC RBC-ENTMCNC: 32.1 GM/DL — SIGNIFICANT CHANGE UP (ref 32–36)
MCV RBC AUTO: 76.3 FL — LOW (ref 80–100)
NRBC # BLD: 0 /100 WBCS — SIGNIFICANT CHANGE UP (ref 0–0)
NRBC # FLD: 0.05 K/UL — HIGH (ref 0–0)
PHOSPHATE SERPL-MCNC: 1.5 MG/DL — LOW (ref 2.5–4.5)
PLATELET # BLD AUTO: 437 K/UL — HIGH (ref 150–400)
POTASSIUM SERPL-MCNC: 4.1 MMOL/L — SIGNIFICANT CHANGE UP (ref 3.5–5.3)
POTASSIUM SERPL-SCNC: 4.1 MMOL/L — SIGNIFICANT CHANGE UP (ref 3.5–5.3)
PROT SERPL-MCNC: 6.3 G/DL — SIGNIFICANT CHANGE UP (ref 6–8.3)
RBC # BLD: 3.88 M/UL — LOW (ref 4.2–5.8)
RBC # FLD: 24.7 % — HIGH (ref 10.3–14.5)
SODIUM SERPL-SCNC: 138 MMOL/L — SIGNIFICANT CHANGE UP (ref 135–145)
SPECIMEN SOURCE: SIGNIFICANT CHANGE UP
SPECIMEN SOURCE: SIGNIFICANT CHANGE UP
WBC # BLD: 14.21 K/UL — HIGH (ref 3.8–10.5)
WBC # FLD AUTO: 14.21 K/UL — HIGH (ref 3.8–10.5)

## 2023-10-09 PROCEDURE — 99233 SBSQ HOSP IP/OBS HIGH 50: CPT

## 2023-10-09 PROCEDURE — 99223 1ST HOSP IP/OBS HIGH 75: CPT | Mod: GC

## 2023-10-09 PROCEDURE — 76775 US EXAM ABDO BACK WALL LIM: CPT | Mod: 26,GC

## 2023-10-09 PROCEDURE — 76604 US EXAM CHEST: CPT | Mod: 26,GC

## 2023-10-09 RX ORDER — ALBUMIN HUMAN 25 %
50 VIAL (ML) INTRAVENOUS EVERY 6 HOURS
Refills: 0 | Status: COMPLETED | OUTPATIENT
Start: 2023-10-09 | End: 2023-10-10

## 2023-10-09 RX ORDER — ENOXAPARIN SODIUM 100 MG/ML
40 INJECTION SUBCUTANEOUS EVERY 24 HOURS
Refills: 0 | Status: DISCONTINUED | OUTPATIENT
Start: 2023-10-09 | End: 2023-10-10

## 2023-10-09 RX ORDER — FUROSEMIDE 40 MG
20 TABLET ORAL ONCE
Refills: 0 | Status: COMPLETED | OUTPATIENT
Start: 2023-10-09 | End: 2023-10-09

## 2023-10-09 RX ORDER — POTASSIUM PHOSPHATE, MONOBASIC POTASSIUM PHOSPHATE, DIBASIC 236; 224 MG/ML; MG/ML
30 INJECTION, SOLUTION INTRAVENOUS ONCE
Refills: 0 | Status: COMPLETED | OUTPATIENT
Start: 2023-10-09 | End: 2023-10-09

## 2023-10-09 RX ADMIN — OXYCODONE HYDROCHLORIDE 5 MILLIGRAM(S): 5 TABLET ORAL at 12:37

## 2023-10-09 RX ADMIN — Medication 100 MILLIGRAM(S): at 13:15

## 2023-10-09 RX ADMIN — POTASSIUM PHOSPHATE, MONOBASIC POTASSIUM PHOSPHATE, DIBASIC 83.33 MILLIMOLE(S): 236; 224 INJECTION, SOLUTION INTRAVENOUS at 16:20

## 2023-10-09 RX ADMIN — CHLORHEXIDINE GLUCONATE 1 APPLICATION(S): 213 SOLUTION TOPICAL at 12:38

## 2023-10-09 RX ADMIN — OXYCODONE HYDROCHLORIDE 5 MILLIGRAM(S): 5 TABLET ORAL at 07:00

## 2023-10-09 RX ADMIN — CEFEPIME 100 MILLIGRAM(S): 1 INJECTION, POWDER, FOR SOLUTION INTRAMUSCULAR; INTRAVENOUS at 22:40

## 2023-10-09 RX ADMIN — OXYCODONE HYDROCHLORIDE 5 MILLIGRAM(S): 5 TABLET ORAL at 00:08

## 2023-10-09 RX ADMIN — Medication 100 MILLIGRAM(S): at 05:44

## 2023-10-09 RX ADMIN — OXYCODONE HYDROCHLORIDE 5 MILLIGRAM(S): 5 TABLET ORAL at 06:11

## 2023-10-09 RX ADMIN — OXYCODONE HYDROCHLORIDE 5 MILLIGRAM(S): 5 TABLET ORAL at 01:08

## 2023-10-09 RX ADMIN — ENOXAPARIN SODIUM 40 MILLIGRAM(S): 100 INJECTION SUBCUTANEOUS at 19:00

## 2023-10-09 RX ADMIN — Medication 100 MILLIGRAM(S): at 21:28

## 2023-10-09 RX ADMIN — CEFEPIME 100 MILLIGRAM(S): 1 INJECTION, POWDER, FOR SOLUTION INTRAMUSCULAR; INTRAVENOUS at 13:15

## 2023-10-09 RX ADMIN — CEFEPIME 100 MILLIGRAM(S): 1 INJECTION, POWDER, FOR SOLUTION INTRAMUSCULAR; INTRAVENOUS at 05:07

## 2023-10-09 RX ADMIN — Medication 20 MILLIGRAM(S): at 19:00

## 2023-10-09 NOTE — DIETITIAN INITIAL EVALUATION ADULT - PERTINENT MEDS FT
MEDICATIONS  (STANDING):  albumin human 25% IVPB 50 milliLiter(s) IV Intermittent every 6 hours  cefepime   IVPB      cefepime   IVPB 2000 milliGRAM(s) IV Intermittent every 8 hours  chlorhexidine 2% Cloths 1 Application(s) Topical daily  furosemide   Injectable 20 milliGRAM(s) IV Push once  lidocaine   4% Patch 1 Patch Transdermal daily  metroNIDAZOLE  IVPB 500 milliGRAM(s) IV Intermittent every 8 hours  oxyCODONE    IR 5 milliGRAM(s) Oral every 6 hours  polyethylene glycol 3350 17 Gram(s) Oral daily  potassium phosphate IVPB 30 milliMole(s) IV Intermittent once  senna 2 Tablet(s) Oral at bedtime    MEDICATIONS  (PRN):  melatonin 3 milliGRAM(s) Oral at bedtime PRN Insomnia  ondansetron Injectable 4 milliGRAM(s) IV Push every 8 hours PRN Nausea and/or Vomiting

## 2023-10-09 NOTE — PROGRESS NOTE ADULT - PROBLEM SELECTOR PLAN 9
- INR continues to rise i/s/o worsening liver dysfunction  - Labs no c/w DIC and pt without clinical s/s of bleeding/clotting  - Completed 3d course of Vit K, 10/6-8  - Will plan for FFP (goal INR < 1.5) if Pulm agrees to perform palliative thora

## 2023-10-09 NOTE — PROGRESS NOTE ADULT - PROBLEM SELECTOR PLAN 2
- recommended to pt on 10/6 to which he was amenable, but today also noted that he was seeking 2nd opinion for alternative medicine tx options  - Code status was also d/w pt today: he iterated that he has not thought about his code status or other advance directives, and that he would like to remain full code for now   -  referral for home hospice enrollement in process

## 2023-10-09 NOTE — PROGRESS NOTE ADULT - ASSESSMENT
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  Pt was admitted to [facility] from Onc clinic on 9/30 with new-onset jaundice and fatigue, with sepsis in ED and abnormal LFTs noted on admission labs. His hospital course has also been c/b ascites, cancer-related abd pain, and recurrent fever (Temp 101.5 on 10/5)- GNRs noted in 10/6 ascites fluid gram stain but cx negative today, more c/w SBP.

## 2023-10-09 NOTE — DIETITIAN INITIAL EVALUATION ADULT - PROBLEM SELECTOR PLAN 1
- [facility]   - here has Tmax 101.6, [id number] with WBC 14.27, source unknown at this time likely intraabdominal  -procal 0.91    - Bcx 9/25 NGTD, Ucx NGTD, repeat BCx pending   - was started on zosyn at OSH, will continue for now   - monitor CBC

## 2023-10-09 NOTE — DIETITIAN INITIAL EVALUATION ADULT - NS FNS REASON FOR WEIGHT CHANG
metastatic cancer, s/p chemotherapy  - s/p paracentesis with IR on 10/6 1260cc of ascitic fluids removed./decreased po intake/fluid loss/fluid retention

## 2023-10-09 NOTE — PROGRESS NOTE ADULT - PROBLEM SELECTOR PLAN 4
- LFTs continue to worsen 2/2 extensive met disease burden in the liver  - Viral hep serologies negative  - MRCP reviewed by iGI and IR: ERCP, PTC unlikely to be effective in alleviating multifocal biliary obstruction  - Avoiding hepatotoxins

## 2023-10-09 NOTE — PROGRESS NOTE ADULT - SUBJECTIVE AND OBJECTIVE BOX
SOLID TUMOR ONCOLOGY HOSPITALIST PROGRESS NOTE    S: No acute events overnight.  Pt confirmed that his abd pain is controlled, but mentioned that he had occasion cough and dyspnea with ambulation. He was amenable to thoracentesis if Pulm were to offer.    CURRENT MEDICATIONS  (STANDING):  albumin human 25% IVPB 50 milliLiter(s) IV Intermittent every 6 hours  cefepime   IVPB      cefepime   IVPB 2000 milliGRAM(s) IV Intermittent every 8 hours  chlorhexidine 2% Cloths 1 Application(s) Topical daily  furosemide   Injectable 20 milliGRAM(s) IV Push once  lidocaine   4% Patch 1 Patch Transdermal daily  metroNIDAZOLE  IVPB 500 milliGRAM(s) IV Intermittent every 8 hours  oxyCODONE    IR 5 milliGRAM(s) Oral every 6 hours  polyethylene glycol 3350 17 Gram(s) Oral daily  senna 2 Tablet(s) Oral at bedtime  (PRN):  melatonin 3 milliGRAM(s) Oral at bedtime PRN Insomnia  ondansetron Injectable 4 milliGRAM(s) IV Push every 8 hours PRN Nausea and/or Vomiting    PHYSICAL EXAM  T(C): 36.9 (10-09-23 @ 12:21), Max: 37.7 (10-08-23 @ 21:45)  HR: 109 (10-09-23 @ 12:21) (98 - 109)  BP: 129/89 (10-09-23 @ 12:21) (104/80 - 129/89)  RR: 19 (10-09-23 @ 12:21) (18 - 19)  SpO2: 100% (10-09-23 @ 12:21) (97% - 100%)    10-08-23 @ 07:01  -  10-09-23 @ 07:00  --------------------------------------------------------  IN: 750 mL / OUT: 0 mL / NET: 750 mL        LABS                        9.5    14.21 )-----------( 437      ( 09 Oct 2023 05:07 )             29.6     10-09    138  |  105  |  11  ----------------------------<  88  4.1   |  22  |  0.51    Ca    8.3<L>      09 Oct 2023 05:07  Phos  1.5     10-  Mg     1.80     10-    TPro  6.3  /  Alb  2.2<L>  /  TBili  15.6<H>  /  DBili  x   /  AST  238<H>  /  ALT  84<H>  /  AlkPhos  353<H>  10-09    PT/INR - ( 08 Oct 2023 06:30 )   PT: 24.8 sec;   INR: 2.27 ratio      PTT - ( 08 Oct 2023 06:30 )  PTT:35.2 sec    CAPILLARY BLOOD GLUCOSE  POCT Blood Glucose.: 95 mg/dL (09 Oct 2023 08:39)  POCT Blood Glucose.: 126 mg/dL (08 Oct 2023 20:12)    MICROBIOLOGY  10/6 ascites fluid cx: GNRs on gram stain, but cxs remain negative to date.    Urinalysis Basic - ( 06 Oct 2023 13:43 )  Color: MATTIE / Appearance: Slightly Cloudy / S.032 / pH: x  Gluc: x / Ketone: Trace mg/dL  / Bili: Large / Urobili: 1.0 mg/dL   Blood: x / Protein: 100 mg/dL / Nitrite: Negative   Leuk Esterase: Negative / RBC: 2 /HPF / WBC 2 /HPF   Sq Epi: x / Non Sq Epi: x / Bacteria: Few /HPF    Culture - Urine (collected 04 Oct 2023 15:39)  Source: Clean Catch Clean Catch (Midstream)  Final Report (05 Oct 2023 13:52):    No growth    Culture - Blood (collected 04 Oct 2023 11:45)  Source: .Blood Blood-Peripheral  Preliminary Report (05 Oct 2023 19:02):    No growth at 24 hours    Culture - Blood (collected 04 Oct 2023 11:42)  Source: .Blood Blood-Peripheral  Preliminary Report (05 Oct 2023 19:02):    No growth at 24 hours    Culture - Blood (collected 02 Oct 2023 06:30)  Source: .Blood Blood-Venous  Preliminary Report (04 Oct 2023 09:02):    No growth at 48 Hours    Culture - Blood (collected 02 Oct 2023 06:30)  Source: .Blood Blood-Peripheral  Preliminary Report (04 Oct 2023 09:01):    No growth at 48 Hours    SARS-CoV-2: NotDetec (06 Oct 2023 10:36)  SARS-CoV-2: NotDetec (25 Sep 2023 23:55)      PERTINENT RADIOLOGY  10/6 CXR: Increasing right pleural effusion with pulmonary metastases.    10/3 MRCP  1.  New moderate right pleural effusion and interval increase of moderate   ascites.  2.  Grossly stable pulmonary and hepatic metastatic lesions.  3.  Stable mild intrahepatic biliary ductal dilatation secondary to   compression of more central duct by metastatic lesions in the hilum.   Unremarkable CBD.     CXR: New atelectasis or infiltrate right lung base     CT a/p: Peripancreatic edema suspicious for acute pancreatitis, new compared to   prior. While less likely this could also represent noninflammatory   edema/third spacing of fluid. Small amount of ascites is new. Otherwise similar to prior with a large number of hepatic metastases,   intrahepatic peripheral biliary ductal dilation in the right greater than   left lobes, partially visible lung metastases, and likely metastatic mild   upper abdominal and partially visible mediastinal adenopathy.

## 2023-10-09 NOTE — DIETITIAN INITIAL EVALUATION ADULT - PROBLEM SELECTOR PLAN 5
- diagnosed with colon cancer in 2021, receives biweekly chemotherapy (Avastin, Irinotecan, Leucovorin, and 5FU CADD)  - follows with Dr. Tu De La Fuente at [facility]

## 2023-10-09 NOTE — DIETITIAN INITIAL EVALUATION ADULT - PROBLEM/PLAN-2
FS: 118. Patient on Insulin lantus 10 units, started HSS, c/w accuchecks
FS: 118. Patient on Insulin lantus 10 units, started HSS, c/w accuchecks
DISPLAY PLAN FREE TEXT

## 2023-10-09 NOTE — CONSULT NOTE ADULT - ASSESSMENT
59 yo man with h/o vitiligo, pre-DM, gastritis on PPI, and met colon adenocarcinoma disease course c/b LBO s/p resection and ostomy in 3/2021, most recently on Irinotecan/Panitumumab since 7/2023, last given 9/15/23 and again due 9/29/23 who presented from Onc clinic on 9/30 with new-onset jaundice and fatigue, course c/b SBP, sepsis, persistent fever and abdominal pain.     On CXR on 10/6 noted to have effusion so pulmonary team consulted for management.  Patient is saturating well on room air, and overall speaks in full sentences without dyspnea    Bedside POCUS shows small right pleural effusion    #Pleural effusion  - Possibly 2/2 malignancy given metastatic colon adenocarcinoma and mets though may be transudative given overall FTT or from abdomen given ascites  - Some SOB though not likely from effusion given size  - No plan for thoracentesis at this time as pocket is small, patient is likely not sympomatic from this effusion and he has elevated INR given liver mets.   - Can consider low dose diuresis as he has leg edema as well.     Pulmonary team to sign off. If there is a change in status or worsening shortness of breath please call us back

## 2023-10-09 NOTE — DIETITIAN INITIAL EVALUATION ADULT - PERTINENT LABORATORY DATA
10-09    138  |  105  |  11  ----------------------------<  88  4.1   |  22  |  0.51    Ca    8.3<L>      09 Oct 2023 05:07  Phos  1.5     10-09  Mg     1.80     10-09    TPro  6.3  /  Alb  2.2<L>  /  TBili  15.6<H>  /  DBili  x   /  AST  238<H>  /  ALT  84<H>  /  AlkPhos  353<H>  10-09  POCT Blood Glucose.: 95 mg/dL (10-09-23 @ 08:39)  A1C with Estimated Average Glucose Result: 5.6 % (09-27-23 @ 08:15)

## 2023-10-09 NOTE — DIETITIAN INITIAL EVALUATION ADULT - REASON FOR ADMISSION
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  Pt was admitted to [facility] from Onc clinic on 9/30 with new-onset jaundice and fatigue, with sepsis in ED and abnormal LFTs noted on admission labs. His hospital course has also been c/b ascites and cancer-related abd pain.

## 2023-10-09 NOTE — CHART NOTE - NSCHARTNOTEFT_GEN_A_CORE
59 y/o male with metastatic colon cancer- w/Right pleural effusion, Increasing based on 10/6 CXR, pulm called to evaluate for Thoracentesis, will see the pt   Discussed w/ the pt about GOC, pt is aware that no further cancer Tx planned,  discussed hospice option, pt is in agreement w/ the plan    Sheri Dietrich, ALMA page [id number]

## 2023-10-09 NOTE — ADVANCED PRACTICE NURSE CONSULT - ASSESSMENT
Patient resting comfortable in bed stating he changed the pouch yesterday, independent in care. Patient requesting additional 3" skin barrier rings for home use, provided at bedside.     LLQ colostomy- Stoma pink, moist, viable edematous and prolapse (entire protrusion 7cm) with edematous stoma measuring approximately [id number]. No significant change from previous assessment.    Patient resting comfortable in bed stating he changed the pouch yesterday, independent in care. Patient requesting additional 3" skin barrier rings for home use, provided at bedside.     LLQ colostomy- Pouch intact with loose stool noted. Stoma pink, moist, viable edematous and prolapse (entire protrusion 7cm) with edematous stoma measuring approximately [id number]- seen through pouch window. No significant change from previous assessment.

## 2023-10-09 NOTE — PROGRESS NOTE ADULT - PROBLEM SELECTOR PLAN 1
- Presentation most c/w occult POD based on rising TM and increasing ascites, although no new lesions present on MRCP  - Pt is not a candidate for continuation of cancer tx i/s/o irreversible liver failure  - Hospice is appropriate (d/w Dr. Ospina), to which pt is amenable (GOC addressed as described below)  - Long term prognosis is poor: weeks to short months at best; days to weeks in the event of another acute clinical insult

## 2023-10-09 NOTE — PROGRESS NOTE ADULT - PROBLEM SELECTOR PLAN 3
- Suspect biliary sepsis due to malignancy-related biliary obstruction + SBP as 10/6 ascites fluid cx growing GNRs  - Blood cxs remain negative today  - continuing empiric Cef/Flagyl for total 7d course  - Maintaining low threshold to broaden abx coverage if pt become HD unstable

## 2023-10-09 NOTE — ADVANCED PRACTICE NURSE CONSULT - REASON FOR CONSULT
Patient seen for colostomy prolapse follow up. 
Patient seen on skin care rounds after wound care referral received for assessment of skin impairment and recommendations of topical management of swollen colostomy. Patient is a 58M PMH of metastatic colon cancer on active chemotherapy (Avastin, Irinotecan, Leucovorin, and 5FU CADD) biweekly who presented to [facility] for yellowing skin and fatigue. Found to have hyperbilirubinemia and fever. Consulted by GI/hepatology for dilation in multiple isolated biliary duct branches 2/2 diffuse metastatic disease. Patient on Iv antibiotic therapy. Heme/onc following for metastatic colon cancer. Chart reviewed: WBC 15.10, H/H 8.3/25.3, INR 1.79, Platelets 481, hA1c 5.6, BMI 21, mirian 19.

## 2023-10-09 NOTE — CONSULT NOTE ADULT - SUBJECTIVE AND OBJECTIVE BOX
HPI:    57 y/o M with pmhx of metastatic colon cancer on active chemotherapy(Avastin, Irinotecan, Leucovorin, and 5FU CADD) biweekly who presented to [facility] for yellowing skin and fatigue. Patient states that he noticed yellowing of his skin and eyes on Monday. He also endorses decreased appetite for the last 2 weeks with associated weight loss. Patient states that he has lost 8-9 lbs in the last 1 week. He denies any nausea or vomiting. He has noticed increased abdominal distension which he found odd given his lack of appetite. Noted that his urine has been darker than usual but denies any hematuria. He also endorses R flank pain that radiates to his back.     At [facility], CT A/P showed peripancreatic edema suspicious for acute pancreatitis new compared to prior; itrahepatic mets with intrahepatic peripheral biliary ductal dilatation R >L with partially visible lung mets with new small ascites. ptient was started on zosyn. Patient was transferred  to [facility] for Advanced GI assessment.     During his hospital stay the course was notable for SBP treated with cefepime and flagyl.     Pulmonary team consulted for pleural effusion as seen on CXR. Patient was seen and examined at bedside and his history as above was concerned. He said he is slightly short of breath which improved when he received is paracentesis a few days ago. He notes swelling particularly in his legs. He has no chest pain or cough.       PAST MEDICAL & SURGICAL HISTORY:  Vitiligo      Metastatic colon cancer to liver      S/P colectomy      S/P colostomy      Large bowel obstruction          FAMILY HISTORY:  Family history of colon cancer (Father)    Family history of hypertension (Mother)    Family history of type 2 diabetes mellitus (Father)        SOCIAL HISTORY:  Smoking: none   EtOH Use: none  Marital Status:  Occupation: maint  Exposures:  Recent Travel:    Allergies    No Known Allergies    Intolerances        HOME MEDICATIONS:    REVIEW OF SYSTEMS:  Constitutional: No fevers or chills. No weight loss. No fatigue or generalised malaise.  Eyes: No itching or discharge from the eyes  ENT: No ear pain. No ear discharge. No nasal congestion. No post nasal drip. No epistaxis. No throat pain. No sore throat. No difficulty swallowing.   CV: No chest pain. No palpitations. No lightheadedness or dizziness.   Resp: No dyspnea at rest. No dyspnea on exertion. No orthopnea. No wheezing. No cough. No stridor. No sputum production. No chest pain with respiration.  GI: No nausea. No vomiting. No diarrhea.  MSK: No joint pain or pain in any extremities  Integumentary: No skin lesions. No pedal edema.  Neurological: No gross motor weakness. No sensory changes.    [ ] All other systems negative  [ ] Unable to assess ROS because ________    OBJECTIVE:  ICU Vital Signs Last 24 Hrs  T(C): 36.9 (09 Oct 2023 12:21), Max: 37.7 (08 Oct 2023 21:45)  T(F): 98.5 (09 Oct 2023 12:21), Max: 99.9 (08 Oct 2023 21:45)  HR: 109 (09 Oct 2023 12:21) (98 - 109)  BP: 129/89 (09 Oct 2023 12:21) (104/80 - 129/89)  BP(mean): --  ABP: --  ABP(mean): --  RR: 19 (09 Oct 2023 12:21) (18 - 19)  SpO2: 100% (09 Oct 2023 12:21) (97% - 100%)    O2 Parameters below as of 09 Oct 2023 12:21  Patient On (Oxygen Delivery Method): room air              10-08 @ 07:01  -  10-09 @ 07:00  --------------------------------------------------------  IN: 750 mL / OUT: 0 mL / NET: 750 mL      CAPILLARY BLOOD GLUCOSE      POCT Blood Glucose.: 95 mg/dL (09 Oct 2023 08:39)      PHYSICAL EXAM:  General: Awake, alert, oriented X 3.   HEENT: Atraumatic, normocephalic.                  No tonsillar or pharyngeal exudates.  Lymph Nodes: No palpable lymphadenopathy  Neck: No JVD. No carotid bruit.   Respiratory: decreased breath sounds on the right base   Cardiovascular: S1 S2 normal. No murmurs, rubs or gallops.   Abdomen: dstended, colostomy pouch in place   Extremities: Warm to touch. Peripheral pulse palpable. + lower extremity edema   Skin: jaundiced       HOSPITAL MEDICATIONS:  MEDICATIONS  (STANDING):  albumin human 25% IVPB 50 milliLiter(s) IV Intermittent every 6 hours  cefepime   IVPB 2000 milliGRAM(s) IV Intermittent every 8 hours  cefepime   IVPB      chlorhexidine 2% Cloths 1 Application(s) Topical daily  furosemide   Injectable 20 milliGRAM(s) IV Push once  lidocaine   4% Patch 1 Patch Transdermal daily  metroNIDAZOLE  IVPB 500 milliGRAM(s) IV Intermittent every 8 hours  oxyCODONE    IR 5 milliGRAM(s) Oral every 6 hours  polyethylene glycol 3350 17 Gram(s) Oral daily  potassium phosphate IVPB 30 milliMole(s) IV Intermittent once  senna 2 Tablet(s) Oral at bedtime    MEDICATIONS  (PRN):  melatonin 3 milliGRAM(s) Oral at bedtime PRN Insomnia  ondansetron Injectable 4 milliGRAM(s) IV Push every 8 hours PRN Nausea and/or Vomiting      LABS:                        9.5    14.21 )-----------( 437      ( 09 Oct 2023 05:07 )             29.6     10-09    138  |  105  |  11  ----------------------------<  88  4.1   |  22  |  0.51    Ca    8.3<L>      09 Oct 2023 05:07  Phos  1.5     10-09  Mg     1.80     10-09    TPro  6.3  /  Alb  2.2<L>  /  TBili  15.6<H>  /  DBili  x   /  AST  238<H>  /  ALT  84<H>  /  AlkPhos  353<H>  10-09    PT/INR - ( 08 Oct 2023 06:30 )   PT: 24.8 sec;   INR: 2.27 ratio         PTT - ( 08 Oct 2023 06:30 )  PTT:35.2 sec  Urinalysis Basic - ( 09 Oct 2023 05:07 )    Color: x / Appearance: x / SG: x / pH: x  Gluc: 88 mg/dL / Ketone: x  / Bili: x / Urobili: x   Blood: x / Protein: x / Nitrite: x   Leuk Esterase: x / RBC: x / WBC x   Sq Epi: x / Non Sq Epi: x / Bacteria: x            MICROBIOLOGY:     RADIOLOGY:  [x ] Reviewed and interpreted by me    Point of Care Ultrasound Findings; small righ tsided pleural effusion     PFT:    EKG:

## 2023-10-09 NOTE — ADVANCED PRACTICE NURSE CONSULT - RECOMMEDATIONS
Ostomy team will continue to monitor.     Please contact Wound/Ostomy Care Service Line if we can be of further assistance (ext [phone]).

## 2023-10-09 NOTE — CHART NOTE - NSCHARTNOTEFT_GEN_A_CORE
: Dr. Leon Pacheco    INDICATION: shortness of breath    PROCEDURE:  [x ] LIMITED CHEST  [x ] LIMITED ABDOMINAL      FINDINGS/INTERPRETATION:  Chest: a line predominant pattern b/l with lung sliding noted. Small left simple appearing pleural effusion noted at left base and left axilla.   Abdominal: Ascites noted, with most at the right lower quadrant    Interpretation; Only small pocket at this time for thoracentesis.     Images uploaded to [facility] : Dr. Leon Pacheco    INDICATION: shortness of breath    PROCEDURE:  [x ] LIMITED CHEST  [x ] LIMITED ABDOMINAL      FINDINGS/INTERPRETATION:  Chest: a line predominant pattern b/l with lung sliding noted. Small left simple appearing pleural effusion noted at left base and left axilla.   Abdominal: Ascites noted, with most at the right lower quadrant    Interpretation; Only small pocket at this time for thoracentesis.     Images uploaded to [facility]    Attending Attestation:    I was present during the key portions of the procedure and immediately available during the entire procedure.    Charmaine Quintero MD  Attending  Pulmonary & Critical Care Medicine

## 2023-10-09 NOTE — DIETITIAN INITIAL EVALUATION ADULT - ORAL INTAKE PTA/DIET HISTORY
Patient reports poor appetite and PO intake over 5-6weeks. Adheres to Regular diet, avoids concentrated sweets and beverages. Wife assists with meal preparation and purchasing. NKFA. Consumes Naked Juice drinks at home. No oral nutrition supplements reported.

## 2023-10-09 NOTE — DIETITIAN INITIAL EVALUATION ADULT - OTHER INFO
Patient endorses poor appetite and PO intake, consuming <50% of meals as observed at bedside. Patient denies any nausea/vomiting/diarrhea/constipation or difficulty chewing and swallowing. Last bowel movement on 10/9-this am per pt. Patient recalls usual weight to be 145lbs and current weight 60.8kg/134lbs with 1+ b/l foot edema per nursing flowsheet. Importance of having well balanced nutrient and protein dense foods encouraged. Strategies to increase kcal and protein intake discussed. Offered oral nutrition supplement, amenable to Ensure Plus and Donavan Wilson for trial.

## 2023-10-09 NOTE — DIETITIAN INITIAL EVALUATION ADULT - ORAL NUTRITION SUPPLEMENTS
Recommend add Ensure Plus (350 kcal, 20 gm protein per 8 oz serving) TID for added calories and protein.

## 2023-10-09 NOTE — PROGRESS NOTE ADULT - PROBLEM SELECTOR PLAN 6
- Increasing based on 10/6 CXR  - Pt still with stable O2 sats on RA, but reported VELEZ today  - Pulm consulted for possible palliative thora  - Albumin/lasix ordered today as noted above

## 2023-10-10 DIAGNOSIS — K94.19 OTHER COMPLICATIONS OF ENTEROSTOMY: ICD-10-CM

## 2023-10-10 LAB
ALBUMIN SERPL ELPH-MCNC: 2.3 G/DL — LOW (ref 3.3–5)
ALP SERPL-CCNC: 234 U/L — HIGH (ref 40–120)
ALT FLD-CCNC: 67 U/L — HIGH (ref 4–41)
ANION GAP SERPL CALC-SCNC: 10 MMOL/L — SIGNIFICANT CHANGE UP (ref 7–14)
AST SERPL-CCNC: 200 U/L — HIGH (ref 4–40)
BILIRUB SERPL-MCNC: 13.7 MG/DL — HIGH (ref 0.2–1.2)
BUN SERPL-MCNC: 11 MG/DL — SIGNIFICANT CHANGE UP (ref 7–23)
CALCIUM SERPL-MCNC: 8.3 MG/DL — LOW (ref 8.4–10.5)
CHLORIDE SERPL-SCNC: 109 MMOL/L — HIGH (ref 98–107)
CO2 SERPL-SCNC: 22 MMOL/L — SIGNIFICANT CHANGE UP (ref 22–31)
CREAT SERPL-MCNC: 0.52 MG/DL — SIGNIFICANT CHANGE UP (ref 0.5–1.3)
EGFR: 115 ML/MIN/1.73M2 — SIGNIFICANT CHANGE UP
GLUCOSE BLDC GLUCOMTR-MCNC: 225 MG/DL — HIGH (ref 70–99)
GLUCOSE BLDC GLUCOMTR-MCNC: 225 MG/DL — HIGH (ref 70–99)
GLUCOSE BLDC GLUCOMTR-MCNC: 87 MG/DL — SIGNIFICANT CHANGE UP (ref 70–99)
GLUCOSE SERPL-MCNC: 111 MG/DL — HIGH (ref 70–99)
HCT VFR BLD CALC: 25.9 % — LOW (ref 39–50)
HCT VFR BLD CALC: 26.8 % — LOW (ref 39–50)
HCT VFR BLD CALC: 26.8 % — LOW (ref 39–50)
HGB BLD-MCNC: 8.2 G/DL — LOW (ref 13–17)
HGB BLD-MCNC: 8.5 G/DL — LOW (ref 13–17)
HGB BLD-MCNC: 8.5 G/DL — LOW (ref 13–17)
INR BLD: 3.15 RATIO — HIGH (ref 0.85–1.18)
MAGNESIUM SERPL-MCNC: 1.5 MG/DL — LOW (ref 1.6–2.6)
MCHC RBC-ENTMCNC: 23.9 PG — LOW (ref 27–34)
MCHC RBC-ENTMCNC: 24.3 PG — LOW (ref 27–34)
MCHC RBC-ENTMCNC: 24.3 PG — LOW (ref 27–34)
MCHC RBC-ENTMCNC: 31.7 GM/DL — LOW (ref 32–36)
MCV RBC AUTO: 75.5 FL — LOW (ref 80–100)
MCV RBC AUTO: 76.6 FL — LOW (ref 80–100)
MCV RBC AUTO: 76.6 FL — LOW (ref 80–100)
NRBC # BLD: 0 /100 WBCS — SIGNIFICANT CHANGE UP (ref 0–0)
NRBC # FLD: 0.08 K/UL — HIGH (ref 0–0)
PHOSPHATE SERPL-MCNC: 1.8 MG/DL — LOW (ref 2.5–4.5)
PLATELET # BLD AUTO: 371 K/UL — SIGNIFICANT CHANGE UP (ref 150–400)
PLATELET # BLD AUTO: 371 K/UL — SIGNIFICANT CHANGE UP (ref 150–400)
PLATELET # BLD AUTO: 384 K/UL — SIGNIFICANT CHANGE UP (ref 150–400)
POTASSIUM SERPL-MCNC: 4.3 MMOL/L — SIGNIFICANT CHANGE UP (ref 3.5–5.3)
POTASSIUM SERPL-SCNC: 4.3 MMOL/L — SIGNIFICANT CHANGE UP (ref 3.5–5.3)
PROT SERPL-MCNC: 6.2 G/DL — SIGNIFICANT CHANGE UP (ref 6–8.3)
PROTHROM AB SERPL-ACNC: 34.1 SEC — HIGH (ref 9.5–13)
RBC # BLD: 3.43 M/UL — LOW (ref 4.2–5.8)
RBC # BLD: 3.5 M/UL — LOW (ref 4.2–5.8)
RBC # BLD: 3.5 M/UL — LOW (ref 4.2–5.8)
RBC # FLD: 24 % — HIGH (ref 10.3–14.5)
RBC # FLD: 24.6 % — HIGH (ref 10.3–14.5)
RBC # FLD: 24.6 % — HIGH (ref 10.3–14.5)
SODIUM SERPL-SCNC: 141 MMOL/L — SIGNIFICANT CHANGE UP (ref 135–145)
WBC # BLD: 13.43 K/UL — HIGH (ref 3.8–10.5)
WBC # BLD: 13.49 K/UL — HIGH (ref 3.8–10.5)
WBC # BLD: 13.49 K/UL — HIGH (ref 3.8–10.5)
WBC # FLD AUTO: 13.43 K/UL — HIGH (ref 3.8–10.5)
WBC # FLD AUTO: 13.49 K/UL — HIGH (ref 3.8–10.5)
WBC # FLD AUTO: 13.49 K/UL — HIGH (ref 3.8–10.5)

## 2023-10-10 PROCEDURE — 76700 US EXAM ABDOM COMPLETE: CPT | Mod: 26

## 2023-10-10 PROCEDURE — 99233 SBSQ HOSP IP/OBS HIGH 50: CPT

## 2023-10-10 PROCEDURE — 93970 EXTREMITY STUDY: CPT | Mod: 26

## 2023-10-10 PROCEDURE — 99232 SBSQ HOSP IP/OBS MODERATE 35: CPT | Mod: GC

## 2023-10-10 RX ORDER — PHYTONADIONE (VIT K1) 5 MG
5 TABLET ORAL ONCE
Refills: 0 | Status: DISCONTINUED | OUTPATIENT
Start: 2023-10-11 | End: 2023-10-11

## 2023-10-10 RX ORDER — MAGNESIUM SULFATE 500 MG/ML
2 VIAL (ML) INJECTION ONCE
Refills: 0 | Status: COMPLETED | OUTPATIENT
Start: 2023-10-10 | End: 2023-10-10

## 2023-10-10 RX ORDER — PHYTONADIONE (VIT K1) 5 MG
5 TABLET ORAL ONCE
Refills: 0 | Status: COMPLETED | OUTPATIENT
Start: 2023-10-10 | End: 2023-10-10

## 2023-10-10 RX ADMIN — OXYCODONE HYDROCHLORIDE 5 MILLIGRAM(S): 5 TABLET ORAL at 19:03

## 2023-10-10 RX ADMIN — CEFEPIME 100 MILLIGRAM(S): 1 INJECTION, POWDER, FOR SOLUTION INTRAMUSCULAR; INTRAVENOUS at 13:48

## 2023-10-10 RX ADMIN — OXYCODONE HYDROCHLORIDE 5 MILLIGRAM(S): 5 TABLET ORAL at 13:44

## 2023-10-10 RX ADMIN — CHLORHEXIDINE GLUCONATE 1 APPLICATION(S): 213 SOLUTION TOPICAL at 12:53

## 2023-10-10 RX ADMIN — Medication 100 MILLIGRAM(S): at 06:58

## 2023-10-10 RX ADMIN — OXYCODONE HYDROCHLORIDE 5 MILLIGRAM(S): 5 TABLET ORAL at 07:55

## 2023-10-10 RX ADMIN — CEFEPIME 100 MILLIGRAM(S): 1 INJECTION, POWDER, FOR SOLUTION INTRAMUSCULAR; INTRAVENOUS at 08:35

## 2023-10-10 RX ADMIN — Medication 25 GRAM(S): at 10:49

## 2023-10-10 RX ADMIN — Medication 50 MILLILITER(S): at 00:14

## 2023-10-10 RX ADMIN — OXYCODONE HYDROCHLORIDE 5 MILLIGRAM(S): 5 TABLET ORAL at 01:13

## 2023-10-10 RX ADMIN — Medication 5 MILLIGRAM(S): at 14:33

## 2023-10-10 RX ADMIN — OXYCODONE HYDROCHLORIDE 5 MILLIGRAM(S): 5 TABLET ORAL at 00:13

## 2023-10-10 RX ADMIN — OXYCODONE HYDROCHLORIDE 5 MILLIGRAM(S): 5 TABLET ORAL at 06:58

## 2023-10-10 RX ADMIN — Medication 100 MILLIGRAM(S): at 13:48

## 2023-10-10 RX ADMIN — OXYCODONE HYDROCHLORIDE 5 MILLIGRAM(S): 5 TABLET ORAL at 13:46

## 2023-10-10 NOTE — PROGRESS NOTE ADULT - SUBJECTIVE AND OBJECTIVE BOX
SOLID TUMOR ONCOLOGY HOSPITALIST PROGRESS NOTE    S: No acute events overnight.  Pt reported feeling better today.  He confirmed that he urinated a lot after he got Lasix yesterday, and he felt that his L leg swelling was improved this am.  He also denied pain at his stoma site.    CURRENT MEDICATIONS  (STANDING):  cefepime   IVPB 2000 milliGRAM(s) IV Intermittent every 8 hours  cefepime   IVPB      chlorhexidine 2% Cloths 1 Application(s) Topical daily  enoxaparin Injectable 40 milliGRAM(s) SubCutaneous every 24 hours  lidocaine   4% Patch 1 Patch Transdermal daily  metroNIDAZOLE  IVPB 500 milliGRAM(s) IV Intermittent every 8 hours  oxyCODONE    IR 5 milliGRAM(s) Oral every 6 hours  polyethylene glycol 3350 17 Gram(s) Oral daily  senna 2 Tablet(s) Oral at bedtime  (PRN):  melatonin 3 milliGRAM(s) Oral at bedtime PRN Insomnia  ondansetron Injectable 4 milliGRAM(s) IV Push every 8 hours PRN Nausea and/or Vomiting    PHYSICAL EXAM  T(C): 37.2 (10-10-23 @ 12:42), Max: 37.7 (10-09-23 @ 21:23)  HR: 107 (10-10-23 @ 12:42) (102 - 107)  BP: 112/75 (10-10-23 @ 12:42) (99/68 - 117/86)  RR: 18 (10-10-23 @ 12:42) (18 - 18)  SpO2: 96% (10-10-23 @ 12:42) (96% - 98%)  Non-toxic appearing middle-aged man, sitting comfortably on edge of bed, nad  Icteric sclera unchanged  Generalized jaundice and vitiligo of skin on hands unchanged  RRR, no m/r/g  CTAB, no w/c/r  Abd soft/mildly distended, non-tender; normoactive bowel sounds; stoma prolapsed ~5cm, appears pink and moist  Trace non-pitting edema of the RLE to the level of the knee, unchanged  CN 2-12 grossly intact, no gross focal neuro deficits; normal gait/station    LABS                        8.2    13.43 )-----------( 384      ( 10 Oct 2023 05:45 )             25.9     10-10    141  |  109<H>  |  11  ----------------------------<  111<H>  4.3   |  22  |  0.52    Ca    8.3<L>      10 Oct 2023 05:45  Phos  1.8     10-10  Mg     1.50     10-10    TPro  6.2  /  Alb  2.3<L>  /  TBili  13.7<H>  /  DBili  x   /  AST  200<H>  /  ALT  67<H>  /  AlkPhos  234<H>  10-10    PT/INR - ( 10 Oct 2023 05:45 )   PT: 34.1 sec;   INR: 3.15 ratio        CAPILLARY BLOOD GLUCOSE  POCT Blood Glucose.: 87 mg/dL (10 Oct 2023 08:32)  POCT Blood Glucose.: 108 mg/dL (09 Oct 2023 20:26)      MICROBIOLOGY  10/6 ascites fluid cx: GNRs on gram stain, but cxs remain negative to date.    Urinalysis Basic - ( 10 Oct 2023 05:45 )  Color: x / Appearance: x / SG: x / pH: x  Gluc: 111 mg/dL / Ketone: x  / Bili: x / Urobili: x   Blood: x / Protein: x / Nitrite: x   Leuk Esterase: x / RBC: x / WBC x   Sq Epi: x / Non Sq Epi: x / Bacteria: x    Culture - Urine (collected 04 Oct 2023 15:39)  Source: Clean Catch Clean Catch (Midstream)  Final Report (05 Oct 2023 13:52):    No growth    Culture - Blood (collected 04 Oct 2023 11:45)  Source: .Blood Blood-Peripheral  Preliminary Report (05 Oct 2023 19:02):    No growth at 24 hours    Culture - Blood (collected 04 Oct 2023 11:42)  Source: .Blood Blood-Peripheral  Preliminary Report (05 Oct 2023 19:02):    No growth at 24 hours    Culture - Blood (collected 02 Oct 2023 06:30)  Source: .Blood Blood-Venous  Preliminary Report (04 Oct 2023 09:02):    No growth at 48 Hours    Culture - Blood (collected 02 Oct 2023 06:30)  Source: .Blood Blood-Peripheral  Preliminary Report (04 Oct 2023 09:01):    No growth at 48 Hours    SARS-CoV-2: NotDetec (06 Oct 2023 10:36)  SARS-CoV-2: Not[facility] (25 Sep 2023 23:55)    PATHOLOGY  10/6 Ascites fluid cytology: negative for malignant cells.    PERTINENT RADIOLOGY  10/10 Abd U/S: small amount of ascites.    10/10 B/L LE Dopplers: negative for dvt.    10/6 CXR: Increasing right pleural effusion with pulmonary metastases.    10/3 MRCP  1.  New moderate right pleural effusion and interval increase of moderate   ascites.  2.  Grossly stable pulmonary and hepatic metastatic lesions.  3.  Stable mild intrahepatic biliary ductal dilatation secondary to   compression of more central duct by metastatic lesions in the hilum.   Unremarkable CBD.    9/29 CXR: New atelectasis or infiltrate right lung base    9/26 CT a/p: Peripancreatic edema suspicious for acute pancreatitis, new compared to   prior. While less likely this could also represent noninflammatory edema/third spacing of fluid. Small amount of ascites is new. Otherwise similar to prior with a large number of hepatic metastases,   intrahepatic peripheral biliary ductal dilation in the right greater than   left lobes, partially visible lung metastases, and likely metastatic mild   upper abdominal and partially visible mediastinal adenopathy.

## 2023-10-10 NOTE — PROGRESS NOTE ADULT - PROBLEM SELECTOR PLAN 8
- 2/2 chronic/neoplastic disease  - hgb slightly decreased compared to prior baseline range  - Pt remains without clinical s/s of active bleeding  - AC ppx held (bleeding risks higher i/s/o coagulopathy); will fu pm cbc- will require additional ely if dropping (ie FOBT and possible CTA a/p to r/o occult intraabd bleed)  - Will transfuse supportively prn (goal hgb > 7)

## 2023-10-10 NOTE — PROGRESS NOTE ADULT - PROBLEM SELECTOR PLAN 6
- Increased on 10/6 CXR  - Pt still with stable O2 sats on RA, but reported VELEZ today  - Pulm consulted for possible palliative thora- no tappable pocket identified  - Continuing diuresis prn  - Of note, EF in 1/2023 61%; will repeat TTE if pt develops worsening symptoms

## 2023-10-10 NOTE — PROGRESS NOTE ADULT - PROBLEM SELECTOR PLAN 3
- Suspect biliary sepsis due to malignancy-related biliary obstruction + SBP as 10/6 ascites fluid cx growing GNRs  - Blood cxs remain negative today  - continuing empiric Cef/Flagyl for total 5d course, until 10/11  - Maintaining low threshold to broaden abx coverage if pt become HD unstable

## 2023-10-10 NOTE — PROGRESS NOTE ADULT - ASSESSMENT
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  Pt was admitted to [facility] from Onc clinic on 9/30 with new-onset jaundice and fatigue, with sepsis in ED and abnormal LFTs noted on admission labs. His hospital course has also been c/b ascites, cancer-related abd pain, and recurrent fever (Temp 101.5 on 10/5)- GNRs noted in 10/6 ascites fluid gram stain but cx negative today, more c/w SBP.

## 2023-10-10 NOTE — PROGRESS NOTE ADULT - PROBLEM SELECTOR PLAN 11
- Appreciate COLETTE sheridan  - Continuing Ensure Plus HP BID - Continuing mgmt as per 10/9 [facility] recs

## 2023-10-10 NOTE — PROGRESS NOTE ADULT - PROBLEM SELECTOR PLAN 9
- INR continues to rise i/s/o liver dysfunction  - Labs no c/w DIC and pt without clinical s/s of bleeding/clotting  - Completed 3d course of Vit K, 10/6-8; additional 3d course ordered for today

## 2023-10-10 NOTE — PROGRESS NOTE ADULT - PROBLEM SELECTOR PLAN 2
- recommended to pt on 10/6 to which he was amenable, but today also noted that he was seeking 2nd opinion for alternative medicine tx options  - Code status was also d/w pt today: he iterated that he has not thought about his code status or other advance directives, and that he would like to remain full code for now   -  referral for home hospice enrollment in process

## 2023-10-11 ENCOUNTER — TRANSCRIPTION ENCOUNTER (OUTPATIENT)
Age: 58
End: 2023-10-11

## 2023-10-11 LAB
ALBUMIN SERPL ELPH-MCNC: 2.2 G/DL — LOW (ref 3.3–5)
ALP SERPL-CCNC: 240 U/L — HIGH (ref 40–120)
ALT FLD-CCNC: 64 U/L — HIGH (ref 4–41)
ANION GAP SERPL CALC-SCNC: 11 MMOL/L — SIGNIFICANT CHANGE UP (ref 7–14)
APTT BLD: 40.6 SEC — HIGH (ref 24.5–35.6)
AST SERPL-CCNC: 187 U/L — HIGH (ref 4–40)
BILIRUB SERPL-MCNC: 13.9 MG/DL — HIGH (ref 0.2–1.2)
BUN SERPL-MCNC: 13 MG/DL — SIGNIFICANT CHANGE UP (ref 7–23)
CALCIUM SERPL-MCNC: 8.2 MG/DL — LOW (ref 8.4–10.5)
CHLORIDE SERPL-SCNC: 105 MMOL/L — SIGNIFICANT CHANGE UP (ref 98–107)
CO2 SERPL-SCNC: 21 MMOL/L — LOW (ref 22–31)
CREAT SERPL-MCNC: 0.52 MG/DL — SIGNIFICANT CHANGE UP (ref 0.5–1.3)
CULTURE RESULTS: SIGNIFICANT CHANGE UP
CULTURE RESULTS: SIGNIFICANT CHANGE UP
EGFR: 115 ML/MIN/1.73M2 — SIGNIFICANT CHANGE UP
GLUCOSE BLDC GLUCOMTR-MCNC: 101 MG/DL — HIGH (ref 70–99)
GLUCOSE BLDC GLUCOMTR-MCNC: 83 MG/DL — SIGNIFICANT CHANGE UP (ref 70–99)
GLUCOSE SERPL-MCNC: 97 MG/DL — SIGNIFICANT CHANGE UP (ref 70–99)
HCT VFR BLD CALC: 27.8 % — LOW (ref 39–50)
HGB BLD-MCNC: 8.9 G/DL — LOW (ref 13–17)
INR BLD: 2.71 RATIO — HIGH (ref 0.85–1.18)
MAGNESIUM SERPL-MCNC: 1.7 MG/DL — SIGNIFICANT CHANGE UP (ref 1.6–2.6)
MCHC RBC-ENTMCNC: 24.4 PG — LOW (ref 27–34)
MCHC RBC-ENTMCNC: 32 GM/DL — SIGNIFICANT CHANGE UP (ref 32–36)
MCV RBC AUTO: 76.2 FL — LOW (ref 80–100)
MRSA PCR RESULT.: SIGNIFICANT CHANGE UP
NRBC # BLD: 0 /100 WBCS — SIGNIFICANT CHANGE UP (ref 0–0)
NRBC # FLD: 0.11 K/UL — HIGH (ref 0–0)
PHOSPHATE SERPL-MCNC: 1.3 MG/DL — LOW (ref 2.5–4.5)
PLATELET # BLD AUTO: 377 K/UL — SIGNIFICANT CHANGE UP (ref 150–400)
POTASSIUM SERPL-MCNC: 4.2 MMOL/L — SIGNIFICANT CHANGE UP (ref 3.5–5.3)
POTASSIUM SERPL-SCNC: 4.2 MMOL/L — SIGNIFICANT CHANGE UP (ref 3.5–5.3)
PROT SERPL-MCNC: 6.2 G/DL — SIGNIFICANT CHANGE UP (ref 6–8.3)
PROTHROM AB SERPL-ACNC: 29.7 SEC — HIGH (ref 9.5–13)
RBC # BLD: 3.65 M/UL — LOW (ref 4.2–5.8)
RBC # FLD: 24.3 % — HIGH (ref 10.3–14.5)
S AUREUS DNA NOSE QL NAA+PROBE: SIGNIFICANT CHANGE UP
SODIUM SERPL-SCNC: 137 MMOL/L — SIGNIFICANT CHANGE UP (ref 135–145)
SPECIMEN SOURCE: SIGNIFICANT CHANGE UP
SPECIMEN SOURCE: SIGNIFICANT CHANGE UP
WBC # BLD: 14.42 K/UL — HIGH (ref 3.8–10.5)
WBC # FLD AUTO: 14.42 K/UL — HIGH (ref 3.8–10.5)

## 2023-10-11 PROCEDURE — 99233 SBSQ HOSP IP/OBS HIGH 50: CPT

## 2023-10-11 RX ORDER — LIDOCAINE 4 G/100G
1 CREAM TOPICAL
Qty: 5 | Refills: 0
Start: 2023-10-11 | End: 2023-10-15

## 2023-10-11 RX ORDER — OXYCODONE HYDROCHLORIDE 5 MG/1
1 TABLET ORAL
Qty: 12 | Refills: 0
Start: 2023-10-11 | End: 2023-10-13

## 2023-10-11 RX ORDER — POLYETHYLENE GLYCOL 3350 17 G/17G
17 POWDER, FOR SOLUTION ORAL
Qty: 510 | Refills: 0
Start: 2023-10-11 | End: 2023-11-09

## 2023-10-11 RX ORDER — CHLORHEXIDINE GLUCONATE 213 G/1000ML
1 SOLUTION TOPICAL DAILY
Refills: 0 | Status: DISCONTINUED | OUTPATIENT
Start: 2023-10-11 | End: 2023-10-18

## 2023-10-11 RX ORDER — POTASSIUM PHOSPHATE, MONOBASIC POTASSIUM PHOSPHATE, DIBASIC 236; 224 MG/ML; MG/ML
30 INJECTION, SOLUTION INTRAVENOUS ONCE
Refills: 0 | Status: COMPLETED | OUTPATIENT
Start: 2023-10-11 | End: 2023-10-11

## 2023-10-11 RX ORDER — HYOSCYAMINE SULFATE 0.13 MG
1 TABLET ORAL
Qty: 8 | Refills: 0
Start: 2023-10-11 | End: 2023-10-12

## 2023-10-11 RX ORDER — PHYTONADIONE (VIT K1) 5 MG
5 TABLET ORAL ONCE
Refills: 0 | Status: COMPLETED | OUTPATIENT
Start: 2023-10-11 | End: 2023-10-11

## 2023-10-11 RX ORDER — SENNA PLUS 8.6 MG/1
2 TABLET ORAL
Qty: 60 | Refills: 0
Start: 2023-10-11 | End: 2023-11-09

## 2023-10-11 RX ORDER — ACETAMINOPHEN 500 MG
1 TABLET ORAL
Qty: 1 | Refills: 0
Start: 2023-10-11 | End: 2023-10-12

## 2023-10-11 RX ORDER — PHYTONADIONE (VIT K1) 5 MG
5 TABLET ORAL ONCE
Refills: 0 | Status: COMPLETED | OUTPATIENT
Start: 2023-10-12 | End: 2023-10-12

## 2023-10-11 RX ORDER — FUROSEMIDE 40 MG
20 TABLET ORAL DAILY
Refills: 0 | Status: DISCONTINUED | OUTPATIENT
Start: 2023-10-11 | End: 2023-10-18

## 2023-10-11 RX ORDER — PROCHLORPERAZINE MALEATE 5 MG
1 TABLET ORAL
Qty: 6 | Refills: 0
Start: 2023-10-11 | End: 2023-10-13

## 2023-10-11 RX ORDER — FUROSEMIDE 40 MG
1 TABLET ORAL
Qty: 30 | Refills: 0
Start: 2023-10-11 | End: 2023-11-09

## 2023-10-11 RX ORDER — HALOPERIDOL DECANOATE 100 MG/ML
1 INJECTION INTRAMUSCULAR
Qty: 8 | Refills: 0
Start: 2023-10-11 | End: 2023-10-12

## 2023-10-11 RX ADMIN — CHLORHEXIDINE GLUCONATE 1 APPLICATION(S): 213 SOLUTION TOPICAL at 11:08

## 2023-10-11 RX ADMIN — Medication 100 MILLIGRAM(S): at 06:59

## 2023-10-11 RX ADMIN — OXYCODONE HYDROCHLORIDE 5 MILLIGRAM(S): 5 TABLET ORAL at 11:53

## 2023-10-11 RX ADMIN — OXYCODONE HYDROCHLORIDE 5 MILLIGRAM(S): 5 TABLET ORAL at 06:18

## 2023-10-11 RX ADMIN — CEFEPIME 100 MILLIGRAM(S): 1 INJECTION, POWDER, FOR SOLUTION INTRAMUSCULAR; INTRAVENOUS at 22:54

## 2023-10-11 RX ADMIN — Medication 20 MILLIGRAM(S): at 11:53

## 2023-10-11 RX ADMIN — OXYCODONE HYDROCHLORIDE 5 MILLIGRAM(S): 5 TABLET ORAL at 01:41

## 2023-10-11 RX ADMIN — CEFEPIME 100 MILLIGRAM(S): 1 INJECTION, POWDER, FOR SOLUTION INTRAMUSCULAR; INTRAVENOUS at 13:41

## 2023-10-11 RX ADMIN — CEFEPIME 100 MILLIGRAM(S): 1 INJECTION, POWDER, FOR SOLUTION INTRAMUSCULAR; INTRAVENOUS at 06:18

## 2023-10-11 RX ADMIN — Medication 100 MILLIGRAM(S): at 21:52

## 2023-10-11 RX ADMIN — Medication 5 MILLIGRAM(S): at 13:42

## 2023-10-11 RX ADMIN — Medication 100 MILLIGRAM(S): at 14:29

## 2023-10-11 RX ADMIN — OXYCODONE HYDROCHLORIDE 5 MILLIGRAM(S): 5 TABLET ORAL at 12:44

## 2023-10-11 RX ADMIN — OXYCODONE HYDROCHLORIDE 5 MILLIGRAM(S): 5 TABLET ORAL at 18:21

## 2023-10-11 RX ADMIN — OXYCODONE HYDROCHLORIDE 5 MILLIGRAM(S): 5 TABLET ORAL at 19:09

## 2023-10-11 RX ADMIN — POTASSIUM PHOSPHATE, MONOBASIC POTASSIUM PHOSPHATE, DIBASIC 83.33 MILLIMOLE(S): 236; 224 INJECTION, SOLUTION INTRAVENOUS at 11:02

## 2023-10-11 NOTE — CHART NOTE - NSCHARTNOTEFT_GEN_A_CORE
IPT consulted for LVP. Patient scanned at bedside w/ US. No safe pocket identifiable for drainage by IPT. If needed, can consult IR although very small volume ascites present. Explained results to patient and patient centered discussion had, patient would prefer to hold off any invasive procedures as he is comfortable.

## 2023-10-11 NOTE — DISCHARGE NOTE NURSING/CASE MANAGEMENT/SOCIAL WORK - PATIENT PORTAL LINK FT
You can access the FollowMyHealth Patient Portal offered by [facility] by registering at the following website: [email or web address]. By joining [employer]’s FollowMyHealth portal, you will also be able to view your health information using other applications (apps) compatible with our system.

## 2023-10-11 NOTE — DISCHARGE NOTE NURSING/CASE MANAGEMENT/SOCIAL WORK - NSDCPEFALRISK_GEN_ALL_CORE
For information on Fall & Injury Prevention, visit: [email or web address] OR  [email or web address] OR  https://www.cdc.gov/steadi/patient.html

## 2023-10-11 NOTE — PROGRESS NOTE ADULT - PROBLEM SELECTOR PLAN 3
- Pt remains afebrile (24 Tmax: 99.9), normotensive   - Likely 2/2 SBP as 10/6 ascites fluid cx growing GNRs, but final culture is negative; biliary sepsis also on the diferrential  - Blood cxs remain negative today  - Completing 5d course of empiric Cef/Flagyl, 10/6-11

## 2023-10-11 NOTE — DISCHARGE NOTE PROVIDER - PROVIDER TOKENS
FREE:[LAST:[HOSPICE MD],FIRST:[Home Hospice],PHONE:[phone]],FAX:[(   )    -],FOLLOWUP:[1-3 days]]

## 2023-10-11 NOTE — DISCHARGE NOTE PROVIDER - NSDCCPCAREPLAN_GEN_ALL_CORE_FT
PRINCIPAL DISCHARGE DIAGNOSIS  Diagnosis: Metastatic colon cancer to liver  Assessment and Plan of Treatment: Unfortunately you were found to have progression of your colon cancer and are not a candidate for further treatment. Your comfort during this illness is incredibly important and your hospice agency should be able to help with any symptoms as they develop.      SECONDARY DISCHARGE DIAGNOSES  Diagnosis: Spontaneous bacterial peritonitis  Assessment and Plan of Treatment: You were treated for an infection of your ascites called spontaneous bacterial peritonitis (SBP). Because you already had this infection, you are likely to have a repeat infection and therefore you should take an antibiotics called bactrim every day to prevent recurrence.     PRINCIPAL DISCHARGE DIAGNOSIS  Diagnosis: Metastatic colon cancer to liver  Assessment and Plan of Treatment: Unfortunately you were found to have progression of your colon cancer and are not a candidate for further treatment. Your comfort during this illness is incredibly important and your hospice agency should be able to help with any symptoms as they develop. HOME WITH HOSPICE CARE      SECONDARY DISCHARGE DIAGNOSES  Diagnosis: Spontaneous bacterial peritonitis  Assessment and Plan of Treatment: You were treated for an infection of your ascites called spontaneous bacterial peritonitis (SBP). Because you already had this infection, you are likely to have a repeat infection and therefore you should take an antibiotics called bactrim every day to prevent recurrence.

## 2023-10-11 NOTE — PROGRESS NOTE ADULT - PROBLEM SELECTOR PLAN 8
- 2/2 chronic/neoplastic disease  - Hgb stable today  - Pt remains without clinical s/s of active bleeding  - Will transfuse supportively prn (goal hgb > 7)

## 2023-10-11 NOTE — PROGRESS NOTE ADULT - SUBJECTIVE AND OBJECTIVE BOX
[facility] ONCOLOGY HOSPITALIST PROGRESS NOTE    S: No acute events overnight.  Pt had no new complaints reported feeling ok today.  He asked if he could stay on diuretics to help keep his abd and leg swelling under control.    CURRENT MEDICATIONS  (STANDING):  cefepime   IVPB      cefepime   IVPB 2000 milliGRAM(s) IV Intermittent every 8 hours  chlorhexidine 2% Cloths 1 Application(s) Topical daily  chlorhexidine 2% Cloths 1 Application(s) Topical daily  furosemide    Tablet 20 milliGRAM(s) Oral daily  lidocaine   4% Patch 1 Patch Transdermal daily  metroNIDAZOLE  IVPB 500 milliGRAM(s) IV Intermittent every 8 hours  oxyCODONE    IR 5 milliGRAM(s) Oral every 6 hours  phytonadione   Solution 5 milliGRAM(s) Oral once  polyethylene glycol 3350 17 Gram(s) Oral daily  senna 2 Tablet(s) Oral at bedtime  (PRN):  melatonin 3 milliGRAM(s) Oral at bedtime PRN Insomnia  ondansetron Injectable 4 milliGRAM(s) IV Push every 8 hours PRN Nausea and/or Vomiting    PHYSICAL EXAM  T(C): 37.7 (10-11-23 @ 06:22), Max: 37.7 (10-11-23 @ 06:22)  HR: 117 (10-11-23 @ 06:22) (101 - 117)  BP: 104/70 (10-11-23 @ 06:22) (103/76 - 104/70)  RR: 17 (10-11-23 @ 06:22) (17 - 18)  SpO2: 95% (10-11-23 @ 06:22) (95% - 96%)  Non-toxic appearing middle-aged man, sitting comfortably on edge of bed, nad  Icteric sclera unchanged  Generalized jaundice and vitiligo of skin on hands unchanged  RRR, no m/r/g  CTAB, no w/c/r  Abd soft/mildly distended, non-tender; normoactive bowel sounds; stoma prolapsed ~5cm, appears pink and moist  Trace non-pitting edema of the RLE to the level of the knee, unchanged  CN 2-12 grossly intact, no gross focal neuro deficits; normal gait/station    LABS                        8.9    14.42 )-----------( 377      ( 11 Oct 2023 06:12 )             27.8     10-11    137  |  105  |  13  ----------------------------<  97  4.2   |  21<L>  |  0.52    Ca    8.2<L>      11 Oct 2023 06:12  Phos  1.3     10-11  Mg     1.70     10-11    TPro  6.2  /  Alb  2.2<L>  /  TBili  13.9<H>  /  DBili  x   /  AST  187<H>  /  ALT  64<H>  /  AlkPhos  240<H>  10-11    PT/INR - ( 11 Oct 2023 06:12 )   PT: 29.7 sec;   INR: 2.71 ratio    PTT - ( 11 Oct 2023 06:12 )  PTT:40.6 sec    CAPILLARY BLOOD GLUCOSE  POCT Blood Glucose.: 101 mg/dL (11 Oct 2023 08:54)  POCT Blood Glucose.: 225 mg/dL (10 Oct 2023 22:07)    MICROBIOLOGY  10/6 ascites fluid cx: GNRs on gram stain, but cxs remain negative to date.    Urinalysis Basic - ( 11 Oct 2023 06:12 )  Color: x / Appearance: x / SG: x / pH: x  Gluc: 97 mg/dL / Ketone: x  / Bili: x / Urobili: x   Blood: x / Protein: x / Nitrite: x   Leuk Esterase: x / RBC: x / WBC x   Sq Epi: x / Non Sq Epi: x / Bacteria: x    Culture - Urine (collected 04 Oct 2023 15:39)  Source: Clean Catch Clean Catch (Midstream)  Final Report (05 Oct 2023 13:52):    No growth    Culture - Blood (collected 04 Oct 2023 11:45)  Source: .Blood Blood-Peripheral  Preliminary Report (05 Oct 2023 19:02):    No growth at 24 hours    Culture - Blood (collected 04 Oct 2023 11:42)  Source: .Blood Blood-Peripheral  Preliminary Report (05 Oct 2023 19:02):    No growth at 24 hours    Culture - Blood (collected 02 Oct 2023 06:30)  Source: .Blood Blood-Venous  Preliminary Report (04 Oct 2023 09:02):    No growth at 48 Hours    Culture - Blood (collected 02 Oct 2023 06:30)  Source: .Blood Blood-Peripheral  Preliminary Report (04 Oct 2023 09:01):    No growth at 48 Hours    SARS-CoV-2: NotDetec (06 Oct 2023 10:36)  SARS-CoV-2: NotDetec (25 Sep 2023 23:55)    PATHOLOGY  10/6 Ascites fluid cytology: negative for malignant cells.    PERTINENT RADIOLOGY  10/10 Abd U/S: small amount of ascites.    10/10 B/L LE Dopplers: negative for dvt.    10/6 CXR: Increasing right pleural effusion with pulmonary metastases.    10/3 MRCP  1.  New moderate right pleural effusion and interval increase of moderate   ascites.  2.  Grossly stable pulmonary and hepatic metastatic lesions.  3.  Stable mild intrahepatic biliary ductal dilatation secondary to   compression of more central duct by metastatic lesions in the hilum.   Unremarkable CBD.    9/29 CXR: New atelectasis or infiltrate right lung base    9/26 CT a/p: Peripancreatic edema suspicious for acute pancreatitis, new compared to   prior. While less likely this could also represent noninflammatory edema/third spacing of fluid. Small amount of ascites is new. Otherwise similar to prior with a large number of hepatic metastases,   intrahepatic peripheral biliary ductal dilation in the right greater than   left lobes, partially visible lung metastases, and likely metastatic mild   upper abdominal and partially visible mediastinal adenopathy.

## 2023-10-11 NOTE — PROGRESS NOTE ADULT - ASSESSMENT
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  Pt was admitted to [facility] from Onc clinic on 9/30 with new-onset jaundice and fatigue, with sepsis in ED and abnormal LFTs noted on admission labs. His hospital course has also been c/b ascites, cancer-related abd pain, and recurrent fever (Temp 101.5 on 10/5)- GNRs noted in 10/6 ascites fluid gram stain but cx negative today, more c/w SBP.

## 2023-10-11 NOTE — PROGRESS NOTE ADULT - PROBLEM SELECTOR PLAN 1
- Presentation most c/w POD based on rising TM and increasing ascites, although no new lesions present on MRCP pt has extensive tumor burden in the liver that is presumably infiltrative  - Pt is not a candidate for continuation of cancer tx i/s/o irreversible liver failure  - Hospice is appropriate (d/w Dr. Ospina), to which pt is amenable (GOC addressed as described below)  - Long term prognosis is poor: weeks to short months at best; days to weeks in the event of another acute clinical insult

## 2023-10-11 NOTE — DISCHARGE NOTE PROVIDER - NSDCFUSCHEDAPPT_GEN_ALL_CORE_FT
Adrian Abraham Physician [facility]  CARDIOLOGY [address] [place] [address]   Scheduled Appointment: 10/27/2023

## 2023-10-11 NOTE — PROGRESS NOTE ADULT - PROBLEM SELECTOR PLAN 9
- INR continues to rise i/s/o liver dysfunction  - Labs no c/w DIC and pt without clinical s/s of bleeding/clotting  - Completed 3d course of Vit K, 10/6-8; completing additional 3d course, 10/10-12  - Can resume AC ppx after completion of Vit K

## 2023-10-11 NOTE — DISCHARGE NOTE PROVIDER - NSDCMRMEDTOKEN_GEN_ALL_CORE_FT
acetaminophen 650 mg rectal suppository: 1 suppository(ies) rectally every 6 hours as needed for  fever and/or mild pain when oral route is lost  haloperidol 1 mg oral tablet: 1 tab(s) orally every 6 hours as needed for restlessness/ agitation MDD: 4 tabs  hyoscyamine 0.125 mg sublingual tablet: 1 tab(s) sublingually every 6 hours as needed for excessive secretions  LORazepam 1 mg oral tablet: 1 tab(s) orally every 6 hours as needed for  anxiety MDD: 4 tabs  oxyCODONE 5 mg oral tablet: 1 tab(s) orally every 6 hours MDD: 4 tabs  prochlorperazine 10 mg oral tablet: 1 tab(s) orally every 12 hours as needed for  nausea MDD: 2 tabs   acetaminophen 650 mg rectal suppository: 1 suppository(ies) rectally every 6 hours as needed for  fever and/or mild pain when oral route is lost  furosemide 20 mg oral tablet: 1 tab(s) orally once a day  haloperidol 1 mg oral tablet: 1 tab(s) orally every 6 hours as needed for restlessness/ agitation MDD: 4 tabs  hyoscyamine 0.125 mg sublingual tablet: 1 tab(s) sublingually every 6 hours as needed for excessive secretions  lidocaine 4% topical film: Apply topically to affected area once a day  LORazepam 1 mg oral tablet: 1 tab(s) orally every 6 hours as needed for  anxiety MDD: 4 tabs  oxyCODONE 5 mg oral tablet: 1 tab(s) orally every 6 hours MDD: 4 tabs  polyethylene glycol 3350 oral powder for reconstitution: 17 gram(s) orally once a day  prochlorperazine 10 mg oral tablet: 1 tab(s) orally every 12 hours as needed for  nausea MDD: 2 tabs  senna leaf extract oral tablet: 2 tab(s) orally once a day (at bedtime)   acetaminophen 650 mg rectal suppository: 1 suppository(ies) rectally every 6 hours as needed for  fever and/or mild pain when oral route is lost  furosemide 20 mg oral tablet: 1 tab(s) orally once a day  haloperidol 1 mg oral tablet: 1 tab(s) orally every 6 hours as needed for restlessness/ agitation MDD: 4 tabs  hyoscyamine 0.125 mg sublingual tablet: 1 tab(s) sublingually every 6 hours as needed for excessive secretions  lidocaine 4% topical film: Apply topically to affected area once a day  LORazepam 1 mg oral tablet: 1 tab(s) orally every 6 hours as needed for  anxiety MDD: 4 tabs  mirtazapine 7.5 mg oral tablet: 1 tab(s) orally once a day (at bedtime) MDD: 1  oxyCODONE 5 mg oral tablet: 1 tab(s) orally every 6 hours MDD: 4 tabs  polyethylene glycol 3350 oral powder for reconstitution: 17 gram(s) orally once a day  prochlorperazine 10 mg oral tablet: 1 tab(s) orally every 12 hours as needed for  nausea MDD: 2 tabs  senna leaf extract oral tablet: 2 tab(s) orally once a day (at bedtime)

## 2023-10-11 NOTE — PROGRESS NOTE ADULT - PROBLEM SELECTOR PLAN 6
- Increased on 10/6 CXR; however, pt remains relatively asymptomatic at rest with normal O2 sats on RA   - Pulm consulted for possible palliative thora- no tappable pocket identified on bedside U/S  - Starting diuretic as above  - Of note, EF in 1/2023 61%; will repeat TTE if pt develops worsening symptoms

## 2023-10-11 NOTE — DISCHARGE NOTE PROVIDER - HOSPITAL COURSE
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  Pt was admitted to [facility] from Onc clinic on 9/30 with new-onset jaundice and fatigue, with sepsis in ED and abnormal LFTs noted on admission labs. His hospital course has also been c/b ascites, cancer-related abd pain, and recurrent fever (Temp 101.5 on 10/5)- GNRs noted in 10/6 ascites fluid gram stain but cx negative today, more c/w SBP.    Hospital Course by Problem:  ·  Metastatic colon cancer to liver.   - Presentation most c/w POD based on rising TM and increasing ascites, although no new lesions present on MRCP pt has extensive tumor burden in the liver that is presumably infiltrative  - Pt is not a candidate for continuation of cancer tx i/s/o irreversible liver failure  - Hospice is appropriate (d/w Dr. Ospina), to which pt is amenable (GOC addressed as described below)  - Long term prognosis is poor: weeks to short months at best; days to weeks in the event of another acute clinical insult.    ·  Counseling regarding goals of care.   - recommended to pt on 10/6 to which he was amenable, but today also noted that he was seeking 2nd opinion for alternative medicine tx options  - Code status was also d/w pt today: he iterated that he has not thought about his code status or other advance directives, and that he would like to remain full code for now   -  referral for home hospice enrollment in process.    ·  Problem: Sepsis.   - Pt remains afebrile (24 Tmax: 99.9), normotensive   - Likely 2/2 SBP as 10/6 ascites fluid cx growing GNRs, but final culture is negative; biliary sepsis also on the diferrential  - Blood cxs remain negative today  - Completing 5d course of empiric Cef/Flagyl, 10/6-11.    Problem/Plan - 4:  ·  Problem: LFTs abnormal.   ·  Plan: - LFTs continue to worsen 2/2 extensive met disease burden in the liver  - Viral hep serologies negative  - MRCP reviewed by iGI and IR: ERCP, PTC unlikely to be effective in alleviating multifocal biliary obstruction  - Avoiding hepatotoxins.    Problem/Plan - 5:  ·  Problem: Ascites.   ·  Plan: - 10/6 cytology negative for malignant cells  - Likely 2/2 portal venous congestion i/s/o extensive hepatic met burden (physiologic cirrhosis)  - IR consulted for LVP with cytology > procedure deferred for mgmt of SBP  - Repeat Abd U/S on 10/10 also without enough fluid to drain; no safe pocket to tap as per 10/11 IPT eval  - Starting Lasix PO 20mg daily.    Problem/Plan - 6:  ·  Problem: Pleural effusion, right.   ·  Plan: - Increased on 10/6 CXR; however, pt remains relatively asymptomatic at rest with normal O2 sats on RA   - Pulm consulted for possible palliative thora- no tappable pocket identified on bedside U/S  - Starting diuretic as above  - Of note, EF in 1/2023 61%; will repeat TTE if pt develops worsening symptoms.    Problem/Plan - 7:  ·  Problem: Right leg swelling.   ·  Plan: - 10/10 B/L LE dopplers negative for DVT  - Likely gravity-dependent edema/3rd spacing   - Of note, prior MRCP did not demonstrate and pathologic abdominopelvic LN to suggest lymphedema.    Problem/Plan - 8:  ·  Problem: Anemia.   ·  Plan: - 2/2 chronic/neoplastic disease  - Hgb stable today  - Pt remains without clinical s/s of active bleeding  - Will transfuse supportively prn (goal hgb > 7).    Problem/Plan - 9:  ·  Problem: Coagulopathy.   ·  Plan: - INR continues to rise i/s/o liver dysfunction  - Labs no c/w DIC and pt without clinical s/s of bleeding/clotting  - Completed 3d course of Vit K, 10/6-8; completing additional 3d course, 10/10-12  - Can resume AC ppx after completion of Vit K.    Problem/Plan - 10:  ·  Problem: Cancer related pain.   ·  Plan; - Currently well controlled  - Continuing Oxy IR 5mg q6 standing (ok for pt to refuse) with bowel regimen to prevent OIC  - Continuing Lidocaine patch prn.    Problem/Plan - 11:  ·  Problem: Intestinal stoma prolapse.   ·  Plan: - Continuing mgmt as per 10/9 WOCN recs.    Problem/Plan - 12:  ·  Problem: Severe protein-calorie malnutrition.   ·  Plan: - Appreciate RD eval  - Continuing Ensure Plus HP BID.   57 yo man with met colon ca admitted with sepsis and acute hyperbili/transaminitis i/s/o known extensive hepatic disease burden; cx data NGTD, was doing well on empiric Zosyn with stably abnormal LFTs, but had relapsed fever on 10/5 (101.5) with worsened LFTs; cxs, RVP, CXR repeat, abx escalated to Cef/Flagyl. MRCP confirmed extensive met and only mild bile duct dilatation, as well as ascites- no role for ERCP or PTC as per iGI and IR; no role for cancer treatment as per Tu Byers. IR also deferred LVP i/s/o relapsed fever, diag para +ve for GNRs, completed 5 course of Cefepime/Flagyl for SBP.  D/w Primary Onc Tu Byers: pt no longer a candidate for cancer tx i/s/o irreversible liver failure. He is amenable to home hospice, but is still full code and wants medical interventions. He was planned for dc home on hospice but became febrile and restarted on Cefepime/flagyl. ID consulted and preliminary rec d/c'ing Abx as likely metastatic disease. Awaiting attending attestation. 59 yo man with met colon ca admitted with sepsis and acute hyperbili/transaminitis i/s/o known extensive hepatic disease burden; cx data NGTD, was doing well on empiric Zosyn with stably abnormal LFTs, but had relapsed fever on 10/5 (101.5) with worsened LFTs; cxs, RVP, CXR repeat, abx escalated to Cef/Flagyl. MRCP confirmed extensive met and only mild bile duct dilatation, as well as ascites- no role for ERCP or PTC as per iGI and IR; no role for cancer treatment as per Tu Byers. IR also deferred LVP i/s/o relapsed fever, diag para +ve for GNRs, completed 5 course of Cefepime/Flagyl for SBP.  D/w Primary Onc Tu Byers: pt no longer a candidate for cancer tx i/s/o irreversible liver failure. He is amenable to home hospice, but is still full code and wants medical interventions. He was planned for dc home on hospice but became febrile and restarted on Cefepime/flagyl. ID consulted and preliminary rec d/c'ing Abx as likely metastatic disease.   Metastatic colon cancer to liver.   ·  Plan: Dx in 1/2021 with mets to liver, RLL; pan-GIAN WT, IFTIKHAR, TMB 5, TP53 mut; disease course c/b LBO s/p resection and ostomy in 3/2021; progressed after FOLFOX (Oxali dced 7/2021) > FOLFIRI/Day (tx c/b coronary vasospasm in 12/2022 2/2 5FU requiring cardiac optimization) > most recently on Irinotecan/Panitumumab since 7/2023, last given 9/15/23 and was due 9/29/23 (no record of having been given i/p).  - Presentation most c/w progression of disease, although no new lesions present on MRCP pt has extensive tumor burden in the liver that is presumably infiltrative  Plan:  - Pt is not a candidate for continuation of cancer tx i/s/o irreversible liver failure  - Hospice is appropriate (d/w Dr. Ospina), to which pt is amenable (GOC addressed as described below)  - Long term prognosis is poor: weeks to short months at best; days to weeks in the event of another acute clinical insult  10/17 patient notes he was going home with HOSPICE. Palliative confirmed. Equipment already in home as por CM.     Problem/Plan - 2:  ·  Problem: Fever.   ·  Plan: pt previously completed 5d course for suspected SBP, 10/6-11, now with T 101.8 while on cefepime and flagyl.  - CXR with layering pleural effusion but per pulm, unable to tap. no evidence of pna.  - UA without WBCs and UCx negative  - MRCP with dilation in multiple isolated biliary duct branches 2/2 diffuse metastatic disease. They are not amenable to stenting via ERCP per GI  - Blood cxs remain negative to date  Plan:  - could be recurrence of SBP, although pt with only small volume ascites that is not amenable to   drainage  - ID consulted, preliminary recs appreciated  - Was on empiric Cefepime/Flagyl for now, but will likely d/c pending finalization of ID recs as fevers likely 2/2 metastatic disease. regardless, should be on SBP ppx with bactrim once abx d/c'ed  COMPLETED  Ertapenem IV , tmax 101.8 on 10/14/23, Iv Ertapenem day#4 on 10/17. culture negative. ct a/p negative. Id stopped antibiotics 10/17. MOST likely SIRS from Tumor fever.  All blood and urine and ascites cultures from 9/30 to present ----> NEGATIVE GROWTH.  Patient most likely had SEPSIS secondary to SBP initially. TREATED. Subsequent Fevers most likely due to TUMOR FEVERS     Problem/Plan - 3:  ·  Problem: Counseling regarding goals of care.   ·  Plan: - recommended to pt on 10/6 to which he was amenable, but also noted that he was seeking 2nd opinion for alternative medicine tx options  - Code status was also d/w pt: he iterated that he has not thought about his code status or other advance directives, and that he would like to remain full code for now   -  referral for home hospice enrollment in process  d/w CM Patient already has hospice equipment at home.     Problem/Plan - 4:  ·  Problem: LFTs abnormal.   ·  Plan: - LFTs stably elevated, 2/2 extensive met disease burden in the liver  - Viral hep serologies negative  - MRCP reviewed by iGI and IR: ERCP, PTC unlikely to be effective in alleviating multifocal biliary obstruction  - Avoiding hepatotoxins.     Problem/Plan - 5:  ·  Problem: Ascites.   ·  Plan: - 10/6 cytology negative for malignant cells  - Likely 2/2 portal venous congestion i/s/o extensive hepatic met burden (physiologic cirrhosis)  - IR consulted for LVP with cytology > procedure deferred for mgmt of suspected SBP  - Repeat Abd U/S on 10/10 also without enough fluid to drain; no safe pocket to tap as per 10/11 IPT eval  Plan:  - Continuing Lasix PO 20mg daily  - will continue to give albumin prn to prevent intravascular volume depletion and promote fluid mobilization.  HOME today with HOSPICE  F/u with HOME HOSPICE MD

## 2023-10-12 LAB
ALBUMIN SERPL ELPH-MCNC: 2 G/DL — LOW (ref 3.3–5)
ALP SERPL-CCNC: 205 U/L — HIGH (ref 40–120)
ALT FLD-CCNC: 57 U/L — HIGH (ref 4–41)
ANION GAP SERPL CALC-SCNC: 13 MMOL/L — SIGNIFICANT CHANGE UP (ref 7–14)
APPEARANCE UR: ABNORMAL
APTT BLD: 41 SEC — HIGH (ref 24.5–35.6)
AST SERPL-CCNC: 189 U/L — HIGH (ref 4–40)
B PERT DNA SPEC QL NAA+PROBE: SIGNIFICANT CHANGE UP
B PERT+PARAPERT DNA PNL SPEC NAA+PROBE: SIGNIFICANT CHANGE UP
BACTERIA # UR AUTO: ABNORMAL /HPF
BILIRUB SERPL-MCNC: 13.6 MG/DL — HIGH (ref 0.2–1.2)
BILIRUB UR-MCNC: ABNORMAL
BORDETELLA PARAPERTUSSIS (RAPRVP): SIGNIFICANT CHANGE UP
BUN SERPL-MCNC: 12 MG/DL — SIGNIFICANT CHANGE UP (ref 7–23)
C PNEUM DNA SPEC QL NAA+PROBE: SIGNIFICANT CHANGE UP
CALCIUM SERPL-MCNC: 7.7 MG/DL — LOW (ref 8.4–10.5)
CHLORIDE SERPL-SCNC: 103 MMOL/L — SIGNIFICANT CHANGE UP (ref 98–107)
CO2 SERPL-SCNC: 20 MMOL/L — LOW (ref 22–31)
COLOR SPEC: SIGNIFICANT CHANGE UP
CREAT SERPL-MCNC: 0.51 MG/DL — SIGNIFICANT CHANGE UP (ref 0.5–1.3)
CULTURE RESULTS: SIGNIFICANT CHANGE UP
CULTURE RESULTS: SIGNIFICANT CHANGE UP
DIFF PNL FLD: NEGATIVE — SIGNIFICANT CHANGE UP
EGFR: 116 ML/MIN/1.73M2 — SIGNIFICANT CHANGE UP
FLUAV SUBTYP SPEC NAA+PROBE: SIGNIFICANT CHANGE UP
FLUBV RNA SPEC QL NAA+PROBE: SIGNIFICANT CHANGE UP
GLUCOSE BLDC GLUCOMTR-MCNC: 116 MG/DL — HIGH (ref 70–99)
GLUCOSE BLDC GLUCOMTR-MCNC: 97 MG/DL — SIGNIFICANT CHANGE UP (ref 70–99)
GLUCOSE SERPL-MCNC: 93 MG/DL — SIGNIFICANT CHANGE UP (ref 70–99)
GLUCOSE UR QL: NEGATIVE MG/DL — SIGNIFICANT CHANGE UP
GRAN CASTS # UR COMP ASSIST: PRESENT
HADV DNA SPEC QL NAA+PROBE: SIGNIFICANT CHANGE UP
HCOV 229E RNA SPEC QL NAA+PROBE: SIGNIFICANT CHANGE UP
HCOV HKU1 RNA SPEC QL NAA+PROBE: SIGNIFICANT CHANGE UP
HCOV NL63 RNA SPEC QL NAA+PROBE: SIGNIFICANT CHANGE UP
HCOV OC43 RNA SPEC QL NAA+PROBE: SIGNIFICANT CHANGE UP
HCT VFR BLD CALC: 26.9 % — LOW (ref 39–50)
HGB BLD-MCNC: 8.5 G/DL — LOW (ref 13–17)
HMPV RNA SPEC QL NAA+PROBE: SIGNIFICANT CHANGE UP
HPIV1 RNA SPEC QL NAA+PROBE: SIGNIFICANT CHANGE UP
HPIV2 RNA SPEC QL NAA+PROBE: SIGNIFICANT CHANGE UP
HPIV3 RNA SPEC QL NAA+PROBE: SIGNIFICANT CHANGE UP
HPIV4 RNA SPEC QL NAA+PROBE: SIGNIFICANT CHANGE UP
HYALINE CASTS # UR AUTO: PRESENT
INR BLD: 3.49 RATIO — HIGH (ref 0.85–1.18)
KETONES UR-MCNC: NEGATIVE MG/DL — SIGNIFICANT CHANGE UP
LEUKOCYTE ESTERASE UR-ACNC: ABNORMAL
M PNEUMO DNA SPEC QL NAA+PROBE: SIGNIFICANT CHANGE UP
MAGNESIUM SERPL-MCNC: 1.4 MG/DL — LOW (ref 1.6–2.6)
MCHC RBC-ENTMCNC: 24.2 PG — LOW (ref 27–34)
MCHC RBC-ENTMCNC: 31.6 GM/DL — LOW (ref 32–36)
MCV RBC AUTO: 76.6 FL — LOW (ref 80–100)
NITRITE UR-MCNC: POSITIVE
NRBC # BLD: 0 /100 WBCS — SIGNIFICANT CHANGE UP (ref 0–0)
NRBC # FLD: 0.08 K/UL — HIGH (ref 0–0)
PH UR: 6 — SIGNIFICANT CHANGE UP (ref 5–8)
PHOSPHATE SERPL-MCNC: 1.9 MG/DL — LOW (ref 2.5–4.5)
PLATELET # BLD AUTO: 337 K/UL — SIGNIFICANT CHANGE UP (ref 150–400)
POTASSIUM SERPL-MCNC: 4.2 MMOL/L — SIGNIFICANT CHANGE UP (ref 3.5–5.3)
POTASSIUM SERPL-SCNC: 4.2 MMOL/L — SIGNIFICANT CHANGE UP (ref 3.5–5.3)
PROT SERPL-MCNC: 5.8 G/DL — LOW (ref 6–8.3)
PROT UR-MCNC: 100 MG/DL
PROTHROM AB SERPL-ACNC: 37.7 SEC — HIGH (ref 9.5–13)
RAPID RVP RESULT: SIGNIFICANT CHANGE UP
RBC # BLD: 3.51 M/UL — LOW (ref 4.2–5.8)
RBC # FLD: 24.3 % — HIGH (ref 10.3–14.5)
RBC CASTS # UR COMP ASSIST: 1 /HPF — SIGNIFICANT CHANGE UP (ref 0–4)
RSV RNA SPEC QL NAA+PROBE: SIGNIFICANT CHANGE UP
RV+EV RNA SPEC QL NAA+PROBE: SIGNIFICANT CHANGE UP
SARS-COV-2 RNA SPEC QL NAA+PROBE: SIGNIFICANT CHANGE UP
SODIUM SERPL-SCNC: 136 MMOL/L — SIGNIFICANT CHANGE UP (ref 135–145)
SP GR SPEC: 1.04 — HIGH (ref 1–1.03)
SPECIMEN SOURCE: SIGNIFICANT CHANGE UP
SPECIMEN SOURCE: SIGNIFICANT CHANGE UP
UROBILINOGEN FLD QL: 0.2 MG/DL — SIGNIFICANT CHANGE UP (ref 0.2–1)
WBC # BLD: 13.56 K/UL — HIGH (ref 3.8–10.5)
WBC # FLD AUTO: 13.56 K/UL — HIGH (ref 3.8–10.5)
WBC UR QL: 1 /HPF — SIGNIFICANT CHANGE UP (ref 0–5)

## 2023-10-12 PROCEDURE — 99233 SBSQ HOSP IP/OBS HIGH 50: CPT

## 2023-10-12 PROCEDURE — 71045 X-RAY EXAM CHEST 1 VIEW: CPT | Mod: 26

## 2023-10-12 RX ORDER — CEFEPIME 1 G/1
INJECTION, POWDER, FOR SOLUTION INTRAMUSCULAR; INTRAVENOUS
Refills: 0 | Status: DISCONTINUED | OUTPATIENT
Start: 2023-10-12 | End: 2023-10-15

## 2023-10-12 RX ORDER — CEFEPIME 1 G/1
2000 INJECTION, POWDER, FOR SOLUTION INTRAMUSCULAR; INTRAVENOUS ONCE
Refills: 0 | Status: COMPLETED | OUTPATIENT
Start: 2023-10-12 | End: 2023-10-12

## 2023-10-12 RX ORDER — CEFEPIME 1 G/1
2000 INJECTION, POWDER, FOR SOLUTION INTRAMUSCULAR; INTRAVENOUS EVERY 8 HOURS
Refills: 0 | Status: DISCONTINUED | OUTPATIENT
Start: 2023-10-12 | End: 2023-10-15

## 2023-10-12 RX ORDER — SODIUM CHLORIDE 9 MG/ML
1000 INJECTION INTRAMUSCULAR; INTRAVENOUS; SUBCUTANEOUS
Refills: 0 | Status: DISCONTINUED | OUTPATIENT
Start: 2023-10-12 | End: 2023-10-18

## 2023-10-12 RX ORDER — METRONIDAZOLE 500 MG
TABLET ORAL
Refills: 0 | Status: DISCONTINUED | OUTPATIENT
Start: 2023-10-12 | End: 2023-10-15

## 2023-10-12 RX ORDER — MAGNESIUM OXIDE 400 MG ORAL TABLET 241.3 MG
400 TABLET ORAL
Refills: 0 | Status: COMPLETED | OUTPATIENT
Start: 2023-10-12 | End: 2023-10-13

## 2023-10-12 RX ORDER — METRONIDAZOLE 500 MG
500 TABLET ORAL ONCE
Refills: 0 | Status: COMPLETED | OUTPATIENT
Start: 2023-10-12 | End: 2023-10-12

## 2023-10-12 RX ORDER — METRONIDAZOLE 500 MG
500 TABLET ORAL EVERY 8 HOURS
Refills: 0 | Status: DISCONTINUED | OUTPATIENT
Start: 2023-10-12 | End: 2023-10-15

## 2023-10-12 RX ORDER — POTASSIUM PHOSPHATE, MONOBASIC POTASSIUM PHOSPHATE, DIBASIC 236; 224 MG/ML; MG/ML
15 INJECTION, SOLUTION INTRAVENOUS ONCE
Refills: 0 | Status: COMPLETED | OUTPATIENT
Start: 2023-10-12 | End: 2023-10-12

## 2023-10-12 RX ORDER — ACETAMINOPHEN 500 MG
1000 TABLET ORAL ONCE
Refills: 0 | Status: COMPLETED | OUTPATIENT
Start: 2023-10-12 | End: 2023-10-12

## 2023-10-12 RX ORDER — OXYCODONE HYDROCHLORIDE 5 MG/1
5 TABLET ORAL EVERY 6 HOURS
Refills: 0 | Status: DISCONTINUED | OUTPATIENT
Start: 2023-10-12 | End: 2023-10-18

## 2023-10-12 RX ADMIN — Medication 1000 MILLIGRAM(S): at 02:45

## 2023-10-12 RX ADMIN — Medication 100 MILLIGRAM(S): at 21:36

## 2023-10-12 RX ADMIN — CEFEPIME 100 MILLIGRAM(S): 1 INJECTION, POWDER, FOR SOLUTION INTRAMUSCULAR; INTRAVENOUS at 13:27

## 2023-10-12 RX ADMIN — OXYCODONE HYDROCHLORIDE 5 MILLIGRAM(S): 5 TABLET ORAL at 00:01

## 2023-10-12 RX ADMIN — OXYCODONE HYDROCHLORIDE 5 MILLIGRAM(S): 5 TABLET ORAL at 06:19

## 2023-10-12 RX ADMIN — POTASSIUM PHOSPHATE, MONOBASIC POTASSIUM PHOSPHATE, DIBASIC 62.5 MILLIMOLE(S): 236; 224 INJECTION, SOLUTION INTRAVENOUS at 10:57

## 2023-10-12 RX ADMIN — MAGNESIUM OXIDE 400 MG ORAL TABLET 400 MILLIGRAM(S): 241.3 TABLET ORAL at 17:24

## 2023-10-12 RX ADMIN — Medication 100 MILLIGRAM(S): at 14:06

## 2023-10-12 RX ADMIN — CEFEPIME 100 MILLIGRAM(S): 1 INJECTION, POWDER, FOR SOLUTION INTRAMUSCULAR; INTRAVENOUS at 21:36

## 2023-10-12 RX ADMIN — OXYCODONE HYDROCHLORIDE 5 MILLIGRAM(S): 5 TABLET ORAL at 12:24

## 2023-10-12 RX ADMIN — OXYCODONE HYDROCHLORIDE 5 MILLIGRAM(S): 5 TABLET ORAL at 01:00

## 2023-10-12 RX ADMIN — SODIUM CHLORIDE 50 MILLILITER(S): 9 INJECTION INTRAMUSCULAR; INTRAVENOUS; SUBCUTANEOUS at 14:06

## 2023-10-12 RX ADMIN — OXYCODONE HYDROCHLORIDE 5 MILLIGRAM(S): 5 TABLET ORAL at 07:05

## 2023-10-12 RX ADMIN — OXYCODONE HYDROCHLORIDE 5 MILLIGRAM(S): 5 TABLET ORAL at 23:48

## 2023-10-12 RX ADMIN — Medication 20 MILLIGRAM(S): at 06:19

## 2023-10-12 RX ADMIN — OXYCODONE HYDROCHLORIDE 5 MILLIGRAM(S): 5 TABLET ORAL at 17:24

## 2023-10-12 RX ADMIN — Medication 400 MILLIGRAM(S): at 00:27

## 2023-10-12 NOTE — PROGRESS NOTE ADULT - ASSESSMENT
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  Pt was admitted to [facility] from Onc clinic on 9/30 with new-onset jaundice and fatigue, with sepsis in ED and abnormal LFTs noted on admission labs. His hospital course has also been c/b ascites, cancer-related abd pain, and recurrent fever (Temp 101.5 on 10/5; 101.2 on 10/11)- GNRs noted in 10/6 ascites fluid gram stain but cx negative today, more c/w SBP; 10/12 UA abnormal c/w acute UTI.

## 2023-10-12 NOTE — PROGRESS NOTE ADULT - SUBJECTIVE AND OBJECTIVE BOX
[facility] ONCOLOGY HOSPITALIST PROGRESS NOTE    S: Pt febrile overnight; temp 101.2, defervesced after Tylenol, Bcx sent, UA abnormal as below.  Pt had no new complaints this morning, and expressed understand that his discharge would be delayed for mgmt of suspected acute UTI.    CURRENT MEDICATIONS  (STANDING):  cefepime   IVPB 2000 milliGRAM(s) IV Intermittent every 8 hours  cefepime   IVPB      chlorhexidine 2% Cloths 1 Application(s) Topical daily  chlorhexidine 2% Cloths 1 Application(s) Topical daily  furosemide    Tablet 20 milliGRAM(s) Oral daily  lidocaine   4% Patch 1 Patch Transdermal daily  magnesium oxide 400 milliGRAM(s) Oral three times a day with meals  metroNIDAZOLE  IVPB      metroNIDAZOLE  IVPB 500 milliGRAM(s) IV Intermittent every 8 hours  oxyCODONE    IR 5 milliGRAM(s) Oral every 6 hours  phytonadione   Solution 5 milliGRAM(s) Oral once  polyethylene glycol 3350 17 Gram(s) Oral daily  senna 2 Tablet(s) Oral at bedtime  sodium chloride 0.9%. 1000 milliLiter(s) (50 mL/Hr) IV Continuous <Continuous>  (PRN):  melatonin 3 milliGRAM(s) Oral at bedtime PRN Insomnia  ondansetron Injectable 4 milliGRAM(s) IV Push every 8 hours PRN Nausea and/or Vomiting      PHYSICAL EXAM  T(C): 37.1 (10-12-23 @ 13:20), Max: 38.4 (10-12-23 @ 00:07)  HR: 94 (10-12-23 @ 13:20) (65 - 116)  BP: 120/71 (10-12-23 @ 13:20) (94/67 - 120/71)  RR: 18 (10-12-23 @ 13:20) (17 - 18)  SpO2: 98% (10-12-23 @ 13:20) (95% - 99%)    10-12-23 @ 07:01  -  10-12-23 @ 17:47  --------------------------------------------------------  IN: 600 mL / OUT: 0 mL / NET: 600 mL  Non-toxic appearing middle-aged man, sitting comfortably on edge of bed, nad  Icteric sclera unchanged  Generalized jaundice and vitiligo of skin on hands unchanged  RRR, no m/r/g  CTAB, no w/c/r  Abd soft/mildly distended, non-tender; normoactive bowel sounds; stoma prolapsed ~5cm, appears pink and moist  Trace non-pitting edema of the RLE to the level of the knee, unchanged  CN 2-12 grossly intact, no gross focal neuro deficits; normal gait/station        LABS                        8.5    13.56 )-----------( 337      ( 12 Oct 2023 05:27 )             26.9     10-12    136  |  103  |  12  ----------------------------<  93  4.2   |  20<L>  |  0.51    Ca    7.7<L>      12 Oct 2023 05:27  Phos  1.9     10-12  Mg     1.40     10-12    TPro  5.8<L>  /  Alb  2.0<L>  /  TBili  13.6<H>  /  DBili  x   /  AST  189<H>  /  ALT  57<H>  /  AlkPhos  205<H>  10-12    PT/INR - ( 12 Oct 2023 05:27 )   PT: 37.7 sec;   INR: 3.49 ratio    PTT - ( 12 Oct 2023 05:27 )  PTT:41.0 sec    CAPILLARY BLOOD GLUCOSE  POCT Blood Glucose.: 97 mg/dL (12 Oct 2023 08:19)  POCT Blood Glucose.: 83 mg/dL (11 Oct 2023 23:37)    MICROBIOLOGY  Urinalysis Basic - ( 12 Oct 2023 08:10 )  Color: Dark Yellow / Appearance: Cloudy / S.039 / pH: x  Gluc: x / Ketone: Negative mg/dL  / Bili: Large / Urobili: 0.2 mg/dL   Blood: x / Protein: 100 mg/dL / Nitrite: Positive   Leuk Esterase: Small / RBC: 1 /HPF / WBC 1 /HPF   Sq Epi: x / Non Sq Epi: x / Bacteria: Few /HPF    Urinalysis Basic - ( 11 Oct 2023 06:12 )  Color: x / Appearance: x / SG: x / pH: x  Gluc: 97 mg/dL / Ketone: x  / Bili: x / Urobili: x   Blood: x / Protein: x / Nitrite: x   Leuk Esterase: x / RBC: x / WBC x   Sq Epi: x / Non Sq Epi: x / Bacteria: x    10/6 ascites fluid cx: GNRs on gram stain, but cxs remain negative to date.    Culture - Urine (collected 04 Oct 2023 15:39)  Source: Clean Catch Clean Catch (Midstream)  Final Report (05 Oct 2023 13:52):    No growth    Culture - Blood (collected 04 Oct 2023 11:45)  Source: .Blood Blood-Peripheral  Preliminary Report (05 Oct 2023 19:02):    No growth at 24 hours    Culture - Blood (collected 04 Oct 2023 11:42)  Source: .Blood Blood-Peripheral  Preliminary Report (05 Oct 2023 19:02):    No growth at 24 hours    Culture - Blood (collected 02 Oct 2023 06:30)  Source: .Blood Blood-Venous  Preliminary Report (04 Oct 2023 09:02):    No growth at 48 Hours    Culture - Blood (collected 02 Oct 2023 06:30)  Source: .Blood Blood-Peripheral  Preliminary Report (04 Oct 2023 09:01):    No growth at 48 Hours    SARS-CoV-2: NotDetec (06 Oct 2023 10:36)  SARS-CoV-2: NotDetec (25 Sep 2023 23:55)    PATHOLOGY  10/6 Ascites fluid cytology: negative for malignant cells.    PERTINENT RADIOLOGY  10/10 Abd U/S: small amount of ascites.    10/10 B/L LE Dopplers: negative for dvt.    10/6 CXR: Increasing right pleural effusion with pulmonary metastases.    10/3 MRCP  1.  New moderate right pleural effusion and interval increase of moderate   ascites.  2.  Grossly stable pulmonary and hepatic metastatic lesions.  3.  Stable mild intrahepatic biliary ductal dilatation secondary to   compression of more central duct by metastatic lesions in the hilum.   Unremarkable CBD.     CXR: New atelectasis or infiltrate right lung base     CT a/p: Peripancreatic edema suspicious for acute pancreatitis, new compared to   prior. While less likely this could also represent noninflammatory edema/third spacing of fluid. Small amount of ascites is new. Otherwise similar to prior with a large number of hepatic metastases,   intrahepatic peripheral biliary ductal dilation in the right greater than   left lobes, partially visible lung metastases, and likely metastatic mild   upper abdominal and partially visible mediastinal adenopathy.

## 2023-10-12 NOTE — PROGRESS NOTE ADULT - PROBLEM SELECTOR PLAN 9
- INR continues to rise i/s/o liver dysfunction  - Labs no c/w DIC and pt without clinical s/s of bleeding/clotting  - Completed 3d course of Vit K, 10/6-8; completing additional 3d course, 10/10-12

## 2023-10-12 NOTE — PROGRESS NOTE ADULT - PROBLEM SELECTOR PLAN 6
- Increased on 10/6 CXR; however, pt remains relatively asymptomatic at rest with normal O2 sats on RA   - Pulm consulted for possible palliative thora- no tappable pocket identified on bedside U/S  - Continuing diuretic as above  - Of note, EF in 1/2023 61%; will repeat TTE if pt develops worsening symptoms

## 2023-10-12 NOTE — PROGRESS NOTE ADULT - PROBLEM SELECTOR PLAN 4
- LFTs stably elevated, 2/2 extensive met disease burden in the liver  - Viral hep serologies negative  - MRCP reviewed by iGI and IR: ERCP, PTC unlikely to be effective in alleviating multifocal biliary obstruction  - Avoiding hepatotoxins

## 2023-10-12 NOTE — PROGRESS NOTE ADULT - PROBLEM SELECTOR PLAN 3
- Febrile, 101.2 overnight  - UA abnormal c/w acute UTI; 10/12 Ucx in process  - Blood cxs remain negative to date  - Resuming empiric Cefepime/Flagyl (pt previously completed 5d course for suspected SBP, 10/6-11)

## 2023-10-13 ENCOUNTER — APPOINTMENT (OUTPATIENT)
Dept: INFUSION THERAPY | Facility: HOSPITAL | Age: 58
End: 2023-10-13

## 2023-10-13 ENCOUNTER — APPOINTMENT (OUTPATIENT)
Dept: HEMATOLOGY ONCOLOGY | Facility: CLINIC | Age: 58
End: 2023-10-13

## 2023-10-13 LAB
ALBUMIN SERPL ELPH-MCNC: 2 G/DL — LOW (ref 3.3–5)
ALP SERPL-CCNC: 214 U/L — HIGH (ref 40–120)
ALT FLD-CCNC: 59 U/L — HIGH (ref 4–41)
ANION GAP SERPL CALC-SCNC: 12 MMOL/L — SIGNIFICANT CHANGE UP (ref 7–14)
AST SERPL-CCNC: 207 U/L — HIGH (ref 4–40)
BILIRUB SERPL-MCNC: 12.8 MG/DL — HIGH (ref 0.2–1.2)
BUN SERPL-MCNC: 14 MG/DL — SIGNIFICANT CHANGE UP (ref 7–23)
CALCIUM SERPL-MCNC: 8.6 MG/DL — SIGNIFICANT CHANGE UP (ref 8.4–10.5)
CHLORIDE SERPL-SCNC: 108 MMOL/L — HIGH (ref 98–107)
CO2 SERPL-SCNC: 21 MMOL/L — LOW (ref 22–31)
CREAT SERPL-MCNC: 0.58 MG/DL — SIGNIFICANT CHANGE UP (ref 0.5–1.3)
CULTURE RESULTS: NO GROWTH — SIGNIFICANT CHANGE UP
EGFR: 112 ML/MIN/1.73M2 — SIGNIFICANT CHANGE UP
GLUCOSE BLDC GLUCOMTR-MCNC: 109 MG/DL — HIGH (ref 70–99)
GLUCOSE BLDC GLUCOMTR-MCNC: 74 MG/DL — SIGNIFICANT CHANGE UP (ref 70–99)
GLUCOSE SERPL-MCNC: 70 MG/DL — SIGNIFICANT CHANGE UP (ref 70–99)
HCT VFR BLD CALC: 30.4 % — LOW (ref 39–50)
HGB BLD-MCNC: 9.7 G/DL — LOW (ref 13–17)
MCHC RBC-ENTMCNC: 24.7 PG — LOW (ref 27–34)
MCHC RBC-ENTMCNC: 31.9 GM/DL — LOW (ref 32–36)
MCV RBC AUTO: 77.6 FL — LOW (ref 80–100)
NRBC # BLD: 1 /100 WBCS — HIGH (ref 0–0)
NRBC # FLD: 0.21 K/UL — HIGH (ref 0–0)
PLATELET # BLD AUTO: 420 K/UL — HIGH (ref 150–400)
POTASSIUM SERPL-MCNC: 4.7 MMOL/L — SIGNIFICANT CHANGE UP (ref 3.5–5.3)
POTASSIUM SERPL-SCNC: 4.7 MMOL/L — SIGNIFICANT CHANGE UP (ref 3.5–5.3)
PROT SERPL-MCNC: 6.6 G/DL — SIGNIFICANT CHANGE UP (ref 6–8.3)
RBC # BLD: 3.92 M/UL — LOW (ref 4.2–5.8)
RBC # FLD: 25.1 % — HIGH (ref 10.3–14.5)
SODIUM SERPL-SCNC: 141 MMOL/L — SIGNIFICANT CHANGE UP (ref 135–145)
SPECIMEN SOURCE: SIGNIFICANT CHANGE UP
WBC # BLD: 17.6 K/UL — HIGH (ref 3.8–10.5)
WBC # FLD AUTO: 17.6 K/UL — HIGH (ref 3.8–10.5)

## 2023-10-13 RX ORDER — ALBUMIN HUMAN 25 %
50 VIAL (ML) INTRAVENOUS EVERY 8 HOURS
Refills: 0 | Status: DISCONTINUED | OUTPATIENT
Start: 2023-10-13 | End: 2023-10-13

## 2023-10-13 RX ORDER — MIRTAZAPINE 45 MG/1
7.5 TABLET, ORALLY DISINTEGRATING ORAL AT BEDTIME
Refills: 0 | Status: DISCONTINUED | OUTPATIENT
Start: 2023-10-13 | End: 2023-10-18

## 2023-10-13 RX ORDER — ALBUMIN HUMAN 25 %
50 VIAL (ML) INTRAVENOUS EVERY 8 HOURS
Refills: 0 | Status: COMPLETED | OUTPATIENT
Start: 2023-10-13 | End: 2023-10-14

## 2023-10-13 RX ADMIN — OXYCODONE HYDROCHLORIDE 5 MILLIGRAM(S): 5 TABLET ORAL at 23:54

## 2023-10-13 RX ADMIN — CEFEPIME 100 MILLIGRAM(S): 1 INJECTION, POWDER, FOR SOLUTION INTRAMUSCULAR; INTRAVENOUS at 13:03

## 2023-10-13 RX ADMIN — CHLORHEXIDINE GLUCONATE 1 APPLICATION(S): 213 SOLUTION TOPICAL at 12:10

## 2023-10-13 RX ADMIN — OXYCODONE HYDROCHLORIDE 5 MILLIGRAM(S): 5 TABLET ORAL at 05:28

## 2023-10-13 RX ADMIN — OXYCODONE HYDROCHLORIDE 5 MILLIGRAM(S): 5 TABLET ORAL at 12:08

## 2023-10-13 RX ADMIN — OXYCODONE HYDROCHLORIDE 5 MILLIGRAM(S): 5 TABLET ORAL at 17:03

## 2023-10-13 RX ADMIN — MAGNESIUM OXIDE 400 MG ORAL TABLET 400 MILLIGRAM(S): 241.3 TABLET ORAL at 08:05

## 2023-10-13 RX ADMIN — Medication 100 MILLIGRAM(S): at 22:11

## 2023-10-13 RX ADMIN — Medication 100 MILLIGRAM(S): at 13:38

## 2023-10-13 RX ADMIN — CEFEPIME 100 MILLIGRAM(S): 1 INJECTION, POWDER, FOR SOLUTION INTRAMUSCULAR; INTRAVENOUS at 22:11

## 2023-10-13 RX ADMIN — Medication 50 MILLILITER(S): at 10:06

## 2023-10-13 RX ADMIN — MIRTAZAPINE 7.5 MILLIGRAM(S): 45 TABLET, ORALLY DISINTEGRATING ORAL at 22:19

## 2023-10-13 RX ADMIN — MAGNESIUM OXIDE 400 MG ORAL TABLET 400 MILLIGRAM(S): 241.3 TABLET ORAL at 12:09

## 2023-10-13 RX ADMIN — Medication 100 MILLIGRAM(S): at 05:29

## 2023-10-13 RX ADMIN — Medication 50 MILLILITER(S): at 22:11

## 2023-10-13 RX ADMIN — Medication 20 MILLIGRAM(S): at 05:28

## 2023-10-13 RX ADMIN — OXYCODONE HYDROCHLORIDE 5 MILLIGRAM(S): 5 TABLET ORAL at 00:07

## 2023-10-13 RX ADMIN — CEFEPIME 100 MILLIGRAM(S): 1 INJECTION, POWDER, FOR SOLUTION INTRAMUSCULAR; INTRAVENOUS at 05:29

## 2023-10-13 RX ADMIN — Medication 50 MILLILITER(S): at 14:47

## 2023-10-13 NOTE — PROGRESS NOTE ADULT - PROBLEM SELECTOR PLAN 9
- INR still elevated > 3 i/s/o liver dysfunction  - Labs not c/w DIC and pt without clinical s/s of bleeding/clotting  - Completed two 3d courses of Vit K, 10/6-8 and 10/10-12 - INR still elevated > 3 i/s/o liver dysfunction  - Labs not c/w DIC and pt without clinical s/s of bleeding/clotting  - Completed two 3d courses of Vit K, 10/6-8 and 10/10-12  - SCDs ordered for DVT ppx

## 2023-10-13 NOTE — PROGRESS NOTE ADULT - PROBLEM SELECTOR PLAN 3
- Pt afebrile x24h, BP improved but WBCs uptrending  - UA abnormal c/w acute UTI; 10/12 Ucx in process  - Blood cxs remain negative to date  - Continuing empiric Cefepime/Flagyl (pt previously completed 5d course for suspected SBP, 10/6-11); will tailor abx based on cx sensitivity; projected tx duration: 7d

## 2023-10-13 NOTE — PROGRESS NOTE ADULT - ASSESSMENT
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  Pt was admitted to [facility] from Onc clinic on 9/30 with new-onset jaundice and fatigue, with sepsis in ED and abnormal LFTs noted on admission labs. His hospital course has also been c/b ascites, cancer-related abd pain, and recurrent fever (Temp 101.5 on 10/5; 101.2 on 10/11)- GNRs noted in 10/6 ascites fluid gram stain but cx negative today, more c/w SBP; 10/12 UA abnormal c/w acute UTI.

## 2023-10-13 NOTE — PROGRESS NOTE ADULT - PROBLEM SELECTOR PLAN 6
- Increased on 10/6 CXR; however, pt remains relatively asymptomatic at rest with normal O2 sats on RA   - Pulm consulted for possible palliative thora- no tappable pocket identified on bedside U/S  - Continuing Lasix/Albumin as above  - Of note, EF in 1/2023 61%; will repeat TTE if pt develops worsening symptoms

## 2023-10-14 LAB
ALBUMIN SERPL ELPH-MCNC: 2.6 G/DL — LOW (ref 3.3–5)
ALP SERPL-CCNC: 204 U/L — HIGH (ref 40–120)
ALT FLD-CCNC: 51 U/L — HIGH (ref 4–41)
ANION GAP SERPL CALC-SCNC: 13 MMOL/L — SIGNIFICANT CHANGE UP (ref 7–14)
AST SERPL-CCNC: 201 U/L — HIGH (ref 4–40)
BILIRUB SERPL-MCNC: 14.1 MG/DL — HIGH (ref 0.2–1.2)
BUN SERPL-MCNC: 14 MG/DL — SIGNIFICANT CHANGE UP (ref 7–23)
CALCIUM SERPL-MCNC: 8.9 MG/DL — SIGNIFICANT CHANGE UP (ref 8.4–10.5)
CHLORIDE SERPL-SCNC: 107 MMOL/L — SIGNIFICANT CHANGE UP (ref 98–107)
CO2 SERPL-SCNC: 20 MMOL/L — LOW (ref 22–31)
CREAT SERPL-MCNC: 0.56 MG/DL — SIGNIFICANT CHANGE UP (ref 0.5–1.3)
EGFR: 113 ML/MIN/1.73M2 — SIGNIFICANT CHANGE UP
GLUCOSE BLDC GLUCOMTR-MCNC: 117 MG/DL — HIGH (ref 70–99)
GLUCOSE BLDC GLUCOMTR-MCNC: 78 MG/DL — SIGNIFICANT CHANGE UP (ref 70–99)
GLUCOSE SERPL-MCNC: 70 MG/DL — SIGNIFICANT CHANGE UP (ref 70–99)
HCT VFR BLD CALC: 34.8 % — LOW (ref 39–50)
HGB BLD-MCNC: 11.3 G/DL — LOW (ref 13–17)
MCHC RBC-ENTMCNC: 25.1 PG — LOW (ref 27–34)
MCHC RBC-ENTMCNC: 32.5 GM/DL — SIGNIFICANT CHANGE UP (ref 32–36)
MCV RBC AUTO: 77.2 FL — LOW (ref 80–100)
NRBC # BLD: 2 /100 WBCS — HIGH (ref 0–0)
NRBC # FLD: 0.24 K/UL — HIGH (ref 0–0)
PLATELET # BLD AUTO: 399 K/UL — SIGNIFICANT CHANGE UP (ref 150–400)
POTASSIUM SERPL-MCNC: 5 MMOL/L — SIGNIFICANT CHANGE UP (ref 3.5–5.3)
POTASSIUM SERPL-SCNC: 5 MMOL/L — SIGNIFICANT CHANGE UP (ref 3.5–5.3)
PROT SERPL-MCNC: 7 G/DL — SIGNIFICANT CHANGE UP (ref 6–8.3)
RBC # BLD: 4.51 M/UL — SIGNIFICANT CHANGE UP (ref 4.2–5.8)
RBC # FLD: 25.3 % — HIGH (ref 10.3–14.5)
SODIUM SERPL-SCNC: 140 MMOL/L — SIGNIFICANT CHANGE UP (ref 135–145)
WBC # BLD: 16.33 K/UL — HIGH (ref 3.8–10.5)
WBC # FLD AUTO: 16.33 K/UL — HIGH (ref 3.8–10.5)

## 2023-10-14 PROCEDURE — 99233 SBSQ HOSP IP/OBS HIGH 50: CPT

## 2023-10-14 RX ORDER — ACETAMINOPHEN 500 MG
1000 TABLET ORAL ONCE
Refills: 0 | Status: COMPLETED | OUTPATIENT
Start: 2023-10-14 | End: 2023-10-14

## 2023-10-14 RX ADMIN — OXYCODONE HYDROCHLORIDE 5 MILLIGRAM(S): 5 TABLET ORAL at 06:25

## 2023-10-14 RX ADMIN — Medication 100 MILLIGRAM(S): at 21:56

## 2023-10-14 RX ADMIN — Medication 400 MILLIGRAM(S): at 06:31

## 2023-10-14 RX ADMIN — Medication 50 MILLILITER(S): at 07:35

## 2023-10-14 RX ADMIN — Medication 100 MILLIGRAM(S): at 13:08

## 2023-10-14 RX ADMIN — OXYCODONE HYDROCHLORIDE 5 MILLIGRAM(S): 5 TABLET ORAL at 06:45

## 2023-10-14 RX ADMIN — OXYCODONE HYDROCHLORIDE 5 MILLIGRAM(S): 5 TABLET ORAL at 11:39

## 2023-10-14 RX ADMIN — CEFEPIME 100 MILLIGRAM(S): 1 INJECTION, POWDER, FOR SOLUTION INTRAMUSCULAR; INTRAVENOUS at 21:51

## 2023-10-14 RX ADMIN — CEFEPIME 100 MILLIGRAM(S): 1 INJECTION, POWDER, FOR SOLUTION INTRAMUSCULAR; INTRAVENOUS at 13:08

## 2023-10-14 RX ADMIN — OXYCODONE HYDROCHLORIDE 5 MILLIGRAM(S): 5 TABLET ORAL at 12:26

## 2023-10-14 RX ADMIN — Medication 1000 MILLIGRAM(S): at 06:31

## 2023-10-14 RX ADMIN — OXYCODONE HYDROCHLORIDE 5 MILLIGRAM(S): 5 TABLET ORAL at 23:56

## 2023-10-14 RX ADMIN — Medication 50 MILLILITER(S): at 14:19

## 2023-10-14 RX ADMIN — MIRTAZAPINE 7.5 MILLIGRAM(S): 45 TABLET, ORALLY DISINTEGRATING ORAL at 23:33

## 2023-10-14 RX ADMIN — OXYCODONE HYDROCHLORIDE 5 MILLIGRAM(S): 5 TABLET ORAL at 17:15

## 2023-10-14 RX ADMIN — Medication 100 MILLIGRAM(S): at 06:25

## 2023-10-14 RX ADMIN — Medication 20 MILLIGRAM(S): at 06:25

## 2023-10-14 RX ADMIN — CEFEPIME 100 MILLIGRAM(S): 1 INJECTION, POWDER, FOR SOLUTION INTRAMUSCULAR; INTRAVENOUS at 06:24

## 2023-10-14 RX ADMIN — CHLORHEXIDINE GLUCONATE 1 APPLICATION(S): 213 SOLUTION TOPICAL at 11:41

## 2023-10-14 RX ADMIN — OXYCODONE HYDROCHLORIDE 5 MILLIGRAM(S): 5 TABLET ORAL at 18:16

## 2023-10-14 NOTE — PROGRESS NOTE ADULT - PROBLEM SELECTOR PLAN 9
- INR still elevated > 3 i/s/o liver dysfunction  - Labs not c/w DIC and pt without clinical s/s of bleeding/clotting  - Completed two 3d courses of Vit K, 10/6-8 and 10/10-12  - SCDs ordered for DVT ppx

## 2023-10-14 NOTE — PROGRESS NOTE ADULT - PROBLEM SELECTOR PLAN 2
Dx in 1/2021 with mets to liver, RLL; pan-GIAN WT, IFTIKHAR, TMB 5, TP53 mut; disease course c/b LBO s/p resection and ostomy in 3/2021; progressed after FOLFOX (Oxali dced 7/2021) > FOLFIRI/Day (tx c/b coronary vasospasm in 12/2022 2/2 5FU requiring cardiac optimization) > most recently on Irinotecan/Panitumumab since 7/2023, last given 9/15/23 and was due 9/29/23 (no record of having been given i/p).  - Presentation most c/w progression of disease, although no new lesions present on MRCP pt has extensive tumor burden in the liver that is presumably infiltrative  Plan:  - Pt is not a candidate for continuation of cancer tx i/s/o irreversible liver failure  - Hospice is appropriate (d/w Dr. Ospina), to which pt is amenable (GOC addressed as described below)  - Long term prognosis is poor: weeks to short months at best; days to weeks in the event of another acute clinical insult

## 2023-10-14 NOTE — PROGRESS NOTE ADULT - ASSESSMENT
59 yo man with h/o vitiligo, pre-DM, gastritis on PPI, and met colon adenoca   Pt was admitted to [facility] from Onc clinic on 9/30 with new-onset jaundice and fatigue, with sepsis in ED and abnormal LFTs noted on admission labs. His hospital course has also been c/b ascites, cancer-related abd pain, and recurrent fever (Temp 101.5 on 10/5; 101.2 on 10/11)- GNRs noted in 10/6 ascites fluid gram stain but cx negative today, more c/w SBP; 10/12 UA abnormal c/w acute UTI.

## 2023-10-14 NOTE — PROGRESS NOTE ADULT - PROBLEM SELECTOR PLAN 3
- recommended to pt on 10/6 to which he was amenable, but also noted that he was seeking 2nd opinion for alternative medicine tx options  - Code status was also d/w pt: he iterated that he has not thought about his code status or other advance directives, and that he would like to remain full code for now   -  referral for home hospice enrollment in process

## 2023-10-14 NOTE — PROGRESS NOTE ADULT - SUBJECTIVE AND OBJECTIVE BOX
CC: Patient is a 58y old  Male who presents with a chief complaint of New onset jaundice and fatigue, with sepsis and abnormal LFTs in the ED (11 Oct 2023 14:35)      INTERVAL EVENTS: T 101.8 this AM.    SUBJECTIVE / INTERVAL HPI: Patient seen and examined at bedside. Pt reports not feeling well with fever but feels better now that fever resolved. Denies chest pain, cough, nausea, vomiting, diarrhea. Endorses minimal dysuria. Reports b/l LE edema.    ROS: negative unless otherwise stated above.    VITAL SIGNS:  Vital Signs Last 24 Hrs  T(C): 36.7 (14 Oct 2023 12:43), Max: 38.8 (14 Oct 2023 06:20)  T(F): 98.1 (14 Oct 2023 12:43), Max: 101.8 (14 Oct 2023 06:20)  HR: 99 (14 Oct 2023 12:43) (99 - 123)  BP: 102/71 (14 Oct 2023 12:43) (102/71 - 124/87)  BP(mean): --  RR: 17 (14 Oct 2023 12:43) (16 - 18)  SpO2: 100% (14 Oct 2023 12:43) (95% - 100%)    Parameters below as of 14 Oct 2023 12:43  Patient On (Oxygen Delivery Method): room air          10-13-23 @ 07:01  -  10-14-23 @ 07:00  --------------------------------------------------------  IN: 750 mL / OUT: 0 mL / NET: 750 mL        PHYSICAL EXAM:    General: NAD  HEENT: MMM  Neck: supple  Cardiovascular: +S1/S2; RRR  Respiratory: CTA B/L; no W/R/R  Gastrointestinal: soft; mildly distended with pitting edema of R abdominal wall  Extremities: WWP; 2+ edema to b/l hips  Vascular: palpable radial, DP pulses B/L  Neurological: AAOx3; no focal deficits    MEDICATIONS:  MEDICATIONS  (STANDING):  albumin human 25% IVPB 50 milliLiter(s) IV Intermittent every 8 hours  cefepime   IVPB 2000 milliGRAM(s) IV Intermittent every 8 hours  cefepime   IVPB      chlorhexidine 2% Cloths 1 Application(s) Topical daily  chlorhexidine 2% Cloths 1 Application(s) Topical daily  furosemide    Tablet 20 milliGRAM(s) Oral daily  lidocaine   4% Patch 1 Patch Transdermal daily  metroNIDAZOLE  IVPB 500 milliGRAM(s) IV Intermittent every 8 hours  metroNIDAZOLE  IVPB      mirtazapine 7.5 milliGRAM(s) Oral at bedtime  oxyCODONE    IR 5 milliGRAM(s) Oral every 6 hours  phytonadione   Solution 5 milliGRAM(s) Oral once  polyethylene glycol 3350 17 Gram(s) Oral daily  senna 2 Tablet(s) Oral at bedtime  sodium chloride 0.9%. 1000 milliLiter(s) (50 mL/Hr) IV Continuous <Continuous>    MEDICATIONS  (PRN):  melatonin 3 milliGRAM(s) Oral at bedtime PRN Insomnia  ondansetron Injectable 4 milliGRAM(s) IV Push every 8 hours PRN Nausea and/or Vomiting      ALLERGIES:  Allergies    No Known Allergies    Intolerances        LABS:                        11.3   16.33 )-----------( 399      ( 14 Oct 2023 06:37 )             34.8     10-14    140  |  107  |  14  ----------------------------<  70  5.0   |  20<L>  |  0.56    Ca    8.9      14 Oct 2023 06:37    TPro  7.0  /  Alb  2.6<L>  /  TBili  14.1<H>  /  DBili  x   /  AST  201<H>  /  ALT  51<H>  /  AlkPhos  204<H>  10-14      Urinalysis Basic - ( 14 Oct 2023 06:37 )    Color: x / Appearance: x / SG: x / pH: x  Gluc: 70 mg/dL / Ketone: x  / Bili: x / Urobili: x   Blood: x / Protein: x / Nitrite: x   Leuk Esterase: x / RBC: x / WBC x   Sq Epi: x / Non Sq Epi: x / Bacteria: x      CAPILLARY BLOOD GLUCOSE      POCT Blood Glucose.: 78 mg/dL (14 Oct 2023 08:53)      RADIOLOGY & ADDITIONAL TESTS: Reviewed.

## 2023-10-14 NOTE — PROGRESS NOTE ADULT - PROBLEM SELECTOR PLAN 1
pt previously completed 5d course for suspected SBP, 10/6-11, now with T 101.8 while on cefepime and flagyl.  - CXR with layering pleural effusion but per pulm, unable to tap. no evidence of pna.  - UA without WBCs and UCx negative  - MRCP with dilation in multiple isolated biliary duct branches 2/2 diffuse metastatic disease. They are not amenable to stenting via ERCP per GI  - Blood cxs remain negative to date  Plan:  - could be recurrence of SBP, although pt with only small volume ascites that is not amenable to drainage  - Continuing empiric Cefepime/Flagyl  - f/u ID consult  - f/u BCx

## 2023-10-15 LAB
ALBUMIN SERPL ELPH-MCNC: 2.8 G/DL — LOW (ref 3.3–5)
ALP SERPL-CCNC: 171 U/L — HIGH (ref 40–120)
ALT FLD-CCNC: 49 U/L — HIGH (ref 4–41)
ANION GAP SERPL CALC-SCNC: 16 MMOL/L — HIGH (ref 7–14)
AST SERPL-CCNC: 181 U/L — HIGH (ref 4–40)
BILIRUB SERPL-MCNC: 14.7 MG/DL — HIGH (ref 0.2–1.2)
BUN SERPL-MCNC: 17 MG/DL — SIGNIFICANT CHANGE UP (ref 7–23)
CALCIUM SERPL-MCNC: 8.5 MG/DL — SIGNIFICANT CHANGE UP (ref 8.4–10.5)
CHLORIDE SERPL-SCNC: 106 MMOL/L — SIGNIFICANT CHANGE UP (ref 98–107)
CO2 SERPL-SCNC: 20 MMOL/L — LOW (ref 22–31)
CREAT SERPL-MCNC: 0.57 MG/DL — SIGNIFICANT CHANGE UP (ref 0.5–1.3)
EGFR: 112 ML/MIN/1.73M2 — SIGNIFICANT CHANGE UP
GLUCOSE BLDC GLUCOMTR-MCNC: 148 MG/DL — HIGH (ref 70–99)
GLUCOSE BLDC GLUCOMTR-MCNC: 96 MG/DL — SIGNIFICANT CHANGE UP (ref 70–99)
GLUCOSE SERPL-MCNC: 130 MG/DL — HIGH (ref 70–99)
HCT VFR BLD CALC: 31.6 % — LOW (ref 39–50)
HGB BLD-MCNC: 10.1 G/DL — LOW (ref 13–17)
MCHC RBC-ENTMCNC: 25.1 PG — LOW (ref 27–34)
MCHC RBC-ENTMCNC: 32 GM/DL — SIGNIFICANT CHANGE UP (ref 32–36)
MCV RBC AUTO: 78.6 FL — LOW (ref 80–100)
NRBC # BLD: 0 /100 WBCS — SIGNIFICANT CHANGE UP (ref 0–0)
NRBC # FLD: 0.13 K/UL — HIGH (ref 0–0)
PLATELET # BLD AUTO: 326 K/UL — SIGNIFICANT CHANGE UP (ref 150–400)
POTASSIUM SERPL-MCNC: 4.6 MMOL/L — SIGNIFICANT CHANGE UP (ref 3.5–5.3)
POTASSIUM SERPL-SCNC: 4.6 MMOL/L — SIGNIFICANT CHANGE UP (ref 3.5–5.3)
PROT SERPL-MCNC: 6.5 G/DL — SIGNIFICANT CHANGE UP (ref 6–8.3)
RBC # BLD: 4.02 M/UL — LOW (ref 4.2–5.8)
RBC # FLD: 25.5 % — HIGH (ref 10.3–14.5)
SODIUM SERPL-SCNC: 142 MMOL/L — SIGNIFICANT CHANGE UP (ref 135–145)
WBC # BLD: 16.89 K/UL — HIGH (ref 3.8–10.5)
WBC # FLD AUTO: 16.89 K/UL — HIGH (ref 3.8–10.5)

## 2023-10-15 PROCEDURE — 99222 1ST HOSP IP/OBS MODERATE 55: CPT | Mod: GC

## 2023-10-15 PROCEDURE — 99233 SBSQ HOSP IP/OBS HIGH 50: CPT

## 2023-10-15 RX ORDER — ERTAPENEM SODIUM 1 G/1
INJECTION, POWDER, LYOPHILIZED, FOR SOLUTION INTRAMUSCULAR; INTRAVENOUS
Refills: 0 | Status: COMPLETED | OUTPATIENT
Start: 2023-10-15 | End: 2023-10-17

## 2023-10-15 RX ORDER — ERTAPENEM SODIUM 1 G/1
1000 INJECTION, POWDER, LYOPHILIZED, FOR SOLUTION INTRAMUSCULAR; INTRAVENOUS EVERY 24 HOURS
Refills: 0 | Status: COMPLETED | OUTPATIENT
Start: 2023-10-16 | End: 2023-10-17

## 2023-10-15 RX ORDER — ERTAPENEM SODIUM 1 G/1
1000 INJECTION, POWDER, LYOPHILIZED, FOR SOLUTION INTRAMUSCULAR; INTRAVENOUS ONCE
Refills: 0 | Status: COMPLETED | OUTPATIENT
Start: 2023-10-15 | End: 2023-10-15

## 2023-10-15 RX ADMIN — OXYCODONE HYDROCHLORIDE 5 MILLIGRAM(S): 5 TABLET ORAL at 06:32

## 2023-10-15 RX ADMIN — OXYCODONE HYDROCHLORIDE 5 MILLIGRAM(S): 5 TABLET ORAL at 12:30

## 2023-10-15 RX ADMIN — OXYCODONE HYDROCHLORIDE 5 MILLIGRAM(S): 5 TABLET ORAL at 19:35

## 2023-10-15 RX ADMIN — OXYCODONE HYDROCHLORIDE 5 MILLIGRAM(S): 5 TABLET ORAL at 23:41

## 2023-10-15 RX ADMIN — OXYCODONE HYDROCHLORIDE 5 MILLIGRAM(S): 5 TABLET ORAL at 13:30

## 2023-10-15 RX ADMIN — Medication 100 MILLIGRAM(S): at 05:29

## 2023-10-15 RX ADMIN — CEFEPIME 100 MILLIGRAM(S): 1 INJECTION, POWDER, FOR SOLUTION INTRAMUSCULAR; INTRAVENOUS at 13:43

## 2023-10-15 RX ADMIN — OXYCODONE HYDROCHLORIDE 5 MILLIGRAM(S): 5 TABLET ORAL at 05:32

## 2023-10-15 RX ADMIN — OXYCODONE HYDROCHLORIDE 5 MILLIGRAM(S): 5 TABLET ORAL at 00:56

## 2023-10-15 RX ADMIN — MIRTAZAPINE 7.5 MILLIGRAM(S): 45 TABLET, ORALLY DISINTEGRATING ORAL at 21:37

## 2023-10-15 RX ADMIN — OXYCODONE HYDROCHLORIDE 5 MILLIGRAM(S): 5 TABLET ORAL at 18:35

## 2023-10-15 RX ADMIN — CEFEPIME 100 MILLIGRAM(S): 1 INJECTION, POWDER, FOR SOLUTION INTRAMUSCULAR; INTRAVENOUS at 05:24

## 2023-10-15 RX ADMIN — ERTAPENEM SODIUM 1000 MILLIGRAM(S): 1 INJECTION, POWDER, LYOPHILIZED, FOR SOLUTION INTRAMUSCULAR; INTRAVENOUS at 20:35

## 2023-10-15 RX ADMIN — Medication 20 MILLIGRAM(S): at 05:24

## 2023-10-15 RX ADMIN — CHLORHEXIDINE GLUCONATE 1 APPLICATION(S): 213 SOLUTION TOPICAL at 12:51

## 2023-10-15 RX ADMIN — Medication 100 MILLIGRAM(S): at 14:21

## 2023-10-15 NOTE — CONSULT NOTE ADULT - CONSULT REASON
Right sided pleural effusion
Paracentesis
jaundice
met colon cancer
percutaneous biliary drain
fevers
pain management

## 2023-10-15 NOTE — CONSULT NOTE ADULT - ASSESSMENT
Impression:  59 y/o M with pmhx of metastatic colon cancer on active chemotherapy(Avastin, Irinotecan, Leucovorin, and 5FU CADD) biweekly who presented to [facility] for yellowing skin and fatigue.  CT A/P showed peripancreatic edema suspicious for acute pancreatitis new compared to prior; itrahepatic mets with intrahepatic peripheral biliary ductal dilatation R >L with partially visible lung mets with new small ascites. Patient at that time was started on piperacillin/tazobactam from 9/30 until 10/6 and MRCP showed stable pulmonary and hepatic metastatic lesions and biliary ductal dilation secondary to compression of more central duct by a lesion but patient CBD.  Patient underwent IR guided diagnostic and therapeutic paracentesis 10/6 and cultures of the fluid were sent which under gram stain showed GNR but were unable to grow on culture. Due to these findings the patient was treated with 5 day course of metronidazole and cefepime from 10/6 until 10/11. On 10/12 patient again spiked fever to 101.2 and antibiotics were again restarted for concerns of continued SBP and  repeat blood cultures and urine cultures were sent which are negative to date.    Antimicrobials:  piperacillin/tazobactam 9/30 - 10/6  metronidazole 10/6 - 10/11, 10/12 - current  cefepime: 10/6 - 10/11, 10/12 - current    Assessment:  *Sepsis as evidenced by pyrexia, leukocytosis and tachycardia likely secondary to SBP w/ hx of metastatic colon cancer with mets to liver and lung. Patient was started on piperacillin/tazobactam which penetrates well into the ascites from 9/30 but IR drainage wasn't until 10/6, even after prolonged antibiotic course gram negative rods were still seen on gram stain but were just unable to be cultured, despite negative culture this is likely indicative of SBP, patient a likely candidate for SBP prophylaxis   *Recurrent pyrexia, patient has already complete 2 courses of appropriate antibiotics for SBP and even had occasional fevers during both courses of antibiotics, likely the fevers that are recurring now are secondary to metastatic disease and less likely due to infection  *Transaminitis and elevated Alk phos  *Asymptomatic low grade bacteruria     Recommendations: PLEASE DEFER ALL CHANGES IN PLAN UNTIL SIGNED BY ATTENDING. All recommendations are tentative pending Attending Attestation.  - d/c cefepime and flagyl  - monitor off of antibiotics, if patient continues to please restart piperacillin/tazobactam, obtain cultures and if possible repeat diagnostic paracentesis    Julian Canales DO, PGY-4   Infectious Disease Fellow  [employer] Teams Preferred  After 5pm/weekends call [phone]  Impression:  59 y/o M with pmhx of metastatic colon cancer on active chemotherapy(Avastin, Irinotecan, Leucovorin, and 5FU CADD) biweekly who presented to [facility] for yellowing skin and fatigue.  CT A/P showed peripancreatic edema suspicious for acute pancreatitis new compared to prior; itrahepatic mets with intrahepatic peripheral biliary ductal dilatation R >L with partially visible lung mets with new small ascites. Patient at that time was started on piperacillin/tazobactam from 9/30 until 10/6 and MRCP showed stable pulmonary and hepatic metastatic lesions and biliary ductal dilation secondary to compression of more central duct by a lesion but patient CBD.  Patient underwent IR guided diagnostic and therapeutic paracentesis 10/6 and cultures of the fluid were sent which under gram stain showed GNR but were unable to grow on culture. Due to these findings the patient was treated with 5 day course of metronidazole and cefepime from 10/6 until 10/11. On 10/12 patient again spiked fever to 101.2 and antibiotics were again restarted for concerns of continued SBP and  repeat blood cultures and urine cultures were sent which are negative to date.    Antimicrobials:  piperacillin/tazobactam 9/30 - 10/6  metronidazole 10/6 - 10/11, 10/12 - current  cefepime: 10/6 - 10/11, 10/12 - current    Assessment:  *Sepsis as evidenced by pyrexia, leukocytosis and tachycardia likely secondary to SBP w/ hx of metastatic colon cancer with mets to liver and lung. Patient was started on piperacillin/tazobactam which penetrates well into the ascites from 9/30 but IR drainage wasn't until 10/6, even after prolonged antibiotic course gram negative rods were still seen on gram stain but were just unable to be cultured, despite negative culture this is likely indicative of SBP, Now there are concerns for resistant bacteria such as ESBL due to persistence of pain and pyrexia, patient a likely candidate for SBP prophylaxis   *Transaminitis and elevated Alk phos  *Asymptomatic low grade bacteruria     Recommendations:   - d/c cefepime and flagyl  - Start Ertapenem for coverage of ESBL  - repeat blood culture x 2  - repeat CT abd/pelv to see if there is any visual source of infection  - defer repeat diagnostic tap until results of CT to make sure no other occult sources    Julian Canales DO, PGY-4   Infectious Disease Fellow  [employer] Teams Preferred  After 5pm/weekends call [phone]

## 2023-10-15 NOTE — PROGRESS NOTE ADULT - ASSESSMENT
58M PMH vitiligo, pre-DM, gastritis on PPI, and met colon adenoca p/f onc clinic for new-onset jaundice and fatigue, with sepsis in ED and abnormal LFTs noted on admission labs. His hospital course has also been c/b ascites, cancer-related abd pain, and SBP with recurrence in sepsis after finishing antibiotics.

## 2023-10-15 NOTE — CONSULT NOTE ADULT - ATTENDING COMMENTS
As above  58M with metastatic colon ca on chemo  Extensive hepatic disease/ ?progression  Jaundice likely mostly due to parenchymal disease, however likely some component of biliary obstruction  Rec obtain MRI/MRCP to eval bile ducts to see if stenting is an option  Med onc consult  Pall care consult  Diet as tolerated from GI standpoint    Thank you for this interesting consult.  Please call the advanced GI service with any questions or concerns.
As above.  POCUS showing small pleural effusion.  Given patient is asymptomatic will hold off on intervention at this time.  If status changes, please reconsult.
This is a 59 y/o M w/ PMHx vitiligo, pre-DM, gastritis, metastatic colon adenocarcimoa (dx 1/21) with mets to the liver c/b LBO s/p resection and ostomy 3/21 (last chemo Irinotecan/Panitumumab last 9/23) presented to [facility] on 9/30/23 with jaundice and fatigue, w/ sepsis and transaminitis. C/b recurrent fever, paracentesis done on 10/6, GNR on peritoneal fluid, however did not grow.   ID now consulted for recurrent fevers on Cefepime/Flagyl.     Antibiotics:  Cefepime 10/6-  Flagyl 10/6-  Zosyn 9/30-10/6      #Fever   #Leukocytosis   #SBP  #Abdominal pain   #Metastatic colon cancer w/ liver mets     Overall 59 y/o M w/ metastatic colon adenocarcinoma (dx 1/21) with mets to the liver c/b LBO s/p resection and osotmy 3/21 (last chemo Irinotecan/Panitumumab last 9/23 with abdominal pain and recurrent fevers. Para on 10/6 w/ only 178 nucleated cells (after 1 week of abx), however growth of GNR. Continues to be febrile with uptrending WBC despite 2 weeks of broad antimicrobials. Would expand to ertapenem and repeat CT A/P. If workup is negative, will consider worsening malignancy as possible source of fevers/leukocytosis.     Plan:  1. D/C cefepime/Flagyl, start Ertapenem 1 g q24  2. Repeat BCx x 2  3. Obtain CT A/P w/ IV contrast, if findings of acities, would send diagnostic para     Thank you for this consult. Inpatient ID team will follow.    Zafar Fraire M.D.  Attending Physician  Division of Infectious Diseases  Department of Medicine    Please contact through [employer].  Office: [phone] (after 5 PM or weekend)
58 year old male with metastatic colon cancer on active chemotherapy (Avastin, Irinotecan, Leucovorin, and 5FU CADD) presenting initially to [facility] with jaundice with CT showing metastatic lesions in the liver and acute pancreatitis. Transferred to [facility] for advance GI evaluation of possible obstructing pathology in the setting of hyperbilirubinemia and fever. Palliative team consulted for symptom management.     Patient reports back pain while laying in bed. He states that moving around typically resolves the pain along with tylenol. Patient was open to adding lidocaine patch.   > Pain: tylenol prn and lidocaine patch. Encouraged ambulation as patient reports movement helps with relieving the pain.   > Hyperbilirubinemia: pending mrcp. Appreciate gi recs   > colon cancer- patient has had cancer for 4 years. Currently on DMT with Dr. Ospina at [facility].   > Appreciate inpt heme/onc recs

## 2023-10-15 NOTE — PROGRESS NOTE ADULT - SUBJECTIVE AND OBJECTIVE BOX
CC: Patient is a 58y old  Male who presents with a chief complaint of New onset jaundice and fatigue, with sepsis and abnormal LFTs in the ED (11 Oct 2023 14:35)      INTERVAL EVENTS: MOHAN    SUBJECTIVE / INTERVAL HPI: Patient seen and examined at bedside.     ROS: negative unless otherwise stated above.    VITAL SIGNS:  Vital Signs Last 24 Hrs  T(C): 37.3 (15 Oct 2023 05:29), Max: 37.6 (14 Oct 2023 20:46)  T(F): 99.2 (15 Oct 2023 05:29), Max: 99.6 (14 Oct 2023 20:46)  HR: 104 (15 Oct 2023 05:29) (104 - 107)  BP: 127/92 (15 Oct 2023 05:29) (114/79 - 127/92)  BP(mean): --  RR: 16 (15 Oct 2023 05:29) (16 - 18)  SpO2: 97% (15 Oct 2023 05:29) (96% - 97%)    Parameters below as of 15 Oct 2023 05:29  Patient On (Oxygen Delivery Method): room air          10-14-23 @ 07:01  -  10-15-23 @ 07:00  --------------------------------------------------------  IN: 200 mL / OUT: 900 mL / NET: -700 mL        PHYSICAL EXAM:    General: NAD  HEENT: MMM  Neck: supple  Cardiovascular: +S1/S2; RRR  Respiratory: CTA B/L; no W/R/R  Gastrointestinal: soft; ostomy in place  Extremities: WWP; 2+ edema to b/l hips  Vascular: palpable radial, DP pulses B/L  Neurological: AAOx3; no focal deficits    MEDICATIONS:  MEDICATIONS  (STANDING):  cefepime   IVPB 2000 milliGRAM(s) IV Intermittent every 8 hours  cefepime   IVPB      chlorhexidine 2% Cloths 1 Application(s) Topical daily  chlorhexidine 2% Cloths 1 Application(s) Topical daily  furosemide    Tablet 20 milliGRAM(s) Oral daily  lidocaine   4% Patch 1 Patch Transdermal daily  metroNIDAZOLE  IVPB 500 milliGRAM(s) IV Intermittent every 8 hours  metroNIDAZOLE  IVPB      mirtazapine 7.5 milliGRAM(s) Oral at bedtime  oxyCODONE    IR 5 milliGRAM(s) Oral every 6 hours  phytonadione   Solution 5 milliGRAM(s) Oral once  polyethylene glycol 3350 17 Gram(s) Oral daily  senna 2 Tablet(s) Oral at bedtime  sodium chloride 0.9%. 1000 milliLiter(s) (50 mL/Hr) IV Continuous <Continuous>    MEDICATIONS  (PRN):  melatonin 3 milliGRAM(s) Oral at bedtime PRN Insomnia  ondansetron Injectable 4 milliGRAM(s) IV Push every 8 hours PRN Nausea and/or Vomiting      ALLERGIES:  Allergies    No Known Allergies    Intolerances        LABS:                        10.1   16.89 )-----------( 326      ( 15 Oct 2023 12:57 )             31.6     10-15    142  |  106  |  17  ----------------------------<  130<H>  4.6   |  20<L>  |  0.57    Ca    8.5      15 Oct 2023 12:57    TPro  6.5  /  Alb  2.8<L>  /  TBili  14.7<H>  /  DBili  x   /  AST  181<H>  /  ALT  49<H>  /  AlkPhos  171<H>  10-15      Urinalysis Basic - ( 15 Oct 2023 12:57 )    Color: x / Appearance: x / SG: x / pH: x  Gluc: 130 mg/dL / Ketone: x  / Bili: x / Urobili: x   Blood: x / Protein: x / Nitrite: x   Leuk Esterase: x / RBC: x / WBC x   Sq Epi: x / Non Sq Epi: x / Bacteria: x      CAPILLARY BLOOD GLUCOSE      POCT Blood Glucose.: 96 mg/dL (15 Oct 2023 09:02)      RADIOLOGY & ADDITIONAL TESTS: Reviewed.

## 2023-10-15 NOTE — CONSULT NOTE ADULT - CONSULT REQUESTED DATE/TIME
02-Oct-2023 08:53
15-Oct-2023 08:20
02-Oct-2023 13:26
09-Oct-2023 14:41
05-Oct-2023 12:04
01-Oct-2023 13:10
04-Oct-2023 14:47

## 2023-10-15 NOTE — CONSULT NOTE ADULT - SUBJECTIVE AND OBJECTIVE BOX
Patient is a 58y old  Male who presents with a chief complaint of New onset jaundice and fatigue, with sepsis and abnormal LFTs in the ED     HPI:  57 y/o M with pmhx of metastatic colon cancer on active chemotherapy(Avastin, Irinotecan, Leucovorin, and 5FU CADD) biweekly who presented to [facility] for yellowing skin and fatigue. Patient states that he noticed yellowing of his skin and eyes on Monday. He also endorses decreased appetite for the last 2 weeks with associated weight loss. Patient states that he has lost 8-9 lbs in the last 1 week. He denies any nausea or vomiting. He has noticed increased abdominal distension which he found odd given his lack of appetite. Noted that his urine has been darker than usual but denies any hematuria. He also endorses R flank pain that radiates to his back. At VS, CT A/P showed peripancreatic edema suspicious for acute pancreatitis new compared to prior; itrahepatic mets with intrahepatic peripheral biliary ductal dilatation R >L with partially visible lung mets with new small ascites. ptient was started on zosyn. Patient was transferred  to [facility] for Advanced GI assessment. Patient underwent IR guided diagnostic and therapeutic paracentesis and cultures of the fluid were sent which under gram stain showed GNR but were unable to grow on culture. Due to these findings the patient was treated with 5 day course of metronidazole and cefepime from 10/6 until 10/11. On 10/12 patient again spiked fever to 101.2 and antibiotics were again restarted for concerns of continued SBP and  repeat blood cultures and urine cultures were sent which are negative to date.       REVIEW OF SYSTEMS  pending full examination      prior hospital charts reviewed [V]  primary team notes reviewed [V]  other consultant notes reviewed [V]    PAST MEDICAL & SURGICAL HISTORY:  Vitiligo      Metastatic colon cancer to liver      S/P colectomy      S/P colostomy      Large bowel obstruction          SOCIAL HISTORY:  - Denied smoking/vaping/alcohol/recreational drug use    FAMILY HISTORY:  Family history of colon cancer (Father)    Family history of hypertension (Mother)    Family history of type 2 diabetes mellitus (Father)        Allergies  No Known Allergies        ANTIMICROBIALS:  cefepime   IVPB 2000 every 8 hours  cefepime   IVPB    metroNIDAZOLE  IVPB 500 every 8 hours  metroNIDAZOLE  IVPB        ANTIMICROBIALS (past 90 days):  MEDICATIONS  (STANDING):  cefepime   IVPB   100 mL/Hr IV Intermittent (10-06-23 @ 21:07)    cefepime   IVPB   100 mL/Hr IV Intermittent (10-15-23 @ 05:24)   100 mL/Hr IV Intermittent (10-14-23 @ 21:51)   100 mL/Hr IV Intermittent (10-14-23 @ 13:08)   100 mL/Hr IV Intermittent (10-14-23 @ 06:24)   100 mL/Hr IV Intermittent (10-13-23 @ 22:11)   100 mL/Hr IV Intermittent (10-13-23 @ 13:03)   100 mL/Hr IV Intermittent (10-13-23 @ 05:29)   100 mL/Hr IV Intermittent (10-12-23 @ 21:36)    cefepime   IVPB   100 mL/Hr IV Intermittent (10-11-23 @ 22:54)   100 mL/Hr IV Intermittent (10-11-23 @ 13:41)   100 mL/Hr IV Intermittent (10-11-23 @ 06:18)   100 mL/Hr IV Intermittent (10-10-23 @ 21:43)   100 mL/Hr IV Intermittent (10-10-23 @ 13:48)   100 mL/Hr IV Intermittent (10-10-23 @ 08:35)   100 mL/Hr IV Intermittent (10-09-23 @ 22:40)   100 mL/Hr IV Intermittent (10-09-23 @ 13:15)   100 mL/Hr IV Intermittent (10-09-23 @ 05:07)   100 mL/Hr IV Intermittent (10-08-23 @ 21:14)   100 mL/Hr IV Intermittent (10-08-23 @ 13:39)   100 mL/Hr IV Intermittent (10-08-23 @ 06:09)   100 mL/Hr IV Intermittent (10-07-23 @ 21:43)   100 mL/Hr IV Intermittent (10-07-23 @ 15:33)   100 mL/Hr IV Intermittent (10-07-23 @ 05:17)    cefepime   IVPB   100 mL/Hr IV Intermittent (10-12-23 @ 13:27)    metroNIDAZOLE  IVPB   100 mL/Hr IV Intermittent (10-12-23 @ 14:06)    metroNIDAZOLE  IVPB   100 mL/Hr IV Intermittent (10-11-23 @ 21:52)   100 mL/Hr IV Intermittent (10-11-23 @ 14:29)   100 mL/Hr IV Intermittent (10-11-23 @ 06:59)   100 mL/Hr IV Intermittent (10-10-23 @ 22:38)   100 mL/Hr IV Intermittent (10-10-23 @ 13:48)   100 mL/Hr IV Intermittent (10-10-23 @ 06:58)   100 mL/Hr IV Intermittent (10-09-23 @ 21:28)   100 mL/Hr IV Intermittent (10-09-23 @ 13:15)   100 mL/Hr IV Intermittent (10-09-23 @ 05:44)   100 mL/Hr IV Intermittent (10-08-23 @ 21:47)   100 mL/Hr IV Intermittent (10-08-23 @ 13:39)   100 mL/Hr IV Intermittent (10-08-23 @ 06:09)   100 mL/Hr IV Intermittent (10-07-23 @ 21:43)   100 mL/Hr IV Intermittent (10-07-23 @ 14:34)   100 mL/Hr IV Intermittent (10-07-23 @ 05:18)   100 mL/Hr IV Intermittent (10-06-23 @ 21:43)    metroNIDAZOLE  IVPB   100 mL/Hr IV Intermittent (10-15-23 @ 05:29)   100 mL/Hr IV Intermittent (10-14-23 @ 21:56)   100 mL/Hr IV Intermittent (10-14-23 @ 13:08)   100 mL/Hr IV Intermittent (10-14-23 @ 06:25)   100 mL/Hr IV Intermittent (10-13-23 @ 22:11)   100 mL/Hr IV Intermittent (10-13-23 @ 13:38)   100 mL/Hr IV Intermittent (10-13-23 @ 05:29)   100 mL/Hr IV Intermittent (10-12-23 @ 21:36)    piperacillin/tazobactam IVPB.   200 mL/Hr IV Intermittent (09-30-23 @ 09:42)    piperacillin/tazobactam IVPB.-   25 mL/Hr IV Intermittent (09-30-23 @ 13:55)    piperacillin/tazobactam IVPB..   25 mL/Hr IV Intermittent (10-06-23 @ 13:36)   25 mL/Hr IV Intermittent (10-06-23 @ 06:00)   25 mL/Hr IV Intermittent (10-05-23 @ 22:25)   25 mL/Hr IV Intermittent (10-05-23 @ 13:05)   25 mL/Hr IV Intermittent (10-05-23 @ 06:30)   25 mL/Hr IV Intermittent (10-04-23 @ 21:56)   25 mL/Hr IV Intermittent (10-04-23 @ 13:40)   25 mL/Hr IV Intermittent (10-04-23 @ 05:01)   25 mL/Hr IV Intermittent (10-03-23 @ 21:35)   25 mL/Hr IV Intermittent (10-03-23 @ 15:57)   25 mL/Hr IV Intermittent (10-03-23 @ 06:30)   25 mL/Hr IV Intermittent (10-02-23 @ 21:39)   25 mL/Hr IV Intermittent (10-02-23 @ 13:57)   25 mL/Hr IV Intermittent (10-02-23 @ 06:06)   25 mL/Hr IV Intermittent (10-01-23 @ 22:18)   25 mL/Hr IV Intermittent (10-01-23 @ 13:38)   25 mL/Hr IV Intermittent (10-01-23 @ 05:49)   25 mL/Hr IV Intermittent (09-30-23 @ 22:11)        OTHER MEDS:   MEDICATIONS  (STANDING):  furosemide    Tablet 20 daily  melatonin 3 at bedtime PRN  mirtazapine 7.5 at bedtime  ondansetron Injectable 4 every 8 hours PRN  oxyCODONE    IR 5 every 6 hours  polyethylene glycol 3350 17 daily  senna 2 at bedtime      VITALS:  Vital Signs Last 24 Hrs  T(F): 99.2 (10-15-23 @ 05:29), Max: 101.8 (10-14-23 @ 06:20)    Vital Signs Last 24 Hrs  HR: 104 (10-15-23 @ 05:29) (99 - 107)  BP: 127/92 (10-15-23 @ 05:29) (102/71 - 127/92)  RR: 16 (10-15-23 @ 05:29)  SpO2: 97% (10-15-23 @ 05:29) (96% - 100%)  Wt(kg): --    EXAM:  pending full examination      Labs:                        11.3   16.33 )-----------( 399      ( 14 Oct 2023 06:37 )             34.8     10-14    140  |  107  |  14  ----------------------------<  70  5.0   |  20<L>  |  0.56    Ca    8.9      14 Oct 2023 06:37    TPro  7.0  /  Alb  2.6<L>  /  TBili  14.1<H>  /  DBili  x   /  AST  201<H>  /  ALT  51<H>  /  AlkPhos  204<H>  10-14      WBC Trend:  WBC Count: 16.33 (10-14-23 @ 06:37)  WBC Count: 17.60 (10-13-23 @ 05:23)  WBC Count: 13.56 (10-12-23 @ 05:27)  WBC Count: 14.42 (10-11-23 @ 06:12)      Auto Neutrophil #: 11.35 K/uL (10-05-23 @ 05:38)  Auto Neutrophil #: 11.11 K/uL (10-04-23 @ 05:15)  Auto Neutrophil #: 8.98 K/uL (10-03-23 @ 05:46)  Auto Neutrophil #: 8.62 K/uL (10-02-23 @ 06:30)  Auto Neutrophil #: 8.71 K/uL (10-01-23 @ 06:00)      Creatine Trend:  Creatinine: 0.56 (10-14)  Creatinine: 0.58 (10-13)  Creatinine: 0.51 (10-12)  Creatinine: 0.52 (10-11)      Liver Biochemical Testing Trend:  Alanine Aminotransferase (ALT/SGPT): 51 *H* (10-14)  Alanine Aminotransferase (ALT/SGPT): 59 *H* (10-13)  Alanine Aminotransferase (ALT/SGPT): 57 *H* (10-12)  Alanine Aminotransferase (ALT/SGPT): 64 *H* (10-11)  Alanine Aminotransferase (ALT/SGPT): 67 *H* (10-10)  Aspartate Aminotransferase (AST/SGOT): 201 (10-14-23 @ 06:37)  Aspartate Aminotransferase (AST/SGOT): 207 (10-13-23 @ 05:23)  Aspartate Aminotransferase (AST/SGOT): 189 (10-12-23 @ 05:27)  Aspartate Aminotransferase (AST/SGOT): 187 (10-11-23 @ 06:12)  Aspartate Aminotransferase (AST/SGOT): 200 (10-10-23 @ 05:45)  Bilirubin Total: 14.1 (10-14)  Bilirubin Total: 12.8 (10-13)  Bilirubin Total: 13.6 (10-12)  Bilirubin Total: 13.9 (10-11)  Bilirubin Total: 13.7 (10-10)      Trend LDH  09-25-23 @ 23:55  966<H>  01-20-21 @ 07:55  942<H>          Urinalysis Basic - ( 14 Oct 2023 06:37 )    Color: x / Appearance: x / SG: x / pH: x  Gluc: 70 mg/dL / Ketone: x  / Bili: x / Urobili: x   Blood: x / Protein: x / Nitrite: x   Leuk Esterase: x / RBC: x / WBC x   Sq Epi: x / Non Sq Epi: x / Bacteria: x        MICROBIOLOGY:    MRSA PCR Result.: NotDetec (10-11-23 @ 06:33)  MRSA PCR Result.: NotDetec (10-04-23 @ 05:15)  MRSA PCR Result.: NotDetec (09-30-23 @ 22:15)      Culture - Urine (collected 12 Oct 2023 11:00)  Source: Clean Catch Clean Catch (Midstream)  Final Report:    No growth    Culture - Blood (collected 12 Oct 2023 00:48)  Source: .Blood Blood-Peripheral  Preliminary Report:    No growth at 48 Hours    Culture - Blood (collected 12 Oct 2023 00:45)  Source: .Blood Blood-Venous  Preliminary Report:    No growth at 48 Hours    Culture - Fungal, Body Fluid (collected 06 Oct 2023 16:55)  Source: Ascites Fl ASCITES  Preliminary Report:    No fungus isolated at 1 week.    Culture - Body Fluid with Gram Stain (collected 06 Oct 2023 16:55)  Source: Ascites Fl ASCITES  Final Report:    No growth    Organism seen in Gram stain is non-viable after prolonged    incubation and repeated subculture.    Culture - Blood (collected 06 Oct 2023 10:45)  Source: .Blood Blood-Venous  Final Report:    No growth at 5 days    Culture - Blood (collected 06 Oct 2023 10:45)  Source: .Blood Blood-Peripheral  Final Report:    No growth at 5 days    Culture - Urine (collected 04 Oct 2023 15:39)  Source: Clean Catch Clean Catch (Midstream)  Final Report:    No growth    Culture - Blood (collected 04 Oct 2023 11:45)  Source: .Blood Blood-Peripheral  Final Report:    No growth at 5 days    Culture - Blood (collected 04 Oct 2023 11:42)  Source: .Blood Blood-Peripheral  Final Report:    No growth at 5 days    Rapid RVP Result: NotDetec (10-12 @ 02:18)      A1C with Estimated Average Glucose Result: 5.6 % (09-27-23 @ 08:15)      RADIOLOGY:  < from: MR MRCP w/wo IV Cont (10.03.23 @ 14:50) >  IMPRESSION:  1.  New moderate right pleural effusion and interval increase of moderate   ascites.  2.  Grossly stable pulmonary and hepatic metastatic lesions.  3.  Stable mild intrahepatic biliary ductal dilatation secondary to   compression of more central duct by metastatic lesions in the hilum.   Unremarkable CBD.      < from: IR Procedure (10.06.23 @ 16:10) >    Impression:  Successful sonographic guided diagnostic/ therapeutic paracentesis with   aspiration of 1260 cc of ascitic fluid.  Sample sent for cytology, chemistry and microbiology.      < from: US Abdomen Complete (US Abdomen Complete .) (10.10.23 @ 13:23) >  IMPRESSION:    Small volume ascites.       Patient is a 58y old  Male who presents with a chief complaint of New onset jaundice and fatigue, with sepsis and abnormal LFTs in the ED     HPI:  57 y/o M with pmhx of metastatic colon cancer on active chemotherapy(Avastin, Irinotecan, Leucovorin, and 5FU CADD) biweekly who presented to [facility] for yellowing skin and fatigue. Patient states that he noticed yellowing of his skin and eyes on Monday. He also endorses decreased appetite for the last 2 weeks with associated weight loss. Patient states that he has lost 8-9 lbs in the last 1 week. He denies any nausea or vomiting. He has noticed increased abdominal distension which he found odd given his lack of appetite. Noted that his urine has been darker than usual but denies any hematuria. He also endorses R flank pain that radiates to his back. At VS, CT A/P showed peripancreatic edema suspicious for acute pancreatitis new compared to prior; itrahepatic mets with intrahepatic peripheral biliary ductal dilatation R >L with partially visible lung mets with new small ascites. patient was started on zosyn. Patient was transferred  to [facility] for Advanced GI assessment. Patient underwent IR guided diagnostic and therapeutic paracentesis and cultures of the fluid were sent which under gram stain showed GNR but were unable to grow on culture. Due to these findings the patient was treated with 5 day course of metronidazole and cefepime from 10/6 until 10/11. On 10/12 patient again spiked fever to 101.2 and antibiotics were again restarted for concerns of continued SBP and  repeat blood cultures and urine cultures were sent which are negative to date. patient currently states that his pain is relatively unchanged that what it was and that it is still located diffusely over his abdomen and occasionally radiates to his back. He also admits to having some diarrhea that comes and goes that is normal in color. lastly, he still admits to having some fevers.        REVIEW OF SYSTEMS  Constitutional: Positive fevers, No chills, weight loss or fatigue   Skin: No rash, no phlebitis	  Eyes: No discharge	  ENMT: No sore throat, oral thrush, ulcers or exudate  Respiratory: No cough, no SOB  Cardiovascular:  No chest pain, palpitations or edema   Gastrointestinal: Positve pain and diarrhea, No nausea, vomiting or constipation	  Genitourinary: No dysuria, discharge or flank pain  MSK: No arthralgias, positive back pain   Neurological: No HA, no weakness, no seizures, no AMS       prior hospital charts reviewed [V]  primary team notes reviewed [V]  other consultant notes reviewed [V]    PAST MEDICAL & SURGICAL HISTORY:  Vitiligo      Metastatic colon cancer to liver      S/P colectomy      S/P colostomy      Large bowel obstruction          SOCIAL HISTORY:  lives at home with wife a kids, has no sick contacts, last travel was in august 2023 to [place] and in 2022 to [place] and [place], he was born in [facility] denies smoking, occasional social drinker    FAMILY HISTORY:  Family history of colon cancer (Father)    Family history of hypertension (Mother)    Family history of type 2 diabetes mellitus (Father)        Allergies  No Known Allergies        ANTIMICROBIALS:  cefepime   IVPB 2000 every 8 hours  cefepime   IVPB    metroNIDAZOLE  IVPB 500 every 8 hours  metroNIDAZOLE  IVPB        ANTIMICROBIALS (past 90 days):  MEDICATIONS  (STANDING):  cefepime   IVPB   100 mL/Hr IV Intermittent (10-06-23 @ 21:07)    cefepime   IVPB   100 mL/Hr IV Intermittent (10-15-23 @ 05:24)   100 mL/Hr IV Intermittent (10-14-23 @ 21:51)   100 mL/Hr IV Intermittent (10-14-23 @ 13:08)   100 mL/Hr IV Intermittent (10-14-23 @ 06:24)   100 mL/Hr IV Intermittent (10-13-23 @ 22:11)   100 mL/Hr IV Intermittent (10-13-23 @ 13:03)   100 mL/Hr IV Intermittent (10-13-23 @ 05:29)   100 mL/Hr IV Intermittent (10-12-23 @ 21:36)    cefepime   IVPB   100 mL/Hr IV Intermittent (10-11-23 @ 22:54)   100 mL/Hr IV Intermittent (10-11-23 @ 13:41)   100 mL/Hr IV Intermittent (10-11-23 @ 06:18)   100 mL/Hr IV Intermittent (10-10-23 @ 21:43)   100 mL/Hr IV Intermittent (10-10-23 @ 13:48)   100 mL/Hr IV Intermittent (10-10-23 @ 08:35)   100 mL/Hr IV Intermittent (10-09-23 @ 22:40)   100 mL/Hr IV Intermittent (10-09-23 @ 13:15)   100 mL/Hr IV Intermittent (10-09-23 @ 05:07)   100 mL/Hr IV Intermittent (10-08-23 @ 21:14)   100 mL/Hr IV Intermittent (10-08-23 @ 13:39)   100 mL/Hr IV Intermittent (10-08-23 @ 06:09)   100 mL/Hr IV Intermittent (10-07-23 @ 21:43)   100 mL/Hr IV Intermittent (10-07-23 @ 15:33)   100 mL/Hr IV Intermittent (10-07-23 @ 05:17)    cefepime   IVPB   100 mL/Hr IV Intermittent (10-12-23 @ 13:27)    metroNIDAZOLE  IVPB   100 mL/Hr IV Intermittent (10-12-23 @ 14:06)    metroNIDAZOLE  IVPB   100 mL/Hr IV Intermittent (10-11-23 @ 21:52)   100 mL/Hr IV Intermittent (10-11-23 @ 14:29)   100 mL/Hr IV Intermittent (10-11-23 @ 06:59)   100 mL/Hr IV Intermittent (10-10-23 @ 22:38)   100 mL/Hr IV Intermittent (10-10-23 @ 13:48)   100 mL/Hr IV Intermittent (10-10-23 @ 06:58)   100 mL/Hr IV Intermittent (10-09-23 @ 21:28)   100 mL/Hr IV Intermittent (10-09-23 @ 13:15)   100 mL/Hr IV Intermittent (10-09-23 @ 05:44)   100 mL/Hr IV Intermittent (10-08-23 @ 21:47)   100 mL/Hr IV Intermittent (10-08-23 @ 13:39)   100 mL/Hr IV Intermittent (10-08-23 @ 06:09)   100 mL/Hr IV Intermittent (10-07-23 @ 21:43)   100 mL/Hr IV Intermittent (10-07-23 @ 14:34)   100 mL/Hr IV Intermittent (10-07-23 @ 05:18)   100 mL/Hr IV Intermittent (10-06-23 @ 21:43)    metroNIDAZOLE  IVPB   100 mL/Hr IV Intermittent (10-15-23 @ 05:29)   100 mL/Hr IV Intermittent (10-14-23 @ 21:56)   100 mL/Hr IV Intermittent (10-14-23 @ 13:08)   100 mL/Hr IV Intermittent (10-14-23 @ 06:25)   100 mL/Hr IV Intermittent (10-13-23 @ 22:11)   100 mL/Hr IV Intermittent (10-13-23 @ 13:38)   100 mL/Hr IV Intermittent (10-13-23 @ 05:29)   100 mL/Hr IV Intermittent (10-12-23 @ 21:36)    piperacillin/tazobactam IVPB.   200 mL/Hr IV Intermittent (09-30-23 @ 09:42)    piperacillin/tazobactam IVPB.-   25 mL/Hr IV Intermittent (09-30-23 @ 13:55)    piperacillin/tazobactam IVPB..   25 mL/Hr IV Intermittent (10-06-23 @ 13:36)   25 mL/Hr IV Intermittent (10-06-23 @ 06:00)   25 mL/Hr IV Intermittent (10-05-23 @ 22:25)   25 mL/Hr IV Intermittent (10-05-23 @ 13:05)   25 mL/Hr IV Intermittent (10-05-23 @ 06:30)   25 mL/Hr IV Intermittent (10-04-23 @ 21:56)   25 mL/Hr IV Intermittent (10-04-23 @ 13:40)   25 mL/Hr IV Intermittent (10-04-23 @ 05:01)   25 mL/Hr IV Intermittent (10-03-23 @ 21:35)   25 mL/Hr IV Intermittent (10-03-23 @ 15:57)   25 mL/Hr IV Intermittent (10-03-23 @ 06:30)   25 mL/Hr IV Intermittent (10-02-23 @ 21:39)   25 mL/Hr IV Intermittent (10-02-23 @ 13:57)   25 mL/Hr IV Intermittent (10-02-23 @ 06:06)   25 mL/Hr IV Intermittent (10-01-23 @ 22:18)   25 mL/Hr IV Intermittent (10-01-23 @ 13:38)   25 mL/Hr IV Intermittent (10-01-23 @ 05:49)   25 mL/Hr IV Intermittent (09-30-23 @ 22:11)        OTHER MEDS:   MEDICATIONS  (STANDING):  furosemide    Tablet 20 daily  melatonin 3 at bedtime PRN  mirtazapine 7.5 at bedtime  ondansetron Injectable 4 every 8 hours PRN  oxyCODONE    IR 5 every 6 hours  polyethylene glycol 3350 17 daily  senna 2 at bedtime      VITALS:  Vital Signs Last 24 Hrs  T(F): 99.2 (10-15-23 @ 05:29), Max: 101.8 (10-14-23 @ 06:20)    Vital Signs Last 24 Hrs  HR: 104 (10-15-23 @ 05:29) (99 - 107)  BP: 127/92 (10-15-23 @ 05:29) (102/71 - 127/92)  RR: 16 (10-15-23 @ 05:29)  SpO2: 97% (10-15-23 @ 05:29) (96% - 100%)  Wt(kg): --    EXAM:  General: Patient appears uncomfortable, and fatigued  HEENT: NCAT, PERRL, icteric sclera, mucous membranes slightly dry  Neck: Supple, No lymphadenopathy  CV: +S1/S2, RRR, no M/R/G, R chest port without pain to palpation  Lungs: No respiratory distress, CTA b/l, no wheezing, rales or rhonchi  Abd:  BS4+, distended, pain to palpation in all quadrants, colostomy present  : No suprapubic tenderness  Neuro: AAOx3. No focal deficits noted.   Ext: No cyanosis, no edema, 2+ pulses in upper and lower extremities   Msk: freely moving upper and lower extremities  Skin: No rash, no phlebitis, erythema or edema, vitiligo present in upper and lower extremities and back      Labs:                        11.3   16.33 )-----------( 399      ( 14 Oct 2023 06:37 )             34.8     10-14    140  |  107  |  14  ----------------------------<  70  5.0   |  20<L>  |  0.56    Ca    8.9      14 Oct 2023 06:37    TPro  7.0  /  Alb  2.6<L>  /  TBili  14.1<H>  /  DBili  x   /  AST  201<H>  /  ALT  51<H>  /  AlkPhos  204<H>  10-14      WBC Trend:  WBC Count: 16.33 (10-14-23 @ 06:37)  WBC Count: 17.60 (10-13-23 @ 05:23)  WBC Count: 13.56 (10-12-23 @ 05:27)  WBC Count: 14.42 (10-11-23 @ 06:12)      Auto Neutrophil #: 11.35 K/uL (10-05-23 @ 05:38)  Auto Neutrophil #: 11.11 K/uL (10-04-23 @ 05:15)  Auto Neutrophil #: 8.98 K/uL (10-03-23 @ 05:46)  Auto Neutrophil #: 8.62 K/uL (10-02-23 @ 06:30)  Auto Neutrophil #: 8.71 K/uL (10-01-23 @ 06:00)      Creatine Trend:  Creatinine: 0.56 (10-14)  Creatinine: 0.58 (10-13)  Creatinine: 0.51 (10-12)  Creatinine: 0.52 (10-11)      Liver Biochemical Testing Trend:  Alanine Aminotransferase (ALT/SGPT): 51 *H* (10-14)  Alanine Aminotransferase (ALT/SGPT): 59 *H* (10-13)  Alanine Aminotransferase (ALT/SGPT): 57 *H* (10-12)  Alanine Aminotransferase (ALT/SGPT): 64 *H* (10-11)  Alanine Aminotransferase (ALT/SGPT): 67 *H* (10-10)  Aspartate Aminotransferase (AST/SGOT): 201 (10-14-23 @ 06:37)  Aspartate Aminotransferase (AST/SGOT): 207 (10-13-23 @ 05:23)  Aspartate Aminotransferase (AST/SGOT): 189 (10-12-23 @ 05:27)  Aspartate Aminotransferase (AST/SGOT): 187 (10-11-23 @ 06:12)  Aspartate Aminotransferase (AST/SGOT): 200 (10-10-23 @ 05:45)  Bilirubin Total: 14.1 (10-14)  Bilirubin Total: 12.8 (10-13)  Bilirubin Total: 13.6 (10-12)  Bilirubin Total: 13.9 (10-11)  Bilirubin Total: 13.7 (10-10)      Trend LDH  09-25-23 @ 23:55  966<H>  01-20-21 @ 07:55  942<H>          Urinalysis Basic - ( 14 Oct 2023 06:37 )    Color: x / Appearance: x / SG: x / pH: x  Gluc: 70 mg/dL / Ketone: x  / Bili: x / Urobili: x   Blood: x / Protein: x / Nitrite: x   Leuk Esterase: x / RBC: x / WBC x   Sq Epi: x / Non Sq Epi: x / Bacteria: x        MICROBIOLOGY:    MRSA PCR Result.: NotDetec (10-11-23 @ 06:33)  MRSA PCR Result.: NotDetec (10-04-23 @ 05:15)  MRSA PCR Result.: NotDetec (09-30-23 @ 22:15)      Culture - Urine (collected 12 Oct 2023 11:00)  Source: Clean Catch Clean Catch (Midstream)  Final Report:    No growth    Culture - Blood (collected 12 Oct 2023 00:48)  Source: .Blood Blood-Peripheral  Preliminary Report:    No growth at 48 Hours    Culture - Blood (collected 12 Oct 2023 00:45)  Source: .Blood Blood-Venous  Preliminary Report:    No growth at 48 Hours    Culture - Fungal, Body Fluid (collected 06 Oct 2023 16:55)  Source: Ascites Fl ASCITES  Preliminary Report:    No fungus isolated at 1 week.    Culture - Body Fluid with Gram Stain (collected 06 Oct 2023 16:55)  Source: Ascites Fl ASCITES  Final Report:    No growth    Organism seen in Gram stain is non-viable after prolonged    incubation and repeated subculture.    Culture - Blood (collected 06 Oct 2023 10:45)  Source: .Blood Blood-Venous  Final Report:    No growth at 5 days    Culture - Blood (collected 06 Oct 2023 10:45)  Source: .Blood Blood-Peripheral  Final Report:    No growth at 5 days    Culture - Urine (collected 04 Oct 2023 15:39)  Source: Clean Catch Clean Catch (Midstream)  Final Report:    No growth    Culture - Blood (collected 04 Oct 2023 11:45)  Source: .Blood Blood-Peripheral  Final Report:    No growth at 5 days    Culture - Blood (collected 04 Oct 2023 11:42)  Source: .Blood Blood-Peripheral  Final Report:    No growth at 5 days    Rapid RVP Result: NotDetec (10-12 @ 02:18)      A1C with Estimated Average Glucose Result: 5.6 % (09-27-23 @ 08:15)      RADIOLOGY:  < from: MR MRCP w/wo IV Cont (10.03.23 @ 14:50) >  IMPRESSION:  1.  New moderate right pleural effusion and interval increase of moderate   ascites.  2.  Grossly stable pulmonary and hepatic metastatic lesions.  3.  Stable mild intrahepatic biliary ductal dilatation secondary to   compression of more central duct by metastatic lesions in the hilum.   Unremarkable CBD.      < from: IR Procedure (10.06.23 @ 16:10) >    Impression:  Successful sonographic guided diagnostic/ therapeutic paracentesis with   aspiration of 1260 cc of ascitic fluid.  Sample sent for cytology, chemistry and microbiology.      < from: US Abdomen Complete (US Abdomen Complete .) (10.10.23 @ 13:23) >  IMPRESSION:    Small volume ascites.

## 2023-10-15 NOTE — CONSULT NOTE ADULT - PROVIDER SPECIALTY LIST ADULT
Intervent Radiology
Gastroenterology
Infectious Disease
Heme/Onc
Intervent Radiology
Pulmonology
Palliative Care

## 2023-10-15 NOTE — PROGRESS NOTE ADULT - PROBLEM SELECTOR PLAN 1
pt previously completed 5d course for suspected SBP, 10/6-11, now with T 101.8 while on cefepime and flagyl.  - CXR with layering pleural effusion but per pulm, unable to tap. no evidence of pna.  - UA without WBCs and UCx negative  - MRCP with dilation in multiple isolated biliary duct branches 2/2 diffuse metastatic disease. They are not amenable to stenting via ERCP per GI  - Blood cxs remain negative to date  Plan:  - could be recurrence of SBP, although pt with only small volume ascites that is not amenable to drainage  - ID consulted, preliminary recs appreciated  - Continuing empiric Cefepime/Flagyl for now, but will likely d/c pending finalization of ID recs as fevers likely 2/2 metastatic disease. regardless, should be on SBP ppx with bactrim once abx d/c'ed  - f/u BCx

## 2023-10-16 DIAGNOSIS — Z71.89 OTHER SPECIFIED COUNSELING: ICD-10-CM

## 2023-10-16 LAB
ALBUMIN SERPL ELPH-MCNC: 2.6 G/DL — LOW (ref 3.3–5)
ALP SERPL-CCNC: 147 U/L — HIGH (ref 40–120)
ALT FLD-CCNC: 43 U/L — HIGH (ref 4–41)
ANION GAP SERPL CALC-SCNC: 11 MMOL/L — SIGNIFICANT CHANGE UP (ref 7–14)
AST SERPL-CCNC: 171 U/L — HIGH (ref 4–40)
BILIRUB SERPL-MCNC: 12 MG/DL — HIGH (ref 0.2–1.2)
BUN SERPL-MCNC: 18 MG/DL — SIGNIFICANT CHANGE UP (ref 7–23)
CALCIUM SERPL-MCNC: 8.5 MG/DL — SIGNIFICANT CHANGE UP (ref 8.4–10.5)
CHLORIDE SERPL-SCNC: 110 MMOL/L — HIGH (ref 98–107)
CO2 SERPL-SCNC: 22 MMOL/L — SIGNIFICANT CHANGE UP (ref 22–31)
CREAT SERPL-MCNC: 0.55 MG/DL — SIGNIFICANT CHANGE UP (ref 0.5–1.3)
EGFR: 113 ML/MIN/1.73M2 — SIGNIFICANT CHANGE UP
GLUCOSE BLDC GLUCOMTR-MCNC: 117 MG/DL — HIGH (ref 70–99)
GLUCOSE BLDC GLUCOMTR-MCNC: 117 MG/DL — HIGH (ref 70–99)
GLUCOSE BLDC GLUCOMTR-MCNC: 95 MG/DL — SIGNIFICANT CHANGE UP (ref 70–99)
GLUCOSE SERPL-MCNC: 122 MG/DL — HIGH (ref 70–99)
HCT VFR BLD CALC: 29.4 % — LOW (ref 39–50)
HGB BLD-MCNC: 9.3 G/DL — LOW (ref 13–17)
MCHC RBC-ENTMCNC: 24.8 PG — LOW (ref 27–34)
MCHC RBC-ENTMCNC: 31.6 GM/DL — LOW (ref 32–36)
MCV RBC AUTO: 78.4 FL — LOW (ref 80–100)
NRBC # BLD: 0 /100 WBCS — SIGNIFICANT CHANGE UP (ref 0–0)
NRBC # FLD: 0.09 K/UL — HIGH (ref 0–0)
PLATELET # BLD AUTO: 306 K/UL — SIGNIFICANT CHANGE UP (ref 150–400)
POTASSIUM SERPL-MCNC: 4.2 MMOL/L — SIGNIFICANT CHANGE UP (ref 3.5–5.3)
POTASSIUM SERPL-SCNC: 4.2 MMOL/L — SIGNIFICANT CHANGE UP (ref 3.5–5.3)
PROT SERPL-MCNC: 6.3 G/DL — SIGNIFICANT CHANGE UP (ref 6–8.3)
RBC # BLD: 3.75 M/UL — LOW (ref 4.2–5.8)
RBC # FLD: 26.1 % — HIGH (ref 10.3–14.5)
SODIUM SERPL-SCNC: 143 MMOL/L — SIGNIFICANT CHANGE UP (ref 135–145)
WBC # BLD: 14.91 K/UL — HIGH (ref 3.8–10.5)
WBC # FLD AUTO: 14.91 K/UL — HIGH (ref 3.8–10.5)

## 2023-10-16 PROCEDURE — 99233 SBSQ HOSP IP/OBS HIGH 50: CPT

## 2023-10-16 PROCEDURE — 99497 ADVNCD CARE PLAN 30 MIN: CPT

## 2023-10-16 PROCEDURE — 74177 CT ABD & PELVIS W/CONTRAST: CPT | Mod: 26

## 2023-10-16 PROCEDURE — 99232 SBSQ HOSP IP/OBS MODERATE 35: CPT

## 2023-10-16 RX ADMIN — OXYCODONE HYDROCHLORIDE 5 MILLIGRAM(S): 5 TABLET ORAL at 06:34

## 2023-10-16 RX ADMIN — OXYCODONE HYDROCHLORIDE 5 MILLIGRAM(S): 5 TABLET ORAL at 23:45

## 2023-10-16 RX ADMIN — OXYCODONE HYDROCHLORIDE 5 MILLIGRAM(S): 5 TABLET ORAL at 00:41

## 2023-10-16 RX ADMIN — CHLORHEXIDINE GLUCONATE 1 APPLICATION(S): 213 SOLUTION TOPICAL at 13:13

## 2023-10-16 RX ADMIN — MIRTAZAPINE 7.5 MILLIGRAM(S): 45 TABLET, ORALLY DISINTEGRATING ORAL at 21:25

## 2023-10-16 RX ADMIN — OXYCODONE HYDROCHLORIDE 5 MILLIGRAM(S): 5 TABLET ORAL at 05:34

## 2023-10-16 RX ADMIN — OXYCODONE HYDROCHLORIDE 5 MILLIGRAM(S): 5 TABLET ORAL at 13:12

## 2023-10-16 RX ADMIN — CHLORHEXIDINE GLUCONATE 1 APPLICATION(S): 213 SOLUTION TOPICAL at 13:14

## 2023-10-16 RX ADMIN — OXYCODONE HYDROCHLORIDE 5 MILLIGRAM(S): 5 TABLET ORAL at 14:12

## 2023-10-16 RX ADMIN — ERTAPENEM SODIUM 120 MILLIGRAM(S): 1 INJECTION, POWDER, LYOPHILIZED, FOR SOLUTION INTRAMUSCULAR; INTRAVENOUS at 18:44

## 2023-10-16 RX ADMIN — Medication 20 MILLIGRAM(S): at 05:34

## 2023-10-16 NOTE — PROGRESS NOTE ADULT - PROBLEM SELECTOR PLAN 2
CT (9/26): Peripancreatic edema suspicious for acute pancreatitis, new compared to prior. While less likely this could also represent noninflammatory edema/third spacing of fluid. Small amount of ascites is new. Otherwise similar to prior with a large number of hepatic metastases, intrahepatic peripheral biliary ductal dilation in the right greater than left lobes, partially visible lung metastases, and likely metastatic mild upper abdominal and partially visible mediastinal adenopathy.  > s/p MRCP - Not a candidate for stenting   > Appreciate GI recs

## 2023-10-16 NOTE — PROGRESS NOTE ADULT - ASSESSMENT
58M PMH vitiligo, pre-DM, gastritis on PPI, and met colon adenoca p/f onc clinic for new-onset jaundice and fatigue, with sepsis in ED and abnormal LFTs noted on admission labs. His hospital course has also been c/b ascites, cancer-related abd pain, and SBP with recurrence in sepsis after finishing antibiotics.    c/w Iv Ertapenam

## 2023-10-16 NOTE — PROGRESS NOTE ADULT - PROBLEM SELECTOR PLAN 1
pt previously completed 5d course for suspected SBP, 10/6-11, now with T 101.8 while on cefepime and flagyl.  - CXR with layering pleural effusion but per pulm, unable to tap. no evidence of pna.  - UA without WBCs and UCx negative  - MRCP with dilation in multiple isolated biliary duct branches 2/2 diffuse metastatic disease. They are not amenable to stenting via ERCP per GI  - Blood cxs remain negative to date  Plan:  - could be recurrence of SBP, although pt with only small volume ascites that is not amenable to drainage  - ID consulted, preliminary recs appreciated  - Continuing empiric Cefepime/Flagyl for now, but will likely d/c pending finalization of ID recs as fevers likely 2/2 metastatic disease. regardless, should be on SBP ppx with bactrim once abx d/c'ed  - f/u BCx pt previously completed 5d course for suspected SBP, 10/6-11, now with T 101.8 while on cefepime and flagyl.  - CXR with layering pleural effusion but per pulm, unable to tap. no evidence of pna.  - UA without WBCs and UCx negative  - MRCP with dilation in multiple isolated biliary duct branches 2/2 diffuse metastatic disease. They are not amenable to stenting via ERCP per GI  - Blood cxs remain negative to date  Plan:  - could be recurrence of SBP, although pt with only small volume ascites that is not amenable to drainage  - ID consulted, preliminary recs appreciated  - Was on empiric Cefepime/Flagyl for now, but will likely d/c pending finalization of ID recs as fevers likely 2/2 metastatic disease. regardless, should be on SBP ppx with bactrim once abx d/c'ed  NOW on Ertapenem IV   - f/u BCx pt previously completed 5d course for suspected SBP, 10/6-11, now with T 101.8 while on cefepime and flagyl.  - CXR with layering pleural effusion but per pulm, unable to tap. no evidence of pna.  - UA without WBCs and UCx negative  - MRCP with dilation in multiple isolated biliary duct branches 2/2 diffuse metastatic disease. They are not amenable to stenting via ERCP per GI  - Blood cxs remain negative to date  Plan:  - could be recurrence of SBP, although pt with only small volume ascites that is not amenable to drainage  - ID consulted, preliminary recs appreciated  - Was on empiric Cefepime/Flagyl for now, but will likely d/c pending finalization of ID recs as fevers likely 2/2 metastatic disease. regardless, should be on SBP ppx with bactrim once abx d/c'ed  NOW on Ertapenem IV , tmax 101.8 on 10/14/23, f/u ID  - f/u BCx

## 2023-10-16 NOTE — PROGRESS NOTE ADULT - SUBJECTIVE AND OBJECTIVE BOX
[facility]  Araceli Freitas Palliative Care Attending  Contact Info: Page [phone] (including Nights/Weekends), message on Microsoft Teams (Araceli Freitas), or leave  at Palliative Office [phone] (non-urgent)   Date of Kjrelix02-14-63 @ 16:27    SUBJECTIVE AND OBJECTIVE: Patient seen this afternoon lying in bed. Patient reports back pain from UTI and wants to have infection treated. He states that oxycodone is working for him and he was open to adding PRN pain medications as well. He is producing stool in colostomy bag. He stated he doesn't want bowel regimen as he will tell provider when he is constipated, which he is not.     Indication for Geriatrics and Palliative Care Services/INTERVAL HPI: sx management and goc     OVERNIGHT EVENTS:  > 10/16: Palliative team re-consulted for goc. Chart reviewed.     DNR on chart:  Allergies    No Known Allergies    Intolerances    MEDICATIONS  (STANDING):  chlorhexidine 2% Cloths 1 Application(s) Topical daily  chlorhexidine 2% Cloths 1 Application(s) Topical daily  ertapenem  IVPB 1000 milliGRAM(s) IV Intermittent every 24 hours  ertapenem  IVPB      furosemide    Tablet 20 milliGRAM(s) Oral daily  lidocaine   4% Patch 1 Patch Transdermal daily  mirtazapine 7.5 milliGRAM(s) Oral at bedtime  oxyCODONE    IR 5 milliGRAM(s) Oral every 6 hours  phytonadione   Solution 5 milliGRAM(s) Oral once  polyethylene glycol 3350 17 Gram(s) Oral daily  senna 2 Tablet(s) Oral at bedtime  sodium chloride 0.9%. 1000 milliLiter(s) (50 mL/Hr) IV Continuous <Continuous>    MEDICATIONS  (PRN):  melatonin 3 milliGRAM(s) Oral at bedtime PRN Insomnia  ondansetron Injectable 4 milliGRAM(s) IV Push every 8 hours PRN Nausea and/or Vomiting      ITEMS UNCHECKED ARE NOT PRESENT    PRESENT SYMPTOMS: [ ]Unable to self-report - see [ ] CPOT [ ] PAINADS [ ] RDOS  Source if other than patient:  [ ]Family   [ ]Team     Pain:  [ x]yes [ ]no  QOL impact - ability to perform ADLs   Location -     low back pain                Aggravating factors - cancer vs uti   Quality - ache   Radiation - denies   Timing- constant   Severity (0-10 scale): 6  Minimal acceptable level/ pain goal (0-10 scale): 2    CPOT:    https://www.[facility].org/getattachment/[email or web address]/Critical-Care-Pain-Observation-Tool-(CPOT)    Dyspnea:                           [ ]Mild [ ]Moderate [ ]Severe  Anxiety:                             [ ]Mild [ ]Moderate [ ]Severe  Fatigue:                             [ ]Mild [ ]Moderate [ ]Severe  Nausea:                             [ ]Mild [ ]Moderate [ ]Severe  Loss of appetite:              [ ]Mild [ ]Moderate [ ]Severe  Constipation:                    [ ]Mild [ ]Moderate [ ]Severe  Other Symptoms:  [x ]All other review of systems negative     PCSSQ[Palliative Care Spiritual Screening Question]   Severity (0-10):  Score of 4 or > indicate consideration of Chaplaincy referral.  Chaplaincy Referral: [ ] yes [ ] refused [ ] following [x ] deferred    Caregiver Tarrs? : [ ] yes [ x] no [ ] Deferred [ ] Declined             Social work referral [ ] Patient & Family Centered Care Referral [ ]  Anticipatory Grief present?:  [ ] yes [ x] no  [ ] Deferred                  Social work referral [ ] Patient & Family Centered Care Referral [ ]      PHYSICAL EXAM:  Vital Signs Last 24 Hrs  T(C): 37.2 (16 Oct 2023 12:07), Max: 37.3 (16 Oct 2023 05:36)  T(F): 98.9 (16 Oct 2023 12:07), Max: 99.2 (16 Oct 2023 05:36)  HR: 117 (16 Oct 2023 12:07) (104 - 117)  BP: 118/83 (16 Oct 2023 12:07) (113/75 - 118/83)  BP(mean): --  RR: 18 (16 Oct 2023 12:07) (16 - 18)  SpO2: 97% (16 Oct 2023 12:07) (97% - 97%)    Parameters below as of 16 Oct 2023 12:07  Patient On (Oxygen Delivery Method): room air     I&O's Summary    16 Oct 2023 07:01  -  16 Oct 2023 16:27  --------------------------------------------------------  IN: 500 mL / OUT: 0 mL / NET: 500 mL       GENERAL: [ ]Cachexia    [x ]Alert  [x ]Oriented x3   [ ]Lethargic  [ ]Unarousable  [ ]Verbal  [ ]Non-Verbal  Behavioral:   [ ] Anxiety  [ ] Delirium [ ] Agitation [ x] Other  HEENT:  [x ]Normal   [ ]Dry mouth   [ ]ET Tube/Trach  [ ]Oral lesions   PULMONARY:   [ ]Clear [ ]Tachypnea  [ ]Audible excessive secretions   [ ]Rhonchi        [ ]Right [ ]Left [ ]Bilateral  [ ]Crackles        [ ]Right [ ]Left [ ]Bilateral  [ ]Wheezing     [ ]Right [ ]Left [ ]Bilateral  [ ]Diminished breath sounds [ ]right [ ]left [ ]bilateral  CARDIOVASCULAR:    [x ]Regular [ ]Irregular [ ]Tachy  [ ]Mikey [ ]Murmur [ ]Other  GASTROINTESTINAL:  [x ]Soft  [ ]Distended   [ ]+BS  [x ]Non tender [ ]Tender  [ ]Other [ ]PEG [ ]OGT/ NGT  Last BM: +colostomy bag   GENITOURINARY:  [x ]Continent- dark urine [ ] Incontinent   [ ]Oliguria/Anuria   [ ]Judd   MUSCULOSKELETAL:   [x ]Normal   [ ]Weakness  [ ]Bed/Wheelchair bound [ ]Edema  NEUROLOGIC:   [x ]No focal deficits  [ ]Cognitive impairment  [ ]Dysphagia [ ]Dysarthria [ ]Paresis [ ]Other   SKIN:   [ ]Normal  [ ]Rash  [x ]Other- jaundice  [ ]Pressure ulcer(s)       Present on admission [ ]y [ ]n    CRITICAL CARE:  [ ]Shock Present  [ ]Septic [ ]Cardiogenic [ ]Neurologic [ ]Hypovolemic  [ ]Vasopressors [ ]Inotropes  [ ]Respiratory failure present [ ]Mechanical Ventilation [ ]Non-invasive ventilatory support [ ]High-Flow   [ ]Acute  [ ]Chronic [ ]Hypoxic  [ ]Hypercarbic [ ]Other  [ ]Other organ failure     LABS:                        9.3    14.91 )-----------( 306      ( 16 Oct 2023 07:36 )             29.4   10-16    143  |  110<H>  |  18  ----------------------------<  122<H>  4.2   |  22  |  0.55    Ca    8.5      16 Oct 2023 07:36    TPro  6.3  /  Alb  2.6<L>  /  TBili  12.0<H>  /  DBili  x   /  AST  171<H>  /  ALT  43<H>  /  AlkPhos  147<H>  10-16      Urinalysis Basic - ( 16 Oct 2023 07:36 )    Color: x / Appearance: x / SG: x / pH: x  Gluc: 122 mg/dL / Ketone: x  / Bili: x / Urobili: x   Blood: x / Protein: x / Nitrite: x   Leuk Esterase: x / RBC: x / WBC x   Sq Epi: x / Non Sq Epi: x / Bacteria: x      RADIOLOGY & ADDITIONAL STUDIES: < from: CT Abdomen and Pelvis w/ Oral Cont and w/ IV Cont (10.16.23 @ 13:00) >  IMPRESSION:  *  Pulmonary and hepatic metastatic disease, similar to prior.  *  Partial left colectomy with new wall thickening of the right colon   raising a question of colitis.  * New right effusion, ascites, and anasarca.  *  Small amount of peripancreatic free fluid, similar to prior.    < end of copied text >      Protein Calorie Malnutrition Present: [ ]mild [ ]moderate [ ]severe [ ]underweight [ ]morbid obesity  https://www.andeal.org/vault/2440/web/files/ONC/Table_Clinical%20Characteristics%20to%20Document%20Malnutrition-White%20JV%20et%20al%202012.pdf    Height (cm): 170.2 (09-30-23 @ 06:43), 170.2 (09-25-23 @ 22:45), 162 (07-10-23 @ 11:07)  Weight (kg): 60.8 (10-04-23 @ 05:56), 54.4 (09-25-23 @ 22:45), [id number] (09-01-23 @ 15:00)  BMI (kg/m2): 21 (10-04-23 @ 05:56), 18.8 (09-30-23 @ 06:43), 18.8 (09-25-23 @ 22:45)    [ ]PPSV2 < or = 30%  [ ]significant weight loss [ ]poor nutritional intake [ ]anasarca[ ]Artificial Nutrition    Other REFERRALS:  [x ]Hospice  [ ]Child Life  [ ]Social Work  [ ]Case management [ ]Holistic Therapy

## 2023-10-16 NOTE — PROGRESS NOTE ADULT - CONVERSATION DETAILS
Palliative care team met with patient this afternoon per request of medical attending. Reviewed patient's medical course and current clinical condition. Patient shared that he is aware that the plan after hospitalization is home hospice. He states that he is aware that he is not getting further DMT. He states that "Dr. De La Fuente had told him that when the cancer had spread to his lungs, it was going to be difficult to continue to treat him". His goal at this time is to have his infection treated and his symptoms managed so he can go home and spend time with his children (12 and 9y/o). He states he would like to go back home so he can eventually drop off his kids and pick them up from school while he is still able to do so.

## 2023-10-16 NOTE — PROGRESS NOTE ADULT - ASSESSMENT
58 year old male with metastatic colon cancer on active chemotherapy (Avastin, Irinotecan, Leucovorin, and 5FU CADD) presenting initially to [facility] with jaundice with CT showing metastatic lesions in the liver and acute pancreatitis. Transferred to [facility] for advance GI evaluation of possible obstructing pathology in the setting of hyperbilirubinemia and fever. Palliative team consulted for symptom management.

## 2023-10-16 NOTE — PROGRESS NOTE ADULT - TIME BILLING
20 mins-Vitals, meds, new results/radiology, interdisciplinary charting reviewed   20 mins-Patient seen and examined and plan discussed, all questions were answered to the best of my ability  10 mins-Charting/documentation and orders.
Time-based billing (NON-critical care).     More than 50% of the visit was spent counseling and / or coordinating care by the attending physician.      The necessity of the time spent during the encounter on this date of service was due to: documentation in [place], reviewing chart and coordinating care with patient/ACPs and interdisciplinary staff (such as , social workers, etc) as well as reviewing vitals, laboratory data, radiology, medication list, consultants' recommendations and prior records. Interventions were performed as documented above.
20 mins-Vitals, meds, new results/radiology, interdisciplinary charting reviewed   20 mins-Patient seen and examined and plan discussed, all questions were answered to the best of my ability  10 mins-Charting/documentation and orders.
20 mins-Vitals, meds, new results/radiology, interdisciplinary charting reviewed   20 mins-Patient seen and examined and plan discussed, all questions were answered to the best of my ability  10 mins-Charting/documentation and orders.
Time-based billing (NON-critical care).     More than 50% of the visit was spent counseling and / or coordinating care by the attending physician.      The necessity of the time spent during the encounter on this date of service was due to: documentation in [place], reviewing chart and coordinating care with patient/ACPs and interdisciplinary staff (such as , social workers, etc) as well as reviewing vitals, laboratory data, radiology, medication list, consultants' recommendations and prior records. Interventions were performed as documented above.
20 mins-Vitals, meds, new results/radiology, interdisciplinary charting reviewed   20 mins-Patient seen and examined and plan discussed, all questions were answered to the best of my ability  10 mins-Charting/documentation and orders.
20 mins-Vitals, meds, new results/radiology, interdisciplinary charting reviewed   20 mins-Patient seen and examined and plan discussed, all questions were answered to the best of my ability  10 mins-Charting/documentation and orders.
Time-based billing (NON-critical care).     More than 50% of the visit was spent counseling and / or coordinating care by the attending physician.      The necessity of the time spent during the encounter on this date of service was due to: documentation in [place], reviewing chart and coordinating care with patient/ACPs and interdisciplinary staff (such as , social workers, etc) as well as reviewing vitals, laboratory data, radiology, medication list, consultants' recommendations and prior records. Interventions were performed as documented above.
20 mins-Vitals, meds, new results/radiology, interdisciplinary charting reviewed   20 mins-Patient seen and examined and plan discussed, all questions were answered to the best of my ability  10 mins-Charting/documentation and orders.

## 2023-10-16 NOTE — PROGRESS NOTE ADULT - PROBLEM SELECTOR PLAN 2
Dx in 1/2021 with mets to liver, RLL; pan-GIAN WT, IFTIKHAR, TMB 5, TP53 mut; disease course c/b LBO s/p resection and ostomy in 3/2021; progressed after FOLFOX (Oxali dced 7/2021) > FOLFIRI/Day (tx c/b coronary vasospasm in 12/2022 2/2 5FU requiring cardiac optimization) > most recently on Irinotecan/Panitumumab since 7/2023, last given 9/15/23 and was due 9/29/23 (no record of having been given i/p).  - Presentation most c/w progression of disease, although no new lesions present on MRCP pt has extensive tumor burden in the liver that is presumably infiltrative  Plan:  - Pt is not a candidate for continuation of cancer tx i/s/o irreversible liver failure  - Hospice is appropriate (d/w Dr. Ospina), to which pt is amenable (GOC addressed as described below)  - Long term prognosis is poor: weeks to short months at best; days to weeks in the event of another acute clinical insult Dx in 1/2021 with mets to liver, RLL; pan-GIAN WT, IFTIKHAR, TMB 5, TP53 mut; disease course c/b LBO s/p resection and ostomy in 3/2021; progressed after FOLFOX (Oxali dced 7/2021) > FOLFIRI/Day (tx c/b coronary vasospasm in 12/2022 2/2 5FU requiring cardiac optimization) > most recently on Irinotecan/Panitumumab since 7/2023, last given 9/15/23 and was due 9/29/23 (no record of having been given i/p).  - Presentation most c/w progression of disease, although no new lesions present on MRCP pt has extensive tumor burden in the liver that is presumably infiltrative  Plan:  - Pt is not a candidate for continuation of cancer tx i/s/o irreversible liver failure  - Hospice is appropriate (d/w Dr. Ospina), to which pt is amenable (GOC addressed as described below)  - Long term prognosis is poor: weeks to short months at best; days to weeks in the event of another acute clinical insult  10/16--> Notes he is to follow up out patient with Dr Tu Ospina once antibiotics completed as per conversation with prior MD

## 2023-10-16 NOTE — PROGRESS NOTE ADULT - SUBJECTIVE AND OBJECTIVE BOX
Infectious Diseases Follow Up:    Patient is a 58y old  Male who presents with a chief complaint of New onset jaundice and fatigue, with sepsis and abnormal LFTs in the ED (11 Oct 2023 14:35)      Interval History/ROS:  Pt afebrile ON, continues to have abdominal pain, worse in the midline     Allergies  No Known Allergies        ANTIMICROBIALS:  ertapenem  IVPB 1000 every 24 hours  ertapenem  IVPB        Current Abx:     Previous Abx     OTHER MEDS:  MEDICATIONS  (STANDING):  furosemide    Tablet 20 daily  melatonin 3 at bedtime PRN  mirtazapine 7.5 at bedtime  ondansetron Injectable 4 every 8 hours PRN  oxyCODONE    IR 5 every 6 hours  polyethylene glycol 3350 17 daily  senna 2 at bedtime      Vital Signs Last 24 Hrs  T(C): 37.3 (16 Oct 2023 05:36), Max: 37.3 (16 Oct 2023 05:36)  T(F): 99.2 (16 Oct 2023 05:36), Max: 99.2 (16 Oct 2023 05:36)  HR: 104 (16 Oct 2023 05:36) (104 - 106)  BP: 117/82 (16 Oct 2023 05:36) (112/77 - 117/82)  BP(mean): --  RR: 18 (16 Oct 2023 05:36) (16 - 18)  SpO2: 97% (16 Oct 2023 05:36) (97% - 97%)    Parameters below as of 16 Oct 2023 05:36  Patient On (Oxygen Delivery Method): room air        PHYSICAL EXAM:  GENERAL: NAD, well-developed  HEAD:  Atraumatic, Normocephalic  EYES: EOMI, PERRLA, conjunctiva and sclera clear  NECK: Supple, No JVD  CHEST/LUNG: Clear to auscultation bilaterally; No wheeze. R chest port well appearing   HEART: Regular rate and rhythm; No murmurs, rubs, or gallops  ABDOMEN: + distended, with ostomy. +tenderness along midline   EXTREMITIES:  2+ Peripheral Pulses, No clubbing, cyanosis, or edema  PSYCH: AAOx3  NEUROLOGY: non-focal  SKIN: No rashes or lesions                          9.3    14.91 )-----------( 306      ( 16 Oct 2023 07:36 )             29.4       10-16    143  |  110<H>  |  18  ----------------------------<  122<H>  4.2   |  22  |  0.55    Ca    8.5      16 Oct 2023 07:36    TPro  6.3  /  Alb  2.6<L>  /  TBili  12.0<H>  /  DBili  x   /  AST  171<H>  /  ALT  43<H>  /  AlkPhos  147<H>  10-16      Urinalysis Basic - ( 16 Oct 2023 07:36 )    Color: x / Appearance: x / SG: x / pH: x  Gluc: 122 mg/dL / Ketone: x  / Bili: x / Urobili: x   Blood: x / Protein: x / Nitrite: x   Leuk Esterase: x / RBC: x / WBC x   Sq Epi: x / Non Sq Epi: x / Bacteria: x        MICROBIOLOGY:  v  Clean Catch Clean Catch (Midstream)  10-12-23   No growth  --  --      .Blood Blood-Peripheral  10-12-23   No growth at 4 days  --  --      .Blood Blood-Venous  10-12-23   No growth at 4 days  --  --      Ascites Fl ASCITES  10-06-23   No growth  Organism seen in Gram stain is non-viable after prolonged  incubation and repeated subculture.  --    polymorphonuclear leukocytes seen  Gram Negative Rods seen  by cytocentrifuge      .Blood Blood-Peripheral  10-06-23   No growth at 5 days  --  --      Clean Catch Clean Catch (Midstream)  10-04-23   No growth  --  --      .Blood Blood-Peripheral  10-04-23   No growth at 5 days  --  --      .Blood Blood-Peripheral  10-04-23   No growth at 5 days  --  --      .Blood Blood-Peripheral  10-02-23   No growth at 5 days  --  --      .Blood Blood-Peripheral  09-30-23   No growth at 5 days  --  --      .Blood Blood-Peripheral  09-30-23   No growth at 5 days  --  --      .Blood Blood-Peripheral  09-29-23   No growth at 5 days  --  --      Clean Catch Clean Catch (Midstream)  09-26-23   No growth  --  --      .Blood Blood-Peripheral  09-25-23   No growth at 5 days  --  --      .Blood Blood-Peripheral  09-25-23   No growth at 5 days  --  --          Rapid RVP Result: NotDetec (10-12 @ 02:18)        RADIOLOGY:

## 2023-10-16 NOTE — PROGRESS NOTE ADULT - ASSESSMENT
This is a 57 y/o M w/ PMHx vitiligo, pre-DM, gastritis, metastatic colon adenocarcimoa (dx 1/21) with mets to the liver c/b LBO s/p resection and ostomy 3/21 (last chemo Irinotecan/Panitumumab last 9/23) presented to [facility] on 9/30/23 with jaundice and fatigue, w/ sepsis and transaminitis. C/b recurrent fever, paracentesis done on 10/6, GNR on peritoneal fluid, however did not grow.   ID now consulted for recurrent fevers on Cefepime/Flagyl.     Antibiotics:  Cefepime 10/6-  Flagyl 10/6-  Zosyn 9/30-10/6      #Fever   #Leukocytosis   #SBP  #Abdominal pain   #Metastatic colon cancer w/ liver mets     Overall 57 y/o M w/ metastatic colon adenocarcinoma (dx 1/21) with mets to the liver c/b LBO s/p resection and osotmy 3/21 (last chemo Irinotecan/Panitumumab last 9/23 with abdominal pain and recurrent fevers. Para on 10/6 w/ only 178 nucleated cells (after 1 week of abx), however growth of GNR. Continues to be febrile with uptrending WBC despite 2 weeks of broad antimicrobials. Would expand to ertapenem and repeat CT A/P. If workup is negative, will consider worsening malignancy as possible source of fevers/leukocytosis.     Plan:  1. C/w  Ertapenem 1 g q24  2. F/u BCx   3. Obtain CT A/P w/ IV contrast, if findings of acities, would send diagnostic para     Thank you for this consult. Inpatient ID team will follow.    Zafar Fraire M.D.  Attending Physician  Division of Infectious Diseases  Department of Medicine    Please contact through [employer].  Office: [phone] (after 5 PM or weekend).

## 2023-10-16 NOTE — PROGRESS NOTE ADULT - PROBLEM SELECTOR PLAN 1
Patient with ongoing outpatient treatment with Avastin, Irinotecan, Leucovorin, and 5FU CADD   > Oncologist: Dr. Tu Byers ([facility])  > Appreciate heme/onc recs.  > Hospice appropriate- care coordination notes reviewed. Patient accepting of hospice services at home.

## 2023-10-16 NOTE — PROGRESS NOTE ADULT - SUBJECTIVE AND OBJECTIVE BOX
Internal Med accept note Internal Med accept note  Patient is a 58y old  Male who presents with a chief complaint of New onset jaundice and fatigue, with sepsis and abnormal LFTs in the ED (11 Oct 2023 14:35)      SUBJECTIVE / OVERNIGHT EVENTS:  Resting in bed, Ox3  Notes he is to follow up out patient with Dr Tu Ospina once antibiotics completed as per conversation with prior MD    MEDICATIONS  (STANDING):  chlorhexidine 2% Cloths 1 Application(s) Topical daily  chlorhexidine 2% Cloths 1 Application(s) Topical daily  ertapenem  IVPB      ertapenem  IVPB 1000 milliGRAM(s) IV Intermittent every 24 hours  furosemide    Tablet 20 milliGRAM(s) Oral daily  lidocaine   4% Patch 1 Patch Transdermal daily  mirtazapine 7.5 milliGRAM(s) Oral at bedtime  oxyCODONE    IR 5 milliGRAM(s) Oral every 6 hours  phytonadione   Solution 5 milliGRAM(s) Oral once  polyethylene glycol 3350 17 Gram(s) Oral daily  senna 2 Tablet(s) Oral at bedtime  sodium chloride 0.9%. 1000 milliLiter(s) (50 mL/Hr) IV Continuous <Continuous>    MEDICATIONS  (PRN):  melatonin 3 milliGRAM(s) Oral at bedtime PRN Insomnia  ondansetron Injectable 4 milliGRAM(s) IV Push every 8 hours PRN Nausea and/or Vomiting        CAPILLARY BLOOD GLUCOSE  POCT Blood Glucose.: 95 mg/dL (16 Oct 2023 08:22)  POCT Blood Glucose.: 148 mg/dL (15 Oct 2023 21:35)    I&O's Summary    16 Oct 2023 07:01  -  16 Oct 2023 10:41  --------------------------------------------------------  IN: 250 mL / OUT: 0 mL / NET: 250 mL      Vital Signs Last 24 Hrs    T(F): 99.2 (16 Oct 2023 05:36), Max: 99.2 (16 Oct 2023 05:36)  HR: 104 (16 Oct 2023 05:36) (104 - 106)  BP: 117/82 (16 Oct 2023 05:36) (112/77 - 117/82)  RR: 18 (16 Oct 2023 05:36) (16 - 18)  SpO2: 97% (16 Oct 2023 05:36) (97% - 97%)   room air      PHYSICAL EXAM:  GENERAL: NAD,   HEAD:  Atraumatic, Normocephalic  EYES: EOMI, PERRLA, conjunctiva and sclera clear  NECK: Supple, No JVD  CHEST/LUNG: Clear to auscultation bilaterally; No wheeze  HEART: Regular rate and rhythm; No murmurs, rubs, or gallops  ABDOMEN: Soft, Nontender, distended;   L colostomy bag   EXTREMITIES:  2+ Peripheral Pulses, No clubbing, cyanosis, or edema  PSYCH: AAOx3  NEUROLOGY: non-focal  SKIN: Vitalego    LABS:                        9.3    14.91 )-----------( 306      ( 16 Oct 2023 07:36 )             29.4     10-16    143  |  110<H>  |  18  ----------------------------<  122<H>  4.2   |  22  |  0.55    Ca    8.5      16 Oct 2023 07:36    TPro  6.3  /  Alb  2.6<L>  /  TBili  12.0<H>  /  DBili  x   /  AST  171<H>  /  ALT  43<H>  /  AlkPhos  147<H>  10-16      RADIOLOGY & ADDITIONAL TESTS:  rad< from: Xray Chest 1 View- PORTABLE-Routine (Xray Chest 1 View- PORTABLE-Routine .) (10.12.23 @ 07:31) >  IMPRESSION: Follow-up with metastases to lungs and pleura.    rd< from: US Abdomen Complete (US Abdomen Complete .) (10.10.23 @ 13:23) >  FINDINGS/  IMPRESSION:  Small volume ascites.          Care Discussed with Consultants/Other Providers:

## 2023-10-16 NOTE — PROGRESS NOTE ADULT - PROBLEM SELECTOR PLAN 3
patient on oxycodone 5mg q6hr ATC   Would recommend adding a PRN oxycodone 5mg q4h prn breakthrough pain   PRN bowel regimen while on opioids   Narcan prn   anticipated d/c recs: oxycodone 5mg q4hr prn mod-severe pain   Primary team to prescribe discharge medications to cover patient’s needs between the date of discharge and the date of appointment with either the patient's PMD, the primary oncologist, or a palliative care provider.  Ideally medications are to be prescribed to [facility] pharmacy, so the patient can leave the hospital with those medications.  For pain being treated as a part of cancer care, hospice or other end-of-life care, or pain being treated as part of palliative care prescribing opioids beyond the 7-day and less than 28 days post-discharge window IS ACCEPTABLE as per [place] Department of Health (https://www.health.[place].gov/professionals/narcotic/laws_and_regulations/).  Primary team to  make sure that any pre-approvals are done before discharge.

## 2023-10-16 NOTE — PROGRESS NOTE ADULT - PROBLEM SELECTOR PLAN 4
Patient accepting of home hospice and is in agreement with dispo to home with hospice once medically stable. Please see above goc note.

## 2023-10-16 NOTE — PROGRESS NOTE ADULT - NSPROGADDITIONALINFOA_GEN_ALL_CORE
Notes he is to follow up out patient with Dr Tu Ospina once antibiotics completed as per conversation with prior MD  ??? GOC conversation ????????????? Notes he is to follow up out patient with Dr Tu Ospina once antibiotics completed as per conversation with prior MD  ??? GOC conversation ?????????????    Palliative care  asked to RE-consult on this patient. Notes he is to follow up out patient with Dr Tu Ospina once antibiotics completed as per conversation with prior MD  ??? GOC conversation ?????????????    Palliative care  asked to RE-consult on this patient.    Addendum   Seen by Id  Plan:  1. C/w  Ertapenem 1 g q24  2. F/u BCx   3. Obtain CT A/P w/ IV contrast, if findings of acities, would send diagnostic para     rd< from: CT Abdomen and Pelvis w/ Oral Cont and w/ IV Cont (10.16.23 @ 13:00) >  IMPRESSION:  *  Pulmonary and hepatic metastatic disease, similar to prior.  *  Partial left colectomy with new wall thickening of the right colon   raising a question of colitis.  * New right effusion, ascites, and anasarca.  *  Small amount of peripancreatic free fluid, similar to prior. Notes he is to follow up out patient with Dr Tu Ospina once antibiotics completed as per conversation with prior MD  ??? GOC conversation ?????????????    Palliative care  asked to RE-consult on this patient.    Addendum   Seen by Id  Plan:  1. C/w  Ertapenem 1 g q24  2. F/u BCx   3. Obtain CT A/P w/ IV contrast, if findings of acities, would send diagnostic para     rd< from: CT Abdomen and Pelvis w/ Oral Cont and w/ IV Cont (10.16.23 @ 13:00) >  IMPRESSION:  *  Pulmonary and hepatic metastatic disease, similar to prior.  *  Partial left colectomy with new wall thickening of the right colon   raising a question of colitis.  * New right effusion, ascites, and anasarca.  *  Small amount of peripancreatic free fluid, similar to prior.  d/w Id c/w Ertapenem for now. monitor temp and WBC, ?? Tumor fever ??

## 2023-10-17 LAB
ALBUMIN SERPL ELPH-MCNC: 2.4 G/DL — LOW (ref 3.3–5)
ALBUMIN SERPL ELPH-MCNC: 2.4 G/DL — LOW (ref 3.3–5)
ALP SERPL-CCNC: 152 U/L — HIGH (ref 40–120)
ALP SERPL-CCNC: 152 U/L — HIGH (ref 40–120)
ALT FLD-CCNC: 54 U/L — HIGH (ref 4–41)
ALT FLD-CCNC: 54 U/L — HIGH (ref 4–41)
ANION GAP SERPL CALC-SCNC: 14 MMOL/L — SIGNIFICANT CHANGE UP (ref 7–14)
ANION GAP SERPL CALC-SCNC: 14 MMOL/L — SIGNIFICANT CHANGE UP (ref 7–14)
AST SERPL-CCNC: 243 U/L — HIGH (ref 4–40)
AST SERPL-CCNC: 243 U/L — HIGH (ref 4–40)
BILIRUB SERPL-MCNC: 13.8 MG/DL — HIGH (ref 0.2–1.2)
BILIRUB SERPL-MCNC: 13.8 MG/DL — HIGH (ref 0.2–1.2)
BUN SERPL-MCNC: 18 MG/DL — SIGNIFICANT CHANGE UP (ref 7–23)
BUN SERPL-MCNC: 18 MG/DL — SIGNIFICANT CHANGE UP (ref 7–23)
CALCIUM SERPL-MCNC: 8.4 MG/DL — SIGNIFICANT CHANGE UP (ref 8.4–10.5)
CALCIUM SERPL-MCNC: 8.4 MG/DL — SIGNIFICANT CHANGE UP (ref 8.4–10.5)
CHLORIDE SERPL-SCNC: 108 MMOL/L — HIGH (ref 98–107)
CHLORIDE SERPL-SCNC: 108 MMOL/L — HIGH (ref 98–107)
CO2 SERPL-SCNC: 21 MMOL/L — LOW (ref 22–31)
CO2 SERPL-SCNC: 21 MMOL/L — LOW (ref 22–31)
CREAT SERPL-MCNC: 0.52 MG/DL — SIGNIFICANT CHANGE UP (ref 0.5–1.3)
CREAT SERPL-MCNC: 0.52 MG/DL — SIGNIFICANT CHANGE UP (ref 0.5–1.3)
CULTURE RESULTS: SIGNIFICANT CHANGE UP
EGFR: 115 ML/MIN/1.73M2 — SIGNIFICANT CHANGE UP
EGFR: 115 ML/MIN/1.73M2 — SIGNIFICANT CHANGE UP
GLUCOSE BLDC GLUCOMTR-MCNC: 68 MG/DL — LOW (ref 70–99)
GLUCOSE BLDC GLUCOMTR-MCNC: 68 MG/DL — LOW (ref 70–99)
GLUCOSE BLDC GLUCOMTR-MCNC: 71 MG/DL — SIGNIFICANT CHANGE UP (ref 70–99)
GLUCOSE BLDC GLUCOMTR-MCNC: 71 MG/DL — SIGNIFICANT CHANGE UP (ref 70–99)
GLUCOSE BLDC GLUCOMTR-MCNC: 74 MG/DL — SIGNIFICANT CHANGE UP (ref 70–99)
GLUCOSE BLDC GLUCOMTR-MCNC: 74 MG/DL — SIGNIFICANT CHANGE UP (ref 70–99)
GLUCOSE SERPL-MCNC: 72 MG/DL — SIGNIFICANT CHANGE UP (ref 70–99)
GLUCOSE SERPL-MCNC: 72 MG/DL — SIGNIFICANT CHANGE UP (ref 70–99)
HCT VFR BLD CALC: 34 % — LOW (ref 39–50)
HCT VFR BLD CALC: 34 % — LOW (ref 39–50)
HGB BLD-MCNC: 10.5 G/DL — LOW (ref 13–17)
HGB BLD-MCNC: 10.5 G/DL — LOW (ref 13–17)
MCHC RBC-ENTMCNC: 24.6 PG — LOW (ref 27–34)
MCHC RBC-ENTMCNC: 24.6 PG — LOW (ref 27–34)
MCHC RBC-ENTMCNC: 30.9 GM/DL — LOW (ref 32–36)
MCHC RBC-ENTMCNC: 30.9 GM/DL — LOW (ref 32–36)
MCV RBC AUTO: 79.8 FL — LOW (ref 80–100)
MCV RBC AUTO: 79.8 FL — LOW (ref 80–100)
NRBC # BLD: 2 /100 WBCS — HIGH (ref 0–0)
NRBC # BLD: 2 /100 WBCS — HIGH (ref 0–0)
NRBC # FLD: 0.21 K/UL — HIGH (ref 0–0)
NRBC # FLD: 0.21 K/UL — HIGH (ref 0–0)
PLATELET # BLD AUTO: 317 K/UL — SIGNIFICANT CHANGE UP (ref 150–400)
PLATELET # BLD AUTO: 317 K/UL — SIGNIFICANT CHANGE UP (ref 150–400)
POTASSIUM SERPL-MCNC: 4.4 MMOL/L — SIGNIFICANT CHANGE UP (ref 3.5–5.3)
POTASSIUM SERPL-MCNC: 4.4 MMOL/L — SIGNIFICANT CHANGE UP (ref 3.5–5.3)
POTASSIUM SERPL-SCNC: 4.4 MMOL/L — SIGNIFICANT CHANGE UP (ref 3.5–5.3)
POTASSIUM SERPL-SCNC: 4.4 MMOL/L — SIGNIFICANT CHANGE UP (ref 3.5–5.3)
PROT SERPL-MCNC: 6.8 G/DL — SIGNIFICANT CHANGE UP (ref 6–8.3)
PROT SERPL-MCNC: 6.8 G/DL — SIGNIFICANT CHANGE UP (ref 6–8.3)
RBC # BLD: 4.26 M/UL — SIGNIFICANT CHANGE UP (ref 4.2–5.8)
RBC # BLD: 4.26 M/UL — SIGNIFICANT CHANGE UP (ref 4.2–5.8)
RBC # FLD: 26.5 % — HIGH (ref 10.3–14.5)
RBC # FLD: 26.5 % — HIGH (ref 10.3–14.5)
SODIUM SERPL-SCNC: 143 MMOL/L — SIGNIFICANT CHANGE UP (ref 135–145)
SODIUM SERPL-SCNC: 143 MMOL/L — SIGNIFICANT CHANGE UP (ref 135–145)
SPECIMEN SOURCE: SIGNIFICANT CHANGE UP
WBC # BLD: 13.34 K/UL — HIGH (ref 3.8–10.5)
WBC # BLD: 13.34 K/UL — HIGH (ref 3.8–10.5)
WBC # FLD AUTO: 13.34 K/UL — HIGH (ref 3.8–10.5)
WBC # FLD AUTO: 13.34 K/UL — HIGH (ref 3.8–10.5)

## 2023-10-17 PROCEDURE — 99232 SBSQ HOSP IP/OBS MODERATE 35: CPT

## 2023-10-17 RX ADMIN — CHLORHEXIDINE GLUCONATE 1 APPLICATION(S): 213 SOLUTION TOPICAL at 12:31

## 2023-10-17 RX ADMIN — OXYCODONE HYDROCHLORIDE 5 MILLIGRAM(S): 5 TABLET ORAL at 12:39

## 2023-10-17 RX ADMIN — ERTAPENEM SODIUM 120 MILLIGRAM(S): 1 INJECTION, POWDER, LYOPHILIZED, FOR SOLUTION INTRAMUSCULAR; INTRAVENOUS at 18:47

## 2023-10-17 RX ADMIN — OXYCODONE HYDROCHLORIDE 5 MILLIGRAM(S): 5 TABLET ORAL at 05:42

## 2023-10-17 RX ADMIN — OXYCODONE HYDROCHLORIDE 5 MILLIGRAM(S): 5 TABLET ORAL at 19:08

## 2023-10-17 RX ADMIN — OXYCODONE HYDROCHLORIDE 5 MILLIGRAM(S): 5 TABLET ORAL at 18:47

## 2023-10-17 RX ADMIN — MIRTAZAPINE 7.5 MILLIGRAM(S): 45 TABLET, ORALLY DISINTEGRATING ORAL at 21:23

## 2023-10-17 RX ADMIN — OXYCODONE HYDROCHLORIDE 5 MILLIGRAM(S): 5 TABLET ORAL at 12:31

## 2023-10-17 RX ADMIN — Medication 20 MILLIGRAM(S): at 05:42

## 2023-10-17 RX ADMIN — OXYCODONE HYDROCHLORIDE 5 MILLIGRAM(S): 5 TABLET ORAL at 06:42

## 2023-10-17 NOTE — PROGRESS NOTE ADULT - SUBJECTIVE AND OBJECTIVE BOX
Follow Up:      Inverval History/ROS:Patient is a 58y old  Male who presents with a chief complaint of New onset jaundice and fatigue, with sepsis and abnormal LFTs in the ED (11 Oct 2023 14:35)    No fever  No events      Allergies    No Known Allergies    Intolerances        ANTIMICROBIALS:  ertapenem  IVPB    ertapenem  IVPB 1000 every 24 hours      OTHER MEDS:  chlorhexidine 2% Cloths 1 Application(s) Topical daily  chlorhexidine 2% Cloths 1 Application(s) Topical daily  furosemide    Tablet 20 milliGRAM(s) Oral daily  lidocaine   4% Patch 1 Patch Transdermal daily  melatonin 3 milliGRAM(s) Oral at bedtime PRN  mirtazapine 7.5 milliGRAM(s) Oral at bedtime  ondansetron Injectable 4 milliGRAM(s) IV Push every 8 hours PRN  oxyCODONE    IR 5 milliGRAM(s) Oral every 6 hours  phytonadione   Solution 5 milliGRAM(s) Oral once  polyethylene glycol 3350 17 Gram(s) Oral daily  senna 2 Tablet(s) Oral at bedtime  sodium chloride 0.9%. 1000 milliLiter(s) IV Continuous <Continuous>      Vital Signs Last 24 Hrs  T(C): 37.1 (17 Oct 2023 12:52), Max: 37.3 (16 Oct 2023 20:16)  T(F): 98.8 (17 Oct 2023 12:52), Max: 99.1 (16 Oct 2023 20:16)  HR: 116 (17 Oct 2023 12:52) (110 - 116)  BP: 114/81 (17 Oct 2023 12:52) (108/75 - 119/81)  BP(mean): --  RR: 18 (17 Oct 2023 12:52) (18 - 18)  SpO2: 98% (17 Oct 2023 12:52) (96% - 98%)    Parameters below as of 17 Oct 2023 12:52  Patient On (Oxygen Delivery Method): room air        PHYSICAL EXAM:  General: [ ] non-toxic  HEAD/EYES: [ ] PERRL [ ] white sclera [x ] icterus  ENT:  [ ] normal [ ] supple [ ] thrush [ ] pharyngeal exudate  Cardiovascular:   [ ] murmur [x ] normal [ ] PPM/AICD  Respiratory:  [x ] clear to ausculation bilaterally  GI:  [ x] soft, non-tender, normal bowel sounds  :  [ ] wasserman [ ] no CVA tenderness   Musculoskeletal:  [ ] no synovitis  Neurologic:  [ ] non-focal exam   Skin:  x[ ] no rash  Lymph: [x ] no lymphadenopathy  Psychiatric:  [ ] appropriate affect [ ] alert & oriented  Lines:  [ x] no phlebitis [ ] central line                                10.5   13.34 )-----------( 317      ( 17 Oct 2023 07:41 )             34.0       10-17    143  |  108<H>  |  18  ----------------------------<  72  4.4   |  21<L>  |  0.52    Ca    8.4      17 Oct 2023 07:41    TPro  6.8  /  Alb  2.4<L>  /  TBili  13.8<H>  /  DBili  x   /  AST  243<H>  /  ALT  54<H>  /  AlkPhos  152<H>  10-17      Urinalysis Basic - ( 17 Oct 2023 07:41 )    Color: x / Appearance: x / SG: x / pH: x  Gluc: 72 mg/dL / Ketone: x  / Bili: x / Urobili: x   Blood: x / Protein: x / Nitrite: x   Leuk Esterase: x / RBC: x / WBC x   Sq Epi: x / Non Sq Epi: x / Bacteria: x        MICROBIOLOGY:Culture Results:   No growth at 24 hours (10-15-23 @ 20:58)  Culture Results:   No growth at 24 hours (10-15-23 @ 20:00)  Culture Results:   No growth (10-12-23 @ 11:00)  Culture Results:   No growth at 5 days (10-12-23 @ 00:48)  Culture Results:   No growth at 5 days (10-12-23 @ 00:45)      RADIOLOGY:  < from: CT Abdomen and Pelvis w/ Oral Cont and w/ IV Cont (10.16.23 @ 13:00) >  IMPRESSION:  *  Pulmonary and hepatic metastatic disease, similar to prior.  *  Partial left colectomy with new wall thickening of the right colon   raising a question of colitis.  * New right effusion, ascites, and anasarca.  *  Small amount of peripancreatic free fluid, similar to prior.    < end of copied text >

## 2023-10-17 NOTE — PROGRESS NOTE ADULT - SUBJECTIVE AND OBJECTIVE BOX
Patient is a 58y old  Male who presents with a chief complaint of New onset jaundice and fatigue, with sepsis and abnormal LFTs in the ED (11 Oct 2023 14:35)      SUBJECTIVE / OVERNIGHT EVENTS:  resting in bed.  aware of plans for home with hospice  explain ct showed no new infections, will c/w iv ertapenam and f/u with id about recent fever 10/14  no more fevers since    MEDICATIONS  (STANDING):  chlorhexidine 2% Cloths 1 Application(s) Topical daily  chlorhexidine 2% Cloths 1 Application(s) Topical daily  ertapenem  IVPB      ertapenem  IVPB 1000 milliGRAM(s) IV Intermittent every 24 hours  furosemide    Tablet 20 milliGRAM(s) Oral daily  lidocaine   4% Patch 1 Patch Transdermal daily  mirtazapine 7.5 milliGRAM(s) Oral at bedtime  oxyCODONE    IR 5 milliGRAM(s) Oral every 6 hours  phytonadione   Solution 5 milliGRAM(s) Oral once  polyethylene glycol 3350 17 Gram(s) Oral daily  senna 2 Tablet(s) Oral at bedtime  sodium chloride 0.9%. 1000 milliLiter(s) (50 mL/Hr) IV Continuous <Continuous>    MEDICATIONS  (PRN):  melatonin 3 milliGRAM(s) Oral at bedtime PRN Insomnia  ondansetron Injectable 4 milliGRAM(s) IV Push every 8 hours PRN Nausea and/or Vomiting        CAPILLARY BLOOD GLUCOSE  POCT Blood Glucose.: 74 mg/dL (17 Oct 2023 08:23)  POCT Blood Glucose.: 68 mg/dL (17 Oct 2023 08:22)  POCT Blood Glucose.: 117 mg/dL (16 Oct 2023 22:26)    I&O's Summary    16 Oct 2023 07:01  -  17 Oct 2023 07:00  --------------------------------------------------------  IN: 750 mL / OUT: 0 mL / NET: 750 mL      Vital Signs Last 24 Hrs  T(F): 98.8 (17 Oct 2023 12:52), Max: 99.1 (16 Oct 2023 20:16)  HR: 116 (17 Oct 2023 12:52) (110 - 116)  BP: 114/81 (17 Oct 2023 12:52) (108/75 - 119/81)  RR: 18 (17 Oct 2023 12:52) (18 - 18)  SpO2: 98% (17 Oct 2023 12:52) (96% - 98%)room air      PHYSICAL EXAM:  GENERAL: NAD,   HEAD:  Atraumatic, Normocephalic  EYES: EOMI, PERRLA, conjunctiva and sclera clear  NECK: Supple, No JVD  CHEST/LUNG: Clear to auscultation bilaterally; No wheeze  HEART: Regular rate and rhythm; No murmurs, rubs, or gallops  ABDOMEN: Soft, Nontender, Nondistended; Bowel sounds present  L colostomy bag  EXTREMITIES:  2+ Peripheral Pulses, No clubbing, cyanosis, or edema  PSYCH: AAOx3  NEUROLOGY: non-focal  SKIN: vitalego    LABS:                        10.5   13.34 )-----------( 317      ( 17 Oct 2023 07:41 )             34.0     10-17    143  |  108<H>  |  18  ----------------------------<  72  4.4   |  21<L>  |  0.52    Ca    8.4      17 Oct 2023 07:41    TPro  6.8  /  Alb  2.4<L>  /  TBili  13.8<H>  /  DBili  x   /  AST  243<H>  /  ALT  54<H>  /  AlkPhos  152<H>  10-17          Care Discussed with Consultants/Other Providers:  d/w ID  c/w iv Ertapenam  id will determine when to stop   ??? tumor fevers

## 2023-10-17 NOTE — PROGRESS NOTE ADULT - ASSESSMENT
This is a 59 y/o M w/ PMHx vitiligo, pre-DM, gastritis, metastatic colon adenocarcimoa (dx 1/21) with mets to the liver c/b LBO s/p resection and ostomy 3/21 (last chemo Irinotecan/Panitumumab last 9/23) presented to [facility] on 9/30/23 with jaundice and fatigue, w/ sepsis and transaminitis. C/b recurrent fever, paracentesis done on 10/6, GNR on peritoneal fluid, however did not grow.   ID now consulted for recurrent fevers on Cefepime/Flagyl.     Antibiotics:  Cefepime 10/6-  Flagyl 10/6-  Zosyn 9/30-10/6      #Fever   #Leukocytosis   #SBP  #Abdominal pain   #Metastatic colon cancer w/ liver mets     Overall 59 y/o M w/ metastatic colon adenocarcinoma (dx 1/21) with mets to the liver c/b LBO s/p resection and osotmy 3/21 (last chemo Irinotecan/Panitumumab last 9/23 with abdominal pain and recurrent fevers. Para on 10/6 w/ only 178 nucleated cells (after 1 week of abx), however growth of GNR. Continues to be febrile with uptrending WBC despite 2 weeks of broad antimicrobials. Would expand to ertapenem and repeat CT A/P. If workup is negative, will consider worsening malignancy as possible source of fevers/leukocytosis.     Plan:  1. On  Ertapenem 1 g q24  2. F/u BCx from 10/15 are without growth  3. Malignancy is likely a factor contributing to fever/ leukocytosis  4. Plan is to transition to hospice care. CT with fluid in abdomen but unlikely enough for paracentesis.  can stop ertapenem after today's dose    ID service will sign off  Call with questions

## 2023-10-17 NOTE — PROGRESS NOTE ADULT - PROBLEM SELECTOR PLAN 2
Dx in 1/2021 with mets to liver, RLL; pan-GIAN WT, IFTIKHAR, TMB 5, TP53 mut; disease course c/b LBO s/p resection and ostomy in 3/2021; progressed after FOLFOX (Oxali dced 7/2021) > FOLFIRI/Day (tx c/b coronary vasospasm in 12/2022 2/2 5FU requiring cardiac optimization) > most recently on Irinotecan/Panitumumab since 7/2023, last given 9/15/23 and was due 9/29/23 (no record of having been given i/p).  - Presentation most c/w progression of disease, although no new lesions present on MRCP pt has extensive tumor burden in the liver that is presumably infiltrative  Plan:  - Pt is not a candidate for continuation of cancer tx i/s/o irreversible liver failure  - Hospice is appropriate (d/w Dr. Ospina), to which pt is amenable (GOC addressed as described below)  - Long term prognosis is poor: weeks to short months at best; days to weeks in the event of another acute clinical insult  10/16--> Notes he is to follow up out patient with Dr Tu Ospina once antibiotics completed as per conversation with prior MD Dx in 1/2021 with mets to liver, RLL; pan-GIAN WT, IFTIKHAR, TMB 5, TP53 mut; disease course c/b LBO s/p resection and ostomy in 3/2021; progressed after FOLFOX (Oxali dced 7/2021) > FOLFIRI/Day (tx c/b coronary vasospasm in 12/2022 2/2 5FU requiring cardiac optimization) > most recently on Irinotecan/Panitumumab since 7/2023, last given 9/15/23 and was due 9/29/23 (no record of having been given i/p).  - Presentation most c/w progression of disease, although no new lesions present on MRCP pt has extensive tumor burden in the liver that is presumably infiltrative  Plan:  - Pt is not a candidate for continuation of cancer tx i/s/o irreversible liver failure  - Hospice is appropriate (d/w Dr. Ospina), to which pt is amenable (GOC addressed as described below)  - Long term prognosis is poor: weeks to short months at best; days to weeks in the event of another acute clinical insult  10/17 patient notes he was going home with HOSPICE. Palliative confirmed. Equipment already in home as por CM

## 2023-10-17 NOTE — PROGRESS NOTE ADULT - ASSESSMENT
58M PMH vitiligo, pre-DM, gastritis on PPI, and met colon adenoca p/f onc clinic for new-onset jaundice and fatigue, with sepsis in ED and abnormal LFTs noted on admission labs. His hospital course has also been c/b ascites, cancer-related abd pain, and SBP with recurrence in sepsis after finishing antibiotics.    c/w Iv Ertapenam day#3

## 2023-10-17 NOTE — PROGRESS NOTE ADULT - PROBLEM SELECTOR PLAN 1
pt previously completed 5d course for suspected SBP, 10/6-11, now with T 101.8 while on cefepime and flagyl.  - CXR with layering pleural effusion but per pulm, unable to tap. no evidence of pna.  - UA without WBCs and UCx negative  - MRCP with dilation in multiple isolated biliary duct branches 2/2 diffuse metastatic disease. They are not amenable to stenting via ERCP per GI  - Blood cxs remain negative to date  Plan:  - could be recurrence of SBP, although pt with only small volume ascites that is not amenable to drainage  - ID consulted, preliminary recs appreciated  - Was on empiric Cefepime/Flagyl for now, but will likely d/c pending finalization of ID recs as fevers likely 2/2 metastatic disease. regardless, should be on SBP ppx with bactrim once abx d/c'ed  NOW on Ertapenem IV , tmax 101.8 on 10/14/23, f/u ID  - f/u BCx

## 2023-10-18 VITALS — HEART RATE: 115 BPM

## 2023-10-18 DIAGNOSIS — R50.9 FEVER, UNSPECIFIED: ICD-10-CM

## 2023-10-18 LAB
GLUCOSE BLDC GLUCOMTR-MCNC: 75 MG/DL — SIGNIFICANT CHANGE UP (ref 70–99)
GLUCOSE BLDC GLUCOMTR-MCNC: 75 MG/DL — SIGNIFICANT CHANGE UP (ref 70–99)
GLUCOSE BLDC GLUCOMTR-MCNC: 80 MG/DL — SIGNIFICANT CHANGE UP (ref 70–99)
GLUCOSE BLDC GLUCOMTR-MCNC: 80 MG/DL — SIGNIFICANT CHANGE UP (ref 70–99)

## 2023-10-18 PROCEDURE — 93010 ELECTROCARDIOGRAM REPORT: CPT | Mod: 76

## 2023-10-18 PROCEDURE — 99239 HOSP IP/OBS DSCHRG MGMT >30: CPT

## 2023-10-18 RX ORDER — MIRTAZAPINE 45 MG/1
1 TABLET, ORALLY DISINTEGRATING ORAL
Qty: 14 | Refills: 0
Start: 2023-10-18 | End: 2023-10-31

## 2023-10-18 RX ADMIN — OXYCODONE HYDROCHLORIDE 5 MILLIGRAM(S): 5 TABLET ORAL at 00:34

## 2023-10-18 RX ADMIN — OXYCODONE HYDROCHLORIDE 5 MILLIGRAM(S): 5 TABLET ORAL at 12:38

## 2023-10-18 RX ADMIN — CHLORHEXIDINE GLUCONATE 1 APPLICATION(S): 213 SOLUTION TOPICAL at 12:39

## 2023-10-18 RX ADMIN — OXYCODONE HYDROCHLORIDE 5 MILLIGRAM(S): 5 TABLET ORAL at 05:50

## 2023-10-18 RX ADMIN — OXYCODONE HYDROCHLORIDE 5 MILLIGRAM(S): 5 TABLET ORAL at 06:50

## 2023-10-18 RX ADMIN — OXYCODONE HYDROCHLORIDE 5 MILLIGRAM(S): 5 TABLET ORAL at 01:34

## 2023-10-18 RX ADMIN — Medication 20 MILLIGRAM(S): at 05:50

## 2023-10-18 NOTE — PROGRESS NOTE ADULT - PROBLEM SELECTOR PLAN 9
other - INR still elevated > 3 i/s/o liver dysfunction  - Labs not c/w DIC and pt without clinical s/s of bleeding/clotting  - Completed two 3d courses of Vit K, 10/6-8 and 10/10-12  - SCDs ordered for DVT ppx

## 2023-10-18 NOTE — PROGRESS NOTE ADULT - PROBLEM SELECTOR PLAN 7
- 10/10 B/L LE dopplers negative for DVT  - Likely gravity-dependent edema/3rd spacing   - Of note, prior MRCP did not demonstrate and pathologic abdominopelvic LN to suggest lymphedema
- 2/2 chronic/neoplastic disease  - hgb stable and pt remains without clinical s/s of active bleeding  - Trending daily cbcs; will transfuse supportively prn (goal hgb > 7)
- 10/10 B/L LE dopplers negative for DVT  - Likely gravity-dependent edema/3rd spacing   - Of note, prior MRCP did not demonstrate and pathologic abdominopelvic LN to suggest lymphedema
- B/L LE dopplers ordered on 10/6, r/o acute DVT- still pending
- 2/2 chronic/neoplastic disease  - hgb stable and pt remains without clinical s/s of active bleeding  - Trending daily cbcs; will transfuse supportively prn (goal hgb > 7)
- 10/10 B/L LE dopplers negative for DVT  - Likely gravity-dependent edema/3rd spacing   - Of note, prior MRCP did not demonstrate and pathologic abdominopelvic LN to suggest lymphedema
- 2/2 chronic/neoplastic disease  - hgb stable and pt remains without clinical s/s of active bleeding  - Trending daily cbcs; will transfuse supportively prn (goal hgb > 7)
- 10/10 B/L LE dopplers negative for DVT  - Likely gravity-dependent edema/3rd spacing   - Of note, prior MRCP did not demonstrate and pathologic abdominopelvic LN to suggest lymphedema

## 2023-10-18 NOTE — PROGRESS NOTE ADULT - PROBLEM SELECTOR PROBLEM 4
Ascites
LFTs abnormal
Ascites
Ascites
Electrolyte imbalance
LFTs abnormal
Electrolyte imbalance
LFTs abnormal
LFTs abnormal
Anemia
Ascites
Electrolyte imbalance
LFTs abnormal
Advanced care planning/counseling discussion
LFTs abnormal

## 2023-10-18 NOTE — PROGRESS NOTE ADULT - PROBLEM SELECTOR PLAN 5
- 10/6 cytology negative for malignant cells  - Likely 2/2 portal venous congestion i/s/o extensive hepatic met burden (physiologic cirrhosis)  - IR consulted for LVP with cytology > procedure deferred for mgmt of suspected SBP  - Repeat Abd U/S on 10/10 also without enough fluid to drain; no safe pocket to tap as per 10/11 IPT eval  Plan:  - Continuing Lasix PO 20mg daily  - will continue to give albumin prn to prevent intravascular volume depletion and promote fluid mobilization
- Increasing based on 10/6 CXR  - Pt still with stable O2 sats on RA and denies dyspnea  - Will discuss palliative thora with Pulm
- diagnosed with colon cancer in 2021, receives biweekly chemotherapy (Avastin, Irinotecan, Leucovorin, and 5FU CADD)  - follows with Dr. Tu De La Fuente at [facility], oncology consulted
- 10/6 cytology negative for malignant cells  - Likely 2/2 portal venous congestion i/s/o extensive hepatic met burden (physiologic cirrhosis)  - IR consulted for LVP with cytology > procedure deferred for mgmt of suspected SBP  - Repeat Abd U/S on 10/10 also without enough fluid to drain; no safe pocket to tap as per 10/11 IPT eval  Plan:  - Continuing Lasix PO 20mg daily  - will continue to give albumin prn to prevent intravascular volume depletion and promote fluid mobilization
- Likely malignant  - IR consulted for LVP with cytology > procedure deferred for now for mgmt of recurrent fever/sepsis  - Albumin/lasix ordered today to promote fluid mobilization (palliative)
- Increasing based on 10/6 CXR  - Pt still with stable O2 sats on RA and denies dyspnea  - Will discuss palliative thora with Pulm
- diagnosed with colon cancer in 2021, receives biweekly chemotherapy (Avastin, Irinotecan, Leucovorin, and 5FU CADD)  - follows with Dr. Tu De La Fuente at [facility], oncology consulted
- diagnosed with colon cancer in 2021, receives biweekly chemotherapy (Avastin, Irinotecan, Leucovorin, and 5FU CADD)  - follows with Dr. Tu De La Fuente at [facility], f/u Onc in AM
Plan for home hospice dispo pending medical stability. At this time, patient is fully aware of hospice transition after hospitalization. Goals are clear. Palliative team signing off.     Thank you for allowing us to participate in your patient's care.  Please page [phone] for any q's or c's. The Geriatric and Palliative Medicine service has coverage 24 hours a day/ 7 days a week to provide medical recommendations regarding symptom management needs via telephone.    Araceli Freitas D.O.   Palliative Medicine
- 2/2 chronic/neoplastic disease  - hgb stable and pt remains without clinical s/s of active bleeding  - Trending daily cbcs; will transfuse supportively prn (goal hgb > 7)
- Starting Oxy IR 5mg q6/prn with bowel regimen to prevent OIC  - Continuing Lidocaine patch prn
- 10/6 cytology negative for malignant cells  - Likely 2/2 portal venous congestion i/s/o extensive hepatic met burden (physiologic cirrhosis)  - IR consulted for LVP with cytology > procedure deferred for mgmt of suspected SBP  - Repeat Abd U/S on 10/10 also without enough fluid to drain; no safe pocket to tap as per 10/11 IPT eval  - Continuing Lasix PO 20mg daily; 3-dose course of Albumin ordered to prevent intravascular volume depletion (Cr uptrended to 0.58 today) and promote fluid mobilization
- 10/6 cytology negative for malignant cells  - Likely 2/2 portal venous congestion i/s/o extensive hepatic met burden (physiologic cirrhosis)  - IR consulted for LVP with cytology > procedure deferred for mgmt of suspected SBP  - Repeat Abd U/S on 10/10 also without enough fluid to drain; no safe pocket to tap as per 10/11 IPT eval  - Starting Lasix PO 20mg daily
- 10/6 cytology negative for malignant cells  - Likely 2/2 portal venous congestion i/s/o extensive hepatic met burden (physiologic cirrhosis)  - IR consulted for LVP with cytology > procedure deferred for mgmt of SBP; repeat Abd U/S on 10/10 also without enough fluid to drain
- 10/6 cytology negative for malignant cells  - Likely 2/2 portal venous congestion i/s/o extensive hepatic met burden (physiologic cirrhosis)  - IR consulted for LVP with cytology > procedure deferred for mgmt of SBP  - Repeat Abd U/S on 10/10 also without enough fluid to drain; no safe pocket to tap as per 10/11 IPT eval  - Starting Lasix PO 20mg daily
- 10/6 cytology negative for malignant cells  - Likely 2/2 portal venous congestion i/s/o extensive hepatic met burden (physiologic cirrhosis)  - IR consulted for LVP with cytology > procedure deferred for mgmt of suspected SBP  - Repeat Abd U/S on 10/10 also without enough fluid to drain; no safe pocket to tap as per 10/11 IPT eval  Plan:  - Continuing Lasix PO 20mg daily  - will continue to give albumin prn to prevent intravascular volume depletion and promote fluid mobilization
- Increasing based on 10/6 CXR  - Pt still with stable O2 sats on RA and denies dyspnea  - Will discuss palliative thora with Pulm

## 2023-10-18 NOTE — PROGRESS NOTE ADULT - PROBLEM SELECTOR PROBLEM 10
Cancer related pain
Cancer related pain
Counseling regarding goals of care
Cancer related pain
Counseling regarding goals of care
Cancer related pain
Counseling regarding goals of care

## 2023-10-18 NOTE — PROGRESS NOTE ADULT - PROBLEM SELECTOR PLAN 2
pt previously completed 5d course for suspected SBP, 10/6-11, now with T 101.8 while on cefepime and flagyl.  - CXR with layering pleural effusion but per pulm, unable to tap. no evidence of pna.  - UA without WBCs and UCx negative  - MRCP with dilation in multiple isolated biliary duct branches 2/2 diffuse metastatic disease. They are not amenable to stenting via ERCP per GI  - Blood cxs remain negative to date  Plan:  - could be recurrence of SBP, although pt with only small volume ascites that is not amenable to drainage  - ID consulted, preliminary recs appreciated  - Was on empiric Cefepime/Flagyl for now, but will likely d/c pending finalization of ID recs as fevers likely 2/2 metastatic disease. regardless, should be on SBP ppx with bactrim once abx d/c'ed  COMPLETED  Ertapenem IV , tmax 101.8 on 10/14/23, Iv Ertapenem day#4 on 10/17. culture negative. ct a/p negative. Id stopped antibiotics 10/17  ID greatly appreciated. pt previously completed 5d course for suspected SBP, 10/6-11, now with T 101.8 while on cefepime and flagyl.  - CXR with layering pleural effusion but per pulm, unable to tap. no evidence of pna.  - UA without WBCs and UCx negative  - MRCP with dilation in multiple isolated biliary duct branches 2/2 diffuse metastatic disease. They are not amenable to stenting via ERCP per GI  - Blood cxs remain negative to date  Plan:  - could be recurrence of SBP, although pt with only small volume ascites that is not amenable to drainage  - ID consulted, preliminary recs appreciated  - Was on empiric Cefepime/Flagyl for now, but will likely d/c pending finalization of ID recs as fevers likely 2/2 metastatic disease. regardless, should be on SBP ppx with bactrim once abx d/c'ed  COMPLETED  Ertapenem IV , tmax 101.8 on 10/14/23, Iv Ertapenem day#4 on 10/17. culture negative. ct a/p negative. Id stopped antibiotics 10/17. MOST likely SIRS from Tumor fever.  All blood and urine and ascites cultures from 9/30 to present ----> NEGATIVE GROWTH.  ID greatly appreciated. pt previously completed 5d course for suspected SBP, 10/6-11, now with T 101.8 while on cefepime and flagyl.  - CXR with layering pleural effusion but per pulm, unable to tap. no evidence of pna.  - UA without WBCs and UCx negative  - MRCP with dilation in multiple isolated biliary duct branches 2/2 diffuse metastatic disease. They are not amenable to stenting via ERCP per GI  - Blood cxs remain negative to date  Plan:  - could be recurrence of SBP, although pt with only small volume ascites that is not amenable to drainage  - ID consulted, preliminary recs appreciated  - Was on empiric Cefepime/Flagyl for now, but will likely d/c pending finalization of ID recs as fevers likely 2/2 metastatic disease. regardless, should be on SBP ppx with bactrim once abx d/c'ed  COMPLETED  Ertapenem IV , tmax 101.8 on 10/14/23, Iv Ertapenem day#4 on 10/17. culture negative. ct a/p negative. Id stopped antibiotics 10/17. MOST likely SIRS from Tumor fever.  All blood and urine and ascites cultures from 9/30 to present ----> NEGATIVE GROWTH.  ID greatly appreciated.  Patient most likely had SEPSIS secondary to SBP initially. TREATED. Subsequent Fevers most likely due to TUMOR FEVERS

## 2023-10-18 NOTE — PROGRESS NOTE ADULT - REASON FOR ADMISSION
jaundice
jaundice
Hyperbilirubinemia
trans from VS
Jaundice
Jaundice
Hyperbilirubinemia
Hyperbilirubinemia
trans from VS
transfer from [facility]

## 2023-10-18 NOTE — PROVIDER CONTACT NOTE (OTHER) - BACKGROUND
Dx Primary Malignant Neoplasm, Metastatic colo cancer mets to liver
Patient with a history of metastatic colon cancer with metastasis to the liver. Admitted with yellowing skin and found to have hyperbilirubinemia.
pt admitted for jaundice, fever and hyperbilirubinemia.

## 2023-10-18 NOTE — PROGRESS NOTE ADULT - PROBLEM/PLAN-12
DISPLAY PLAN FREE TEXT
100
DISPLAY PLAN FREE TEXT

## 2023-10-18 NOTE — PROGRESS NOTE ADULT - SUBJECTIVE AND OBJECTIVE BOX
Patient is a 58y old  Male who presents with a chief complaint of New onset jaundice and fatigue, with sepsis and abnormal LFTs in the ED (11 Oct 2023 14:35)      SUBJECTIVE / OVERNIGHT EVENTS:    MEDICATIONS  (STANDING):  chlorhexidine 2% Cloths 1 Application(s) Topical daily  chlorhexidine 2% Cloths 1 Application(s) Topical daily  furosemide    Tablet 20 milliGRAM(s) Oral daily  lidocaine   4% Patch 1 Patch Transdermal daily  mirtazapine 7.5 milliGRAM(s) Oral at bedtime  oxyCODONE    IR 5 milliGRAM(s) Oral every 6 hours  polyethylene glycol 3350 17 Gram(s) Oral daily  senna 2 Tablet(s) Oral at bedtime  sodium chloride 0.9%. 1000 milliLiter(s) (50 mL/Hr) IV Continuous <Continuous>    MEDICATIONS  (PRN):  melatonin 3 milliGRAM(s) Oral at bedtime PRN Insomnia  ondansetron Injectable 4 milliGRAM(s) IV Push every 8 hours PRN Nausea and/or Vomiting        CAPILLARY BLOOD GLUCOSE  POCT Blood Glucose.: 75 mg/dL (18 Oct 2023 08:16)  POCT Blood Glucose.: 71 mg/dL (17 Oct 2023 21:26)    I&O's Summary    17 Oct 2023 07:01  -  18 Oct 2023 07:00  --------------------------------------------------------  IN: 750 mL / OUT: 0 mL / NET: 750 mL      Vital Signs Last 24 Hrs    T(F): 98.4 (18 Oct 2023 05:45), Max: 98.9 (17 Oct 2023 21:19)  HR: 112 (18 Oct 2023 05:45) (108 - 116)  BP: 119/80 (18 Oct 2023 05:45) (114/81 - 119/80)  RR: 18 (18 Oct 2023 05:45) (18 - 18)  SpO2: 97% (18 Oct 2023 05:45) (97% - 98%)  room air      PHYSICAL EXAM:  GENERAL: NAD, well-developed  HEAD:  Atraumatic, Normocephalic  EYES: EOMI, PERRLA, conjunctiva and sclera clear  NECK: Supple, No JVD  CHEST/LUNG: Clear to auscultation bilaterally; No wheeze  HEART: Regular rate and rhythm; No murmurs, rubs, or gallops  ABDOMEN: Soft, Nontender, Nondistended;  L Colostomy bag present    Bowel sounds present  EXTREMITIES:  2+ Peripheral Pulses, No clubbing, cyanosis, or edema  PSYCH: AAOx3  NEUROLOGY: non-focal  SKIN: vitalego    LABS:                        10.5   13.34 )-----------( 317      ( 17 Oct 2023 07:41 )             34.0     10-17    143  |  108<H>  |  18  ----------------------------<  72  4.4   |  21<L>  |  0.52    Ca    8.4      17 Oct 2023 07:41    TPro  6.8  /  Alb  2.4<L>  /  TBili  13.8<H>  /  DBili  x   /  AST  243<H>  /  ALT  54<H>  /  AlkPhos  152<H>  10-17          Care Discussed with Consultants/Other Providers:  Id rec stop antibiotics 10/17  Home with hospice today 10/18 Patient is a 58y old  Male who presents with a chief complaint of New onset jaundice and fatigue, with sepsis and abnormal LFTs in the ED (11 Oct 2023 14:35)      SUBJECTIVE / OVERNIGHT EVENTS:  Patient states he is ready for home.  explained all the cultures ever taken since 9/30 are negative  Patient to go home with hospice today     MEDICATIONS  (STANDING):  chlorhexidine 2% Cloths 1 Application(s) Topical daily  chlorhexidine 2% Cloths 1 Application(s) Topical daily  furosemide    Tablet 20 milliGRAM(s) Oral daily  lidocaine   4% Patch 1 Patch Transdermal daily  mirtazapine 7.5 milliGRAM(s) Oral at bedtime  oxyCODONE    IR 5 milliGRAM(s) Oral every 6 hours  polyethylene glycol 3350 17 Gram(s) Oral daily  senna 2 Tablet(s) Oral at bedtime  sodium chloride 0.9%. 1000 milliLiter(s) (50 mL/Hr) IV Continuous <Continuous>    MEDICATIONS  (PRN):  melatonin 3 milliGRAM(s) Oral at bedtime PRN Insomnia  ondansetron Injectable 4 milliGRAM(s) IV Push every 8 hours PRN Nausea and/or Vomiting        CAPILLARY BLOOD GLUCOSE  POCT Blood Glucose.: 75 mg/dL (18 Oct 2023 08:16)  POCT Blood Glucose.: 71 mg/dL (17 Oct 2023 21:26)    I&O's Summary    17 Oct 2023 07:01  -  18 Oct 2023 07:00  --------------------------------------------------------  IN: 750 mL / OUT: 0 mL / NET: 750 mL      Vital Signs Last 24 Hrs    T(F): 98.4 (18 Oct 2023 05:45), Max: 98.9 (17 Oct 2023 21:19)  HR: 112 (18 Oct 2023 05:45) (108 - 116)  BP: 119/80 (18 Oct 2023 05:45) (114/81 - 119/80)  RR: 18 (18 Oct 2023 05:45) (18 - 18)  SpO2: 97% (18 Oct 2023 05:45) (97% - 98%)  room air      PHYSICAL EXAM:  GENERAL: NAD, well-developed  HEAD:  Atraumatic, Normocephalic  EYES: EOMI, PERRLA, conjunctiva and sclera clear  NECK: Supple, No JVD  CHEST/LUNG: Clear to auscultation bilaterally; No wheeze  HEART: Regular rate and rhythm; No murmurs, rubs, or gallops  ABDOMEN: Soft, Nontender, Nondistended;  L Colostomy bag present    Bowel sounds present  EXTREMITIES:  2+ Peripheral Pulses, No clubbing, cyanosis, or edema  PSYCH: AAOx3  NEUROLOGY: non-focal  SKIN: vitalego    LABS:                        10.5   13.34 )-----------( 317      ( 17 Oct 2023 07:41 )             34.0     10-17    143  |  108<H>  |  18  ----------------------------<  72  4.4   |  21<L>  |  0.52    Ca    8.4      17 Oct 2023 07:41    TPro  6.8  /  Alb  2.4<L>  /  TBili  13.8<H>  /  DBili  x   /  AST  243<H>  /  ALT  54<H>  /  AlkPhos  152<H>  10-17          Care Discussed with Consultants/Other Providers:  Id rec stop antibiotics 10/17  Home with hospice today 10/18

## 2023-10-18 NOTE — PROGRESS NOTE ADULT - NUTRITIONAL ASSESSMENT
This patient has been assessed with a concern for Malnutrition and has been determined to have a diagnosis/diagnoses of Severe protein-calorie malnutrition.    This patient is being managed with:   Diet Regular-  Supplement Feeding Modality:  Oral  Ensure Plus High Protein Cans or Servings Per Day:  1       Frequency:  Two Times a day  Entered: Oct  9 2023  3:24PM  

## 2023-10-18 NOTE — PROVIDER CONTACT NOTE (OTHER) - ASSESSMENT
TACHYCARDIC, otherwise BP, ORAL TEMP, respiration and oxygen WNL. FS checked and blood sugar WNL.  Pt denies SOB, chest palpitation, anxiety.  Pt says he is excited to be going home.
Denies shiver, no diaphoresis, /68, P-110, RR 17, O2 sat 97%RA
Patient has an oral temperature of 100.5 F.

## 2023-10-18 NOTE — PROGRESS NOTE ADULT - PROBLEM SELECTOR PROBLEM 3
LFTs abnormal
Sepsis
Counseling regarding goals of care
Anemia
Counseling regarding goals of care
Sepsis
Counseling regarding goals of care
Counseling regarding goals of care
LFTs abnormal
Anemia
Counseling regarding goals of care
Back pain
Anemia
Sepsis
LFTs abnormal
Sepsis
Sepsis

## 2023-10-18 NOTE — PROGRESS NOTE ADULT - PROBLEM SELECTOR PLAN 12
- Appreciate COLETTE sheridan  - Continuing Ensure Plus HP BID
- Appreciate COLETTE sheridan  - Continuing Ensure Plus HP BID    Discharge planning  Patient d/c to home with hospice 10/18.  Already has equipment at home  already has feeds  will f/u HOME HOSPICE  60 min to coord d/c
- Appreciate COLETTE sheridan  - Continuing Ensure Plus HP BID

## 2023-10-18 NOTE — PROGRESS NOTE ADULT - ASSESSMENT
58M PMH vitiligo, pre-DM, gastritis on PPI, and met colon adenoca p/f onc clinic for new-onset jaundice and fatigue, with sepsis in ED and abnormal LFTs noted on admission labs. His hospital course has also been c/b ascites, cancer-related abd pain, and SBP with recurrence in sepsis after finishing antibiotics.    c/w Iv Ertapenam day#4 on 10/17. culture negative. ct a/p negative. Id stopped antibiotics 10/17  ID greatly appreciated.

## 2023-10-18 NOTE — PROGRESS NOTE ADULT - PROBLEM SELECTOR PLAN 1
Dx in 1/2021 with mets to liver, RLL; pan-GIAN WT, IFTIKHAR, TMB 5, TP53 mut; disease course c/b LBO s/p resection and ostomy in 3/2021; progressed after FOLFOX (Oxali dced 7/2021) > FOLFIRI/Day (tx c/b coronary vasospasm in 12/2022 2/2 5FU requiring cardiac optimization) > most recently on Irinotecan/Panitumumab since 7/2023, last given 9/15/23 and was due 9/29/23 (no record of having been given i/p).  - Presentation most c/w progression of disease, although no new lesions present on MRCP pt has extensive tumor burden in the liver that is presumably infiltrative  Plan:  - Pt is not a candidate for continuation of cancer tx i/s/o irreversible liver failure  - Hospice is appropriate (d/w Dr. Ospina), to which pt is amenable (GOC addressed as described below)  - Long term prognosis is poor: weeks to short months at best; days to weeks in the event of another acute clinical insult  10/17 patient notes he was going home with HOSPICE. Palliative confirmed. Equipment already in home as por CM

## 2023-10-18 NOTE — PROGRESS NOTE ADULT - PROBLEM SELECTOR PLAN 10
- Currently well controlled  - Continuing Oxy IR 5mg q6 standing (ok for pt to refuse) with bowel regimen to prevent OIC  - Continuing Lidocaine patch prn
- Better controlled  - Continuing Oxy IR 5mg q6 standing (ok for pt to refuse) with bowel regimen to prevent OIC  - Continuing Lidocaine patch prn
- Planning to address GOC and hospice placement this weekend
- Currently well controlled  - Continuing Oxy IR 5mg q6 standing (ok for pt to refuse) with bowel regimen to prevent OIC  - Continuing Lidocaine patch prn
- Better controlled  - Continuing Oxy IR 5mg q6 standing (ok for pt to refuse) with bowel regimen to prevent OIC  - Continuing Lidocaine patch prn
- Currently well controlled  - Continuing Oxy IR 5mg q6 standing (ok for pt to refuse) with bowel regimen to prevent OIC  - Continuing Lidocaine patch prn
- Planning to address GOC and hospice placement this weekend
- Currently well controlled  - Continuing Oxy IR 5mg q6 standing (ok for pt to refuse) with bowel regimen to prevent OIC  - Continuing Lidocaine patch prn
- Currently well controlled  - Continuing Oxy IR 5mg q6 standing (ok for pt to refuse) with bowel regimen to prevent OIC  - Continuing Lidocaine patch prn
- Planning to address GOC and hospice placement this weekend

## 2023-10-18 NOTE — CHART NOTE - NSCHARTNOTEFT_GEN_A_CORE
Pt planned for DC w/ home hospice today at 2pm.   At time of transport arrival, pt noted w/ HR of 135. Pt currently asymptomatic, other VSS.  EKG obtained shows sinus tachycardia, no acute ischemic changes.  Tachycardia likely 2/2 deconditioing, pt cleared for DC to home w/ home hospice.  Dr. Wynn made aware and agrees to plan.    Toni Nava PA-C  pager [phone]

## 2023-10-18 NOTE — CHART NOTE - NSCHARTNOTESELECT_GEN_ALL_CORE
ACP/Event Note
Event Note
Nutrition follow-up note/Nutrition Services
Palliative Care/Event Note
ACP/Event Note
Event Note
GI/Event Note
IPT consult/Event Note
IPT/Event Note
POCUS/Event Note

## 2023-10-18 NOTE — CHART NOTE - NSCHARTNOTEFT_GEN_A_CORE
Source: Patient [X]    Family [ ]     other [ X] electronic chart, RN     Diet : Diet, Regular:   Supplement Feeding Modality:  Oral  Ensure Plus High Protein Cans or Servings Per Day:  1       Frequency:  Two Times a day (10-09-23 @ 15:25)      Nutrition follow-up note, severe malnutrition. 58 year old male with metastatic colon cancer on active chemotherapy (Avastin, Irinotecan, Leucovorin, and 5FU CADD) presenting initially to [facility] with jaundice with CT showing metastatic lesions in the liver and acute pancreatitis. Transferred to [facility] for advance GI evaluation of possible obstructing pathology in the setting of hyperbilirubinemia and fever. As per     Current Weight:   % Weight Change    Pertinent Medications: MEDICATIONS  (STANDING):  chlorhexidine 2% Cloths 1 Application(s) Topical daily  chlorhexidine 2% Cloths 1 Application(s) Topical daily  furosemide    Tablet 20 milliGRAM(s) Oral daily  lidocaine   4% Patch 1 Patch Transdermal daily  mirtazapine 7.5 milliGRAM(s) Oral at bedtime  oxyCODONE    IR 5 milliGRAM(s) Oral every 6 hours  polyethylene glycol 3350 17 Gram(s) Oral daily  senna 2 Tablet(s) Oral at bedtime  sodium chloride 0.9%. 1000 milliLiter(s) (50 mL/Hr) IV Continuous <Continuous>    MEDICATIONS  (PRN):  melatonin 3 milliGRAM(s) Oral at bedtime PRN Insomnia  ondansetron Injectable 4 milliGRAM(s) IV Push every 8 hours PRN Nausea and/or Vomiting    Pertinent Labs:  10-17 Na143 mmol/L Glu 72 mg/dL K+ 4.4 mmol/L Cr  0.52 mg/dL BUN 18 mg/dL 10-12 Phos 1.9 mg/dL<L> 10-17 Alb 2.4 g/dL<L> 09-27 Chol 180 mg/dL LDL --    HDL 14 mg/dL<L> Trig 141 mg/dL      Skin:     Estimated Needs:   [ ] no change since previous assessment  [ ] recalculated:     Previous Nutrition Diagnosis:     Nutrition Diagnosis is [ ] ongoing  [ ] resolved [ ] not applicable     New Nutrition Diagnosis:     Education:    [  ] Given today    Type of education provided:    [  ] Given on previous assessment by RD    [  ] Not applicable 2/2 cognitive deficit    [  ] Pt refusal of education offered    [  ] Not applicable 2/2 current prognosis    [  ] Not warranted at present    Recommend    Monitoring and Evaluation:     [ ] PO intake [ ] Tolerance to diet prescription [ ] weights [ ] follow up per protocol    Prateek Ca, MS, CDN, RDN     pager [phone] or TEAMS Source: Patient [X]    Family [ ]     other [ X] electronic chart, RN     Diet : Diet, Regular:   Supplement Feeding Modality:  Oral  Ensure Plus High Protein Cans or Servings Per Day:  1       Frequency:  Two Times a day (10-09-23 @ 15:25)      Nutrition follow-up note, severe malnutrition. 58 year old male with metastatic colon cancer on active chemotherapy (Avastin, Irinotecan, Leucovorin, and 5FU CADD) presenting initially to [facility] with jaundice with CT showing metastatic lesions in the liver and acute pancreatitis. Transferred to [facility] for advance GI evaluation of possible obstructing pathology in the setting of hyperbilirubinemia and fever. Pt is not a candidate for continuation of cancer treatment with irreversible liver failure. As per Goc/palliative care note on 10/16-Plan for home hospice dispo pending medical stability. Patient is fully aware of hospice transition after hospitalization.    Met with patient at bedside. Patient reports poor po intake, consuming mostly 26-50% po intake per nursing flowsheet at this time.  Patient reports consuming about 1-2 containers of Ensure plus daily. No nausea/vomiting/diarrhea/constipation or difficulty chewing and swallowing. +Colostomy- no record of output noted in I&O. Continue small frequent po intake as tolerated encouraged. No questions or concerns voiced at this time. RD remains available, patient made aware.     Weight: 10/4  60.8kg/134lbs     9/30 61.2kg/134.9lbs  Weight relatively stable. No significant weight change noted.     Pertinent Medications: MEDICATIONS  (STANDING):  chlorhexidine 2% Cloths 1 Application(s) Topical daily  chlorhexidine 2% Cloths 1 Application(s) Topical daily  furosemide    Tablet 20 milliGRAM(s) Oral daily  lidocaine   4% Patch 1 Patch Transdermal daily  mirtazapine 7.5 milliGRAM(s) Oral at bedtime  oxyCODONE    IR 5 milliGRAM(s) Oral every 6 hours  polyethylene glycol 3350 17 Gram(s) Oral daily  senna 2 Tablet(s) Oral at bedtime  sodium chloride 0.9%. 1000 milliLiter(s) (50 mL/Hr) IV Continuous <Continuous>    MEDICATIONS  (PRN):  melatonin 3 milliGRAM(s) Oral at bedtime PRN Insomnia  ondansetron Injectable 4 milliGRAM(s) IV Push every 8 hours PRN Nausea and/or Vomiting    Pertinent Labs:  10-17 Na143 mmol/L Glu 72 mg/dL K+ 4.4 mmol/L Cr  0.52 mg/dL BUN 18 mg/dL 10-12 Phos 1.9 mg/dL<L> 10-17 Alb 2.4 g/dL<L> 09-27 Chol 180 mg/dL LDL --    HDL 14 mg/dL<L> Trig 141 mg/dL      Skin: No pressure ulcers/DTI noted in flowsheets.   1+ b/l foot edema per nursing flowsheet.     Estimated Needs:   [ X] no change since previous assessment  [ ] recalculated:     Previous Nutrition Diagnosis: Severe malnutrition    Nutrition Diagnosis is [ X] ongoing  [ ] resolved [ ] not applicable     New Nutrition Diagnosis: not applicable    Education:    [ X ] Given today- mentioned above    Recommend:  1. Continue current diet order, which remains appropriate at this time.   2. Monitor weights, labs, BM's, skin integrity, p.o. intake.   3. Please document % PO intake in nursing flowsheet.   4. Honor food and beverage preferences within diet restriction of patient in an effort to maximize level of nutrient intake.   5. Please Encourage po intake, assist with meals and menu selections, provide alternatives PRN.     Monitoring and Evaluation:     [X ] PO intake [X ] Tolerance to diet prescription [X ] weights    Prateek Ca, MS, CDN, RDN     pager [phone] or TEAMS

## 2023-10-18 NOTE — PROGRESS NOTE ADULT - PROBLEM SELECTOR PLAN 3
- recommended to pt on 10/6 to which he was amenable, but also noted that he was seeking 2nd opinion for alternative medicine tx options  - Code status was also d/w pt: he iterated that he has not thought about his code status or other advance directives, and that he would like to remain full code for now   -  referral for home hospice enrollment in process - recommended to pt on 10/6 to which he was amenable, but also noted that he was seeking 2nd opinion for alternative medicine tx options  - Code status was also d/w pt: he iterated that he has not thought about his code status or other advance directives, and that he would like to remain full code for now   - SW referral for home hospice enrollment in process  d/w CM Patient already has hospice equipment at home

## 2023-10-18 NOTE — PROGRESS NOTE ADULT - PROBLEM SELECTOR PROBLEM 12
Severe protein-calorie malnutrition

## 2023-10-18 NOTE — PROVIDER CONTACT NOTE (OTHER) - ACTION/TREATMENT ORDERED:
Provider made aware. EKG ordered. Provider made aware. EKG ordered and preformed. Provider okay with patients VS, okay to discharge.

## 2023-10-18 NOTE — PROGRESS NOTE ADULT - PROVIDER SPECIALTY LIST ADULT
Hospitalist
Infectious Disease
Infectious Disease
Heme/Onc
Heme/Onc
Hospitalist
Gastroenterology
Palliative Care
Hospitalist
Heme/Onc
Hospitalist
Hospitalist
Heme/Onc
Heme/Onc
Hospitalist
Heme/Onc
Hospitalist
Hospitalist
Heme/Onc
Heme/Onc
Hospitalist
Hospitalist

## 2023-10-21 LAB
CULTURE RESULTS: SIGNIFICANT CHANGE UP
SPECIMEN SOURCE: SIGNIFICANT CHANGE UP

## 2023-10-27 ENCOUNTER — APPOINTMENT (OUTPATIENT)
Dept: CARDIOLOGY | Facility: CLINIC | Age: 58
End: 2023-10-27

## 2023-10-27 NOTE — PROGRESS NOTE ADULT - ATTENDING COMMENTS
Problem: Chronic Conditions and Co-morbidities  Goal: Patient's chronic conditions and co-morbidity symptoms are monitored and maintained or improved  10/27/2023 0933 by Patel Montalvo  Outcome: Progressing  10/27/2023 0622 by Stephanie Butler RN  Outcome: Progressing     Problem: Pain  Goal: Verbalizes/displays adequate comfort level or baseline comfort level  Outcome: Progressing     Problem: Safety - Adult  Goal: Free from fall injury  Outcome: Progressing I have reviewed the case in detail with Dr. Echols, the oncology consult fellow and agree with the assessment and recommendations as outlined in the note. Palliative surgery for LBO and colonic stent removal scheduled with surgical oncology tomorrow. Bacteremia management per ID and primary team. Will continue to follow with you.

## 2024-01-11 NOTE — ED ADULT NURSE NOTE - PAIN RATING/NUMBER SCALE (0-10): ACTIVITY
<--- Click to Launch ICDx for PreOp, PostOp and Procedure 0 (no pain/absence of nonverbal indicators of pain)

## 2024-01-13 NOTE — PROVIDER CONTACT NOTE (CRITICAL VALUE NOTIFICATION) - DATE AND TIME:
15-Mar-2021 08:30 Assumed care at 0700. Bedside report received from Ella. Patient's chart and MAR reviewed. Pt denies pain at this time. Pt is A & O 4. Patient was updated on plan of care for the day. Questions answered and concerns addressed.  Pt denies any additional needs at this time. White board updated. Call light, phone and personal belongings within reach.    15-Mar-2021 08:52

## 2024-11-22 NOTE — PROVIDER CONTACT NOTE (OTHER) - REASON
Patient is febrile of 100.4
Oral Temp 102.3
Patient has a fever of 101.1 Orally
Patient has a fever of100.4 Orally
Patient has a fever of 100.5
Patient noted with small black/ tarry formed stool.
Oral temp of 101.8 and patient refusing zosyn
Oral temp of 102.6 and patient refusing zosyn
Patient Febrile
Patient has a fever of100.8 Orally
patient HR went upto 130's while ambulating to the bathroom. At rest HR 90's.
Post Blood Transfusion. Temp noted 100.6, 
HR went to 146 Oral Temp 102.7
Yes

## 2025-01-13 NOTE — PHYSICAL EXAM
Severe esophagitis present on recent EGD.  Gastroenterology has been consulted who have reviewed the scans in detail, seen the patient on 1/6/25 and agree there may be minor esophageal injury with their recommendations including no further intervention at this time required, continuing antibiotics and no need to perform esophagram.  Appreciate Dr. Lora (gastroenterology) seeing the patient and giving her recommendations.   [Fully active, able to carry on all pre-disease performance without restriction] : Status 0 - Fully active, able to carry on all pre-disease performance without restriction [Thin] : thin [Normal] : affect appropriate [de-identified] : anicteric  [de-identified] : no JVD, no swelling or tenderness, FROM intact without pain [de-identified] : normal respiratory effort, no audible wheeze [de-identified] : reg rate  [de-identified] : no LE edema; mediport in right chest wall without pain, swelling, tenderness or erythema [de-identified] : + ostomy, soft, non-tender [de-identified] : no midline cervical or thoracic tenderness or deformity [de-identified] : + palpable muscle spasm with tenderness to palpation over right scapular and upper thoracic, paraspinal area; no deformity; FROM intact RUE with some pain on full right shoulder abduction [de-identified] : vitiligo [de-identified] : grade 1 neuropathy in fingers and toes

## 2025-01-21 NOTE — PROGRESS NOTE ADULT - PROBLEM SELECTOR PLAN 4
- Likely malignant  - IR consulted for LVP with cytology > procedure deferred for now for mgmt of recurrent fever Acute hyponatremia - Likely malignant  - IR consulted for LVP with cytology > procedure deferred for now for mgmt of recurrent fever/sepsis

## 2025-05-14 NOTE — PROVIDER CONTACT NOTE (OTHER) - DATE AND TIME:
21-Jan-2021 22:30
26-Jan-2021 20:35
20-Jan-2021 22:32
21-Jan-2021 22:45
24-Jan-2021 18:17
24-Jan-2021 21:02
26-Jan-2021 05:15
26-Jan-2021 13:20
27-Jan-2021 06:16
28-Jan-2021 21:30
21-Jan-2021 17:15
28-Jan-2021 20:55
21-Jan-2021 22:45
right upper arm
